# Patient Record
Sex: FEMALE | Race: WHITE | NOT HISPANIC OR LATINO | Employment: OTHER | URBAN - METROPOLITAN AREA
[De-identification: names, ages, dates, MRNs, and addresses within clinical notes are randomized per-mention and may not be internally consistent; named-entity substitution may affect disease eponyms.]

---

## 2017-03-24 ENCOUNTER — ALLSCRIPTS OFFICE VISIT (OUTPATIENT)
Dept: OTHER | Facility: OTHER | Age: 62
End: 2017-03-24

## 2017-04-11 ENCOUNTER — ALLSCRIPTS OFFICE VISIT (OUTPATIENT)
Dept: OTHER | Facility: OTHER | Age: 62
End: 2017-04-11

## 2017-07-21 ENCOUNTER — ALLSCRIPTS OFFICE VISIT (OUTPATIENT)
Dept: OTHER | Facility: OTHER | Age: 62
End: 2017-07-21

## 2017-09-25 ENCOUNTER — APPOINTMENT (EMERGENCY)
Dept: CT IMAGING | Facility: HOSPITAL | Age: 62
End: 2017-09-25
Payer: MEDICARE

## 2017-09-25 ENCOUNTER — APPOINTMENT (EMERGENCY)
Dept: RADIOLOGY | Facility: HOSPITAL | Age: 62
End: 2017-09-25
Payer: MEDICARE

## 2017-09-25 ENCOUNTER — HOSPITAL ENCOUNTER (EMERGENCY)
Facility: HOSPITAL | Age: 62
Discharge: HOME | End: 2017-09-25
Attending: EMERGENCY MEDICINE | Admitting: EMERGENCY MEDICINE
Payer: MEDICARE

## 2017-09-25 VITALS
HEIGHT: 64 IN | HEART RATE: 69 BPM | TEMPERATURE: 99.1 F | SYSTOLIC BLOOD PRESSURE: 115 MMHG | OXYGEN SATURATION: 94 % | RESPIRATION RATE: 16 BRPM | DIASTOLIC BLOOD PRESSURE: 71 MMHG | WEIGHT: 181.88 LBS | BODY MASS INDEX: 31.05 KG/M2
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
## 2017-09-25 DIAGNOSIS — E04.1 THYROID NODULE: ICD-10-CM

## 2017-09-25 DIAGNOSIS — S02.92XA FACIAL FRACTURE DUE TO FALL (HCC): ICD-10-CM

## 2017-09-25 LAB
ALBUMIN SERPL BCP-MCNC: 3.5 G/DL (ref 3.5–5)
ALP SERPL-CCNC: 112 U/L (ref 46–116)
ALT SERPL W P-5'-P-CCNC: 20 U/L (ref 12–78)
ANION GAP SERPL CALCULATED.3IONS-SCNC: 7 MMOL/L (ref 4–13)
APTT PPP: 30 SECONDS (ref 23–35)
AST SERPL W P-5'-P-CCNC: 18 U/L (ref 5–45)
BASOPHILS # BLD AUTO: 0.04 THOUSANDS/ΜL (ref 0–0.1)
BASOPHILS NFR BLD AUTO: 1 % (ref 0–1)
BILIRUB SERPL-MCNC: 0.5 MG/DL (ref 0.2–1)
BUN SERPL-MCNC: 10 MG/DL (ref 5–25)
CALCIUM SERPL-MCNC: 8.4 MG/DL (ref 8.3–10.1)
CHLORIDE SERPL-SCNC: 101 MMOL/L (ref 100–108)
CO2 SERPL-SCNC: 31 MMOL/L (ref 21–32)
CREAT SERPL-MCNC: 1.08 MG/DL (ref 0.6–1.3)
EOSINOPHIL # BLD AUTO: 0.09 THOUSAND/ΜL (ref 0–0.61)
EOSINOPHIL NFR BLD AUTO: 1 % (ref 0–6)
ERYTHROCYTE [DISTWIDTH] IN BLOOD BY AUTOMATED COUNT: 12.1 % (ref 11.6–15.1)
GFR SERPL CREATININE-BSD FRML MDRD: 56 ML/MIN/1.73SQ M
GLUCOSE SERPL-MCNC: 100 MG/DL (ref 65–140)
HCT VFR BLD AUTO: 35.3 % (ref 34.8–46.1)
HGB BLD-MCNC: 11.8 G/DL (ref 11.5–15.4)
INR PPP: 0.99 (ref 0.86–1.16)
LYMPHOCYTES # BLD AUTO: 2.09 THOUSANDS/ΜL (ref 0.6–4.47)
LYMPHOCYTES NFR BLD AUTO: 28 % (ref 14–44)
MCH RBC QN AUTO: 29.9 PG (ref 26.8–34.3)
MCHC RBC AUTO-ENTMCNC: 33.4 G/DL (ref 31.4–37.4)
MCV RBC AUTO: 89 FL (ref 82–98)
MONOCYTES # BLD AUTO: 0.74 THOUSAND/ΜL (ref 0.17–1.22)
MONOCYTES NFR BLD AUTO: 10 % (ref 4–12)
NEUTROPHILS # BLD AUTO: 4.36 THOUSANDS/ΜL (ref 1.85–7.62)
NEUTS SEG NFR BLD AUTO: 59 % (ref 43–75)
NRBC BLD AUTO-RTO: 0 /100 WBCS
PLATELET # BLD AUTO: 194 THOUSANDS/UL (ref 149–390)
PMV BLD AUTO: 10.1 FL (ref 8.9–12.7)
POTASSIUM SERPL-SCNC: 3.9 MMOL/L (ref 3.5–5.3)
PROT SERPL-MCNC: 7.2 G/DL (ref 6.4–8.2)
PROTHROMBIN TIME: 13.3 SECONDS (ref 12.1–14.4)
RBC # BLD AUTO: 3.95 MILLION/UL (ref 3.81–5.12)
SODIUM SERPL-SCNC: 139 MMOL/L (ref 136–145)
WBC # BLD AUTO: 7.36 THOUSAND/UL (ref 4.31–10.16)

## 2017-09-25 PROCEDURE — 73610 X-RAY EXAM OF ANKLE: CPT

## 2017-09-25 PROCEDURE — 85025 COMPLETE CBC W/AUTO DIFF WBC: CPT | Performed by: EMERGENCY MEDICINE

## 2017-09-25 PROCEDURE — 70450 CT HEAD/BRAIN W/O DYE: CPT

## 2017-09-25 PROCEDURE — 71260 CT THORAX DX C+: CPT

## 2017-09-25 PROCEDURE — 73630 X-RAY EXAM OF FOOT: CPT

## 2017-09-25 PROCEDURE — 70486 CT MAXILLOFACIAL W/O DYE: CPT

## 2017-09-25 PROCEDURE — 72125 CT NECK SPINE W/O DYE: CPT

## 2017-09-25 PROCEDURE — 74177 CT ABD & PELVIS W/CONTRAST: CPT

## 2017-09-25 PROCEDURE — 96372 THER/PROPH/DIAG INJ SC/IM: CPT

## 2017-09-25 PROCEDURE — 96361 HYDRATE IV INFUSION ADD-ON: CPT

## 2017-09-25 PROCEDURE — 80053 COMPREHEN METABOLIC PANEL: CPT | Performed by: EMERGENCY MEDICINE

## 2017-09-25 PROCEDURE — 36415 COLL VENOUS BLD VENIPUNCTURE: CPT | Performed by: EMERGENCY MEDICINE

## 2017-09-25 PROCEDURE — 96374 THER/PROPH/DIAG INJ IV PUSH: CPT

## 2017-09-25 PROCEDURE — 85610 PROTHROMBIN TIME: CPT | Performed by: EMERGENCY MEDICINE

## 2017-09-25 PROCEDURE — 96376 TX/PRO/DX INJ SAME DRUG ADON: CPT

## 2017-09-25 PROCEDURE — 85730 THROMBOPLASTIN TIME PARTIAL: CPT | Performed by: EMERGENCY MEDICINE

## 2017-09-25 PROCEDURE — 90715 TDAP VACCINE 7 YRS/> IM: CPT | Performed by: EMERGENCY MEDICINE

## 2017-09-25 PROCEDURE — 99284 EMERGENCY DEPT VISIT MOD MDM: CPT

## 2017-09-25 RX ORDER — FENTANYL CITRATE 50 UG/ML
50 INJECTION, SOLUTION INTRAMUSCULAR; INTRAVENOUS ONCE
Status: COMPLETED | OUTPATIENT
Start: 2017-09-25 | End: 2017-09-25

## 2017-09-25 RX ORDER — OXYCODONE HYDROCHLORIDE AND ACETAMINOPHEN 5; 325 MG/1; MG/1
1 TABLET ORAL EVERY 4 HOURS PRN
Qty: 15 TABLET | Refills: 0 | Status: SHIPPED | OUTPATIENT
Start: 2017-09-25 | End: 2018-07-02

## 2017-09-25 RX ORDER — OXYCODONE HYDROCHLORIDE AND ACETAMINOPHEN 5; 325 MG/1; MG/1
1 TABLET ORAL ONCE
Status: COMPLETED | OUTPATIENT
Start: 2017-09-25 | End: 2017-09-25

## 2017-09-25 RX ADMIN — IOHEXOL 100 ML: 350 INJECTION, SOLUTION INTRAVENOUS at 13:17

## 2017-09-25 RX ADMIN — OXYCODONE HYDROCHLORIDE AND ACETAMINOPHEN 1 TABLET: 5; 325 TABLET ORAL at 14:30

## 2017-09-25 RX ADMIN — FENTANYL CITRATE 50 MCG: 50 INJECTION INTRAMUSCULAR; INTRAVENOUS at 13:48

## 2017-09-25 RX ADMIN — SODIUM CHLORIDE 1000 ML: 0.9 INJECTION, SOLUTION INTRAVENOUS at 12:18

## 2017-09-25 RX ADMIN — FENTANYL CITRATE 50 MCG: 50 INJECTION INTRAMUSCULAR; INTRAVENOUS at 12:19

## 2017-09-25 RX ADMIN — TETANUS TOXOID, REDUCED DIPHTHERIA TOXOID AND ACELLULAR PERTUSSIS VACCINE, ADSORBED 0.5 ML: 5; 2.5; 8; 8; 2.5 SUSPENSION INTRAMUSCULAR at 14:35

## 2018-01-13 VITALS
DIASTOLIC BLOOD PRESSURE: 52 MMHG | BODY MASS INDEX: 30.32 KG/M2 | OXYGEN SATURATION: 97 % | HEIGHT: 65 IN | SYSTOLIC BLOOD PRESSURE: 102 MMHG | WEIGHT: 182 LBS | HEART RATE: 77 BPM

## 2018-07-02 ENCOUNTER — APPOINTMENT (INPATIENT)
Dept: RADIOLOGY | Facility: HOSPITAL | Age: 63
DRG: 389 | End: 2018-07-02
Payer: MEDICARE

## 2018-07-02 ENCOUNTER — APPOINTMENT (EMERGENCY)
Dept: RADIOLOGY | Facility: HOSPITAL | Age: 63
DRG: 389 | End: 2018-07-02
Payer: MEDICARE

## 2018-07-02 ENCOUNTER — APPOINTMENT (EMERGENCY)
Dept: INTERVENTIONAL RADIOLOGY/VASCULAR | Facility: HOSPITAL | Age: 63
DRG: 389 | End: 2018-07-02
Payer: MEDICARE

## 2018-07-02 ENCOUNTER — APPOINTMENT (EMERGENCY)
Dept: CT IMAGING | Facility: HOSPITAL | Age: 63
DRG: 389 | End: 2018-07-02
Payer: MEDICARE

## 2018-07-02 ENCOUNTER — HOSPITAL ENCOUNTER (INPATIENT)
Facility: HOSPITAL | Age: 63
LOS: 4 days | Discharge: HOME | DRG: 389 | End: 2018-07-06
Attending: EMERGENCY MEDICINE | Admitting: FAMILY MEDICINE
Payer: MEDICARE

## 2018-07-02 ENCOUNTER — TELEPHONE (OUTPATIENT)
Dept: INTERVENTIONAL RADIOLOGY/VASCULAR | Facility: HOSPITAL | Age: 63
End: 2018-07-02

## 2018-07-02 DIAGNOSIS — E87.6 HYPOKALEMIA: ICD-10-CM

## 2018-07-02 DIAGNOSIS — K56.7 ILEUS, POSTOPERATIVE (HCC): Primary | ICD-10-CM

## 2018-07-02 DIAGNOSIS — F11.90 CHRONIC, CONTINUOUS USE OF OPIOIDS: Chronic | ICD-10-CM

## 2018-07-02 DIAGNOSIS — K56.7 ILEUS (HCC): ICD-10-CM

## 2018-07-02 DIAGNOSIS — R74.8 ELEVATED LIVER ENZYMES: ICD-10-CM

## 2018-07-02 DIAGNOSIS — R11.14 BILIOUS VOMITING WITH NAUSEA: ICD-10-CM

## 2018-07-02 DIAGNOSIS — K59.00 CONSTIPATION: ICD-10-CM

## 2018-07-02 DIAGNOSIS — K91.89 ILEUS, POSTOPERATIVE (HCC): Primary | ICD-10-CM

## 2018-07-02 PROBLEM — K83.8 COMMON BILE DUCT DILATATION: Chronic | Status: ACTIVE | Noted: 2018-07-02

## 2018-07-02 PROBLEM — K83.8 COMMON BILE DUCT DILATATION: Status: ACTIVE | Noted: 2018-07-02

## 2018-07-02 PROBLEM — F41.1 GENERALIZED ANXIETY DISORDER: Chronic | Status: ACTIVE | Noted: 2018-07-02

## 2018-07-02 PROBLEM — F41.1 GENERALIZED ANXIETY DISORDER: Status: ACTIVE | Noted: 2018-07-02

## 2018-07-02 LAB
ALBUMIN SERPL BCP-MCNC: 3.4 G/DL (ref 3–5.2)
ALP SERPL-CCNC: 249 U/L (ref 43–122)
ALT SERPL W P-5'-P-CCNC: 68 U/L (ref 9–52)
ANION GAP SERPL CALCULATED.3IONS-SCNC: 8 MMOL/L (ref 5–14)
APTT PPP: 23 SECONDS (ref 23–34)
AST SERPL W P-5'-P-CCNC: 79 U/L (ref 14–36)
ATRIAL RATE: 90 BPM
BASOPHILS # BLD AUTO: 0 THOUSANDS/ΜL (ref 0–0.1)
BASOPHILS NFR BLD AUTO: 0 % (ref 0–1)
BILIRUB SERPL-MCNC: 1.3 MG/DL
BUN SERPL-MCNC: 12 MG/DL (ref 5–25)
CALCIUM SERPL-MCNC: 8.6 MG/DL (ref 8.4–10.2)
CHLORIDE SERPL-SCNC: 95 MMOL/L (ref 97–108)
CO2 SERPL-SCNC: 31 MMOL/L (ref 22–30)
CREAT SERPL-MCNC: 0.54 MG/DL (ref 0.6–1.2)
EOSINOPHIL # BLD AUTO: 0 THOUSAND/ΜL (ref 0–0.4)
EOSINOPHIL NFR BLD AUTO: 0 % (ref 0–6)
ERYTHROCYTE [DISTWIDTH] IN BLOOD BY AUTOMATED COUNT: 13.9 %
GFR SERPL CREATININE-BSD FRML MDRD: 101 ML/MIN/1.73SQ M
GLUCOSE SERPL-MCNC: 122 MG/DL (ref 70–99)
HCT VFR BLD AUTO: 37.6 % (ref 36–46)
HGB BLD-MCNC: 12.6 G/DL (ref 12–16)
INR PPP: 1.11 (ref 0.89–1.1)
LACTATE SERPL-SCNC: 0.6 MMOL/L (ref 0.7–2)
LIPASE SERPL-CCNC: 72 U/L (ref 23–300)
LYMPHOCYTES # BLD AUTO: 0.5 THOUSANDS/ΜL (ref 0.5–4)
LYMPHOCYTES NFR BLD AUTO: 7 % (ref 20–50)
MAGNESIUM SERPL-MCNC: 2.1 MG/DL (ref 1.6–2.3)
MCH RBC QN AUTO: 29.1 PG (ref 26–34)
MCHC RBC AUTO-ENTMCNC: 33.4 G/DL (ref 31–36)
MCV RBC AUTO: 87 FL (ref 80–100)
MONOCYTES # BLD AUTO: 0.7 THOUSAND/ΜL (ref 0.2–0.9)
MONOCYTES NFR BLD AUTO: 9 % (ref 1–10)
NEUTROPHILS # BLD AUTO: 6.4 THOUSANDS/ΜL (ref 1.8–7.8)
NEUTS SEG NFR BLD AUTO: 83 % (ref 45–65)
P AXIS: 22 DEGREES
PHOSPHATE SERPL-MCNC: 3.4 MG/DL (ref 2.5–4.8)
PLATELET # BLD AUTO: 319 THOUSANDS/UL (ref 150–450)
PMV BLD AUTO: 8.1 FL (ref 8.9–12.7)
POTASSIUM SERPL-SCNC: 3.4 MMOL/L (ref 3.6–5)
PR INTERVAL: 156 MS
PROT SERPL-MCNC: 6.6 G/DL (ref 5.9–8.4)
PROTHROMBIN TIME: 11.7 SECONDS (ref 9.5–11.6)
QRS AXIS: 14 DEGREES
QRSD INTERVAL: 74 MS
QT INTERVAL: 372 MS
QTC INTERVAL: 455 MS
RBC # BLD AUTO: 4.32 MILLION/UL (ref 4–5.2)
SODIUM SERPL-SCNC: 134 MMOL/L (ref 137–147)
T WAVE AXIS: 14 DEGREES
TROPONIN I SERPL-MCNC: <0.01 NG/ML (ref 0–0.03)
VENTRICULAR RATE: 90 BPM
WBC # BLD AUTO: 7.6 THOUSAND/UL (ref 4.5–11)

## 2018-07-02 PROCEDURE — 74177 CT ABD & PELVIS W/CONTRAST: CPT

## 2018-07-02 PROCEDURE — 93010 ELECTROCARDIOGRAM REPORT: CPT | Performed by: INTERNAL MEDICINE

## 2018-07-02 PROCEDURE — 76000 FLUOROSCOPY <1 HR PHYS/QHP: CPT

## 2018-07-02 PROCEDURE — 36415 COLL VENOUS BLD VENIPUNCTURE: CPT | Performed by: PHYSICIAN ASSISTANT

## 2018-07-02 PROCEDURE — 84484 ASSAY OF TROPONIN QUANT: CPT | Performed by: PHYSICIAN ASSISTANT

## 2018-07-02 PROCEDURE — 36569 INSJ PICC 5 YR+ W/O IMAGING: CPT

## 2018-07-02 PROCEDURE — 76937 US GUIDE VASCULAR ACCESS: CPT

## 2018-07-02 PROCEDURE — 99223 1ST HOSP IP/OBS HIGH 75: CPT | Performed by: HOSPITALIST

## 2018-07-02 PROCEDURE — 96361 HYDRATE IV INFUSION ADD-ON: CPT

## 2018-07-02 PROCEDURE — 96375 TX/PRO/DX INJ NEW DRUG ADDON: CPT

## 2018-07-02 PROCEDURE — 85025 COMPLETE CBC W/AUTO DIFF WBC: CPT | Performed by: PHYSICIAN ASSISTANT

## 2018-07-02 PROCEDURE — 85730 THROMBOPLASTIN TIME PARTIAL: CPT | Performed by: PHYSICIAN ASSISTANT

## 2018-07-02 PROCEDURE — 83605 ASSAY OF LACTIC ACID: CPT | Performed by: PHYSICIAN ASSISTANT

## 2018-07-02 PROCEDURE — 93005 ELECTROCARDIOGRAM TRACING: CPT

## 2018-07-02 PROCEDURE — 84100 ASSAY OF PHOSPHORUS: CPT | Performed by: HOSPITALIST

## 2018-07-02 PROCEDURE — 85610 PROTHROMBIN TIME: CPT | Performed by: PHYSICIAN ASSISTANT

## 2018-07-02 PROCEDURE — 02HV33Z INSERTION OF INFUSION DEVICE INTO SUPERIOR VENA CAVA, PERCUTANEOUS APPROACH: ICD-10-PCS | Performed by: FAMILY MEDICINE

## 2018-07-02 PROCEDURE — C1751 CATH, INF, PER/CENT/MIDLINE: HCPCS

## 2018-07-02 PROCEDURE — 83735 ASSAY OF MAGNESIUM: CPT | Performed by: HOSPITALIST

## 2018-07-02 PROCEDURE — 96365 THER/PROPH/DIAG IV INF INIT: CPT

## 2018-07-02 PROCEDURE — 71045 X-RAY EXAM CHEST 1 VIEW: CPT

## 2018-07-02 PROCEDURE — 96376 TX/PRO/DX INJ SAME DRUG ADON: CPT

## 2018-07-02 PROCEDURE — 83690 ASSAY OF LIPASE: CPT | Performed by: PHYSICIAN ASSISTANT

## 2018-07-02 PROCEDURE — 99285 EMERGENCY DEPT VISIT HI MDM: CPT

## 2018-07-02 PROCEDURE — 80053 COMPREHEN METABOLIC PANEL: CPT | Performed by: PHYSICIAN ASSISTANT

## 2018-07-02 PROCEDURE — 0T9B70Z DRAINAGE OF BLADDER WITH DRAINAGE DEVICE, VIA NATURAL OR ARTIFICIAL OPENING: ICD-10-PCS | Performed by: FAMILY MEDICINE

## 2018-07-02 RX ORDER — ONDANSETRON 2 MG/ML
INJECTION INTRAMUSCULAR; INTRAVENOUS
Status: DISPENSED
Start: 2018-07-02 | End: 2018-07-03

## 2018-07-02 RX ORDER — IBUPROFEN 200 MG
CAPSULE ORAL
COMMUNITY
Start: 2017-11-24 | End: 2018-12-02

## 2018-07-02 RX ORDER — ACETAMINOPHEN 325 MG/1
650 TABLET ORAL EVERY 6 HOURS PRN
Status: DISCONTINUED | OUTPATIENT
Start: 2018-07-02 | End: 2018-07-06 | Stop reason: HOSPADM

## 2018-07-02 RX ORDER — MORPHINE SULFATE 15 MG/1
15 TABLET ORAL EVERY 4 HOURS PRN
Status: COMPLETED | OUTPATIENT
Start: 2018-07-02 | End: 2018-07-04

## 2018-07-02 RX ORDER — MORPHINE SULFATE 60 MG/1
30 TABLET, FILM COATED, EXTENDED RELEASE ORAL
COMMUNITY
End: 2018-07-06 | Stop reason: HOSPADM

## 2018-07-02 RX ORDER — SODIUM CHLORIDE 9 MG/ML
100 INJECTION, SOLUTION INTRAVENOUS CONTINUOUS
Status: DISPENSED | OUTPATIENT
Start: 2018-07-02 | End: 2018-07-03

## 2018-07-02 RX ORDER — SENNOSIDES 8.6 MG
1 TABLET ORAL DAILY
Status: DISCONTINUED | OUTPATIENT
Start: 2018-07-03 | End: 2018-07-06 | Stop reason: HOSPADM

## 2018-07-02 RX ORDER — GABAPENTIN 300 MG/1
600 CAPSULE ORAL 2 TIMES DAILY
COMMUNITY
Start: 2017-11-06 | End: 2021-01-01 | Stop reason: SDUPTHER

## 2018-07-02 RX ORDER — DOCUSATE SODIUM 100 MG/1
100 CAPSULE, LIQUID FILLED ORAL 2 TIMES DAILY
Status: DISCONTINUED | OUTPATIENT
Start: 2018-07-02 | End: 2018-07-06 | Stop reason: HOSPADM

## 2018-07-02 RX ORDER — CETIRIZINE HYDROCHLORIDE 10 MG/1
TABLET ORAL
COMMUNITY
Start: 2017-10-23 | End: 2019-02-26

## 2018-07-02 RX ORDER — DIAZEPAM 5 MG/1
TABLET ORAL 3 TIMES DAILY
COMMUNITY
Start: 2017-11-03 | End: 2021-02-04 | Stop reason: HOSPADM

## 2018-07-02 RX ORDER — ONDANSETRON 2 MG/ML
INJECTION INTRAMUSCULAR; INTRAVENOUS
Status: COMPLETED
Start: 2018-07-02 | End: 2018-07-02

## 2018-07-02 RX ORDER — ONDANSETRON 2 MG/ML
4 INJECTION INTRAMUSCULAR; INTRAVENOUS EVERY 6 HOURS PRN
Status: DISCONTINUED | OUTPATIENT
Start: 2018-07-02 | End: 2018-07-06 | Stop reason: HOSPADM

## 2018-07-02 RX ORDER — MORPHINE SULFATE 15 MG/1
15 TABLET ORAL EVERY 4 HOURS
COMMUNITY
End: 2018-07-02

## 2018-07-02 RX ORDER — ESCITALOPRAM OXALATE 20 MG/1
TABLET ORAL
COMMUNITY
Start: 2017-10-23 | End: 2021-01-01 | Stop reason: SDUPTHER

## 2018-07-02 RX ORDER — POTASSIUM CHLORIDE 14.9 MG/ML
10 INJECTION INTRAVENOUS ONCE
Status: DISCONTINUED | OUTPATIENT
Start: 2018-07-02 | End: 2018-07-02

## 2018-07-02 RX ORDER — TEMAZEPAM 30 MG/1
30 CAPSULE ORAL
Status: ON HOLD | COMMUNITY
Start: 2017-11-28 | End: 2019-11-03 | Stop reason: ALTCHOICE

## 2018-07-02 RX ORDER — ONDANSETRON 2 MG/ML
4 INJECTION INTRAMUSCULAR; INTRAVENOUS ONCE
Status: COMPLETED | OUTPATIENT
Start: 2018-07-02 | End: 2018-07-02

## 2018-07-02 RX ADMIN — ONDANSETRON 4 MG: 2 INJECTION, SOLUTION INTRAMUSCULAR; INTRAVENOUS at 12:06

## 2018-07-02 RX ADMIN — HYDROMORPHONE HYDROCHLORIDE 0.5 MG: 1 INJECTION, SOLUTION INTRAMUSCULAR; INTRAVENOUS; SUBCUTANEOUS at 20:48

## 2018-07-02 RX ADMIN — SODIUM CHLORIDE 1000 ML: 9 INJECTION, SOLUTION INTRAVENOUS at 16:53

## 2018-07-02 RX ADMIN — POTASSIUM CHLORIDE 20 MEQ: 200 INJECTION, SOLUTION INTRAVENOUS at 17:49

## 2018-07-02 RX ADMIN — DOCUSATE SODIUM 100 MG: 100 CAPSULE, LIQUID FILLED ORAL at 22:23

## 2018-07-02 RX ADMIN — ONDANSETRON 4 MG: 2 INJECTION, SOLUTION INTRAMUSCULAR; INTRAVENOUS at 20:49

## 2018-07-02 RX ADMIN — HYDROMORPHONE HYDROCHLORIDE 1 MG: 1 INJECTION, SOLUTION INTRAMUSCULAR; INTRAVENOUS; SUBCUTANEOUS at 15:52

## 2018-07-02 RX ADMIN — IOHEXOL 100 ML: 350 INJECTION, SOLUTION INTRAVENOUS at 17:24

## 2018-07-02 RX ADMIN — SODIUM CHLORIDE 1000 ML: 9 INJECTION, SOLUTION INTRAVENOUS at 12:02

## 2018-07-02 RX ADMIN — HYDROMORPHONE HYDROCHLORIDE 1 MG: 1 INJECTION, SOLUTION INTRAMUSCULAR; INTRAVENOUS; SUBCUTANEOUS at 11:38

## 2018-07-02 RX ADMIN — HYDROMORPHONE HYDROCHLORIDE 0.5 MG: 1 INJECTION, SOLUTION INTRAMUSCULAR; INTRAVENOUS; SUBCUTANEOUS at 23:51

## 2018-07-02 RX ADMIN — SODIUM CHLORIDE 100 ML/HR: 9 INJECTION, SOLUTION INTRAVENOUS at 20:49

## 2018-07-02 RX ADMIN — HYDROMORPHONE HYDROCHLORIDE 0.5 MG: 1 INJECTION, SOLUTION INTRAMUSCULAR; INTRAVENOUS; SUBCUTANEOUS at 17:49

## 2018-07-02 NOTE — ED PROVIDER NOTES
History  Chief Complaint   Patient presents with    Abdominal Pain     uncomfortable abdomen, flatulence, no BM for 4 days    Fever - 9 weeks to 74 years     pt states she started 2 days ago     71-year-old female referred to the emergency department by Sneha Howard found for evaluation of increasing abdominal pain, distention, inability to pass flatus or bowel movements x3 days, fever x2 days  Patient underwent a anterior lumbar fusion at L2 through L4 on June 29th, and was admitted to skilled nursing care unit for further monitoring  Per documentation, she had post procedural urinary retention and was admitted to unit w/ indwelling urinary catheter, which was removed 7/1  She was then straight cathed this AM at 0100, but successfully voided urine at 0350 this AM    Pt has a reported hx of "at least 15 bowel obstructions"  Has not had a BM since 6/30, unable to pass flatus  Pt states "this feels like another one"  Vomited malodorous, bilious emesis upon arrival to ED  Prior to Admission Medications   Prescriptions Last Dose Informant Patient Reported? Taking? Calcium 600 MG tablet   Yes Yes   Sig: take 1 tablet by mouth once daily   cetirizine (ZyrTEC) 10 mg tablet   Yes Yes   diazepam (VALIUM) 5 mg tablet   Yes Yes   Sig: 3 (three) times a day Morning, 3pm and 8pm   escitalopram (LEXAPRO) 20 mg tablet   Yes Yes   Sig: daily  gabapentin (NEURONTIN) 300 mg capsule   Yes Yes   Sig: take 2 capsules by mouth three times a day   morphine (MS CONTIN) 60 mg 12 hr tablet   Yes No   Sig: Take 30 mg by mouth   temazepam (RESTORIL) 30 mg capsule   Yes Yes   Sig: daily      Facility-Administered Medications: None       Past Medical History:   Diagnosis Date    Asthma        Past Surgical History:   Procedure Laterality Date    ABDOMINAL SURGERY      BACK SURGERY         History reviewed  No pertinent family history    I have reviewed and agree with the history as documented  Social History   Substance Use Topics    Smoking status: Former Smoker     Quit date: 7/2/1980    Smokeless tobacco: Never Used    Alcohol use Not on file        Review of Systems   Constitutional: Positive for fatigue and fever  Negative for chills and diaphoresis  Respiratory: Negative for chest tightness and shortness of breath  Cardiovascular: Negative for chest pain and palpitations  Gastrointestinal: Positive for abdominal distention, abdominal pain, constipation, nausea and vomiting  Negative for blood in stool and diarrhea  Genitourinary: Negative for dysuria, flank pain, frequency and hematuria  Musculoskeletal: Negative for arthralgias, back pain and myalgias  Skin: Negative for color change and rash  Allergic/Immunologic: Negative for immunocompromised state  Neurological: Negative for dizziness and light-headedness  Hematological: Does not bruise/bleed easily  Psychiatric/Behavioral: Negative for confusion  Physical Exam  Physical Exam   Constitutional: She is oriented to person, place, and time  She appears well-developed and well-nourished  She appears distressed (in pain)  HENT:   Head: Normocephalic and atraumatic  Eyes: Pupils are equal, round, and reactive to light  No scleral icterus  Cardiovascular: Normal rate and regular rhythm  Exam reveals no gallop and no friction rub  No murmur heard  Pulmonary/Chest: No respiratory distress  She has no wheezes  She has no rales  Abdominal:   Abdomen markedly distended, diffuse rigid and exquisitely tender to palpation  Hyperactive BS 4 quadrants   Neurological: She is alert and oriented to person, place, and time  Skin: Skin is warm  Capillary refill takes less than 2 seconds  She is diaphoretic  Psychiatric: She has a normal mood and affect  Her behavior is normal    Vitals reviewed        Vital Signs  ED Triage Vitals   Temperature Pulse Respirations Blood Pressure SpO2   07/02/18 1120 07/02/18 1120 07/02/18 1120 07/02/18 1120 07/02/18 1212   99 4 °F (37 4 °C) 91 16 138/84 94 %      Temp Source Heart Rate Source Patient Position - Orthostatic VS BP Location FiO2 (%)   07/02/18 1120 07/02/18 1120 07/02/18 1120 07/02/18 1120 --   Temporal Monitor Lying Left arm       Pain Score       07/02/18 1138       Worst Possible Pain           Vitals:    07/02/18 1530 07/02/18 1600 07/02/18 1639 07/02/18 1757   BP: 140/81 138/71 124/80 131/75   Pulse: 90  93 97   Patient Position - Orthostatic VS:    Lying       Visual Acuity      ED Medications  Medications   potassium chloride 20 mEq in D5W 100 mL (20 mEq Intravenous New Bag 7/2/18 1749)    EMS REPLENISHMENT MED ( Does not apply Given to EMS 7/2/18 1110)   ondansetron (ZOFRAN) 4 mg/2 mL injection **AcuDose Override Pull** (  Given to EMS 7/2/18 1112)   HYDROmorphone (DILAUDID) injection 1 mg (1 mg Intravenous Given 7/2/18 1138)   sodium chloride 0 9 % bolus 1,000 mL (0 mL Intravenous Stopped 7/2/18 1645)   ondansetron (ZOFRAN) injection 4 mg (4 mg Intravenous Given 7/2/18 1206)   HYDROmorphone (DILAUDID) injection 1 mg (1 mg Intravenous Given 7/2/18 1552)   sodium chloride 0 9 % bolus 1,000 mL (1,000 mL Intravenous New Bag 7/2/18 1653)   iohexol (OMNIPAQUE) 350 MG/ML injection (MULTI-DOSE) 100 mL (100 mL Intravenous Given 7/2/18 1724)   HYDROmorphone (DILAUDID) injection 0 5 mg (0 5 mg Intravenous Given 7/2/18 1749)       Diagnostic Studies  Results Reviewed     Procedure Component Value Units Date/Time    APTT [[id number]]  (Normal) Collected:  07/02/18 1659    Lab Status:  Final result Specimen:  Blood from Line Updated:  07/02/18 1732     PTT 23 seconds     Narrative:       PTT:      Protime-INR [[id number]]  (Abnormal) Collected:  07/02/18 9555    Lab Status:  Final result Specimen:  Blood from Line Updated:  07/02/18 6022     Protime 11 7 (H) seconds      INR 1 11 (H)    Narrative:       INR:  ,PROTIME:      Troponin I [[id number]]  (Normal) Collected: 07/02/18 1602    Lab Status:  Final result Specimen:  Blood from Line Updated:  07/02/18 1634     Troponin I <0 01 ng/mL     Comprehensive metabolic panel [[id number]]  (Abnormal) Collected:  07/02/18 1604    Lab Status:  Final result Specimen:  Blood from Line Updated:  07/02/18 1623     Sodium 134 (L) mmol/L      Potassium 3 4 (L) mmol/L      Chloride 95 (L) mmol/L      CO2 31 (H) mmol/L      Anion Gap 8 mmol/L      BUN 12 mg/dL      Creatinine 0 54 (L) mg/dL      Glucose 122 (H) mg/dL      Calcium 8 6 mg/dL      AST 79 (H) U/L      ALT 68 (H) U/L      Alkaline Phosphatase 249 (H) U/L      Total Protein 6 6 g/dL      Albumin 3 4 g/dL      Total Bilirubin 1 30 (H) mg/dL      eGFR 101 ml/min/1 73sq m     Narrative:         National Kidney Disease Education Program recommendations are as follows:  GFR calculation is accurate only with a steady state creatinine  Chronic Kidney disease less than 60 ml/min/1 73 sq  meters  Kidney failure less than 15 ml/min/1 73 sq  meters  Lipase [[id number]]  (Normal) Collected:  07/02/18 1602    Lab Status:  Final result Specimen:  Blood from Line Updated:  07/02/18 1621     Lipase 72 u/L     Lactic acid, plasma [[id number]]  (Abnormal) Collected:  07/02/18 1603    Lab Status:  Final result Specimen:  Blood from Line Updated:  07/02/18 1621     LACTIC ACID 0 6 (L) mmol/L     Narrative:         Result may be elevated if tourniquet was used during collection      CBC and differential [[id number]]  (Abnormal) Collected:  07/02/18 1602    Lab Status:  Final result Specimen:  Blood from Line Updated:  07/02/18 1615     WBC 7 60 Thousand/uL      RBC 4 32 Million/uL      Hemoglobin 12 6 g/dL      Hematocrit 37 6 %      MCV 87 fL      MCH 29 1 pg      MCHC 33 4 g/dL      RDW 13 9 %      MPV 8 1 (L) fL      Platelets 054 Thousands/uL      Neutrophils Relative 83 (H) %      Lymphocytes Relative 7 (L) %      Monocytes Relative 9 %      Eosinophils Relative 0 %      Basophils Relative 0 % Neutrophils Absolute 6 40 Thousands/µL      Lymphocytes Absolute 0 50 Thousands/µL      Monocytes Absolute 0 70 Thousand/µL      Eosinophils Absolute 0 00 Thousand/µL      Basophils Absolute 0 00 Thousands/µL                  CT abdomen pelvis with contrast   Final Result by Arash Monique MD (07/02 1748)      1  Diffuse bowel dilatation involving distal small bowel and the entire colon to the anal verge  Obstructing mass is not identified and this may represent adynamic ileus in the postoperative setting, possibly related to medication  The possibility of    obstruction secondary to fecal impaction or a distal rectal/anal mass cannot be entirely excluded and clinical correlation is recommended  2   Postoperative changes status post recent spinal surgery  Lytic lesions in the iliac bone are likely bone graft donor sites  If this is not consistent with recent surgery, pathologic lesions cannot be excluded  3   Stable common bile duct and pancreatic duct dilatation of uncertain etiology though given that this was present in 2007 to a lesser degree, this is likely a benign process  4   Patchy bibasilar density, likely atelectasis  Note that a left basilar pneumonia cannot be excluded in the appropriate clinical setting  5   Stable right-sided hydronephrosis though the right kidney demonstrates normal enhancement  The ureter is nondilated and there appears to be kinking at the ureteropelvic junction suggesting partial UPJ junction  The study was marked in [facility] for immediate notification        Workstation performed: [id number]         XR chest PICC line portable   Final Result by Geremias Vargas MD (07/02 9466)      Successful repositioning of the PICC line, with its tip now at the cavoatrial junction            Workstation performed: [id number]         XR chest PICC line portable   Final Result by Geremias Vargas MD (07/02 9882)      PICC line extends into the left jugular vein requiring repositioning  The examination demonstrates a significant  finding and was documented as such [facility] for liaison and referring practitioner immediate notification  Workstation performed: [id number]         IR PICC line    (Results Pending)   XR chest 2 views    (Results Pending)              Procedures  ECG 12 Lead Documentation  Date/Time: 7/2/2018 12:05 PM  Performed by: Jhoan Velásquez by: Jg Loya     Indications / Diagnosis:  Abd pain, N/V  ECG reviewed by me, the ED Provider: yes    Patient location:  ED  Previous ECG:     Previous ECG:  Unavailable    Comparison to cardiac monitor: Yes    Interpretation:     Interpretation: normal    Rate:     ECG rate:  90    ECG rate assessment: normal    Rhythm:     Rhythm: sinus rhythm    Ectopy:     Ectopy: none    QRS:     QRS axis:  Normal  Conduction:     Conduction: normal    ST segments:     ST segments:  Normal  T waves:     T waves: flattening and inverted      Flattening:  II    Inverted:  V6, V5, V4, V3, V2, III and V1           Phone Contacts  ED Phone Contact    ED Course  ED Course as of Jul 02 1826   Mon Jul 02, 2018   1120 58 yo female presents 7d s/p ALIF L2-L4, abdominal approach, with abd distention, pain and bilious emesis  Hx of multiple SBO  Plan for abdominal labs, lactate, CT abdomen/pelvis  1300 IR in with patient, attempting vascular access    1405 CXR shows PICC line placed in jugular  IR will take pt to IR suite for management of the line  1539 PICC successfully repositioned  MDM  Number of Diagnoses or Management Options  Bilious vomiting with nausea:   Ileus, postoperative [facility]):   Diagnosis management comments: 58 yo female presents to the Emergency Department from [place] [address] for presumed bowel obstruction  Pt underwent ALIF L2-L4 on 6/29, no BMs since 6/30; also had issues w/ post-op urinary retention requring wang/straight catheter placement  Upon arrival to the ED, pt was found to be in distress w/ significant abdominal distention and pain  VS normal  Prolonged ED time due to lengthy IV access attempts; IR consulted and PICC Line placed under fluoroscopic guidance  NG tube placed during stay due to bilious emesis  Labs unremarkable; CT abdomen/pelvis shows adynamic ileus, no free air  Discussed w/ Dr Bailey Wilburn who accepts admission to [facility] surg  Amount and/or Complexity of Data Reviewed  Clinical lab tests: ordered and reviewed  Tests in the radiology section of CPT®: ordered and reviewed  Tests in the medicine section of CPT®: ordered and reviewed  Decide to obtain previous medical records or to obtain history from someone other than the patient: yes  Obtain history from someone other than the patient: yes  Review and summarize past medical records: yes  Discuss the patient with other providers: yes  Independent visualization of images, tracings, or specimens: yes    Risk of Complications, Morbidity, and/or Mortality  Presenting problems: high  Diagnostic procedures: high  Management options: high      CritCare Time    Disposition  Final diagnoses:   Ileus, postoperative ([facility] [address] )   Bilious vomiting with nausea     Time reflects when diagnosis was documented in both MDM as applicable and the Disposition within this note     Time User Action Codes Description Comment    7/2/2018  6:24 PM Uli Briscoe [K91 89,  K56 7] Ileus, postoperative ([address] )     7/2/2018  6:24 PM Sanjiv Bowen [R11 14] Bilious vomiting with nausea       ED Disposition     ED Disposition Condition Comment    Admit  Case was discussed with Dr Bailey Wilburn and the patient's admission status was agreed to be Admission Status: inpatient status to the service of Dr Bailey Wilburn   Follow-up Information    None         Patient's Medications   Discharge Prescriptions    No medications on file     No discharge procedures on file      ED Provider  Electronically Signed by           Emory Martinez SAMANTA  07/02/18 1828

## 2018-07-02 NOTE — PROCEDURES
PICC Line Insertion  Date/Time: 7/2/2018 1:26 PM  Performed by: Andreia Berumen  Authorized by: Kingston Jordan     Patient location:  ED  Other Assisting Provider: Yes (comment)    Consent:     Consent obtained:  Written    Consent given by:  Patient    Risks discussed:  Arterial puncture, bleeding, infection and incorrect placement    Alternatives discussed:  No treatment  Universal protocol:     Procedure explained and questions answered to patient or proxy's satisfaction: yes      Relevant documents present and verified: yes      Test results available and properly labeled: yes      Radiology Images displayed and confirmed  If images not available, report reviewed: yes      Site/side marked: yes      Immediately prior to procedure, a time out was called: yes      Patient identity confirmed:  Verbally with patient, arm band, provided demographic data and hospital-assigned identification number  Pre-procedure details:     Hand hygiene: Hand hygiene performed prior to insertion      Sterile barrier technique: All elements of maximal sterile technique followed      Skin preparation:  ChloraPrep    Skin preparation agent: Skin preparation agent completely dried prior to procedure    Indications:     PICC line indications: total parenteral nutrition and no peripheral vascular access    Anesthesia (see MAR for exact dosages):      Anesthesia method:  Local infiltration    Local anesthetic:  Lidocaine 1% w/o epi  Procedure details:     Location:  Basilic    Vessel type: vein      Laterality:  Left    Site selection rationale:  Non dominant extremity    Approach: percutaneous technique used      Patient position:  Flat    Procedural supplies:  Double lumen    Catheter size:  5 Fr    Landmarks identified: yes      Ultrasound guidance: yes      Sterile ultrasound techniques: Sterile gel and sterile probe covers were used      Number of attempts:  2    Successful placement: yes      Vessel of catheter tip end:  Chest Xray needed to confirm placement  Post-procedure details:     Post-procedure:  Dressing applied and securement device placed    Assessment:  Blood return through all ports and placement verification pending x-ray result    Post-procedure complications: none      Patient tolerance of procedure:   Tolerated well, no immediate complications

## 2018-07-02 NOTE — ED NOTES
Pt refused Chest X-ray at this time   Pt would like for X-ray to be done tomorrow since she will be admitted     Karlie Hsieh RN  07/02/18 7360

## 2018-07-02 NOTE — ED NOTES
Pt feels urgency to void, and states she knows she will need a wang, since in the past this has been something done routinely  PA informed       Kathryn Mulligan RN  07/02/18 6634

## 2018-07-02 NOTE — ED NOTES
BAND - LIMB ALERT APPLIED ON RIGHT ARM DUE TO PICC    BAND - ALLERGY APPLIED- NSAIDS     Eric Jameson RN  07/02/18 7808

## 2018-07-02 NOTE — PROGRESS NOTES
PICC line manipulated under fluoroscopic guidance  New measurements are 43cm at skin site and 46cm total  Pending radiologist reading

## 2018-07-02 NOTE — ED NOTES
Pt in ED for abdominal pain and distention  Pt has not had a BM for 4 days, poor I&O at home     Pt has a hx of multiple bowel obstructions and states these symptoms feel the same as she had had in the past        Nonah Room, RN  07/02/18 2998

## 2018-07-02 NOTE — ED NOTES
Report called to Del MULLER/care transferred to [id number] [facility]  07/02/18 1946

## 2018-07-03 ENCOUNTER — APPOINTMENT (INPATIENT)
Dept: RADIOLOGY | Facility: HOSPITAL | Age: 63
DRG: 389 | End: 2018-07-03
Payer: MEDICARE

## 2018-07-03 LAB
ALBUMIN SERPL BCP-MCNC: 2.6 G/DL (ref 3–5.2)
ALP SERPL-CCNC: 187 U/L (ref 43–122)
ALT SERPL W P-5'-P-CCNC: 59 U/L (ref 9–52)
ANION GAP SERPL CALCULATED.3IONS-SCNC: 3 MMOL/L (ref 5–14)
AST SERPL W P-5'-P-CCNC: 60 U/L (ref 14–36)
BILIRUB SERPL-MCNC: 0.8 MG/DL
BUN SERPL-MCNC: 10 MG/DL (ref 5–25)
CALCIUM SERPL-MCNC: 7.3 MG/DL (ref 8.4–10.2)
CHLORIDE SERPL-SCNC: 103 MMOL/L (ref 97–108)
CO2 SERPL-SCNC: 30 MMOL/L (ref 22–30)
CREAT SERPL-MCNC: 0.48 MG/DL (ref 0.6–1.2)
ERYTHROCYTE [DISTWIDTH] IN BLOOD BY AUTOMATED COUNT: 13.6 %
GFR SERPL CREATININE-BSD FRML MDRD: 105 ML/MIN/1.73SQ M
GLUCOSE SERPL-MCNC: 100 MG/DL (ref 70–99)
HCT VFR BLD AUTO: 24.5 % (ref 36–46)
HGB BLD-MCNC: 8.2 G/DL (ref 12–16)
MAGNESIUM SERPL-MCNC: 1.9 MG/DL (ref 1.6–2.3)
MCH RBC QN AUTO: 29.3 PG (ref 26–34)
MCHC RBC AUTO-ENTMCNC: 33.5 G/DL (ref 31–36)
MCV RBC AUTO: 88 FL (ref 80–100)
PHOSPHATE SERPL-MCNC: 2.4 MG/DL (ref 2.5–4.8)
PLATELET # BLD AUTO: 370 THOUSANDS/UL (ref 150–450)
PMV BLD AUTO: 8.4 FL (ref 8.9–12.7)
POTASSIUM SERPL-SCNC: 3.3 MMOL/L (ref 3.6–5)
PROT SERPL-MCNC: 5.3 G/DL (ref 5.9–8.4)
RBC # BLD AUTO: 2.8 MILLION/UL (ref 4–5.2)
SODIUM SERPL-SCNC: 136 MMOL/L (ref 137–147)
TSH SERPL DL<=0.05 MIU/L-ACNC: 0.61 UIU/ML (ref 0.47–4.68)
WBC # BLD AUTO: 6.8 THOUSAND/UL (ref 4.5–11)

## 2018-07-03 PROCEDURE — 84100 ASSAY OF PHOSPHORUS: CPT | Performed by: FAMILY MEDICINE

## 2018-07-03 PROCEDURE — 85027 COMPLETE CBC AUTOMATED: CPT | Performed by: FAMILY MEDICINE

## 2018-07-03 PROCEDURE — 84443 ASSAY THYROID STIM HORMONE: CPT | Performed by: FAMILY MEDICINE

## 2018-07-03 PROCEDURE — 80053 COMPREHEN METABOLIC PANEL: CPT | Performed by: FAMILY MEDICINE

## 2018-07-03 PROCEDURE — 71046 X-RAY EXAM CHEST 2 VIEWS: CPT

## 2018-07-03 PROCEDURE — 99232 SBSQ HOSP IP/OBS MODERATE 35: CPT | Performed by: INTERNAL MEDICINE

## 2018-07-03 PROCEDURE — 83735 ASSAY OF MAGNESIUM: CPT | Performed by: FAMILY MEDICINE

## 2018-07-03 RX ORDER — TEMAZEPAM 15 MG/1
30 CAPSULE ORAL DAILY
Status: DISCONTINUED | OUTPATIENT
Start: 2018-07-03 | End: 2018-07-06 | Stop reason: HOSPADM

## 2018-07-03 RX ORDER — DIAZEPAM 5 MG/1
5 TABLET ORAL 3 TIMES DAILY
Status: DISCONTINUED | OUTPATIENT
Start: 2018-07-03 | End: 2018-07-06 | Stop reason: HOSPADM

## 2018-07-03 RX ORDER — GABAPENTIN 300 MG/1
600 CAPSULE ORAL 3 TIMES DAILY
Status: DISCONTINUED | OUTPATIENT
Start: 2018-07-03 | End: 2018-07-06 | Stop reason: HOSPADM

## 2018-07-03 RX ORDER — LORATADINE 10 MG/1
10 TABLET ORAL DAILY
Status: DISCONTINUED | OUTPATIENT
Start: 2018-07-03 | End: 2018-07-06 | Stop reason: HOSPADM

## 2018-07-03 RX ORDER — KETOROLAC TROMETHAMINE 30 MG/ML
15 INJECTION, SOLUTION INTRAMUSCULAR; INTRAVENOUS EVERY 6 HOURS PRN
Status: DISPENSED | OUTPATIENT
Start: 2018-07-03 | End: 2018-07-06

## 2018-07-03 RX ORDER — ESCITALOPRAM OXALATE 10 MG/1
20 TABLET ORAL DAILY
Status: DISCONTINUED | OUTPATIENT
Start: 2018-07-03 | End: 2018-07-06 | Stop reason: HOSPADM

## 2018-07-03 RX ORDER — B-COMPLEX WITH VITAMIN C
1 TABLET ORAL
Status: DISCONTINUED | OUTPATIENT
Start: 2018-07-03 | End: 2018-07-06 | Stop reason: HOSPADM

## 2018-07-03 RX ADMIN — GABAPENTIN 600 MG: 300 CAPSULE ORAL at 20:20

## 2018-07-03 RX ADMIN — HYDROMORPHONE HYDROCHLORIDE 0.5 MG: 1 INJECTION, SOLUTION INTRAMUSCULAR; INTRAVENOUS; SUBCUTANEOUS at 14:05

## 2018-07-03 RX ADMIN — HYDROMORPHONE HYDROCHLORIDE 0.5 MG: 1 INJECTION, SOLUTION INTRAMUSCULAR; INTRAVENOUS; SUBCUTANEOUS at 20:48

## 2018-07-03 RX ADMIN — DOCUSATE SODIUM 100 MG: 100 CAPSULE, LIQUID FILLED ORAL at 09:30

## 2018-07-03 RX ADMIN — DIAZEPAM 5 MG: 5 TABLET ORAL at 15:55

## 2018-07-03 RX ADMIN — ESCITALOPRAM OXALATE 20 MG: 10 TABLET ORAL at 09:37

## 2018-07-03 RX ADMIN — HYDROMORPHONE HYDROCHLORIDE 0.5 MG: 1 INJECTION, SOLUTION INTRAMUSCULAR; INTRAVENOUS; SUBCUTANEOUS at 04:11

## 2018-07-03 RX ADMIN — KETOROLAC TROMETHAMINE 15 MG: 30 INJECTION, SOLUTION INTRAMUSCULAR; INTRAVENOUS at 17:33

## 2018-07-03 RX ADMIN — LORATADINE 10 MG: 10 TABLET ORAL at 09:36

## 2018-07-03 RX ADMIN — POTASSIUM CHLORIDE 20 MEQ: 200 INJECTION, SOLUTION INTRAVENOUS at 12:31

## 2018-07-03 RX ADMIN — TEMAZEPAM 30 MG: 15 CAPSULE ORAL at 20:21

## 2018-07-03 RX ADMIN — GABAPENTIN 600 MG: 300 CAPSULE ORAL at 09:37

## 2018-07-03 RX ADMIN — DIAZEPAM 5 MG: 5 TABLET ORAL at 20:20

## 2018-07-03 RX ADMIN — MORPHINE SULFATE 15 MG: 15 TABLET ORAL at 02:24

## 2018-07-03 RX ADMIN — SENNOSIDES 8.6 MG: 8.6 TABLET, FILM COATED ORAL at 09:37

## 2018-07-03 RX ADMIN — POTASSIUM CHLORIDE 20 MEQ: 200 INJECTION, SOLUTION INTRAVENOUS at 11:35

## 2018-07-03 RX ADMIN — DOCUSATE SODIUM 100 MG: 100 CAPSULE, LIQUID FILLED ORAL at 17:32

## 2018-07-03 RX ADMIN — ENOXAPARIN SODIUM 40 MG: 40 INJECTION SUBCUTANEOUS at 09:37

## 2018-07-03 RX ADMIN — GABAPENTIN 600 MG: 300 CAPSULE ORAL at 17:32

## 2018-07-03 RX ADMIN — KETOROLAC TROMETHAMINE 15 MG: 30 INJECTION, SOLUTION INTRAMUSCULAR; INTRAVENOUS at 11:35

## 2018-07-03 RX ADMIN — DIAZEPAM 5 MG: 5 TABLET ORAL at 09:30

## 2018-07-03 RX ADMIN — SODIUM CHLORIDE 100 ML/HR: 9 INJECTION, SOLUTION INTRAVENOUS at 05:31

## 2018-07-03 RX ADMIN — HYDROMORPHONE HYDROCHLORIDE 0.5 MG: 1 INJECTION, SOLUTION INTRAMUSCULAR; INTRAVENOUS; SUBCUTANEOUS at 09:33

## 2018-07-03 NOTE — ASSESSMENT & PLAN NOTE
Most likely contribute to her hypodynamic ileus  Avoid unnecessary opioids  Added IV Toradol for pain control

## 2018-07-03 NOTE — PLAN OF CARE
Problem: PAIN - ADULT  Goal: Verbalizes/displays adequate comfort level or baseline comfort level  Interventions:  - Encourage patient to monitor pain and request assistance  - Assess pain using appropriate pain scale  - Administer analgesics based on type and severity of pain and evaluate response  - Implement non-pharmacological measures as appropriate and evaluate response  - Consider cultural and social influences on pain and pain management  - Notify physician/advanced practitioner if interventions unsuccessful or patient reports new pain   Outcome: Progressing      Problem: INFECTION - ADULT  Goal: Absence or prevention of progression during hospitalization  INTERVENTIONS:  - Assess and monitor for signs and symptoms of infection  - Monitor lab/diagnostic results  - Monitor all insertion sites, i e  indwelling lines, tubes, and drains  - Monitor endotracheal (as able) and nasal secretions for changes in amount and color  - [place] appropriate cooling/warming therapies per order  - Administer medications as ordered  - Instruct and encourage patient and family to use good hand hygiene technique  - Identify and instruct in appropriate isolation precautions for identified infection/condition   Outcome: Progressing    Goal: Absence of fever/infection during neutropenic period  INTERVENTIONS:  - Monitor WBC  - Implement neutropenic guidelines   Outcome: Progressing      Problem: SAFETY ADULT  Goal: Maintain or return to baseline ADL function  INTERVENTIONS:  -  Assess patient's ability to carry out ADLs; assess patient's baseline for ADL function and identify physical deficits which impact ability to perform ADLs (bathing, care of mouth/teeth, toileting, grooming, dressing, etc )  - Assess/evaluate cause of self-care deficits   - Assess range of motion  - Assess patient's mobility; develop plan if impaired  - Assess patient's need for assistive devices and provide as appropriate  - Encourage maximum independence but intervene and supervise when necessary  ¯ Involve family in performance of ADLs  ¯ Assess for home care needs following discharge   ¯ Request OT consult to assist with ADL evaluation and planning for discharge  ¯ Provide patient education as appropriate   Outcome: Progressing    Goal: Maintain or return mobility status to optimal level  INTERVENTIONS:  - Assess patient's baseline mobility status (ambulation, transfers, stairs, etc )    - Identify cognitive and physical deficits and behaviors that affect mobility  - Identify mobility aids required to assist with transfers and/or ambulation (gait belt, sit-to-stand, lift, walker, cane, etc )  - [place] fall precautions as indicated by assessment  - Record patient progress and toleration of activity level on Mobility SBAR; progress patient to next Phase/Stage  - Instruct patient to call for assistance with activity based on assessment  - Request Rehabilitation consult to assist with strengthening/weightbearing, etc    Outcome: Progressing      Problem: DISCHARGE PLANNING  Goal: Discharge to home or other facility with appropriate resources  INTERVENTIONS:  - Identify barriers to discharge w/patient and caregiver  - Arrange for needed discharge resources and transportation as appropriate  - Identify discharge learning needs (meds, wound care, etc )  - Arrange for interpretive services to assist at discharge as needed  - Refer to Case Management Department for coordinating discharge planning if the patient needs post-hospital services based on physician/advanced practitioner order or complex needs related to functional status, cognitive ability, or social support system   Outcome: Progressing      Problem: Knowledge Deficit  Goal: Patient/family/caregiver demonstrates understanding of disease process, treatment plan, medications, and discharge instructions  Complete learning assessment and assess knowledge base    Interventions:  - Provide teaching at level of understanding  - Provide teaching via preferred learning methods   Outcome: Progressing      Problem: Prexisting or High Potential for Compromised Skin Integrity  Goal: Skin integrity is maintained or improved  INTERVENTIONS:  - Identify patients at risk for skin breakdown  - Assess and monitor skin integrity  - Assess and monitor nutrition and hydration status  - Monitor labs (i e  albumin)  - Assess for incontinence   - Turn and reposition patient  - Assist with mobility/ambulation  - Relieve pressure over bony prominences  - Avoid friction and shearing  - Provide appropriate hygiene as needed including keeping skin clean and dry  - Evaluate need for skin moisturizer/barrier cream  - Collaborate with interdisciplinary team (i e  Nutrition, Rehabilitation, etc )   - Patient/family teaching   Outcome: Progressing

## 2018-07-03 NOTE — CONSULTS
Assessment/Plan:   Mary Sneed is a 59 y  o female who is here for The primary encounter diagnosis was Ileus, postoperative ([address] )  Diagnoses of Bilious vomiting with nausea, Elevated liver enzymes, Ileus ([address] ), and Constipation were also pertinent to this visit  Plan:  1  Emesis that enemas  2 continue NG tube for now  3   Add Toradol with an attempt to diminish her narcotic use 4  DC Stahl 5  Ambulate patien  ______________________________________________________  CC: Jalen Kaska Abdominal pain distention vomiting    HPI:  Mary Sneed is a 59 y  o female who was referred for evaluation of a 3 to four-day history of abdominal pain and distention vomiting     She recently underwent spinal surgery about 5 days prior to admission  S were performed ER [facility]  She was discharged on narcotics and developed the above symptoms over the next few days  She went to the emergency room at [facility] where CT scan demonstrated a diffuse ileus with fairly significantly dilated colon down to the anal verge  She was admitted to the medical service and consultation was placed to General surgery  Currently current least she is complaining of abdominal pain 1 more pain medicine  She has NG tube in situ that was not functioning but was made to function  Minimal bilious fluid is being retrieved from the NG tube  ROS:  General ROS: negative  negative for - chills, fatigue, fever or night sweats, weight loss  Respiratory ROS: no cough, shortness of breath, or wheezing  Cardiovascular ROS: no chest pain or dyspnea on exertion  Genito-Urinary ROS: no dysuria, trouble voiding, or hematuria  Musculoskeletal ROS: negative for - gait disturbance, joint pain or muscle pain  Neurological ROS: no TIA or stroke symptoms     see HPI    Skin ROS: no new rashes or lesions   Lymphatic ROS: no new adenopathy noted by pt     GYN ROS: see HPI, no new GYN history or bleeding noted  Psy ROS: no new mental or behavioral disturbances       Patient Active Problem List   Diagnosis    [address]us ([facility] [address]    Elevated liver enzymes    Bilious vomiting with nausea    Hypokalemia    Common bile duct dilatation    Generalized anxiety disorder    Chronic, continuous use of opioids         Allergies:  Nsaids      Current Facility-Administered Medications:     acetaminophen (TYLENOL) tablet 650 mg, 650 mg, Oral, Q6H PRN, Ollen Homans, MD    calcium carbonate-vitamin D (OSCAL-D) 500 mg-200 units per tablet 1 tablet, 1 tablet, Oral, Daily With Breakfast, Ollen Homans, MD    diazepam (VALIUM) tablet 5 mg, 5 mg, Oral, TID, Ollen Homans, MD, 5 mg at 07/03/18 0930    docusate sodium (COLACE) capsule 100 mg, 100 mg, Oral, BID, Ollen Homans, MD, 100 mg at 07/03/18 0930    enoxaparin (LOVENOX) subcutaneous injection 40 mg, 40 mg, Subcutaneous, Daily, Ollen Homans, MD, 40 mg at 07/03/18 0937    escitalopram (LEXAPRO) tablet 20 mg, 20 mg, Oral, Daily, Ollen Homans, MD, 20 mg at 07/03/18 1906    gabapentin (NEURONTIN) capsule 600 mg, 600 mg, Oral, TID, Ollen Homans, MD, 600 mg at 07/03/18 5935    HYDROmorphone (DILAUDID) injection 0 5 mg, 0 5 mg, Intravenous, Q3H PRN, Ollen Homans, MD, 0 5 mg at 07/03/18 0933    ketorolac (TORADOL) injection 15 mg, 15 mg, Intravenous, Q6H PRN, Timothy Gamez MD    loratadine (CLARITIN) tablet 10 mg, 10 mg, Oral, Daily, Ollen Homans, MD, 10 mg at 07/03/18 0936    morphine (MSIR) IR tablet 15 mg, 15 mg, Oral, Q4H PRN, Ollen Homans, MD, 15 mg at 07/03/18 0224    ondansetron [facility]) injection 4 mg, 4 mg, Intravenous, Q6H PRN, Ollen Homans, MD, 4 mg at 07/02/18 2049    senna (SENOKOT) tablet 8 6 mg, 1 tablet, Oral, Daily, Ollen Homans, MD, 8 6 mg at 07/03/18 6122    temazepam (RESTORIL) capsule 30 mg, 30 mg, Oral, Daily, Ollen Homans, MD    Past Medical History:   Diagnosis Date    Asthma        Past Surgical History:   Procedure Laterality Date    ABDOMINAL SURGERY      BACK SURGERY         History reviewed  No pertinent family history  reports that she quit smoking about 38 years ago  She has never used smokeless tobacco  She reports that she drinks alcohol  She reports that she does not use drugs  Labs:   Lab Results   Component Value Date    WBC 6 80 07/03/2018    HGB 8 2 (L) 07/03/2018    HCT 24 5 (L) 07/03/2018    MCV 88 07/03/2018     07/03/2018     Lab Results   Component Value Date     (L) 07/03/2018    K 3 3 (L) 07/03/2018     07/03/2018    CO2 30 07/03/2018    ANIONGAP 3 (L) 07/03/2018    BUN 10 07/03/2018    CREATININE 0 48 (L) 07/03/2018    GLUCOSE 100 (H) 07/03/2018    CALCIUM 7 3 (L) 07/03/2018    AST 60 (H) 07/03/2018    ALT 59 (H) 07/03/2018    ALKPHOS 187 (H) 07/03/2018    PROT 5 3 (L) 07/03/2018    BILITOT 0 80 07/03/2018    EGFR 105 07/03/2018         Imaging: I personally reviewed the radiology studies, my impression is as follows:  Ileus dilated colon constipation  PHYSICAL EXAM middle-aged white female awake alert mild distress  Lungs clear   cor regular  abdomen distended multiple scars are noted up and down, transversely  Questionable incisional hernia present

## 2018-07-03 NOTE — NURSING NOTE
Enema effective for 500ml brown liquid and multiple small pieces of stool  Pt continues to c/o abdominal discomfort  Refusing second enema for more complete evacuation of bowel

## 2018-07-03 NOTE — PROGRESS NOTES
Progress Note - Lawanda Aranda 1955, 59 y o  female MRN: [id number]    Unit/Bed#: 7T [facility] 705-02 Encounter: [id number]    Primary Care Provider: Vilma Mccracken MD   Date and time admitted to hospital: 7/2/2018 11:06 AM        Chronic, continuous use of opioids   Assessment & Plan    Most likely contribute to her hypodynamic ileus  Avoid unnecessary opioids  Added IV Toradol for pain control         Generalized anxiety disorder   Assessment & Plan    Continue Valium ,Lexapro, Restoril  Patient is more comfortable continue complain of abdominal pain but anxiety is much controlled         Common bile duct dilatation   Assessment & Plan    Chronic since 2007 ,management as above        Hypokalemia   Assessment & Plan    Secondary to vomitus  Will replace potassium   check magnesium phosphorus level and replace as needed  Repeat BMP and electrolytes in a m  Bilious vomiting with nausea   Assessment & Plan    Secondary to #1  Management as above  Low potassium replace IV potassium  Monitored electrolytes          Elevated liver enzymes   Assessment & Plan    Status post cholecystectomy and chronic CBD dilatation since 2007  Keep NPO ,management as above  GI consult noted        * Ileus [facility])   Assessment & Plan    Patient presented with generalized abdominal pain nausea and bilious vomiting ,imaging study reports " diffuse bowel dilatation involving distal small bowel and the entire colon to the panel verge obstruction mass is not identified and this may represent adynamic ileus in the postoperative setting possible related to medication"  Patient indeed takes [facility] Contin 60 mg q 12 hoursX 6 months  Will keep NPO  Continue NG tube to low suction  Antiemetic IV fluids  Given the patient on opioids will continue IV pain management limited amount of time    General surgeon and GI consult  Given questionable findings of possible pneumonia on CT of abdomen pelvis chest x-ray has been ordered however patient refused until a m  will encourage incentive spirometer   Seen by surgeon thinks it is secondary to opioid obstruction but no mass found order enema 1 to see his response continue NPO with NG tube               VTE Pharmacologic Prophylaxis:   Pharmacologic: Enoxaparin (Lovenox)  Mechanical VTE Prophylaxis in Place: No    Patient Centered Rounds: I have performed bedside rounds with nursing staff today  Discussions with Specialists or Other Care Team Provider:  Dr Janina Bright to patient's management Discussed with surgeon     Education and Discussions with Family / Patient:  The patient    Time Spent for Care: 30 minutes  More than 50% of total time spent on counseling and coordination of care as described above  Current Length of Stay: 1 day(s)    Current Patient Status: Inpatient   Certification Statement: The patient will continue to require additional inpatient hospital stay due to ileus    Discharge Plan:  Home    Code Status: Level 1 - Full Code      Subjective:   Patient complaining of abdominal pain no BM but no chest pain or shortness of breath no coughing no vomiting    Objective:     Vitals:   Temp (24hrs), Av 6 °F (36 4 °C), Min:97 3 °F (36 3 °C), Max:97 8 °F (36 6 °C)    HR:  [] 84  Resp:  [16-24] 16  BP: (124-165)/(71-94) 140/78  SpO2:  [90 %-95 %] 95 %  Body mass index is 30 16 kg/m²  Input and Output Summary (last 24 hours): Intake/Output Summary (Last 24 hours) at 18 1218  Last data filed at 18 0250   Gross per 24 hour   Intake             2810 ml   Output             2600 ml   Net              210 ml       Physical Exam:     Physical Exam   Constitutional: She is oriented to person, place, and time  She appears well-developed and well-nourished  HENT:   Head: Normocephalic and atraumatic     Right Ear: External ear normal    Left Ear: External ear normal    Mouth/Throat: Oropharynx is clear and moist    Eyes: Conjunctivae and EOM are normal  Pupils are equal, round, and reactive to light  Neck: Normal range of motion  Neck supple  Cardiovascular: Normal rate, regular rhythm, normal heart sounds and intact distal pulses  Pulmonary/Chest: Effort normal    Chest decreased breath sound bilaterally   Abdominal: Soft  Bowel sounds are normal  She exhibits distension and mass  There is tenderness  There is no rebound and no guarding  Genitourinary:   Genitourinary Comments: deferred   Musculoskeletal: Normal range of motion  Neurological: She is alert and oriented to person, place, and time  She has normal reflexes  Skin: Skin is warm and dry  No rash noted  Psychiatric: She has a normal mood and affect  Nursing note and vitals reviewed  Additional Data:     Labs:      Results from last 7 days  Lab Units 07/03/18  0652 07/02/18  1602   WBC Thousand/uL 6 80 7 60   HEMOGLOBIN g/dL 8 2* 12 6   HEMATOCRIT % 24 5* 37 6   PLATELETS Thousands/uL 370 319   NEUTROS PCT %  --  83*   LYMPHS PCT %  --  7*   MONOS PCT %  --  9   EOS PCT %  --  0       Results from last 7 days  Lab Units 07/03/18  0526   SODIUM mmol/L 136*   POTASSIUM mmol/L 3 3*   CHLORIDE mmol/L 103   CO2 mmol/L 30   BUN mg/dL 10   CREATININE mg/dL 0 48*   CALCIUM mg/dL 7 3*   TOTAL PROTEIN g/dL 5 3*   BILIRUBIN TOTAL mg/dL 0 80   ALK PHOS U/L 187*   ALT U/L 59*   AST U/L 60*   GLUCOSE RANDOM mg/dL 100*       Results from last 7 days  Lab Units 07/02/18  1659   INR  1 11*                 * I Have Reviewed All Lab Data Listed Above  * Additional Pertinent Lab Tests Reviewed:  All [place] Admission Reviewed    Imaging:    Imaging Reports Reviewed Today Include:  Not done yet x-ray pending CT reviewed with surgeon  Imaging Personally Reviewed by Myself Includes:  Reviewed with surgeon    Recent Cultures (last 7 days):           Last 24 Hours Medication List:     Current Facility-Administered Medications:  acetaminophen 650 mg Oral Q6H PRN Rox Sacks, MD    calcium carbonate-vitamin D 1 tablet Oral Daily With Breakfast Ollen Homans, MD    diazepam 5 mg Oral TID Ollen Homans, MD    docusate sodium 100 mg Oral BID Ollen Homans, MD    enoxaparin 40 mg Subcutaneous Daily Ollen Homans, MD    escitalopram 20 mg Oral Daily Ollen Homans, MD    gabapentin 600 mg Oral TID Ollen Homans, MD    HYDROmorphone 0 5 mg Intravenous Q3H PRN Ollen Homans, MD    ketorolac 15 mg Intravenous Q6H PRN Justin Akers MD    loratadine 10 mg Oral Daily Ollen Homans, MD    morphine 15 mg Oral Q4H PRN Ollen Homans, MD    ondansetron 4 mg Intravenous Q6H PRN Ollen Homans, MD    potassium chloride 20 mEq Intravenous Once Tamiko Geronimo MD Last Rate: 20 mEq (07/03/18 1135)   potassium chloride 20 mEq Intravenous BID Tamiko Geronimo MD    senna 1 tablet Oral Daily Ollen Homans, MD    temazepam 30 mg Oral Daily Ollen Homans, MD         Today, Patient Was Seen By: Tamiko Greonimo MD    ** Please Note: Dictation voice to text software may have been used in the creation of this document   **

## 2018-07-03 NOTE — NURSING NOTE
Patient refusing 2nd milk and molasses enema  Nurse sat with patient and explained rational behind same  Patient continues to refuse  Nurse advised patient to ambulate with assistance as she's been in bed since admission with exception of going to commode per patient, patient refused same as well  Requesting pain medication, tylenol available, patient refused  Nurse stated no other PRNs available until [phone], patient stated "ok" to same  Stahl in place  Lungs clear throughuot  Abdomen soft, distended, bowel sounds hyperactive  Refusing SCDs  No edema  Call bell in reach, IV fluids infusing in PICC  Will monitor

## 2018-07-03 NOTE — ASSESSMENT & PLAN NOTE
Secondary to vomitus  Will replace potassium   check magnesium phosphorus level and replace as needed  Repeat BMP and electrolytes in a m

## 2018-07-03 NOTE — ASSESSMENT & PLAN NOTE
Status post cholecystectomy and chronic CBD dilatation since 2007  Keep NPO ,management as above  GI consult noted

## 2018-07-03 NOTE — ASSESSMENT & PLAN NOTE
Status post cholecystectomy and chronic CBD dilatation since 2007  Keep NPO ,management as above  GI consult

## 2018-07-03 NOTE — NURSING NOTE
Patient found out of bed on commode, stated she got up by herself  Nurse advised patient to ring for stand by assistance due to NG tube, PICC and wang  Patient stated "ok" to same  Assisted back to bed  Call bell in reach  NG tube unclamped  Refuses SCDs  Will monitor

## 2018-07-03 NOTE — H&P
H&P- Héctor Milton 1955, 59 y o  female MRN: [id number]    Unit/Bed#: 7T [facility] 705-02 Encounter: [id number]    Primary Care Provider: Jaquelin Diggs MD   Date and time admitted to hospital: 7/2/2018 11:06 AM        * Ileus [facility])   Assessment & Plan    Patient presented with generalized abdominal pain nausea and bilious vomiting ,imaging study reports " diffuse bowel dilatation involving distal small bowel and the entire colon to the panel verge obstruction mass is not identified and this may represent adynamic ileus in the postoperative setting possible related to medication"  Patient indeed takes [facility] Contin 60 mg q 12 hoursX 6 months  Will keep NPO  Continue NG tube to low suction  Antiemetic IV fluids  Check potassium magnesium phosphorus level and replace as needed, avoid electrolyte imbalance  Given the patient on opioids will continue IV pain management limited amount of time  General surgeon and GI consult  Given questionable findings of possible pneumonia on CT of abdomen pelvis chest x-ray has been ordered however patient refused until a m  Elevated liver enzymes   Assessment & Plan    Status post cholecystectomy and chronic CBD dilatation since 2007  Keep NPO ,management as above  GI consult        Bilious vomiting with nausea   Assessment & Plan    Secondary to #1  Management as above        Hypokalemia   Assessment & Plan    Secondary to vomitus  Will replace potassium   check magnesium phosphorus level and replace as needed  Repeat BMP and electrolytes in a m          Common bile duct dilatation   Assessment & Plan    Chronic since 2007 ,management as above        Chronic, continuous use of opioids   Assessment & Plan    Most likely contribute to her hypodynamic ileus  Avoid unnecessary opioids        Generalized anxiety disorder   Assessment & Plan    Continue Valium ,Lexapro, Restoril          VTE Prophylaxis: Enoxaparin (Lovenox)  / sequential compression device   Code Status: full  POLST: There is no POLST form on file for this patient (pre-hospital)      Anticipated Length of Stay:  Patient will be admitted on an Inpatient basis with an anticipated length of stay of  > 2 midnights  Justification for Hospital Stay:  General surgeon and GI consult    Total Time for Visit, including Counseling / Coordination of Care: 45 minutes  Greater than 50% of this total time spent on direct patient counseling and coordination of care  Chief Complaint:  Nausea ,vomiting, Diffuse abdominal pain    History of Present Illness:    Arlyn Hanson is a 59 y o  female [facility] of generalized abdominal pain distension nonbilious vomiting nausea over the past 2-3 days  Upon my assessment patient reports that she underwent lumbar spine and " thigh" surgery by Gabby Thomas last Tuesday , she had postop urinary retention and was admitted to the unit with indwelling urine catheter which was removed on July 1st, she was then straight catheterize this a m  and successfully voided at 3:50 a m  as mentioned above over the past 2-3 days she also noted inability to pass flatus generalize intractable nonradiating 10/10 abdominal pain and multiple episodes of nonbilious vomiting with nausea  Patient mostly concerned to continue her  [facility] Contin 60 mg BID which she started after her 1st back surgery approximately 6 months ago  Has a history of small-bowel obstructions as well  She had a large bowel movement after admission   denies fever chills hematemesis hematuria melena significant weight loss  Patient requested a Stahl catheter to be inserted in the ER catheter to be inserted    Further workup in the emergency room revealed  " diffuse bowel dilatation involving distal small bowel and the entire colon to the anal verge obstructing mass is not identified and this may represent a denied jessica a loose in the postoperative setting or related to medication" on CT of the  abdomen and pelvis  She refused a chest x-ray  Mildly elevated AST/ ALT noted ,total bilirubin 1 3 ,potassium 3 4   WBC and lactic acid normal  patient afebrile hemodynamically stable    NG tube was placed by ER fellow  She admitted on an inpatient basis for general surgeon and GI consult      Review of Systems:    Review of Systems   Gastrointestinal: Positive for abdominal pain, nausea and vomiting  All other systems reviewed and are negative  Past Medical and Surgical History:     Past Medical History:   Diagnosis Date    Asthma        Past Surgical History:   Procedure Laterality Date    ABDOMINAL SURGERY      BACK SURGERY         Meds/Allergies:    Prior to Admission medications    Medication Sig Start Date End Date Taking? Authorizing Provider   Calcium 600 MG tablet take 1 tablet by mouth once daily 11/24/17  Yes Historical Provider, MD   cetirizine (ZyrTEC) 10 mg tablet  10/23/17  Yes Historical Provider, MD   diazepam (VALIUM) 5 mg tablet 3 (three) times a day Morning, 3pm and 8pm 11/3/17  Yes Historical Provider, MD   escitalopram (LEXAPRO) 20 mg tablet daily  10/23/17  Yes Historical Provider, MD   gabapentin (NEURONTIN) 300 mg capsule take 2 capsules by mouth three times a day 11/6/17  Yes Historical Provider, MD   temazepam (RESTORIL) 30 mg capsule daily 11/28/17  Yes Historical Provider, MD   morphine (MS CONTIN) 60 mg 12 hr tablet Take 30 mg by mouth    Historical Provider, MD   morphine (MSIR) 15 mg tablet Take 15 mg by mouth every 4 (four) hours  7/2/18  Historical Provider, MD   oxyCODONE-acetaminophen (PERCOCET) 5-325 mg per tablet Take 1 tablet by mouth every 4 (four) hours as needed for moderate pain for up to 15 doses Max Daily Amount: 6 tablets 9/25/17 7/2/18  Birdie Knutson, DO     I have reviewed home medications with a medical source (PCP, Pharmacy, other)  Allergies:    Allergies   Allergen Reactions    Nsaids      Irritable, upset stomach       Social History:     Marital Status: /Civil Union   Occupation:none  Patient Pre-hospital Living Situation: home  Patient Pre-hospital Level of Mobility: reg  Patient Pre-hospital Diet Restrictions: reg  Substance Use History:   History   Alcohol use Not on file     History   Smoking Status    Former Smoker    Quit date: 7/2/1980   Smokeless Tobacco    Never Used     History   Drug Use No       Family History:    non-contributory    Physical Exam:     Vitals:   Blood Pressure: 144/94 (07/02/18 2000)  Pulse: (!) 111 (07/02/18 1959)  Temperature: 97 8 °F (36 6 °C) (07/02/18 1959)  Temp Source: Temporal (07/02/18 1959)  Respirations: 22 (07/02/18 1959)  Height: 5' 4" (162 6 cm) (07/02/18 2000)  Weight - Scale: 79 7 kg (175 lb 11 3 oz) (07/02/18 2000)  SpO2: 92 % (07/02/18 2000)    Physical Exam   Constitutional: No distress  HENT:   Head: Normocephalic  NG tube in place   Eyes: Pupils are equal, round, and reactive to light  Neck: Normal range of motion  Cardiovascular: Regular rhythm  Pulmonary/Chest: No respiratory distress  Abdominal: She exhibits distension  There is tenderness  There is no rebound and no guarding  Neurological: She is alert  Skin: Skin is warm  Psychiatric: She has a normal mood and affect  Additional Data:     Lab Results: I have personally reviewed pertinent reports          Results from last 7 days  Lab Units 07/02/18  1602   WBC Thousand/uL 7 60   HEMOGLOBIN g/dL 12 6   HEMATOCRIT % 37 6   PLATELETS Thousands/uL 319   NEUTROS PCT % 83*   LYMPHS PCT % 7*   MONOS PCT % 9   EOS PCT % 0       Results from last 7 days  Lab Units 07/02/18  1604   SODIUM mmol/L 134*   POTASSIUM mmol/L 3 4*   CHLORIDE mmol/L 95*   CO2 mmol/L 31*   BUN mg/dL 12   CREATININE mg/dL 0 54*   CALCIUM mg/dL 8 6   TOTAL PROTEIN g/dL 6 6   BILIRUBIN TOTAL mg/dL 1 30*   ALK PHOS U/L 249*   ALT U/L 68*   AST U/L 79*   GLUCOSE RANDOM mg/dL 122*       Results from last 7 days  Lab Units 07/02/18  1659   INR  1 11*               Imaging: I have personally reviewed pertinent reports  CT abdomen pelvis with contrast   Final Result by Heidi Montes MD (07/02 5438)      1  Diffuse bowel dilatation involving distal small bowel and the entire colon to the anal verge  Obstructing mass is not identified and this may represent adynamic ileus in the postoperative setting, possibly related to medication  The possibility of    obstruction secondary to fecal impaction or a distal rectal/anal mass cannot be entirely excluded and clinical correlation is recommended  2   Postoperative changes status post recent spinal surgery  Lytic lesions in the iliac bone are likely bone graft donor sites  If this is not consistent with recent surgery, pathologic lesions cannot be excluded  3   Stable common bile duct and pancreatic duct dilatation of uncertain etiology though given that this was present in 2007 to a lesser degree, this is likely a benign process  4   Patchy bibasilar density, likely atelectasis  Note that a left basilar pneumonia cannot be excluded in the appropriate clinical setting  5   Stable right-sided hydronephrosis though the right kidney demonstrates normal enhancement  The ureter is nondilated and there appears to be kinking at the ureteropelvic junction suggesting partial UPJ junction  The study was marked in [facility] for immediate notification  Workstation performed: [id number]         XR chest PICC line portable   Final Result by Nael Alves MD (07/02 1536)      Successful repositioning of the PICC line, with its tip now at the cavoatrial junction            Workstation performed: [id number]         XR chest PICC line portable   Final Result by Nael Alves MD (07/02 1354)      PICC line extends into the left jugular vein requiring repositioning         The examination demonstrates a significant  finding and was documented as such in Epic for liaison and referring practitioner immediate notification  Workstation performed: [id number]         IR PICC line    (Results Pending)   XR chest 2 views    (Results Pending)         Allscripts / Epic Records Reviewed: Yes     ** Please Note: This note has been constructed using a voice recognition system   **

## 2018-07-03 NOTE — SOCIAL WORK
SW met with this pt to gather assessment information  Pt reports her PCP is now Dr Randall Ac [phone] in [address] [place] [address]  She uses [employer] in [facility] and her  will transport her home  SW will follow for any discharge needs

## 2018-07-03 NOTE — ASSESSMENT & PLAN NOTE
Continue Valium ,Lexapro, Restoril  Patient is more comfortable continue complain of abdominal pain but anxiety is much controlled

## 2018-07-03 NOTE — ASSESSMENT & PLAN NOTE
Patient presented with generalized abdominal pain nausea and bilious vomiting ,imaging study reports " diffuse bowel dilatation involving distal small bowel and the entire colon to the panel verge obstruction mass is not identified and this may represent adynamic ileus in the postoperative setting possible related to medication"  Patient indeed takes [facility] Contin 60 mg q 12 hoursX 6 months  Will keep NPO  Continue NG tube to low suction  Antiemetic IV fluids  Check potassium magnesium phosphorus level and replace as needed, avoid electrolyte imbalance  Given the patient on opioids will continue IV pain management limited amount of time    General surgeon and GI consult  Given questionable findings of possible pneumonia on CT of abdomen pelvis chest x-ray has been ordered however patient refused until a m  will encourage incentive spirometer

## 2018-07-03 NOTE — NURSING NOTE
Pt continues w/ abdominal pain  Abdomen more distended throughout the night  NG tube only drained 50 cc of brown thick secretions  NG tube adjusted, and began to drain more  VSS call bell within reach  Will continue to monitor

## 2018-07-03 NOTE — CONSULTS
Patient MRN: [id number]  Date of Service: 7/3/2018  Referring Physician: Radha Chatterjee  Provider Creating Note: Maryjo Griggs MD  PCP: Dorie Naik  Reason for Consult:  Ileus, abnormal CT  HPI  Argelia Cheung is a 59 y o  female who was admitted with Ileus ([facility] [address] )  She is presently off floor at x-ray  Discussed with Dr Reagan Post who is following,  Enemas ordered and attempt to decrease narcotics  Past Medical History:   Diagnosis Date    Asthma      Past Surgical History:   Procedure Laterality Date    ABDOMINAL SURGERY      BACK SURGERY       Medications  Home Medications:   Prior to Admission medications    Medication Sig Start Date End Date Taking? Authorizing Provider   Calcium 600 MG tablet take 1 tablet by mouth once daily 11/24/17  Yes Historical Provider, MD   cetirizine (ZyrTEC) 10 mg tablet  10/23/17  Yes Historical Provider, MD   diazepam (VALIUM) 5 mg tablet 3 (three) times a day Morning, 3pm and 8pm 11/3/17  Yes Historical Provider, MD   escitalopram (LEXAPRO) 20 mg tablet daily     10/23/17  Yes Historical Provider, MD   gabapentin (NEURONTIN) 300 mg capsule take 2 capsules by mouth three times a day 11/6/17  Yes Historical Provider, MD   temazepam (RESTORIL) 30 mg capsule daily 11/28/17  Yes Historical Provider, MD   morphine (MS CONTIN) 60 mg 12 hr tablet Take 30 mg by mouth    Historical Provider, MD       Inhouse Medications    Current Facility-Administered Medications:     acetaminophen (TYLENOL) tablet 650 mg, 650 mg, Oral, Q6H PRN    calcium carbonate-vitamin D (OSCAL-D) 500 mg-200 units per tablet 1 tablet, 1 tablet, Oral, Daily With Breakfast    diazepam (VALIUM) tablet 5 mg, 5 mg, Oral, TID, 5 mg at 07/03/18 0930    docusate sodium (COLACE) capsule 100 mg, 100 mg, Oral, BID, 100 mg at 07/03/18 0930    enoxaparin (LOVENOX) subcutaneous injection 40 mg, 40 mg, Subcutaneous, Daily, 40 mg at 07/03/18 0937    escitalopram (LEXAPRO) tablet 20 mg, 20 mg, Oral, Daily, 20 mg at 07/03/18 4439    gabapentin (NEURONTIN) capsule 600 mg, 600 mg, Oral, TID, 600 mg at 07/03/18 0937    HYDROmorphone (DILAUDID) injection 0 5 mg, 0 5 mg, Intravenous, Q3H PRN, 0 5 mg at 07/03/18 0933    ketorolac (TORADOL) injection 15 mg, 15 mg, Intravenous, Q6H PRN    loratadine (CLARITIN) tablet 10 mg, 10 mg, Oral, Daily, 10 mg at 07/03/18 0936    morphine (MSIR) IR tablet 15 mg, 15 mg, Oral, Q4H PRN, 15 mg at 07/03/18 0224    ondansetron (ZOFRAN) injection 4 mg, 4 mg, Intravenous, Q6H PRN, 4 mg at 07/02/18 2049    potassium chloride 20 mEq in D5W 100 mL, 20 mEq, Intravenous, Once    potassium chloride 20 mEq in D5W 100 mL, 20 mEq, Intravenous, BID    senna (SENOKOT) tablet 8 6 mg, 1 tablet, Oral, Daily, 8 6 mg at 07/03/18 0859    temazepam (RESTORIL) capsule 30 mg, 30 mg, Oral, Daily    Allergies  Allergies   Allergen Reactions    Nsaids      Irritable, upset stomach     Social History   reports that she quit smoking about 38 years ago  She has never used smokeless tobacco  She reports that she drinks alcohol  She reports that she does not use drugs  Family History  History reviewed  No pertinent family history  ROS  ROS: Reports: not obtained All others negative except as noted in HPI  Objective   Vitals  /78 (BP Location: Right arm)   Pulse 84   Temp 97 8 °F (36 6 °C) (Temporal)   Resp 16   Ht 5' 4" (1 626 m)   Wt 79 7 kg (175 lb 11 3 oz)   SpO2 95%   BMI 30 16 kg/m²   General: Alert, no apparent distress per nursing and surgery  Eyes:   ENT:   Card:   Lungs: Clear to ascultation b/l   No wheezes, rales, rhonchi  Abdomen:   Skin:  Extremities:  Neuro: Alert and oriented x3    Laboratory Studies  Lab Results   Component Value Date    WBC 6 80 07/03/2018    HGB 8 2 (L) 07/03/2018    HCT 24 5 (L) 07/03/2018     07/03/2018    MCV 88 07/03/2018     Lab Results   Component Value Date    CREATININE 0 48 (L) 07/03/2018    BUN 10 07/03/2018     (L) 07/03/2018    K 3 3 (L) 07/03/2018    CL 103 07/03/2018    CO2 30 07/03/2018    GLUCOSE 100 (H) 07/03/2018    CALCIUM 7 3 (L) 07/03/2018    PROT 5 3 (L) 07/03/2018    ALKPHOS 187 (H) 07/03/2018    BILITOT 0 80 07/03/2018    AST 60 (H) 07/03/2018    ALT 59 (H) 07/03/2018     Lab Results   Component Value Date    PROTIME 11 7 (H) 07/02/2018    INR 1 11 (H) 07/02/2018       Imaging and Other Studies  Reviewed with Dr Dionte Chavez and Plan:  See surgery plan, doubt signif, liver disease,  ? Post op ileus exacerb by narcotic,   Will follow      Principal Problem:    Ileus ([address] )  Active Problems:    Elevated liver enzymes    Bilious vomiting with nausea    Hypokalemia    Common bile duct dilatation    Generalized anxiety disorder    Chronic, continuous use of opioids      Chloé Cornelius MD

## 2018-07-04 ENCOUNTER — APPOINTMENT (INPATIENT)
Dept: RADIOLOGY | Facility: HOSPITAL | Age: 63
DRG: 389 | End: 2018-07-04
Payer: MEDICARE

## 2018-07-04 PROBLEM — E87.6 HYPOKALEMIA: Status: RESOLVED | Noted: 2018-07-02 | Resolved: 2018-07-04

## 2018-07-04 PROBLEM — R33.8 ACUTE URINARY RETENTION: Status: ACTIVE | Noted: 2018-07-04

## 2018-07-04 LAB
ANION GAP SERPL CALCULATED.3IONS-SCNC: 8 MMOL/L (ref 5–14)
BUN SERPL-MCNC: 9 MG/DL (ref 5–25)
CALCIUM SERPL-MCNC: 8 MG/DL (ref 8.4–10.2)
CHLORIDE SERPL-SCNC: 100 MMOL/L (ref 97–108)
CO2 SERPL-SCNC: 25 MMOL/L (ref 22–30)
CREAT SERPL-MCNC: 0.49 MG/DL (ref 0.6–1.2)
ERYTHROCYTE [DISTWIDTH] IN BLOOD BY AUTOMATED COUNT: 13.8 %
GFR SERPL CREATININE-BSD FRML MDRD: 105 ML/MIN/1.73SQ M
GLUCOSE SERPL-MCNC: 103 MG/DL (ref 70–99)
HCT VFR BLD AUTO: 24.5 % (ref 36–46)
HGB BLD-MCNC: 8.1 G/DL (ref 12–16)
MAGNESIUM SERPL-MCNC: 1.8 MG/DL (ref 1.6–2.3)
MCH RBC QN AUTO: 28.9 PG (ref 26–34)
MCHC RBC AUTO-ENTMCNC: 33.3 G/DL (ref 31–36)
MCV RBC AUTO: 87 FL (ref 80–100)
PLATELET # BLD AUTO: 434 THOUSANDS/UL (ref 150–450)
PMV BLD AUTO: 7.9 FL (ref 8.9–12.7)
POTASSIUM SERPL-SCNC: 3.6 MMOL/L (ref 3.6–5)
RBC # BLD AUTO: 2.81 MILLION/UL (ref 4–5.2)
SODIUM SERPL-SCNC: 133 MMOL/L (ref 137–147)
WBC # BLD AUTO: 6.5 THOUSAND/UL (ref 4.5–11)

## 2018-07-04 PROCEDURE — 85027 COMPLETE CBC AUTOMATED: CPT | Performed by: INTERNAL MEDICINE

## 2018-07-04 PROCEDURE — 83735 ASSAY OF MAGNESIUM: CPT | Performed by: INTERNAL MEDICINE

## 2018-07-04 PROCEDURE — 99232 SBSQ HOSP IP/OBS MODERATE 35: CPT | Performed by: SPECIALIST

## 2018-07-04 PROCEDURE — 99232 SBSQ HOSP IP/OBS MODERATE 35: CPT | Performed by: FAMILY MEDICINE

## 2018-07-04 PROCEDURE — 74018 RADEX ABDOMEN 1 VIEW: CPT

## 2018-07-04 PROCEDURE — 80048 BASIC METABOLIC PNL TOTAL CA: CPT | Performed by: INTERNAL MEDICINE

## 2018-07-04 RX ORDER — SODIUM CHLORIDE 9 MG/ML
100 INJECTION, SOLUTION INTRAVENOUS CONTINUOUS
Status: DISPENSED | OUTPATIENT
Start: 2018-07-04 | End: 2018-07-04

## 2018-07-04 RX ORDER — IBUPROFEN 400 MG/1
400 TABLET ORAL ONCE
Status: COMPLETED | OUTPATIENT
Start: 2018-07-04 | End: 2018-07-04

## 2018-07-04 RX ORDER — SODIUM CHLORIDE 9 MG/ML
100 INJECTION, SOLUTION INTRAVENOUS CONTINUOUS
Status: DISPENSED | OUTPATIENT
Start: 2018-07-04 | End: 2018-07-05

## 2018-07-04 RX ADMIN — MORPHINE SULFATE 15 MG: 15 TABLET ORAL at 20:40

## 2018-07-04 RX ADMIN — LORATADINE 10 MG: 10 TABLET ORAL at 08:21

## 2018-07-04 RX ADMIN — IBUPROFEN 400 MG: 400 TABLET ORAL at 20:41

## 2018-07-04 RX ADMIN — SODIUM CHLORIDE 100 ML/HR: 9 INJECTION, SOLUTION INTRAVENOUS at 22:34

## 2018-07-04 RX ADMIN — DIAZEPAM 5 MG: 5 TABLET ORAL at 16:19

## 2018-07-04 RX ADMIN — GABAPENTIN 600 MG: 300 CAPSULE ORAL at 20:41

## 2018-07-04 RX ADMIN — TEMAZEPAM 30 MG: 15 CAPSULE ORAL at 22:00

## 2018-07-04 RX ADMIN — ENOXAPARIN SODIUM 40 MG: 40 INJECTION SUBCUTANEOUS at 08:23

## 2018-07-04 RX ADMIN — MORPHINE SULFATE 15 MG: 15 TABLET ORAL at 01:18

## 2018-07-04 RX ADMIN — DIAZEPAM 5 MG: 5 TABLET ORAL at 08:19

## 2018-07-04 RX ADMIN — KETOROLAC TROMETHAMINE: 30 INJECTION, SOLUTION INTRAMUSCULAR; INTRAVENOUS at 00:19

## 2018-07-04 RX ADMIN — MORPHINE SULFATE 15 MG: 15 TABLET ORAL at 12:14

## 2018-07-04 RX ADMIN — MORPHINE SULFATE 15 MG: 15 TABLET ORAL at 05:40

## 2018-07-04 RX ADMIN — SODIUM CHLORIDE 100 ML/HR: 9 INJECTION, SOLUTION INTRAVENOUS at 01:22

## 2018-07-04 RX ADMIN — ESCITALOPRAM OXALATE 20 MG: 10 TABLET ORAL at 08:20

## 2018-07-04 RX ADMIN — CALCIUM CARBONATE-VITAMIN D TAB 500 MG-200 UNIT 1 TABLET: 500-200 TAB at 08:20

## 2018-07-04 RX ADMIN — DIAZEPAM 5 MG: 5 TABLET ORAL at 22:00

## 2018-07-04 RX ADMIN — GABAPENTIN 600 MG: 300 CAPSULE ORAL at 16:19

## 2018-07-04 RX ADMIN — MORPHINE SULFATE 15 MG: 15 TABLET ORAL at 16:19

## 2018-07-04 RX ADMIN — SODIUM CHLORIDE 100 ML/HR: 9 INJECTION, SOLUTION INTRAVENOUS at 14:41

## 2018-07-04 NOTE — NURSING NOTE
Patient rang stating "I need to talk to you, I feel like I'm being ignored and swept under the rug"  Nurse reminded patient that nurse and patient spoke at length multiple times throughout evening about situation, patient stated "I know I just feel it"  Nurse sat with patient for 10 minutes discussing her feelings  After discussion patient stated she felt better and was appreciative of conversation  Patient remains apprehensive and non-compliant to 2nd enema despite further education throughout shift  Patient stating "I have nothing to poop out because you won't let me eat"  Nurse explained to patient that her CT scan showed she has stool and that she can't eat until she is having normal bowel function etc  Patient stated she understands this but wants ginger-jb, nurse advised patient her diet is NPO  Patient compliant to this  Call bell in reach will monitor

## 2018-07-04 NOTE — PROGRESS NOTES
Awake alert in bed  Complaining that her pain medicine was discontinued  Note  Abdomen is successful for significant amount of bowel movement yesterday  Complaining of back pain primarily and also some abdominal pain  This is improved somewhat  Chest of hypertension but other vital signs stable  Abdomen multi scarred less distended than yesterday  Soft minimal tenderness diffusely  White count normal   Hemoglobin 8 same as admission  NG tube minimal drainage  The impression/plan:  Postoperative constipation/colonic ileus status post back surgery  She also has chronic narcotic use  Would DC NG tube  Continue NPO Keep  Out of bed

## 2018-07-04 NOTE — NURSING NOTE
Assessment unchanged  C/O pain Morphine was given and was effective  Will continue to monitor call bell within reach

## 2018-07-04 NOTE — PROGRESS NOTES
Progress Note - Alexandre Vinson 1955, 59 y o  female MRN: [id number]    Unit/Bed#: 7T [facility] 705-02 Encounter: [id number]    Primary Care Provider: Fernando Mena MD   Date and time admitted to hospital: 7/2/2018 11:06 AM        * [facility])   Assessment & Plan    Patient presented with generalized abdominal pain nausea and bilious vomiting ,imaging study reports " diffuse bowel dilatation involving distal small bowel and the entire colon to the panel verge obstruction mass is not identified and this may represent adynamic ileus in the postoperative setting possible related to medication"  Patient states that she still has abdominal pain 3/10 periodically gets spasms  Will keep her NPO for now but will remove the NG tube  Discussed with surgery  If there is any recurrence of vomiting then NG tube will have to be replaced  The patient did have some bowel movements yesterday and does have active bowel sounds at this time  Patient has had multiple abdominal surgeries and multiple abdominal complications and is a very high risk of having recurrence of symptoms  Avoid narcotics at this time  Acute urinary retention   Assessment & Plan    Will discontinue wang catheter and do voiding trial if she fails it after 2 straight cath episode will replace wang and place on flomax        Chronic, continuous use of opioids   Assessment & Plan    Most likely contribute to her hypodynamic ileus  Avoid unnecessary opioids  Added IV Toradol for pain control   opioids decreased  Generalized anxiety disorder   Assessment & Plan    Continue Valium ,Lexapro, Restoril  Patient is more comfortable continue complain of abdominal pain but anxiety is much controlled         Common bile duct dilatation   Assessment & Plan    Chronic since 2007 ,management as above        Bilious vomiting with nausea   Assessment & Plan    Resolved  will see how she handles trial of ng tube being removed          Elevated liver enzymes Assessment & Plan    Status post cholecystectomy and chronic CBD dilatation since   Keep NPO ,management as above  GI consult noted        Hypokalemiaresolved as of 2018   Assessment & Plan    Secondary to vomitus  Will replace potassium   check magnesium phosphorus level and replace as needed  Repeat BMP and electrolytes in a m  VTE Pharmacologic Prophylaxis:   Pharmacologic: Enoxaparin (Lovenox)  Mechanical VTE Prophylaxis in Place: Yes    Patient Centered Rounds: I have performed bedside rounds with nursing staff today  Discussions with Specialists or Other Care Team Provider: surgery    Education and Discussions with Family / Patient: patient explained abt hospital course at bedside    Time Spent for Care: 30 minutes  More than 50% of total time spent on counseling and coordination of care as described above  Current Length of Stay: 2 day(s)    Current Patient Status: Inpatient   Certification Statement: The patient will continue to require additional inpatient hospital stay due to abd pain  Discharge Plan: in 2 days    Code Status: Level 1 - Full Code      Subjective:   Patient states that she has 3 to 4/10 abdominal pain periodically  She states that she has had bowel movements  Denies any nausea or vomiting  Objective:     Vitals:   Temp (24hrs), Av 4 °F (36 3 °C), Min:97 1 °F (36 2 °C), Max:97 8 °F (36 6 °C)    HR:  [82-90] 82  Resp:  [18] 18  BP: (146-175)/() 152/78  SpO2:  [96 %-98 %] 96 %  Body mass index is 30 16 kg/m²  Input and Output Summary (last 24 hours): Intake/Output Summary (Last 24 hours) at 18 1010  Last data filed at 18 0600   Gross per 24 hour   Intake                0 ml   Output             3975 ml   Net            -3975 ml       Physical Exam:     Physical Exam   Constitutional: She is oriented to person, place, and time  She appears well-developed and well-nourished  HENT:   Head: Normocephalic and atraumatic  Right Ear: External ear normal    Left Ear: External ear normal    Mouth/Throat: Oropharynx is clear and moist    Eyes: Conjunctivae and EOM are normal  Pupils are equal, round, and reactive to light  Neck: Normal range of motion  Neck supple  Cardiovascular: Normal rate, regular rhythm, normal heart sounds and intact distal pulses  Pulmonary/Chest: Effort normal and breath sounds normal    Abdominal: Bowel sounds are normal  She exhibits distension  She exhibits no mass  There is tenderness  There is no rebound and no guarding  Mild generalized tenderness on deep palpation  Very mild distention noted  Normal bowel sounds heard in all quadrants  Genitourinary:   Genitourinary Comments: deferred   Musculoskeletal: Normal range of motion  Neurological: She is alert and oriented to person, place, and time  She has normal reflexes  Skin: Skin is warm and dry  No rash noted  Psychiatric: She has a normal mood and affect  Nursing note and vitals reviewed  Additional Data:     Labs:      Results from last 7 days  Lab Units 07/04/18  0643  07/02/18  1602   WBC Thousand/uL 6 50  < > 7 60   HEMOGLOBIN g/dL 8 1*  < > 12 6   HEMATOCRIT % 24 5*  < > 37 6   PLATELETS Thousands/uL 434  < > 319   NEUTROS PCT %  --   --  83*   LYMPHS PCT %  --   --  7*   MONOS PCT %  --   --  9   EOS PCT %  --   --  0   < > = values in this interval not displayed  Results from last 7 days  Lab Units 07/04/18  0643 07/03/18  0526   SODIUM mmol/L 133* 136*   POTASSIUM mmol/L 3 6 3 3*   CHLORIDE mmol/L 100 103   CO2 mmol/L 25 30   BUN mg/dL 9 10   CREATININE mg/dL 0 49* 0 48*   CALCIUM mg/dL 8 0* 7 3*   TOTAL PROTEIN g/dL  --  5 3*   BILIRUBIN TOTAL mg/dL  --  0 80   ALK PHOS U/L  --  187*   ALT U/L  --  59*   AST U/L  --  60*   GLUCOSE RANDOM mg/dL 103* 100*       Results from last 7 days  Lab Units 07/02/18  1659   INR  1 11*                 * I Have Reviewed All Lab Data Listed Above    * Additional Pertinent Lab Tests Reviewed: [address] Admission Reviewed    Imaging:    Imaging Reports Reviewed Today Include: ct abd   Imaging Personally Reviewed by Myself Includes:  Ct abd    Recent Cultures (last 7 days):           Last 24 Hours Medication List:     Current Facility-Administered Medications:  acetaminophen 650 mg Oral Q6H PRN Alycia Ganser, MD    calcium carbonate-vitamin D 1 tablet Oral Daily With Breakfast Alycia Ganser, MD    diazepam 5 mg Oral TID Alycia Ganser, MD    docusate sodium 100 mg Oral BID Alycia Ganser, MD    enoxaparin 40 mg Subcutaneous Daily Alycia Ganser, MD    escitalopram 20 mg Oral Daily Alycia Ganser, MD    gabapentin 600 mg Oral TID Alycia Ganser, MD    ketorolac 15 mg Intravenous Q6H PRN Nabila Orta MD    loratadine 10 mg Oral Daily Alycia Ganser, MD    morphine 15 mg Oral Q4H PRN Alycia Ganser, MD    ondansetron 4 mg Intravenous Q6H PRN Alycia Ganser, MD    senna 1 tablet Oral Daily Alycia Ganser, MD    sodium chloride 100 mL/hr Intravenous Continuous Patty Davis MD Last Rate: 100 mL/hr (07/04/18 0122)   temazepam 30 mg Oral Daily Alycia Ganser, MD         Today, Patient Was Seen By: Shira Hdz MD    ** Please Note: Dictation voice to text software may have been used in the creation of this document   **

## 2018-07-04 NOTE — PROGRESS NOTES
Patient Name: Priya Jose  Patient MRN: [id number]  Date: 07/04/18  Service: [facility]    Subjective   Priya Jose is a 59 y o  female who was admitted with   She continues to complain of abdominal pain less than originally on admission  Claims worse after and during enema  NG tube seen with minimal drainage darker brown not coffee-ground  Discussed with Dr Mercedez Barron who was on floor  Objective     Vitals  /78 (BP Location: Left arm)   Pulse 82   Temp 97 6 °F (36 4 °C) (Temporal)   Resp 18   Ht 5' 4" (1 626 m)   Wt 79 7 kg (175 lb 11 3 oz)   SpO2 96%   BMI 30 16 kg/m²   General: Alert, no apparent distress  Eyes:   ENT:  Anicteric  Card:   Lungs: Clear to ascultation b/l  No wheezes, rales, rhonchi  Abdomen:  Distended with mild diffuse tenderness seemingly unchanged from yesterday  Negative rebound    Skin:  Extremities:  Neuro: Alert and oriented x3    Laboratory Studies  Lab Results   Component Value Date    CREATININE 0 49 (L) 07/04/2018    BUN 9 07/04/2018     (L) 07/04/2018    K 3 6 07/04/2018     07/04/2018    CO2 25 07/04/2018    GLUCOSE 103 (H) 07/04/2018    CALCIUM 8 0 (L) 07/04/2018    PROT 5 3 (L) 07/03/2018    ALKPHOS 187 (H) 07/03/2018    BILITOT 0 80 07/03/2018    AST 60 (H) 07/03/2018    ALT 59 (H) 07/03/2018     Lab Results   Component Value Date    WBC 6 50 07/04/2018    HGB 8 1 (L) 07/04/2018    HCT 24 5 (L) 07/04/2018     07/04/2018    MCV 87 07/04/2018     Lab Results   Component Value Date    PROTIME 11 7 (H) 07/02/2018    INR 1 11 (H) 07/02/2018       Imaging and Other Studies    Inhouse Medications       Current Facility-Administered Medications:     acetaminophen (TYLENOL) tablet 650 mg, 650 mg, Oral, Q6H PRN    calcium carbonate-vitamin D (OSCAL-D) 500 mg-200 units per tablet 1 tablet, 1 tablet, Oral, Daily With Breakfast, 1 tablet at 07/04/18 0820    diazepam (VALIUM) tablet 5 mg, 5 mg, Oral, TID, 5 mg at 07/04/18 0893   docusate sodium (COLACE) capsule 100 mg, 100 mg, Oral, BID, 100 mg at 07/03/18 1732    enoxaparin (LOVENOX) subcutaneous injection 40 mg, 40 mg, Subcutaneous, Daily, 40 mg at 07/04/18 0823    escitalopram (LEXAPRO) tablet 20 mg, 20 mg, Oral, Daily, 20 mg at 07/04/18 0820    gabapentin (NEURONTIN) capsule 600 mg, 600 mg, Oral, TID, 600 mg at 07/03/18 2020    ketorolac (TORADOL) injection 15 mg, 15 mg, Intravenous, Q6H PRN    loratadine (CLARITIN) tablet 10 mg, 10 mg, Oral, Daily, 10 mg at 07/04/18 0821    morphine (MSIR) IR tablet 15 mg, 15 mg, Oral, Q4H PRN, 15 mg at 07/04/18 0540    ondansetron (ZOFRAN) injection 4 mg, 4 mg, Intravenous, Q6H PRN, 4 mg at 07/02/18 2049    senna (SENOKOT) tablet 8 6 mg, 1 tablet, Oral, Daily, 8 6 mg at 07/03/18 1019    sodium chloride 0 9 % infusion, 100 mL/hr, Intravenous, Continuous, 100 mL/hr at 07/04/18 0122    temazepam (RESTORIL) capsule 30 mg, 30 mg, Oral, Daily, 30 mg at 07/03/18 2021      Assessment/Plan:  Probable postop ileus exacerbated by narcotics and bed rest-would suggest obstruction series in a m  to rule out significant colonic dilatation  Surgery following        Principal Problem:    Ileus ([address] )  Active Problems:    Elevated liver enzymes    Bilious vomiting with nausea    Hypokalemia    Common bile duct dilatation    Generalized anxiety disorder    Chronic, continuous use of opioids    Patti Acosta MD

## 2018-07-04 NOTE — NURSING NOTE
On initial rounds patient in no apparent distress  Skin warm and dry to touch  Pleasant and cooperative  Stated pain was at level 9 but was previously medicated  Abdomen softly distended with hypoactive bowel sounds  NS @ 100 cc per hour infusing well  NG to continuous suction with small amount of drainage, remain NPO but does take medication by mouth without any problem  Dressing to back intact  Stahl to bedside drainage with adequate amount of urine  Encourage out of bed but refuses said doctor do not want her walking because of her back  Encourage to use incentive spirometer  All safety maintained  Will continue to monitor

## 2018-07-04 NOTE — ASSESSMENT & PLAN NOTE
Patient presented with generalized abdominal pain nausea and bilious vomiting ,imaging study reports " diffuse bowel dilatation involving distal small bowel and the entire colon to the panel verge obstruction mass is not identified and this may represent adynamic ileus in the postoperative setting possible related to medication"  Patient states that she still has abdominal pain 3/10 periodically gets spasms  Will keep her NPO for now but will remove the NG tube  Discussed with surgery  If there is any recurrence of vomiting then NG tube will have to be replaced  The patient did have some bowel movements yesterday and does have active bowel sounds at this time  Patient has had multiple abdominal surgeries and multiple abdominal complications and is a very high risk of having recurrence of symptoms  Avoid narcotics at this time

## 2018-07-04 NOTE — NURSING NOTE
Marcia FERNÁNDEZ with [facility] called after receiving a call from patient stating that no one from their group came to see her  He wanted me to explain to her that she was having therapy there and there is no need for them to see her and that she can be out of bed ambulating after I told him that she said that they do not want her walking, she has no restrictions  Patient continue to ask for IV Morphine which is not ordered  MD aware All safety maintained  Will continue to monitor

## 2018-07-04 NOTE — ASSESSMENT & PLAN NOTE
Will discontinue wang catheter and do voiding trial if she fails it after 2 straight cath episode will replace wang and place on flomax

## 2018-07-04 NOTE — ASSESSMENT & PLAN NOTE
Most likely contribute to her hypodynamic ileus  Avoid unnecessary opioids  Added IV Toradol for pain control   opioids decreased

## 2018-07-05 ENCOUNTER — APPOINTMENT (INPATIENT)
Dept: RADIOLOGY | Facility: HOSPITAL | Age: 63
DRG: 389 | End: 2018-07-05
Payer: MEDICARE

## 2018-07-05 PROBLEM — D64.9 ANEMIA: Status: ACTIVE | Noted: 2018-07-05

## 2018-07-05 PROCEDURE — 99232 SBSQ HOSP IP/OBS MODERATE 35: CPT | Performed by: FAMILY MEDICINE

## 2018-07-05 PROCEDURE — 74022 RADEX COMPL AQT ABD SERIES: CPT

## 2018-07-05 PROCEDURE — C9113 INJ PANTOPRAZOLE SODIUM, VIA: HCPCS | Performed by: FAMILY MEDICINE

## 2018-07-05 RX ORDER — PANTOPRAZOLE SODIUM 40 MG/1
40 INJECTION, POWDER, FOR SOLUTION INTRAVENOUS
Status: DISCONTINUED | OUTPATIENT
Start: 2018-07-05 | End: 2018-07-06 | Stop reason: HOSPADM

## 2018-07-05 RX ORDER — MORPHINE SULFATE 15 MG/1
15 TABLET, FILM COATED, EXTENDED RELEASE ORAL EVERY 12 HOURS SCHEDULED
Status: DISCONTINUED | OUTPATIENT
Start: 2018-07-05 | End: 2018-07-06 | Stop reason: HOSPADM

## 2018-07-05 RX ADMIN — ACETAMINOPHEN 650 MG: 325 TABLET ORAL at 02:16

## 2018-07-05 RX ADMIN — KETOROLAC TROMETHAMINE 15 MG: 30 INJECTION, SOLUTION INTRAMUSCULAR; INTRAVENOUS at 22:59

## 2018-07-05 RX ADMIN — GABAPENTIN 600 MG: 300 CAPSULE ORAL at 08:51

## 2018-07-05 RX ADMIN — DIAZEPAM 5 MG: 5 TABLET ORAL at 22:57

## 2018-07-05 RX ADMIN — MORPHINE SULFATE 15 MG: 15 TABLET, EXTENDED RELEASE ORAL at 20:08

## 2018-07-05 RX ADMIN — DIAZEPAM 5 MG: 5 TABLET ORAL at 17:53

## 2018-07-05 RX ADMIN — GABAPENTIN 600 MG: 300 CAPSULE ORAL at 20:08

## 2018-07-05 RX ADMIN — PANTOPRAZOLE SODIUM 40 MG: 40 INJECTION, POWDER, FOR SOLUTION INTRAVENOUS at 11:04

## 2018-07-05 RX ADMIN — DIAZEPAM 5 MG: 5 TABLET ORAL at 08:49

## 2018-07-05 RX ADMIN — LORATADINE 10 MG: 10 TABLET ORAL at 08:51

## 2018-07-05 RX ADMIN — ENOXAPARIN SODIUM 40 MG: 40 INJECTION SUBCUTANEOUS at 08:51

## 2018-07-05 RX ADMIN — TEMAZEPAM 30 MG: 15 CAPSULE ORAL at 22:58

## 2018-07-05 RX ADMIN — MORPHINE SULFATE 15 MG: 15 TABLET, EXTENDED RELEASE ORAL at 11:00

## 2018-07-05 RX ADMIN — ESCITALOPRAM OXALATE 20 MG: 10 TABLET ORAL at 08:54

## 2018-07-05 RX ADMIN — KETOROLAC TROMETHAMINE: 30 INJECTION, SOLUTION INTRAMUSCULAR; INTRAVENOUS at 00:33

## 2018-07-05 RX ADMIN — GABAPENTIN 600 MG: 300 CAPSULE ORAL at 17:53

## 2018-07-05 RX ADMIN — KETOROLAC TROMETHAMINE 15 MG: 30 INJECTION, SOLUTION INTRAMUSCULAR; INTRAVENOUS at 06:34

## 2018-07-05 RX ADMIN — CALCIUM CARBONATE-VITAMIN D TAB 500 MG-200 UNIT 1 TABLET: 500-200 TAB at 08:49

## 2018-07-05 RX ADMIN — ONDANSETRON 4 MG: 2 INJECTION, SOLUTION INTRAMUSCULAR; INTRAVENOUS at 18:59

## 2018-07-05 NOTE — PROGRESS NOTES
Patient Name: Say Espinoza  Patient MRN: [id number]  Date: 07/05/18  Service: [facility]    Subjective    Pt still with abd pain about the same as yesterday  No fevers  NG tube removed  No emesis  +bm yesterday after enema  Abd pain better for several minutes after bm but then seems to return  Vitals  Blood pressure 152/82, pulse 78, temperature 97 9 °F (36 6 °C), temperature source Temporal, resp  rate 16, height 5' 4" (1 626 m), weight 79 7 kg (175 lb 11 3 oz), SpO2 97 %  Pt is laying in bed no distress  Abd with active bowel sounds  No rebound  Mild distension  nonlabored breathing  Laboratory Studies    Results from last 7 days  Lab Units 07/04/18  0643 07/03/18  0652 07/02/18  1659 07/02/18  1602   WBC Thousand/uL 6 50 6 80  --  7 60   HEMOGLOBIN g/dL 8 1* 8 2*  --  12 6   HEMATOCRIT % 24 5* 24 5*  --  37 6   PLATELETS Thousands/uL 434 370  --  319   INR   --   --  1 11*  --        Results from last 7 days  Lab Units 07/04/18  0643 07/03/18  0526 07/02/18  1604   SODIUM mmol/L 133* 136* 134*   POTASSIUM mmol/L 3 6 3 3* 3 4*   CHLORIDE mmol/L 100 103 95*   CO2 mmol/L 25 30 31*   BUN mg/dL 9 10 12   CREATININE mg/dL 0 49* 0 48* 0 54*   CALCIUM mg/dL 8 0* 7 3* 8 6   TOTAL PROTEIN g/dL  --  5 3* 6 6   BILIRUBIN TOTAL mg/dL  --  0 80 1 30*   ALK PHOS U/L  --  187* 249*   ALT U/L  --  59* 68*   AST U/L  --  60* 79*   GLUCOSE RANDOM mg/dL 103* 100* 122*       Imaging and Other Studies  Persistent dilation as before      Inhouse Medications     Current Facility-Administered Medications:     acetaminophen (TYLENOL) tablet 650 mg, 650 mg, Oral, Q6H PRN, 650 mg at 07/05/18 0216    calcium carbonate-vitamin D (OSCAL-D) 500 mg-200 units per tablet 1 tablet, 1 tablet, Oral, Daily With Breakfast, 1 tablet at 07/05/18 0849    diazepam (VALIUM) tablet 5 mg, 5 mg, Oral, TID, 5 mg at 07/05/18 0849    docusate sodium (COLACE) capsule 100 mg, 100 mg, Oral, BID, 100 mg at 07/03/18 1735    enoxaparin (LOVENOX) subcutaneous injection 40 mg, 40 mg, Subcutaneous, Daily, 40 mg at 07/05/18 0851    escitalopram (LEXAPRO) tablet 20 mg, 20 mg, Oral, Daily, 20 mg at 07/05/18 0854    gabapentin (NEURONTIN) capsule 600 mg, 600 mg, Oral, TID, 600 mg at 07/05/18 0851    ketorolac (TORADOL) injection 15 mg, 15 mg, Intravenous, Q6H PRN, 15 mg at 07/05/18 0634    loratadine (CLARITIN) tablet 10 mg, 10 mg, Oral, Daily, 10 mg at 07/05/18 0851    ondansetron (ZOFRAN) injection 4 mg, 4 mg, Intravenous, Q6H PRN, 4 mg at 07/02/18 2049    senna (SENOKOT) tablet 8 6 mg, 1 tablet, Oral, Daily, 8 6 mg at 07/03/18 2736    temazepam (RESTORIL) capsule 30 mg, 30 mg, Oral, Daily, 30 mg at 07/04/18 2200      Assessment/Plan:  ABD Pain; given xray and timing, suggestive of ileus from immobility and narcartics most likely  Morphine held and plan to increase ambulation  Will try clears given bms and no emesis  Consider stopping calcium supplement as can sometimes contribute to constipation  Elevated lfts; lfts were normal in the fall 2017; dilated cbd and pd seen on ct scan is chronic and was present in 2007 per report  lfts trending down  Back surgery; Hypokalema; corrected  Normocytic anemia without evidence for gi bleeding; monitor  Of note pt did have a colonoscopy quite awhile ago which was "ok"   She was probably in her 45s when it was performed  No significant abd sxs prior to her recent surgery        Rosamaria Cai MD

## 2018-07-05 NOTE — PROGRESS NOTES
Progress Note - General Surgery   Antoine Bergman 59 y o  female MRN: [id number]        D/W Dr Brittany silveira    C/O back pain   wants more narcs  Bowels moving    No surgical probs at this time

## 2018-07-05 NOTE — PROGRESS NOTES
Progress Note - Steven Batres 1955, 59 y o  female MRN: [id number]    Unit/Bed#: 7T [facility] 705-02 Encounter: [id number]    Primary Care Provider: Maribel Rice MD   Date and time admitted to hospital: 7/2/2018 11:06 AM        * [facility])   Assessment & Plan    Patient presented with generalized abdominal pain nausea and bilious vomiting ,imaging study reports " diffuse bowel dilatation involving distal small bowel and the entire colon to the panel verge obstruction mass is not identified and this may represent adynamic ileus in the postoperative setting possible related to medication"  Patient states that she still has abdominal pain 3/10 periodically gets spasms  Discussed with surgery  If there is any recurrence of vomiting then NG tube will have to be replaced  The patient did have some bowel movements yesterday and does have active bowel sounds at this time  Patient has had multiple abdominal surgeries and multiple abdominal complications and is a very high risk of having recurrence of symptoms  Patient has reported no n/v to np student magda guadalupe, when I walked in she right always tells me she still nauseus, and abdominal pain is there  Also focused on pain medications, she does not want any food but apparently taking po medications and drinking water fine  Had diarrhea yesterday  She continues to say give me 2 days, but also wants to go home very soon  States she does not want to see dr Justin Hernandez again as he did not inform her of ngt removal       I did inform her the side effects of opiods, she has toradol prn for pain  But she has been on morphine 60 mg po q12hrs standing for 6 month - cant be stopped abruptly as was not continued on admission- I did restart ms contin but at reduced dose 15 mg po bid     Also advance to clears            Chronic, continuous use of opioids   Assessment & Plan    Most likely contribute to her hypodynamic ileus  Avoid unnecessary opioids   IV Toradol for pain control   opioids decreased  She is on high dose chronic morphine cant be stopped abruptly will add back on but at lower dose 15mg po bid  Bilious vomiting with nausea   Assessment & Plan    Resolved  will see how she handles trial of ng tube being removed  S/p removal of ngt no vomiting tolerated water and po meds  Different report of nausea to different individuals   Advance to clears         Acute urinary retention   Assessment & Plan    Will discontinue wang catheter and do voiding trial if she fails it after 2 straight cath episode will replace wang and place on flomax    Patient reluctant to remove wang as she likes it   Explained risk of wang casuing infection - understood will remove        Elevated liver enzymes   Assessment & Plan    Status post cholecystectomy and chronic CBD dilatation since 2007  Advance to clears ,management as above  GI consult noted- discussed case with GI        Generalized anxiety disorder   Assessment & Plan    Continue Valium ,Lexapro, Restoril  Patient is more comfortable continue complain of abdominal pain but anxiety is much controlled         Common bile duct dilatation   Assessment & Plan    Chronic since 2007 ,management as above        Anemia   Assessment & Plan    · Normocytic / possible post surgery blood loss / ?? Chronic   · Recheck blood work marcelino   · Will avoid ferrous sulfate with chronic opiod use and high chance of ileus /constipation   · Transfuse if <8  · Iron studies            VTE Pharmacologic Prophylaxis:   Pharmacologic: Enoxaparin (Lovenox)  Mechanical VTE Prophylaxis in Place: Yes    Patient Centered Rounds: I have performed bedside rounds with nursing staff today  Discussions with Specialists or Other Care Team Provider: surgery    Education and Discussions with Family / Patient: patient explained abt hospital course at bedside    Time Spent for Care: 30 minutes    More than 50% of total time spent on counseling and coordination of care as described above  Current Length of Stay: 3 day(s)    Current Patient Status: Inpatient   Certification Statement: The patient will continue to require additional inpatient hospital stay due to abd pain  Discharge Plan: in 2 days    Code Status: Level 1 - Full Code      Subjective:   Patient states that she has 3 to 4/10 abdominal pain periodically  To me she states that's he is nausea, but to NP student reported no  She always has abdominal pain  she continues to focus onopiod medications, she does not want food  She had diarrhea epsisode yesterday  Objective:     Vitals:   Temp (24hrs), Av 6 °F (36 4 °C), Min:96 4 °F (35 8 °C), Max:98 1 °F (36 7 °C)    HR:  [68-78] 78  Resp:  [16-18] 16  BP: (145-153)/(74-82) 152/82  SpO2:  [95 %-97 %] 97 %  Body mass index is 30 16 kg/m²  Input and Output Summary (last 24 hours): Intake/Output Summary (Last 24 hours) at 18 1101  Last data filed at 18 0841   Gross per 24 hour   Intake          1553 34 ml   Output             6450 ml   Net         -4896 66 ml       Physical Exam:     Physical Exam   Constitutional: She is oriented to person, place, and time  She appears well-developed and well-nourished  HENT:   Head: Normocephalic and atraumatic  Right Ear: External ear normal    Left Ear: External ear normal    Mouth/Throat: Oropharynx is clear and moist    Eyes: Conjunctivae and EOM are normal  Pupils are equal, round, and reactive to light  Neck: Normal range of motion  Neck supple  Cardiovascular: Normal rate, regular rhythm, normal heart sounds and intact distal pulses  Pulmonary/Chest: Effort normal and breath sounds normal    Abdominal: Bowel sounds are normal  She exhibits no mass  There is tenderness  There is no rebound and no guarding  Patient has no distention on physical exam, she has diffuse abdominal tenderness when attempt to palpate diffusely, normal bowel sounds  Multiple scars     Genitourinary:   Genitourinary Comments: deferred   Musculoskeletal: Normal range of motion  Neurological: She is alert and oriented to person, place, and time  She has normal reflexes  Skin: Skin is warm and dry  No rash noted  Psychiatric: She has a normal mood and affect  Nursing note and vitals reviewed  Additional Data:     Labs:      Results from last 7 days  Lab Units 07/04/18  0643  07/02/18  1602   WBC Thousand/uL 6 50  < > 7 60   HEMOGLOBIN g/dL 8 1*  < > 12 6   HEMATOCRIT % 24 5*  < > 37 6   PLATELETS Thousands/uL 434  < > 319   NEUTROS PCT %  --   --  83*   LYMPHS PCT %  --   --  7*   MONOS PCT %  --   --  9   EOS PCT %  --   --  0   < > = values in this interval not displayed  Results from last 7 days  Lab Units 07/04/18  0643 07/03/18  0526   SODIUM mmol/L 133* 136*   POTASSIUM mmol/L 3 6 3 3*   CHLORIDE mmol/L 100 103   CO2 mmol/L 25 30   BUN mg/dL 9 10   CREATININE mg/dL 0 49* 0 48*   CALCIUM mg/dL 8 0* 7 3*   TOTAL PROTEIN g/dL  --  5 3*   BILIRUBIN TOTAL mg/dL  --  0 80   ALK PHOS U/L  --  187*   ALT U/L  --  59*   AST U/L  --  60*   GLUCOSE RANDOM mg/dL 103* 100*       Results from last 7 days  Lab Units 07/02/18  1659   INR  1 11*                 * I Have Reviewed All Lab Data Listed Above  * Additional Pertinent Lab Tests Reviewed:  Itz [facility] Admission Reviewed    Imaging:    Imaging Reports Reviewed Today Include: ct abd   Imaging Personally Reviewed by Myself Includes:  Ct abd    Recent Cultures (last 7 days):           Last 24 Hours Medication List:     Current Facility-Administered Medications:  acetaminophen 650 mg Oral Q6H PRN Rosalba Santos MD   calcium carbonate-vitamin D 1 tablet Oral Daily With Breakfast Rosalba Santos MD   diazepam 5 mg Oral TID Rosalba Santos MD   docusate sodium 100 mg Oral BID Rosalba Santos MD   enoxaparin 40 mg Subcutaneous Daily Rosalba Santos MD   escitalopram 20 mg Oral Daily Rosalba Santos MD   gabapentin 600 mg Oral TID Eddy Phalen MD Moni   ketorolac 15 mg Intravenous Q6H PRN Aydee Harper MD   loratadine 10 mg Oral Daily Isac Biggs MD   morphine 15 mg Oral Q12H [address] Darlene Zavala MD   ondansetron 4 mg Intravenous Q6H PRN Isac Biggs MD   pantoprazole 40 mg Intravenous Q24H [address] Darlene Zavala MD   senna 1 tablet Oral Daily Isac Biggs MD   temazepam 30 mg Oral Daily Isac Biggs MD        Today, Patient Was Seen By: Darlene Zavala MD    ** Please Note: Dictation voice to text software may have been used in the creation of this document   **

## 2018-07-05 NOTE — ASSESSMENT & PLAN NOTE
· Normocytic / possible post surgery blood loss / ??  Chronic   · Recheck blood work marcelino   · Will avoid ferrous sulfate with chronic opiod use and high chance of ileus /constipation   · Transfuse if <8  · Iron studies

## 2018-07-05 NOTE — ASSESSMENT & PLAN NOTE
Status post cholecystectomy and chronic CBD dilatation since 2007  Advance to clears ,management as above  GI consult noted- discussed case with GI

## 2018-07-05 NOTE — PHYSICAL THERAPY NOTE
PT CANCELLATION    PT Evaluation/Treatment deferred at this time due to: patient leaving floor for test     Plan is to resume PT when available, willing to participate and medically cleared      Franchesca Clark, PT, DPT

## 2018-07-05 NOTE — ASSESSMENT & PLAN NOTE
Resolved  will see how she handles trial of ng tube being removed   S/p removal of ngt no vomiting tolerated water and po meds  Different report of nausea to different individuals   Advance to clears

## 2018-07-05 NOTE — ASSESSMENT & PLAN NOTE
Patient presented with generalized abdominal pain nausea and bilious vomiting ,imaging study reports " diffuse bowel dilatation involving distal small bowel and the entire colon to the panel verge obstruction mass is not identified and this may represent adynamic ileus in the postoperative setting possible related to medication"  Patient states that she still has abdominal pain 3/10 periodically gets spasms  Discussed with surgery  If there is any recurrence of vomiting then NG tube will have to be replaced  The patient did have some bowel movements yesterday and does have active bowel sounds at this time  Patient has had multiple abdominal surgeries and multiple abdominal complications and is a very high risk of having recurrence of symptoms  Patient has reported no n/v to np student magda guadalupe, when I walked in she right always tells me she still nauseus, and abdominal pain is there  Also focused on pain medications, she does not want any food but apparently taking po medications and drinking water fine  Had diarrhea yesterday  She continues to say give me 2 days, but also wants to go home very soon  States she does not want to see dr Ahmet Galvan again as he did not inform her of ngt removal       I did inform her the side effects of opiods, she has toradol prn for pain  But she has been on morphine 60 mg po q12hrs standing for 6 month - cant be stopped abruptly as was not continued on admission- I did restart ms contin but at reduced dose 15 mg po bid     Also advance to clears

## 2018-07-05 NOTE — SOCIAL WORK
Pt continues with conservative treatment and has been tolerating clear liquids  Per Dr Heather Bermudez, this will continue  PT attempted to work with the pt today but pt was leaving the floor  SW continues to follow for discharge needs

## 2018-07-05 NOTE — ASSESSMENT & PLAN NOTE
Most likely contribute to her hypodynamic ileus  Avoid unnecessary opioids   IV Toradol for pain control   opioids decreased  She is on high dose chronic morphine cant be stopped abruptly will add back on but at lower dose 15mg po bid

## 2018-07-05 NOTE — ASSESSMENT & PLAN NOTE
Will discontinue wang catheter and do voiding trial if she fails it after 2 straight cath episode will replace wang and place on flomax    Patient reluctant to remove wang as she likes it   Explained risk of wang casuing infection - understood will remove

## 2018-07-05 NOTE — MEDICAL STUDENT
MEDICAL STUDENT  Inpatient Progress Note for TRAINING ONLY  Not Part of Legal Medical Record       Progress Note - Betsy Ochoa 59 y o  female MRN: [id number]    Unit/Bed#: 7T [facility] 705-02 Encounter: [id number]      Assessment/Plan    1  Ileus: Patient presented with generalized abdominal pain nausea and bilious vomiting ,Ab X-ray 7/4 shows  " diffuse bowel dilatation involving distal small bowel and the entire colon to the panel verge obstruction mass is not identified and this may represent adynamic ileus in the postoperative setting possible related to medication"  Patient states that she still has  Stabbing abdominal pain 5/10 periodically gets spasms  patient is inconsistently reporting nausea, reports positive flatus and reports a small non formed BM last night  Will repeat abdominal film and advance diet to clear liquids as tolerated  Patient is already tolerating medications with small amounts of water  Discussed with Gastroenterology and they agree with this plan  The patient does have active bowel sounds at this time  Patient has had multiple abdominal surgeries and multiple abdominal complications and is a very high risk of having recurrence of symptoms  Added PPI for patient at increased risk of PUD due to abdominal surgeries, NSAIDS, and limited oral intake  2  Acute Urinary Retention: Wang cath placed post operatively at [place] [address]  for post op urinary retention  Patient is OOB and ambulating to bathroom, will remove wang today for voiding trial   Patient was educated on the risks of UTI and understands why wang must be removed  3  Chronic Opioid use: Patient was on MS Contin 60 mg BID plus PRN Dilaudid 3 mg PRN at home for greater than 6 months  These medications were not ordered here due to NPO and ileus, patient at high risk of clinical opioid withdrawal syndrome    To balance risk of withdrawal and ileus will restart Ms-Contin 15 mg BID and monitor for signs and symptoms of ileus and opiate withdrawal   Pain also being addressed with gabapentin, Tylenol, NSAIDS  4  SHERICE: Continue Valium, Lexapro, Restoril, Patient is more comfortable continue complain of abdominal pain but anxiety is much controlled     5  Anemia: Today 8 1/24 5, yesterday 8 2/24 5, normocytic  Possible post op blood loss, will recheck CBC and Iron studies  Transfuse if <8 5 and symptomatic  6  Chronic common bile duct dilation: Chronic since 2007 ,management as above    7  Bilious vomiting with nausea: patient inconsistent with reporting of nausea  Tolerating PO medications with water, will advance diet to clear liquids as tolerated  Patient denies vomiting  8  Elevated liver enzymes: AST decreased from 79 to 60, ALT decreased from 68 to 59 7/3  Total bilirubin now normal at 0 80, no jaundice present  9  Hypokalemia-resolved as of 7/4/18 K 3 6, will recheck tomorrow  No vomiting, PO intake does remain poor  10  Hyponatremia: Sodium 133 on 7/4, not rechecked today, will recheck tomorrow  Likely due to decreased oral intake  Started patient on clear liquids today including juice, broth, and jell-O    11  Surgical site dressings: Surgical site dressings clean, dry and intact  Ortho spine here to see and evaluate dressings  Subjective:    patient states "I feel terrible" I "need my pain medications back"     Objective:     Vitals: Blood pressure 151/79, pulse 68, temperature (!) 96 4 °F (35 8 °C), resp  rate 18, height 5' 4" (1 626 m), weight 79 7 kg (175 lb 11 3 oz), SpO2 95 %  ,Body mass index is 30 16 kg/m²        Intake/Output Summary (Last 24 hours) at 07/05/18 0954  Last data filed at 07/05/18 0841   Gross per 24 hour   Intake          1553 34 ml   Output             6450 ml   Net         -4896 66 ml       Physical Exam: General appearance: alert and mild distress  Head: Normocephalic, without obvious abnormality, atraumatic  Eyes: negative findings: lids and lashes normal and pupils equal, round, reactive to light and accomodation  Back: surgical dressing intact midline  Lungs: diminished breath sounds  Heart: regular rate and rhythm, S1, S2 normal, no murmur, click, rub or gallop  Abdomen: abnormal findings:  guarding  Extremities: extremities normal, warm and well-perfused; no cyanosis, clubbing, or edema  Pulses: 2+ and symmetric  Skin: Skin color, texture, turgor normal  No rashes or lesions or pale  Neurologic: Alert and oriented X 3, normal strength and tone  Normal symmetric reflexes  Normal coordination and gait     Invasive Devices     Peripherally Inserted Central Catheter Line            PICC Line 23/31/48 Left Basilic 2 days          Drain            Urethral Catheter 16 Fr  2 days                Lab, Imaging and other studies: I have personally reviewed pertinent reports      VTE Pharmacologic Prophylaxis: Enoxaparin (Lovenox)  VTE Mechanical Prophylaxis: sequential compression device

## 2018-07-06 VITALS
DIASTOLIC BLOOD PRESSURE: 85 MMHG | OXYGEN SATURATION: 97 % | HEART RATE: 74 BPM | RESPIRATION RATE: 18 BRPM | WEIGHT: 175.71 LBS | BODY MASS INDEX: 30 KG/M2 | HEIGHT: 64 IN | SYSTOLIC BLOOD PRESSURE: 148 MMHG | TEMPERATURE: 97 F

## 2018-07-06 PROBLEM — R33.8 ACUTE URINARY RETENTION: Status: RESOLVED | Noted: 2018-07-04 | Resolved: 2018-07-06

## 2018-07-06 PROBLEM — E87.1 HYPONATREMIA: Status: ACTIVE | Noted: 2018-07-06

## 2018-07-06 PROBLEM — R11.14 BILIOUS VOMITING WITH NAUSEA: Status: RESOLVED | Noted: 2018-07-02 | Resolved: 2018-07-06

## 2018-07-06 LAB
ANION GAP SERPL CALCULATED.3IONS-SCNC: 9 MMOL/L (ref 5–14)
BUN SERPL-MCNC: 10 MG/DL (ref 5–25)
CALCIUM SERPL-MCNC: 8.7 MG/DL (ref 8.4–10.2)
CHLORIDE SERPL-SCNC: 99 MMOL/L (ref 97–108)
CO2 SERPL-SCNC: 29 MMOL/L (ref 22–30)
CREAT SERPL-MCNC: 0.64 MG/DL (ref 0.6–1.2)
EOSINOPHIL # BLD AUTO: 0.07 THOUSAND/UL (ref 0–0.4)
EOSINOPHIL NFR BLD MANUAL: 1 % (ref 0–6)
ERYTHROCYTE [DISTWIDTH] IN BLOOD BY AUTOMATED COUNT: 13.8 %
FERRITIN SERPL-MCNC: 107 NG/ML (ref 8–388)
GFR SERPL CREATININE-BSD FRML MDRD: 96 ML/MIN/1.73SQ M
GLUCOSE SERPL-MCNC: 109 MG/DL (ref 70–99)
HCT VFR BLD AUTO: 28.3 % (ref 36–46)
HGB BLD-MCNC: 9.3 G/DL (ref 12–16)
LYMPHOCYTES # BLD AUTO: 0.66 THOUSAND/UL (ref 0.5–4)
LYMPHOCYTES # BLD AUTO: 9 % (ref 20–50)
MAGNESIUM SERPL-MCNC: 1.9 MG/DL (ref 1.6–2.3)
MCH RBC QN AUTO: 28.7 PG (ref 26–34)
MCHC RBC AUTO-ENTMCNC: 33 G/DL (ref 31–36)
MCV RBC AUTO: 87 FL (ref 80–100)
MONOCYTES # BLD AUTO: 0.22 THOUSAND/UL (ref 0.2–0.9)
MONOCYTES NFR BLD AUTO: 3 % (ref 1–10)
NEUTS SEG # BLD: 6.35 THOUSAND/UL (ref 1.8–7.8)
NEUTS SEG NFR BLD AUTO: 87 %
PLATELET # BLD AUTO: 523 THOUSANDS/UL (ref 150–450)
PLATELET BLD QL SMEAR: ABNORMAL
PMV BLD AUTO: 7.8 FL (ref 8.9–12.7)
POTASSIUM SERPL-SCNC: 3 MMOL/L (ref 3.6–5)
RBC # BLD AUTO: 3.25 MILLION/UL (ref 4–5.2)
RBC MORPH BLD: NORMAL
SODIUM SERPL-SCNC: 137 MMOL/L (ref 137–147)
TIBC SERPL-MCNC: 294 UG/DL (ref 250–450)
TOTAL CELLS COUNTED SPEC: 100
WBC # BLD AUTO: 7.3 THOUSAND/UL (ref 4.5–11)

## 2018-07-06 PROCEDURE — 80048 BASIC METABOLIC PNL TOTAL CA: CPT | Performed by: FAMILY MEDICINE

## 2018-07-06 PROCEDURE — 99239 HOSP IP/OBS DSCHRG MGMT >30: CPT | Performed by: FAMILY MEDICINE

## 2018-07-06 PROCEDURE — G8982 BODY POS GOAL STATUS: HCPCS

## 2018-07-06 PROCEDURE — 97167 OT EVAL HIGH COMPLEX 60 MIN: CPT

## 2018-07-06 PROCEDURE — 97530 THERAPEUTIC ACTIVITIES: CPT

## 2018-07-06 PROCEDURE — 97163 PT EVAL HIGH COMPLEX 45 MIN: CPT

## 2018-07-06 PROCEDURE — 85007 BL SMEAR W/DIFF WBC COUNT: CPT | Performed by: FAMILY MEDICINE

## 2018-07-06 PROCEDURE — C9113 INJ PANTOPRAZOLE SODIUM, VIA: HCPCS | Performed by: FAMILY MEDICINE

## 2018-07-06 PROCEDURE — 85027 COMPLETE CBC AUTOMATED: CPT | Performed by: FAMILY MEDICINE

## 2018-07-06 PROCEDURE — G8981 BODY POS CURRENT STATUS: HCPCS

## 2018-07-06 PROCEDURE — 82728 ASSAY OF FERRITIN: CPT | Performed by: FAMILY MEDICINE

## 2018-07-06 PROCEDURE — 97535 SELF CARE MNGMENT TRAINING: CPT

## 2018-07-06 PROCEDURE — 83735 ASSAY OF MAGNESIUM: CPT | Performed by: FAMILY MEDICINE

## 2018-07-06 PROCEDURE — 83550 IRON BINDING TEST: CPT | Performed by: FAMILY MEDICINE

## 2018-07-06 RX ORDER — SIMETHICONE 80 MG
80 TABLET,CHEWABLE ORAL
Qty: 60 TABLET | Refills: 0 | Status: SHIPPED | OUTPATIENT
Start: 2018-07-06 | End: 2018-12-01

## 2018-07-06 RX ORDER — DOCUSATE SODIUM 100 MG/1
100 CAPSULE, LIQUID FILLED ORAL 2 TIMES DAILY
Qty: 20 CAPSULE | Refills: 0 | Status: SHIPPED | OUTPATIENT
Start: 2018-07-06 | End: 2018-12-01

## 2018-07-06 RX ORDER — POTASSIUM CHLORIDE 20 MEQ/1
60 TABLET, EXTENDED RELEASE ORAL ONCE
Status: DISCONTINUED | OUTPATIENT
Start: 2018-07-06 | End: 2018-07-06

## 2018-07-06 RX ORDER — POTASSIUM CHLORIDE 750 MG/1
30 TABLET, EXTENDED RELEASE ORAL
Status: COMPLETED | OUTPATIENT
Start: 2018-07-06 | End: 2018-07-06

## 2018-07-06 RX ORDER — SIMETHICONE 80 MG
80 TABLET,CHEWABLE ORAL
Status: DISCONTINUED | OUTPATIENT
Start: 2018-07-06 | End: 2018-07-06 | Stop reason: HOSPADM

## 2018-07-06 RX ORDER — SENNOSIDES 8.6 MG
1 TABLET ORAL DAILY
Qty: 30 EACH | Refills: 0 | Status: SHIPPED | OUTPATIENT
Start: 2018-07-07 | End: 2018-12-01

## 2018-07-06 RX ORDER — POTASSIUM CHLORIDE 20 MEQ/1
20 TABLET, EXTENDED RELEASE ORAL DAILY
Qty: 3 TABLET | Refills: 0 | Status: SHIPPED | OUTPATIENT
Start: 2018-07-07 | End: 2018-12-01

## 2018-07-06 RX ORDER — CHLORHEXIDINE GLUCONATE 4 G/100ML
SOLUTION TOPICAL
Status: DISPENSED
Start: 2018-07-06 | End: 2018-07-06

## 2018-07-06 RX ORDER — MORPHINE SULFATE 15 MG/1
15 TABLET, FILM COATED, EXTENDED RELEASE ORAL EVERY 12 HOURS SCHEDULED
Qty: 20 TABLET | Refills: 0 | Status: SHIPPED | OUTPATIENT
Start: 2018-07-06 | End: 2018-07-16

## 2018-07-06 RX ADMIN — POTASSIUM CHLORIDE 30 MEQ: 750 TABLET, EXTENDED RELEASE ORAL at 13:31

## 2018-07-06 RX ADMIN — POTASSIUM CHLORIDE 30 MEQ: 750 TABLET, EXTENDED RELEASE ORAL at 13:35

## 2018-07-06 RX ADMIN — DIAZEPAM 5 MG: 5 TABLET ORAL at 08:21

## 2018-07-06 RX ADMIN — SIMETHICONE CHEW TAB 80 MG 80 MG: 80 TABLET ORAL at 12:06

## 2018-07-06 RX ADMIN — LORATADINE 10 MG: 10 TABLET ORAL at 08:21

## 2018-07-06 RX ADMIN — CALCIUM CARBONATE-VITAMIN D TAB 500 MG-200 UNIT 1 TABLET: 500-200 TAB at 08:21

## 2018-07-06 RX ADMIN — GABAPENTIN 600 MG: 300 CAPSULE ORAL at 08:21

## 2018-07-06 RX ADMIN — MORPHINE SULFATE 15 MG: 15 TABLET, EXTENDED RELEASE ORAL at 08:21

## 2018-07-06 RX ADMIN — SENNOSIDES 8.6 MG: 8.6 TABLET, FILM COATED ORAL at 08:21

## 2018-07-06 RX ADMIN — ESCITALOPRAM OXALATE 20 MG: 10 TABLET ORAL at 08:21

## 2018-07-06 RX ADMIN — PANTOPRAZOLE SODIUM 40 MG: 40 INJECTION, POWDER, FOR SOLUTION INTRAVENOUS at 08:22

## 2018-07-06 RX ADMIN — ENOXAPARIN SODIUM 40 MG: 40 INJECTION SUBCUTANEOUS at 08:21

## 2018-07-06 NOTE — NURSING NOTE
Patient discharged to home  PICC removed, pressure dressing applied, no bleeding noted  Discharge instructions and Rx's given to patient with stated understanding  Patient left unit via w/c with belongings accompanied by staff in stable condition

## 2018-07-06 NOTE — PLAN OF CARE
Problem: OCCUPATIONAL THERAPY ADULT  Goal: Performs self-care activities at highest level of function for planned discharge setting  See evaluation for individualized goals  Treatment Interventions: ADL retraining, Functional transfer training, UE strengthening/ROM, Endurance training, Patient/family training, Compensatory technique education  Equipment Recommended: Tub seat with back       See flowsheet documentation for full assessment, interventions and recommendations  Limitation: decreased ADL status, decreased endurance, decreased self care trans, decreased high level ADLs  Prognosis: Good  Assessment: OT completed an extensive review of medical and social history within OT eval  Pt presents to OT with performance deficits including, restricted ROM, generalized weakness, pain, balance, standing tolerance, functional mobility, activity tolerance, balance, community skills, ADL function,and IADL function  These deficits have caused a decline in pt's prior level of independence  Pt will benefit from OT services to maximize [place] in order to safely return to PLOF  Pt is of high complexity and will benefit from max adaptations/ modifications to safely return to highest functional level of independence  OT Discharge Recommendation: Short Term Rehab  OT - OK to Discharge:  Yes

## 2018-07-06 NOTE — SOCIAL WORK
Pt asked for SW to see if she could discharge to home today, as she reports "I've had it, I want to go home today " SW reported this to her attending who will discharge her today  IMM#2 signed by pt  Discharge options discussed with pt including outpatient PT/OT, which pt prefers  Script requested for pt, who wants to schedule things herself  Pt's  will come to pick her up

## 2018-07-06 NOTE — OCCUPATIONAL THERAPY NOTE
Occupational Therapy Evaluation      Liv Flynn    7/6/2018    Patient Active Problem List   Diagnosis    Ileus ([address] )    Elevated liver enzymes    Bilious vomiting with nausea    Common bile duct dilatation    Generalized anxiety disorder    Chronic, continuous use of opioids    Acute urinary retention    Anemia       Past Medical History:   Diagnosis Date    Asthma        Past Surgical History:   Procedure Laterality Date    ABDOMINAL SURGERY      BACK SURGERY       Vitals: 95%, HR 88bpm, BP: 144/94  Observation: Pt supine in bed; IV not infusing, dressings to surgical sites intact  End of session: Pt returned to bed with all needs in reach  07/06/18 1016   Note Type   Note type Eval/Treat   Restrictions/Precautions   Other Precautions Pain; Fall Risk;Limb alert   Pain Assessment   Pain Assessment 0-10   Pain Score 8   Pain Type Surgical pain   Pain Location Abdomen   Pain [address] [place] Pain Intervention(s) Medication (See MAR)   Home Living   Type of [address] [place] [address] One level;Stairs to enter without rails   Bathroom Shower/Tub Tub/shower unit   Bathroom Toilet Raised   Bathroom Equipment Commode   Home Equipment Walker;Reacher   Prior Function   Level of [place] Independent with ADLs and functional mobility   Lives With Spouse; Family  (brother)   Receives Help From Family   ADL Assistance Independent   IADLs Needs assistance   Falls in the last 6 months 1 to 4   Vocational Retired   Cre[employer] work   Lifestyle   Autonomy Pt reports being independnet for bathing, dressing and ambulation with RW ( following sx 2 weeks ago)    Reciprocal Relationships Pt lives with her  and brother  States her  helps her with anything  Brother does most cooking, cleaning and grocery shopping      Service to Others retired    Intrinsic Gratification Pt states she enjoys spending time with her grandkids    Subjective   Subjective " I would like to go home today"   ADL   Where Assessed Edge of bed   Grooming Assistance 5  Supervision/Setup   Grooming Deficit Brushing hair   LB Dressing Assistance 2  Maximal Assistance   LB Dressing Deficit Don/doff R sock; Don/doff L sock; Thread RLE into pants; Thread LLE into pants; Thread RLE into underwear; Thread LLE into underwear   Toileting Assistance  4  Minimal Assistance   Toileting Deficit Steadying   Bed Mobility   Supine to Sit 5  Supervision   Additional items HOB elevated; Bedrails   Sit to Supine 5  Supervision   Additional items HOB elevated; Bedrails   Transfers   Sit to Stand 4  Minimal assistance   Additional items Assist x 1; Increased time required;Verbal cues  (safety and hand placement )   Stand to Sit 4  Minimal assistance   Additional items Assist x 1; Increased time required;Verbal cues   Functional Mobility   Functional Mobility 4  Minimal assistance   Additional Comments verbal cues for safety pt noted " I get tired very quickly"   Additional items Rolling walker   Balance   Static Sitting Fair +   Dynamic Sitting Fair +   Static Standing Fair   Dynamic Standing Fair   Ambulatory Fair   Activity Tolerance   Activity Tolerance Patient limited by fatigue;Patient limited by pain   Nurse Made Aware Darling Heard   RUE Assessment   RUE Assessment WFL  (shldrs to 90 degrees; unable to MMT 2' pain  appears funct  )   LUE Assessment   LUE Assessment WFL  (shldrs to 90 degrees; unable to MMT 2' pain  appears funct  )   Vision-Basic Assessment   Current Vision Wears glasses all the time   Cognition   Overall Cognitive Status [facility]   Arousal/Participation Alert; Responsive; Cooperative   Orientation Level Oriented X4   Following Commands Follows multistep commands with increased time or repetition   Goals   Patient Goals "to go home and visit and play with my grandkids"   Short Term Goal #1 1)Pt will perform upper body self care I; 2) Pt will perform LB self care with min A; 3) Pt will perform toileting I; 4) Pt will perform grooming I; 5) Pt will achieve a F+ activity tolerance  6) Pt will achieve a F+ standing balance  Plan   Treatment Interventions ADL retraining;Functional transfer training;UE strengthening/ROM; Endurance training;Patient/family training; Compensatory technique education   Goal Expiration Date 07/20/18   Treatment Day 1   OT Frequency 3-5x/wk   Additional Treatment Session   Start Time 1001   End Time 1016   Treatment Assessment Pt performed self care sitting EOB: grooming- pt performed hair care with S, Pt required total assist to alphonso/ doff socks 2' pain limited trunk flexion  Pt performed functional mobility with RW in room with CS- min A; requiring more assist at times 2' knee buckling  Will continue with plan of care    Recommendation   OT Discharge Recommendation Short Term Rehab   Equipment Recommended Tub seat with back   OT - OK to Discharge Yes   Barthel Index   Feeding 10   Bathing 0   Grooming Score 0   Dressing Score 5   Bladder Score 10   Bowels Score 10   Toilet Use Score 5   Transfers (Bed/Chair) Score 5   Mobility (Level Surface) Score 0   Stairs Score 0   Barthel Index Score 45   OT completed an extensive review of medical and social history within OT eval  Pt presents to OT with performance deficits including: ROM, Strength, pain, balance, standing tolerance, functional  Mobility, activity tolerance,  community skills, ADL function, IADL function  These deficits have cause a decline in pt's prior level of independence  Pt will benefit from OT services to maximize independence in order to safely return to PLOF  Pt is of high complexity and will benefit from max adaptations/ modifications to safely return to highest functional level of independence  Pt's prognosis is good     Rhys Vora OT  7/6/2018

## 2018-07-06 NOTE — DISCHARGE SUMMARY
Discharge- Alyssa Smith 1955, 59 y o  female MRN: [id number]    Unit/Bed#: 7T [facility] 705-02 Encounter: [id number]    Primary Care Provider: Faby Avelar MD   Date and time admitted to hospital: 7/2/2018 11:06 AM        * Ileus [facility])   Assessment & Plan    Patient presented with generalized abdominal pain nausea and bilious vomiting ,imaging study reports " diffuse bowel dilatation involving distal small bowel and the entire colon to the panel verge obstruction mass is not identified and this may represent adynamic ileus in the postoperative setting possible related to medication"  Patient states that she still has abdominal pain 3/10 periodically gets spasms  Discussed with surgery  If there is any recurrence of vomiting then NG tube will have to be replaced  The patient did have some bowel movements yesterday and does have active bowel sounds at this time  Patient has had multiple abdominal surgeries and multiple abdominal complications and is a very high risk of having recurrence of symptoms  Patient has reported no n/v to np student magda guadalupe, when I walked in she right always tells me she still nauseus, and abdominal pain is there  Also focused on pain medications, she does not want any food but apparently taking po medications and drinking water fine  Had diarrhea yesterday  She continues to say give me 2 days, but also wants to go home very soon  States she does not want to see dr Red Goldman again as he did not inform her of ngt removal       I did inform her the side effects of opiods, she has toradol prn for pain   But she has been on morphine 60 mg po q12hrs standing for 6 month - cant be stopped abruptly as was not continued on admission- I did restart ms contin but at reduced dose 15 mg po bid     ·   Patient's condition has improved she is having bowel movements positive flatus she is tolerating her clears and actually was advanced to full liquids and tolerating that without any vomiting abdominal pain improved nausea  Reviewed obstruction series yesterday still persistent ileus but her narcotics could complicate this she does not have any obstruction  Patient is requesting to go home I have no opposition to that  She will follow up with her PCP I did discuss with her to continue morphine sulfate but at a lower dose she stated she has 30 mg at home all actually sent her are MS Contin 15 mg b i d     Will continue full liquid diet for now and advance every 2 days if able to  Chronic, continuous use of opioids   Assessment & Plan    Most likely contribute to her hypodynamic ileus  Avoid unnecessary opioids   IV Toradol for pain control   opioids decreased  She is on high dose chronic morphine cant be stopped abruptly will add back on but at lower dose 15mg po bid  Will cut actually continue her on 15 mg   p o  B i d  At home to avoid worsening         Bilious vomiting with nausearesolved as of 7/6/2018   Assessment & Plan    Resolved  will see how she handles trial of ng tube being removed  S/p removal of ngt no vomiting tolerated water and po meds  Different report of nausea to different individuals   Advance to clears         Generalized anxiety disorder   Assessment & Plan    Continue Valium ,Lexapro, Restoril           Common bile duct dilatation   Assessment & Plan    Chronic since 2007 ,management as above        Anemia   Assessment & Plan    · Normocytic / possible post surgery blood loss / ??  Chronic   · Recheck blood work marcelino hb stable  · Will avoid ferrous sulfate with chronic opiod use and high chance of ileus /constipation   · Transfuse if <8  · Iron studies        Hyponatremia   Assessment & Plan    · POA resolved   · Was 2/2 inadequate intake and then most likley post op pain siadh        Hypokalemia   Assessment & Plan    Replace with kcl 60 meq po x1  Will send home on 20 meq po daily x3 days till po picks up              Discharging Physician / Practitioner: Adriana Mac Elias Narayanan MD  PCP: Carmina Key MD  Admission Date:   Admission Orders     Ordered        07/02/18 1825  Inpatient Admission (expected length of stay for this patient is greater than two midnights)  Once             Discharge Date: 07/06/18    Resolved Problems  Date Reviewed: 7/6/2018          Resolved    Bilious vomiting with nausea 7/6/2018     Resolved by  Cortez Mazariegos MD    Acute urinary retention 7/6/2018     Resolved by  Cortez Mazariegos MD          Consultations During Hospital Stay:  · GI  · DR VALE - SURGERY  · orthopedics    Procedures Performed:     Ct abdomen and pelvis- 1  Diffuse bowel dilatation involving distal small bowel and the entire colon to the anal verge  Obstructing mass is not identified and this may represent adynamic ileus in the postoperative setting, possibly related to medication  The possibility of   obstruction secondary to fecal impaction or a distal rectal/anal mass cannot be entirely excluded and clinical correlation is recommended      2  Postoperative changes status post recent spinal surgery  Lytic lesions in the iliac bone are likely bone graft donor sites  If this is not consistent with recent surgery, pathologic lesions cannot be excluded      3   Stable common bile duct and pancreatic duct dilatation of uncertain etiology though given that this was present in 2007 to a lesser degree, this is likely a benign process      4   Patchy bibasilar density, likely atelectasis  Note that a left basilar pneumonia cannot be excluded in the appropriate clinical setting      5   Stable right-sided hydronephrosis though the right kidney demonstrates normal enhancement    The ureter is nondilated and there appears to be kinking at the ureteropelvic junction suggesting partial UPJ junction  ·  obstruction series -Findings again most suggestive of an ileus pattern, without significant change from 7/4/2018       Significant Findings / Test Results:     · See above    Incidental Findings:   · none     Test Results Pending at Discharge (will require follow up):   · none     Outpatient Tests Requested:  · Bmp on 2/32    Complications:  none    Reason for Admission: ileus    Hospital Course:     Vincent Harrison is a 59 y o  female patient who originally presented to the hospital on 7/2/2018 due to   Which generalized abdominal pain, nausea, bilious vomiting with abnormality on CT abdomen and pelvis which was was found to have an ileus than obstruction on repeat obstruction series  Patient had an NG tube surgery evaluation and GI evaluation kept NP O  Patient was focused on opiate narcotics which was explained to her the side effect is ileus  Initially hyponatremic which could have been a combination pain as a SIADH and also inadequate intake  Hyponatremia resolved and if discharge is 137  Patient clinically actually has improved her NG tube has been removed at bowel sounds positive bowel movement although obstruction series still revealed a persistent ileus the patient is not going to comply to being off opiates I did discuss with her to avoid higher dose I did restart her on MS Contin 15 mg twice a day because she is unable to not do without them  I am replacing her potassium and her diet was advanced to full liquid she will remain on it for couple of days and advance as tolerated every 2-3 days will send her 3 days of potassium and repeat a BMP on July 9th  She will follow up with her PCP she stated and as she had recent surgery with Dr Kassandra Rojas issue has appointment with him in the follow-up the evaluated her here and everything was okay with the dressing replaced  No I cannot bend hemoglobin stable vitals are stable patient is asking to be discharged on no position to that just to follow strict rules and followups  Please see above list of diagnoses and related plan for additional information       Condition at Discharge: fair     Discharge Day Visit / Exam:     Subjective: Patient is seen and examined states her situation has improved she really wants to go home no nausea tolerating her full liquid diets no vomiting abdominal pain has improved she has been urinating since the Stahl has been removed and having adequate bowel movements  Vitals: Blood Pressure: 148/85 (07/06/18 0800)  Pulse: 74 (07/06/18 0800)  Temperature: (!) 97 °F (36 1 °C) (07/06/18 0800)  Temp Source: Temporal (07/06/18 0800)  Respirations: 18 (07/06/18 0800)  Height: 5' 4" (162 6 cm) (07/02/18 2000)  Weight - Scale: 79 7 kg (175 lb 11 3 oz) (07/02/18 2000)  SpO2: 97 % (07/06/18 0800)  Exam:   Physical Exam   Constitutional: She is oriented to person, place, and time  She appears well-developed and well-nourished  HENT:   Head: Normocephalic and atraumatic  Eyes: EOM are normal  Pupils are equal, round, and reactive to light  Neck: Normal range of motion  Cardiovascular: Normal rate, regular rhythm and normal heart sounds  Pulmonary/Chest: Effort normal and breath sounds normal    Abdominal: Soft  Bowel sounds are normal  She exhibits no distension  There is tenderness (  Mild tenderness in the upper abdomen)  There is no rebound and no guarding  Multiple abdominal scars   Musculoskeletal: Normal range of motion  Neurological: She is alert and oriented to person, place, and time  She has normal reflexes  Skin: Skin is warm  Psychiatric: She has a normal mood and affect  Discussion with Family: no    Discharge instructions/Information to patient and family:   See after visit summary for information provided to patient and family  Provisions for Follow-Up Care:  See after visit summary for information related to follow-up care and any pertinent home health orders        Disposition:     Home    For Discharges to [address] [facility]:   · Not Applicable to this Patient - Not Applicable to this Patient    Planned Readmission: no     Discharge Statement:  I spent >35 minutes minutes discharging the patient  This time was spent on the day of discharge  I had direct contact with the patient on the day of discharge  Greater than 50% of the total time was spent examining patient, answering all patient questions, arranging and discussing plan of care with patient as well as directly providing post-discharge instructions  Additional time then spent on discharge activities  Discharge Medications:  See after visit summary for reconciled discharge medications provided to patient and family        ** Please Note: This note has been constructed using a voice recognition system **

## 2018-07-06 NOTE — PROGRESS NOTES
Patient Name: Aram Kennedy  Patient MRN: [id number]  Date: 07/06/18  Service: [facility]    Subjective   Aram Kennedy is a 59 y o  female who was admitted with Ileus ([facility] [address] )  She is about the same  No significant change in symptoms with same degree of abd pain  +bm yesterday and this am  Stool is loose  Vitals  Blood pressure 154/89, pulse 66, temperature 97 8 °F (36 6 °C), temperature source Temporal, resp  rate 18, height 5' 4" (1 626 m), weight 79 7 kg (175 lb 11 3 oz), SpO2 96 %, not currently breastfeeding  Physical Exam   Appears comfortable  Somewhat frustrated about slow recovery  abd is soft but diffusely tender  +bs  Many scars  Lower suprapubic region with more pain on exam  No rebound  Laboratory Studies  Lab Results   Component Value Date    CREATININE 0 64 07/06/2018    BUN 10 07/06/2018    K 3 0 (L) 07/06/2018    CL 99 07/06/2018    CO2 29 07/06/2018    GLUCOSE 109 (H) 07/06/2018    CALCIUM 8 7 07/06/2018    PROT 5 3 (L) 07/03/2018    ALKPHOS 187 (H) 07/03/2018    BILITOT 0 80 07/03/2018    AST 60 (H) 07/03/2018    ALT 59 (H) 07/03/2018     Lab Results   Component Value Date    WBC 7 30 07/06/2018    HGB 9 3 (L) 07/06/2018    HCT 28 3 (L) 07/06/2018     (H) 07/06/2018    MCV 87 07/06/2018     Lab Results   Component Value Date    PROTIME 11 7 (H) 07/02/2018    INR 1 11 (H) 07/02/2018     No results found for: CDIFF    Imaging and Other Studies  kub showing increased intestinal air; no change   7 5 18    Inhouse Medications       Current Facility-Administered Medications:     acetaminophen (TYLENOL) tablet 650 mg, 650 mg, Oral, Q6H PRN, 650 mg at 07/05/18 0216    calcium carbonate-vitamin D (OSCAL-D) 500 mg-200 units per tablet 1 tablet, 1 tablet, Oral, Daily With Breakfast, 1 tablet at 07/06/18 0821    diazepam (VALIUM) tablet 5 mg, 5 mg, Oral, TID, 5 mg at 07/06/18 0821    docusate sodium (COLACE) capsule 100 mg, 100 mg, Oral, BID, 100 mg at 07/03/18 1732    enoxaparin (LOVENOX) subcutaneous injection 40 mg, 40 mg, Subcutaneous, Daily, 40 mg at 07/06/18 0821    escitalopram (LEXAPRO) tablet 20 mg, 20 mg, Oral, Daily, 20 mg at 07/06/18 0821    gabapentin (NEURONTIN) capsule 600 mg, 600 mg, Oral, TID, 600 mg at 07/06/18 0821    ketorolac (TORADOL) injection 15 mg, 15 mg, Intravenous, Q6H PRN, 15 mg at 07/05/18 2259    loratadine (CLARITIN) tablet 10 mg, 10 mg, Oral, Daily, 10 mg at 07/06/18 0821    morphine (MS CONTIN) ER tablet 15 mg, 15 mg, Oral, Q12H SHENA, 15 mg at 07/06/18 0821    ondansetron (ZOFRAN) injection 4 mg, 4 mg, Intravenous, Q6H PRN, 4 mg at 07/05/18 1859    pantoprazole (PROTONIX) injection 40 mg, 40 mg, Intravenous, Q24H SHENA, 40 mg at 07/06/18 0822    senna (SENOKOT) tablet 8 6 mg, 1 tablet, Oral, Daily, 8 6 mg at 07/06/18 8860    temazepam (RESTORIL) capsule 30 mg, 30 mg, Oral, Daily, 30 mg at 07/05/18 2258      Assessment/Plan:  Recent back surgery  Abd pain persists likely from gaseous distension of the bowel  May be hampered by significant adhesive disease  No obstruction based on ablilty to eat and KUB  Also now active bs and flatus and bms  Pt desiring something more for pain  Concern is that she is on morphine MS contin and more narcotics may "reaggrevate ileus" situation  Getting toradol but states not helping much  Ultram gives her migraines  levsin could assist if intestinal spasm contributing to pain, but does have constipation as side effect  Pt does not wish to take  Would try advancing to full liquids per pt request   OOB ambulating  Add simethacone for gas  Pain control per primary service  If morphine is used, would only consider using low doses        Angela Ellis MD

## 2018-07-06 NOTE — ASSESSMENT & PLAN NOTE
Most likely contribute to her hypodynamic ileus  Avoid unnecessary opioids   IV Toradol for pain control   opioids decreased  She is on high dose chronic morphine cant be stopped abruptly will add back on but at lower dose 15mg po bid  Will cut actually continue her on 15 mg   p o  B i d   At home to avoid worsening

## 2018-07-06 NOTE — PLAN OF CARE
Problem: PHYSICAL THERAPY ADULT  Goal: Performs mobility at highest level of function for planned discharge setting  See evaluation for individualized goals  Treatment/Interventions: ADL retraining, Functional transfer training, Elevations, LE strengthening/ROM, Therapeutic exercise, Endurance training, Patient/family training, Bed mobility, Equipment eval/education, Gait training, Spoke to nursing  Equipment Recommended: Other (Comment) (To be determined; states she has RW at home)       See flowsheet documentation for full assessment, interventions and recommendations  Outcome: Progressing  Prognosis: Fair  Problem List: Decreased strength, Decreased endurance, Impaired balance, Decreased mobility, Impaired judgement, Decreased safety awareness, Decreased skin integrity, Pain  Assessment: In summary, guiding factors including patient history, examination of body system(s), clinical presentation and clinical decision making were considered  Patient presents with comorbid conditions that impact function, context of current functional limitations as compared to the prior level of function, impaired prior level of function, recent hospital admission and with living environment deficits  Patient also presents with c/o abdominal pain, skin integrity issue(s), impaired safety awareness, left LE strength/knee AROM, bed mobility, transfers, endurance for activity, standing balance and gait abilities  Clinical presentation is unstable at this time  The assigned level of complexity is: high  Patient would benefit from continued PT treatment to address deficits as defined above and restore or maximize level of functional mobility with consistency   From PT/mobility standpoint, preliminary discharge recommendation would be: short-term/sub-acute level of rehab, in order to facilitate return home with  and brother, decrease risk for falls, improve activity tolerance and increase independence in home environment  Barriers to Discharge: Other (Comment) (LIANA home without HRs, limited functional mobility)     Recommendation: Short-term skilled PT     PT - OK to Discharge:  (Recommend sub-acute rehab)    See flowsheet documentation for full assessment

## 2018-07-06 NOTE — ASSESSMENT & PLAN NOTE
Patient presented with generalized abdominal pain nausea and bilious vomiting ,imaging study reports " diffuse bowel dilatation involving distal small bowel and the entire colon to the panel verge obstruction mass is not identified and this may represent adynamic ileus in the postoperative setting possible related to medication"  Patient states that she still has abdominal pain 3/10 periodically gets spasms  Discussed with surgery  If there is any recurrence of vomiting then NG tube will have to be replaced  The patient did have some bowel movements yesterday and does have active bowel sounds at this time  Patient has had multiple abdominal surgeries and multiple abdominal complications and is a very high risk of having recurrence of symptoms  Patient has reported no n/v to np student magda guadalupe, when I walked in she right always tells me she still nauseus, and abdominal pain is there  Also focused on pain medications, she does not want any food but apparently taking po medications and drinking water fine  Had diarrhea yesterday  She continues to say give me 2 days, but also wants to go home very soon  States she does not want to see dr Montez Valentin again as he did not inform her of ngt removal       I did inform her the side effects of opiods, she has toradol prn for pain  But she has been on morphine 60 mg po q12hrs standing for 6 month - cant be stopped abruptly as was not continued on admission- I did restart ms contin but at reduced dose 15 mg po bid     ·   Patient's condition has improved she is having bowel movements positive flatus she is tolerating her clears and actually was advanced to full liquids and tolerating that without any vomiting abdominal pain improved nausea  Reviewed obstruction series yesterday still persistent ileus but her narcotics could complicate this she does not have any obstruction  Patient is requesting to go home I have no opposition to that    She will follow up with her PCP I did discuss with her to continue morphine sulfate but at a lower dose she stated she has 30 mg at home all actually sent her are MS Contin 15 mg b i d     Will continue full liquid diet for now and advance every 2 days if able to

## 2018-07-06 NOTE — ASSESSMENT & PLAN NOTE
· Normocytic / possible post surgery blood loss / ??  Chronic   · Recheck blood work marcelino hb stable  · Will avoid ferrous sulfate with chronic opiod use and high chance of ileus /constipation   · Transfuse if <8  · Iron studies

## 2018-07-06 NOTE — PHYSICAL THERAPY NOTE
PHYSICAL THERAPY EVALUATION    Time In: 9:45  Time Out: 10:05  Total Time: 20 min  MRN: [id number]    Patient is a 59 y o  female evaluated by Physical Therapy [facility] with admitting diagnosis of: Ileus ([address] ) [K56 7]  Abdominal pain [R10 9]  Elevated liver enzymes [R74 8]  Ileus, postoperative ([address] ) [K91 89, K56 7]  Bilious vomiting with nausea [R11 14] and principal problem of: Ileus ([address] )  Past Medical History:   Diagnosis Date    Asthma      Please refer to H & P for further details  Past Surgical History:   Procedure Laterality Date    ABDOMINAL SURGERY      BACK SURGERY         Current Length Of Hospital Stay: 4 day(s)    PT was consulted to assess patient's functional mobility and discharge needs  Ordered are PT Evaluation and treatment with activity level of: up and OOB as tolerated  Chart reviewed  RN cleared patient for PT  Comorbidities affecting patient's physical performance at time of assessment include: anxiety  Personal factors affecting the patient at time of IE include: ambulating with assistive device, step(s) to enter home, history of fall(s), impaired safety awareness and anxiety  Please locate objective findings from PT assessment regarding body systems outlined below  07/06/18 1015   Note Type   Note type Eval/Treat   Pain Assessment   Pain Assessment 0-10   Pain Score 8   Pain Type Surgical pain   Pain Location Abdomen   Patient's Stated Pain Goal No pain   Hospital Pain Intervention(s) Medication (See MAR); Other (Comment)  Raysa Berry RN made aware)   Home Living   Type of Home House; Other (Comment)  (1 SH with 2 LIANA without HR)   Home Layout One level; Laundry in basement;Stairs to enter without rails; Other (Comment)  (States her  assists her on the steps)   Bathroom Shower/Tub Tub/shower unit   Bathroom Toilet Raised   [address] Other (Comment); Reacher  (RW)   Prior Function   Level of [place] Other (Comment); Independent with ADLs and functional mobility  (States she was at mod I for ambulation with RW)   Lives With Spouse; Other (Comment)  (Brother)   Receives Help From Family; Other (Comment); Outpatient therapy  ( & brother; states [facility])   ADL Assistance Independent   IADLs Needs assistance  ( does laundry, brother cleans/cooks, both shop)   Falls in the last 6 months 1 to 4  (Reports 3; 2 in winter on ice, 1 indoors)   Vocational Retired   [address] [place] [address] Work   Restrictions/Precautions   Other Precautions Fall Risk;Pain;Limb alert;Spinal precautions  (Limb Alert Left UE)   General   Additional Pertinent History Patient s/p spine surgery at [facility] 6/26/18 by Dr Edin Jurado  Family/Caregiver Present No   Cognition   Overall Cognitive Status WFL   Arousal/Participation Alert   Orientation Level Oriented X4   Following Commands Follows multistep commands with increased time or repetition   RLE Assessment   RLE Assessment X   RLE Overall AROM   R Hip Flexion NT   R Knee Extension WFL; strength 4+/5   R Ankle Dorsiflexion WFL; strength 4+/5   LLE Assessment   LLE Assessment X   LLE Overall AROM   L Hip Flexion NT   L Knee Extension ~ 170 degrees; strength 4-/5 within available range   L Ankle Dorsiflexion WFL; strength 4/5   Coordination   Movements are Fluid and Coordinated 1   Sensation WFL  (Denies numbness/tingling bilateral LEs)   Light Touch   RLE Light Touch Grossly intact   LLE Light Touch Grossly intact   Bed Mobility   Supine to Sit 5  Supervision   Additional items HOB elevated; Bedrails;Verbal cues; Comment; Increased time required  (VCs for proper technique)   Transfers   Sit to Stand 4  Minimal assistance   Additional items Assist x 1; Increased time required; Other  (For safety; VCs for proper hand placement)   Stand to Sit 4  Minimal assistance   Additional items Assist x 1; Increased time required;Verbal cues; Other  (VCs for proper hand placement)   Ambulation/Elevation   Gait pattern L Knee Ben; Short stride; Excessively slow   Gait Assistance 4  Minimal assist  (Close supervision to occ Min A of 1 due to L knee buckling)   Additional items Assist x 1   Assistive Device Rolling walker   Distance 75 ft   Stair Management Assistance Not tested   Balance   Static Sitting Fair   Dynamic Sitting Fair   Activity Tolerance   Activity Tolerance Patient limited by fatigue;Patient limited by pain   Nurse Made Aware Yes, Darline RN   Assessment   Prognosis Fair   Problem List Decreased strength;Decreased endurance; Impaired balance;Decreased mobility; Impaired judgement;Decreased safety awareness;Decreased skin integrity;Pain   Assessment In summary, guiding factors including patient history, examination of body system(s), clinical presentation and clinical decision making were considered  Patient presents with comorbid conditions that impact function, context of current functional limitations as compared to the prior level of function, impaired prior level of function, recent hospital admission and with living environment deficits  Patient also presents with c/o abdominal pain, skin integrity issue(s), impaired safety awareness, left LE strength/knee AROM, bed mobility, transfers, endurance for activity, standing balance and gait abilities  Clinical presentation is unstable at this time  The assigned level of complexity is: high  Patient would benefit from continued PT treatment to address deficits as defined above and restore or maximize level of functional mobility with consistency  From PT/mobility standpoint, preliminary discharge recommendation would be: short-term/sub-acute level of rehab, in order to facilitate return home with  and brother, decrease risk for falls, improve activity tolerance and increase independence in home environment     Barriers to Discharge Other (Comment)  (LIANA home without HRs, limited functional mobility) Goals   Patient Goals "I'm gonna try really hard to go home today"  LTG Expiration Date 07/14/18   Long Term Goal #1 1  Patient will perform all bed mobility with modified independence in order to get in/out of bed  2  Patient will perform all functional transfers with modified independence in order to  improve ability to perform upright tasks at home and improve ease of transfers  3  Patient will ambulate with modified independence x at least 150 ft with RW in order to safely access all necessary areas of home  4  Patient will ascend/descend 2 steps without handrail with least restrictive assistive device with min A of 1 to enter/exit home  Treatment Day 1   Plan   Treatment/Interventions ADL retraining;Functional transfer training;Elevations;LE strengthening/ROM; Therapeutic exercise; Endurance training;Patient/family training;Bed mobility; Equipment eval/education;Gait training;Spoke to nursing   PT Frequency Once a day; Other (Comment)  (3-5x/wk)   Recommendation   Recommendation Short-term skilled PT   Equipment Recommended Other (Comment)  (To be determined; states she has RW at home)   PT - OK to Discharge (Recommend sub-acute rehab)   Barthel Index   Feeding 10   Bathing 0   Grooming Score 5   Dressing Score 5   Bladder Score 10   Bowels Score 10   Toilet Use Score 5   Transfers (Bed/Chair) Score 5   Mobility (Level Surface) Score 0   Stairs Score 0   Barthel Index Score 50     PHYSICAL THERAPY TREATMENT NOTE    Time In: 10:05    Time Out: 10:15  Total Time: 10 min  MRN: [id number]    Chart reviewed  RN cleared patient for PT     S: "I wish I had a bed like this at home", patient stated regarding electric bed with elevating head capabilities  O: Therapeutic Activities:   Reviewed spine precautions  Sit < > stand with min A of 1 for balance to close supervision for safety with VCs for proper hand placement    Sit > supine with supervision for safety; HOB elevated (patient insisted) and use of siderail  Moves slowly  Ambulation with RW with close supervision to min A of 1 for safety and balance due to occasional left knee buckling x ~ 75 ft; patient declined to go further due to c/o fatigue; ambulates with decreased cheng and occasional left knee buckling  Patient declined to negotiate steps; states, "my  will help me"  Vitals: Seated post-ambulation and right UE BP = 144/94, Heart Rate = 88 bpm, SpO2 = 95%  on RA     A: Patient with limited endurance for activity  Recommended increased time OOB, but patient declining  Gait not safe due to occasional left knee buckling  P: Continue PT for generalized strengthening and improving functional tolerance for mobility  Patient opted to return to bed; patient positioned with all needs, including call bell, within reach      Yazmin Clark, PT, DPT

## 2018-07-09 ENCOUNTER — TRANSITIONAL CARE MANAGEMENT (OUTPATIENT)
Dept: FAMILY MEDICINE CLINIC | Facility: CLINIC | Age: 63
End: 2018-07-09

## 2018-09-13 ENCOUNTER — OFFICE VISIT (OUTPATIENT)
Dept: URGENT CARE | Facility: CLINIC | Age: 63
End: 2018-09-13
Payer: MEDICARE

## 2018-09-13 VITALS
DIASTOLIC BLOOD PRESSURE: 72 MMHG | RESPIRATION RATE: 18 BRPM | BODY MASS INDEX: 25.95 KG/M2 | HEART RATE: 75 BPM | SYSTOLIC BLOOD PRESSURE: 122 MMHG | WEIGHT: 152 LBS | TEMPERATURE: 99 F | HEIGHT: 64 IN

## 2018-09-13 DIAGNOSIS — H60.503 ACUTE OTITIS EXTERNA OF BOTH EARS, UNSPECIFIED TYPE: Primary | ICD-10-CM

## 2018-09-13 PROCEDURE — G0463 HOSPITAL OUTPT CLINIC VISIT: HCPCS | Performed by: PHYSICIAN ASSISTANT

## 2018-09-13 PROCEDURE — 99213 OFFICE O/P EST LOW 20 MIN: CPT | Performed by: PHYSICIAN ASSISTANT

## 2018-09-13 RX ORDER — OFLOXACIN 3 MG/ML
10 SOLUTION AURICULAR (OTIC) DAILY
Qty: 5 ML | Refills: 0 | Status: SHIPPED | OUTPATIENT
Start: 2018-09-13 | End: 2018-09-20

## 2018-09-13 NOTE — PROGRESS NOTES
[facility] [facility]        NAME: Alexandre Vinson is a 59 y o  female  : 1955    MRN: [id number]  DATE: 2018  TIME: 8:12 AM    Assessment and Plan   Acute otitis externa of both ears, unspecified type [H60 503]  1  Acute otitis externa of both ears, unspecified type  ofloxacin (FLOXIN) 0 3 % otic solution         Patient Instructions     Take medications as directed  Drink plenty of fluids  Follow up with family doctor this week  Go to ER immediately if new or worsening symptoms occur  Chief Complaint     Chief Complaint   Patient presents with    Earache     bi lateral since this morning         History of Present Illness       Earache    There is pain in both ears  This is a new problem  The current episode started today  The problem occurs constantly  The problem has been gradually worsening  There has been no fever  The pain is at a severity of 7/10  The pain is moderate  Pertinent negatives include no coughing, diarrhea, ear discharge, headaches, neck pain, rash, rhinorrhea or vomiting  She has tried nothing for the symptoms  Review of Systems   Review of Systems   Constitutional: Negative for chills, diaphoresis, fatigue and fever  HENT: Positive for ear pain  Negative for congestion, ear discharge, rhinorrhea, sinus pressure and voice change  Eyes: Negative  Respiratory: Negative for cough, chest tightness and shortness of breath  Cardiovascular: Negative for chest pain and palpitations  Gastrointestinal: Negative for constipation, diarrhea, nausea and vomiting  Endocrine: Negative  Genitourinary: Negative for dysuria  Musculoskeletal: Negative for back pain, myalgias and neck pain  Skin: Negative for pallor and rash  Allergic/Immunologic: Negative  Neurological: Negative for dizziness, syncope and headaches  Hematological: Negative  Psychiatric/Behavioral: Negative            Current Medications       Current Outpatient Prescriptions:    Calcium 600 MG tablet, take 1 tablet by mouth once daily, Disp: , Rfl:     cetirizine (ZyrTEC) 10 mg tablet, , Disp: , Rfl:     escitalopram (LEXAPRO) 20 mg tablet, daily    , Disp: , Rfl:     gabapentin (NEURONTIN) 300 mg capsule, take 2 capsules by mouth three times a day, Disp: , Rfl:     morphine (RUBIO) 30 MG 24 hr capsule, Take 30 mg by mouth daily, Disp: , Rfl:     diazepam (VALIUM) 5 mg tablet, 3 (three) times a day Morning, 3pm and 8pm, Disp: , Rfl:     docusate sodium (COLACE) 100 mg capsule, Take 1 capsule (100 mg total) by mouth 2 (two) times a day (Patient not taking: Reported on 9/13/2018 ), Disp: 20 capsule, Rfl: 0    ofloxacin (FLOXIN) 0 3 % otic solution, Administer 10 drops into both ears daily for 7 days, Disp: 5 mL, Rfl: 0    potassium chloride (K-DUR,KLOR-CON) 20 mEq tablet, Take 1 tablet (20 mEq total) by mouth daily for 3 days, Disp: 3 tablet, Rfl: 0    senna (SENOKOT) 8 6 mg, Take 1 tablet (8 6 mg total) by mouth daily (Patient not taking: Reported on 9/13/2018 ), Disp: 30 each, Rfl: 0    simethicone (MYLICON) 80 mg chewable tablet, Chew 1 tablet (80 mg total) 4 (four) times a day (with meals and at bedtime) (Patient not taking: Reported on 9/13/2018 ), Disp: 60 tablet, Rfl: 0    temazepam (RESTORIL) 30 mg capsule, daily, Disp: , Rfl:     Current Allergies     Allergies as of 09/13/2018 - Reviewed 09/13/2018   Allergen Reaction Noted    Nsaids  07/02/2018            The following portions of the patient's history were reviewed and updated as appropriate: allergies, current medications, past family history, past medical history, past social history, past surgical history and problem list      Past Medical History:   Diagnosis Date    Asthma     Chronic back pain        Past Surgical History:   Procedure Laterality Date    ABDOMINAL SURGERY      BACK SURGERY         Family History   Problem Relation Age of Onset    Diabetes Mother          Medications have been verified  Objective   /72 (BP Location: Left arm, Patient Position: Sitting)   Pulse 75   Temp 99 °F (37 2 °C) (Temporal)   Resp 18   Ht 5' 4" (1 626 m)   Wt 68 9 kg (152 lb)   LMP  (LMP Unknown)   BMI 26 09 kg/m²        Physical Exam     Physical Exam   Constitutional: She appears well-developed and well-nourished  No distress  HENT:   Head: Normocephalic and atraumatic  Right Ear: There is drainage and tenderness (Tragus)  Tympanic membrane is not erythematous and not bulging  Left Ear: There is drainage and tenderness (Tragus)  Tympanic membrane is not erythematous and not bulging  Eyes: Conjunctivae are normal  Right eye exhibits no discharge  Left eye exhibits no discharge  Neck: Normal range of motion  Neck supple  Cardiovascular: Normal rate, regular rhythm, normal heart sounds and intact distal pulses  Pulmonary/Chest: Effort normal and breath sounds normal  No respiratory distress  She has no wheezes  She has no rales  Lymphadenopathy:     She has no cervical adenopathy  Skin: Skin is warm  No rash noted  She is not diaphoretic  Nursing note and vitals reviewed

## 2018-09-13 NOTE — PATIENT INSTRUCTIONS
Take medications as directed  Drink plenty of fluids  Follow up with family doctor this week  Go to ER immediately if new or worsening symptoms occur  Otitis Externa   WHAT YOU NEED TO KNOW:   Otitis externa, or swimmer's ear, is an infection in the outer ear canal  This canal goes from the outside of the ear to the eardrum  DISCHARGE INSTRUCTIONS:   Return to the emergency department if:   · You have severe ear pain  · You are suddenly unable to hear at all  · You have new swelling in your face, behind your ears, or in your neck  · You suddenly cannot move part of your face  · Your face suddenly feels numb  Contact your healthcare provider if:   · You have a fever  · Your signs and symptoms do not get better after 2 days of treatment  · Your signs and symptoms go away for a time, but then come back  · You have questions or concerns about your condition or care  Medicines:   · NSAIDs , such as ibuprofen, help decrease swelling, pain, and fever  This medicine is available with or without a doctor's order  NSAIDs can cause stomach bleeding or kidney problems in certain people  If you take blood thinner medicine, always ask if NSAIDs are safe for you  Always read the medicine label and follow directions  Do not give these medicines to children under 10months of age without direction from your child's healthcare provider  · Acetaminophen  decreases pain and fever  It is available without a doctor's order  Ask how much to take and how often to take it  Follow directions  Acetaminophen can cause liver damage if not taken correctly  · Ear drops  that contain an antibiotic may be given  The antibiotic helps treat a bacterial infection  You may also be given steroid medicine  The steroid helps decrease redness, swelling, and pain  · Take your medicine as directed  Contact your healthcare provider if you think your medicine is not helping or if you have side effects   Tell him or her if you are allergic to any medicine  Keep a list of the medicines, vitamins, and herbs you take  Include the amounts, and when and why you take them  Bring the list or the pill bottles to follow-up visits  Carry your medicine list with you in case of an emergency  Follow up with your healthcare provider as directed:  Write down your questions so you remember to ask them during your visits  How to use eardrops:   · Lie down on your side with your infected ear facing up  · Carefully drip the correct number of eardrops into your ear  Have another person help you if possible  · Gently move the outside part of your ear back and forth to help the medicine reach your ear canal      · Stay lying down in the same position (with your ear facing up) for 3 to 5 minutes  Prevent otitis externa:   · Do not put cotton swabs or foreign objects in your ears  · Wrap a clean moist washcloth around your finger, and use it to clean your outer ear and remove extra ear wax  · Use ear plugs when you swim  Dry your outer ears completely after you swim or bathe  © 2017 [address] [facility] Information is for End User's use only and may not be sold, redistributed or otherwise used for commercial purposes  All illustrations and images included in CareNotes® are the copyrighted property of A D A M [employer] Inc  or [employer]  The above information is an  only  It is not intended as medical advice for individual conditions or treatments  Talk to your doctor, nurse or pharmacist before following any medical regimen to see if it is safe and effective for you

## 2018-12-01 ENCOUNTER — APPOINTMENT (EMERGENCY)
Dept: CT IMAGING | Facility: HOSPITAL | Age: 63
DRG: 344 | End: 2018-12-01
Payer: MEDICARE

## 2018-12-01 ENCOUNTER — HOSPITAL ENCOUNTER (INPATIENT)
Facility: HOSPITAL | Age: 63
LOS: 4 days | Discharge: LEFT BEFORE DISCHARGE | DRG: 344 | End: 2018-12-05
Attending: EMERGENCY MEDICINE | Admitting: HOSPITALIST
Payer: MEDICARE

## 2018-12-01 DIAGNOSIS — R14.0 DISTENDED ABDOMEN: ICD-10-CM

## 2018-12-01 DIAGNOSIS — N17.9 ACUTE RENAL FAILURE, UNSPECIFIED ACUTE RENAL FAILURE TYPE (HCC): Primary | ICD-10-CM

## 2018-12-01 DIAGNOSIS — J69.0 ASPIRATION PNEUMONIA (HCC): ICD-10-CM

## 2018-12-01 DIAGNOSIS — K59.00 OBSTIPATION: ICD-10-CM

## 2018-12-01 DIAGNOSIS — K56.41 FECAL IMPACTION (HCC): ICD-10-CM

## 2018-12-01 DIAGNOSIS — F11.90 CHRONIC NARCOTIC USE: ICD-10-CM

## 2018-12-01 PROBLEM — R33.9 URINARY RETENTION: Status: ACTIVE | Noted: 2018-07-04

## 2018-12-01 PROBLEM — G89.29 CHRONIC MIDLINE THORACIC BACK PAIN: Status: ACTIVE | Noted: 2018-12-01

## 2018-12-01 PROBLEM — K59.09 CHRONIC CONSTIPATION: Status: ACTIVE | Noted: 2018-12-01

## 2018-12-01 PROBLEM — M54.6 CHRONIC MIDLINE THORACIC BACK PAIN: Status: ACTIVE | Noted: 2018-12-01

## 2018-12-01 LAB
ALBUMIN SERPL BCP-MCNC: 3.4 G/DL (ref 3.5–5)
ALP SERPL-CCNC: 127 U/L (ref 46–116)
ALT SERPL W P-5'-P-CCNC: 26 U/L (ref 12–78)
ANION GAP SERPL CALCULATED.3IONS-SCNC: 14 MMOL/L (ref 4–13)
AST SERPL W P-5'-P-CCNC: 68 U/L (ref 5–45)
BASOPHILS # BLD AUTO: 0.06 THOUSANDS/ΜL (ref 0–0.1)
BASOPHILS NFR BLD AUTO: 1 % (ref 0–1)
BILIRUB SERPL-MCNC: 0.4 MG/DL (ref 0.2–1)
BILIRUB UR QL STRIP: NEGATIVE
BUN SERPL-MCNC: 28 MG/DL (ref 5–25)
CALCIUM SERPL-MCNC: 7.8 MG/DL (ref 8.3–10.1)
CHLORIDE SERPL-SCNC: 99 MMOL/L (ref 100–108)
CLARITY UR: CLEAR
CO2 SERPL-SCNC: 26 MMOL/L (ref 21–32)
COLOR UR: YELLOW
CREAT SERPL-MCNC: 3.52 MG/DL (ref 0.6–1.3)
EOSINOPHIL # BLD AUTO: 0.28 THOUSAND/ΜL (ref 0–0.61)
EOSINOPHIL NFR BLD AUTO: 3 % (ref 0–6)
ERYTHROCYTE [DISTWIDTH] IN BLOOD BY AUTOMATED COUNT: 17.6 % (ref 11.6–15.1)
GFR SERPL CREATININE-BSD FRML MDRD: 13 ML/MIN/1.73SQ M
GLUCOSE SERPL-MCNC: 113 MG/DL (ref 65–140)
GLUCOSE UR STRIP-MCNC: NEGATIVE MG/DL
HCT VFR BLD AUTO: 31.5 % (ref 34.8–46.1)
HGB BLD-MCNC: 10.1 G/DL (ref 11.5–15.4)
HGB UR QL STRIP.AUTO: NEGATIVE
IMM GRANULOCYTES # BLD AUTO: 0.05 THOUSAND/UL (ref 0–0.2)
IMM GRANULOCYTES NFR BLD AUTO: 1 % (ref 0–2)
KETONES UR STRIP-MCNC: NEGATIVE MG/DL
LACTATE SERPL-SCNC: 1 MMOL/L (ref 0.5–2)
LEUKOCYTE ESTERASE UR QL STRIP: NEGATIVE
LIPASE SERPL-CCNC: 155 U/L (ref 73–393)
LYMPHOCYTES # BLD AUTO: 1.12 THOUSANDS/ΜL (ref 0.6–4.47)
LYMPHOCYTES NFR BLD AUTO: 14 % (ref 14–44)
MCH RBC QN AUTO: 27.2 PG (ref 26.8–34.3)
MCHC RBC AUTO-ENTMCNC: 32.1 G/DL (ref 31.4–37.4)
MCV RBC AUTO: 85 FL (ref 82–98)
MONOCYTES # BLD AUTO: 0.79 THOUSAND/ΜL (ref 0.17–1.22)
MONOCYTES NFR BLD AUTO: 10 % (ref 4–12)
NEUTROPHILS # BLD AUTO: 6 THOUSANDS/ΜL (ref 1.85–7.62)
NEUTS SEG NFR BLD AUTO: 71 % (ref 43–75)
NITRITE UR QL STRIP: NEGATIVE
NRBC BLD AUTO-RTO: 0 /100 WBCS
PH UR STRIP.AUTO: 5.5 [PH] (ref 4.5–8)
PLATELET # BLD AUTO: 230 THOUSANDS/UL (ref 149–390)
PMV BLD AUTO: 9.8 FL (ref 8.9–12.7)
POTASSIUM SERPL-SCNC: 3 MMOL/L (ref 3.5–5.3)
PROT SERPL-MCNC: 7.2 G/DL (ref 6.4–8.2)
PROT UR STRIP-MCNC: NEGATIVE MG/DL
RBC # BLD AUTO: 3.72 MILLION/UL (ref 3.81–5.12)
SODIUM SERPL-SCNC: 139 MMOL/L (ref 136–145)
SP GR UR STRIP.AUTO: 1.02 (ref 1–1.03)
TROPONIN I SERPL-MCNC: <0.02 NG/ML
UROBILINOGEN UR QL STRIP.AUTO: 0.2 E.U./DL
WBC # BLD AUTO: 8.3 THOUSAND/UL (ref 4.31–10.16)

## 2018-12-01 PROCEDURE — 99285 EMERGENCY DEPT VISIT HI MDM: CPT

## 2018-12-01 PROCEDURE — 81003 URINALYSIS AUTO W/O SCOPE: CPT | Performed by: EMERGENCY MEDICINE

## 2018-12-01 PROCEDURE — 83605 ASSAY OF LACTIC ACID: CPT | Performed by: EMERGENCY MEDICINE

## 2018-12-01 PROCEDURE — 36415 COLL VENOUS BLD VENIPUNCTURE: CPT | Performed by: EMERGENCY MEDICINE

## 2018-12-01 PROCEDURE — 80053 COMPREHEN METABOLIC PANEL: CPT | Performed by: EMERGENCY MEDICINE

## 2018-12-01 PROCEDURE — 85025 COMPLETE CBC W/AUTO DIFF WBC: CPT | Performed by: EMERGENCY MEDICINE

## 2018-12-01 PROCEDURE — 96361 HYDRATE IV INFUSION ADD-ON: CPT

## 2018-12-01 PROCEDURE — 96374 THER/PROPH/DIAG INJ IV PUSH: CPT

## 2018-12-01 PROCEDURE — 83690 ASSAY OF LIPASE: CPT | Performed by: EMERGENCY MEDICINE

## 2018-12-01 PROCEDURE — 0T9B70Z DRAINAGE OF BLADDER WITH DRAINAGE DEVICE, VIA NATURAL OR ARTIFICIAL OPENING: ICD-10-PCS | Performed by: INTERNAL MEDICINE

## 2018-12-01 PROCEDURE — 84484 ASSAY OF TROPONIN QUANT: CPT | Performed by: EMERGENCY MEDICINE

## 2018-12-01 PROCEDURE — 74176 CT ABD & PELVIS W/O CONTRAST: CPT

## 2018-12-01 PROCEDURE — 99223 1ST HOSP IP/OBS HIGH 75: CPT | Performed by: HOSPITALIST

## 2018-12-01 RX ORDER — HYDROMORPHONE HYDROCHLORIDE 4 MG/1
4 TABLET ORAL 3 TIMES DAILY
COMMUNITY
End: 2021-01-01 | Stop reason: SDUPTHER

## 2018-12-01 RX ORDER — POLYETHYLENE GLYCOL 3350 17 G/17G
17 POWDER, FOR SOLUTION ORAL DAILY
Status: DISCONTINUED | OUTPATIENT
Start: 2018-12-02 | End: 2018-12-05 | Stop reason: HOSPADM

## 2018-12-01 RX ORDER — MORPHINE SULFATE 15 MG/1
30 TABLET ORAL EVERY 8 HOURS
Status: DISCONTINUED | OUTPATIENT
Start: 2018-12-01 | End: 2018-12-01

## 2018-12-01 RX ORDER — CALCIUM CARBONATE 200(500)MG
1000 TABLET,CHEWABLE ORAL DAILY PRN
Status: DISCONTINUED | OUTPATIENT
Start: 2018-12-01 | End: 2018-12-05 | Stop reason: HOSPADM

## 2018-12-01 RX ORDER — MORPHINE SULFATE 10 MG/ML
6 INJECTION, SOLUTION INTRAMUSCULAR; INTRAVENOUS ONCE
Status: COMPLETED | OUTPATIENT
Start: 2018-12-01 | End: 2018-12-01

## 2018-12-01 RX ORDER — PROMETHAZINE HYDROCHLORIDE 6.25 MG/5ML
5 SYRUP ORAL 4 TIMES DAILY PRN
COMMUNITY
End: 2019-02-26

## 2018-12-01 RX ORDER — POTASSIUM CHLORIDE 14.9 MG/ML
20 INJECTION INTRAVENOUS
Status: COMPLETED | OUTPATIENT
Start: 2018-12-01 | End: 2018-12-02

## 2018-12-01 RX ORDER — DOCUSATE SODIUM 100 MG/1
100 CAPSULE, LIQUID FILLED ORAL 2 TIMES DAILY
Status: DISCONTINUED | OUTPATIENT
Start: 2018-12-01 | End: 2018-12-05 | Stop reason: HOSPADM

## 2018-12-01 RX ORDER — FLUTICASONE PROPIONATE 220 UG/1
2 AEROSOL, METERED RESPIRATORY (INHALATION) 2 TIMES DAILY
COMMUNITY
End: 2019-02-26

## 2018-12-01 RX ORDER — SENNOSIDES 8.6 MG
1 TABLET ORAL DAILY
Status: DISCONTINUED | OUTPATIENT
Start: 2018-12-02 | End: 2018-12-05 | Stop reason: HOSPADM

## 2018-12-01 RX ORDER — SODIUM PHOSPHATE, DIBASIC AND SODIUM PHOSPHATE, MONOBASIC 7; 19 G/133ML; G/133ML
1 ENEMA RECTAL ONCE
Status: COMPLETED | OUTPATIENT
Start: 2018-12-01 | End: 2018-12-01

## 2018-12-01 RX ORDER — HEPARIN SODIUM 5000 [USP'U]/ML
5000 INJECTION, SOLUTION INTRAVENOUS; SUBCUTANEOUS EVERY 8 HOURS SCHEDULED
Status: DISCONTINUED | OUTPATIENT
Start: 2018-12-01 | End: 2018-12-05 | Stop reason: HOSPADM

## 2018-12-01 RX ORDER — SODIUM CHLORIDE 9 MG/ML
125 INJECTION, SOLUTION INTRAVENOUS CONTINUOUS
Status: DISCONTINUED | OUTPATIENT
Start: 2018-12-01 | End: 2018-12-03

## 2018-12-01 RX ADMIN — SODIUM CHLORIDE 2000 ML: 0.9 INJECTION, SOLUTION INTRAVENOUS at 19:18

## 2018-12-01 RX ADMIN — MORPHINE SULFATE 6 MG: 10 INJECTION INTRAVENOUS at 19:02

## 2018-12-01 RX ADMIN — DOCUSATE SODIUM 100 MG: 100 CAPSULE, LIQUID FILLED ORAL at 21:38

## 2018-12-01 RX ADMIN — POTASSIUM CHLORIDE 20 MEQ: 14.9 INJECTION, SOLUTION INTRAVENOUS at 21:54

## 2018-12-01 RX ADMIN — SODIUM PHOSPHATE, DIBASIC AND SODIUM PHOSPHATE, MONOBASIC 1 ENEMA: 7; 19 ENEMA RECTAL at 22:02

## 2018-12-01 RX ADMIN — HEPARIN SODIUM 5000 UNITS: 5000 INJECTION, SOLUTION INTRAVENOUS; SUBCUTANEOUS at 21:56

## 2018-12-01 NOTE — ED PROVIDER NOTES
History  Chief Complaint   Patient presents with    Urinary Retention     "have not peed in 3 days" + nausea, vomitting     3d constant moderate to severe LLQ abd pain, radiating down to L thigh  No aggravating or alleviating factors  Reports h/o SBOs in past w similar sxs  Reports associated sxs of no urine outpt x 2-3d  + vomiting  No fever  Currently symptomatic  Prior to Admission Medications   Prescriptions Last Dose Informant Patient Reported? Taking? Calcium 600 MG tablet   Yes Yes   Sig: take 1 tablet by mouth once daily   HYDROmorphone (DILAUDID) 4 mg tablet   Yes Yes   Sig: Take 4 mg by mouth 4 (four) times a day   Morphine Sulfate ER 30 MG TBEA  Self Yes Yes   Sig: Take 30 mg by mouth 3 (three) times a day     cetirizine (ZyrTEC) 10 mg tablet   Yes Yes   diazepam (VALIUM) 5 mg tablet   Yes Yes   Sig: 3 (three) times a day Morning, 3pm and 8pm   escitalopram (LEXAPRO) 20 mg tablet   Yes Yes   Sig: daily       fluticasone (FLOVENT HFA) 220 mcg/act inhaler   Yes Yes   Sig: Inhale 2 puffs 2 (two) times a day Rinse mouth after use    gabapentin (NEURONTIN) 300 mg capsule   Yes Yes   Sig: take 2 capsules by mouth three times a day   naproxen (NAPROSYN) 500 mg tablet   Yes Yes   Sig: Take 500 mg by mouth every 12 (twelve) hours as needed   nystatin (MYCOSTATIN) 100,000 units/mL suspension   Yes Yes   Sig: Apply 500,000 Units to the mouth or throat 4 (four) times a day   promethazine (PHENERGAN) 12 5 mg/10 mL syrup   Yes Yes   Sig: Take 5 mL by mouth 4 (four) times a day as needed for nausea or vomiting   temazepam (RESTORIL) 30 mg capsule   Yes Yes   Si mg daily at bedtime        Facility-Administered Medications: None       Past Medical History:   Diagnosis Date    Asthma     Chronic back pain        Past Surgical History:   Procedure Laterality Date    ABDOMINAL SURGERY      BACK SURGERY         Family History   Problem Relation Age of Onset    Diabetes Mother      I have reviewed and agree with the history as documented  Social History   Substance Use Topics    Smoking status: Former Smoker     Quit date: 7/2/1980    Smokeless tobacco: Never Used    Alcohol use No      Comment: social drinker        Review of Systems   Gastrointestinal: Positive for abdominal distention and abdominal pain  Endocrine: Negative for polyphagia and polyuria  Genitourinary: Positive for decreased urine volume and difficulty urinating  Negative for dysuria  All other systems reviewed and are negative  Physical Exam  Physical Exam   Constitutional: She is oriented to person, place, and time  She appears well-developed and well-nourished  No distress  HENT:   Head: Normocephalic and atraumatic  Eyes: Pupils are equal, round, and reactive to light  Conjunctivae and EOM are normal    Neck: Normal range of motion  Neck supple  No JVD present  Cardiovascular: Normal rate, regular rhythm, normal heart sounds and intact distal pulses  Pulmonary/Chest: Effort normal and breath sounds normal  No stridor  Abdominal: Soft  She exhibits distension  She exhibits no mass  There is tenderness  There is no rebound and no guarding  Musculoskeletal: Normal range of motion  She exhibits no edema, tenderness or deformity  Neurological: She is alert and oriented to person, place, and time  No cranial nerve deficit or sensory deficit  She exhibits normal muscle tone  Coordination normal    Skin: Skin is warm and dry  Capillary refill takes less than 2 seconds  No rash noted  She is not diaphoretic  No erythema  No pallor  Nursing note and vitals reviewed        Vital Signs  ED Triage Vitals   Temperature Pulse Respirations Blood Pressure SpO2   12/01/18 1705 12/01/18 1705 12/01/18 1704 12/01/18 1705 12/01/18 1705   98 8 °F (37 1 °C) 94 18 121/73 93 %      Temp Source Heart Rate Source Patient Position - Orthostatic VS BP Location FiO2 (%)   12/01/18 1705 12/01/18 1704 12/01/18 1704 12/01/18 1704 --   Oral Monitor Sitting Right arm       Pain Score       12/01/18 1704       8           Vitals:    12/01/18 1705 12/01/18 1845 12/01/18 1930 12/01/18 2000   BP: 121/73 100/61 113/70 112/68   Pulse: 94 83 82 81   Patient Position - Orthostatic VS:  Lying Lying Lying       Visual Acuity      ED Medications  Medications   morphine (PF) 10 mg/mL injection 6 mg (6 mg Intravenous Given 12/1/18 1902)   sodium chloride 0 9 % bolus 2,000 mL (2,000 mL Intravenous New Bag 12/1/18 1918)       Diagnostic Studies  Results Reviewed     Procedure Component Value Units Date/Time    [place] draw [[id number]] Collected:  12/01/18 1834    Lab Status:  Final result Specimen:  Blood Updated:  12/01/18 2001    Narrative: The following orders were created for panel order [place] draw  Procedure                               Abnormality         Status                     ---------                               -----------         ------                     Joselin Brisenoi Top on HBIZ[[id number]]                            Final result               Gold top on MMRC[[id number]]                                  Final result               Green / Black tube on BDQG[[id number]]                                                     Please view results for these tests on the individual orders      CBC and differential [[id number]]  (Abnormal) Collected:  12/01/18 1910    Lab Status:  Final result Specimen:  Blood from Hand, Right Updated:  12/01/18 1917     WBC 8 30 Thousand/uL      RBC 3 72 (L) Million/uL      Hemoglobin 10 1 (L) g/dL      Hematocrit 31 5 (L) %      MCV 85 fL      MCH 27 2 pg      MCHC 32 1 g/dL      RDW 17 6 (H) %      MPV 9 8 fL      Platelets 239 Thousands/uL      nRBC 0 /100 WBCs      Neutrophils Relative 71 %      Immat GRANS % 1 %      Lymphocytes Relative 14 %      Monocytes Relative 10 %      Eosinophils Relative 3 %      Basophils Relative 1 %      Neutrophils Absolute 6 00 Thousands/µL      Immature Grans Absolute 0 05 Thousand/uL Lymphocytes Absolute 1 12 Thousands/µL      Monocytes Absolute 0 79 Thousand/µL      Eosinophils Absolute 0 28 Thousand/µL      Basophils Absolute 0 06 Thousands/µL     Lactic acid, plasma [[id number]]  (Normal) Collected:  12/01/18 1834    Lab Status:  Final result Specimen:  Blood from Arm, Left Updated:  12/01/18 1913     LACTIC ACID 1 0 mmol/L     Narrative:         Result may be elevated if tourniquet was used during collection  Troponin I [[id number]]  (Normal) Collected:  12/01/18 1834    Lab Status:  Final result Specimen:  Blood from Arm, Left Updated:  12/01/18 1913     Troponin I <0 02 ng/mL     Comprehensive metabolic panel [[id number]]  (Abnormal) Collected:  12/01/18 1834    Lab Status:  Final result Specimen:  Blood from Arm, Left Updated:  12/01/18 1908     Sodium 139 mmol/L      Potassium 3 0 (L) mmol/L      Chloride 99 (L) mmol/L      CO2 26 mmol/L      ANION GAP 14 (H) mmol/L      BUN 28 (H) mg/dL      Creatinine 3 52 (H) mg/dL      Glucose 113 mg/dL      Calcium 7 8 (L) mg/dL      AST 68 (H) U/L      ALT 26 U/L      Alkaline Phosphatase 127 (H) U/L      Total Protein 7 2 g/dL      Albumin 3 4 (L) g/dL      Total Bilirubin 0 40 mg/dL      eGFR 13 ml/min/1 73sq m     Narrative:         National Kidney Disease Education Program recommendations are as follows:  GFR calculation is accurate only with a steady state creatinine  Chronic Kidney disease less than 60 ml/min/1 73 sq  meters  Kidney failure less than 15 ml/min/1 73 sq  meters      Lipase [[id number]]  (Normal) Collected:  12/01/18 1834    Lab Status:  Final result Specimen:  Blood from Arm, Left Updated:  12/01/18 1908     Lipase 155 u/L     UA w Reflex to Microscopic w Reflex to Culture [[id number]] Collected:  12/01/18 1806    Lab Status:  Final result Specimen:  Urine from Urine, Clean Catch Updated:  12/01/18 1814     Color, UA Yellow     Clarity, UA Clear     Specific Gravity, UA 1 025     pH, UA 5 5     Leukocytes, UA Negative     Nitrite, UA Negative     Protein, UA Negative mg/dl      Glucose, UA Negative mg/dl      Ketones, UA Negative mg/dl      Urobilinogen, UA 0 2 E U /dl      Bilirubin, UA Negative     Blood, UA Negative                 CT abdomen pelvis wo contrast   Final Result by Saul Valdivia MD (12/01 1920)      Stable to increased bilateral lower lung field infiltrates  This might represent chronic or recurrent pneumonia or perhaps some scarring  Correlate for any cough or fever or other typical pulmonary symptoms  Grossly stable appearance of distended colon containing stool and gas, presumably chronic constipation  Fecal impaction might be present  Consider disimpaction or therapeutic enema  Workstation performed: [id number]                    Procedures  CriticalCare Time  Performed by: Martín Kohler  Authorized by: Martín Kohler     Critical care provider statement:     Critical care time (minutes):  35    Critical care time was exclusive of:  Separately billable procedures and treating other patients    Critical care was time spent personally by me on the following activities:  Review of old charts, re-evaluation of patient's condition, ordering and review of radiographic studies, ordering and review of laboratory studies, blood draw for specimens, examination of patient and evaluation of patient's response to treatment           Phone Contacts  ED Phone Contact    ED Course                               MDM  Number of Diagnoses or Management Options  Acute renal failure, unspecified acute renal failure type [facility]):   Chronic narcotic use: The patient presented with a condition in which there was a high probability of imminent or life-threatening deterioration, and critical care services (excluding separately billable procedures) totalled 30-74 minutes          Disposition  Final diagnoses:   Acute renal failure, unspecified acute renal failure type ([address] )   Chronic narcotic use     Time reflects when diagnosis was documented in both MDM as applicable and the Disposition within this note     Time User Action Codes Description Comment    12/1/2018  8:04 PM Isac Rodriguez Add [N17 9] Acute renal failure, unspecified [facility] )     12/1/2018  8:05 PM Luis Fernando Tam Add [F11 90] Chronic narcotic use       ED Disposition     None      Follow-up Information    None         Patient's Medications   Discharge Prescriptions    No medications on file     No discharge procedures on file      ED Provider  Electronically Signed by           Va Feliciano MD  12/01/18 2026

## 2018-12-02 ENCOUNTER — APPOINTMENT (INPATIENT)
Dept: CT IMAGING | Facility: HOSPITAL | Age: 63
DRG: 344 | End: 2018-12-02
Payer: MEDICARE

## 2018-12-02 ENCOUNTER — APPOINTMENT (INPATIENT)
Dept: RADIOLOGY | Facility: HOSPITAL | Age: 63
DRG: 344 | End: 2018-12-02
Payer: MEDICARE

## 2018-12-02 LAB
ALBUMIN SERPL BCP-MCNC: 3.1 G/DL (ref 3.5–5)
ALP SERPL-CCNC: 126 U/L (ref 46–116)
ALT SERPL W P-5'-P-CCNC: 22 U/L (ref 12–78)
ANION GAP SERPL CALCULATED.3IONS-SCNC: 14 MMOL/L (ref 4–13)
AST SERPL W P-5'-P-CCNC: 53 U/L (ref 5–45)
BILIRUB SERPL-MCNC: 0.4 MG/DL (ref 0.2–1)
BUN SERPL-MCNC: 22 MG/DL (ref 5–25)
CALCIUM SERPL-MCNC: 7.8 MG/DL (ref 8.3–10.1)
CHLORIDE SERPL-SCNC: 109 MMOL/L (ref 100–108)
CO2 SERPL-SCNC: 22 MMOL/L (ref 21–32)
CREAT SERPL-MCNC: 2.23 MG/DL (ref 0.6–1.3)
GFR SERPL CREATININE-BSD FRML MDRD: 23 ML/MIN/1.73SQ M
GLUCOSE SERPL-MCNC: 103 MG/DL (ref 65–140)
INR PPP: 0.99 (ref 0.86–1.17)
MAGNESIUM SERPL-MCNC: 1.9 MG/DL (ref 1.6–2.6)
PHOSPHATE SERPL-MCNC: 5.1 MG/DL (ref 2.3–4.1)
PLATELET # BLD AUTO: 208 THOUSANDS/UL (ref 149–390)
PMV BLD AUTO: 9.7 FL (ref 8.9–12.7)
POTASSIUM SERPL-SCNC: 3.4 MMOL/L (ref 3.5–5.3)
PROT SERPL-MCNC: 6.9 G/DL (ref 6.4–8.2)
PROTHROMBIN TIME: 13 SECONDS (ref 11.8–14.2)
SODIUM SERPL-SCNC: 145 MMOL/L (ref 136–145)

## 2018-12-02 PROCEDURE — 99222 1ST HOSP IP/OBS MODERATE 55: CPT | Performed by: SURGERY

## 2018-12-02 PROCEDURE — 74022 RADEX COMPL AQT ABD SERIES: CPT

## 2018-12-02 PROCEDURE — 85049 AUTOMATED PLATELET COUNT: CPT | Performed by: NURSE PRACTITIONER

## 2018-12-02 PROCEDURE — 84100 ASSAY OF PHOSPHORUS: CPT | Performed by: NURSE PRACTITIONER

## 2018-12-02 PROCEDURE — 74176 CT ABD & PELVIS W/O CONTRAST: CPT

## 2018-12-02 PROCEDURE — 71250 CT THORAX DX C-: CPT

## 2018-12-02 PROCEDURE — 85610 PROTHROMBIN TIME: CPT | Performed by: NURSE PRACTITIONER

## 2018-12-02 PROCEDURE — 36415 COLL VENOUS BLD VENIPUNCTURE: CPT | Performed by: NURSE PRACTITIONER

## 2018-12-02 PROCEDURE — 83735 ASSAY OF MAGNESIUM: CPT | Performed by: NURSE PRACTITIONER

## 2018-12-02 PROCEDURE — 99233 SBSQ HOSP IP/OBS HIGH 50: CPT | Performed by: PHYSICIAN ASSISTANT

## 2018-12-02 PROCEDURE — 80053 COMPREHEN METABOLIC PANEL: CPT | Performed by: NURSE PRACTITIONER

## 2018-12-02 RX ORDER — MORPHINE SULFATE 30 MG/1
30 TABLET, FILM COATED, EXTENDED RELEASE ORAL EVERY 12 HOURS SCHEDULED
Status: DISCONTINUED | OUTPATIENT
Start: 2018-12-02 | End: 2018-12-02

## 2018-12-02 RX ORDER — GUAIFENESIN 600 MG
600 TABLET, EXTENDED RELEASE 12 HR ORAL EVERY 12 HOURS SCHEDULED
Status: DISCONTINUED | OUTPATIENT
Start: 2018-12-02 | End: 2018-12-05 | Stop reason: HOSPADM

## 2018-12-02 RX ORDER — SODIUM PHOSPHATE, DIBASIC AND SODIUM PHOSPHATE, MONOBASIC 7; 19 G/133ML; G/133ML
1 ENEMA RECTAL ONCE
Status: DISCONTINUED | OUTPATIENT
Start: 2018-12-02 | End: 2018-12-02

## 2018-12-02 RX ORDER — FLUTICASONE PROPIONATE 220 UG/1
2 AEROSOL, METERED RESPIRATORY (INHALATION) 2 TIMES DAILY
Status: DISCONTINUED | OUTPATIENT
Start: 2018-12-02 | End: 2018-12-05 | Stop reason: HOSPADM

## 2018-12-02 RX ORDER — TEMAZEPAM 15 MG/1
30 CAPSULE ORAL
Status: DISCONTINUED | OUTPATIENT
Start: 2018-12-02 | End: 2018-12-05 | Stop reason: HOSPADM

## 2018-12-02 RX ORDER — ACETAMINOPHEN 650 MG/1
650 SUPPOSITORY RECTAL EVERY 6 HOURS PRN
Status: DISCONTINUED | OUTPATIENT
Start: 2018-12-02 | End: 2018-12-05 | Stop reason: HOSPADM

## 2018-12-02 RX ORDER — GABAPENTIN 300 MG/1
600 CAPSULE ORAL 3 TIMES DAILY
Status: DISCONTINUED | OUTPATIENT
Start: 2018-12-02 | End: 2018-12-05 | Stop reason: HOSPADM

## 2018-12-02 RX ORDER — MINERAL OIL 100 G/100G
1 OIL RECTAL ONCE
Status: COMPLETED | OUTPATIENT
Start: 2018-12-02 | End: 2018-12-02

## 2018-12-02 RX ORDER — ESCITALOPRAM OXALATE 10 MG/1
20 TABLET ORAL DAILY
Status: DISCONTINUED | OUTPATIENT
Start: 2018-12-02 | End: 2018-12-05 | Stop reason: HOSPADM

## 2018-12-02 RX ORDER — PROMETHAZINE HYDROCHLORIDE 6.25 MG/5ML
6.25 SYRUP ORAL 4 TIMES DAILY PRN
Status: DISCONTINUED | OUTPATIENT
Start: 2018-12-02 | End: 2018-12-05 | Stop reason: HOSPADM

## 2018-12-02 RX ORDER — HYDROMORPHONE HCL/PF 1 MG/ML
0.5 SYRINGE (ML) INJECTION EVERY 4 HOURS PRN
Status: DISCONTINUED | OUTPATIENT
Start: 2018-12-02 | End: 2018-12-05 | Stop reason: HOSPADM

## 2018-12-02 RX ORDER — DIPHENHYDRAMINE HYDROCHLORIDE 50 MG/ML
25 INJECTION INTRAMUSCULAR; INTRAVENOUS EVERY 6 HOURS PRN
Status: DISCONTINUED | OUTPATIENT
Start: 2018-12-02 | End: 2018-12-05 | Stop reason: HOSPADM

## 2018-12-02 RX ORDER — HYDROMORPHONE HCL/PF 1 MG/ML
0.5 SYRINGE (ML) INJECTION ONCE
Status: COMPLETED | OUTPATIENT
Start: 2018-12-02 | End: 2018-12-02

## 2018-12-02 RX ORDER — LIDOCAINE 50 MG/G
1 PATCH TOPICAL DAILY
Status: DISCONTINUED | OUTPATIENT
Start: 2018-12-02 | End: 2018-12-05 | Stop reason: HOSPADM

## 2018-12-02 RX ORDER — ONDANSETRON 2 MG/ML
4 INJECTION INTRAMUSCULAR; INTRAVENOUS EVERY 8 HOURS PRN
Status: DISCONTINUED | OUTPATIENT
Start: 2018-12-02 | End: 2018-12-05 | Stop reason: HOSPADM

## 2018-12-02 RX ADMIN — ACETAMINOPHEN 650 MG: 650 SUPPOSITORY RECTAL at 12:55

## 2018-12-02 RX ADMIN — DIPHENHYDRAMINE HYDROCHLORIDE 25 MG: 50 INJECTION, SOLUTION INTRAMUSCULAR; INTRAVENOUS at 13:33

## 2018-12-02 RX ADMIN — FLUTICASONE PROPIONATE 2 PUFF: 220 AEROSOL, METERED RESPIRATORY (INHALATION) at 09:45

## 2018-12-02 RX ADMIN — POTASSIUM CHLORIDE 20 MEQ: 14.9 INJECTION, SOLUTION INTRAVENOUS at 00:47

## 2018-12-02 RX ADMIN — MINERAL OIL 1 ENEMA: 118 ENEMA RECTAL at 16:22

## 2018-12-02 RX ADMIN — HEPARIN SODIUM 5000 UNITS: 5000 INJECTION, SOLUTION INTRAVENOUS; SUBCUTANEOUS at 10:09

## 2018-12-02 RX ADMIN — SODIUM CHLORIDE 125 ML/HR: 0.9 INJECTION, SOLUTION INTRAVENOUS at 19:21

## 2018-12-02 RX ADMIN — METRONIDAZOLE 500 MG: 500 INJECTION, SOLUTION INTRAVENOUS at 12:55

## 2018-12-02 RX ADMIN — ESCITALOPRAM OXALATE 20 MG: 20 TABLET ORAL at 09:45

## 2018-12-02 RX ADMIN — HYDROMORPHONE HYDROCHLORIDE 0.5 MG: 1 INJECTION, SOLUTION INTRAMUSCULAR; INTRAVENOUS; SUBCUTANEOUS at 20:32

## 2018-12-02 RX ADMIN — SODIUM CHLORIDE 125 ML/HR: 0.9 INJECTION, SOLUTION INTRAVENOUS at 00:48

## 2018-12-02 RX ADMIN — HYDROMORPHONE HYDROCHLORIDE 0.5 MG: 1 INJECTION, SOLUTION INTRAMUSCULAR; INTRAVENOUS; SUBCUTANEOUS at 12:55

## 2018-12-02 RX ADMIN — CEFTRIAXONE SODIUM 1000 MG: 10 INJECTION, POWDER, FOR SOLUTION INTRAVENOUS at 12:35

## 2018-12-02 RX ADMIN — HEPARIN SODIUM 5000 UNITS: 5000 INJECTION, SOLUTION INTRAVENOUS; SUBCUTANEOUS at 16:22

## 2018-12-02 RX ADMIN — HYDROMORPHONE HYDROCHLORIDE 0.5 MG: 1 INJECTION, SOLUTION INTRAMUSCULAR; INTRAVENOUS; SUBCUTANEOUS at 16:15

## 2018-12-02 RX ADMIN — HEPARIN SODIUM 5000 UNITS: 5000 INJECTION, SOLUTION INTRAVENOUS; SUBCUTANEOUS at 23:38

## 2018-12-02 RX ADMIN — DOCUSATE SODIUM 100 MG: 100 CAPSULE, LIQUID FILLED ORAL at 09:43

## 2018-12-02 RX ADMIN — GABAPENTIN 600 MG: 300 CAPSULE ORAL at 09:45

## 2018-12-02 RX ADMIN — METRONIDAZOLE 500 MG: 500 INJECTION, SOLUTION INTRAVENOUS at 20:35

## 2018-12-02 NOTE — ED NOTES
Pt requesting to leave [facility] - Provider notified     Amrit Kingsley RN  12/02/18 8490

## 2018-12-02 NOTE — PROGRESS NOTES
Progress Note - Chris Jensen 1955, 59 y o  female MRN: [id number]    Unit/Bed#: ED 07 Encounter: [id number]    Primary Care Provider: Burton Sesay PA-C   Date and time admitted to hospital: 12/1/2018  5:23 PM    * SEBASTIÁN (acute kidney injury) ([facility] [address] )   Assessment & Plan    · Baseline creatinine was 0 4-0 6  · Creatinine on admission 3 52, improved with IVF and decompression by wang, 2 23  · Suspect secondary to severe constipation  · BMP daily, strict I/Os, continue wang catheter  · Avoid nephrotoxins, dose adjust meds accordingly     Urinary retention   Assessment & Plan    · Patient reports that she had not voided in approximately 3 days  · UA is negative     Chronic constipation   Assessment & Plan    · Patient has a significant history of opioid use for pain control reports that she has not had a bowel movement in a very long time this may be attributing to her urinary retention as well  · CT of her abdomen and pelvis revealed Grossly stable appearance of distended colon containing stool and gas, presumably chronic constipation   Fecal impaction might be present   Consider disimpaction or therapeutic enema    · Follow up obstruction series today shows persistent dilated colon and rectal fecal matter  · Consult placed to GI and surgery, discussed case with both  · Await surgery evaluation due to new development of fever - will consider stat CT abdomen/pelvis to rule out development of perforated bowel, though no free air on imaging this AM  · Monitor closely  · Start IV flagyl/ceftriaxone for possibility of aspiration pneumonia, consider zosyn if perforated bowel     Chronic midline thoracic back pain   Assessment & Plan    · Patient takes Dilaudid and morphine at home with Neurontin to manage her chronic back pain  · Hold oral agents for now  · IV dilaudid as needed due to renal failure/contraindication for morphine, judicious use due to concern for obstruction related to slow transit in the setting of chronic narcotic use  · Consider Relistor when renal functions improve  · Aqua K, and lidocaine patch (patient refuses)       VTE Pharmacologic Prophylaxis:   Pharmacologic: Heparin  Mechanical VTE Prophylaxis in Place: Yes    Patient Centered Rounds: I have performed bedside rounds with nursing staff today  Discussions with Specialists or Other Care Team Provider: d/w GI and surgery    Education and Discussions with Family / Patient: discussed extensively with the patient,  not present but will update following surgical evaluation     Time Spent for Care: > 90 minutes  More than 50% of total time spent on counseling and coordination of care as described above  Current Length of Stay: 1 day(s)    Current Patient Status: Inpatient   Certification Statement: The patient will continue to require additional inpatient hospital stay due to significant GI distension requiring GI and surgery evaluation, fever of unknown origin    Discharge Plan: not medically stable     Code Status: Level 1 - Full Code      Subjective:   Patient s/e multiple times, initially because she wanted to leave AMA  We had a long discussion at that time regarding ramifications of improperly treated SEBASTIÁN and colonic distension, at which point she was agreeable to stay  At that time, we obtained obstruction series to eval the distension due to her passing BM overnight  This was reviewed with GI and surgery  Initial plan was for enemas and close observation, consideration of NG tube for nausea  I then was asked to re-evaluate her due to development of fever and worsening distension  At this time, she is having 10/10 pain, fever with Tmax 103 oral and worsening back and belly pain (on top of chronic back pain)  She has had no emesis this am, but reports feeling nauseated and has diffuse abdominal pain  She is clammy and distressed at the time of my re-evaluation      Objective:     Vitals:   Temp (24hrs), Av 4 °F (38 6 °C), Min:98 8 °F (37 1 °C), Max:103 °F (39 4 °C)    Temp:  [98 8 °F (37 1 °C)-103 °F (39 4 °C)] 103 °F (39 4 °C)  HR:  [66-94] 68  Resp:  [9-21] 20  BP: (100-121)/(57-73) 113/66  SpO2:  [93 %-99 %] 99 %  Body mass index is 26 09 kg/m²  Input and Output Summary (last 24 hours): Intake/Output Summary (Last 24 hours) at 12/02/18 1248  Last data filed at 12/01/18 2307   Gross per 24 hour   Intake                0 ml   Output             3650 ml   Net            -3650 ml       Physical Exam:     Physical Exam   Constitutional: She is oriented to person, place, and time  She appears well-developed  She appears distressed  Cardiovascular: Normal rate, regular rhythm, S1 normal, S2 normal and normal heart sounds  No murmur heard  Pulmonary/Chest: Effort normal  No respiratory distress  She has no wheezes  She has rales (bases)  Moist-sounding cough  Difficult ausculation due to patient discomfort   Abdominal: Soft  She exhibits distension (significant)  There is tenderness  There is guarding  Decreased to minimal bowel sounds upper abdomen with tinkling bowel sounds lower abdomen    Neurological: She is alert and oriented to person, place, and time  Skin: Skin is warm  Nursing note and vitals reviewed        Additional Data:     Labs:      Results from last 7 days  Lab Units 12/02/18  0609 12/01/18  1910   WBC Thousand/uL  --  8 30   HEMOGLOBIN g/dL  --  10 1*   HEMATOCRIT %  --  31 5*   PLATELETS Thousands/uL 208 230   NEUTROS PCT %  --  71   LYMPHS PCT %  --  14   MONOS PCT %  --  10   EOS PCT %  --  3       Results from last 7 days  Lab Units 12/02/18  0609   SODIUM mmol/L 145   POTASSIUM mmol/L 3 4*   CHLORIDE mmol/L 109*   CO2 mmol/L 22   BUN mg/dL 22   CREATININE mg/dL 2 23*   ANION GAP mmol/L 14*   CALCIUM mg/dL 7 8*   ALBUMIN g/dL 3 1*   TOTAL BILIRUBIN mg/dL 0 40   ALK PHOS U/L 126*   ALT U/L 22   AST U/L 53*   GLUCOSE RANDOM mg/dL 103       Results from last 7 days  Lab Units 12/02/18  0609   INR 0 99               Results from last 7 days  Lab Units 12/01/18  1834   LACTIC ACID mmol/L 1 0           * I Have Reviewed All Lab Data Listed Above  * Additional Pertinent Lab Tests Reviewed: [address] Admission Reviewed    Imaging:    Imaging Reports Reviewed Today Include: CT abdo/pelvis, XR obstruction   Imaging Personally Reviewed by Myself Includes: obstruction series - significant dilated large bowel     Recent Cultures (last 7 days):           Last 24 Hours Medication List:     Current Facility-Administered Medications:  acetaminophen 650 mg Rectal Q6H PRN Jenise Strange PA-C    calcium carbonate 1,000 mg Oral Daily PRN Lethaniel DamienCAITLYN    cefTRIAXone 1,000 mg Intravenous Q24H Maria E Crespo PA-C Last Rate: 1,000 mg (12/02/18 1235)   docusate sodium 100 mg Oral BID Lethaniel DamienCAITLYN    escitalopram 20 mg Oral Daily Lethaniel DamienCAITLYN    fluticasone 2 puff Inhalation BID Lethaniel DamienCAITLYN    gabapentin 600 mg Oral TID Lethaniel DamienCAITLYN    guaiFENesin 600 mg Oral Q12H [address] Jenise Strange PA-C    heparin (porcine) 5,000 Units Subcutaneous [facility] Lethaniel CAITLYN Que[place]ada    HYDROmorphone 0 5 mg Intravenous Once Maria E Crespo PA-C    lidocaine 1 patch Topical Daily Nazario Ramírez MD    metroNIDAZOLE 500 mg Intravenous Q8H Maria E Crespo PA-C    mineral oil 1 enema Rectal Once Maria E Crespo PA-C    ondansetron 4 mg Intravenous Q8H PRN Maria E Crespo PA-C    polyethylene glycol 17 g Oral Daily Lethaniel CAITLYN Quezada    promethazine 6 25 mg Oral 4x Daily PRN Lethaniel DamienCAITLYN    senna 1 tablet Oral Daily Lethaniel DamienCAITLYN    sodium chloride 125 mL/hr Intravenous Continuous Ezequielaniel CAITLYN Quezada Last Rate: 125 mL/hr (12/02/18 0048)   temazepam 30 mg Oral HS Ezequielaniel CAITLYN Quezada         Today, Patient Was Seen By: Jenise Strange PA-C    ** Please Note: Dictation voice to text software may have been used in the creation of this document  **

## 2018-12-02 NOTE — RESPIRATORY THERAPY NOTE
RT Protocol Note  Fransico Fay 59 y o  female MRN: [id number]  Unit/Bed#: ED 07 Encounter: [id number]    Assessment    Principal Problem:    SEBASTIÁN (acute kidney injury) ([address] )  Active Problems:    Urinary retention    Chronic constipation    Chronic midline thoracic back pain      Home Pulmonary Medications:  none       Past Medical History:   Diagnosis Date    Asthma     Chronic back pain      Social History     Social History    Marital status: /Civil Union     Spouse name: N/A    Number of children: N/A    Years of education: N/A     Social History Main Topics    Smoking status: Former Smoker     Quit date: 7/2/1980    Smokeless tobacco: Never Used    Alcohol use No      Comment: social drinker    Drug use: No    Sexual activity: Yes     Other Topics Concern    None     Social History Narrative    None       Subjective  Pt offers no respiratory complaints at this time  Objective    Physical Exam:   Assessment Type: Assess only  General Appearance: Alert, Awake  Respiratory Pattern: Normal  Chest Assessment: Chest expansion symmetrical  Bilateral Breath Sounds: Diminished, Clear  O2 Device: 2L NC    Vitals:  Blood pressure 105/67, pulse 71, temperature 98 8 °F (37 1 °C), temperature source Oral, resp  rate (!) 9, SpO2 99 %, not currently breastfeeding  Imaging and other studies: I have personally reviewed pertinent reports        O2 Device: 2L NC     Plan    Respiratory Plan: Discontinue Protocol, No distress/Pulmonary history

## 2018-12-02 NOTE — ED NOTES
After fleet enema, no bowel movement at this time  Pt  Informed to contact nurse when feeling the urge to defecate  Pt  Dwayne Stoll in agreement  Pt  Very lethargic  Provider made aware       Peter Sheffield RN  12/01/18 2423

## 2018-12-02 NOTE — PLAN OF CARE
GASTROINTESTINAL - ADULT     Minimal or absence of nausea and/or vomiting Progressing     Maintains or returns to baseline bowel function Progressing     Maintains adequate nutritional intake Progressing        Potential for Falls     Patient will remain free of falls Progressing        Prexisting or High Potential for Compromised Skin Integrity     Skin integrity is maintained or improved Progressing

## 2018-12-02 NOTE — ASSESSMENT & PLAN NOTE
· Patient has a significant history of opioid use for pain control reports that she has not had a bowel movement in a very long time this may be attributing to her urinary retention as well  · CT of her abdomen and pelvis revealed Grossly stable appearance of distended colon containing stool and gas, presumably chronic constipation   Fecal impaction might be present   Consider disimpaction or therapeutic enema    · Senna Colace and MiraLax ordered  · One time fleets enema  · Monitor closely

## 2018-12-02 NOTE — ASSESSMENT & PLAN NOTE
· Baseline creatinine was 0 4-0 6  · Creatinine on admission 3 52, improved with IVF and decompression by wang, 2 23  · Suspect secondary to severe constipation  · BMP daily, strict I/Os, continue wang catheter  · Avoid nephrotoxins, dose adjust meds accordingly

## 2018-12-02 NOTE — ASSESSMENT & PLAN NOTE
· Baseline creatinine was 0 4-0 6  · Creatinine on admission 3 52  · Patient reports that she had not voided in approximately 3 days as this is likely a post renal injury  · Stahl placed strict I&O  · BMP in a m

## 2018-12-02 NOTE — ASSESSMENT & PLAN NOTE
· Patient reports that she has not voided in approximately 3 days  · Urinary retention resulting in SEBASTIÁN  · Stahl catheter placed, strict I&O

## 2018-12-02 NOTE — ASSESSMENT & PLAN NOTE
· Patient has a significant history of opioid use for pain control reports that she has not had a bowel movement in a very long time this may be attributing to her urinary retention as well  · CT of her abdomen and pelvis revealed Grossly stable appearance of distended colon containing stool and gas, presumably chronic constipation   Fecal impaction might be present   Consider disimpaction or therapeutic enema    · Follow up obstruction series today shows persistent dilated colon and rectal fecal matter  · Consult placed to GI and surgery, discussed case with both  · Await surgery evaluation due to new development of fever - will consider stat CT abdomen/pelvis to rule out development of perforated bowel, though no free air on imaging this AM  · Monitor closely  · Start IV flagyl/ceftriaxone for possibility of aspiration pneumonia, consider zosyn if perforated bowel

## 2018-12-02 NOTE — ASSESSMENT & PLAN NOTE
· Patient takes Dilaudid and morphine at home with Neurontin to manage her chronic back pain  · Morphine continued for pain control however Dilaudid held given patient's current condition  · Aqua K, and lidocaine patch

## 2018-12-02 NOTE — H&P
[facility]  H&P- Dar Hernandez 1955, 59 y o  female MRN: [id number]    Unit/Bed#: ED 07 Encounter: [id number]    Primary Care Provider: René Ching PA-C   Date and time admitted to hospital: 12/1/2018  5:23 PM        * SEBASTIÁN (acute kidney injury) ([facility] [address] )   Assessment & Plan    · Baseline creatinine was 0 4-0 6  · Creatinine on admission 3 52  · Patient reports that she had not voided in approximately 3 days as this is likely a post renal injury  · Stahl placed strict I&O  · BMP in a m  Urinary retention   Assessment & Plan    · Patient reports that she has not voided in approximately 3 days  · Urinary retention resulting in SEBASTIÁN  · Stahl catheter placed, strict I&O     Chronic constipation   Assessment & Plan    · Patient has a significant history of opioid use for pain control reports that she has not had a bowel movement in a very long time this may be attributing to her urinary retention as well  · CT of her abdomen and pelvis revealed Grossly stable appearance of distended colon containing stool and gas, presumably chronic constipation   Fecal impaction might be present   Consider disimpaction or therapeutic enema  · Senna Colace and MiraLax ordered  · One time fleets enema  · Monitor closely     Chronic midline thoracic back pain   Assessment & Plan    · Patient takes Dilaudid and morphine at home with Neurontin to manage her chronic back pain  · Morphine continued for pain control however Dilaudid held given patient's current condition  · Aqua K, and lidocaine patch          VTE Prophylaxis: Heparin  / sequential compression device   Code Status:  Full  POLST: POLST form is not discussed and not completed at this time  Discussion with family:  Not available    Anticipated Length of Stay:  Patient will be admitted on an Inpatient basis with an anticipated length of stay of  > 2 midnights     Justification for Hospital Stay:  Patient has a severe a KI requiring IV fluids and Stahl catheter placement  CT scan also revealed significant constipation possible impaction requiring treatment    Total Time for Visit, including Counseling / Coordination of Care: 45 minutes  Greater than 50% of this total time spent on direct patient counseling and coordination of care  Chief Complaint:   Have not peed in 3 days    History of Present Illness:    Rubens Thompson is a 59 y o  female who presents with urinary retention  Patient presented to the ED complaining that she has not voided in approximately 3 days  Stahl catheter was placed 750 mL of dark urine drained  Patient denies any chest pain chest tightness shortness of breath or difficulty breathing  She does report back pain as well as some lower abdominal discomfort  She reports that she does not smoke drink or use illicit drugs  She denies any nausea vomiting or diarrhea  Reports that she does not remember the last time she had a bowel movement  Review of Systems:    Review of Systems   Constitutional: Positive for fatigue  Negative for chills and fever  Respiratory: Negative for chest tightness and shortness of breath  Gastrointestinal: Positive for constipation  Genitourinary: Positive for difficulty urinating and flank pain  Musculoskeletal: Positive for back pain and neck pain  Skin: Negative  Neurological: Positive for dizziness and weakness  Negative for light-headedness  Psychiatric/Behavioral: Negative  All other systems reviewed and are negative  Past Medical and Surgical History:     Past Medical History:   Diagnosis Date    Asthma     Chronic back pain        Past Surgical History:   Procedure Laterality Date    ABDOMINAL SURGERY      BACK SURGERY         Meds/Allergies:    Prior to Admission medications    Medication Sig Start Date End Date Taking?  Authorizing Provider   Calcium 600 MG tablet take 1 tablet by mouth once daily 11/24/17  Yes Historical Provider, MD   cetirizine (ZyrTEC) 10 mg tablet  10/23/17  Yes Historical Provider, MD   diazepam (VALIUM) 5 mg tablet 3 (three) times a day Morning, 3pm and 8pm 11/3/17  Yes Historical Provider, MD   escitalopram (LEXAPRO) 20 mg tablet daily  10/23/17  Yes Historical Provider, MD   fluticasone (FLOVENT HFA) 220 mcg/act inhaler Inhale 2 puffs 2 (two) times a day Rinse mouth after use     Yes Historical Provider, MD   gabapentin (NEURONTIN) 300 mg capsule take 2 capsules by mouth three times a day 11/6/17  Yes Historical Provider, MD   HYDROmorphone (DILAUDID) 4 mg tablet Take 4 mg by mouth 4 (four) times a day   Yes Historical Provider, MD   Morphine Sulfate ER 30 MG TBEA Take 30 mg by mouth 3 (three) times a day     Yes Historical Provider, MD   naproxen (NAPROSYN) 500 mg tablet Take 500 mg by mouth every 12 (twelve) hours as needed 8/3/18  Yes Historical Provider, MD   nystatin (MYCOSTATIN) 100,000 units/mL suspension Apply 500,000 Units to the mouth or throat 4 (four) times a day   Yes Historical Provider, MD   promethazine (PHENERGAN) 12 5 mg/10 mL syrup Take 5 mL by mouth 4 (four) times a day as needed for nausea or vomiting   Yes Historical Provider, MD   temazepam (RESTORIL) 30 mg capsule 30 mg daily at bedtime   11/28/17  Yes Historical Provider, MD   AFLURIA QUADRIVALENT 0 5 ML MARY inject 0 5 milliliter intramuscularly 8/16/18 12/1/18  Historical Provider, MD   alendronate (FOSAMAX) 70 mg tablet  8/24/18 12/1/18  Historical Provider, MD   diclofenac sodium (VOLTAREN) 1 % APPLY 4 GRAMS SPARINGLY TO AFFECTED AREAS 4 TIMES DAILY AS NEEDED 9/14/18 12/1/18  Historical Provider, MD   docusate sodium (COLACE) 100 mg capsule Take 1 capsule (100 mg total) by mouth 2 (two) times a day  Patient not taking: Reported on 9/13/2018 7/6/18 12/1/18  Derek Lockwood MD   phenazopyridine (PYRIDIUM) 200 mg tablet take 1 tablet by mouth three times a day after meals if needed 8/3/18 12/1/18  Historical Provider, MD   potassium chloride (K-DUR,KLOR-CON) 20 mEq tablet Take 1 tablet (20 mEq total) by mouth daily for 3 days 7/7/18 12/1/18  Shikha Mccullough MD   senna (SENOKOT) 8 6 mg Take 1 tablet (8 6 mg total) by mouth daily  Patient not taking: Reported on 9/13/2018 7/7/18 12/1/18  Shikha Mccullough MD   simethicone (MYLICON) 80 mg chewable tablet Chew 1 tablet (80 mg total) 4 (four) times a day (with meals and at bedtime)  Patient not taking: Reported on 9/13/2018 7/6/18 12/1/18  Shikha Mccullough MD     I have reviewed home medications with patient personally  Allergies: Allergies   Allergen Reactions    Nsaids      Irritable, upset stomach       Social History:     Marital Status: /Civil Union   Occupation:  None  Patient Pre-hospital Living Situation:  Private residence  Patient Pre-hospital Level of Mobility:  Independent  Patient Pre-hospital Diet Restrictions:  None  Substance Use History:   History   Alcohol Use No     Comment: social drinker     History   Smoking Status    Former Smoker    Quit date: 7/2/1980   Smokeless Tobacco    Never Used     History   Drug Use No       Family History:    Family History   Problem Relation Age of Onset    Diabetes Mother        Physical Exam:     Vitals:   Blood Pressure: 116/66 (12/01/18 2100)  Pulse: 77 (12/01/18 2100)  Temperature: 98 8 °F (37 1 °C) (12/01/18 1705)  Temp Source: Oral (12/01/18 1705)  Respirations: 16 (12/01/18 2100)  SpO2: 97 % (12/01/18 2100)    Physical Exam   Constitutional: She appears well-developed  She appears lethargic  She is sleeping  She has a sickly appearance  Nasal cannula in place  HENT:   Head: Normocephalic  Eyes: Pupils are equal, round, and reactive to light  Neck: Normal range of motion  Neck supple  Cardiovascular: Normal rate and regular rhythm  Pulmonary/Chest: Effort normal and breath sounds normal    Abdominal: Soft  Bowel sounds are normal    Musculoskeletal: Normal range of motion  Neurological: She appears lethargic  Skin: Skin is warm and dry  Psychiatric: She has a normal mood and affect  Her behavior is normal    Nursing note and vitals reviewed  Additional Data:     Lab Results: I have personally reviewed pertinent reports  Results from last 7 days  Lab Units 12/01/18  1910   WBC Thousand/uL 8 30   HEMOGLOBIN g/dL 10 1*   HEMATOCRIT % 31 5*   PLATELETS Thousands/uL 230   NEUTROS PCT % 71   LYMPHS PCT % 14   MONOS PCT % 10   EOS PCT % 3       Results from last 7 days  Lab Units 12/01/18  1834   SODIUM mmol/L 139   POTASSIUM mmol/L 3 0*   CHLORIDE mmol/L 99*   CO2 mmol/L 26   BUN mg/dL 28*   CREATININE mg/dL 3 52*   ANION GAP mmol/L 14*   CALCIUM mg/dL 7 8*   ALBUMIN g/dL 3 4*   TOTAL BILIRUBIN mg/dL 0 40   ALK PHOS U/L 127*   ALT U/L 26   AST U/L 68*   GLUCOSE RANDOM mg/dL 113                   Results from last 7 days  Lab Units 12/01/18  1834   LACTIC ACID mmol/L 1 0       Imaging: I have personally reviewed pertinent reports  CT abdomen pelvis wo contrast   Final Result by Ana Antunez MD (12/01 1920)      Stable to increased bilateral lower lung field infiltrates  This might represent chronic or recurrent pneumonia or perhaps some scarring  Correlate for any cough or fever or other typical pulmonary symptoms  Grossly stable appearance of distended colon containing stool and gas, presumably chronic constipation  Fecal impaction might be present  Consider disimpaction or therapeutic enema  Workstation performed: [id number]             EKG, Pathology, and Other Studies Reviewed on Admission:   · EKG:  Not available    Allscripts / Epic Records Reviewed: Yes     ** Please Note: This note has been constructed using a voice recognition system   **

## 2018-12-02 NOTE — CONSULTS
Consultation - General Surgery   Gladis Bran 59 y o  female MRN: [id number]  Unit/Bed#: ED 07 Encounter: [id number]    Assessment/Plan     Assessment:  Acute abdominal distension, mostly large bowel  Suspect fecaloma causing mechanical obstruction at the level of the rectum  Increased temperature most likely from pulmonary infiltrates  Chronic opioid use  Acute kidney injury  Chronic back pain  Asthma    Plan:  The patient did not have good results from p o  laxatives and enema, worsening abdominal examination according to the physician assistant  Agree with repeat CT scan of the abdomen and pelvis, if this is negative for perforation then proceeded with oil retention enema followed by tap water enema  If the symptoms do not improve from the fecal impaction that will proceed with mechanical disimpaction with anesthesia  History of Present Illness     HPI:  Gladis Bran is a 59 y o  female who presents to the emergency room yesterday because of urinary retention for the past 3 days prior to admission, increasing shortness of breath and difficulty breathing  She also was noted to have abdominal distension  CT scan of the abdomen and pelvis was performed, see full report below  The patient is feeling worse today with increasing abdominal distension and more tenderness and also spiking temperatures  She does not recall any single event the provoked the symptoms  Patient had multiple abdominal surgeries  Consults    Review of Systems   Constitutional: Positive for chills and fever  HENT: Negative for nosebleeds and sore throat  Eyes: Negative for pain and discharge  Respiratory: Negative for cough and shortness of breath  Cardiovascular: Negative for chest pain and palpitations  Gastrointestinal:        As per history of present illness  Endocrine: Negative for cold intolerance and heat intolerance  Genitourinary: Positive for difficulty urinating and dysuria     Neurological: Negative for seizures and headaches  Hematological: Negative for adenopathy  Does not bruise/bleed easily  Psychiatric/Behavioral: Negative for confusion  The patient is not nervous/anxious          Historical Information   Past Medical History:   Diagnosis Date    Asthma     Chronic back pain      Past Surgical History:   Procedure Laterality Date    ABDOMINAL SURGERY      BACK SURGERY       Social History   History   Alcohol Use No     Comment: social drinker     History   Drug Use No     History   Smoking Status    Former Smoker    Quit date: 7/2/1980   Smokeless Tobacco    Never Used     Family History: non-contributory    Meds/Allergies   all current active meds have been reviewed, current meds:   Current Facility-Administered Medications   Medication Dose Route Frequency    acetaminophen (TYLENOL) rectal suppository 650 mg  650 mg Rectal Q6H PRN    calcium carbonate (TUMS) chewable tablet 1,000 mg  1,000 mg Oral Daily PRN    cefTRIAXone (ROCEPHIN) 1,000 mg in dextrose 5 % 50 mL IVPB  1,000 mg Intravenous Q24H    diphenhydrAMINE (BENADRYL) injection 25 mg  25 mg Intravenous Q6H PRN    docusate sodium (COLACE) capsule 100 mg  100 mg Oral BID    escitalopram (LEXAPRO) tablet 20 mg  20 mg Oral Daily    fluticasone (FLOVENT HFA) 220 mcg/act inhaler 2 puff  2 puff Inhalation BID    gabapentin (NEURONTIN) capsule 600 mg  600 mg Oral TID    guaiFENesin (MUCINEX) 12 hr tablet 600 mg  600 mg Oral Q12H SHENA    heparin (porcine) subcutaneous injection 5,000 Units  5,000 Units Subcutaneous Q8H [address]    lidocaine (LIDODERM) 5 % patch 1 patch  1 patch Topical Daily    metroNIDAZOLE (FLAGYL) IVPB (premix) 500 mg  500 mg Intravenous Q8H    mineral oil enema 1 enema  1 enema Rectal Once    ondansetron (ZOFRAN) injection 4 mg  4 mg Intravenous Q8H PRN    polyethylene glycol (MIRALAX) packet 17 g  17 g Oral Daily    promethazine (PHENERGAN) 12 5 mg/10 mL oral syrup 6 25 mg  6 25 mg Oral 4x Daily PRN    senna (SENOKOT) tablet 8 6 mg  1 tablet Oral Daily    sodium chloride 0 9 % infusion  125 mL/hr Intravenous Continuous    temazepam (RESTORIL) capsule 30 mg  30 mg Oral HS    and PTA meds:   Prior to Admission Medications   Prescriptions Last Dose Informant Patient Reported? Taking? HYDROmorphone (DILAUDID) 4 mg tablet   Yes Yes   Sig: Take 4 mg by mouth 4 (four) times a day   Morphine Sulfate ER 30 MG TBEA  Self Yes Yes   Sig: Take 30 mg by mouth 3 (three) times a day     cetirizine (ZyrTEC) 10 mg tablet   Yes Yes   diazepam (VALIUM) 5 mg tablet   Yes Yes   Sig: 3 (three) times a day Morning, 3pm and 8pm   escitalopram (LEXAPRO) 20 mg tablet   Yes Yes   Sig: daily       fluticasone (FLOVENT HFA) 220 mcg/act inhaler   Yes Yes   Sig: Inhale 2 puffs 2 (two) times a day Rinse mouth after use    gabapentin (NEURONTIN) 300 mg capsule   Yes Yes   Sig: take 2 capsules by mouth three times a day   naproxen (NAPROSYN) 500 mg tablet Not Taking at Unknown time  Yes No   Sig: Take 500 mg by mouth every 12 (twelve) hours as needed   nystatin (MYCOSTATIN) 100,000 units/mL suspension   Yes Yes   Sig: Apply 500,000 Units to the mouth or throat 4 (four) times a day   promethazine (PHENERGAN) 12 5 mg/10 mL syrup   Yes Yes   Sig: Take 5 mL by mouth 4 (four) times a day as needed for nausea or vomiting   temazepam (RESTORIL) 30 mg capsule   Yes Yes   Si mg daily at bedtime        Facility-Administered Medications: None     Allergies   Allergen Reactions    Nsaids      Irritable, upset stomach       Objective   First Vitals:   Blood Pressure: 121/73 (18 170)  Pulse: 94 (18 170)  Temperature: 98 8 °F (37 1 °C) (18 170)  Temp Source: Oral (18)  Respirations: 18 (18)  Height: 5' 4" (162 6 cm) (18 1140)  SpO2: 93 % (18 170)    Current Vitals:   Blood Pressure: 113/66 (18 0400)  Pulse: 68 (18 0400)  Temperature: (!) 103 °F (39 4 °C) (18 1200)  Temp Source: Oral (18 1200)  Respirations: 20 (12/02/18 0400)  Height: 5' 4" (162 6 cm) (12/02/18 1140)  SpO2: 99 % (12/02/18 0400)      Intake/Output Summary (Last 24 hours) at 12/02/18 1323  Last data filed at 12/01/18 2307   Gross per 24 hour   Intake                0 ml   Output             3650 ml   Net            -3650 ml       Invasive Devices     Peripheral Intravenous Line            Peripheral IV 12/01/18 Right Forearm less than 1 day          Drain            Urethral Catheter Latex 16 Fr  less than 1 day                Physical Exam    Lab Results:   I have personally reviewed pertinent lab results  , CBC:   Lab Results   Component Value Date    WBC 8 30 12/01/2018    HGB 10 1 (L) 12/01/2018    HCT 31 5 (L) 12/01/2018    MCV 85 12/01/2018     12/02/2018    MCH 27 2 12/01/2018    MCHC 32 1 12/01/2018    RDW 17 6 (H) 12/01/2018    MPV 9 7 12/02/2018    NRBC 0 12/01/2018   , CMP:   Lab Results   Component Value Date    SODIUM 145 12/02/2018    K 3 4 (L) 12/02/2018     (H) 12/02/2018    CO2 22 12/02/2018    BUN 22 12/02/2018    CREATININE 2 23 (H) 12/02/2018    CALCIUM 7 8 (L) 12/02/2018    AST 53 (H) 12/02/2018    ALT 22 12/02/2018    ALKPHOS 126 (H) 12/02/2018    EGFR 23 12/02/2018   , Coagulation:   Lab Results   Component Value Date    INR 0 99 12/02/2018   , Urinalysis:   Lab Results   Component Value Date    COLORU Yellow 12/01/2018    CLARITYU Clear 12/01/2018    SPECGRAV 1 025 12/01/2018    PHUR 5 5 12/01/2018    LEUKOCYTESUR Negative 12/01/2018    NITRITE Negative 12/01/2018    GLUCOSEU Negative 12/01/2018    KETONESU Negative 12/01/2018    BILIRUBINUR Negative 12/01/2018    BLOODU Negative 12/01/2018   , Amylase: No results found for: AMYLASE, Lipase:   Lab Results   Component Value Date    LIPASE 155 12/01/2018     Imaging: I have personally reviewed pertinent reports      EKG, Pathology, and Other Studies: I have personally reviewed pertinent films in PACS    XR abdomen obstruction series [[id number]] Collected: 12/02/18 1017   Order Status: Completed Updated: 12/02/18 1026   Narrative:     OBSTRUCTION SERIES    INDICATION:   r/o obstruction  COMPARISON:  Chest film from July 3, 2018, and abdomen film from July 5, 2018    EXAM PERFORMED/VIEWS:  [place] ABDOMEN OBSTRUCTION SERIES  Images: 4    FINDINGS:    Bowel gas pattern is abnormal   There is air-filled colon throughout   The colon is dilated throughout   Air-fluid levels are seen in multiple locations, within the colon   This was not seen previously  Inder Vines, this is very similar to the CT scan from   yesterday  Currently, no gas or stool is seen in the rectal vault   This would be the only change since yesterday  There are several air-filled loops of small bowel   I do not see air-fluid levels in the small bowel     No free air beneath the hemidiaphragms  No pathologic calcifications or soft tissue masses evident  Osseous structures are unremarkable  Examination of the chest reveals persistent abnormal soft tissue density in the right superior sulcus   This is similar to previous studies  New in the interval, or abnormal densities at both bases   This may represent worsening of the atelectasis seen on yesterday's CT  Impression:       Persistently dilated colon, after evacuation of the rectal vault    Worsening bibasilar process   Atelectasis versus infiltrate      Workstation performed: [id number]   CT abdomen pelvis wo contrast [[id number]] Collected: 12/01/18 1914   Order Status: Completed Updated: 12/01/18 1921   Narrative:     CT ABDOMEN AND PELVIS WITHOUT IV CONTRAST    INDICATION:   abd pain, h/o SBO     COMPARISON:  July 2, 2018    TECHNIQUE:  CT examination of the abdomen and pelvis was performed without intravenous contrast   Axial, sagittal, and coronal 2D reformatted images were created from the source data and submitted for interpretation       Radiation dose length product (DLP) for this visit:  341 mGy-cm    This examination, like all CT scans performed in the [facility], was performed utilizing techniques to minimize radiation dose exposure, including the use of iterative   reconstruction and automated exposure control  Enteric contrast was not administered  FINDINGS:    ABDOMEN    LOWER CHEST: Rebecca Concord are persistent left lower lobe ill-defined infiltrates and also the lingular infiltrate and interstitial opacity in the right middle and lower lobes   These seem slightly more prominent than on the previous exam   Does the patient   have symptoms of pneumonia?  Heart size appears stable   No pericardial or pleural effusion  LIVER/BILIARY TREE: Rebecca Concord are right hepatic lobe cysts   These are stable  GALLBLADDER: Joshi Fitting is surgically absent  SPLEEN:  Unremarkable  PANCREAS:  Unremarkable  ADRENAL GLANDS:  Unremarkable  KIDNEYS/URETERS:  Kidneys appear stable   There is prominence of the renal pelves bilaterally, right greater than left   Overall, no significant change from the prior exam   No obstructing stone or mass visible  STOMACH AND BOWEL:  The colon is very distended with stool and gas   This is similar to the prior study as well   Presumably chronic constipation  Rebecca Concord is no bowel wall thickening or pneumatosis of the bowel wall   The small bowel loops do not appear   substantially dilated at this time  APPENDIX:  No findings to suggest appendicitis  ABDOMINOPELVIC CAVITY:  No ascites or free intraperitoneal air  No lymphadenopathy  VESSELS:  Unremarkable for patient's age  PELVIS    REPRODUCTIVE ORGANS:  Patient is status post hysterectomy  URINARY BLADDER:  Decompressed by a Stahl catheter   No stones are seen  ABDOMINAL WALL/INGUINAL REGIONS: Rebecca Concord are postoperative changes of the abdominal wall   No hernias are seen  OSSEOUS STRUCTURES:  Postoperative changes of the iliac bones and lumbar spine   No acute findings     Impression:       Stable to increased bilateral lower lung field infiltrates   This might represent chronic or recurrent pneumonia or perhaps some scarring   Correlate for any cough or fever or other typical pulmonary symptoms  Grossly stable appearance of distended colon containing stool and gas, presumably chronic constipation   Fecal impaction might be present   Consider disimpaction or therapeutic enema

## 2018-12-03 ENCOUNTER — APPOINTMENT (INPATIENT)
Dept: RADIOLOGY | Facility: HOSPITAL | Age: 63
DRG: 344 | End: 2018-12-03
Payer: MEDICARE

## 2018-12-03 PROBLEM — R14.0 DISTENDED ABDOMEN: Status: ACTIVE | Noted: 2018-12-01

## 2018-12-03 LAB
ALBUMIN SERPL BCP-MCNC: 2.7 G/DL (ref 3.5–5)
ALP SERPL-CCNC: 111 U/L (ref 46–116)
ALT SERPL W P-5'-P-CCNC: 17 U/L (ref 12–78)
ANION GAP SERPL CALCULATED.3IONS-SCNC: 14 MMOL/L (ref 4–13)
AST SERPL W P-5'-P-CCNC: 37 U/L (ref 5–45)
BILIRUB SERPL-MCNC: 0.5 MG/DL (ref 0.2–1)
BUN SERPL-MCNC: 12 MG/DL (ref 5–25)
CA-I BLD-SCNC: 1 MMOL/L (ref 1.12–1.32)
CALCIUM SERPL-MCNC: 7.6 MG/DL (ref 8.3–10.1)
CHLORIDE SERPL-SCNC: 107 MMOL/L (ref 100–108)
CO2 SERPL-SCNC: 23 MMOL/L (ref 21–32)
CREAT SERPL-MCNC: 1.19 MG/DL (ref 0.6–1.3)
GFR SERPL CREATININE-BSD FRML MDRD: 49 ML/MIN/1.73SQ M
GLUCOSE SERPL-MCNC: 94 MG/DL (ref 65–140)
MAGNESIUM SERPL-MCNC: 1.7 MG/DL (ref 1.6–2.6)
POTASSIUM SERPL-SCNC: 2.7 MMOL/L (ref 3.5–5.3)
PROT SERPL-MCNC: 6 G/DL (ref 6.4–8.2)
SODIUM SERPL-SCNC: 144 MMOL/L (ref 136–145)

## 2018-12-03 PROCEDURE — 74018 RADEX ABDOMEN 1 VIEW: CPT

## 2018-12-03 PROCEDURE — 99232 SBSQ HOSP IP/OBS MODERATE 35: CPT | Performed by: NURSE PRACTITIONER

## 2018-12-03 PROCEDURE — 80053 COMPREHEN METABOLIC PANEL: CPT | Performed by: PHYSICIAN ASSISTANT

## 2018-12-03 PROCEDURE — 99232 SBSQ HOSP IP/OBS MODERATE 35: CPT | Performed by: SURGERY

## 2018-12-03 PROCEDURE — 99233 SBSQ HOSP IP/OBS HIGH 50: CPT | Performed by: SURGERY

## 2018-12-03 PROCEDURE — 82330 ASSAY OF CALCIUM: CPT | Performed by: PHYSICIAN ASSISTANT

## 2018-12-03 PROCEDURE — 83735 ASSAY OF MAGNESIUM: CPT | Performed by: PHYSICIAN ASSISTANT

## 2018-12-03 PROCEDURE — 99222 1ST HOSP IP/OBS MODERATE 55: CPT | Performed by: INTERNAL MEDICINE

## 2018-12-03 RX ORDER — POTASSIUM CHLORIDE 14.9 MG/ML
20 INJECTION INTRAVENOUS
Status: COMPLETED | OUTPATIENT
Start: 2018-12-03 | End: 2018-12-03

## 2018-12-03 RX ORDER — ERYTHROMYCIN 250 MG/1
250 TABLET, COATED ORAL
Status: DISCONTINUED | OUTPATIENT
Start: 2018-12-03 | End: 2018-12-05 | Stop reason: HOSPADM

## 2018-12-03 RX ORDER — SODIUM CHLORIDE 9 MG/ML
75 INJECTION, SOLUTION INTRAVENOUS CONTINUOUS
Status: DISCONTINUED | OUTPATIENT
Start: 2018-12-03 | End: 2018-12-05 | Stop reason: HOSPADM

## 2018-12-03 RX ORDER — MAGNESIUM SULFATE HEPTAHYDRATE 40 MG/ML
2 INJECTION, SOLUTION INTRAVENOUS ONCE
Status: COMPLETED | OUTPATIENT
Start: 2018-12-03 | End: 2018-12-03

## 2018-12-03 RX ADMIN — HYDROMORPHONE HYDROCHLORIDE 0.5 MG: 1 INJECTION, SOLUTION INTRAMUSCULAR; INTRAVENOUS; SUBCUTANEOUS at 16:58

## 2018-12-03 RX ADMIN — HYDROMORPHONE HYDROCHLORIDE 0.5 MG: 1 INJECTION, SOLUTION INTRAMUSCULAR; INTRAVENOUS; SUBCUTANEOUS at 20:58

## 2018-12-03 RX ADMIN — SODIUM CHLORIDE 75 ML/HR: 0.9 INJECTION, SOLUTION INTRAVENOUS at 21:25

## 2018-12-03 RX ADMIN — ERYTHROMYCIN 250 MG: 250 TABLET, FILM COATED ORAL at 23:31

## 2018-12-03 RX ADMIN — FLUTICASONE PROPIONATE 2 PUFF: 220 AEROSOL, METERED RESPIRATORY (INHALATION) at 10:27

## 2018-12-03 RX ADMIN — HYDROMORPHONE HYDROCHLORIDE 0.5 MG: 1 INJECTION, SOLUTION INTRAMUSCULAR; INTRAVENOUS; SUBCUTANEOUS at 00:37

## 2018-12-03 RX ADMIN — CEFTRIAXONE SODIUM 1000 MG: 10 INJECTION, POWDER, FOR SOLUTION INTRAVENOUS at 17:04

## 2018-12-03 RX ADMIN — MAGNESIUM SULFATE HEPTAHYDRATE 2 G: 40 INJECTION, SOLUTION INTRAVENOUS at 18:11

## 2018-12-03 RX ADMIN — HYDROMORPHONE HYDROCHLORIDE 0.5 MG: 1 INJECTION, SOLUTION INTRAMUSCULAR; INTRAVENOUS; SUBCUTANEOUS at 12:50

## 2018-12-03 RX ADMIN — HYDROMORPHONE HYDROCHLORIDE 0.5 MG: 1 INJECTION, SOLUTION INTRAMUSCULAR; INTRAVENOUS; SUBCUTANEOUS at 08:44

## 2018-12-03 RX ADMIN — METRONIDAZOLE 500 MG: 500 INJECTION, SOLUTION INTRAVENOUS at 16:00

## 2018-12-03 RX ADMIN — METRONIDAZOLE 500 MG: 500 INJECTION, SOLUTION INTRAVENOUS at 04:48

## 2018-12-03 RX ADMIN — HEPARIN SODIUM 5000 UNITS: 5000 INJECTION, SOLUTION INTRAVENOUS; SUBCUTANEOUS at 05:17

## 2018-12-03 RX ADMIN — HYDROMORPHONE HYDROCHLORIDE 0.5 MG: 1 INJECTION, SOLUTION INTRAMUSCULAR; INTRAVENOUS; SUBCUTANEOUS at 04:45

## 2018-12-03 RX ADMIN — POTASSIUM CHLORIDE 20 MEQ: 14.9 INJECTION, SOLUTION INTRAVENOUS at 09:24

## 2018-12-03 RX ADMIN — HEPARIN SODIUM 5000 UNITS: 5000 INJECTION, SOLUTION INTRAVENOUS; SUBCUTANEOUS at 14:06

## 2018-12-03 RX ADMIN — HEPARIN SODIUM 5000 UNITS: 5000 INJECTION, SOLUTION INTRAVENOUS; SUBCUTANEOUS at 23:31

## 2018-12-03 RX ADMIN — FLUTICASONE PROPIONATE 2 PUFF: 220 AEROSOL, METERED RESPIRATORY (INHALATION) at 18:39

## 2018-12-03 RX ADMIN — POTASSIUM CHLORIDE 20 MEQ: 14.9 INJECTION, SOLUTION INTRAVENOUS at 13:57

## 2018-12-03 NOTE — ASSESSMENT & PLAN NOTE
· Baseline creatinine was 0 4-0 6  · Creatinine on admission 3 52, improved with IVF and decompression by wang, currently resolved at 1 19  · Suspect secondary to severe constipation  · BMP daily, strict I/Os, continue wang catheter await surgical recommendations can attempt voiding trial once medically improved  · Avoid nephrotoxins, dose adjust meds accordingly

## 2018-12-03 NOTE — PROGRESS NOTES
Progress Note -Surgery PA  Gerhardt Jg 59 y o  female MRN: [id number]  Unit/Bed#: -01 Encounter: [id number]      Assessment   Abdominal pain and distention  Chronic constipation  - patient continues to have sharp cramping abdominal pain in her mid abdomen and distension but states this is not getting worse it just has not improved  - mineral oil enema and tap water enema yesterday with 2 large watery bowel movements containing stool  No further bowel movements today   - NGT placed yesterday, but output not recorded  - CBC pending, due to difficult stick  Hypokalemia - 2 7 today    Plan   - Continue diet NPO   - patient did have 2 bowel movements yesterday after receiving enemas, which were watery contain some stool but not significant and clearing out hard stool   - abdominal x-ray/KUB repeated; Shows air and distention in bowel  No stool palpable on rectal exam  No surgical intervention at this time  - replete electrolytes; check phos and ionized Ca  - Discuss possible decompression with GI  Will discuss  ______________________________________________________________________  CC:  I do not feel like I am getting better    Subjective:   Patient continues to complain of abdominal pain and distension but states that it is not worsening just not improving at all  Patient febrile yesterday but has been afebrile overnight and into this morning  Denies any current symptoms of fever or chills  States she had 2 bowel movements that were screening color and watery  Objective:       Vitals:  /68 (BP Location: Right arm)   Pulse 92   Temp 99 3 °F (37 4 °C) (Oral)   Resp 18   Ht 5' 4" (1 626 m)   Wt 68 9 kg (151 lb 14 4 oz)   LMP  (LMP Unknown)   SpO2 96%   BMI 26 07 kg/m²     I/Os:  I/O last 3 completed shifts:  In: -   Out: 4150 [Urine:4150]    No intake/output data recorded      Invasive Devices     Peripheral Intravenous Line            Peripheral IV 12/01/18 Right Forearm 1 day    Peripheral IV 12/02/18 Right Hand less than 1 day          Drain            Urethral Catheter Latex 16 Fr  1 day    NG/OG/Enteral Tube Nasogastric 16 Fr Right nares less than 1 day                Medications:  Current Facility-Administered Medications   Medication Dose Route Frequency    acetaminophen (TYLENOL) rectal suppository 650 mg  650 mg Rectal Q6H PRN    calcium carbonate (TUMS) chewable tablet 1,000 mg  1,000 mg Oral Daily PRN    cefTRIAXone (ROCEPHIN) 1,000 mg in dextrose 5 % 50 mL IVPB  1,000 mg Intravenous Q24H    diphenhydrAMINE (BENADRYL) injection 25 mg  25 mg Intravenous Q6H PRN    docusate sodium (COLACE) capsule 100 mg  100 mg Oral BID    escitalopram (LEXAPRO) tablet 20 mg  20 mg Oral Daily    fluticasone (FLOVENT HFA) 220 mcg/act inhaler 2 puff  2 puff Inhalation BID    gabapentin (NEURONTIN) capsule 600 mg  600 mg Oral TID    guaiFENesin (MUCINEX) 12 hr tablet 600 mg  600 mg Oral Q12H SHENA    heparin (porcine) subcutaneous injection 5,000 Units  5,000 Units Subcutaneous Q8H [address]    HYDROmorphone (DILAUDID) injection 0 5 mg  0 5 mg Intravenous Q4H PRN    lidocaine (LIDODERM) 5 % patch 1 patch  1 patch Topical Daily    magnesium sulfate 2 g/50 mL IVPB (premix) 2 g  2 g Intravenous Once    metroNIDAZOLE (FLAGYL) IVPB (premix) 500 mg  500 mg Intravenous Q8H    ondansetron (ZOFRAN) injection 4 mg  4 mg Intravenous Q8H PRN    polyethylene glycol (MIRALAX) packet 17 g  17 g Oral Daily    potassium chloride 20 mEq IVPB (premix)  20 mEq Intravenous Q2H    promethazine (PHENERGAN) 12 5 mg/10 mL oral syrup 6 25 mg  6 25 mg Oral 4x Daily PRN    senna (SENOKOT) tablet 8 6 mg  1 tablet Oral Daily    sodium chloride 0 9 % infusion  125 mL/hr Intravenous Continuous    temazepam (RESTORIL) capsule 30 mg  30 mg Oral HS                 Lab Results and Cultures:   CBC with diff:     BMP/CMP:  Lab Results   Component Value Date    K 2 7 (LL) 12/03/2018     12/03/2018    CO2 23 12/03/2018    BUN 12 12/03/2018    CREATININE 1 19 12/03/2018    CALCIUM 7 6 (L) 12/03/2018    AST 37 12/03/2018    ALT 17 12/03/2018    ALKPHOS 111 12/03/2018    EGFR 49 12/03/2018           Urinalysis:   Lab Results   Component Value Date    COLORU Yellow 12/01/2018    CLARITYU Clear 12/01/2018    SPECGRAV 1 025 12/01/2018    PHUR 5 5 12/01/2018    LEUKOCYTESUR Negative 12/01/2018    NITRITE Negative 12/01/2018    GLUCOSEU Negative 12/01/2018    KETONESU Negative 12/01/2018    BILIRUBINUR Negative 12/01/2018    BLOODU Negative 12/01/2018          Physical Exam:  General Appearance:    Alert and orientated x 3, cooperative, patient in moderate, appears stated age   Lungs:     Clear to auscultation bilaterally, shallow respirations, no wheezes    Heart:    Regular rate and rhythm, S1 and S2 normal, no murmur   Abdomen:    hypoactive BS, significant scarring present on abdomen 2/2 multiple surgical interventions, distended, tympanic to percussion   Extremities:  Extremities normal, no calf tenderness, no cyanosis or edema   Pulses:   2+ and symmetric all extremities   Skin:   Skin color, texture, turgor normal, no rashes   Neurologic:   CNII-XII intact, normal strength, affect appropriate       Imaging:  Ct Abdomen Pelvis Wo Contrast    Result Date: 12/1/2018  Impression: Stable to increased bilateral lower lung field infiltrates  This might represent chronic or recurrent pneumonia or perhaps some scarring  Correlate for any cough or fever or other typical pulmonary symptoms  Grossly stable appearance of distended colon containing stool and gas, presumably chronic constipation  Fecal impaction might be present  Consider disimpaction or therapeutic enema  Workstation performed: [id number]     Ct Chest Abdomen Pelvis Wo Contrast    Result Date: 12/2/2018  Impression: Probable pneumonia  Right apical masslike density is probably nodular scarring and is unchanged since September  It may require ongoing surveillance   Currently 22 x 25 mm  Endogastric tube in good position in the gastric antrum  Stable distended colon with gas and stool  Slightly increased size of small bowel loops compared with yesterday  Stable prominence of the renal pelves without visible obstructing stones or masses  Workstation performed: [id number]     Xr Abdomen Obstruction Series    Result Date: 12/2/2018  Impression: Persistently dilated colon, after evacuation of the rectal vault Worsening bibasilar process    Atelectasis versus infiltrate Workstation performed: [id number]       Nora Terrazas PA-C   12/3/2018

## 2018-12-03 NOTE — UTILIZATION REVIEW
Initial Clinical Review    Admission: Date/Time/Statement: 12/1/18 @ 2026      Inpatient Admission (expected length of stay for this patient is greater than two midnights)     Standing Status:   Standing     Number of Occurrences:   1     Order Specific Question:   Admitting Physician     Answer:   Mable Ingram     Order Specific Question:   Level of Care     Answer:   Med Surg [16]     Order Specific Question:   Estimated length of stay     Answer:   More than 2 Midnights     Order Specific Question:   Certification     Answer:   I certify that inpatient services are medically necessary for this patient for a duration of greater than two midnights  See H&P and MD Progress Notes for additional information about the patient's course of treatment  ED: Date/Time/Mode of Arrival:   ED Arrival Information     Expected Arrival Acuity Means of Arrival Escorted By Service Admission Type    - 12/1/2018 16:56 Emergent Walk-In Spouse General Medicine Emergency    Arrival Complaint    Urinary retention/vomiting          Chief Complaint:   Chief Complaint   Patient presents with    Urinary Retention     "have not peed in 3 days" + nausea, vomitting       History of Illness: 58 yo female to ED w/ /co not voiding in 3 days   Stahl was placed in ED and drained 750 ml dark yellow ua  C/o back pain and lower abd discomfort        PE : lethargic , sickly appearance     ED Vital Signs:   ED Triage Vitals   Temperature Pulse Respirations Blood Pressure SpO2   12/01/18 1705 12/01/18 1705 12/01/18 1704 12/01/18 1705 12/01/18 1705   98 8 °F (37 1 °C) 94 18 121/73 93 %      Temp Source Heart Rate Source Patient Position - Orthostatic VS BP Location FiO2 (%)   12/01/18 1705 12/01/18 1704 12/01/18 1704 12/01/18 1704 --   Oral Monitor Sitting Right arm       Pain Score       12/01/18 1704       8        Wt Readings from Last 1 Encounters:   12/02/18 68 9 kg (151 lb 14 4 oz)       Vital Signs (abnormal):   12/02/18 1457 100 3 °F (37 9 °C)  --  --  --  --  --  --   12/02/18 1200   103 °F (39 4 °C)  --  --  --  --  --  --   12/02/18 1140   102 4 °F (39 1 °C)  --  --  --  --  --  --   12/02/18 0400  --  68  20  113/66  99 %  --  --   12/02/18 0215  --  67  14  110/69  99 %  None (Room air)  Lying         Abnormal Labs/Diagnostic Test Results: H&H   10 1  31 5  Potassium 3 0 (L) 3 5 - 5 3 mmol/L      Chloride 99 (L) 100 - 108 mmol/L     CO2 26 21 - 32 mmol/L     ANION GAP 14 (H) 4 - 13 mmol/L     BUN 28 (H) 5 - 25 mg/dL     Creatinine 3 52 (H) 0 60 - 1 30 mg/dL      Calcium 7 8 (L) 8 3 - 10 1 mg/dL     AST 68 (H) 5 - 45 U/L   Alk phos 127, alb 3 4  CT chest -    Probable pneumonia  Right apical masslike density is probably nodular scarring and is unchanged since September  It may require ongoing surveillance  Currently 22 x 25 mm  Endogastric tube in good position in the gastric antrum  Stable distended colon with gas and stool  Slightly increased size of small bowel loops compared with yesterday  Stable prominence of the renal pelves without visible obstructing stones or masses  OBS series -    Persistently dilated colon, after evacuation of the rectal vault  Worsening bibasilar process   Atelectasis versus infiltrate      CT abd-      Stable to increased bilateral lower lung field infiltrates   This might represent chronic or recurrent pneumonia or perhaps some scarring          ED Treatment:   Medication Administration from 12/01/2018 1656 to 12/02/2018 1541       Date/Time Order Dose Route Action Action by Comments     12/01/2018 1902 morphine (PF) 10 mg/mL injection 6 mg 6 mg Intravenous Given Gretta Bundy RN      12/02/2018 0041 sodium chloride 0 9 % bolus 2,000 mL 0 mL Intravenous Stopped Parvin Jackson RN      12/01/2018 1918 sodium chloride 0 9 % bolus 2,000 mL 2,000 mL Intravenous [address] Gretta Bundy RN      12/02/2018 0994 escitalopram (LEXAPRO) tablet 20 mg 20 mg Oral Given Sergio Cortez RN 12/02/2018 0945 gabapentin (NEURONTIN) capsule 600 mg 600 mg Oral Given Gemini Gray RN      12/02/2018 0945 fluticasone (FLOVENT HFA) 220 mcg/act inhaler 2 puff 2 puff Inhalation Given Gemini Gray RN      12/02/2018 0048 sodium chloride 0 9 % infusion 125 mL/hr Intravenous [address]  [address] [facility]      12/02/2018 1009 heparin (porcine) subcutaneous injection 5,000 Units 5,000 Units Subcutaneous Given Gemini Gray RN      12/01/2018 2156 heparin (porcine) subcutaneous injection 5,000 Units 5,000 Units Subcutaneous Given Atul Moody RN right     12/02/2018 6534 polyethylene glycol (MIRALAX) packet 17 g 0 g Oral Hold Gemini Gray RN possible obstruction, surgery to see     12/02/2018 0946 senna (SENOKOT) tablet 8 6 mg 0 mg Oral Hold Gemini Gray RN possible obstruction, surgery to see     12/02/2018 0943 docusate sodium (COLACE) capsule 100 mg 100 mg Oral Given Gemini Gray RN      12/01/2018 2138 docusate sodium (COLACE) capsule 100 mg 100 mg Oral Given Atul Moody RN      12/01/2018 2202 sodium phosphate-biphosphate (FLEET) enema 1 enema 1 enema Rectal Given Atul Moody RN      12/02/2018 0300 potassium chloride 20 mEq IVPB (premix) 0 mEq Intravenous Stopped Yahaira Pederson RN      12/02/2018 0047 potassium chloride 20 mEq IVPB (premix) 20 mEq Intravenous [address]  [address] [facility]      12/01/2018 2154 potassium chloride 20 mEq IVPB (premix) 20 mEq Intravenous [address] Atul Moody RN      12/02/2018 0946 lidocaine (LIDODERM) 5 % patch 1 patch 0 patch Topical Hold Gemini Gray RN      12/02/2018 0946 guaiFENesin (MUCINEX) 12 hr tablet 600 mg 0 mg Oral Hold Gemini Gray RN possible obstruction, surgery to see     12/02/2018 0950 morphine (MS CONTIN) ER tablet 30 mg 0 mg Oral Hold Gemini Gray RN possible obstruction, surgery to see     12/02/2018 1255 acetaminophen (TYLENOL) rectal suppository 650 mg 650 mg Rectal Given Jailene Payne RN      12/02/2018 1255 metroNIDAZOLE (FLAGYL) IVPB (premix) 500 mg 500 mg Intravenous [address] Gabi Ribeiro RN      12/02/2018 1235 cefTRIAXone (ROCEPHIN) 1,000 mg in dextrose 5 % 50 mL IVPB 1,000 mg Intravenous [address] Jolie Mathew RN      12/02/2018 1255 HYDROmorphone (DILAUDID) injection 0 5 mg 0 5 mg Intravenous Given Gabi Ribeiro RN      12/02/2018 1333 diphenhydrAMINE (BENADRYL) injection 25 mg 25 mg Intravenous Given Jolie Mathew RN           Past Medical/Surgical History:    Active Ambulatory Problems     Diagnosis Date Noted    [facility]    Elevated liver enzymes 07/02/2018    Hypokalemia 07/02/2018    Common bile duct dilatation 07/02/2018    Generalized anxiety disorder 07/02/2018    Chronic, continuous use of opioids 07/02/2018    Urinary retention 07/04/2018    Anemia 07/05/2018    Hyponatremia 07/06/2018     Past Medical History:   Diagnosis Date    Asthma     Chronic back pain        Admitting Diagnosis: Urinary retention [R33 9]  Fecal impaction ([facility] [address] ) [K56 41]  Distended abdomen [R14 0]  Chronic narcotic use [F11 90]  Acute renal failure, unspecified acute renal failure type ([facility] [address] ) [N17 9]    Age/Sex: 59 y o  female    Assessment/Plan:        * SEBASTIÁN (acute kidney injury) ([facility] [address] )   Assessment & Plan     · Baseline creatinine was 0 4-0 6  · Creatinine on admission 3 52  · Patient reports that she had not voided in approximately 3 days as this is likely a post renal injury  · Stahl placed strict I&O  · BMP in a m     Urinary retention   Assessment & Plan     · Patient reports that she has not voided in approximately 3 days  · Urinary retention resulting in SEBASTIÁN  · Stahl catheter placed, strict I&O      Chronic constipation   Assessment & Plan     · Patient has a significant history of opioid use for pain control reports that she has not had a bowel movement in a very long time this may be attributing to her urinary retention as well  · CT of her abdomen and pelvis revealed Grossly stable appearance of distended colon containing stool and gas, presumably chronic constipation   Fecal impaction might be present   Consider disimpaction or therapeutic enema  · Senna Colace and MiraLax ordered  · One time fleets enema  · Monitor closely    Chronic midline thoracic back pain   Assessment & Plan     · Patient takes Dilaudid and morphine at home with Neurontin to manage her chronic back pain  · Morphine continued for pain control however Dilaudid held given patient's current condition  · Aqua K, and lidocaine patch     Anticipated Length of Stay:  Patient will be admitted on an Inpatient basis with an anticipated length of stay of  > 2 midnights  Justification for Hospital Stay:  Patient has a severe a KI requiring IV fluids and Stahl catheter placement    CT scan also revealed significant constipation possible impaction requiring treatment     Admission Orders:  Scheduled Meds:   Current Facility-Administered Medications:  acetaminophen 650 mg Rectal Q6H PRN     calcium carbonate 1,000 mg Oral Daily PRN     cefTRIAXone 1,000 mg Intravenous Q24H  Last Rate: 1,000 mg (12/02/18 1235)   diphenhydrAMINE 25 mg Intravenous Q6H PRN     docusate sodium 100 mg Oral BID     escitalopram 20 mg Oral Daily     fluticasone 2 puff Inhalation BID     gabapentin 600 mg Oral TID     guaiFENesin 600 mg Oral Q12H [address]     heparin (porcine) 5,000 Units Subcutaneous Q8H [address]     HYDROmorphone 0 5 mg Intravenous Q4H PRN     lidocaine 1 patch Topical Daily     magnesium sulfate 2 g Intravenous Once     metroNIDAZOLE 500 mg Intravenous Q8H  Last Rate: 500 mg (12/03/18 0448)   ondansetron 4 mg Intravenous Q8H PRN     polyethylene glycol 17 g Oral Daily     potassium chloride 20 mEq Intravenous Q2H  Last Rate: 20 mEq (12/03/18 0924)   promethazine 6 25 mg Oral 4x Daily PRN     senna 1 tablet Oral Daily     sodium chloride 125 mL/hr Intravenous Continuous  Last Rate: 125 mL/hr (12/02/18 1921)   temazepam 30 mg Oral HS       Tele   NG tube   Tap water enema  GI and acute care sx consult   SCD  I&O   Up w/ assist   Reg diet   12/3 cbc , bmp , mg , cmp  K   2 7, an gap 14, peg    7 6, total prot  6 0, alb  2 7

## 2018-12-03 NOTE — ASSESSMENT & PLAN NOTE
· Patient takes Dilaudid and morphine at home with Neurontin to manage her chronic back pain  · Hold oral agents for now  · IV dilaudid as needed due to renal failure/contraindication for morphine, would continue judicious use due to concern for obstruction related to slow transit in the setting of chronic narcotic use  · Consider Relistor when renal functions improve per discussion today with GI await further recommendations  · Aqua K, and lidocaine patch (patient refuses)

## 2018-12-03 NOTE — ASSESSMENT & PLAN NOTE
· Patient has a significant history of opioid use for pain control reports that she has not had a bowel movement in a very long time this may be attributing to her urinary retention as well per previous discussion with provider  · Patient reports 2 successful bowel movements after enema x2 however remains symptomatic  · CT of her abdomen and pelvis revealed Grossly stable appearance of distended colon containing stool and gas, presumably chronic constipation   Fecal impaction might be present     · At bedside with surgery patient is still markedly distended with abdominal pain check abdominal x-ray maintain NG tube and NPO status  · GI consult pending recommendations appreciated  · Monitor closely  · Continue IV flagyl/ceftriaxone for possibility of aspiration pneumonia

## 2018-12-03 NOTE — ASSESSMENT & PLAN NOTE
· Patient reports that she had not voided in approximately 3 days  · UA is negative  · Once patient is medically cleared by surgery can attempt voiding trial

## 2018-12-03 NOTE — CONSULTS
Consultation - [facility]  Ophelia Flatness 59 y o  female MRN: [id number]  Unit/Bed#: -01 Encounter: [id number]        Consults    Reason for Consult / Principal Problem: Large bowel distention, Obstipation    HPI: Ms Jalen Bull is a 60 yo F with a PMH of multiple bowel obstruction (unknown if large or small) requiring resections, chronic pain on chronic opioids, who presented with on 12/1 with 3 days of urinary retention as well as abdominal pain with constipation  She was found to have significant SEBASTIÁN as well as a very distended large bowel with stool and gas, possible 2/2 chronic constipation  She was started on enemas and yesterday did have 2 large watery BMs but she denies any further BMs or passage of gas today  She has an NG tube in place  She denies any chronic constipation and reports she takes PO mineral oil daily and has normal daily BMs with this regimen  She is unsure when her last colonoscopy was      REVIEW OF SYSTEMS: Negative except for as stated above    Historical Information   Past Medical History:   Diagnosis Date    Asthma     Chronic back pain      Past Surgical History:   Procedure Laterality Date    ABDOMINAL SURGERY      BACK SURGERY       Social History   History   Alcohol Use No     Comment: social drinker     History   Drug Use No     History   Smoking Status    Former Smoker    Quit date: 7/2/1980   Smokeless Tobacco    Never Used     Family History   Problem Relation Age of Onset    Diabetes Mother        Meds/Allergies     Prescriptions Prior to Admission   Medication    cetirizine (ZyrTEC) 10 mg tablet    diazepam (VALIUM) 5 mg tablet    escitalopram (LEXAPRO) 20 mg tablet    fluticasone (FLOVENT HFA) 220 mcg/act inhaler    gabapentin (NEURONTIN) 300 mg capsule    HYDROmorphone (DILAUDID) 4 mg tablet    Morphine Sulfate ER 30 MG TBEA    nystatin (MYCOSTATIN) 100,000 units/mL suspension    promethazine (PHENERGAN) 12 5 mg/10 mL syrup    temazepam (RESTORIL) 30 mg capsule    naproxen (NAPROSYN) 500 mg tablet     Current Facility-Administered Medications   Medication Dose Route Frequency    acetaminophen (TYLENOL) rectal suppository 650 mg  650 mg Rectal Q6H PRN    calcium carbonate (TUMS) chewable tablet 1,000 mg  1,000 mg Oral Daily PRN    cefTRIAXone (ROCEPHIN) 1,000 mg in dextrose 5 % 50 mL IVPB  1,000 mg Intravenous Q24H    diphenhydrAMINE (BENADRYL) injection 25 mg  25 mg Intravenous Q6H PRN    docusate sodium (COLACE) capsule 100 mg  100 mg Oral BID    escitalopram (LEXAPRO) tablet 20 mg  20 mg Oral Daily    fluticasone (FLOVENT HFA) 220 mcg/act inhaler 2 puff  2 puff Inhalation BID    gabapentin (NEURONTIN) capsule 600 mg  600 mg Oral TID    guaiFENesin (MUCINEX) 12 hr tablet 600 mg  600 mg Oral Q12H SHENA    heparin (porcine) subcutaneous injection 5,000 Units  5,000 Units Subcutaneous Q8H [address]    HYDROmorphone (DILAUDID) injection 0 5 mg  0 5 mg Intravenous Q4H PRN    lidocaine (LIDODERM) 5 % patch 1 patch  1 patch Topical Daily    magnesium sulfate 2 g/50 mL IVPB (premix) 2 g  2 g Intravenous Once    metroNIDAZOLE (FLAGYL) IVPB (premix) 500 mg  500 mg Intravenous Q8H    ondansetron (ZOFRAN) injection 4 mg  4 mg Intravenous Q8H PRN    polyethylene glycol (MIRALAX) packet 17 g  17 g Oral Daily    potassium chloride 20 mEq IVPB (premix)  20 mEq Intravenous Q2H    promethazine (PHENERGAN) 12 5 mg/10 mL oral syrup 6 25 mg  6 25 mg Oral 4x Daily PRN    senna (SENOKOT) tablet 8 6 mg  1 tablet Oral Daily    sodium chloride 0 9 % infusion  125 mL/hr Intravenous Continuous    temazepam (RESTORIL) capsule 30 mg  30 mg Oral HS       Allergies   Allergen Reactions    Nsaids      Irritable, upset stomach           Objective     Blood pressure 122/68, pulse 92, temperature 99 3 °F (37 4 °C), temperature source Oral, resp   rate 18, height 5' 4" (1 626 m), weight 68 9 kg (151 lb 14 4 oz), SpO2 96 %, not currently breastfeeding  Intake/Output Summary (Last 24 hours) at 12/03/18 1059  Last data filed at 12/02/18 2106   Gross per 24 hour   Intake                0 ml   Output             2100 ml   Net            -2100 ml         PHYSICAL EXAM:      General Appearance:   Alert, oriented x3, appears uncomfortable   HEENT:   Normocephalic, atraumatic, anicteric, (+) NG tube in place draining bilious fluid    Neck:  Supple, symmetrical, trachea midline   Lungs:   Clear to auscultation bilaterally, no respiratory distress   Heart[de-identified]   RRR, no murmur   Abdomen:   (+) Moderately distended, bowel sounds hypoactive, generalized TTP   Rectal:   Deferred    Extremities:  No cyanosis or LE edema    Pulses:  2+ and symmetric all extremities    Skin:  No jaundice or pallor     Lab Results:     Results from last 7 days  Lab Units 12/02/18  0609 12/01/18  1910   WBC Thousand/uL  --  8 30   HEMOGLOBIN g/dL  --  10 1*   HEMATOCRIT %  --  31 5*   PLATELETS Thousands/uL 208 230   NEUTROS PCT %  --  71   LYMPHS PCT %  --  14   MONOS PCT %  --  10   EOS PCT %  --  3       Results from last 7 days  Lab Units 12/03/18  0443   POTASSIUM mmol/L 2 7*   CHLORIDE mmol/L 107   CO2 mmol/L 23   BUN mg/dL 12   CREATININE mg/dL 1 19   CALCIUM mg/dL 7 6*   ALK PHOS U/L 111   ALT U/L 17   AST U/L 37       Results from last 7 days  Lab Units 12/02/18  0609   INR  0 99       Results from last 7 days  Lab Units 12/01/18  1834   LIPASE u/L 155       Imaging Studies: I have personally reviewed pertinent imaging studies  Ct Abdomen Pelvis Wo Contrast  Result Date: 12/1/2018  Impression: Stable to increased bilateral lower lung field infiltrates  This might represent chronic or recurrent pneumonia or perhaps some scarring  Correlate for any cough or fever or other typical pulmonary symptoms  Grossly stable appearance of distended colon containing stool and gas, presumably chronic constipation  Fecal impaction might be present    Consider disimpaction or therapeutic enema  Ct Chest Abdomen Pelvis Wo Contrast  Result Date: 12/2/2018  Impression: Probable pneumonia  Right apical masslike density is probably nodular scarring and is unchanged since September  It may require ongoing surveillance  Currently 22 x 25 mm  Endogastric tube in good position in the gastric antrum  Stable distended colon with gas and stool  Slightly increased size of small bowel loops compared with yesterday  Stable prominence of the renal pelves without visible obstructing stones or masses  Xr Abdomen Obstruction Series  Result Date: 12/2/2018  Impression: Persistently dilated colon, after evacuation of the rectal vault Worsening bibasilar process  Atelectasis versus infiltrate  ASSESSMENT and PLAN:      Obstipation  - CT A/P on admission showed distended colon with stool and gas likely 2/2 chronic constipation; repeat CT yesterday showed stable distention with increasing distention of small bowel loops  - Agree with surgical plan regarding possible disimpaction under anesthesia  - Could consider trial of relistor given she is on chronic opioids but given concern for mechanical obstruction from fecal matter, would hold off for now  - Colonoscopy will not be helpful given the large amount of stool, this will prevent decompression  - Continue serial abdominal exams  - Supportive care  - Limit additional narcotics beyond her chronic daily dose if possible  - Monitor electrolytes daily and replete as this will improve bowel function if she has chronic bowel dilation        Patient will be seen and examined by Dr Swathi Neff  All aaron medical decisions were made by Dr Swathi Neff  Thank you for allowing us to participate in the care of this patient  We will follow with you closely

## 2018-12-03 NOTE — PROGRESS NOTES
Progress Note - Chris Jensen 1955, 59 y o  female MRN: [id number]    Unit/Bed#: -01 Encounter: [id number]    Primary Care Provider: Burton Sesay PA-C   Date and time admitted to hospital: 12/1/2018  5:23 PM        * SEBASTIÁN (acute kidney injury) [facility])   Assessment & Plan    · Baseline creatinine was 0 4-0 6  · Creatinine on admission 3 52, improved with IVF and decompression by wang, currently resolved at 1 19  · Suspect secondary to severe constipation  · BMP daily, strict I/Os, continue wang catheter await surgical recommendations can attempt voiding trial once medically improved  · Avoid nephrotoxins, dose adjust meds accordingly     Chronic constipation   Assessment & Plan    · Patient has a significant history of opioid use for pain control reports that she has not had a bowel movement in a very long time this may be attributing to her urinary retention as well per previous discussion with provider  · Patient reports 2 successful bowel movements after enema x2 however remains symptomatic  · CT of her abdomen and pelvis revealed Grossly stable appearance of distended colon containing stool and gas, presumably chronic constipation   Fecal impaction might be present     · At bedside with surgery patient is still markedly distended with abdominal pain check abdominal x-ray maintain NG tube and NPO status  · GI consult pending recommendations appreciated  · Monitor closely  · Continue IV flagyl/ceftriaxone for possibility of aspiration pneumonia     Chronic midline thoracic back pain   Assessment & Plan    · Patient takes Dilaudid and morphine at home with Neurontin to manage her chronic back pain  · Hold oral agents for now  · IV dilaudid as needed due to renal failure/contraindication for morphine, would continue judicious use due to concern for obstruction related to slow transit in the setting of chronic narcotic use  · Consider Relistor when renal functions improve per discussion today with GI await further recommendations  · Aqua K, and lidocaine patch (patient refuses)     Urinary retention   Assessment & Plan    · Patient reports that she had not voided in approximately 3 days  · UA is negative  · Once patient is medically cleared by surgery can attempt voiding trial         VTE Pharmacologic Prophylaxis:   Pharmacologic: Heparin  Mechanical VTE Prophylaxis in Place: Yes    Patient Centered Rounds: I have performed bedside rounds with nursing staff today  Discussions with Specialists or Other Care Team Provider:  Surgery    Education and Discussions with Family / Patient:  Patient    Time Spent for Care: 30 minutes  More than 50% of total time spent on counseling and coordination of care as described above  Current Length of Stay: 2 day(s)    Current Patient Status: Inpatient   Certification Statement: The patient will continue to require additional inpatient hospital stay due to Ongoing treatment of chronic constipation with abdomen abdominal distension and pain    Discharge Plan:  Not medically cleared    Code Status: Level 1 - Full Code      Subjective:   Patient reporting ongoing significant abdominal pain with minimal improvement after having 2 bowel movements overnight  Patient reporting central abdominal pain and distension  Patient denies fever or chills  Patient reports she has been fine for the last 5 years however has had multiple surgeries for obstructions  Patient has no other general complaints at this time  Objective:     Vitals:   Temp (24hrs), Av 9 °F (37 7 °C), Min:98 3 °F (36 8 °C), Max:103 °F (39 4 °C)    Temp:  [98 3 °F (36 8 °C)-103 °F (39 4 °C)] 99 3 °F (37 4 °C)  HR:  [71-92] 92  Resp:  [18] 18  BP: ()/(51-68) 122/68  SpO2:  [93 %-96 %] 96 %  Body mass index is 26 07 kg/m²  Input and Output Summary (last 24 hours):        Intake/Output Summary (Last 24 hours) at 18 1123  Last data filed at 18 2106   Gross per 24 hour   Intake 0 ml   Output             2100 ml   Net            -2100 ml       Physical Exam:     Physical Exam   Constitutional: She is oriented to person, place, and time  HENT:   Head: Normocephalic and atraumatic  Eyes: Conjunctivae and EOM are normal    Neck: Normal range of motion  Cardiovascular: Normal rate, regular rhythm and normal heart sounds  Pulmonary/Chest: [place] normal and breath sounds normal    Abdominal: Soft  Bowel sounds are normal  She exhibits distension  There is tenderness  Previous surgical scars noted mid abdomen noted to be from childbirth and previous bowel obstructions per patient last surgery 5 years ago   Musculoskeletal: Normal range of motion  Neurological: She is alert and oriented to person, place, and time  Skin: Skin is warm and dry  Psychiatric: She has a normal mood and affect  Her behavior is normal          Additional Data:     Labs:      Results from last 7 days  Lab Units 12/02/18  0609 12/01/18  1910   WBC Thousand/uL  --  8 30   HEMOGLOBIN g/dL  --  10 1*   HEMATOCRIT %  --  31 5*   PLATELETS Thousands/uL 208 230   NEUTROS PCT %  --  71   LYMPHS PCT %  --  14   MONOS PCT %  --  10   EOS PCT %  --  3       Results from last 7 days  Lab Units 12/03/18  0443   SODIUM mmol/L 144   POTASSIUM mmol/L 2 7*   CHLORIDE mmol/L 107   CO2 mmol/L 23   BUN mg/dL 12   CREATININE mg/dL 1 19   ANION GAP mmol/L 14*   CALCIUM mg/dL 7 6*   ALBUMIN g/dL 2 7*   TOTAL BILIRUBIN mg/dL 0 50   ALK PHOS U/L 111   ALT U/L 17   AST U/L 37   GLUCOSE RANDOM mg/dL 94       Results from last 7 days  Lab Units 12/02/18  0609   INR  0 99               Results from last 7 days  Lab Units 12/01/18  1834   LACTIC ACID mmol/L 1 0           * I Have Reviewed All Lab Data Listed Above  * Additional Pertinent Lab Tests Reviewed:  Itz 66 Admission Reviewed    Imaging:    Imaging Reports Reviewed Today Include:  CT as mentioned above      Recent Cultures (last 7 days): Last 24 Hours Medication List:     Current Facility-Administered Medications:  acetaminophen 650 mg Rectal Q6H PRN Kelli Laughlin PA-C    calcium carbonate 1,000 mg Oral Daily PRN CAITLYN Razo    cefTRIAXone 1,000 mg Intravenous Q24H Maria E Crespo PA-C Last Rate: 1,000 mg (12/02/18 1235)   diphenhydrAMINE 25 mg Intravenous Q6H PRN Maria E Crespo PA-C    docusate sodium 100 mg Oral BID CAITLYN Razo    escitalopram 20 mg Oral Daily Kenny PassCAITLYN luo    fluticasone 2 puff Inhalation BID CAITLYN Razo    gabapentin 600 mg Oral TID Kenny CAITLYN Rodriguez    guaiFENesin 600 mg Oral Q12H [address] Kelli Laughlin PA-C    heparin (porcine) 5,000 Units Subcutaneous [facility] CAITLYN Razo    HYDROmorphone 0 5 mg Intravenous Q4H PRN Kelli Laguhlin PA-C    lidocaine 1 patch Topical Daily Priyanka Baires MD    magnesium sulfate 2 g Intravenous Once CAITLYN Mancera    metroNIDAZOLE 500 mg Intravenous Q8H Maria E Crespo PA-C Last Rate: 500 mg (12/03/18 0448)   ondansetron 4 mg Intravenous Q8H PRN Maria E Crespo PA-C    polyethylene glycol 17 g Oral Daily CAITLYN Razo    potassium chloride 20 mEq Intravenous Q2H CAITLYN Mancera Last Rate: 20 mEq (12/03/18 0924)   promethazine 6 25 mg Oral 4x Daily PRN CAITLYN Razo    senna 1 tablet Oral Daily CAITLYN Razo    sodium chloride 125 mL/hr Intravenous Continuous CAITLYN Razo Last Rate: 125 mL/hr (12/02/18 1921)   temazepam 30 mg Oral HS CAITLYN Razo         Today, Patient Was Seen By: CAITLYN Mancera    ** Please Note: Dictation voice to text software may have been used in the creation of this document   **

## 2018-12-04 ENCOUNTER — ANESTHESIA (INPATIENT)
Dept: PERIOP | Facility: HOSPITAL | Age: 63
DRG: 344 | End: 2018-12-04
Payer: MEDICARE

## 2018-12-04 ENCOUNTER — ANESTHESIA EVENT (INPATIENT)
Dept: PERIOP | Facility: HOSPITAL | Age: 63
DRG: 344 | End: 2018-12-04
Payer: MEDICARE

## 2018-12-04 PROBLEM — K59.00 OBSTIPATION: Status: ACTIVE | Noted: 2018-12-01

## 2018-12-04 LAB
ANION GAP SERPL CALCULATED.3IONS-SCNC: 16 MMOL/L (ref 4–13)
BASOPHILS # BLD AUTO: 0.02 THOUSANDS/ΜL (ref 0–0.1)
BASOPHILS NFR BLD AUTO: 1 % (ref 0–1)
BUN SERPL-MCNC: 7 MG/DL (ref 5–25)
CALCIUM SERPL-MCNC: 7.8 MG/DL (ref 8.3–10.1)
CHLORIDE SERPL-SCNC: 104 MMOL/L (ref 100–108)
CO2 SERPL-SCNC: 22 MMOL/L (ref 21–32)
CREAT SERPL-MCNC: 0.73 MG/DL (ref 0.6–1.3)
EOSINOPHIL # BLD AUTO: 0.03 THOUSAND/ΜL (ref 0–0.61)
EOSINOPHIL NFR BLD AUTO: 1 % (ref 0–6)
ERYTHROCYTE [DISTWIDTH] IN BLOOD BY AUTOMATED COUNT: 16.9 % (ref 11.6–15.1)
GFR SERPL CREATININE-BSD FRML MDRD: 89 ML/MIN/1.73SQ M
GLUCOSE SERPL-MCNC: 95 MG/DL (ref 65–140)
HCT VFR BLD AUTO: 29.2 % (ref 34.8–46.1)
HGB BLD-MCNC: 9.4 G/DL (ref 11.5–15.4)
IMM GRANULOCYTES # BLD AUTO: 0.02 THOUSAND/UL (ref 0–0.2)
IMM GRANULOCYTES NFR BLD AUTO: 1 % (ref 0–2)
LYMPHOCYTES # BLD AUTO: 0.57 THOUSANDS/ΜL (ref 0.6–4.47)
LYMPHOCYTES NFR BLD AUTO: 13 % (ref 14–44)
MCH RBC QN AUTO: 27.4 PG (ref 26.8–34.3)
MCHC RBC AUTO-ENTMCNC: 32.2 G/DL (ref 31.4–37.4)
MCV RBC AUTO: 85 FL (ref 82–98)
MONOCYTES # BLD AUTO: 0.43 THOUSAND/ΜL (ref 0.17–1.22)
MONOCYTES NFR BLD AUTO: 10 % (ref 4–12)
NEUTROPHILS # BLD AUTO: 3.37 THOUSANDS/ΜL (ref 1.85–7.62)
NEUTS SEG NFR BLD AUTO: 74 % (ref 43–75)
NRBC BLD AUTO-RTO: 0 /100 WBCS
PLATELET # BLD AUTO: 176 THOUSANDS/UL (ref 149–390)
PMV BLD AUTO: 10.4 FL (ref 8.9–12.7)
POTASSIUM SERPL-SCNC: 3.1 MMOL/L (ref 3.5–5.3)
RBC # BLD AUTO: 3.43 MILLION/UL (ref 3.81–5.12)
SODIUM SERPL-SCNC: 142 MMOL/L (ref 136–145)
WBC # BLD AUTO: 4.44 THOUSAND/UL (ref 4.31–10.16)

## 2018-12-04 PROCEDURE — 99232 SBSQ HOSP IP/OBS MODERATE 35: CPT | Performed by: NURSE PRACTITIONER

## 2018-12-04 PROCEDURE — 0D9E8ZZ DRAINAGE OF LARGE INTESTINE, VIA NATURAL OR ARTIFICIAL OPENING ENDOSCOPIC: ICD-10-PCS | Performed by: INTERNAL MEDICINE

## 2018-12-04 PROCEDURE — 80048 BASIC METABOLIC PNL TOTAL CA: CPT | Performed by: NURSE PRACTITIONER

## 2018-12-04 PROCEDURE — 0DBP8ZZ EXCISION OF RECTUM, VIA NATURAL OR ARTIFICIAL OPENING ENDOSCOPIC: ICD-10-PCS | Performed by: INTERNAL MEDICINE

## 2018-12-04 PROCEDURE — 0DBK8ZZ EXCISION OF ASCENDING COLON, VIA NATURAL OR ARTIFICIAL OPENING ENDOSCOPIC: ICD-10-PCS | Performed by: INTERNAL MEDICINE

## 2018-12-04 PROCEDURE — 85025 COMPLETE CBC W/AUTO DIFF WBC: CPT | Performed by: NURSE PRACTITIONER

## 2018-12-04 PROCEDURE — 45393 COLONOSCOPY W/DECOMPRESSION: CPT | Performed by: INTERNAL MEDICINE

## 2018-12-04 PROCEDURE — 88305 TISSUE EXAM BY PATHOLOGIST: CPT | Performed by: PATHOLOGY

## 2018-12-04 PROCEDURE — 45385 COLONOSCOPY W/LESION REMOVAL: CPT | Performed by: INTERNAL MEDICINE

## 2018-12-04 RX ORDER — LIDOCAINE HYDROCHLORIDE 10 MG/ML
INJECTION, SOLUTION INFILTRATION; PERINEURAL AS NEEDED
Status: DISCONTINUED | OUTPATIENT
Start: 2018-12-04 | End: 2018-12-04 | Stop reason: SURG

## 2018-12-04 RX ORDER — PROPOFOL 10 MG/ML
INJECTION, EMULSION INTRAVENOUS AS NEEDED
Status: DISCONTINUED | OUTPATIENT
Start: 2018-12-04 | End: 2018-12-04 | Stop reason: SURG

## 2018-12-04 RX ORDER — SODIUM CHLORIDE, SODIUM LACTATE, POTASSIUM CHLORIDE, CALCIUM CHLORIDE 600; 310; 30; 20 MG/100ML; MG/100ML; MG/100ML; MG/100ML
125 INJECTION, SOLUTION INTRAVENOUS CONTINUOUS
Status: DISCONTINUED | OUTPATIENT
Start: 2018-12-04 | End: 2018-12-05 | Stop reason: HOSPADM

## 2018-12-04 RX ADMIN — ERYTHROMYCIN 250 MG: 250 TABLET, FILM COATED ORAL at 11:49

## 2018-12-04 RX ADMIN — GABAPENTIN 600 MG: 300 CAPSULE ORAL at 20:57

## 2018-12-04 RX ADMIN — PROPOFOL 100 MG: 10 INJECTION, EMULSION INTRAVENOUS at 15:16

## 2018-12-04 RX ADMIN — ESCITALOPRAM OXALATE 20 MG: 20 TABLET ORAL at 08:54

## 2018-12-04 RX ADMIN — HYDROMORPHONE HYDROCHLORIDE 0.5 MG: 1 INJECTION, SOLUTION INTRAMUSCULAR; INTRAVENOUS; SUBCUTANEOUS at 04:32

## 2018-12-04 RX ADMIN — ERYTHROMYCIN 250 MG: 250 TABLET, FILM COATED ORAL at 08:54

## 2018-12-04 RX ADMIN — SENNOSIDES 8.6 MG: 8.6 TABLET, FILM COATED ORAL at 08:54

## 2018-12-04 RX ADMIN — FLUTICASONE PROPIONATE 2 PUFF: 220 AEROSOL, METERED RESPIRATORY (INHALATION) at 11:49

## 2018-12-04 RX ADMIN — CEFTRIAXONE SODIUM 1000 MG: 10 INJECTION, POWDER, FOR SOLUTION INTRAVENOUS at 11:50

## 2018-12-04 RX ADMIN — HEPARIN SODIUM 5000 UNITS: 5000 INJECTION, SOLUTION INTRAVENOUS; SUBCUTANEOUS at 21:07

## 2018-12-04 RX ADMIN — PROPOFOL 50 MG: 10 INJECTION, EMULSION INTRAVENOUS at 15:25

## 2018-12-04 RX ADMIN — TEMAZEPAM 30 MG: 15 CAPSULE ORAL at 21:07

## 2018-12-04 RX ADMIN — HYDROMORPHONE HYDROCHLORIDE 0.5 MG: 1 INJECTION, SOLUTION INTRAMUSCULAR; INTRAVENOUS; SUBCUTANEOUS at 16:23

## 2018-12-04 RX ADMIN — HYDROMORPHONE HYDROCHLORIDE 0.5 MG: 1 INJECTION, SOLUTION INTRAMUSCULAR; INTRAVENOUS; SUBCUTANEOUS at 20:25

## 2018-12-04 RX ADMIN — PROPOFOL 50 MG: 10 INJECTION, EMULSION INTRAVENOUS at 15:29

## 2018-12-04 RX ADMIN — HEPARIN SODIUM 5000 UNITS: 5000 INJECTION, SOLUTION INTRAVENOUS; SUBCUTANEOUS at 06:20

## 2018-12-04 RX ADMIN — DOCUSATE SODIUM 100 MG: 100 CAPSULE, LIQUID FILLED ORAL at 18:26

## 2018-12-04 RX ADMIN — METRONIDAZOLE 500 MG: 500 INJECTION, SOLUTION INTRAVENOUS at 01:23

## 2018-12-04 RX ADMIN — DOCUSATE SODIUM 100 MG: 100 CAPSULE, LIQUID FILLED ORAL at 11:49

## 2018-12-04 RX ADMIN — GABAPENTIN 600 MG: 300 CAPSULE ORAL at 08:54

## 2018-12-04 RX ADMIN — HYDROMORPHONE HYDROCHLORIDE 0.5 MG: 1 INJECTION, SOLUTION INTRAMUSCULAR; INTRAVENOUS; SUBCUTANEOUS at 08:49

## 2018-12-04 RX ADMIN — HYDROMORPHONE HYDROCHLORIDE 0.5 MG: 1 INJECTION, SOLUTION INTRAMUSCULAR; INTRAVENOUS; SUBCUTANEOUS at 12:41

## 2018-12-04 RX ADMIN — FLUTICASONE PROPIONATE 2 PUFF: 220 AEROSOL, METERED RESPIRATORY (INHALATION) at 18:27

## 2018-12-04 RX ADMIN — GUAIFENESIN 600 MG: 600 TABLET, EXTENDED RELEASE ORAL at 20:56

## 2018-12-04 RX ADMIN — GUAIFENESIN 600 MG: 600 TABLET, EXTENDED RELEASE ORAL at 08:54

## 2018-12-04 RX ADMIN — HEPARIN SODIUM 5000 UNITS: 5000 INJECTION, SOLUTION INTRAVENOUS; SUBCUTANEOUS at 14:15

## 2018-12-04 RX ADMIN — LIDOCAINE HYDROCHLORIDE 50 MG: 10 INJECTION, SOLUTION INFILTRATION; PERINEURAL at 15:15

## 2018-12-04 RX ADMIN — SODIUM CHLORIDE 75 ML/HR: 0.9 INJECTION, SOLUTION INTRAVENOUS at 16:20

## 2018-12-04 RX ADMIN — METRONIDAZOLE 500 MG: 500 INJECTION, SOLUTION INTRAVENOUS at 08:51

## 2018-12-04 RX ADMIN — HYDROMORPHONE HYDROCHLORIDE 0.5 MG: 1 INJECTION, SOLUTION INTRAMUSCULAR; INTRAVENOUS; SUBCUTANEOUS at 00:39

## 2018-12-04 RX ADMIN — PROPOFOL 50 MG: 10 INJECTION, EMULSION INTRAVENOUS at 15:22

## 2018-12-04 RX ADMIN — PROPOFOL 50 MG: 10 INJECTION, EMULSION INTRAVENOUS at 15:19

## 2018-12-04 NOTE — ANESTHESIA POSTPROCEDURE EVALUATION
Post-Op Assessment Note      CV Status:  Stable    Mental Status:  Alert and awake    Hydration Status:  Euvolemic    PONV Controlled:  Controlled    Airway Patency:  Patent    Post Op Vitals Reviewed: Yes          Staff: CRNA, Anesthesiologist           BP   122/66   Temp      Pulse  68   Resp   16   SpO2   99% ra

## 2018-12-04 NOTE — PROGRESS NOTES
Dav BRADY with SLIM and Teresa Pollack PA-c with GI in to speak with pt  Pt now agreeable to colonoscopy  Orders placed to remove wang and NG tube  Will continue to monitor

## 2018-12-04 NOTE — ANESTHESIA PREPROCEDURE EVALUATION
Review of Systems/Medical History  Patient summary reviewed    No history of anesthetic complications     Cardiovascular  EKG reviewed, Exercise tolerance (METS): >4,     Pulmonary  Smoker ex-smoker  , Asthma , well controlled/ stable Last rescue: < 1 month ago Asthma type of rescue: PRN inhaler,        GI/Hepatic    Bowel prep       Negative  ROS        Endo/Other  Negative endo/other ROS      GYN       Hematology  Anemia ,     Musculoskeletal       Neurology  Negative neurology ROS      Psychology   Anxiety,              Physical Exam    Airway    Mallampati score: II  TM Distance: >3 FB  Neck ROM: full     Dental   upper dentures,     Cardiovascular  Rhythm: regular, Rate: normal,     Pulmonary  Breath sounds clear to auscultation,     Other Findings        Anesthesia Plan  ASA Score- 2     Anesthesia Type- IV sedation with anesthesia with ASA Monitors  Additional Monitors:   Airway Plan:         Plan Factors-  Patient did not smoke on day of surgery  Induction- intravenous  Postoperative Plan-     Informed Consent- Anesthetic plan and risks discussed with patient  I personally reviewed this patient with the CRNA  Discussed and agreed on the Anesthesia Plan with the CRNA  Reina Elizabeth

## 2018-12-04 NOTE — PLAN OF CARE
Problem: DISCHARGE PLANNING - CARE MANAGEMENT  Goal: Discharge to post-acute care or home with appropriate resources  INTERVENTIONS:  - Conduct assessment to determine patient/family and health care team treatment goals, and need for post-acute services based on payer coverage, community resources, and patient preferences, and barriers to discharge  - Address psychosocial, clinical, and financial barriers to discharge as identified in assessment in conjunction with the patient/family and health care team  - Arrange appropriate level of post-acute services according to patients   needs and preference and payer coverage in collaboration with the physician and health care team  - Communicate with and update the patient/family, physician, and health care team regarding progress on the discharge plan  - Arrange appropriate transportation to post-acute venues  Outcome: Progressing  LOS- 3 LOS  PATIENT IS NOT A READMIT  PATIENT IS NOT A BUNDLE  CM met with patient at bedside  Patient lives in [place] with her  in a 1 story house   Per patient she uses no DME and can complete ADLS by herself  Patient states she had VNA in the past  Patient uses the [employer] in [place] and has prescription coverage  Patient relies on her  for transportation  CM name and role reviewed  Discharge Checklist reviewed and CM will continue to monitor for progress toward discharge goals in nursing and provider rounds  CM dept will continue to follow until d/c

## 2018-12-04 NOTE — SOCIAL WORK
LOS- 3 LOS  PATIENT IS NOT A READMIT  PATIENT IS NOT A BUNDLE  CM met with patient at bedside  Patient lives [place] with her  in a 1 story house   Per patient she uses no DME and can complete ADLS by herself  Patient states she had VNA in the past  Patient uses the [employer] in [place] and has prescription coverage  Patient relies on her  for transportation  CM name and role reviewed  Discharge Checklist reviewed and CM will continue to monitor for progress toward discharge goals in nursing and provider rounds  CM dept will continue to follow until d/c

## 2018-12-04 NOTE — ASSESSMENT & PLAN NOTE
· Patient takes Dilaudid and morphine at home with Neurontin to manage her chronic back pain  · IV dilaudid as needed due to renal failure/contraindication for morphine, would continue judicious use due to concern for obstruction related to slow transit in the setting of chronic narcotic use  · Consider Relistor when renal functions improve per discussion with GI await further recommendations  · Aqua K, and lidocaine patch (patient refuses)

## 2018-12-04 NOTE — ASSESSMENT & PLAN NOTE
· Baseline creatinine was 0 4-0 6  · Creatinine on admission 3 52, improved with IVF and decompression by wang, currently resolved at 0 73  · Suspect secondary to severe constipation  · BMP daily, strict I/Os, continue wang catheter await surgical recommendations can attempt voiding trial once medically improved  · Avoid nephrotoxins, dose adjust meds accordingly

## 2018-12-04 NOTE — PROGRESS NOTES
Patient complaining of rectal discomfort and unable to tolerate rectal tube despite recent administration of Dilaudid  Patient states "I need this tube out now or I will rip it out"  SLIM paged, advised to partially deflate rectal tube balloon to relieve pressure  20ml of the 45ml was removed  Patient with some relief  Tube still firmly in place  Will continue to monitor

## 2018-12-04 NOTE — ASSESSMENT & PLAN NOTE
· Patient has a significant history of opioid use for pain control reports that she has not had a bowel movement in a very long time this may be attributing to her urinary retention as well per previous discussion with provider  · Patient reports 2 successful bowel movements after enema x2 however remains symptomatic  · CT of her abdomen and pelvis revealed Grossly stable appearance of distended colon containing stool and gas, presumably chronic constipation   Fecal impaction might be present     · Follow-up x-ray showing distension no stool GI recommended rectal last evening that showed marked improvement in patient's abdominal distension  Given concern for bowel stenosis given patient's multiple surgeries in the past colonoscopy recommended    Patient refusal at 1st however after much education discussion patient open to get a colonoscopy however would like to have the NG tube removed and urinary catheter both were removed given improvement of her distension and symptoms will proceed with colonoscopy this afternoon  · Monitor closely  · Continue IV flagyl/ceftriaxone for possibility of aspiration pneumonia

## 2018-12-04 NOTE — PROGRESS NOTES
Progress Note - Delilah Chilel 59 y o  female MRN: [id number]    Unit/Bed#: -01 Encounter: [id number]      Assessment:  Chronic constipation  Chronic narcotic use    Plan:    GI for colonoscopic decompression today  Discussed with patient the importance of following with any postprocedure instructions as well as leave any rectal tubes in place  Informed her that if there is a failure to resolve her symptoms with these measures may necessitate surgical intervention  Subjective:   Better today still some persistent distension    Objective:     Vitals: Blood pressure 119/56, pulse 76, temperature 98 6 °F (37 °C), temperature source Oral, resp  rate 20, height 5' 4" (1 626 m), weight 68 9 kg (151 lb 14 4 oz), SpO2 95 %, not currently breastfeeding  ,Body mass index is 26 07 kg/m²        Intake/Output Summary (Last 24 hours) at 12/04/18 1418  Last data filed at 12/04/18 0700   Gross per 24 hour   Intake                0 ml   Output             1350 ml   Net            -1350 ml       Physical Exam:  Soft distended mild diffuse tenderness no focal rebound guarding or peritoneal signs

## 2018-12-04 NOTE — PROGRESS NOTES
Patient continues to complain of pain from rectal tube after deflation of balloon, stating "I take full blame for wanting the tube out, I know it should stay in but I can't take it anymore " SLIM paged, advised to remove the tube  Tube was removed  No further complaints from patient at this time, will continue to monitor

## 2018-12-04 NOTE — ASSESSMENT & PLAN NOTE
· Patient reports that she had not voided in approximately 3 days  · UA is negative  · Significant improvement of patient's abdominal distension will discontinue Stahl attempt a voiding trial

## 2018-12-04 NOTE — OP NOTE
COLONOSCOPY    PROCEDURE: Colonoscopy/ Polypectomy (Snare Cautery)    INDICATIONS: Abdominal Pain, Chronic; Olgilvies Syndrome for decompression    POST-OP DIAGNOSIS: See the impression below    SEDATION: Monitored anesthesia care, check anesthesia records    PHYSICAL EXAM:  Vitals:    12/04/18 1442   BP: 137/67   Pulse: 66   Resp: 20   Temp: 97 6 °F (36 4 °C)   SpO2: 95%     Body mass index is 26 07 kg/m²  General: NAD  Heart: S1 & S2 normal, RRR  Lungs: CTA, No rales or rhonchi  Abdomen: Soft, nontender, nondistended, good bowel sounds    CONSENT:  Informed consent was obtained for the procedure, including sedation after explaining the risks and benefits of the procedure  Risks including but not limited to bleeding, perforation, infection, aspiration were discussed in detail  Also explained about less than 100%$ sensitivity with the exam and other alternatives  PREPARATION:   EKG tracing, pulse oximetry, blood pressure were monitored throughout the procedure  Patient was identified by myself both verbally and by visual inspection of ID band  DESCRIPTION:   Patient was placed in the left lateral decubitus position and was sedated with the above medication  Digital rectal examination was performed  The colonoscope was introduced in to the anal canal and advanced up to cecum, which was identified by the appendiceal orifice and IC valve  A careful inspection was made as the colonoscope was withdrawn, including a retroflexed view of the rectum; findings and interventions are described below  Appropriate photodocumentation was obtained  The quality of the colonic preparation was adequate  The blood loss was minimal     FINDINGS:  The colon was wide and capacious in the cecum ascending colon hepatic flexure transverse colon  The entire colon was suctioned with good decompression  Two large 2 cm polyps were removed with snare cautery polypectomy from the ascending colon and rectum    There was scattered stool throughout preventing adequate examination for smaller polyps         IMPRESSIONS:    Charlotte syndrome status post decompression  Two polyps removed with hot snare polypectomy    RECOMMENDATIONS:  Continue MiraLax 17 g p o  Q day  Continue erythromycin  Regular diet  I suspect she will be able to go home tomorrow as long as she tolerates the diet      COMPLICATIONS:  None; patient tolerated the procedure well    DISPOSITION: PACU  CONDITION: Stable    Amandeep Landry MD  12/4/2018,3:38 PM

## 2018-12-04 NOTE — PROGRESS NOTES
Progress Note - Delilah Chilel 1955, 59 y o  female MRN: [id number]    Unit/Bed#: -01 Encounter: [id number]    Primary Care Provider: Daren Lemons PA-C   Date and time admitted to hospital: 12/1/2018  5:23 PM        * Obstipation   Assessment & Plan    · Patient has a significant history of opioid use for pain control reports that she has not had a bowel movement in a very long time this may be attributing to her urinary retention as well per previous discussion with provider  · Patient reports 2 successful bowel movements after enema x2 however remains symptomatic  · CT of her abdomen and pelvis revealed Grossly stable appearance of distended colon containing stool and gas, presumably chronic constipation   Fecal impaction might be present     · Follow-up x-ray showing distension no stool GI recommended rectal last evening that showed marked improvement in patient's abdominal distension  Given concern for bowel stenosis given patient's multiple surgeries in the past colonoscopy recommended    Patient refusal at 1st however after much education discussion patient open to get a colonoscopy however would like to have the NG tube removed and urinary catheter both were removed given improvement of her distension and symptoms will proceed with colonoscopy this afternoon  · Monitor closely  · Continue IV flagyl/ceftriaxone for possibility of aspiration pneumonia     SEBASTIÁN (acute kidney injury) ([facility] [address] )   Assessment & Plan    · Baseline creatinine was 0 4-0 6  · Creatinine on admission 3 52, improved with IVF and decompression by wang, currently resolved at 0 73  · Suspect secondary to severe constipation  · BMP daily, strict I/Os, continue wang catheter await surgical recommendations can attempt voiding trial once medically improved  · Avoid nephrotoxins, dose adjust meds accordingly     Chronic midline thoracic back pain   Assessment & Plan    · Patient takes Dilaudid and morphine at home with Neurontin to manage her chronic back pain  · IV dilaudid as needed due to renal failure/contraindication for morphine, would continue judicious use due to concern for obstruction related to slow transit in the setting of chronic narcotic use  · Consider Relistor when renal functions improve per discussion with GI await further recommendations  · Aqua K, and lidocaine patch (patient refuses)     Urinary retention   Assessment & Plan    · Patient reports that she had not voided in approximately 3 days  · UA is negative  · Significant improvement of patient's abdominal distension will discontinue Stahl attempt a voiding trial     Chronic, continuous use of opioids   Assessment & Plan    Known history evident by home use of morphine and Dilaudid   Continue current regimen with added prn dilaudid IV for acute on chronic pain until symptoms improve  VTE Pharmacologic Prophylaxis:   Pharmacologic: Heparin  Mechanical VTE Prophylaxis in Place: Yes    Patient Centered Rounds: I have performed bedside rounds with nursing staff today  Discussions with Specialists or Other Care Team Provider:  Gastroenterology    Education and Discussions with Family / Patient:  Patient    Time Spent for Care: 35 minutes  More than 50% of total time spent on counseling and coordination of care as described above  Current Length of Stay: 3 day(s)    Current Patient Status: Inpatient   Certification Statement: The patient will continue to require additional inpatient hospital stay due to Need for further diagnostic evaluation for obstipation    Discharge Plan:  Not medically clear pending progress    Code Status: Level 1 - Full Code      Subjective: At bedside with Gastroenterology AP patient is refusing colonoscopy requesting to have NG tube and catheter removed as she knows her symptoms will get better if she gets up and moves around    Patient reported the rectal to was the worst experience and felt the staff did not listen to her upon putting it on given the significant pain she had  Patient allowed to verbalize her concerns and was reason for not want a colonoscopy done  Patient educated by GI and slim attending on importance of colonoscopy given her multiple surgeries and risk for stenosis given that she is already prepped further distension will be taken away from her to make her more comfortable  Patient stated given the trauma from the rectal tube she is hesitant as she does want to be in pain patient made aware that she would be sedated and relaxed likely with less pain and discomfort  After much education patient agreeable to proceed forward with the colonoscopy however requesting to have NG tube removed and open to a voiding trial with the Stahl  Objective:     Vitals:   Temp (24hrs), Av 8 °F (37 1 °C), Min:98 4 °F (36 9 °C), Max:99 7 °F (37 6 °C)    Temp:  [98 4 °F (36 9 °C)-99 7 °F (37 6 °C)] 98 6 °F (37 °C)  HR:  [71-80] 76  Resp:  [19-20] 20  BP: (117-143)/(56-67) 119/56  SpO2:  [94 %-97 %] 95 %  Body mass index is 26 07 kg/m²  Input and Output Summary (last 24 hours): Intake/Output Summary (Last 24 hours) at 18 0925  Last data filed at 18 0700   Gross per 24 hour   Intake                0 ml   Output             1350 ml   Net            -1350 ml       Physical Exam:     Physical Exam   Constitutional: She is oriented to person, place, and time  HENT:   Head: Normocephalic and atraumatic  Eyes: Conjunctivae and EOM are normal    Neck: Normal range of motion  Cardiovascular: Normal rate, regular rhythm and normal heart sounds  Pulmonary/Chest: Effort normal and breath sounds normal    Abdominal: Soft  Bowel sounds are normal  She exhibits distension  There is no tenderness  Minimal distention of the abdomen overall improved   Musculoskeletal: Normal range of motion  Neurological: She is alert and oriented to person, place, and time  Skin: Skin is warm and dry     Psychiatric: She has a normal mood and affect  Her behavior is normal          Additional Data:     Labs:      Results from last 7 days  Lab Units 12/04/18  0507   WBC Thousand/uL 4 44   HEMOGLOBIN g/dL 9 4*   HEMATOCRIT % 29 2*   PLATELETS Thousands/uL 176   NEUTROS PCT % 74   LYMPHS PCT % 13*   MONOS PCT % 10   EOS PCT % 1       Results from last 7 days  Lab Units 12/04/18  0507 12/03/18  0443   SODIUM mmol/L 142 144   POTASSIUM mmol/L 3 1* 2 7*   CHLORIDE mmol/L 104 107   CO2 mmol/L 22 23   BUN mg/dL 7 12   CREATININE mg/dL 0 73 1 19   ANION GAP mmol/L 16* 14*   CALCIUM mg/dL 7 8* 7 6*   ALBUMIN g/dL  --  2 7*   TOTAL BILIRUBIN mg/dL  --  0 50   ALK PHOS U/L  --  111   ALT U/L  --  17   AST U/L  --  37   GLUCOSE RANDOM mg/dL 95 94       Results from last 7 days  Lab Units 12/02/18  0609   INR  0 99               Results from last 7 days  Lab Units 12/01/18  1834   LACTIC ACID mmol/L 1 0           * I Have Reviewed All Lab Data Listed Above  * Additional Pertinent Lab Tests Reviewed:  All Labs Within Last 24 Hours Reviewed        Recent Cultures (last 7 days):           Last 24 Hours Medication List:     Current Facility-Administered Medications:  acetaminophen 650 mg Rectal Q6H PRN Kayla Baeza PA-C    calcium carbonate 1,000 mg Oral Daily PRN Rosan Light, MARCELONP    cefTRIAXone 1,000 mg Intravenous Q24H Maria E Crespo PA-C Last Rate: 1,000 mg (12/03/18 1704)   diphenhydrAMINE 25 mg Intravenous Q6H PRN Maria E Crespo PA-C    docusate sodium 100 mg Oral BID Rosan Light, CAITLYN    erythromycin base 250 mg Oral TID AC Shellie Daniel III, MD    escitalopram 20 mg Oral Daily Rosan Light, CRFIOR    fluticasone 2 puff Inhalation BID Rosan Light, CRNP    gabapentin 600 mg Oral TID Rosan Light, CRNP    guaiFENesin 600 mg Oral Q12H [facility] Genoveva Crespo PA-C    heparin (porcine) 5,000 Units Subcutaneous [facility] Rosan Light, MARCELONP    HYDROmorphone 0 5 mg Intravenous Q4H PRN SILVER LyonsC    lidocaine 1 patch Topical Daily Sabina Frausto MD    metroNIDAZOLE 500 mg Intravenous Q8H Jewel Osorio PA-C Last Rate: 500 mg (12/04/18 0851)   ondansetron 4 mg Intravenous Q8H PRN Jewel Osorio PA-C    polyethylene glycol 17 g Oral Daily Levi Cheek, CRNP    promethazine 6 25 mg Oral 4x Daily PRN Levi Cheek, CRNP    senna 1 tablet Oral Daily Levi Cheek, CRNP    sodium chloride 75 mL/hr Intravenous Continuous CAITLYN Chan Last Rate: 75 mL/hr (12/03/18 2125)   temazepam 30 mg Oral HS Levi Cheek, CRNP         Today, Patient Was Seen By: CAITLYN Chan    ** Please Note: Dictation voice to text software may have been used in the creation of this document   **

## 2018-12-04 NOTE — ASSESSMENT & PLAN NOTE
Known history evident by home use of morphine and Dilaudid   Continue current regimen with added prn dilaudid IV for acute on chronic pain until symptoms improve

## 2018-12-05 VITALS
TEMPERATURE: 98.1 F | RESPIRATION RATE: 17 BRPM | SYSTOLIC BLOOD PRESSURE: 127 MMHG | HEIGHT: 64 IN | BODY MASS INDEX: 25.93 KG/M2 | OXYGEN SATURATION: 97 % | HEART RATE: 65 BPM | WEIGHT: 151.9 LBS | DIASTOLIC BLOOD PRESSURE: 73 MMHG

## 2018-12-05 PROCEDURE — 99232 SBSQ HOSP IP/OBS MODERATE 35: CPT | Performed by: PHYSICIAN ASSISTANT

## 2018-12-05 PROCEDURE — 99232 SBSQ HOSP IP/OBS MODERATE 35: CPT | Performed by: INTERNAL MEDICINE

## 2018-12-05 PROCEDURE — 99239 HOSP IP/OBS DSCHRG MGMT >30: CPT | Performed by: NURSE PRACTITIONER

## 2018-12-05 RX ORDER — METRONIDAZOLE 500 MG/1
500 TABLET ORAL EVERY 8 HOURS SCHEDULED
Status: DISCONTINUED | OUTPATIENT
Start: 2018-12-05 | End: 2018-12-05 | Stop reason: HOSPADM

## 2018-12-05 RX ORDER — METRONIDAZOLE 500 MG/1
500 TABLET ORAL EVERY 8 HOURS SCHEDULED
Qty: 18 TABLET | Refills: 0 | Status: SHIPPED | OUTPATIENT
Start: 2018-12-05 | End: 2018-12-11

## 2018-12-05 RX ORDER — CEFDINIR 300 MG/1
300 CAPSULE ORAL EVERY 12 HOURS SCHEDULED
Qty: 12 CAPSULE | Refills: 0 | Status: SHIPPED | OUTPATIENT
Start: 2018-12-05 | End: 2018-12-11

## 2018-12-05 RX ORDER — POLYETHYLENE GLYCOL 3350 17 G/17G
17 POWDER, FOR SOLUTION ORAL DAILY
Qty: 14 EACH | Refills: 0 | Status: SHIPPED | OUTPATIENT
Start: 2018-12-06 | End: 2019-02-26

## 2018-12-05 RX ADMIN — GABAPENTIN 600 MG: 300 CAPSULE ORAL at 09:33

## 2018-12-05 RX ADMIN — HEPARIN SODIUM 5000 UNITS: 5000 INJECTION, SOLUTION INTRAVENOUS; SUBCUTANEOUS at 05:28

## 2018-12-05 RX ADMIN — ESCITALOPRAM OXALATE 20 MG: 20 TABLET ORAL at 09:33

## 2018-12-05 RX ADMIN — METRONIDAZOLE 500 MG: 500 TABLET ORAL at 09:33

## 2018-12-05 RX ADMIN — GUAIFENESIN 600 MG: 600 TABLET, EXTENDED RELEASE ORAL at 09:33

## 2018-12-05 RX ADMIN — METRONIDAZOLE 500 MG: 500 INJECTION, SOLUTION INTRAVENOUS at 00:29

## 2018-12-05 RX ADMIN — HYDROMORPHONE HYDROCHLORIDE 0.5 MG: 1 INJECTION, SOLUTION INTRAMUSCULAR; INTRAVENOUS; SUBCUTANEOUS at 01:11

## 2018-12-05 RX ADMIN — HYDROMORPHONE HYDROCHLORIDE 0.5 MG: 1 INJECTION, SOLUTION INTRAMUSCULAR; INTRAVENOUS; SUBCUTANEOUS at 09:33

## 2018-12-05 RX ADMIN — HYDROMORPHONE HYDROCHLORIDE 0.5 MG: 1 INJECTION, SOLUTION INTRAMUSCULAR; INTRAVENOUS; SUBCUTANEOUS at 05:28

## 2018-12-05 RX ADMIN — DOCUSATE SODIUM 100 MG: 100 CAPSULE, LIQUID FILLED ORAL at 09:33

## 2018-12-05 RX ADMIN — ERYTHROMYCIN 250 MG: 250 TABLET, FILM COATED ORAL at 09:35

## 2018-12-05 NOTE — ASSESSMENT & PLAN NOTE
· Patient takes Dilaudid and morphine at home with Neurontin to manage her chronic back pain  · IV dilaudid as needed due to renal failure/contraindication for morphine, would continue judicious use due to concern for obstruction related to slow transit in the setting of chronic narcotic use  · Patient decided on AMA  · Aqua K, and lidocaine patch (patient refuses)

## 2018-12-05 NOTE — PROGRESS NOTES
The metronidazole has / have been converted to Oral per [facility] IV-to-PO Auto-Conversion Protocol for Adults as approved by the Pharmacy and Therapeutics Committee  The patient met all eligible criteria:  3 Age = 26years old   2) Received at least one dose of the IV form   3) Receiving at least one other scheduled oral/enteral medication   4) Tolerating an oral/enteral diet   and did not have any exclusions:   1) Critical care patient   2) Active GI bleed (IF assessing H2RAs or PPIs)   3) Continuous tube feeding (IF assessing cipro, doxycycline, levofloxacin, minocycline, rifampin, or voriconazole)   4) Receiving PO vancomycin (IF assessing metronidazole)   5) Persistent nausea and/or vomiting   6) Ileus or gastrointestinal obstruction   7) Benito/nasogastric tube set for continuous suction   8) Specific order not to automatically convert to PO (in the order's comments or if discussed in the most recent Infectious Disease or primary team's progress notes)

## 2018-12-05 NOTE — PROGRESS NOTES
GI Progress Note - Liz Mar 59 y o  female MRN: [id number]    Unit/Bed#: -01 Encounter: [id number]    Subjective: Ms Albino Martinez wants to leave today  She still reports abdominal pain, some severe in the upper abdomen  She denies nausea or vomiting  She had 4 liquid brown BMs since last night and is passing flatus  She tolerated ice cream last night and yogurt this AM  She refuses to ambulate while inpatient and wants to do this at home  Objective:     Vitals: Blood pressure 127/73, pulse 65, temperature 98 1 °F (36 7 °C), temperature source Oral, resp  rate 17, height 5' 4" (1 626 m), weight 68 9 kg (151 lb 14 4 oz), SpO2 97 %, not currently breastfeeding  ,Body mass index is 26 07 kg/m²        Intake/Output Summary (Last 24 hours) at 12/05/18 1151  Last data filed at 12/05/18 0125   Gross per 24 hour   Intake           473 75 ml   Output              350 ml   Net           123 75 ml       Physical Exam:     General Appearance: Alert, oriented x3, no acute distess  Lungs: Clear to auscultation bilaterally, no respiratory distress  Heart: RRR, no murmur  Abdomen: Non-distended, BS active, (+) diffuse TTP with guarding  Extremities: No cyanosis or LE edema    Invasive Devices     Peripheral Intravenous Line            Peripheral IV 12/03/18 Right Foot 1 day          Drain            Rectal Tube With balloon 1 day                Lab Results:    Results from last 7 days  Lab Units 12/04/18  0507   WBC Thousand/uL 4 44   HEMOGLOBIN g/dL 9 4*   HEMATOCRIT % 29 2*   PLATELETS Thousands/uL 176   NEUTROS PCT % 74   LYMPHS PCT % 13*   MONOS PCT % 10   EOS PCT % 1       Results from last 7 days  Lab Units 12/04/18  0507 12/03/18  0443   POTASSIUM mmol/L 3 1* 2 7*   CHLORIDE mmol/L 104 107   CO2 mmol/L 22 23   BUN mg/dL 7 12   CREATININE mg/dL 0 73 1 19   CALCIUM mg/dL 7 8* 7 6*   ALK PHOS U/L  --  111   ALT U/L  --  17   AST U/L  --  37       Results from last 7 days  Lab Units 12/02/18  0609   INR  0 99 Results from last 7 days  Lab Units 12/01/18  1834   LIPASE u/L 155       Imaging Studies: I have personally reviewed pertinent imaging studies  Ct Abdomen Pelvis Wo Contrast  Result Date: 12/1/2018  Impression: Stable to increased bilateral lower lung field infiltrates  This might represent chronic or recurrent pneumonia or perhaps some scarring  Correlate for any cough or fever or other typical pulmonary symptoms  Grossly stable appearance of distended colon containing stool and gas, presumably chronic constipation  Fecal impaction might be present  Consider disimpaction or therapeutic enema  Ct Chest Abdomen Pelvis Wo Contrast  Result Date: 12/2/2018  Impression: Probable pneumonia  Right apical masslike density is probably nodular scarring and is unchanged since September  It may require ongoing surveillance  Currently 22 x 25 mm  Endogastric tube in good position in the gastric antrum  Stable distended colon with gas and stool  Slightly increased size of small bowel loops compared with yesterday  Stable prominence of the renal pelves without visible obstructing stones or masses  Xr Abdomen 1 View Kub  Result Date: 12/3/2018  Impression: No change from yesterday's CT exam   There is again significant gaseous distention of large bowel as well as gaseous distention of small bowel compatible with large bowel obstruction  Paucity of gas in the rectum     Xr Abdomen Obstruction Series  Result Date: 12/2/2018  Impression: Persistently dilated colon, after evacuation of the rectal vault Worsening bibasilar process  Atelectasis versus infiltrate         Assessment and Plan:     Charlotte Syndrome  Chronic Constipation  - S/P decompression colonoscopy yesterday showing wide diameter from cecum to transverse colon, entire colon suctioned for decompression, 2 large polyps removed  - Follow up pathology of polyps in 1-2 weeks  - Giving guarding on exam, recommend continued inpatient hospitalization and serial abdominal exams but patient would like to leave AMA  - Recommend continuing bowel regimen with miralax daily  - Pt has been on erythromycin while inpatient for motility, would not continue on discharge  - Pt instructed to come back to hospital if symptoms worsen and that we do not feel she is safe for discharge at this point      The patient's care will be discussed by Dr Markos Owens

## 2018-12-05 NOTE — PROGRESS NOTES
Progress Note - General Surgery   Cleve Phillip 59 y o  female MRN: [id number]  Unit/Bed#: -01 Encounter: [id number]    Assessment:  1  Cleve Phillip is a 59 y o  female with chronic constipation s/p colonoscopy for bowel decompression and polypectomy     Plan:  - No further indications for surgical intervention at this time as patient feeling much improved after colonoscopy decompression  Tolerating solid diet without nausea or vomiting  Polyp biopsies pending  Continue with GI recommendations  Subjective/Objective     Subjective: Lying in bed in no acute distress  States she feels much improved after undergoing a colonoscopy decompression yesterday  Abdominal pain has improved now with only residual soreness s/p colonoscopy  Passing large amounts of flatus, no further nausea or vomiting  Tolerated a solid diet yesterday without difficulty  No other complaints  Objective:     Blood pressure 127/73, pulse 65, temperature 98 1 °F (36 7 °C), temperature source Oral, resp  rate 17, height 5' 4" (1 626 m), weight 68 9 kg (151 lb 14 4 oz), SpO2 97 %, not currently breastfeeding  ,Body mass index is 26 07 kg/m²        Intake/Output Summary (Last 24 hours) at 12/05/18 1147  Last data filed at 12/05/18 0125   Gross per 24 hour   Intake           473 75 ml   Output              350 ml   Net           123 75 ml       Invasive Devices     Peripheral Intravenous Line            Peripheral IV 12/03/18 Right Foot 1 day          Drain            Rectal Tube With balloon 1 day                Physical Exam: /73 (BP Location: Left arm)   Pulse 65   Temp 98 1 °F (36 7 °C) (Oral)   Resp 17   Ht 5' 4" (1 626 m)   Wt 68 9 kg (151 lb 14 4 oz)   LMP  (LMP Unknown)   SpO2 97%   BMI 26 07 kg/m²   General appearance: alert and oriented, in no acute distress  Lungs: clear to auscultation bilaterally  Heart: regular rate and rhythm, S1, S2 normal, no murmur, click, rub or gallop  Abdomen: soft, non-distended, normoactive bowel sounds, diffusely sore to palpation without guarding, no diffuse peritoneal signs,     Lab, Imaging and other studies:I have personally reviewed pertinent lab results       VTE Pharmacologic Prophylaxis: Heparin  VTE Mechanical Prophylaxis: sequential compression device    Recent Results (from the past 36 hour(s))   Basic metabolic panel    Collection Time: 12/04/18  5:07 AM   Result Value Ref Range    Sodium 142 136 - 145 mmol/L    Potassium 3 1 (L) 3 5 - 5 3 mmol/L    Chloride 104 100 - 108 mmol/L    CO2 22 21 - 32 mmol/L    ANION GAP 16 (H) 4 - 13 mmol/L    BUN 7 5 - 25 mg/dL    Creatinine 0 73 0 60 - 1 30 mg/dL    Glucose 95 65 - 140 mg/dL    Calcium 7 8 (L) 8 3 - 10 1 mg/dL    eGFR 89 ml/min/1 73sq m   CBC and differential    Collection Time: 12/04/18  5:07 AM   Result Value Ref Range    WBC 4 44 4 31 - 10 16 Thousand/uL    RBC 3 43 (L) 3 81 - 5 12 Million/uL    Hemoglobin 9 4 (L) 11 5 - 15 4 g/dL    Hematocrit 29 2 (L) 34 8 - 46 1 %    MCV 85 82 - 98 fL    MCH 27 4 26 8 - 34 3 pg    MCHC 32 2 31 4 - 37 4 g/dL    RDW 16 9 (H) 11 6 - 15 1 %    MPV 10 4 8 9 - 12 7 fL    Platelets 400 461 - 237 Thousands/uL    nRBC 0 /100 WBCs    Neutrophils Relative 74 43 - 75 %    Immat GRANS % 1 0 - 2 %    Lymphocytes Relative 13 (L) 14 - 44 %    Monocytes Relative 10 4 - 12 %    Eosinophils Relative 1 0 - 6 %    Basophils Relative 1 0 - 1 %    Neutrophils Absolute 3 37 1 85 - 7 62 Thousands/µL    Immature Grans Absolute 0 02 0 00 - 0 20 Thousand/uL    Lymphocytes Absolute 0 57 (L) 0 60 - 4 47 Thousands/µL    Monocytes Absolute 0 43 0 17 - 1 22 Thousand/µL    Eosinophils Absolute 0 03 0 00 - 0 61 Thousand/µL    Basophils Absolute 0 02 0 00 - 0 10 Thousands/µL

## 2018-12-05 NOTE — DISCHARGE SUMMARY
Discharge- Jane Beal 1955, 59 y o  female MRN: [id number]    Unit/Bed#: -01 Encounter: [id number]    Primary Care Provider: Reji Boston PA-C   Date and time admitted to hospital: 12/1/2018  5:23 PM        * Obstipation   Assessment & Plan    · Present on admission likely setting to patient's chronic opioid use  · Patient was given to enema given her extensive distension with improvement  · Given concern for possible bowel stricture patient went for colonoscopy yesterday were no stricture was found patient had 2 polyps removed  · Patient has persistent guarding mid epigastric region discuss with GI evaluated patient agree patient is guarding in needs further acute intervention  · Continue regular diet although patient is currently refusing to eat  · Will give patient antibiotic to treat possible aspiration pneumonia to complete 10 course      SEBASTIÁN (acute kidney injury) ([facility] [address] )   Assessment & Plan    · Baseline creatinine was 0 4-0 6  · Currently resolved  · Continue to monitor     Chronic midline thoracic back pain   Assessment & Plan    · Patient takes Dilaudid and morphine at home with Neurontin to manage her chronic back pain  · IV dilaudid as needed due to renal failure/contraindication for morphine, would continue judicious use due to concern for obstruction related to slow transit in the setting of chronic narcotic use  · Patient decided on AMA  · Aqua K, and lidocaine patch (patient refuses)     Urinary retention   Assessment & Plan    · Patient reports that she had not voided in approximately 3 days  · UA is negative  · Significant improvement of patient's abdominal distension will discontinue Stahl attempt a voiding trial     Chronic, continuous use of opioids   Assessment & Plan    Known history evident by home use of morphine and Dilaudid   Continue current regimen with added prn dilaudid IV for acute on chronic pain until symptoms improve             Discharging Physician / Practitioner: CAITLYN Carpenter  PCP: Etelvina Martell PA-C  Admission Date:   Admission Orders     Ordered        12/01/18 2059  Inpatient Admission (expected length of stay for this patient is greater than two midnights)  Once         12/01/18 2026  Inpatient Admission (expected length of stay for this patient is greater than two midnights)  Once             Discharge Date: 12/05/18    Resolved Problems  Date Reviewed: 12/5/2018    None          Consultations During Hospital Stay:  · Gastroenterology  · Acute Care surgery    Procedures Performed:     · CT abdomen pelvis 12/1:  Stable due to increased bilateral lower lobe infiltrates  Could represent chronic or recurrent pneumonia  Grossly stable appearance of descending colon containing stool and gas pursue make chronic constipation  Fecal impaction may be present  · X-ray obstruction series 12/02:  Persistent dilated colon after evacuation  Worsening bibasilar process atelectasis versus infiltrate  · CT chest abdomen pelvis 12/2:  Showing probable pneumonia  Right apical masslike density is probably nodular scarring is unchanged since previous imaging  And a gastric tube is in good position  Stable distended colon with gas and stool  Slightly increased size of small bowel loops compared with yesterday  Stable prominence of the renal past pelvis with out obstructing stones or masses  · Abdominal x-ray KUB 12/3:  No changes from CT imaging previously again significant gas is distention of large bowel    Significant Findings / Test Results:     · Obstipation in setting of chronic opioid use  · Pneumonia    Incidental Findings:   ·  Known nodular density on CT patient aware     Test Results Pending at Discharge (will require follow up):    · None     Outpatient Tests Requested:  · Per Gastroenterology    Complications:  Patient signed out [place]    Reason for Admission:  Need for further GI workup for obstipation    Hospital Course:     Ruby Connolly is a 59 y o  female patient who originally presented to the hospital on 12/1/2018 due to not awaiting in approximately 6 3 days and increased pain and distention in her lower abdomen  Patient has known multiple abdominal surgeries in the past   Patient had a Stahl placed in the ED that drained 750 mL which was kept intact  Patient has CT of the abdomen was showing a grossly distended abdomen with constipation  Patient was admitted to the floor given enema with successful bowel movements however her abdominal distension persisted  GI evaluated the patient opted for a rectal tube which further decompress the patient however further recommended a colonoscopy that patient 1st did not want to do after much education patient was agreeable however wanted the NG tube and the Stahl catheter removed which was done in patient had a successful voiding trial   Patient then agreeable was vacuumed during the colonoscopy to further decrease her distension no stricture was seen patient was returned to the floor and started on diet  Patient had persistent abdominal pain acute Care surgery was following and identified no need for surgical intervention at this time  Patient on day 4 had persistent abdominal pain with guarding however refused further intervention and asked for AMA and further refused any discussion with her family  Patient was explained risks of infection, of worsening of her symptoms, perforation, or even death patient explained she assumes the wrist and still wanted to leave  Please see above list of diagnoses and related plan for additional information  Condition at Discharge: stable     Discharge Day Visit / Exam:     Subjective:  Patient denies fever chills denies abdominal pain even on examination with guarding and currently states shows no further treatment and wanted to sign out against medical advice    Vitals: Blood Pressure: 127/73 (12/05/18 0750)  Pulse: 65 (12/05/18 0750)  Temperature: 98 1 °F (36 7 °C) (12/05/18 0750)  Temp Source: Oral (12/05/18 0750)  Respirations: 17 (12/05/18 0750)  Height: 5' 4" (162 6 cm) (12/02/18 1602)  Weight - Scale: 68 9 kg (151 lb 14 4 oz) (12/02/18 1602)  SpO2: 97 % (12/05/18 0750)  Exam:   Physical Exam   Constitutional: She is oriented to person, place, and time  HENT:   Head: Normocephalic and atraumatic  Eyes: Conjunctivae and EOM are normal    Neck: Normal range of motion  Cardiovascular: Normal rate, regular rhythm and normal heart sounds  Pulmonary/Chest: Effort normal and breath sounds normal    Abdominal: Soft  Bowel sounds are normal  There is tenderness in the epigastric area  There is guarding  Musculoskeletal: Normal range of motion  Neurological: She is alert and oriented to person, place, and time  Skin: Skin is warm and dry  Psychiatric: She has a normal mood and affect  Her behavior is normal        Discussion with Family:  Refused    Discharge instructions/Information to patient and family:   See after visit summary for information provided to patient and family  Provisions for Follow-Up Care:  See after visit summary for information related to follow-up care and any pertinent home health orders  Disposition:     Home    For Discharges to [facility] SNF:   · Not Applicable to this Patient - Not Applicable to this Patient    Planned Readmission:  None     Discharge Statement:  I spent 45 minutes discharging the patient  This time was spent on the day of discharge  I had direct contact with the patient on the day of discharge  Greater than 50% of the total time was spent examining patient, answering all patient questions, arranging and discussing plan of care with patient as well as directly providing post-discharge instructions  Additional time then spent on discharge activities  Discharge Medications:  See after visit summary for reconciled discharge medications provided to patient and family        ** Please Note: This note has been constructed using a voice recognition system **

## 2018-12-05 NOTE — DISCHARGE INSTRUCTIONS
Cefdinir (By mouth)   Cefdinir (SEF-di-true)  Treats bacterial infections  This medicine is a cephalosporin antibiotic  Brand Name(s):   There may be other brand names for this medicine  When This Medicine Should Not Be Used: This medicine is not right for everyone  Do not use it if you had an allergic reaction to cefdinir or to any type of cephalosporin  How to Use This Medicine:   Capsule, Liquid  · Your doctor will tell you how much medicine to use  Do not use more than directed  · Shake the oral liquid well before use  · Measure the oral liquid medicine with a marked measuring spoon, oral syringe, or medicine cup  · Take all of the medicine in your prescription to clear up your infection, even if you feel better after the first few doses  · This medicine is not for long-term use  · Missed dose: Take a dose as soon as you remember  If it is almost time for your next dose, wait until then and take a regular dose  Do not take extra medicine to make up for a missed dose  · Store the medicine in a closed container at room temperature, away from heat, moisture, and direct light  Discard any unused portion of the oral liquid after 10 days  Drugs and Foods to Avoid:   Ask your doctor or pharmacist before using any other medicine, including over-the-counter medicines, vitamins, and herbal products  · Some medicines can affect how cefdinir works  Tell your doctor if you are also taking probenecid  · If you use antacids or iron supplements (including those found in multivitamins), take them at least 2 hours before or 2 hours after you take cefdinir  Warnings While Using This Medicine:   · Tell your doctor if you are pregnant or breastfeeding, have kidney disease, or had an allergic reaction to penicillin antibiotics or any other medicines  · This medicine can cause diarrhea  Call your doctor if the diarrhea becomes severe, does not stop, or is bloody   Do not take any medicine to stop diarrhea until you have talked to your doctor  Diarrhea can occur 2 months or more after you stop taking this medicine  · If you have diabetes, use Clinistix® or Luzmaria-Tape® for urine glucose tests while you are taking cefdinir  Cefdinir capsules and liquid may cause incorrect results on the Clinitest® urine glucose test   · Tell any doctor or dentist who treats you that you are using this medicine  This medicine may affect certain medical test results  · Call your doctor if your symptoms do not improve or if they get worse  · Keep all medicine out of the reach of children  Never share your medicine with anyone  Possible Side Effects While Using This Medicine:   Call your doctor right away if you notice any of these side effects:  · Allergic reaction: Itching or hives, swelling in your face or hands, swelling or tingling in your mouth or throat, chest tightness, trouble breathing  · Blistering, peeling, red skin rash  · Red or black stools  · Severe diarrhea or stomach pain  · Sores or white patches on your lips, mouth, or throat  If you notice these less serious side effects, talk with your doctor:   · Bloated feeling, constipation, loss of appetite, nausea, vomiting, or upset stomach  · Dizziness, drowsiness, sleepiness, or unusual weakness  · Dry mouth  · Trouble sleeping  If you notice other side effects that you think are caused by this medicine, tell your doctor  Call your doctor for medical advice about side effects  You may report side effects to FDA at 0-088-FDA-9547  © 2017 [address] Information is for End User's use only and may not be sold, redistributed or otherwise used for commercial purposes  The above information is an  only  It is not intended as medical advice for individual conditions or treatments  Talk to your doctor, nurse or pharmacist before following any medical regimen to see if it is safe and effective for you        Metronidazole (By mouth)   Metronidazole (met-tiffanie-HANNAH-saurabh-radha)  Treats bacterial infections  Brand Name(s): Flagyl, Flagyl 375   There may be other brand names for this medicine  When This Medicine Should Not Be Used: This medicine is not right for everyone  Do not use if you had an allergic reaction to metronidazole or similar medicines  Do not use this medicine to treat trichomoniasis if you are in the first 3 months of pregnancy  How to Use This Medicine:   Capsule, Tablet, Long Acting Tablet  · Take this medicine as directed, and take it at the same time each day  · Capsule or Tablet: Take with food or milk to avoid stomach upset  · Extended-release tablet: Take it on an empty stomach, 1 hour before or 2 hours after a meal   · Swallow the extended-release tablet whole  Do not crush, break, or chew it  · Take all of the medicine in your prescription to clear up your infection, even if you feel better after the first few doses  · Missed dose: Take a dose as soon as you remember  If it is almost time for your next dose, wait until then and take a regular dose  Do not take extra medicine to make up for a missed dose  · Store the medicine in a closed container at room temperature, away from heat, moisture, and direct light  Drugs and Foods to Avoid:   Ask your doctor or pharmacist before using any other medicine, including over-the-counter medicines, vitamins, and herbal products  · Do not use this medicine if you have taken disulfiram within the last 2 weeks  Do not drink alcohol or use medicine that contains alcohol or propylene glycol while using this medicine and for at least 3 days after you have finished metronidazole treatment  · Some foods and medicines can affect how metronidazole works  Tell your doctor if you are using busulfan, cimetidine, lithium, phenobarbital, phenytoin, or warfarin or another blood thinner    Warnings While Using This Medicine:   · Tell your doctor if you are pregnant or breastfeeding, or if you have kidney disease, liver disease, blood or bone marrow problems, oral thrush, yeast infection, or a history of seizures  · This medicine may cause the following problems:  ¨ Brain or nervous system problems, including seizures, meningitis, or vision problems  · Trichomoniasis treatment:  Your doctor may want to also treat your sexual partner, even if he or she has no symptoms  Also, you may want to use a condom during sexual intercourse  These measures will help keep you from getting the infection back again from your partner  If you have any questions, ask your doctor  · Call your doctor if your symptoms do not improve or if they get worse  · Your doctor will do lab tests at regular visits to check on the effects of this medicine  Keep all appointments  · Tell any doctor or dentist who treats you that you are using this medicine  This medicine may affect certain medical test results  · Keep all medicine out of the reach of children  Never share your medicine with anyone  Possible Side Effects While Using This Medicine:   Call your doctor right away if you notice any of these side effects:  · Allergic reaction: Itching or hives, swelling in your face or hands, swelling or tingling in your mouth or throat, chest tightness, trouble breathing  · Confusion, drowsiness, fever, headache, loss of appetite, nausea or vomiting, stiff neck or back  · Dizziness, problems with muscle control, clumsiness, shakiness, trouble talking  · Fever, chills, cough, sore throat, and body aches  · Numbness, tingling, or burning pain in your hands, arms, legs, or feet  · Seizures  If you notice these less serious side effects, talk with your doctor:   · Mild nausea  · Unusual or unpleasant taste in your mouth  If you notice other side effects that you think are caused by this medicine, tell your doctor  Call your doctor for medical advice about side effects   You may report side effects to FDA at 7-024-FDA-6922  © 2017 [employer] Information is for End User's use only and may not be sold, redistributed or otherwise used for commercial purposes  The above information is an  only  It is not intended as medical advice for individual conditions or treatments  Talk to your doctor, nurse or pharmacist before following any medical regimen to see if it is safe and effective for you  Polyethylene Glycol 3350 (By mouth)   Polyethylene Glycol (tkd-jz-RWF-i-magui GLYE-kol)  Treats occasional constipation  Brand Name(s): [employer] ClearLax, Peter Merlin, [facility] Clear Lax, Healthylax Polyethylene Glycol 3350, [employer] ClearLax, MiraLAX, , Purelax, [employer] Laxative, [employer] Clearmax, [employer] ClearLax, [employer] Polyethylene Glycol 3350   There may be other brand names for this medicine  When This Medicine Should Not Be Used: You should not use this medicine if you have had an allergic reaction to polyethylene glycol, or if you have signs of a bowel obstruction (nausea, vomiting, stomach pain or bloating)  How to Use This Medicine:   Powder, Liquid, Packet  · Your doctor will tell you how much of this medicine to take and how often  Do not take more medicine or take it more often than your doctor tells you to  This medicine is not for long-term use  · Always dissolve the powder in a full glass (8 ounces) of water, juice, soda, coffee, or tea before swallowing it  If you need to measure your medicine, use a marked measuring cup or spoon  · Measure the oral liquid medicine with a marked measuring spoon, oral syringe, or medicine cup  · It may take 2 days or longer for this medicine to help you have a bowel movement  If a dose is missed:   · If you miss a dose or forget to take your medicine, take it as soon as you can  If it is almost time for your next dose, wait until then to take the medicine and skip the missed dose  · Do not use extra medicine to make up for a missed dose    How to Store and Dispose of This Medicine:   · Store the medicine at room temperature in a closed container, away from heat, moisture, and direct light  · Keep all medicine out of the reach of children and never share your medicine with anyone  Drugs and Foods to Avoid:      Ask your doctor or pharmacist before using any other medicine, including over-the-counter medicines, vitamins, and herbal products  Warnings While Using This Medicine:   · Make sure your doctor knows if you are pregnant or breastfeeding  · Do not use this medicine longer than 2 weeks unless your doctor has told you to  Possible Side Effects While Using This Medicine:   Call your doctor right away if you notice any of these side effects:  · Allergic reaction: Itching or hives, swelling in face or hands, swelling or tingling in the mouth or throat, tightness in chest, trouble breathing  · Severe stomach pain, bloating, vomiting, or diarrhea  If you notice these less serious side effects, talk with your doctor:   · Mild cramps, bloating, diarrhea, or gas  If you notice other side effects that you think are caused by this medicine, tell your doctor  Call your doctor for medical advice about side effects  You may report side effects to FDA at 9-262-FDA-0258  © 2017 [employer]  Information is for End User's use only and may not be sold, redistributed or otherwise used for commercial purposes  The above information is an  only  It is not intended as medical advice for individual conditions or treatments  Talk to your doctor, nurse or pharmacist before following any medical regimen to see if it is safe and effective for you

## 2018-12-05 NOTE — ASSESSMENT & PLAN NOTE
· Present on admission likely setting to patient's chronic opioid use  · Patient was given to enema given her extensive distension with improvement  · Given concern for possible bowel stricture patient went for colonoscopy yesterday were no stricture was found patient had 2 polyps removed  · Patient has persistent guarding mid epigastric region discuss with GI evaluated patient agree patient is guarding in needs further acute intervention  · Continue regular diet although patient is currently refusing to eat    · Will give patient antibiotic to treat possible aspiration pneumonia to complete 10 course

## 2018-12-06 ENCOUNTER — TRANSITIONAL CARE MANAGEMENT (OUTPATIENT)
Dept: FAMILY MEDICINE CLINIC | Facility: CLINIC | Age: 63
End: 2018-12-06

## 2018-12-06 ENCOUNTER — TELEPHONE (OUTPATIENT)
Dept: GASTROENTEROLOGY | Facility: CLINIC | Age: 63
End: 2018-12-06

## 2018-12-06 NOTE — TELEPHONE ENCOUNTER
----- Message from [id number] Shane Azar MD sent at 12/6/2018 10:57 AM EST -----  pls tell her the polyps were precancerous- pls recommend colonoscopy in the next 4-6 weeks

## 2018-12-08 ENCOUNTER — APPOINTMENT (EMERGENCY)
Dept: RADIOLOGY | Facility: HOSPITAL | Age: 63
End: 2018-12-08
Payer: MEDICARE

## 2018-12-08 ENCOUNTER — APPOINTMENT (EMERGENCY)
Dept: CT IMAGING | Facility: HOSPITAL | Age: 63
End: 2018-12-08
Payer: MEDICARE

## 2018-12-08 ENCOUNTER — HOSPITAL ENCOUNTER (EMERGENCY)
Facility: HOSPITAL | Age: 63
Discharge: HOME | End: 2018-12-09
Attending: EMERGENCY MEDICINE
Payer: MEDICARE

## 2018-12-08 VITALS
BODY MASS INDEX: 23.9 KG/M2 | HEART RATE: 75 BPM | WEIGHT: 140 LBS | HEIGHT: 64 IN | TEMPERATURE: 99.2 F | RESPIRATION RATE: 20 BRPM | OXYGEN SATURATION: 99 % | DIASTOLIC BLOOD PRESSURE: 58 MMHG | SYSTOLIC BLOOD PRESSURE: 110 MMHG

## 2018-12-08 DIAGNOSIS — R10.9 ABDOMINAL PAIN: Primary | ICD-10-CM

## 2018-12-08 DIAGNOSIS — E87.6 HYPOKALEMIA: ICD-10-CM

## 2018-12-08 DIAGNOSIS — R11.0 NAUSEA: ICD-10-CM

## 2018-12-08 DIAGNOSIS — R33.9 URINARY RETENTION: ICD-10-CM

## 2018-12-08 LAB
ALBUMIN SERPL BCP-MCNC: 3.1 G/DL (ref 3.5–5)
ALP SERPL-CCNC: 78 U/L (ref 46–116)
ALT SERPL W P-5'-P-CCNC: 24 U/L (ref 12–78)
ANION GAP SERPL CALCULATED.3IONS-SCNC: 7 MMOL/L (ref 4–13)
AST SERPL W P-5'-P-CCNC: 31 U/L (ref 5–45)
BASOPHILS # BLD AUTO: 0.02 THOUSANDS/ΜL (ref 0–0.1)
BASOPHILS NFR BLD AUTO: 0 % (ref 0–1)
BILIRUB SERPL-MCNC: 0.2 MG/DL (ref 0.2–1)
BUN SERPL-MCNC: 5 MG/DL (ref 5–25)
CALCIUM SERPL-MCNC: 8.6 MG/DL (ref 8.3–10.1)
CHLORIDE SERPL-SCNC: 104 MMOL/L (ref 100–108)
CO2 SERPL-SCNC: 33 MMOL/L (ref 21–32)
CREAT SERPL-MCNC: 0.84 MG/DL (ref 0.6–1.3)
EOSINOPHIL # BLD AUTO: 0.11 THOUSAND/ΜL (ref 0–0.61)
EOSINOPHIL NFR BLD AUTO: 2 % (ref 0–6)
ERYTHROCYTE [DISTWIDTH] IN BLOOD BY AUTOMATED COUNT: 16.8 % (ref 11.6–15.1)
GFR SERPL CREATININE-BSD FRML MDRD: 75 ML/MIN/1.73SQ M
GLUCOSE SERPL-MCNC: 72 MG/DL (ref 65–140)
HCT VFR BLD AUTO: 29.9 % (ref 34.8–46.1)
HGB BLD-MCNC: 9.8 G/DL (ref 11.5–15.4)
IMM GRANULOCYTES # BLD AUTO: 0.02 THOUSAND/UL (ref 0–0.2)
IMM GRANULOCYTES NFR BLD AUTO: 0 % (ref 0–2)
LACTATE SERPL-SCNC: 1.7 MMOL/L (ref 0.5–2)
LIPASE SERPL-CCNC: 149 U/L (ref 73–393)
LYMPHOCYTES # BLD AUTO: 1.75 THOUSANDS/ΜL (ref 0.6–4.47)
LYMPHOCYTES NFR BLD AUTO: 32 % (ref 14–44)
MCH RBC QN AUTO: 27.5 PG (ref 26.8–34.3)
MCHC RBC AUTO-ENTMCNC: 32.8 G/DL (ref 31.4–37.4)
MCV RBC AUTO: 84 FL (ref 82–98)
MONOCYTES # BLD AUTO: 0.51 THOUSAND/ΜL (ref 0.17–1.22)
MONOCYTES NFR BLD AUTO: 9 % (ref 4–12)
NEUTROPHILS # BLD AUTO: 3.12 THOUSANDS/ΜL (ref 1.85–7.62)
NEUTS SEG NFR BLD AUTO: 57 % (ref 43–75)
NRBC BLD AUTO-RTO: 0 /100 WBCS
PLATELET # BLD AUTO: 258 THOUSANDS/UL (ref 149–390)
PMV BLD AUTO: 10.3 FL (ref 8.9–12.7)
POTASSIUM SERPL-SCNC: 2.5 MMOL/L (ref 3.5–5.3)
PROT SERPL-MCNC: 6.6 G/DL (ref 6.4–8.2)
RBC # BLD AUTO: 3.56 MILLION/UL (ref 3.81–5.12)
SODIUM SERPL-SCNC: 144 MMOL/L (ref 136–145)
WBC # BLD AUTO: 5.53 THOUSAND/UL (ref 4.31–10.16)

## 2018-12-08 PROCEDURE — 96374 THER/PROPH/DIAG INJ IV PUSH: CPT

## 2018-12-08 PROCEDURE — 96376 TX/PRO/DX INJ SAME DRUG ADON: CPT

## 2018-12-08 PROCEDURE — 85025 COMPLETE CBC W/AUTO DIFF WBC: CPT | Performed by: EMERGENCY MEDICINE

## 2018-12-08 PROCEDURE — 74022 RADEX COMPL AQT ABD SERIES: CPT

## 2018-12-08 PROCEDURE — 74177 CT ABD & PELVIS W/CONTRAST: CPT

## 2018-12-08 PROCEDURE — 96375 TX/PRO/DX INJ NEW DRUG ADDON: CPT

## 2018-12-08 PROCEDURE — 36415 COLL VENOUS BLD VENIPUNCTURE: CPT | Performed by: EMERGENCY MEDICINE

## 2018-12-08 PROCEDURE — 83690 ASSAY OF LIPASE: CPT | Performed by: EMERGENCY MEDICINE

## 2018-12-08 PROCEDURE — 99285 EMERGENCY DEPT VISIT HI MDM: CPT

## 2018-12-08 PROCEDURE — 83605 ASSAY OF LACTIC ACID: CPT | Performed by: EMERGENCY MEDICINE

## 2018-12-08 PROCEDURE — 80053 COMPREHEN METABOLIC PANEL: CPT | Performed by: EMERGENCY MEDICINE

## 2018-12-08 PROCEDURE — 96365 THER/PROPH/DIAG IV INF INIT: CPT

## 2018-12-08 PROCEDURE — 96366 THER/PROPH/DIAG IV INF ADDON: CPT

## 2018-12-08 RX ORDER — ONDANSETRON 2 MG/ML
4 INJECTION INTRAMUSCULAR; INTRAVENOUS ONCE
Status: COMPLETED | OUTPATIENT
Start: 2018-12-08 | End: 2018-12-08

## 2018-12-08 RX ORDER — FENTANYL CITRATE 50 UG/ML
50 INJECTION, SOLUTION INTRAMUSCULAR; INTRAVENOUS ONCE
Status: COMPLETED | OUTPATIENT
Start: 2018-12-08 | End: 2018-12-08

## 2018-12-08 RX ORDER — POTASSIUM CHLORIDE 14.9 MG/ML
20 INJECTION INTRAVENOUS ONCE
Status: COMPLETED | OUTPATIENT
Start: 2018-12-08 | End: 2018-12-09

## 2018-12-08 RX ORDER — FENTANYL CITRATE 50 UG/ML
75 INJECTION, SOLUTION INTRAMUSCULAR; INTRAVENOUS ONCE
Status: COMPLETED | OUTPATIENT
Start: 2018-12-08 | End: 2018-12-08

## 2018-12-08 RX ADMIN — IOHEXOL 50 ML: 240 INJECTION, SOLUTION INTRATHECAL; INTRAVASCULAR; INTRAVENOUS; ORAL at 22:28

## 2018-12-08 RX ADMIN — FENTANYL CITRATE 75 MCG: 50 INJECTION INTRAMUSCULAR; INTRAVENOUS at 22:27

## 2018-12-08 RX ADMIN — POTASSIUM CHLORIDE 20 MEQ: 14.9 INJECTION, SOLUTION INTRAVENOUS at 22:51

## 2018-12-08 RX ADMIN — IOHEXOL 100 ML: 350 INJECTION, SOLUTION INTRAVENOUS at 23:40

## 2018-12-08 RX ADMIN — ONDANSETRON 4 MG: 2 INJECTION INTRAMUSCULAR; INTRAVENOUS at 21:24

## 2018-12-08 RX ADMIN — FENTANYL CITRATE 50 MCG: 50 INJECTION INTRAMUSCULAR; INTRAVENOUS at 21:22

## 2018-12-09 LAB
BACTERIA UR QL AUTO: NORMAL /HPF
BILIRUB UR QL STRIP: NEGATIVE
CLARITY UR: CLEAR
COLOR UR: YELLOW
GLUCOSE UR STRIP-MCNC: NEGATIVE MG/DL
HGB UR QL STRIP.AUTO: NEGATIVE
KETONES UR STRIP-MCNC: NEGATIVE MG/DL
LEUKOCYTE ESTERASE UR QL STRIP: NEGATIVE
NITRITE UR QL STRIP: NEGATIVE
NON-SQ EPI CELLS URNS QL MICRO: NORMAL /HPF
PH UR STRIP.AUTO: 5.5 [PH] (ref 4.5–8)
PROT UR STRIP-MCNC: NEGATIVE MG/DL
RBC #/AREA URNS AUTO: NORMAL /HPF
SP GR UR STRIP.AUTO: 1.01 (ref 1–1.03)
UROBILINOGEN UR QL STRIP.AUTO: 0.2 E.U./DL
WBC #/AREA URNS AUTO: NORMAL /HPF

## 2018-12-09 PROCEDURE — 96375 TX/PRO/DX INJ NEW DRUG ADDON: CPT

## 2018-12-09 PROCEDURE — 81001 URINALYSIS AUTO W/SCOPE: CPT | Performed by: EMERGENCY MEDICINE

## 2018-12-09 PROCEDURE — 96366 THER/PROPH/DIAG IV INF ADDON: CPT

## 2018-12-09 PROCEDURE — 96361 HYDRATE IV INFUSION ADD-ON: CPT

## 2018-12-09 RX ORDER — ONDANSETRON 4 MG/1
4 TABLET, FILM COATED ORAL EVERY 6 HOURS
Qty: 12 TABLET | Refills: 0 | Status: SHIPPED | OUTPATIENT
Start: 2018-12-09 | End: 2019-02-26

## 2018-12-09 RX ORDER — FENTANYL CITRATE 50 UG/ML
50 INJECTION, SOLUTION INTRAMUSCULAR; INTRAVENOUS ONCE
Status: COMPLETED | OUTPATIENT
Start: 2018-12-09 | End: 2018-12-09

## 2018-12-09 RX ADMIN — SODIUM CHLORIDE 1000 ML: 0.9 INJECTION, SOLUTION INTRAVENOUS at 02:00

## 2018-12-09 RX ADMIN — FENTANYL CITRATE 50 MCG: 50 INJECTION INTRAMUSCULAR; INTRAVENOUS at 01:55

## 2018-12-09 NOTE — DISCHARGE INSTRUCTIONS
Hypokalemia   WHAT YOU NEED TO KNOW:   What is hypokalemia? Hypokalemia is a low level of potassium in your blood  Potassium helps control how your muscles, heart, and digestive system work  Hypokalemia occurs when your body loses too much potassium or does not absorb enough from food  What causes hypokalemia? · Diarrhea or vomiting    · Medicines, such as diuretics, blood pressure medicines, or antibiotics    · Excessive use of laxatives    · Anorexia or bulimia nervosa    · Medical conditions, such as Cushing syndrome or kidney disease    · Not eating enough foods that contain potassium  What are the signs and symptoms of hypokalemia? You may not have any signs or symptoms if you have mild hypokalemia  You may have any of the following if it is more severe:  · Fatigue    · Constipation    · Frequent urination or urinating large amounts    · Muscle cramps or skin tingling    · Muscle weakness    · Fast or irregular heartbeat  How is hypokalemia diagnosed? · An EKG  test records your heart rhythm and how fast your heart beats  It is used to check for an irregular heartbeat  · Blood tests  are done to check your potassium level  How is hypokalemia treated? You will receive potassium to bring your levels back to normal  This may be given as a pill or IV  The amount of potassium you will be given depends on your potassium level  What foods are high in potassium? Foods that are high in potassium include bananas, oranges, tomatoes, potatoes, and avocado  Slade beans, turkey, salmon, lean beef, yogurt, and milk are also high in potassium  Ask your healthcare provider or dietitian for more information about foods that are high in potassium  When should I seek immediate care? · You cannot move your arm or leg  · You have a fast or irregular heartbeat  · You are too tired or weak to stand up  When should I contact my healthcare provider? · You are vomiting, or you have diarrhea      · You have numbness or tingling in your arms or legs  · Your symptoms do not go away or they get worse  · You have questions or concerns about your condition or care  CARE AGREEMENT:   You have the right to help plan your care  Learn about your health condition and how it may be treated  Discuss treatment options with your caregivers to decide what care you want to receive  You always have the right to refuse treatment  The above information is an  only  It is not intended as medical advice for individual conditions or treatments  Talk to your doctor, nurse or pharmacist before following any medical regimen to see if it is safe and effective for you  © 2017 [employer]  Information is for End User's use only and may not be sold, redistributed or otherwise used for commercial purposes  All illustrations and images included in CareNotes® are the copyrighted property of [employer]  or James Ramires  Acute Urinary Retention in Women   WHAT YOU NEED TO KNOW:   Acute urinary retention (AUR) is when your bladder is full, but you cannot urinate  This condition happens suddenly, gets worse quickly, and lasts a short time  DISCHARGE INSTRUCTIONS:   Medicines:   · Antibiotics  help treat or prevent a bacterial infection  · Take your medicine as directed  Contact your healthcare provider if you think your medicine is not helping or if you have side effects  Tell him if you are allergic to any medicine  Keep a list of the medicines, vitamins, and herbs you take  Include the amounts, and when and why you take them  Bring the list or the pill bottles to follow-up visits  Carry your medicine list with you in case of an emergency  Stahl catheter care: You may need a Stahl catheter while you are at home  Healthcare providers will give you a smaller leg bag to collect urine   Keep the bag below your waist  This will prevent urine from flowing back into your bladder and causing an infection or other problems  Also, keep the tube free of kinks so the urine will drain properly  Do not pull on the catheter  This can cause pain and bleeding, and may cause the catheter to come out  Ask your healthcare provider for more information on Stahl catheter care  Follow up with your healthcare provider as directed:  Write down your questions so you remember to ask them during your visits  Contact your healthcare provider if:   · You have a fever  · You have pain when you urinate  · You see blood in your urine  · You have problems with your catheter  · You have questions or concerns about your condition or care  Return to the emergency department if:   · You have severe abdominal pain  · You are breathing faster than usual     · Your heartbeat is faster than usual     · Your face, hands, feet, or ankles are swollen  © 2017 [employer]  Information is for End User's use only and may not be sold, redistributed or otherwise used for commercial purposes  All illustrations and images included in CareNotes® are the copyrighted property of [employer]  or James Ike  The above information is an  only  It is not intended as medical advice for individual conditions or treatments  Talk to your doctor, nurse or pharmacist before following any medical regimen to see if it is safe and effective for you  Abdominal Pain   WHAT YOU NEED TO KNOW:   Abdominal pain can be dull, achy, or sharp  You may have pain in one area of your abdomen, or in your entire abdomen  Your pain may be caused by a condition such as constipation, food sensitivity or poisoning, infection, or a blockage  Abdominal pain can also be from a hernia, appendicitis, or an ulcer  Liver, gallbladder, or kidney conditions can also cause abdominal pain  The cause of your abdominal pain may be unknown     DISCHARGE INSTRUCTIONS:   Return to the emergency department if:   · You have new chest pain or shortness of breath  · You have pulsing pain in your upper abdomen or lower back that suddenly becomes constant  · Your pain is in the right lower abdominal area and worsens with movement  · You have a fever over 100 4°F (38°C) or shaking chills  · You are vomiting and cannot keep food or liquids down  · Your pain does not improve or gets worse over the next 8 to 12 hours  · You see blood in your vomit or bowel movements, or they look black and tarry  · Your skin or the whites of your eyes turn yellow  · You are a woman and have a large amount of vaginal bleeding that is not your monthly period  Contact your healthcare provider if:   · You have pain in your lower back  · You are a man and have pain in your testicles  · You have pain when you urinate  · You have questions or concerns about your condition or care  Follow up with your healthcare provider within 24 hours or as directed:  Write down your questions so you remember to ask them during your visits  Medicines:   · Medicines  may be given to calm your stomach and prevent vomiting or to decrease pain  Ask how to take pain medicine safely  · Take your medicine as directed  Contact your healthcare provider if you think your medicine is not helping or if you have side effects  Tell him of her if you are allergic to any medicine  Keep a list of the medicines, vitamins, and herbs you take  Include the amounts, and when and why you take them  Bring the list or the pill bottles to follow-up visits  Carry your medicine list with you in case of an emergency  © 2017 [employer] Information is for End User's use only and may not be sold, redistributed or otherwise used for commercial purposes  All illustrations and images included in CareNotes® are the copyrighted property of [employer]  or James Ramires  The above information is an  only   It is not intended as medical advice for individual conditions or treatments  Talk to your doctor, nurse or pharmacist before following any medical regimen to see if it is safe and effective for you

## 2018-12-09 NOTE — ED PROVIDER NOTES
History  Chief Complaint   Patient presents with    Abdominal Pain     Patient c/o abdominal pain, she states her abdomen feels very distended  Patient states has history of obstruction and states she was discharged from our hospital 2 days ago  HPI  52-year-old female with significant abdominal surgical history requiring multiple ex laps presents to the ED with concern for bowel obstruction  Patient was admitted to the hospital last week with abdominal pain and urinary retention and left AMA on 12/5  She states that she did not feel well when she left, but that she wants to go home to see her grand daughter  Since discharge home, she has continued to have abdominal pain and has hardly had any PO intake  Abdominal pain acutely worsened today and patient has not had a bowel movement or passed any gas since yesterday  She states that has also not had any urine output since discharge  Per notes, patient had wang catheter while in the hospital due to retention  Current abdominal pain is associated with distention and feels similar to that which she has had in the past with bowel obstructions  On ROS, she denies associated nausea, vomiting, or complaints other than stated above  Prior to Admission Medications   Prescriptions Last Dose Informant Patient Reported? Taking? HYDROmorphone (DILAUDID) 4 mg tablet   Yes No   Sig: Take 4 mg by mouth 4 (four) times a day   Morphine Sulfate ER 30 MG TBEA  Self Yes No   Sig: Take 30 mg by mouth 3 (three) times a day     cefdinir (OMNICEF) 300 mg capsule   No No   Sig: Take 1 capsule (300 mg total) by mouth every 12 (twelve) hours for 6 days   cetirizine (ZyrTEC) 10 mg tablet   Yes No   diazepam (VALIUM) 5 mg tablet   Yes No   Sig: 3 (three) times a day Morning, 3pm and 8pm   escitalopram (LEXAPRO) 20 mg tablet   Yes No   Sig: daily  fluticasone (FLOVENT HFA) 220 mcg/act inhaler   Yes No   Sig: Inhale 2 puffs 2 (two) times a day Rinse mouth after use  gabapentin (NEURONTIN) 300 mg capsule   Yes No   Sig: take 2 capsules by mouth three times a day   metroNIDAZOLE (FLAGYL) 500 mg tablet   No No   Sig: Take 1 tablet (500 mg total) by mouth every 8 (eight) hours for 6 days   naproxen (NAPROSYN) 500 mg tablet   Yes No   Sig: Take 500 mg by mouth every 12 (twelve) hours as needed   nystatin (MYCOSTATIN) 100,000 units/mL suspension   Yes No   Sig: Apply 500,000 Units to the mouth or throat 4 (four) times a day   polyethylene glycol (MIRALAX) 17 g packet   No No   Sig: Take 17 g by mouth daily   promethazine (PHENERGAN) 12 5 mg/10 mL syrup   Yes No   Sig: Take 5 mL by mouth 4 (four) times a day as needed for nausea or vomiting   temazepam (RESTORIL) 30 mg capsule   Yes No   Si mg daily at bedtime        Facility-Administered Medications: None       Past Medical History:   Diagnosis Date    Asthma     Chronic back pain        Past Surgical History:   Procedure Laterality Date    ABDOMINAL SURGERY      x 25    BACK SURGERY      x 3    COLONOSCOPY N/A 2018    Procedure: COLONOSCOPY;  Surgeon: Rebecca Connolly MD;  Location: [facility]; Service: Gastroenterology       Family History   Problem Relation Age of Onset    Diabetes Mother      I have reviewed and agree with the history as documented  Social History   Substance Use Topics    Smoking status: Former Smoker     Quit date: 1980    Smokeless tobacco: Never Used    Alcohol use No      Comment: social drinker        Review of Systems   Constitutional: Negative for chills and fever  Respiratory: Negative for shortness of breath  Gastrointestinal: Positive for abdominal distention and abdominal pain  Negative for nausea and vomiting  Genitourinary: Positive for difficulty urinating  Skin: Negative for rash and wound  Allergic/Immunologic: Negative for immunocompromised state  Neurological: Negative for headaches  Psychiatric/Behavioral: The patient is not nervous/anxious  All other systems reviewed and are negative  Physical Exam  Physical Exam   Constitutional: She is oriented to person, place, and time  No distress  HENT:   Head: Normocephalic and atraumatic  Eyes: EOM are normal    Neck: Normal range of motion  Neck supple  Cardiovascular: Normal rate and regular rhythm  Pulmonary/Chest: Effort normal and breath sounds normal  No respiratory distress  Abdominal: She exhibits distension  There is generalized tenderness  There is no rigidity and no guarding  Multiple old scars   Musculoskeletal: Normal range of motion  Neurological: She is alert and oriented to person, place, and time  Skin: Skin is warm and dry  She is not diaphoretic  Psychiatric: She has a normal mood and affect  Her behavior is normal    Nursing note and vitals reviewed        Vital Signs  ED Triage Vitals   Temperature Pulse Respirations Blood Pressure SpO2   12/08/18 2003 12/08/18 2005 12/08/18 2005 12/08/18 2005 12/08/18 2005   99 2 °F (37 3 °C) 80 20 130/74 97 %      Temp src Heart Rate Source Patient Position - Orthostatic VS BP Location FiO2 (%)   -- 12/08/18 2005 12/08/18 2005 12/08/18 2005 --    Monitor Sitting Left arm       Pain Score       12/08/18 2034       Worst Possible Pain           Vitals:    12/08/18 2005 12/08/18 2210   BP: 130/74 110/58   Pulse: 80 75   Patient Position - Orthostatic VS: Sitting Lying       Visual Acuity      ED Medications  Medications   fentanyl citrate (PF) 100 MCG/2ML 50 mcg (50 mcg Intravenous Given 12/8/18 2122)   ondansetron (ZOFRAN) injection 4 mg (4 mg Intravenous Given 12/8/18 2124)   iohexol (OMNIPAQUE) 240 MG/ML solution 50 mL (50 mL Oral Given 12/8/18 2228)   potassium chloride 20 mEq IVPB (premix) (0 mEq Intravenous Stopped 12/9/18 0158)   fentanyl citrate (PF) 100 MCG/2ML 75 mcg (75 mcg Intravenous Given 12/8/18 2227)   iohexol (OMNIPAQUE) 350 MG/ML injection (MULTI-DOSE) 100 mL (100 mL Intravenous Given 12/8/18 2340)   fentanyl citrate (PF) 100 MCG/2ML 50 mcg (50 mcg Intravenous Given 12/9/18 0155)   sodium chloride 0 9 % bolus 1,000 mL (0 mL Intravenous Stopped 12/9/18 0338)       Diagnostic Studies  Results Reviewed     Procedure Component Value Units Date/Time    Urinalysis with microscopic [[id number]] Collected:  12/09/18 0128    Lab Status:  Final result Specimen:  Urine from Urine, Clean Catch Updated:  12/09/18 0150     Clarity, UA Clear     Color, UA Yellow     Specific Gravity, UA 1 010     pH, UA 5 5     Glucose, UA Negative mg/dl      Ketones, UA Negative mg/dl      Blood, UA Negative     Protein, UA Negative mg/dl      Nitrite, UA Negative     Bilirubin, UA Negative     Urobilinogen, UA 0 2 E U /dl      Leukocytes, UA Negative     WBC, UA None Seen /hpf      RBC, UA None Seen /hpf      Bacteria, UA None Seen /hpf      Epithelial Cells Occasional /hpf     CMP [[id number]]  (Abnormal) Collected:  12/08/18 2132    Lab Status:  Final result Specimen:  Blood from Arm, Right Updated:  12/08/18 2210     Sodium 144 mmol/L      Potassium 2 5 (LL) mmol/L      Chloride 104 mmol/L      CO2 33 (H) mmol/L      ANION GAP 7 mmol/L      BUN 5 mg/dL      Creatinine 0 84 mg/dL      Glucose 72 mg/dL      Calcium 8 6 mg/dL      AST 31 U/L      ALT 24 U/L      Alkaline Phosphatase 78 U/L      Total Protein 6 6 g/dL      Albumin 3 1 (L) g/dL      Total Bilirubin 0 20 mg/dL      eGFR 75 ml/min/1 73sq m     Narrative:         National Kidney Disease Education Program recommendations are as follows:  GFR calculation is accurate only with a steady state creatinine  Chronic Kidney disease less than 60 ml/min/1 73 sq  meters  Kidney failure less than 15 ml/min/1 73 sq  meters      Lipase [[id number]]  (Normal) Collected:  12/08/18 2132    Lab Status:  Final result Specimen:  Blood from Arm, Right Updated:  12/08/18 2205     Lipase 149 u/L     Lactic acid, plasma [[id number]]  (Normal) Collected:  12/08/18 2201    Lab Status:  Final result Specimen:  Blood Updated:  12/08/18 2202     LACTIC ACID 1 7 mmol/L     Narrative:         Result may be elevated if tourniquet was used during collection  CBC and differential [[id number]]  (Abnormal) Collected:  12/08/18 2132    Lab Status:  Final result Specimen:  Blood from Arm, Right Updated:  12/08/18 2151     WBC 5 53 Thousand/uL      RBC 3 56 (L) Million/uL      Hemoglobin 9 8 (L) g/dL      Hematocrit 29 9 (L) %      MCV 84 fL      MCH 27 5 pg      MCHC 32 8 g/dL      RDW 16 8 (H) %      MPV 10 3 fL      Platelets 944 Thousands/uL      nRBC 0 /100 WBCs      Neutrophils Relative 57 %      Immat GRANS % 0 %      Lymphocytes Relative 32 %      Monocytes Relative 9 %      Eosinophils Relative 2 %      Basophils Relative 0 %      Neutrophils Absolute 3 12 Thousands/µL      Immature Grans Absolute 0 02 Thousand/uL      Lymphocytes Absolute 1 75 Thousands/µL      Monocytes Absolute 0 51 Thousand/µL      Eosinophils Absolute 0 11 Thousand/µL      Basophils Absolute 0 02 Thousands/µL                  XR abdomen obstruction series   Final Result by Nikita Acosta MD (12/09 6687)      Nonobstructive bowel gas pattern  Workstation performed: [id number]         CT abdomen pelvis with contrast   Final Result by Cliff Almanzar MD (12/09 0032)      Right ureter is dilated in its mid and proximal portions and there is right hydronephrosis  No renal stones are noted  This is likely secondary to mass effect or downstream obstruction     There is a left-sided extrarenal pelvis and mild left    hydronephrosis, however no hydroureter  Right hydroureter has developed in the interval   Other findings are similar to the prior exam       The bladder is massively enlarged and dilated    Stahl catheter has been removed in the interval       There is descending colonic and sigmoid wall thickening concerning for colitis  Liquid stool is seen throughout the bowel concerning for diarrhea  No small bowel dilatation to suggest an obstruction  Other nonacute findings as above  Workstation performed: [id number]                    Procedures  Procedures       Phone Contacts  ED Phone Contact    ED Course  ED Course as of Dec 13 1330   Sat Dec 08, 2018   2152 chronic Hemoglobin: (!) 9 8   2210 Potassium: (!!) 2 5   Sun Dec 09, 2018   0056 Stahl                                MDM  Number of Diagnoses or Management Options  Abdominal pain:   Hypokalemia:   Nausea:   Urinary retention:   Diagnosis management comments: 18-year-old female with significant surgical abdominal history presenting abdominal pain, concern for recurrent bowel obstruction  Patient has not urinated in 3 days, but has not had p o  Intake during that time either  Will obtain belly labs, CT, urinalysis  Patient is unable to provide urine sample after receiving fluids, will place Stahl catheter  Stahl placed, but patient requested removal prior to discharge  She understands that retention may return if Stahl is not in place  She understands and agrees with plan to return to the emergency department if she is unable to urinated home  CritCare Time    Disposition  Final diagnoses:   Abdominal pain   Urinary retention   Nausea   Hypokalemia     Time reflects when diagnosis was documented in both MDM as applicable and the Disposition within this note     Time User Action Codes Description Comment    12/9/2018  2:36 AM Shweta Quiet Add [R10 9] Abdominal pain     12/9/2018  2:36 AM Zwiebel, Namrata Add [R33 9] Urinary retention     12/9/2018  2:39 AM Zwiebel, Namrata Add [R11 0] Nausea     12/9/2018  2:39 AM Shweta Quiet Add [E87 6] Hypokalemia       ED Disposition     ED Disposition Condition Comment    Discharge  Leela Matthew discharge to home/self care      Condition at discharge: Good        Follow-up Information     Follow up With Specialties Details Why Contact Info Additional [address] [facility] Schedule an appointment as soon as possible for a visit in 1 week for wang removal  [address] [facility] [id number]  [facility], [address], [place], [place], [address]    PCP   repeat potassium in 1 week            Discharge Medication List as of 12/9/2018  2:41 AM      START taking these medications    Details   ondansetron (ZOFRAN) 4 mg tablet Take 1 tablet (4 mg total) by mouth every 6 (six) hours, Starting Sun 12/9/2018, Normal         CONTINUE these medications which have NOT CHANGED    Details   cefdinir (OMNICEF) 300 mg capsule Take 1 capsule (300 mg total) by mouth every 12 (twelve) hours for 6 days, Starting Wed 12/5/2018, Until Tue 12/11/2018, Normal      cetirizine (ZyrTEC) 10 mg tablet Starting Mon 10/23/2017, Historical Med      diazepam (VALIUM) 5 mg tablet 3 (three) times a day Morning, 3pm and 8pm, Starting Fri 11/3/2017, Historical Med      escitalopram (LEXAPRO) 20 mg tablet daily    , Historical Med      fluticasone (FLOVENT HFA) 220 mcg/act inhaler Inhale 2 puffs 2 (two) times a day Rinse mouth after use , Historical Med      gabapentin (NEURONTIN) 300 mg capsule take 2 capsules by mouth three times a day, Historical Med      HYDROmorphone (DILAUDID) 4 mg tablet Take 4 mg by mouth 4 (four) times a day, Historical Med      metroNIDAZOLE (FLAGYL) 500 mg tablet Take 1 tablet (500 mg total) by mouth every 8 (eight) hours for 6 days, Starting Wed 12/5/2018, Until Tue 12/11/2018, Normal      Morphine Sulfate ER 30 MG TBEA Take 30 mg by mouth 3 (three) times a day  , Historical Med      naproxen (NAPROSYN) 500 mg tablet Take 500 mg by mouth every 12 (twelve) hours as needed, Starting Fri 8/3/2018, Historical Med      nystatin (MYCOSTATIN) 100,000 units/mL suspension Apply 500,000 Units to the mouth or throat 4 (four) times a day, Historical Med      polyethylene glycol (MIRALAX) 17 g packet Take 17 g by mouth daily, Starting u 12/6/2018, Normal promethazine (PHENERGAN) 12 5 mg/10 mL syrup Take 5 mL by mouth 4 (four) times a day as needed for nausea or vomiting, Historical Med      temazepam (RESTORIL) 30 mg capsule 30 mg daily at bedtime  , Starting Tue 11/28/2017, Historical Med           No discharge procedures on file      ED Provider  Electronically Signed by           Dilip Beverly MD  12/13/18 1910

## 2018-12-09 NOTE — ED NOTES
Provided food/drink for PO challenge; patient is reluctant to try   Encourage her to try her best     Ignacoi Oakley RN  12/09/18 3616

## 2019-01-27 ENCOUNTER — APPOINTMENT (EMERGENCY)
Dept: CT IMAGING | Facility: HOSPITAL | Age: 64
End: 2019-01-27
Payer: MEDICARE

## 2019-01-27 ENCOUNTER — HOSPITAL ENCOUNTER (EMERGENCY)
Facility: HOSPITAL | Age: 64
Discharge: HOME | End: 2019-01-27
Attending: EMERGENCY MEDICINE
Payer: MEDICARE

## 2019-01-27 VITALS
DIASTOLIC BLOOD PRESSURE: 70 MMHG | BODY MASS INDEX: 24.03 KG/M2 | HEART RATE: 72 BPM | RESPIRATION RATE: 16 BRPM | TEMPERATURE: 98.4 F | WEIGHT: 139.99 LBS | SYSTOLIC BLOOD PRESSURE: 140 MMHG | OXYGEN SATURATION: 94 %

## 2019-01-27 DIAGNOSIS — R33.8 ACUTE URINARY RETENTION: Primary | ICD-10-CM

## 2019-01-27 DIAGNOSIS — K59.00 CONSTIPATION: ICD-10-CM

## 2019-01-27 LAB
ALBUMIN SERPL BCP-MCNC: 3.5 G/DL (ref 3.5–5)
ALP SERPL-CCNC: 87 U/L (ref 46–116)
ALT SERPL W P-5'-P-CCNC: 9 U/L (ref 12–78)
ANION GAP SERPL CALCULATED.3IONS-SCNC: 7 MMOL/L (ref 4–13)
AST SERPL W P-5'-P-CCNC: 16 U/L (ref 5–45)
BASOPHILS # BLD AUTO: 0.05 THOUSANDS/ΜL (ref 0–0.1)
BASOPHILS NFR BLD AUTO: 1 % (ref 0–1)
BILIRUB DIRECT SERPL-MCNC: 0.06 MG/DL (ref 0–0.2)
BILIRUB SERPL-MCNC: 0.2 MG/DL (ref 0.2–1)
BILIRUB UR QL STRIP: NEGATIVE
BUN SERPL-MCNC: 10 MG/DL (ref 5–25)
CALCIUM SERPL-MCNC: 8.7 MG/DL (ref 8.3–10.1)
CHLORIDE SERPL-SCNC: 98 MMOL/L (ref 100–108)
CLARITY UR: CLEAR
CO2 SERPL-SCNC: 30 MMOL/L (ref 21–32)
COLOR UR: YELLOW
CREAT SERPL-MCNC: 1.02 MG/DL (ref 0.6–1.3)
EOSINOPHIL # BLD AUTO: 0.13 THOUSAND/ΜL (ref 0–0.61)
EOSINOPHIL NFR BLD AUTO: 2 % (ref 0–6)
ERYTHROCYTE [DISTWIDTH] IN BLOOD BY AUTOMATED COUNT: 13.4 % (ref 11.6–15.1)
GFR SERPL CREATININE-BSD FRML MDRD: 59 ML/MIN/1.73SQ M
GLUCOSE SERPL-MCNC: 84 MG/DL (ref 65–140)
GLUCOSE UR STRIP-MCNC: NEGATIVE MG/DL
HCT VFR BLD AUTO: 35.1 % (ref 34.8–46.1)
HGB BLD-MCNC: 11.3 G/DL (ref 11.5–15.4)
HGB UR QL STRIP.AUTO: NEGATIVE
IMM GRANULOCYTES # BLD AUTO: 0.02 THOUSAND/UL (ref 0–0.2)
IMM GRANULOCYTES NFR BLD AUTO: 0 % (ref 0–2)
KETONES UR STRIP-MCNC: NEGATIVE MG/DL
LEUKOCYTE ESTERASE UR QL STRIP: NEGATIVE
LYMPHOCYTES # BLD AUTO: 1.48 THOUSANDS/ΜL (ref 0.6–4.47)
LYMPHOCYTES NFR BLD AUTO: 26 % (ref 14–44)
MCH RBC QN AUTO: 28.9 PG (ref 26.8–34.3)
MCHC RBC AUTO-ENTMCNC: 32.2 G/DL (ref 31.4–37.4)
MCV RBC AUTO: 90 FL (ref 82–98)
MONOCYTES # BLD AUTO: 0.44 THOUSAND/ΜL (ref 0.17–1.22)
MONOCYTES NFR BLD AUTO: 8 % (ref 4–12)
NEUTROPHILS # BLD AUTO: 3.52 THOUSANDS/ΜL (ref 1.85–7.62)
NEUTS SEG NFR BLD AUTO: 63 % (ref 43–75)
NITRITE UR QL STRIP: NEGATIVE
NRBC BLD AUTO-RTO: 0 /100 WBCS
PH UR STRIP.AUTO: 6 [PH] (ref 4.5–8)
PLATELET # BLD AUTO: 170 THOUSANDS/UL (ref 149–390)
PMV BLD AUTO: 9.8 FL (ref 8.9–12.7)
POTASSIUM SERPL-SCNC: 3.8 MMOL/L (ref 3.5–5.3)
PROT SERPL-MCNC: 7 G/DL (ref 6.4–8.2)
PROT UR STRIP-MCNC: NEGATIVE MG/DL
RBC # BLD AUTO: 3.91 MILLION/UL (ref 3.81–5.12)
SODIUM SERPL-SCNC: 135 MMOL/L (ref 136–145)
SP GR UR STRIP.AUTO: 1.01 (ref 1–1.03)
UROBILINOGEN UR QL STRIP.AUTO: 0.2 E.U./DL
WBC # BLD AUTO: 5.64 THOUSAND/UL (ref 4.31–10.16)

## 2019-01-27 PROCEDURE — 74177 CT ABD & PELVIS W/CONTRAST: CPT

## 2019-01-27 PROCEDURE — 96372 THER/PROPH/DIAG INJ SC/IM: CPT

## 2019-01-27 PROCEDURE — 96361 HYDRATE IV INFUSION ADD-ON: CPT

## 2019-01-27 PROCEDURE — 80076 HEPATIC FUNCTION PANEL: CPT | Performed by: PHYSICIAN ASSISTANT

## 2019-01-27 PROCEDURE — 80048 BASIC METABOLIC PNL TOTAL CA: CPT | Performed by: PHYSICIAN ASSISTANT

## 2019-01-27 PROCEDURE — 81003 URINALYSIS AUTO W/O SCOPE: CPT | Performed by: PHYSICIAN ASSISTANT

## 2019-01-27 PROCEDURE — 85025 COMPLETE CBC W/AUTO DIFF WBC: CPT | Performed by: PHYSICIAN ASSISTANT

## 2019-01-27 PROCEDURE — 99284 EMERGENCY DEPT VISIT MOD MDM: CPT

## 2019-01-27 PROCEDURE — 96374 THER/PROPH/DIAG INJ IV PUSH: CPT

## 2019-01-27 PROCEDURE — 36415 COLL VENOUS BLD VENIPUNCTURE: CPT | Performed by: PHYSICIAN ASSISTANT

## 2019-01-27 RX ORDER — HYDROMORPHONE HCL/PF 1 MG/ML
0.5 SYRINGE (ML) INJECTION ONCE
Status: COMPLETED | OUTPATIENT
Start: 2019-01-27 | End: 2019-01-27

## 2019-01-27 RX ORDER — FENTANYL CITRATE 50 UG/ML
50 INJECTION, SOLUTION INTRAMUSCULAR; INTRAVENOUS ONCE
Status: DISCONTINUED | OUTPATIENT
Start: 2019-01-27 | End: 2019-01-27

## 2019-01-27 RX ORDER — QUETIAPINE FUMARATE 100 MG/1
150 TABLET, FILM COATED ORAL
Status: ON HOLD | COMMUNITY
End: 2019-11-03

## 2019-01-27 RX ADMIN — HYDROMORPHONE HYDROCHLORIDE 0.5 MG: 1 INJECTION, SOLUTION INTRAMUSCULAR; INTRAVENOUS; SUBCUTANEOUS at 20:52

## 2019-01-27 RX ADMIN — IOHEXOL 100 ML: 350 INJECTION, SOLUTION INTRAVENOUS at 21:20

## 2019-01-27 RX ADMIN — SODIUM CHLORIDE 1000 ML: 0.9 INJECTION, SOLUTION INTRAVENOUS at 20:52

## 2019-01-27 RX ADMIN — HYDROMORPHONE HYDROCHLORIDE 0.5 MG: 1 INJECTION, SOLUTION INTRAMUSCULAR; INTRAVENOUS; SUBCUTANEOUS at 18:42

## 2019-01-27 NOTE — ED PROVIDER NOTES
History  Chief Complaint   Patient presents with    Urinary Retention     last void yesterday morning, co of pain      60 y/o female here for acute urinary retention  Last void was yesterday morning  Increasing pain since then  This has been a recurrent issue for her  She was previously told that it was likely due to her extensive abdominal surgical history and subsequent adhesions  Abdominal pain is in suprapubic region getting progressively worse  Denies fevers, chills, n/v  Normal bowel movements  Continues to have flatulence  She states she has been hospitalized in the past for her urinary retention          History provided by:  Patient   used: No    Difficulty Urinating   Pain quality:  Stabbing  Pain severity:  Moderate  Onset quality:  Sudden  Duration:  1 day  Timing:  Constant  Progression:  Worsening  Chronicity:  Recurrent  Recent urinary tract infections: no    Relieved by:  Nothing  Worsened by:  Nothing  Ineffective treatments:  None tried  Urinary symptoms: no discolored urine, no foul-smelling urine, no frequent urination, no hematuria and no bladder incontinence    Urinary symptoms comment:  Retention  Associated symptoms: abdominal pain    Associated symptoms: no fever, no flank pain, no genital lesions, no nausea, no vaginal discharge and no vomiting        Prior to Admission Medications   Prescriptions Last Dose Informant Patient Reported? Taking?    HYDROmorphone (DILAUDID) 4 mg tablet 1/27/2019 at Unknown time  Yes Yes   Sig: Take 4 mg by mouth 4 (four) times a day   Morphine Sulfate ER 30 MG TBEA 1/27/2019 at Unknown time Self Yes Yes   Sig: Take 30 mg by mouth 3 (three) times a day     QUEtiapine (SEROquel) 100 mg tablet 1/26/2019 at Unknown time  Yes Yes   Sig: Take 100 mg by mouth daily at bedtime   cetirizine (ZyrTEC) 10 mg tablet 1/27/2019 at Unknown time  Yes Yes   diazepam (VALIUM) 5 mg tablet 1/27/2019 at Unknown time  Yes Yes   Sig: 3 (three) times a day Morning, 3pm and 8pm   escitalopram (LEXAPRO) 20 mg tablet 2019 at Unknown time  Yes Yes   Sig: daily  fluticasone (FLOVENT HFA) 220 mcg/act inhaler 2019 at Unknown time  Yes Yes   Sig: Inhale 2 puffs 2 (two) times a day Rinse mouth after use    gabapentin (NEURONTIN) 300 mg capsule 2019 at Unknown time  Yes Yes   Sig: take 2 capsules by mouth three times a day   naproxen (NAPROSYN) 500 mg tablet 2019 at Unknown time  Yes Yes   Sig: Take 500 mg by mouth every 12 (twelve) hours as needed   nystatin (MYCOSTATIN) 100,000 units/mL suspension 2019 at Unknown time  Yes Yes   Sig: Apply 500,000 Units to the mouth or throat 4 (four) times a day   ondansetron (ZOFRAN) 4 mg tablet 2019 at Unknown time  No Yes   Sig: Take 1 tablet (4 mg total) by mouth every 6 (six) hours   polyethylene glycol (MIRALAX) 17 g packet 2019 at Unknown time  No Yes   Sig: Take 17 g by mouth daily   promethazine (PHENERGAN) 12 5 mg/10 mL syrup 2019 at Unknown time  Yes Yes   Sig: Take 5 mL by mouth 4 (four) times a day as needed for nausea or vomiting   temazepam (RESTORIL) 30 mg capsule 2019 at Unknown time  Yes Yes   Si mg daily at bedtime        Facility-Administered Medications: None       Past Medical History:   Diagnosis Date    Asthma     Chronic back pain        Past Surgical History:   Procedure Laterality Date    ABDOMINAL SURGERY      x 25    BACK SURGERY      x 3    COLONOSCOPY N/A 2018    Procedure: COLONOSCOPY;  Surgeon: Shreya Nguyen MD;  Location: [facility]; Service: Gastroenterology       Family History   Problem Relation Age of Onset    Diabetes Mother      I have reviewed and agree with the history as documented      Social History   Substance Use Topics    Smoking status: Former Smoker     Quit date: 1980    Smokeless tobacco: Never Used    Alcohol use No      Comment: social drinker        Review of Systems   Constitutional: Negative for activity change, appetite change, chills, diaphoresis, fatigue, fever and unexpected weight change  HENT: Negative for congestion, rhinorrhea, sinus pressure, sore throat and trouble swallowing  Eyes: Negative for photophobia and visual disturbance  Respiratory: Negative for apnea, cough, choking, chest tightness, shortness of breath, wheezing and stridor  Cardiovascular: Negative for chest pain, palpitations and leg swelling  Gastrointestinal: Positive for abdominal pain  Negative for abdominal distention, blood in stool, constipation, diarrhea, nausea and vomiting  Genitourinary: Positive for difficulty urinating and dysuria  Negative for decreased urine volume, enuresis, flank pain, frequency, hematuria, urgency and vaginal discharge  Musculoskeletal: Negative for arthralgias, myalgias, neck pain and neck stiffness  Skin: Negative for color change, pallor, rash and wound  Allergic/Immunologic: Negative  Neurological: Negative for dizziness, tremors, syncope, weakness, light-headedness, numbness and headaches  Hematological: Negative  Psychiatric/Behavioral: Negative  All other systems reviewed and are negative  Physical Exam  Physical Exam   Constitutional: She is oriented to person, place, and time  She appears well-developed and well-nourished  Non-toxic appearance  She does not have a sickly appearance  She does not appear ill  No distress  HENT:   Head: Normocephalic and atraumatic  Eyes: Pupils are equal, round, and reactive to light  EOM and lids are normal    Neck: Normal range of motion  Neck supple  Cardiovascular: Normal rate, regular rhythm, S1 normal, S2 normal, normal heart sounds, intact distal pulses and normal pulses  Exam reveals no gallop, no distant heart sounds, no friction rub and no decreased pulses  No murmur heard  Pulses:       Radial pulses are 2+ on the right side, and 2+ on the left side     Pulmonary/Chest: Effort normal and breath sounds normal  No accessory muscle usage  No apnea, no tachypnea and no bradypnea  No respiratory distress  She has no decreased breath sounds  She has no wheezes  She has no rhonchi  She has no rales  Abdominal: Soft  Normal appearance  She exhibits no distension  There is tenderness in the suprapubic area  There is no rigidity, no rebound and no guarding  Musculoskeletal: Normal range of motion  She exhibits no edema, tenderness or deformity  Neurological: She is alert and oriented to person, place, and time  No cranial nerve deficit  GCS eye subscore is 4  GCS verbal subscore is 5  GCS motor subscore is 6  GCS 15  AAOx3  Ambulating in department without difficulty  CN II-XII grossly intact  No focal neuro deficits  Skin: Skin is warm, dry and intact  No rash noted  She is not diaphoretic  No erythema  No pallor  Psychiatric: Her speech is normal    Nursing note and vitals reviewed        Vital Signs  ED Triage Vitals [01/27/19 1615]   Temperature Pulse Respirations Blood Pressure SpO2   98 4 °F (36 9 °C) 96 16 107/70 98 %      Temp Source Heart Rate Source Patient Position - Orthostatic VS BP Location FiO2 (%)   Oral Monitor Sitting Right arm --      Pain Score       5           Vitals:    01/27/19 1915 01/27/19 2057 01/27/19 2200 01/27/19 2300   BP: 129/71 109/66 136/70 140/70   Pulse: 68 80 71 72   Patient Position - Orthostatic VS: Sitting  Sitting        Visual Acuity      ED Medications  Medications   sodium chloride 0 9 % bolus 1,000 mL (0 mL Intravenous Stopped 1/27/19 2230)   HYDROmorphone (DILAUDID) injection 0 5 mg (0 5 mg Intramuscular Given 1/27/19 1842)   HYDROmorphone (DILAUDID) injection 0 5 mg (0 5 mg Intravenous Given 1/27/19 2052)   iohexol (OMNIPAQUE) 350 MG/ML injection (MULTI-DOSE) 100 mL (100 mL Intravenous Given 1/27/19 2120)       Diagnostic Studies  Results Reviewed     Procedure Component Value Units Date/Time    Basic metabolic panel [[id number]]  (Abnormal) Collected:  01/27/19 2006    Lab Status:  Final result Specimen:  Blood from Arm, Left Updated:  01/27/19 2032     Sodium 135 (L) mmol/L      Potassium 3 8 mmol/L      Chloride 98 (L) mmol/L      CO2 30 mmol/L      ANION GAP 7 mmol/L      BUN 10 mg/dL      Creatinine 1 02 mg/dL      Glucose 84 mg/dL      Calcium 8 7 mg/dL      eGFR 59 ml/min/1 73sq m     Narrative:         National Kidney Disease Education Program recommendations are as follows:  GFR calculation is accurate only with a steady state creatinine  Chronic Kidney disease less than 60 ml/min/1 73 sq  meters  Kidney failure less than 15 ml/min/1 73 sq  meters      Hepatic function panel [[id number]]  (Abnormal) Collected:  01/27/19 2006    Lab Status:  Final result Specimen:  Blood from Arm, Left Updated:  01/27/19 2032     Total Bilirubin 0 20 mg/dL      Bilirubin, Direct 0 06 mg/dL      Alkaline Phosphatase 87 U/L      AST 16 U/L      ALT 9 (L) U/L      Total Protein 7 0 g/dL      Albumin 3 5 g/dL     CBC and differential [[id number]]  (Abnormal) Collected:  01/27/19 2006    Lab Status:  Final result Specimen:  Blood from Arm, Left Updated:  01/27/19 2013     WBC 5 64 Thousand/uL      RBC 3 91 Million/uL      Hemoglobin 11 3 (L) g/dL      Hematocrit 35 1 %      MCV 90 fL      MCH 28 9 pg      MCHC 32 2 g/dL      RDW 13 4 %      MPV 9 8 fL      Platelets 477 Thousands/uL      nRBC 0 /100 WBCs      Neutrophils Relative 63 %      Immat GRANS % 0 %      Lymphocytes Relative 26 %      Monocytes Relative 8 %      Eosinophils Relative 2 %      Basophils Relative 1 %      Neutrophils Absolute 3 52 Thousands/µL      Immature Grans Absolute 0 02 Thousand/uL      Lymphocytes Absolute 1 48 Thousands/µL      Monocytes Absolute 0 44 Thousand/µL      Eosinophils Absolute 0 13 Thousand/µL      Basophils Absolute 0 05 Thousands/µL     UA w Reflex to Microscopic w Reflex to Culture [[id number]] Collected:  01/27/19 1713    Lab Status:  Final result Specimen:  Urine from Urine, Clean Catch Updated:  01/27/19 1825     Color, UA Yellow     Clarity, UA Clear     Specific Gravity, UA 1 015     pH, UA 6 0     Leukocytes, UA Negative     Nitrite, UA Negative     Protein, UA Negative mg/dl      Glucose, UA Negative mg/dl      Ketones, UA Negative mg/dl      Urobilinogen, UA 0 2 E U /dl      Bilirubin, UA Negative     Blood, UA Negative                 CT abdomen pelvis with contrast   Final Result by Ebonie Muniz DO (01/27 2247)      Large amount of stool in the colon, suspicious for constipation but no discrete evidence of bowel obstruction  Patchy groundglass and alveolar opacities in the lung bases which could be secondary to an infectious or inflammatory pneumonitis depending on the clinical setting  Correlation with the patient's symptoms and laboratory values recommended  Bladder is collapsed around a bladder catheter  Some mild circumferential bladder wall thickening noted, probably exaggerated by underdistention although a superimposed cystitis could be considered in the appropriate clinical setting  The previously    seen hydroureteronephrosis is significantly intervally improved  Postsurgical changes in the spine redemonstrated  The hardware appears intact  Hepatic cysts, pancreatic atrophy with prominence of the biliary tree and proximal pancreatic duct, and other findings as above  Workstation performed: [id number]                    Procedures  Procedures       Phone Contacts  ED Phone Contact    ED Course  ED Course as of Jan 27 2344   Shankar Ponce Jan 27, 2019   1803 Continues to have severe pain after catheter placement  Will check CT to r/o other acute pathology aside from acute urinary retention  1842 Difficulty obtaining IV access  Will attempt US guided IV      2323 Patient now requesting the Stahl catheter be removed    I had a lengthy discussion with the patient in regards to the potential for again developing acute urinary retention and I explained that at any point she can developed this again and each time this occurs shaking developed acute renal failure, infection, sepsis  Pt insistent that she wants it removed  She states she will be able to see her urologist tomorrow in office if she calls and requests to  She understands that if for some reason she is unable to be seen and she begins retaining urine she will need to return to ER>                                MDM  Number of Diagnoses or Management Options  Acute urinary retention: new and requires workup  Constipation: new and requires workup  Diagnosis management comments: DDX including but not limited to: urinary retention, reaction to anesthesia, BPH, hematuria, UTI, tumor, neurologic etiology  Plan: Bladder scan  Wang catheter if confirmed to be retaining  IV, labs, fluid bolus  Analgesia  dispo pending  Amount and/or Complexity of Data Reviewed  Clinical lab tests: ordered and reviewed  Tests in the radiology section of CPT®: reviewed and ordered  Independent visualization of images, tracings, or specimens: yes    Risk of Complications, Morbidity, and/or Mortality  Presenting problems: moderate  Management options: low  General comments: 71-year-old female patient presents with urinary retention, abdominal pain  Urinary retention confirmed by bladder scan  Wang placed  Retention resolved  Continued to have severe pain  Labs unremarkable  CT obtained revealed constipation, no obstruction  Do not suspect pneumonia, she has no symptoms of pneumonia  No leukocytosis  She is now resting comfortably  Recommended pt keep wang catheter in until urology follow up but pt is adamant to have it taken out  Discussed risks and benefits  She chose to have it removed  Return parameters provided  Pt understands and agrees with plan        Patient Progress  Patient progress: stable    CritCare Time    Disposition  Final diagnoses:   Acute urinary retention   Constipation     Time reflects when diagnosis was documented in both MDM as applicable and the Disposition within this note     Time User Action Codes Description Comment    1/27/2019 11:03 PM Danella Duane L Add [R33 8] Acute urinary retention     1/27/2019 11:03 PM Nadine Campos Add [K59 00] Constipation       ED Disposition     ED Disposition Condition Comment    Discharge  Graham Fair discharge to home/self care  Condition at discharge: Good        Follow-up Information     Follow up With Specialties Details Why Contact Info Additional Information    Your urologist         [address] [place] [address] [facility] Call in 1 day if unable to follow up with urologist [id number] [facility] [phone]  70  [facility], [address], [facility], [place], [address]          Discharge Medication List as of 1/27/2019 11:05 PM      CONTINUE these medications which have NOT CHANGED    Details   cetirizine (ZyrTEC) 10 mg tablet Starting Mon 10/23/2017, Historical Med      diazepam (VALIUM) 5 mg tablet 3 (three) times a day Morning, 3pm and 8pm, Starting Fri 11/3/2017, Historical Med      escitalopram (LEXAPRO) 20 mg tablet daily    , Historical Med      fluticasone (FLOVENT HFA) 220 mcg/act inhaler Inhale 2 puffs 2 (two) times a day Rinse mouth after use , Historical Med      gabapentin (NEURONTIN) 300 mg capsule take 2 capsules by mouth three times a day, Historical Med      HYDROmorphone (DILAUDID) 4 mg tablet Take 4 mg by mouth 4 (four) times a day, Historical Med      Morphine Sulfate ER 30 MG TBEA Take 30 mg by mouth 3 (three) times a day  , Historical Med      naproxen (NAPROSYN) 500 mg tablet Take 500 mg by mouth every 12 (twelve) hours as needed, Starting Fri 8/3/2018, Historical Med      nystatin (MYCOSTATIN) 100,000 units/mL suspension Apply 500,000 Units to the mouth or throat 4 (four) times a day, Historical Med      ondansetron (ZOFRAN) 4 mg tablet Take 1 tablet (4 mg total) by mouth every 6 (six) hours, Starting Sun 12/9/2018, Normal      polyethylene glycol (MIRALAX) 17 g packet Take 17 g by mouth daily, Starting Thu 12/6/2018, Normal      promethazine (PHENERGAN) 12 5 mg/10 mL syrup Take 5 mL by mouth 4 (four) times a day as needed for nausea or vomiting, Historical Med      QUEtiapine (SEROquel) 100 mg tablet Take 100 mg by mouth daily at bedtime, Historical Med      temazepam (RESTORIL) 30 mg capsule 30 mg daily at bedtime  , Starting Tue 11/28/2017, Historical Med           No discharge procedures on file      ED Provider  Electronically Signed by           Katie Fischer PA-C  01/27/19 0212

## 2019-01-27 NOTE — ED NOTES
Reema Roy at bedside to attempt iv access   Attempted by 3 nurses prior to his arrival     Anita Hess, [address] [facility]  01/27/19 7581

## 2019-01-28 ENCOUNTER — TELEPHONE (OUTPATIENT)
Dept: UROLOGY | Facility: MEDICAL CENTER | Age: 64
End: 2019-01-28

## 2019-01-28 NOTE — TELEPHONE ENCOUNTER
New patient  Patient seen in ER on 1/27/19 and was diagnosed with urinary retention and constipation  Patient reported to ER that this has happened before  Patient had catheter placed  Patient should be scheduled within 4 weeks  Patient scheduled for 2/14/19 at 11:15 with Dr Smith at the [facility] office   Please call and confirm appointment

## 2019-01-28 NOTE — DISCHARGE INSTRUCTIONS
Return to the Emergency Department sooner if increased difficulty urinating, pain, fever, vomiting, bleeding  Acute Urinary Retention in Women   WHAT YOU NEED TO KNOW:   Acute urinary retention (AUR) is when your bladder is full, but you cannot urinate  This condition happens suddenly, gets worse quickly, and lasts a short time  DISCHARGE INSTRUCTIONS:   Medicines:   · Antibiotics  help treat or prevent a bacterial infection  · Take your medicine as directed  Contact your healthcare provider if you think your medicine is not helping or if you have side effects  Tell him if you are allergic to any medicine  Keep a list of the medicines, vitamins, and herbs you take  Include the amounts, and when and why you take them  Bring the list or the pill bottles to follow-up visits  Carry your medicine list with you in case of an emergency  Stahl catheter care: You may need a Stahl catheter while you are at home  Healthcare providers will give you a smaller leg bag to collect urine  Keep the bag below your waist  This will prevent urine from flowing back into your bladder and causing an infection or other problems  Also, keep the tube free of kinks so the urine will drain properly  Do not pull on the catheter  This can cause pain and bleeding, and may cause the catheter to come out  Ask your healthcare provider for more information on Stahl catheter care  Follow up with your healthcare provider as directed:  Write down your questions so you remember to ask them during your visits  Contact your healthcare provider if:   · You have a fever  · You have pain when you urinate  · You see blood in your urine  · You have problems with your catheter  · You have questions or concerns about your condition or care  Return to the emergency department if:   · You have severe abdominal pain      · You are breathing faster than usual     · Your heartbeat is faster than usual     · Your face, hands, feet, or ankles are swollen  © 2017 [facility] [facility] Information is for End User's use only and may not be sold, redistributed or otherwise used for commercial purposes  All illustrations and images included in CareNotes® are the copyrighted property of A D A M , Inc  or [employer]  The above information is an  only  It is not intended as medical advice for individual conditions or treatments  Talk to your doctor, nurse or pharmacist before following any medical regimen to see if it is safe and effective for you

## 2019-01-28 NOTE — ED NOTES
Pt states that she does not want to go home with wang in place  JOLENE Dunham made aware and at bedside        Juan Manuel Marx, YOHANA  01/27/19 6130

## 2019-01-29 NOTE — TELEPHONE ENCOUNTER
Called patient left message for appointment in [facility] on 02/14/2019 at 11:15 am   Advised patient message if any issues to return call at [phone]

## 2019-01-31 ENCOUNTER — TELEPHONE (OUTPATIENT)
Dept: SURGERY | Facility: CLINIC | Age: 64
End: 2019-01-31

## 2019-01-31 NOTE — TELEPHONE ENCOUNTER
ED followup is the appropriate action  Most likely this is related to chronic narcotic use and constipation as it had been on the previous visit  But ED would be the best place to be checked out

## 2019-01-31 NOTE — TELEPHONE ENCOUNTER
Pt in a lot of pain-   Please advise    Severe pain, stomach extremely bloated    Can not urinate or poop ( for 3 days)    Went to er recently- they did ct scan, came back normal    She thinks something major is going on internally and hospital    Pt said she saw Gina Stanford in the hospital (progress not form 12/04/18 from [facility])

## 2019-01-31 NOTE — TELEPHONE ENCOUNTER
Vero Romo,    I called the pt and she informed me that she has had 25 prior open Laparotomies with [facility] in the past for Adhesions  She stated she is in a great deal of pain  Has not been able to have a bowel movement or urinate in 3 days  She went to ED where CT-Scan was done  She stated they told her Ct was normal for her to f/u with Urology  I see she has an appointment coming up  On 02/14/19  I scheduled her with you on 02/06/2019  I did advise her that if she is still in a great deal of pain and has not used the restroom at all that she should go to the ED immediately  Is there anything else that you would advise at this time?

## 2019-02-01 ENCOUNTER — HOSPITAL ENCOUNTER (EMERGENCY)
Facility: HOSPITAL | Age: 64
Discharge: HOME | End: 2019-02-01
Attending: EMERGENCY MEDICINE
Payer: MEDICARE

## 2019-02-01 VITALS
DIASTOLIC BLOOD PRESSURE: 80 MMHG | TEMPERATURE: 97.9 F | OXYGEN SATURATION: 93 % | HEART RATE: 84 BPM | BODY MASS INDEX: 23.9 KG/M2 | SYSTOLIC BLOOD PRESSURE: 142 MMHG | RESPIRATION RATE: 20 BRPM | HEIGHT: 64 IN | WEIGHT: 139.99 LBS

## 2019-02-01 DIAGNOSIS — Z46.6 ENCOUNTER FOR FOLEY CATHETER REMOVAL: Primary | ICD-10-CM

## 2019-02-01 PROCEDURE — 99283 EMERGENCY DEPT VISIT LOW MDM: CPT

## 2019-02-01 NOTE — ED NOTES
Patient came in with a 16 Green[place]ic wang placed a another facility, requested that is be removed  MD placed orders and orders were followed with wang being removed       Paul Leon RN  02/01/19 9121

## 2019-02-01 NOTE — ED PROVIDER NOTES
History  Chief Complaint   Patient presents with    Urinary Catheter Problem     Pt presents for removal of wang, pt states she had placed at [facility] 24 hours ago and was told to get it removed in 24 hours and was not able to be seen by urology  Placed for rentention  HPI  71-year-old female with history of multiple abdominal surgeries and intermittent urinary retention presents to the emergency department requesting Wang catheter removal   Patient states that she was seen at [facility] yesterday with 4 days of urinary retention and have Wang catheter placed  She reports being told to follow up in 24 hr for Wang catheter removal, but she was unable to get an appointment with urologist, so she presents to the emergency department today  She denies any complaints at this time aside from discomfort from the catheter  Explained to patient that Wang catheter should remain in place until outpatient follow-up with Urology, as she had retention that did require catheter placement  She requests that catheter be removed and states that she will return if retention recurs  Review of recent records reveals that patient was in the emergency department on 01/27 with urinary retention  She had a catheter placed at that time, as well, but did not want to go home with a catheter in place  Prior to Admission Medications   Prescriptions Last Dose Informant Patient Reported? Taking? HYDROmorphone (DILAUDID) 4 mg tablet   Yes No   Sig: Take 4 mg by mouth 4 (four) times a day   Morphine Sulfate ER 30 MG TBEA  Self Yes No   Sig: Take 30 mg by mouth 3 (three) times a day     QUEtiapine (SEROquel) 100 mg tablet   Yes No   Sig: Take 100 mg by mouth daily at bedtime   cetirizine (ZyrTEC) 10 mg tablet   Yes No   diazepam (VALIUM) 5 mg tablet   Yes No   Sig: 3 (three) times a day Morning, 3pm and 8pm   escitalopram (LEXAPRO) 20 mg tablet   Yes No   Sig: daily       fluticasone (FLOVENT HFA) 220 mcg/act inhaler   Yes No   Sig: Inhale 2 puffs 2 (two) times a day Rinse mouth after use    gabapentin (NEURONTIN) 300 mg capsule   Yes No   Sig: take 2 capsules by mouth three times a day   naproxen (NAPROSYN) 500 mg tablet   Yes No   Sig: Take 500 mg by mouth every 12 (twelve) hours as needed   nystatin (MYCOSTATIN) 100,000 units/mL suspension   Yes No   Sig: Apply 500,000 Units to the mouth or throat 4 (four) times a day   ondansetron (ZOFRAN) 4 mg tablet   No No   Sig: Take 1 tablet (4 mg total) by mouth every 6 (six) hours   polyethylene glycol (MIRALAX) 17 g packet   No No   Sig: Take 17 g by mouth daily   promethazine (PHENERGAN) 12 5 mg/10 mL syrup   Yes No   Sig: Take 5 mL by mouth 4 (four) times a day as needed for nausea or vomiting   temazepam (RESTORIL) 30 mg capsule   Yes No   Si mg daily at bedtime        Facility-Administered Medications: None       Past Medical History:   Diagnosis Date    Asthma     Chronic back pain        Past Surgical History:   Procedure Laterality Date    ABDOMINAL SURGERY      x 25    BACK SURGERY      x 3    COLONOSCOPY N/A 2018    Procedure: COLONOSCOPY;  Surgeon: Eleonora Young MD;  Location: [facility]; Service: Gastroenterology       Family History   Problem Relation Age of Onset    Diabetes Mother      I have reviewed and agree with the history as documented  Social History   Substance Use Topics    Smoking status: Former Smoker     Quit date: 1980    Smokeless tobacco: Never Used    Alcohol use No      Comment: social drinker        Review of Systems   Constitutional: Negative for chills and fever  Respiratory: Negative for shortness of breath  Gastrointestinal: Negative for abdominal pain, nausea and vomiting  Genitourinary:        Catheter in place   Musculoskeletal: Negative for arthralgias and joint swelling  Skin: Negative for rash and wound  Allergic/Immunologic: Negative for immunocompromised state     Neurological: Negative for headaches  Psychiatric/Behavioral: The patient is not nervous/anxious  All other systems reviewed and are negative  Physical Exam  Physical Exam   Constitutional: She is oriented to person, place, and time  She appears well-nourished  No distress  HENT:   Head: Normocephalic and atraumatic  Eyes: EOM are normal    Neck: Normal range of motion  Neck supple  Cardiovascular: Normal rate and regular rhythm  Pulmonary/Chest: Effort normal and breath sounds normal  No respiratory distress  Abdominal: Soft  She exhibits no distension  There is no tenderness  Mild diffuse abdominal discomfort, patient reports significantly improved since yesterday  No guarding or rebound  Musculoskeletal: Normal range of motion  Neurological: She is alert and oriented to person, place, and time  Skin: Skin is warm and dry  She is not diaphoretic  Psychiatric: She has a normal mood and affect  Her behavior is normal    Nursing note and vitals reviewed        Vital Signs  ED Triage Vitals   Temperature Pulse Respirations Blood Pressure SpO2   02/01/19 1001 02/01/19 0958 02/01/19 0958 02/01/19 1000 02/01/19 0958   97 9 °F (36 6 °C) 84 20 142/80 93 %      Temp Source Heart Rate Source Patient Position - Orthostatic VS BP Location FiO2 (%)   02/01/19 1001 02/01/19 0958 02/01/19 0958 02/01/19 0958 --   Oral Monitor Sitting Right arm       Pain Score       02/01/19 0958       No Pain           Vitals:    02/01/19 0958 02/01/19 1000   BP:  142/80   Pulse: 84    Patient Position - Orthostatic VS: Sitting        Visual Acuity      ED Medications  Medications - No data to display    Diagnostic Studies  Results Reviewed     None                 No orders to display              Procedures  Procedures       Phone Contacts  ED Phone Contact    ED Course                               MDM    Disposition  Final diagnoses:   Encounter for Stahl catheter removal     Time reflects when diagnosis was documented in both MDM as applicable and the Disposition within this note     Time User Action Codes Description Comment    2/1/2019 10:17 AM Jose Bowen [Z46 [id number]] Encounter for Stahl catheter removal       ED Disposition     ED Disposition Condition Date/Time Comment    Discharge  Fri Feb 1, 2019 10:17 AM Ankit David discharge to home/self care  Condition at discharge: Good        Follow-up Information     Follow up With Specialties Details Why [address] For [facility]rology [place] [facility] Schedule an appointment as soon as possible for a visit  [address]  [address] [facility] [id number]  [address] [place] [address] For [facility] [place], [address],  [address], [facility], [place], [address]          Discharge Medication List as of 2/1/2019 10:18 AM      CONTINUE these medications which have NOT CHANGED    Details   cetirizine (ZyrTEC) 10 mg tablet Starting Mon 10/23/2017, Historical Med      diazepam (VALIUM) 5 mg tablet 3 (three) times a day Morning, 3pm and 8pm, Starting Fri 11/3/2017, Historical Med      escitalopram (LEXAPRO) 20 mg tablet daily    , Historical Med      fluticasone (FLOVENT HFA) 220 mcg/act inhaler Inhale 2 puffs 2 (two) times a day Rinse mouth after use , Historical Med      gabapentin (NEURONTIN) 300 mg capsule take 2 capsules by mouth three times a day, Historical Med      HYDROmorphone (DILAUDID) 4 mg tablet Take 4 mg by mouth 4 (four) times a day, Historical Med      Morphine Sulfate ER 30 MG TBEA Take 30 mg by mouth 3 (three) times a day  , Historical Med      naproxen (NAPROSYN) 500 mg tablet Take 500 mg by mouth every 12 (twelve) hours as needed, Starting Fri 8/3/2018, Historical Med      nystatin (MYCOSTATIN) 100,000 units/mL suspension Apply 500,000 Units to the mouth or throat 4 (four) times a day, Historical Med      ondansetron (ZOFRAN) 4 mg tablet Take 1 tablet (4 mg total) by mouth every 6 (six) hours, Starting Sun 12/9/2018, Normal      polyethylene glycol (MIRALAX) 17 g packet Take 17 g by mouth daily, Starting u 12/6/2018, Normal      promethazine (PHENERGAN) 12 5 mg/10 mL syrup Take 5 mL by mouth 4 (four) times a day as needed for nausea or vomiting, Historical Med      QUEtiapine (SEROquel) 100 mg tablet Take 100 mg by mouth daily at bedtime, Historical Med      temazepam (RESTORIL) 30 mg capsule 30 mg daily at bedtime  , Starting Tue 11/28/2017, Historical Med           No discharge procedures on file      ED Provider  Electronically Signed by           Kay Barrios MD  02/06/19 2028

## 2019-02-01 NOTE — DISCHARGE INSTRUCTIONS
Acute Urinary Retention in Women   WHAT YOU NEED TO KNOW:   Acute urinary retention (AUR) is when your bladder is full, but you cannot urinate  This condition happens suddenly, gets worse quickly, and lasts a short time  DISCHARGE INSTRUCTIONS:   Medicines:   · Antibiotics  help treat or prevent a bacterial infection  · Take your medicine as directed  Contact your healthcare provider if you think your medicine is not helping or if you have side effects  Tell him if you are allergic to any medicine  Keep a list of the medicines, vitamins, and herbs you take  Include the amounts, and when and why you take them  Bring the list or the pill bottles to follow-up visits  Carry your medicine list with you in case of an emergency  Stahl catheter care: You may need a Stahl catheter while you are at home  Healthcare providers will give you a smaller leg bag to collect urine  Keep the bag below your waist  This will prevent urine from flowing back into your bladder and causing an infection or other problems  Also, keep the tube free of kinks so the urine will drain properly  Do not pull on the catheter  This can cause pain and bleeding, and may cause the catheter to come out  Ask your healthcare provider for more information on Stahl catheter care  Follow up with your healthcare provider as directed:  Write down your questions so you remember to ask them during your visits  Contact your healthcare provider if:   · You have a fever  · You have pain when you urinate  · You see blood in your urine  · You have problems with your catheter  · You have questions or concerns about your condition or care  Return to the emergency department if:   · You have severe abdominal pain  · You are breathing faster than usual     · Your heartbeat is faster than usual     · Your face, hands, feet, or ankles are swollen    © 2017 [address] [employer] Information is for End User's use only and may not be sold, redistributed or otherwise used for commercial purposes  All illustrations and images included in CareNotes® are the copyrighted property of A D A M , Inc  or [employer]  The above information is an  only  It is not intended as medical advice for individual conditions or treatments  Talk to your doctor, nurse or pharmacist before following any medical regimen to see if it is safe and effective for you

## 2019-02-26 ENCOUNTER — HOSPITAL ENCOUNTER (INPATIENT)
Facility: HOSPITAL | Age: 64
LOS: 3 days | Discharge: LEFT BEFORE DISCHARGE | DRG: 371 | End: 2019-03-01
Attending: EMERGENCY MEDICINE | Admitting: INTERNAL MEDICINE
Payer: MEDICARE

## 2019-02-26 ENCOUNTER — APPOINTMENT (EMERGENCY)
Dept: CT IMAGING | Facility: HOSPITAL | Age: 64
DRG: 371 | End: 2019-02-26
Payer: MEDICARE

## 2019-02-26 DIAGNOSIS — G93.41 ACUTE METABOLIC ENCEPHALOPATHY: ICD-10-CM
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
## 2019-02-26 DIAGNOSIS — R19.7 DIARRHEA: ICD-10-CM

## 2019-02-26 DIAGNOSIS — R93.89 ABNORMAL CT OF THE CHEST: ICD-10-CM

## 2019-02-26 DIAGNOSIS — K51.00 PANCOLITIS (HCC): ICD-10-CM

## 2019-02-26 DIAGNOSIS — N17.9 ACUTE RENAL FAILURE (ARF) (HCC): ICD-10-CM

## 2019-02-26 DIAGNOSIS — J18.9 MULTIFOCAL PNEUMONIA: ICD-10-CM

## 2019-02-26 DIAGNOSIS — E86.0 DEHYDRATION: ICD-10-CM

## 2019-02-26 DIAGNOSIS — N13.30 HYDRONEPHROSIS: ICD-10-CM

## 2019-02-26 DIAGNOSIS — E87.6 HYPOKALEMIA: ICD-10-CM

## 2019-02-26 PROBLEM — F32.A DEPRESSION: Status: ACTIVE | Noted: 2019-02-26

## 2019-02-26 PROBLEM — M62.82 RHABDOMYOLYSIS: Status: ACTIVE | Noted: 2019-02-26

## 2019-02-26 PROBLEM — F11.20 OPIOID DEPENDENCE (HCC): Status: ACTIVE | Noted: 2019-02-26

## 2019-02-26 LAB
ALBUMIN SERPL BCP-MCNC: 3.1 G/DL (ref 3.5–5)
ALP SERPL-CCNC: 101 U/L (ref 46–116)
ALT SERPL W P-5'-P-CCNC: 39 U/L (ref 12–78)
AMMONIA PLAS-SCNC: 29 UMOL/L (ref 11–35)
ANION GAP SERPL CALCULATED.3IONS-SCNC: 15 MMOL/L (ref 4–13)
APTT PPP: 30 SECONDS (ref 26–38)
AST SERPL W P-5'-P-CCNC: 60 U/L (ref 5–45)
ATRIAL RATE: 87 BPM
BASOPHILS # BLD AUTO: 0.03 THOUSANDS/ΜL (ref 0–0.1)
BASOPHILS NFR BLD AUTO: 0 % (ref 0–1)
BILIRUB DIRECT SERPL-MCNC: 0.09 MG/DL (ref 0–0.2)
BILIRUB SERPL-MCNC: 0.4 MG/DL (ref 0.2–1)
BILIRUB UR QL STRIP: NEGATIVE
BUN SERPL-MCNC: 66 MG/DL (ref 5–25)
CALCIUM SERPL-MCNC: 9.5 MG/DL (ref 8.3–10.1)
CHLORIDE SERPL-SCNC: 90 MMOL/L (ref 100–108)
CK MB SERPL-MCNC: 2.8 NG/ML (ref 0–5)
CK MB SERPL-MCNC: <1 % (ref 0–2.5)
CK SERPL-CCNC: 856 U/L (ref 26–192)
CLARITY UR: CLEAR
CO2 SERPL-SCNC: 24 MMOL/L (ref 21–32)
COLOR UR: YELLOW
CREAT SERPL-MCNC: 2.86 MG/DL (ref 0.6–1.3)
EOSINOPHIL # BLD AUTO: 0.03 THOUSAND/ΜL (ref 0–0.61)
EOSINOPHIL NFR BLD AUTO: 0 % (ref 0–6)
ERYTHROCYTE [DISTWIDTH] IN BLOOD BY AUTOMATED COUNT: 13 % (ref 11.6–15.1)
GFR SERPL CREATININE-BSD FRML MDRD: 17 ML/MIN/1.73SQ M
GLUCOSE SERPL-MCNC: 115 MG/DL (ref 65–140)
GLUCOSE UR STRIP-MCNC: NEGATIVE MG/DL
HCT VFR BLD AUTO: 36.8 % (ref 34.8–46.1)
HGB BLD-MCNC: 12.9 G/DL (ref 11.5–15.4)
HGB UR QL STRIP.AUTO: NEGATIVE
IMM GRANULOCYTES # BLD AUTO: 0.14 THOUSAND/UL (ref 0–0.2)
IMM GRANULOCYTES NFR BLD AUTO: 2 % (ref 0–2)
INR PPP: 1.09 (ref 0.86–1.17)
KETONES UR STRIP-MCNC: NEGATIVE MG/DL
LACTATE SERPL-SCNC: 1.2 MMOL/L (ref 0.5–2)
LEUKOCYTE ESTERASE UR QL STRIP: NEGATIVE
LIPASE SERPL-CCNC: 575 U/L (ref 73–393)
LYMPHOCYTES # BLD AUTO: 0.76 THOUSANDS/ΜL (ref 0.6–4.47)
LYMPHOCYTES NFR BLD AUTO: 9 % (ref 14–44)
MAGNESIUM SERPL-MCNC: 2 MG/DL (ref 1.6–2.6)
MCH RBC QN AUTO: 28.3 PG (ref 26.8–34.3)
MCHC RBC AUTO-ENTMCNC: 35.1 G/DL (ref 31.4–37.4)
MCV RBC AUTO: 81 FL (ref 82–98)
MONOCYTES # BLD AUTO: 1 THOUSAND/ΜL (ref 0.17–1.22)
MONOCYTES NFR BLD AUTO: 12 % (ref 4–12)
NEUTROPHILS # BLD AUTO: 6.68 THOUSANDS/ΜL (ref 1.85–7.62)
NEUTS SEG NFR BLD AUTO: 77 % (ref 43–75)
NITRITE UR QL STRIP: NEGATIVE
NRBC BLD AUTO-RTO: 0 /100 WBCS
NT-PROBNP SERPL-MCNC: 125 PG/ML
OSMOLALITY UR: 290 MMOL/KG
P AXIS: 45 DEGREES
PH UR STRIP.AUTO: 6 [PH] (ref 4.5–8)
PLATELET # BLD AUTO: 244 THOUSANDS/UL (ref 149–390)
PMV BLD AUTO: 11.5 FL (ref 8.9–12.7)
POTASSIUM SERPL-SCNC: 2.5 MMOL/L (ref 3.5–5.3)
PR INTERVAL: 172 MS
PROCALCITONIN SERPL-MCNC: 0.26 NG/ML
PROT SERPL-MCNC: 8 G/DL (ref 6.4–8.2)
PROT UR STRIP-MCNC: NEGATIVE MG/DL
PROTHROMBIN TIME: 14 SECONDS (ref 11.8–14.2)
QRS AXIS: 84 DEGREES
QRSD INTERVAL: 92 MS
QT INTERVAL: 494 MS
QTC INTERVAL: 594 MS
RBC # BLD AUTO: 4.56 MILLION/UL (ref 3.81–5.12)
SODIUM 24H UR-SCNC: 9 MOL/L
SODIUM SERPL-SCNC: 129 MMOL/L (ref 136–145)
SP GR UR STRIP.AUTO: 1.01 (ref 1–1.03)
T WAVE AXIS: 42 DEGREES
TROPONIN I SERPL-MCNC: <0.02 NG/ML
TSH SERPL DL<=0.05 MIU/L-ACNC: 0.5 UIU/ML (ref 0.36–3.74)
UROBILINOGEN UR QL STRIP.AUTO: 0.2 E.U./DL
VENTRICULAR RATE: 87 BPM
WBC # BLD AUTO: 8.64 THOUSAND/UL (ref 4.31–10.16)

## 2019-02-26 PROCEDURE — 83605 ASSAY OF LACTIC ACID: CPT | Performed by: EMERGENCY MEDICINE

## 2019-02-26 PROCEDURE — 99222 1ST HOSP IP/OBS MODERATE 55: CPT | Performed by: PHYSICIAN ASSISTANT

## 2019-02-26 PROCEDURE — 82553 CREATINE MB FRACTION: CPT | Performed by: EMERGENCY MEDICINE

## 2019-02-26 PROCEDURE — 84145 PROCALCITONIN (PCT): CPT | Performed by: INTERNAL MEDICINE

## 2019-02-26 PROCEDURE — 99222 1ST HOSP IP/OBS MODERATE 55: CPT | Performed by: PSYCHIATRY & NEUROLOGY

## 2019-02-26 PROCEDURE — 85730 THROMBOPLASTIN TIME PARTIAL: CPT | Performed by: EMERGENCY MEDICINE

## 2019-02-26 PROCEDURE — 93010 ELECTROCARDIOGRAM REPORT: CPT | Performed by: INTERNAL MEDICINE

## 2019-02-26 PROCEDURE — 71250 CT THORAX DX C-: CPT

## 2019-02-26 PROCEDURE — 84300 ASSAY OF URINE SODIUM: CPT | Performed by: INTERNAL MEDICINE

## 2019-02-26 PROCEDURE — 82140 ASSAY OF AMMONIA: CPT | Performed by: EMERGENCY MEDICINE

## 2019-02-26 PROCEDURE — 80076 HEPATIC FUNCTION PANEL: CPT | Performed by: EMERGENCY MEDICINE

## 2019-02-26 PROCEDURE — 96365 THER/PROPH/DIAG IV INF INIT: CPT

## 2019-02-26 PROCEDURE — 84443 ASSAY THYROID STIM HORMONE: CPT | Performed by: EMERGENCY MEDICINE

## 2019-02-26 PROCEDURE — 99285 EMERGENCY DEPT VISIT HI MDM: CPT

## 2019-02-26 PROCEDURE — 83935 ASSAY OF URINE OSMOLALITY: CPT | Performed by: INTERNAL MEDICINE

## 2019-02-26 PROCEDURE — 83690 ASSAY OF LIPASE: CPT | Performed by: EMERGENCY MEDICINE

## 2019-02-26 PROCEDURE — 72125 CT NECK SPINE W/O DYE: CPT

## 2019-02-26 PROCEDURE — 0T9B70Z DRAINAGE OF BLADDER WITH DRAINAGE DEVICE, VIA NATURAL OR ARTIFICIAL OPENING: ICD-10-PCS | Performed by: INTERNAL MEDICINE

## 2019-02-26 PROCEDURE — 83735 ASSAY OF MAGNESIUM: CPT | Performed by: EMERGENCY MEDICINE

## 2019-02-26 PROCEDURE — 99223 1ST HOSP IP/OBS HIGH 75: CPT | Performed by: INTERNAL MEDICINE

## 2019-02-26 PROCEDURE — 83880 ASSAY OF NATRIURETIC PEPTIDE: CPT | Performed by: EMERGENCY MEDICINE

## 2019-02-26 PROCEDURE — 36415 COLL VENOUS BLD VENIPUNCTURE: CPT | Performed by: EMERGENCY MEDICINE

## 2019-02-26 PROCEDURE — 87040 BLOOD CULTURE FOR BACTERIA: CPT | Performed by: EMERGENCY MEDICINE

## 2019-02-26 PROCEDURE — 96361 HYDRATE IV INFUSION ADD-ON: CPT

## 2019-02-26 PROCEDURE — 81003 URINALYSIS AUTO W/O SCOPE: CPT | Performed by: INTERNAL MEDICINE

## 2019-02-26 PROCEDURE — 74176 CT ABD & PELVIS W/O CONTRAST: CPT

## 2019-02-26 PROCEDURE — 85610 PROTHROMBIN TIME: CPT | Performed by: EMERGENCY MEDICINE

## 2019-02-26 PROCEDURE — 84484 ASSAY OF TROPONIN QUANT: CPT | Performed by: EMERGENCY MEDICINE

## 2019-02-26 PROCEDURE — 87186 SC STD MICRODIL/AGAR DIL: CPT | Performed by: EMERGENCY MEDICINE

## 2019-02-26 PROCEDURE — 82550 ASSAY OF CK (CPK): CPT | Performed by: EMERGENCY MEDICINE

## 2019-02-26 PROCEDURE — 70450 CT HEAD/BRAIN W/O DYE: CPT

## 2019-02-26 PROCEDURE — 85025 COMPLETE CBC W/AUTO DIFF WBC: CPT | Performed by: EMERGENCY MEDICINE

## 2019-02-26 PROCEDURE — 93005 ELECTROCARDIOGRAM TRACING: CPT

## 2019-02-26 PROCEDURE — 80048 BASIC METABOLIC PNL TOTAL CA: CPT | Performed by: EMERGENCY MEDICINE

## 2019-02-26 RX ORDER — ONDANSETRON 2 MG/ML
4 INJECTION INTRAMUSCULAR; INTRAVENOUS EVERY 6 HOURS PRN
Status: DISCONTINUED | OUTPATIENT
Start: 2019-02-26 | End: 2019-03-01 | Stop reason: HOSPADM

## 2019-02-26 RX ORDER — POTASSIUM CHLORIDE 20 MEQ/1
40 TABLET, EXTENDED RELEASE ORAL ONCE
Status: COMPLETED | OUTPATIENT
Start: 2019-02-26 | End: 2019-02-26

## 2019-02-26 RX ORDER — TAMSULOSIN HYDROCHLORIDE 0.4 MG/1
0.4 CAPSULE ORAL
Status: DISCONTINUED | OUTPATIENT
Start: 2019-02-26 | End: 2019-03-01 | Stop reason: HOSPADM

## 2019-02-26 RX ORDER — VENLAFAXINE 75 MG/1
75 TABLET ORAL 2 TIMES DAILY
Status: ON HOLD | COMMUNITY
End: 2019-11-03 | Stop reason: ALTCHOICE

## 2019-02-26 RX ORDER — ACETAMINOPHEN 325 MG/1
650 TABLET ORAL EVERY 6 HOURS PRN
Status: DISCONTINUED | OUTPATIENT
Start: 2019-02-26 | End: 2019-03-01 | Stop reason: HOSPADM

## 2019-02-26 RX ORDER — SODIUM CHLORIDE 9 MG/ML
100 INJECTION, SOLUTION INTRAVENOUS CONTINUOUS
Status: DISCONTINUED | OUTPATIENT
Start: 2019-02-26 | End: 2019-02-27

## 2019-02-26 RX ORDER — MAGNESIUM SULFATE 1 G/100ML
1 INJECTION INTRAVENOUS 2 TIMES DAILY
Status: COMPLETED | OUTPATIENT
Start: 2019-02-26 | End: 2019-02-27

## 2019-02-26 RX ORDER — VENLAFAXINE 37.5 MG/1
75 TABLET ORAL 2 TIMES DAILY
Status: DISCONTINUED | OUTPATIENT
Start: 2019-02-26 | End: 2019-03-01 | Stop reason: HOSPADM

## 2019-02-26 RX ORDER — ESCITALOPRAM OXALATE 20 MG/1
20 TABLET ORAL DAILY
Status: CANCELLED | OUTPATIENT
Start: 2019-02-26

## 2019-02-26 RX ORDER — HEPARIN SODIUM 5000 [USP'U]/ML
5000 INJECTION, SOLUTION INTRAVENOUS; SUBCUTANEOUS EVERY 8 HOURS SCHEDULED
Status: DISCONTINUED | OUTPATIENT
Start: 2019-02-26 | End: 2019-03-01 | Stop reason: HOSPADM

## 2019-02-26 RX ORDER — POTASSIUM CHLORIDE 14.9 MG/ML
20 INJECTION INTRAVENOUS ONCE
Status: COMPLETED | OUTPATIENT
Start: 2019-02-26 | End: 2019-02-26

## 2019-02-26 RX ORDER — QUETIAPINE FUMARATE 100 MG/1
100 TABLET, FILM COATED ORAL
Status: DISCONTINUED | OUTPATIENT
Start: 2019-02-26 | End: 2019-03-01 | Stop reason: HOSPADM

## 2019-02-26 RX ORDER — LIDOCAINE 50 MG/G
1 PATCH TOPICAL ONCE
Status: DISCONTINUED | OUTPATIENT
Start: 2019-02-26 | End: 2019-03-01

## 2019-02-26 RX ORDER — GABAPENTIN 300 MG/1
600 CAPSULE ORAL DAILY
Status: DISCONTINUED | OUTPATIENT
Start: 2019-02-26 | End: 2019-03-01

## 2019-02-26 RX ADMIN — TAMSULOSIN HYDROCHLORIDE 0.4 MG: 0.4 CAPSULE ORAL at 17:46

## 2019-02-26 RX ADMIN — SODIUM CHLORIDE 1000 ML: 0.9 INJECTION, SOLUTION INTRAVENOUS at 07:59

## 2019-02-26 RX ADMIN — CEFTRIAXONE SODIUM 1000 MG: 10 INJECTION, POWDER, FOR SOLUTION INTRAVENOUS at 16:11

## 2019-02-26 RX ADMIN — POTASSIUM CHLORIDE 20 MEQ: 200 INJECTION, SOLUTION INTRAVENOUS at 10:45

## 2019-02-26 RX ADMIN — METRONIDAZOLE 500 MG: 500 INJECTION, SOLUTION INTRAVENOUS at 18:50

## 2019-02-26 RX ADMIN — VENLAFAXINE 75 MG: 37.5 TABLET ORAL at 21:15

## 2019-02-26 RX ADMIN — GABAPENTIN 600 MG: 300 CAPSULE ORAL at 13:13

## 2019-02-26 RX ADMIN — POTASSIUM CHLORIDE 40 MEQ: 1500 TABLET, EXTENDED RELEASE ORAL at 10:12

## 2019-02-26 RX ADMIN — HEPARIN SODIUM 5000 UNITS: 5000 INJECTION, SOLUTION INTRAVENOUS; SUBCUTANEOUS at 21:16

## 2019-02-26 RX ADMIN — HEPARIN SODIUM 5000 UNITS: 5000 INJECTION, SOLUTION INTRAVENOUS; SUBCUTANEOUS at 16:12

## 2019-02-26 RX ADMIN — SODIUM CHLORIDE 100 ML/HR: 0.9 INJECTION, SOLUTION INTRAVENOUS at 16:06

## 2019-02-26 RX ADMIN — QUETIAPINE FUMARATE 100 MG: 100 TABLET ORAL at 21:16

## 2019-02-26 RX ADMIN — VENLAFAXINE 75 MG: 37.5 TABLET ORAL at 13:14

## 2019-02-26 RX ADMIN — MAGNESIUM SULFATE HEPTAHYDRATE 1 G: 1 INJECTION, SOLUTION INTRAVENOUS at 17:39

## 2019-02-26 RX ADMIN — SODIUM CHLORIDE 1000 ML: 0.9 INJECTION, SOLUTION INTRAVENOUS at 08:43

## 2019-02-26 NOTE — CONSULTS
Consultation - Neurology   Gladis Bran 62 y o  female MRN: [id number]  Unit/Bed#: -01 Encounter: [id number]      Assessment/Plan   Assessment:  Gladis Bran is a 62 y o   female with a past medical history of chronic back pain s/p multiple lumbar spine surgeries, on opiate analgesics at home, asthma, psychiatric history, multiple small-bowel obstructions s/p multiple abdominal surgeries who presents to [facility] on 02/26/2019 after being found down in her home  Neurology consulted for acute metabolic encephalopathy  Likely multifactorial in the setting of hyponatremia, hypokalemia, SEBASTI[place]N, rhabdomyolysis, and dehydration  Plan:  -CT head unremarkable for acute intracranial abnormalities  -CT cervical and lumbar spine unremarkable for fractures or traumatic subluxation/malalignment  -Pending:TSH, B12, C diff toxin and stool enteric bacterial panel  -Telemetry  -Frequent neuro checks  -PT/OT  -Pain management per primary team   -Recommend patient follows up with pain management     History of Present Illness     Reason for Consult / Principal Problem: Acute metabolic encephalopathy   Hx and PE limited by: Patient is a poor historian   HPI: Gladis Bran is a 62 y o   female with a past medical history of chronic back pain s/p multiple lumbar spine surgeries, on opiate analgesics at home, asthma, psychiatric history, multiple small-bowel obstructions s/p multiple abdominal surgeries who presents to [facility] on 02/26/2019 after being found down in her home  Patient states she couldn't sleep the night before  Patient got up to walk and fell and stayed there all night because she hit her back and had intense pain  Patient states she lost her balance d/t walking in the dark and remembers falling onto the couch and the floor  States she did not hit her head and no LOC  She states she stayed on the floor for a couple of hours  Patients  found her and called EMS    Per chart review, family reports patient was slurring her speech and seemed confused and altered  Patient does admit to nocturia the morning of 2019  Patient states she had diarrhea approximately a week ago  Last BM 2-3 days ago  Per chart review, patient reports multiple episodes of nonbloody diarrhea at least 5-7 episodes daily  States she is not eating well  Denies recent illness, fevers, chills, joint pain, muscle pain, headache, cough, sore throat, sick contacts at home     Today patient states she continues to have excruciating back pain and repeatedly asks for pain medications  Patient does admit to urinary retention issues  Denies CP, SOB, headache, dizziness, vision changes, N/V, abdominal pain, weakness or numbness, urinary incontinence  Inpatient consult to Neurology  Consult performed by: Isa Torrez PA-C  Consult ordered by: Peyman Singh MD        Review of Systems  12 point ROS performed, as stated above, all others negative  Historical Information   Past Medical History:   Diagnosis Date    Asthma     Bowel obstruction (HCC)     Chronic back pain      Past Surgical History:   Procedure Laterality Date    ABDOMINAL SURGERY      x 25    BACK SURGERY      x 3    COLONOSCOPY N/A 2018    Procedure: COLONOSCOPY;  Surgeon: Giorgio Coreas MD;  Location: [facility]; Service: Gastroenterology     Social History   Social History     Substance and Sexual Activity   Alcohol Use No    Alcohol/week: 0 0 oz    Frequency: Never    Binge frequency: Never    Comment: social drinker     Social History     Substance and Sexual Activity   Drug Use No     Social History     Tobacco Use   Smoking Status Former Smoker    Last attempt to quit: 1980    Years since quittin 6   Smokeless Tobacco Never Used     Family History:   Family History   Problem Relation Age of Onset    Diabetes Mother        Review of previous medical records was completed      Meds/Allergies   all current active meds have been reviewed, current meds:   Current Facility-Administered Medications   Medication Dose Route Frequency    acetaminophen (TYLENOL) tablet 650 mg  650 mg Oral Q6H PRN    ceftriaxone (ROCEPHIN) 1 g/50 mL in dextrose IVPB  1,000 mg Intravenous Q24H    gabapentin (NEURONTIN) capsule 600 mg  600 mg Oral Daily    heparin (porcine) subcutaneous injection 5,000 Units  5,000 Units Subcutaneous Q8H [address]    lidocaine (LIDODERM) 5 % patch 1 patch  1 patch Topical Once    magnesium sulfate IVPB (premix) SOLN 1 g  1 g Intravenous BID    metroNIDAZOLE (FLAGYL) IVPB (premix) 500 mg  500 mg Intravenous Q8H    ondansetron (ZOFRAN) injection 4 mg  4 mg Intravenous Q6H PRN    QUEtiapine (SEROquel) tablet 100 mg  100 mg Oral HS    sodium chloride 0 9 % infusion  100 mL/hr Intravenous Continuous    venlafaxine (EFFEXOR) tablet 75 mg  75 mg Oral BID   , PTA meds:   Prior to Admission Medications   Prescriptions Last Dose Informant Patient Reported? Taking? HYDROmorphone (DILAUDID) 4 mg tablet   Yes Yes   Sig: Take 4 mg by mouth 4 (four) times a day   QUEtiapine (SEROquel) 100 mg tablet   Yes Yes   Sig: Take 100 mg by mouth daily at bedtime   diazepam (VALIUM) 5 mg tablet   Yes Yes   Sig: 3 (three) times a day Morning, 3pm and 8pm   escitalopram (LEXAPRO) 20 mg tablet   Yes Yes   Sig: daily  gabapentin (NEURONTIN) 300 mg capsule   Yes Yes   Sig: take 2 capsules by mouth three times a day   temazepam (RESTORIL) 30 mg capsule   Yes No   Si mg daily at bedtime     venlafaxine (EFFEXOR) 75 mg tablet   Yes Yes   Sig: Take 75 mg by mouth 2 (two) times a day      Facility-Administered Medications: None    and     Allergies   Allergen Reactions    Nsaids      Irritable, upset stomach    Tolmetin      Irritable, upset stomach       Objective   Vitals:Blood pressure 138/78, pulse 79, temperature 97 9 °F (36 6 °C), temperature source Oral, resp   rate 20, height 5' 4" (1 626 m), weight 67 4 kg (148 lb 9 4 oz), SpO2 98 %, not currently breastfeeding  ,Body mass index is 25 51 kg/m²  Intake/Output Summary (Last 24 hours) at 2/26/2019 1510  Last data filed at 2/26/2019 1159  Gross per 24 hour   Intake 1080 ml   Output    Net 1080 ml       Invasive Devices: Invasive Devices     Peripheral Intravenous Line            Peripheral IV 02/26/19 Left Antecubital less than 1 day                Physical Exam   Constitutional: She appears well-developed and well-nourished  No distress  HENT:   Head: Normocephalic and atraumatic  Eyes: Pupils are equal, round, and reactive to light  EOM are normal    Neck: Normal range of motion  Neck supple  Cardiovascular: Normal rate  Pulmonary/Chest: Effort normal  No respiratory distress  Musculoskeletal:   Tender to palpation over paraspinal muscles    Neurological: She has a normal Finger-Nose-Finger Test    Skin: Skin is warm and dry  She is not diaphoretic  Psychiatric: She has a normal mood and affect  Her behavior is normal  Judgment and thought content normal      Neurologic Exam     Mental Status   Patient is alert, lying in bed  Oriented to person and place  States the year is 5  Unable to state the day of the week  No dysarthria or aphasia noted  Able to perform simple calculations, difficult with complex calculations  Able to spell WORLD forwards, difficult with spelling backwards  Able to register 3/3 words, unable to recall any objects after 5 minutes even with hints  0/3 delayed with cues  Difficulty with performing multistep commands  Answers all questions appropriately      Cranial Nerves     CN II   Visual fields full to confrontation  Right visual field deficit: none  Left visual field deficit: none     CN III, IV, VI   Pupils are equal, round, and reactive to light  Extraocular motions are normal    Nystagmus: none   Upgaze: normal  Downgaze: normal  Conjugate gaze: present    CN V   Facial sensation intact  CN VII   Facial expression full, symmetric       CN VIII   Hearing: intact    CN XI   CN XI normal      CN XII   Tongue deviation: none    Motor Exam   Subtle tremor with pronator drift testing, no drift noted    strength symmetric  Bilateral hip flexion 3-/5, however pain limited   Right knee flexion 5/5, knee extension 3-/5  Left Knee flexion 5/5, knee extension 3-/5  Increased tone in LLE  Bilateral dorsiflexion and plantar flexion 5/5     Sensory Exam   Right leg vibration: decreased from ankle  Left leg vibration: decreased from ankle  Decreased sensation to light touch on Left forearm, remainder of extremity WNL  Bilateral lower extremity sensation to light touch intact  Temperature sensation intact throughout  Diminished proprioception in bilateral lower extremities      Gait, Coordination, and Reflexes     Coordination   Finger to nose coordination: normal    Reflexes   Right plantar: equivocal  Left plantar: equivocal  Bilateral biceps, triceps, brachioradialis reflexes 2+  Bilateral patellar reflexes 3+ brisk w/o crossed adductor reflex  Bilateral achilles reflexes 1+  Difficult to assess heel to shin d/t trouble getting heel on shin in the setting of pain and stiffness        Lab Results: I have personally reviewed pertinent reports    Recent Results (from the past 24 hour(s))   CBC and differential    Collection Time: 02/26/19  7:59 AM   Result Value Ref Range    WBC 8 64 4 31 - 10 16 Thousand/uL    RBC 4 56 3 81 - 5 12 Million/uL    Hemoglobin 12 9 11 5 - 15 4 g/dL    Hematocrit 36 8 34 8 - 46 1 %    MCV 81 (L) 82 - 98 fL    MCH 28 3 26 8 - 34 3 pg    MCHC 35 1 31 4 - 37 4 g/dL    RDW 13 0 11 6 - 15 1 %    MPV 11 5 8 9 - 12 7 fL    Platelets 049 843 - 949 Thousands/uL    nRBC 0 /100 WBCs    Neutrophils Relative 77 (H) 43 - 75 %    Immat GRANS % 2 0 - 2 %    Lymphocytes Relative 9 (L) 14 - 44 %    Monocytes Relative 12 4 - 12 %    Eosinophils Relative 0 0 - 6 %    Basophils Relative 0 0 - 1 %    Neutrophils Absolute 6 68 1 85 - 7 62 Thousands/µL Immature Grans Absolute 0 14 0 00 - 0 20 Thousand/uL    Lymphocytes Absolute 0 76 0 60 - 4 47 Thousands/µL    Monocytes Absolute 1 00 0 17 - 1 22 Thousand/µL    Eosinophils Absolute 0 03 0 00 - 0 61 Thousand/µL    Basophils Absolute 0 03 0 00 - 0 10 Thousands/µL   Protime-INR    Collection Time: 02/26/19  7:59 AM   Result Value Ref Range    Protime 14 0 11 8 - 14 2 seconds    INR 1 09 0 86 - 1 17   APTT    Collection Time: 02/26/19  7:59 AM   Result Value Ref Range    PTT 30 26 - 38 seconds   Basic metabolic panel    Collection Time: 02/26/19  7:59 AM   Result Value Ref Range    Sodium 129 (L) 136 - 145 mmol/L    Potassium 2 5 (LL) 3 5 - 5 3 mmol/L    Chloride 90 (L) 100 - 108 mmol/L    CO2 24 21 - 32 mmol/L    ANION GAP 15 (H) 4 - 13 mmol/L    BUN 66 (H) 5 - 25 mg/dL    Creatinine 2 86 (H) 0 60 - 1 30 mg/dL    Glucose 115 65 - 140 mg/dL    Calcium 9 5 8 3 - 10 1 mg/dL    eGFR 17 ml/min/1 73sq m   Hepatic function panel    Collection Time: 02/26/19  7:59 AM   Result Value Ref Range    Total Bilirubin 0 40 0 20 - 1 00 mg/dL    Bilirubin, Direct 0 09 0 00 - 0 20 mg/dL    Alkaline Phosphatase 101 46 - 116 U/L    AST 60 (H) 5 - 45 U/L    ALT 39 12 - 78 U/L    Total Protein 8 0 6 4 - 8 2 g/dL    Albumin 3 1 (L) 3 5 - 5 0 g/dL   TSH, 3rd generation with Free T4 reflex    Collection Time: 02/26/19  7:59 AM   Result Value Ref Range    TSH 3RD GENERATON 0 501 0 358 - 3 740 uIU/mL   Troponin I    Collection Time: 02/26/19  7:59 AM   Result Value Ref Range    Troponin I <0 02 <=0 04 ng/mL   B-type natriuretic peptide    Collection Time: 02/26/19  7:59 AM   Result Value Ref Range    NT-proBNP 125 (H) <125 pg/mL   Lactic acid, plasma    Collection Time: 02/26/19  7:59 AM   Result Value Ref Range    LACTIC ACID 1 2 0 5 - 2 0 mmol/L   Magnesium    Collection Time: 02/26/19  7:59 AM   Result Value Ref Range    Magnesium 2 0 1 6 - 2 6 mg/dL   Lipase    Collection Time: 02/26/19  7:59 AM   Result Value Ref Range    Lipase 575 (H) 73 - 393 u/L   CK Total with Reflex CKMB    Collection Time: 02/26/19  7:59 AM   Result Value Ref Range    Total  (H) 26 - 192 U/L   Ammonia    Collection Time: 02/26/19  7:59 AM   Result Value Ref Range    Ammonia 29 11 - 35 umol/L   CKMB    Collection Time: 02/26/19  7:59 AM   Result Value Ref Range    CK-MB Index <1 0 0 0 - 2 5 %    CK-MB 2 8 0 0 - 5 0 ng/mL   ]  Imaging Studies: I have personally reviewed pertinent reports and I have personally reviewed pertinent films in PACS  EKG, Pathology, and Other Studies: I have personally reviewed pertinent reports  VTE Prophylaxis: Sequential compression device (Venodyne)  and Heparin    Counseling / Coordination of Care  Total time spent today 45 minutes  Greater than 50% of total time was spent with the patient and/or family counseling and/or coordination of care  A description of the counseling/coordination of care:  Patient was seen and evaluated  Discussed with attending  Chart reviewed thoroughly including laboratory and imaging studies    Plan of care discussed with patient and primary team

## 2019-02-26 NOTE — ASSESSMENT & PLAN NOTE
Resume Dilaudid at lower dose    Also resume some of her other medications although at lower dose and advised to hold for sedation

## 2019-02-26 NOTE — CONSULTS
UROLOGY CONSULTATION NOTE     Patient Identifiers: Felisa Lee (MRN [id number])  Service Requesting Consultation: SLIM  Service Providing Consultation:  Urology, Arlen Maldonado PA-C    Date of Service: 2/26/2019  Inpatient consult to Urology  Consult performed by: Arlen Maldonado PA-C  Consult ordered by: Shaniqua Nieto MD      Reason for Consultation: Hydroureteronephrosis with distended bladder on CT    History of Present Illness:     Felisa Lee is a 62 y o  old with a history of urinary retention, multiple lumbar spine surgeries, chronic pain medication use and multiple abdominal surgeries  She was brought to the ED this morning after being found on the floor by her family around 7:00 a m  Abdelrahman Perla She was also found to be confused with slurred speech  From chart review it appears that patient believe she got out of bed early in the morning to moved to the couch, but does not remember what happened after that  There is also report of diarrhea over the past week, with occasional abdominal cramping  Patient does have a history of urinary retention  From review of patient's chart it seems that she has had multiple episodes of urinary tension but has failed to follow up in Urology office  Upon presentation today CT scan showed hydroureteronephrosis and distended bladder  Upon insertion of Stahl catheter 1400 cc of urine was obtained  I was unable to obtain a proper review of systems as patient was sleeping and she was difficult to keep awake when trying to arouse her  She was able to answer some questions but this was very limited  Other information was obtained from discussion with her nurse and chart review  The patient is not currently having any nausea, vomiting, fever, chills or abdominal pain      Past Medical, Past Surgical History:     Past Medical History:   Diagnosis Date    Asthma     Bowel obstruction (HCC)     Chronic back pain    :    Past Surgical History:   Procedure Laterality Date  ABDOMINAL SURGERY      x 25    BACK SURGERY      x 3    COLONOSCOPY N/A 2018    Procedure: COLONOSCOPY;  Surgeon: Todd Lake MD;  Location: [facility]; Service: Gastroenterology   :    Medications, Allergies:     Current Facility-Administered Medications   Medication Dose Route Frequency    acetaminophen (TYLENOL) tablet 650 mg  650 mg Oral Q6H PRN    ceftriaxone (ROCEPHIN) 1 g/50 mL in dextrose IVPB  1,000 mg Intravenous Q24H    gabapentin (NEURONTIN) capsule 600 mg  600 mg Oral Daily    heparin (porcine) subcutaneous injection 5,000 Units  5,000 Units Subcutaneous Q8H [address]    lidocaine (LIDODERM) 5 % patch 1 patch  1 patch Topical Once    magnesium sulfate IVPB (premix) SOLN 1 g  1 g Intravenous BID    metroNIDAZOLE (FLAGYL) IVPB (premix) 500 mg  500 mg Intravenous Q8H    ondansetron (ZOFRAN) injection 4 mg  4 mg Intravenous Q6H PRN    QUEtiapine (SEROquel) tablet 100 mg  100 mg Oral HS    sodium chloride 0 9 % infusion  100 mL/hr Intravenous Continuous    venlafaxine (EFFEXOR) tablet 75 mg  75 mg Oral BID       Allergies:   Allergies   Allergen Reactions    Nsaids      Irritable, upset stomach    Tolmetin      Irritable, upset stomach   :    Social and Family History:   Social History:   Social History     Tobacco Use    Smoking status: Former Smoker     Last attempt to quit: 1980     Years since quittin 6    Smokeless tobacco: Never Used   Substance Use Topics    Alcohol use: No     Alcohol/week: 0 0 oz     Frequency: Never     Binge frequency: Never     Comment: social drinker    Drug use: No        Social History     Tobacco Use   Smoking Status Former Smoker    Last attempt to quit: 1980    Years since quittin 6   Smokeless Tobacco Never Used       Family History:  Family History   Problem Relation Age of Onset    Diabetes Mother    :     Review of Systems:     All other systems were queried and were negative except as listed above in HPI and here in the ROS  Physical Exam:   /78 (BP Location: Left arm)   Pulse 79   Temp 97 9 °F (36 6 °C) (Oral)   Resp 20   Ht 5' 4" (1 626 m)   Wt 67 4 kg (148 lb 9 4 oz)   LMP  (LMP Unknown)   SpO2 98%   BMI 25 51 kg/m² Temp (24hrs), Av 7 °F (36 5 °C), Min:97 5 °F (36 4 °C), Max:97 9 °F (36 6 °C)  current; Temperature: 97 9 °F (36 6 °C)  I/O last 24 hours: In: 1080 [IV Piggyback:1080]  Out: 1400 [Urine:1400]  General:  Patient is sleeping and difficult to arouse  She is oriented to person place and time  Cardiac:  Regular rate and rhythm, S1 and S2 normal, no murmurs  Pulmonary: Non-labored breathing, no wheezes or rhonchi, CTA  Abdomen: Soft, non-tender, non-distended  multiple surgical scars  No masses, tenderness, no CVA or suprapubic tenderness  Musculoskeletal: Extremities are nontender, mild lower extremity edema, no pitting  Psychiatric:  Pleasant but sleepy  Difficult to arouse from sleep and patient continues to drift off  Skin:  Warm, dry, no rashes, no jaundice  WONG: in place draining clear yellow urine  no clots and       Labs:     Lab Results   Component Value Date    HGB 12 9 2019    HCT 36 8 2019    WBC 8 64 2019     2019   ]    Lab Results   Component Value Date    K 2 5 (LL) 2019    CL 90 (L) 2019    CO2 24 2019    BUN 66 (H) 2019    CREATININE 2 86 (H) 2019    CALCIUM 9 5 2019   ]    Imaging:   Ct Chest Abdomen Pelvis Wo Contrast    Result Date: 2019  Impression: No visceral injury in the chest, abdomen or pelvis  No acute fracture  Patchy groundglass parenchymal changes in mid and lower lung zones suspicious for multifocal pneumonia  However, changes appear to have been present on prior examinations as far back as 2018  Pulmonology consultation in close interval chest CT  follow-up with next examination to be performed in 6 weeks is recommended   Hydronephrosis and hydroureter to level of the mid ureters  A similar pattern, though lesser degree was present on examination of September 25, 2017 also the time when there was significant urinary bladder distention  Mild thickening of the wall of the large bowel suggesting mild pancolitis, possibly infectious  Fusiform enlargement of the common bile duct, stable as far back as September 2017  Correlate with liver function testing  Workstation performed: [id number]     Ct Head Without Contrast    Result Date: 2/26/2019  Impression: No acute intracranial abnormality  Workstation performed: [id number]     Ct Cervical Spine Without Contrast    Result Date: 2/26/2019  Impression: No cervical spine fracture or traumatic malalignment  Workstation performed: [id number]     Ct Recon Only Lumbar Spine    Result Date: 2/26/2019  Impression: No fracture or traumatic subluxation  Surgical changes with intact hardware, successful incorporation of bone graft material, and expected postsurgical alignment from L2 through L5 inclusive  Workstation performed: [id number]     ASSESSMENT:     62 y o  old female with  history of urinary retention, admitted after brought to the emergency department by EMS after family for having found on floor  Unsure of amount of time patient was down on floor  Patient found to have rhabdomyolysis and CT findings of hydroureternephrosis and distended bladder  SEBASTIÁN with Cr 2 86, last Cr 1 02  Wang placement with 1400cc of urine output  Clear straw colored urine  No hematuria  Nephrology consulted  PLAN:     - Maintain wang catheter  Consider void trial once patient is better recovered and ambulating, however patient has had multiple episodes of retention  Start flomax  - Continue IVF  - Monitor kidney function    - Replete electrolytes    Thank you for allowing me to participate in this patients care  Please do not hesitate to call with any additional questions    Rogerio Alejo PA-C

## 2019-02-26 NOTE — ED NOTES
Pt offered lidocaine patch  PT refused  Pt states "It does not work"  Explained purpose of lidocaine patch to patient   Pt still refused     Jean Rodriguez RN  02/26/19 0843

## 2019-02-26 NOTE — H&P
[facility]        PATIENT INFORMATION      Garlin Galeazzi 62 y o  female MRN: [id number]  Unit/Bed#: ED 15 Encounter: [id number]  Admitting Physician: Migdalia Arriaza MD  PCP: No primary care provider on file    Date of Admission:  02/26/19      ASSESSMENTS & PLAN       Traumatic rhabdomyolysis secondary to fall  Assessment & Plan  - presented with a CK of 856 - continue IV fluid resuscitation - acute kidney injury noted   - likely due to follow prolonged immobility  - trend CK daily     Acute metabolic encephalopathy  Assessment & Plan  - likely multifactorial secondary to suspicion for benzodiazepine/opioid unintentional overdose, pancolitis, hyponatremia, hypokalemia, acute kidney injury, rhabdomyolysis, and dehydration (see plan for individual assessments)  - CT of head unremarkable   - neurology to follow - c/w neurochecks - check carotid dopplers  - mention of "slurred speech" noted now resolved - defer further brain imaging to neurology if warranted in light of [place]     Pancolitis - Diarrhea  Assessment & Plan  - as evidence on CT imaging with clinical complaints of worsening diarrhea over the last several days  - check stool culture, stool leukocytes, and stool for C  difficile PCR  - maintain on IV Flagyl/Rocephin for now - will appreciate gastroenterology input  - check procalcitonin to guide antibiotic therapy - presented afebrile without leukocytosis  - clear liquids for now - slowly advance diet pending clinical progression   - monitor electrolytes and replete for insensible losses   - elevated lipase of 575 on admission, however, no evidence of pancreatitis on CT imaging    Depression  Assessment & Plan  - continue Effexor BID - holding Lexapro currently (SSRI interaction w/ SNRI noted) the due to acute encephalopathy    Opioid dependence - Chronic back pain  Assessment & Plan  - in light of encephalopathy/fall, hold Dilaudid - Lidocaine patch and PRN Tylenol on board for pain      Hyponatremia  Assessment & Plan  - possibly due to decreased oral intake/dehydration and insensible losses from diarrhea  - continue IV fluids   - check serum and urine osmolality - check urine sodium     Hypokalemia  Assessment & Plan  - monitor/replete as necessary  - serum potassium normal  - likely due to poor oral intake coupled with insensible losses from diarrhea    Acute kidney injury   Assessment & Plan  - presented with a creatinine of 2 86 with evidence of dehydration on with hydronephrosis CT imaging  - continue IV fluids - limit/avoid nephrotoxins and renally dose existing medications as possible  - monitor renal function - monitor urine output    Hydronephrosis  Assessment & Plan  - progressively increased compared to prior imaging per radiology report  - continue IV fluids - monitor urine output - monitor renal function  - will appreciate urology input    Abnormal CT of the chest  Assessment & Plan  - read by radiologist as "Patchy groundglass parenchymal changes in mid and lower lung zones suspicious for multifocal pneumonia  However, changes appear to have been present on prior examinations as far back as December 2018  Pulmonology consultation in close interval chest CT follow-up with next examination to be performed in 6 weeks is recommended  "  - denies any chest pain/shortness of breath - denies any coughing or other symptoms of upper respiratory infection   - clinical monitoring outpatient follow-up      DVT Prophylaxis:  Heparin SC      CHIEF COMPLAINT      Found by family on the floor status post fall for unknown period of time      HISTORY OF PRESENT ILLNESS      Ankit Hernandez is a 62 y o  female who presents from home where she was found on the floor early this morning by her family    Per patient, she woke up in the middle the night and was walking towards her couch when she fell but denied any loss of consciousness, or headache/dizziness before the fall  Approximately around 7 AM, her family found laying in the living room and brought her to the hospital   At that time, she was noted to have some slurred speech" with confusion  Upon my encounter in the ER, she is alert and oriented to name/place only  She is speaking in full sentences however  She also complains of severe episodes of loose diarrhea over the last several days and often times has up to 7-8 episodes today  Denies any fever/chills however  She denies any chest pain or shortness of breath/coughing  She does complain of generalized weakness/fatigue  She acknowledges that she has not been eating or drinking much over the last several days due to the diarrhea  She acknowledges that her  assists her and her daily medications and does not intentionally take more than prescribed  No other significant complaints at this time  REVIEW OF SYSTEMS      Review of Systems - A thorough 12 point review systems was conducted  Pertinent positives and negatives are mentioned in the history of present illness  PAST MEDICAL & SURGICAL HISTORY      Past Medical History:   Diagnosis Date    Asthma     Bowel obstruction ([address] )     Chronic back pain        Past Surgical History:   Procedure Laterality Date    ABDOMINAL SURGERY      x 25    BACK SURGERY      x 3    COLONOSCOPY N/A 12/4/2018    Procedure: COLONOSCOPY;  Surgeon: Nevin Tay MD;  Location: [facility]; Service: Gastroenterology         MEDICATIONS & ALLERGIES       Prior to Admission medications    Medication Sig Start Date End Date Taking? Authorizing Provider   diazepam (VALIUM) 5 mg tablet 3 (three) times a day Morning, 3pm and 8pm 11/3/17  Yes Historical Provider, MD   escitalopram (LEXAPRO) 20 mg tablet daily     10/23/17  Yes Historical Provider, MD   gabapentin (NEURONTIN) 300 mg capsule take 2 capsules by mouth three times a day 11/6/17  Yes Historical Provider, MD   HYDROmorphone (DILAUDID) 4 mg tablet Take 4 mg by mouth 4 (four) times a day   Yes Historical Provider, MD   QUEtiapine (SEROquel) 100 mg tablet Take 100 mg by mouth daily at bedtime   Yes Historical Provider, MD   venlafaxine (EFFEXOR) 75 mg tablet Take 75 mg by mouth 2 (two) times a day   Yes Historical Provider, MD   temazepam (RESTORIL) 30 mg capsule 30 mg daily at bedtime   17   Historical Provider, MD   cetirizine (ZyrTEC) 10 mg tablet  10/23/17 2/26/19  Historical Provider, MD   fluticasone (FLOVENT HFA) 220 mcg/act inhaler Inhale 2 puffs 2 (two) times a day Rinse mouth after use   19  Historical Provider, MD   Morphine Sulfate ER 30 MG TBEA Take 30 mg by mouth 3 (three) times a day    19  Historical Provider, MD   naproxen (NAPROSYN) 500 mg tablet Take 500 mg by mouth every 12 (twelve) hours as needed 8/3/18 2/26/19  Historical Provider, MD   nystatin (MYCOSTATIN) 100,000 units/mL suspension Apply 500,000 Units to the mouth or throat 4 (four) times a day  19  Historical Provider, MD   ondansetron (ZOFRAN) 4 mg tablet Take 1 tablet (4 mg total) by mouth every 6 (six) hours 18  Desean Nielson MD   polyethylene glycol (MIRALAX) 17 g packet Take 17 g by mouth daily 18  Patsie Brittle, CRNP   promethazine (PHENERGAN) 12 5 mg/10 mL syrup Take 5 mL by mouth 4 (four) times a day as needed for nausea or vomiting  19  Historical Provider, MD         Allergies:    Allergies   Allergen Reactions    Nsaids      Irritable, upset stomach    Tolmetin      Irritable, upset stomach         SOCIAL HISTORY         Marital Status:   Occupation:  Retired  Patient Pre-hospital Living Situation:  Resides at home      Substance Use History:   Social History     Substance and Sexual Activity   Alcohol Use No    Comment: social drinker     Social History     Tobacco Use   Smoking Status Former Smoker    Last attempt to quit: 1980    Years since quittin 6   Smokeless Tobacco Never Used Social History     Substance and Sexual Activity   Drug Use No         FAMILY HISTORY      Patient cannot recall pertinent family history at this time  Per records, significant for diabetes mellitus in mother  PHYSICAL EXAM      Vitals:   Blood Pressure: 99/63 (02/26/19 1100)  Pulse: 76 (02/26/19 1100)  Temperature: 97 5 °F (36 4 °C) (02/26/19 0724)  Temp Source: Oral (02/26/19 0724)  Respirations: 19 (02/26/19 1100)  Height: 5' 4" (162 6 cm) (02/26/19 4438)  Weight - Scale: 67 4 kg (148 lb 9 4 oz) (02/26/19 0712)  SpO2: 97 % (02/26/19 1100)      GENERAL:  Weak/fatigued  HEAD:  Normocephalic - atraumatic  EYES: PERRL - EOMI   MOUTH:  Mucosa dry  NECK:  Supple - full range of motion  CARDIAC:  Regular rate/rhythm - S1/S2 positive  PULMONARY:  Clear breath sounds bilaterally - nonlabored respirations  ABDOMEN:  Soft - nontender/nondistended - active bowel sounds  MUSCULOSKELETAL:  Motor strength/range of motion markedly deconditioned   NEUROLOGIC:  Awake - oriented to name/place only   SKIN:  Chronic wrinkles/blemishes       ADDITIONAL DATA     Lab Results:     Results from last 7 days   Lab Units 02/26/19  0759   WBC Thousand/uL 8 64   HEMOGLOBIN g/dL 12 9   HEMATOCRIT % 36 8   PLATELETS Thousands/uL 244   NEUTROS PCT % 77*   LYMPHS PCT % 9*   MONOS PCT % 12   EOS PCT % 0     Results from last 7 days   Lab Units 02/26/19  0759   SODIUM mmol/L 129*   POTASSIUM mmol/L 2 5*   CHLORIDE mmol/L 90*   CO2 mmol/L 24   BUN mg/dL 66*   CREATININE mg/dL 2 86*   CALCIUM mg/dL 9 5   ALK PHOS U/L 101   ALT U/L 39   AST U/L 60*     Results from last 7 days   Lab Units 02/26/19  0759   INR  1 09       Imaging:     Ct Chest Abdomen Pelvis Wo Contrast    Result Date: 2/26/2019  Narrative: CT CHEST, ABDOMEN AND PELVIS WITHOUT IV CONTRAST INDICATION:   fall unknown mechanism, abd and back pain, diarrhea x 7 days  Weakness  COMPARISON:  CT of the abdomen and pelvis performed January 27, 2019   TECHNIQUE: CT examination of the chest, abdomen and pelvis was performed without intravenous contrast   Axial, sagittal, and coronal 2D reformatted images were created from the source data and submitted for interpretation  Radiation dose length product (DLP) for this visit:  842 mGy-cm   This examination, like all CT scans performed in the [facility], was performed utilizing techniques to minimize radiation dose exposure, including the use of iterative reconstruction and automated exposure control  Enteric contrast was not administered  FINDINGS: CHEST LUNGS:  Right apical pulmonary parenchymal scarring  Patchy groundglass consolidative densities in the perihilar right middle lobe and medial right lower lobe as well as in the perihilar lingula and left lower lobe are noted  These changes are similar to those seen at the lung bases on abdomen and pelvis CTs of December 8, 2018 and January 27, 2019  PLEURA:  Unremarkable  HEART/GREAT VESSELS:  Unremarkable for patient's age  MEDIASTINUM AND DAVID:  Unremarkable  CHEST WALL AND LOWER NECK:   Unremarkable  ABDOMEN LIVER/BILIARY TREE:  There is prominent fusiform enlargement of the common bile duct and cystic duct remnant with mild prominence of central intrahepatic biliary tree  This is similar from previous examinations when compared as far back as September 2017  Right hepatic lobe cysts are unchanged  GALLBLADDER:  Gallbladder is surgically absent  SPLEEN:  Unremarkable  PANCREAS:  Unremarkable  ADRENAL GLANDS:  Unremarkable  KIDNEYS/URETERS:  There is hydronephrosis and ureteronephrosis to the level of the mid ureters  Distal ureters are collapsed  No calcified stones are identified  No perinephric stranding or perinephric collection noted  STOMACH AND BOWEL:  There has been bowel surgery and a left lower quadrant anastomosis is identified    There is mild submucosal thickening of the wall of colonic loops especially notable involving the descending colon and a segment of sigmoid colon draped over the dome of the urinary bladder  Findings are nonspecific, suspicious for mild pancreatitis possibly infectious  No pneumatosis  Small bowel obstruction  APPENDIX:  No findings to suggest appendicitis  ABDOMINOPELVIC CAVITY:  No ascites or free intraperitoneal air  No lymphadenopathy  VESSELS:  Unremarkable for patient's age  PELVIS REPRODUCTIVE ORGANS:  Patient is status post hysterectomy  URINARY BLADDER:  Urinary bladder is markedly distended but otherwise unremarkable  ABDOMINAL WALL/INGUINAL REGIONS:  Abdominal wall surgical changes noted  OSSEOUS STRUCTURES:  Posterior fixation at L2-L5  Discectomy and osseous fusion at every level from L2 through L5 with successful incorporation of bone graft material   No acute osseous abnormality  Impression: No visceral injury in the chest, abdomen or pelvis  No acute fracture  Patchy groundglass parenchymal changes in mid and lower lung zones suspicious for multifocal pneumonia  However, changes appear to have been present on prior examinations as far back as December 2018  Pulmonology consultation in close interval chest CT  follow-up with next examination to be performed in 6 weeks is recommended  Hydronephrosis and hydroureter to level of the mid ureters  A similar pattern, though lesser degree was present on examination of September 25, 2017 also the time when there was significant urinary bladder distention  Mild thickening of the wall of the large bowel suggesting mild pancolitis, possibly infectious  Fusiform enlargement of the common bile duct, stable as far back as September 2017  Correlate with liver function testing  Workstation performed: [id number]     Ct Head Without Contrast    Result Date: 2/26/2019  Narrative: CT BRAIN - WITHOUT CONTRAST INDICATION:   fall, unknown mechanism  COMPARISON:  September 25, 2017  TECHNIQUE:  CT examination of the brain was performed    In addition to axial images, coronal 2D reformatted images were created and submitted for interpretation  Radiation dose length product (DLP) for this visit:  959 mGy-cm   This examination, like all CT scans performed in the [facility], was performed utilizing techniques to minimize radiation dose exposure, including the use of iterative reconstruction and automated exposure control  IMAGE QUALITY:  Diagnostic  FINDINGS: PARENCHYMA:  No intracranial mass, mass effect or midline shift  No CT signs of acute infarction  No acute parenchymal hemorrhage  VENTRICLES AND EXTRA-AXIAL SPACES:  Normal for the patient's age  VISUALIZED ORBITS AND PARANASAL SINUSES:  Unremarkable  CALVARIUM AND EXTRACRANIAL SOFT TISSUES:  Normal      Impression: No acute intracranial abnormality  Workstation performed: [id number]     Ct Cervical Spine Without Contrast    Result Date: 2/26/2019  Narrative: CT CERVICAL SPINE - WITHOUT CONTRAST INDICATION:   fall, unknown mechanism, patient altered  COMPARISON:  September 25, 2017  TECHNIQUE:  CT examination of the cervical spine was performed without intravenous contrast   Contiguous axial images were obtained  Sagittal and coronal reconstructions were performed  Radiation dose length product (DLP) for this visit:  323 mGy-cm   This examination, like all CT scans performed in the [facility], was performed utilizing techniques to minimize radiation dose exposure, including the use of iterative reconstruction and automated exposure control  IMAGE QUALITY:  Diagnostic  FINDINGS: ALIGNMENT:  There is straightening of normal cervical lordosis  No subluxation or compression deformity  VERTEBRAL BODIES:  No fracture  DEGENERATIVE CHANGES:  Mild multilevel cervical degenerative changes are noted without critical central canal stenosis  PREVERTEBRAL AND PARASPINAL SOFT TISSUES: Thyroid parenchyma is heterogeneous and there is a 12 mm right-sided thyroid nodule    Incidental discovery of one or more thyroid nodule(s) measuring less than 1 5 cm and without suspicious features is noted in this patient who is above 28years old; according to guidelines published in the February 2015 white paper on incidental thyroid nodules in the Journal of the [employer] of Radiology [facility]), no further evaluation is recommended  THORACIC INLET:  There is dense scarring in the right lung apex  Impression: No cervical spine fracture or traumatic malalignment  Workstation performed: [id number]     Ct Recon Only Lumbar Spine    Result Date: 2/26/2019  Narrative: CT LUMBAR SPINE INDICATION:   Acute back pain  Chronic back pain  Fall  Midline tenderness    COMPARISON:  Multiple prior CT examinations, including reconstructed CT performed September 25, 2017  TECHNIQUE: Axial CT examination of the lumbar spine was obtained utilizing reconstructed images from CT of the chest, abdomen and pelvis performed the same day  Images were reformatted in the sagittal and coronal planes  This examination, like all CT scans performed in the [facility], was performed utilizing techniques to minimize radiation dose exposure, including the use of iterative reconstruction and automated exposure control  FINDINGS: ALIGNMENT: Normal alignment of lumbar vertebral bodies  No subluxation  VERTEBRAL BODIES: No fracture  No acute osseous abnormality  Surgical changes of left lateral fusion at the L2-L3 levels with intact hardware  There has been pedicle screw and posterior jocelyn fixation from the L2-L5 levels, inclusive  Where is intact  There has been successful incorporation of interbody graft material at L2-L3, L3-L4, and L4-L5  In addition, there has been successful incorporation of graft material at the level of the posterior elements from L2 through L5  There has been right hemilaminectomy at L3 and L4  There is no significant interval change when compared to September 25, 2017   DEGENERATIVE CHANGES: Postsurgical changes as described  No critical central canal or foraminal stenosis  PREVERTEBRAL AND PARASPINAL SOFT TISSUES: No prevertebral or paraspinal soft tissue mass  Please see concurrently performed examination of the chest, abdomen and pelvis dictated under separate accession number  Impression: No fracture or traumatic subluxation  Surgical changes with intact hardware, successful incorporation of bone graft material, and expected postsurgical alignment from L2 through L5 inclusive  Workstation performed: [id number]     Ct Abdomen Pelvis With Contrast    Result Date: 1/27/2019  Narrative: CT ABDOMEN AND PELVIS WITH IV CONTRAST INDICATION:   generalized abd pain, h/o bowel obstruction  COMPARISON:  CT abdomen and pelvis dated 12/8/2018 TECHNIQUE:  CT examination of the abdomen and pelvis was performed  Axial, sagittal, and coronal 2D reformatted images were created from the source data and submitted for interpretation  Radiation dose length product (DLP) for this visit:  469 mGy-cm   This examination, like all CT scans performed in the [facility], was performed utilizing techniques to minimize radiation dose exposure, including the use of iterative reconstruction and automated exposure control  IV Contrast:  100 mL of iohexol (OMNIPAQUE) Enteric Contrast:  Enteric contrast was not administered  FINDINGS: ABDOMEN LOWER CHEST:  Some linear atelectasis/scarring is seen in the lung bases  There are some patchy groundglass and alveolar opacities in the lung bases which could be secondary to an infectious or inflammatory pneumonitis depending on the clinical setting  The heart is normal in size  LIVER/BILIARY TREE:  Several cysts are seen in the right hepatic lobe, largest measures approximately 2 cm in size  Mild prominence of the intrahepatic biliary tree with dilatation of the common bile duct, similar to the prior, probably sequela postcholecystectomy state    Several additional tiny low-density lesions scattered in the liver are too small to definitively characterize but are of unlikely clinical significance  GALLBLADDER:  Gallbladder is surgically absent  SPLEEN:  Unremarkable  PANCREAS:  Mild atrophy  There is mild prominence of the proximal pancreatic duct, possibly related to pancreatic atrophy  Otherwise grossly unremarkable ADRENAL GLANDS:  Unremarkable  KIDNEYS/URETERS:  Exophytic bilateral renal pelvises but the previously seen hydroureteronephrosis is significantly intervally improved  Several tiny low-density lesions are scattered in the bilateral kidneys, too small to definitively characterize but of unlikely clinical significance  Bilateral kidneys otherwise appear grossly unremarkable  STOMACH AND BOWEL:  Postsurgical changes are redemonstrated involving the distal colon and the distal ileum  There is a large amount of stool seen throughout the colon, suggesting constipation but there is no discrete evidence for bowel obstruction  APPENDIX:  No findings to suggest appendicitis  ABDOMINOPELVIC CAVITY:  No ascites or free intraperitoneal air  No lymphadenopathy  VESSELS:  No aortic aneurysm  PELVIS REPRODUCTIVE ORGANS:  Uterus not well seen, suspect prior hysterectomy  URINARY BLADDER:  Bladder is collapsed around a bladder catheter  Some mild circumferential bladder wall thickening is noted, probably exaggerated by underdistention, although a superimposed cystitis could be considered in the appropriate clinical setting  ABDOMINAL WALL/INGUINAL REGIONS:  Extensive postsurgical changes involving the anterior abdominal wall are again noted, the bilateral inguinal regions appear grossly unremarkable  Mild body wall edema  OSSEOUS STRUCTURES:  Posterior jocelyn and screw fusion of L2-L5 with laminectomy at the L4 level  The hardware appears intact  Intervertebral disc spacers are seen and appear appropriately positioned  Spinal alignment is maintained    No acute fracture or  destructive osseous lesion is identified  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 but the femoral head morphology is maintained  Graft donor sites in the bilateral iliac bones are again seen  Mild degenerative changes of the pubic symphysis  Impression: Large amount of stool in the colon, suspicious for constipation but no discrete evidence of bowel obstruction  Patchy groundglass and alveolar opacities in the lung bases which could be secondary to an infectious or inflammatory pneumonitis depending on the clinical setting  Correlation with the patient's symptoms and laboratory values recommended  Bladder is collapsed around a bladder catheter  Some mild circumferential bladder wall thickening noted, probably exaggerated by underdistention although a superimposed cystitis could be considered in the appropriate clinical setting  The previously seen hydroureteronephrosis is significantly intervally improved  Postsurgical changes in the spine redemonstrated  The hardware appears intact  Hepatic cysts, pancreatic atrophy with prominence of the biliary tree and proximal pancreatic duct, and other findings as above  Workstation performed: [id number]       Code Status:  DNR/DNI (personally discussed with patient)       Anticipated Length of Stay:  Patient will be admitted on an Inpatient basis with an anticipated length of stay of  greater than 2 midnights  Justification for Hospital Stay:  Acute metabolic encephalopathy with rhabdomyolysis status post fall and concurrent pancolitis requiring intravenous fluid resuscitation, IV antibiotics, and clinical monitoring  Total Time for Visit, including Counseling / Coordination of Care: 72 minutes  Greater than 50% of this total time spent on direct patient counseling and coordination of care  ** Please Note: This note is constructed using a voice recognition dictation system    An occasional wrong word/phrase or sound-a-like substitution may have been picked up by dictation device due to the inherent limitations of voice recognition software  Read the chart carefully and recognize, using reasonable context, where substitutions may have occurred  **

## 2019-02-26 NOTE — ED NOTES
Patient rang call bell; stated "I have a lot of pressure and feel like I need to urinate"  RN informed  Prompted patient to use commode with assistance, patient refused and stated "I don't want to use to the commode, I have a lot of pressure  I want a straight catheter"        Brian Deleon  02/26/19 1218

## 2019-02-26 NOTE — ED NOTES
IV potassium stopped due to patient request  Pt states she could not tolerate IV potassium even with the normal saline diluting        Claudette Duron RN  02/26/19 4722

## 2019-02-26 NOTE — ED PROVIDER NOTES
History  Chief Complaint   Patient presents with    Fall     Pt states she fell this morning while going to the living room  Pt does not remember how she fell but remembers being stuck on the ground in the living room  Pt c/o being more weak than usual     Patient is a 71-year-old female with past medical history of chronic back pain status post multiple lumbar spine surgeries, on opioid analgesics at home, asthma, psychiatric history, multiple small bowel obstruction status post multiple abdominal surgeries, presents to the emergency department by EMS after she was found on the floor of her home this morning her family  According to patient, she got out of bed to go to the couch approximately 3:00 a m  and somehow ended up on the ground  She does not recall falling and is not sure how she fell  She was unable to get up and was on the floor until her family found her at 7:00 a m     Family noted that she was slurring her speech and seemed confused and altered  Patient denies hitting her head or loss of consciousness  She complains of worsening of her chronic back pain since the fall  She does report that for the past 7 days she has had multiple episodes of nonbloody diarrhea at least 5-7 episodes daily  She reports occasional abdominal cramping pain  She denies any headache, dizziness or near syncope, visual disturbance or eye pain, tinnitus or loss of hearing, neck pain or stiffness, chest pain, palpitations, dyspnea, cough or URI symptoms, hemoptysis, abdominal distension, nausea, vomiting, constipation, blood per rectum or melena, dysuria, change in urinary frequency, hematuria, flank pain, skin rash or color change, lateralizing extremity weakness or paresthesia or other focal neurologic deficits  She does report feeling generally weak  She denies being on any blood thinners  She thinks her speech was abnormal earlier but it is starting to return to baseline    She would not describe it as slurring but that she was losing her voice  She reports her  assist with medication administration and she denies taking more than her prescribed opioid therapy  History provided by:  Patient   used: No    Fall   Associated symptoms: back pain    Associated symptoms: no abdominal pain, no chest pain, no headaches, no hearing loss, no nausea, no neck pain, no seizures and no vomiting        Prior to Admission Medications   Prescriptions Last Dose Informant Patient Reported? Taking? HYDROmorphone (DILAUDID) 4 mg tablet   Yes Yes   Sig: Take 4 mg by mouth 4 (four) times a day   QUEtiapine (SEROquel) 100 mg tablet   Yes Yes   Sig: Take 100 mg by mouth daily at bedtime   diazepam (VALIUM) 5 mg tablet   Yes Yes   Sig: 3 (three) times a day Morning, 3pm and 8pm   escitalopram (LEXAPRO) 20 mg tablet   Yes Yes   Sig: daily  gabapentin (NEURONTIN) 300 mg capsule   Yes Yes   Sig: take 2 capsules by mouth three times a day   temazepam (RESTORIL) 30 mg capsule   Yes No   Si mg daily at bedtime     venlafaxine (EFFEXOR) 75 mg tablet   Yes Yes   Sig: Take 75 mg by mouth 2 (two) times a day      Facility-Administered Medications: None       Past Medical History:   Diagnosis Date    Asthma     Bowel obstruction (HCC)     Chronic back pain        Past Surgical History:   Procedure Laterality Date    ABDOMINAL SURGERY      x 25    BACK SURGERY      x 3    COLONOSCOPY N/A 2018    Procedure: COLONOSCOPY;  Surgeon: Martina Villa MD;  Location: [facility]; Service: Gastroenterology       Family History   Problem Relation Age of Onset    Diabetes Mother      I have reviewed and agree with the history as documented      Social History     Tobacco Use    Smoking status: Former Smoker     Last attempt to quit: 1980     Years since quittin 6    Smokeless tobacco: Never Used   Substance Use Topics    Alcohol use: No     Comment: social drinker    Drug use: No        Review of Systems   Constitutional: Negative for chills and fever  HENT: Positive for voice change  Negative for congestion, ear pain, hearing loss, rhinorrhea, sore throat and tinnitus  Eyes: Negative for pain and visual disturbance  Respiratory: Negative for cough, chest tightness, shortness of breath and wheezing  Cardiovascular: Negative for chest pain, palpitations and leg swelling  Gastrointestinal: Positive for diarrhea  Negative for abdominal distention, abdominal pain, blood in stool, constipation, nausea and vomiting  Genitourinary: Negative for dysuria, flank pain, frequency and hematuria  Musculoskeletal: Positive for back pain  Negative for neck pain and neck stiffness  Skin: Negative for color change, pallor, rash and wound  Allergic/Immunologic: Negative for immunocompromised state  Neurological: Positive for speech difficulty and weakness  Negative for dizziness, seizures, syncope, facial asymmetry, light-headedness, numbness and headaches  Hematological: Negative for adenopathy  Does not bruise/bleed easily  Psychiatric/Behavioral: Positive for confusion  Negative for decreased concentration  All other systems reviewed and are negative  Physical Exam  Physical Exam   Constitutional: She appears well-developed and well-nourished  No distress  HENT:   Head: Normocephalic and atraumatic  Right Ear: External ear normal    Left Ear: External ear normal    Mouth/Throat: No oropharyngeal exudate  Dry mucous membranes and dry cracked lips  Eyes: Pupils are equal, round, and reactive to light  Conjunctivae and EOM are normal    Neck: Normal range of motion  Neck supple  No JVD present  Cardiovascular: Normal rate, regular rhythm, normal heart sounds and intact distal pulses  Exam reveals no gallop and no friction rub  No murmur heard  Pulmonary/Chest: Effort normal and breath sounds normal  No respiratory distress  She has no wheezes  She has no rales   She exhibits no tenderness  Abdominal: Soft  Bowel sounds are normal  She exhibits no distension  There is tenderness  There is no rebound and no guarding  Mild diffuse abdominal tenderness  Extensive old surgical scars on the anterior abdominal wall  Musculoskeletal: Normal range of motion  She exhibits no edema or tenderness    + Midline L-spine tenderness  Midline thoracolumbar surgical scar (old)  No midline C/T spine tenderness  Lymphadenopathy:     She has no cervical adenopathy  Neurological: She is alert  No cranial nerve deficit  Patient oriented to person and place but disoriented to time  4/5 strength bilateral upper extremities  2/5 strength bilateral lower extremities  No dysarthria or aphasia  Skin: Skin is warm and dry  No rash noted  She is not diaphoretic  No erythema  No pallor  Psychiatric: She has a normal mood and affect  Her behavior is normal    Nursing note and vitals reviewed        Vital Signs  ED Triage Vitals   Temperature Pulse Respirations Blood Pressure SpO2   02/26/19 0724 02/26/19 0712 02/26/19 0712 02/26/19 0712 02/26/19 0712   97 5 °F (36 4 °C) 89 16 100/60 96 %      Temp Source Heart Rate Source Patient Position - Orthostatic VS BP Location FiO2 (%)   02/26/19 0724 02/26/19 0712 02/26/19 0712 02/26/19 0712 --   Oral Monitor Sitting Left arm       Pain Score       02/26/19 0712       9         Vitals:    02/26/19 0930 02/26/19 1000 02/26/19 1030 02/26/19 1100   BP: 96/58 95/57 99/62 99/63   BP Location: Right arm Right arm Right arm Right arm   Pulse: 77 81 76 76   Resp: 13 16 15 19   Temp:       TempSrc:       SpO2: 98% 96% 97% 97%   Weight:       Height:         Visual Acuity  Visual Acuity      Most Recent Value   L Pupil Size (mm)  2   R Pupil Size (mm)  2          ED Medications  Medications   lidocaine (LIDODERM) 5 % patch 1 patch (0 patches Topical Hold 2/26/19 0800)   potassium chloride 20 mEq IVPB (premix) (20 mEq Intravenous New Bag 2/26/19 1045)   sodium chloride 0 9 % bolus 1,000 mL (0 mL Intravenous Stopped 2/26/19 0842)   sodium chloride 0 9 % bolus 1,000 mL (0 mL Intravenous Stopped 2/26/19 1045)   potassium chloride (K-DUR,KLOR-CON) CR tablet 40 mEq (40 mEq Oral Given 2/26/19 1012)       Diagnostic Studies  Results Reviewed     Procedure Component Value Units Date/Time    Procalcitonin [[id number]]     Lab Status:  No result Specimen:  Blood     CKMB [[id number]]  (Normal) Collected:  02/26/19 0759    Lab Status:  Final result Specimen:  Blood from Arm, Left Updated:  02/26/19 0942     CK-MB Index <1 0 %      CK-MB 2 8 ng/mL     Basic metabolic panel [[id number]]  (Abnormal) Collected:  02/26/19 0759    Lab Status:  Final result Specimen:  Blood from Arm, Left Updated:  02/26/19 8810     Sodium 129 mmol/L      Potassium 2 5 mmol/L      Chloride 90 mmol/L      CO2 24 mmol/L      ANION GAP 15 mmol/L      BUN 66 mg/dL      Creatinine 2 86 mg/dL      Glucose 115 mg/dL      Calcium 9 5 mg/dL      eGFR 17 ml/min/1 73sq m     Narrative:       National Kidney Disease Education Program recommendations are as follows:  GFR calculation is accurate only with a steady state creatinine  Chronic Kidney disease less than 60 ml/min/1 73 sq  meters  Kidney failure less than 15 ml/min/1 73 sq  meters  Hepatic function panel [[id number]]  (Abnormal) Collected:  02/26/19 0759    Lab Status:  Final result Specimen:  Blood from Arm, Left Updated:  02/26/19 0916     Total Bilirubin 0 40 mg/dL      Bilirubin, Direct 0 09 mg/dL      Alkaline Phosphatase 101 U/L      AST 60 U/L      ALT 39 U/L      Total Protein 8 0 g/dL      Albumin 3 1 g/dL     TSH, 3rd generation with Free T4 reflex [[id number]]  (Normal) Collected:  02/26/19 0759    Lab Status:  Final result Specimen:  Blood from Arm, Left Updated:  02/26/19 0916     TSH 3RD GENERATON 0 501 uIU/mL     Narrative:       Patients undergoing fluorescein dye angiography may retain small amounts of fluorescein in the body for 48-72 hours post procedure  Samples containing fluorescein can produce falsely depressed TSH values  If the patient had this procedure,a specimen should be resubmitted post fluorescein clearance  B-type natriuretic peptide [[id number]]  (Abnormal) Collected:  02/26/19 0759    Lab Status:  Final result Specimen:  Blood from Arm, Left Updated:  02/26/19 0916     NT-proBNP 125 pg/mL     Magnesium [[id number]]  (Normal) Collected:  02/26/19 0759    Lab Status:  Final result Specimen:  Blood from Arm, Left Updated:  02/26/19 0916     Magnesium 2 0 mg/dL     Lipase [[id number]]  (Abnormal) Collected:  02/26/19 0759    Lab Status:  Final result Specimen:  Blood from Arm, Left Updated:  02/26/19 0916     Lipase 575 u/L     CK Total with Reflex CKMB [[id number]]  (Abnormal) Collected:  02/26/19 0759    Lab Status:  Final result Specimen:  Blood from Arm, Left Updated:  02/26/19 0916     Total  U/L     Ammonia [[id number]]  (Normal) Collected:  02/26/19 0759    Lab Status:  Final result Specimen:  Blood from Arm, Left Updated:  02/26/19 0912     Ammonia 29 umol/L     Troponin I [[id number]]  (Normal) Collected:  02/26/19 0759    Lab Status:  Final result Specimen:  Blood from Arm, Left Updated:  02/26/19 0844     Troponin I <0 02 ng/mL     Lactic acid, plasma [[id number]]  (Normal) Collected:  02/26/19 0759    Lab Status:  Final result Specimen:  Blood from Arm, Left Updated:  02/26/19 0839     LACTIC ACID 1 2 mmol/L     Narrative:       Result may be elevated if tourniquet was used during collection      Karly Manner [[id number]]  (Normal) Collected:  02/26/19 0759    Lab Status:  Final result Specimen:  Blood from Arm, Left Updated:  02/26/19 0837     Protime 14 0 seconds      INR 1 09    APTT [[id number]]  (Normal) Collected:  02/26/19 0759    Lab Status:  Final result Specimen:  Blood from Arm, Left Updated:  02/26/19 0837     PTT 30 seconds     CBC and differential [[id number]]  (Abnormal) Collected:  02/26/19 0759    Lab Status:  Final result Specimen:  Blood from Arm, Left Updated:  02/26/19 0814     WBC 8 64 Thousand/uL      RBC 4 56 Million/uL      Hemoglobin 12 9 g/dL      Hematocrit 36 8 %      MCV 81 fL      MCH 28 3 pg      MCHC 35 1 g/dL      RDW 13 0 %      MPV 11 5 fL      Platelets 960 Thousands/uL      nRBC 0 /100 WBCs      Neutrophils Relative 77 %      Immat GRANS % 2 %      Lymphocytes Relative 9 %      Monocytes Relative 12 %      Eosinophils Relative 0 %      Basophils Relative 0 %      Neutrophils Absolute 6 68 Thousands/µL      Immature Grans Absolute 0 14 Thousand/uL      Lymphocytes Absolute 0 76 Thousands/µL      Monocytes Absolute 1 00 Thousand/µL      Eosinophils Absolute 0 03 Thousand/µL      Basophils Absolute 0 03 Thousands/µL     Blood culture #2 [[id number]] Collected:  02/26/19 0759    Lab Status: In process Specimen:  Blood from Arm, Left Updated:  02/26/19 0808    Blood culture #1 [[id number]] Collected:  02/26/19 0742    Lab Status: In process Specimen:  Blood from Arm, Left Updated:  02/26/19 0808    Stool Enteric Bacterial Panel by PCR [[id number]]     Lab Status:  No result Specimen:  Stool     Clostridium difficile toxin by PCR [[id number]]     Lab Status:  No result Specimen:  Stool from Per Rectum     UA w Reflex to Microscopic w Reflex to Culture [[id number]]     Lab Status:  No result Specimen:  Urine                  CT head without contrast   Final Result by Mukesh Waterman MD (02/26 1021)      No acute intracranial abnormality  Workstation performed: [id number]         CT recon only lumbar spine   Final Result by Mukesh Waterman MD (02/26 1042)      No fracture or traumatic subluxation  Surgical changes with intact hardware, successful incorporation of bone graft material, and expected postsurgical alignment from L2 through L5 inclusive              Workstation performed: [id number]         CT cervical spine without contrast   Final Result by Mukesh Waterman MD (02/26 1020)      No cervical spine fracture or traumatic malalignment  Workstation performed: [id number]         CT chest abdomen pelvis wo contrast   Final Result by Muna Santiago MD (02/26 1033)      No visceral injury in the chest, abdomen or pelvis  No acute fracture  Patchy groundglass parenchymal changes in mid and lower lung zones suspicious for multifocal pneumonia  However, changes appear to have been present on prior examinations as far back as December 2018  Pulmonology consultation in close interval chest CT    follow-up with next examination to be performed in 6 weeks is recommended  Hydronephrosis and hydroureter to level of the mid ureters  A similar pattern, though lesser degree was present on examination of September 25, 2017 also the time when there was significant urinary bladder distention  Mild thickening of the wall of the large bowel suggesting mild pancolitis, possibly infectious  Fusiform enlargement of the common bile duct, stable as far back as September 2017  Correlate with liver function testing  Workstation performed: [id number]                    Procedures  ECG 12 Lead Documentation  Date/Time: 2/26/2019 7:42 AM  Performed by: Moriah Dawn DO  Authorized by: Moriah Dawn DO     ECG reviewed by me, the ED Provider: yes    Patient location:  ED  Previous ECG:     Previous ECG:  Compared to current    Comparison ECG info:  7-2-18; QT interval has significantly lengthened; T-wave inversion in anterior leads no longer present    Rate:     ECG rate:  87    ECG rate assessment: normal    Rhythm:     Rhythm: sinus rhythm    Ectopy:     Ectopy: none    QRS:     QRS axis:  Normal    QRS intervals:  Normal  Conduction:     Conduction: normal    ST segments:     ST segments:  Normal  T waves:     T waves: normal    Other findings:     Other findings: prolonged qTc interval             Phone Contacts  ED Phone Contact    ED Course  ED Course as of Feb 26 1115   Tue Feb 26, 2019   5399 Patient in acute renal failure  Creatinine(!): 2 86   0931 Will replace with IV and oral potassium  Potassium(!!): 2 5   1115 Spoke with SLIM about CT chest findings and will have inpatient team evaluate patient prior to starting Abx since the PNA was seen on prior imaging in Dec and Jan                                   [facility]  Number of Diagnoses or Management Options  Diagnosis management comments: 71-year-old female presents status post fall with unclear mechanism  Patient has had recent diarrhea for the past 1 week  She appears clinically dry and slightly confused  She is weak on exam but no focal neurologic deficits  Will do full cardiac and septic workup  Will obtain CT imaging of the head, cervical spine, chest, abdomen and pelvis  Will give 1 L IV fluid bolus  Lidoderm patch for back pain  Will most likely admit  Amount and/or Complexity of Data Reviewed  Clinical lab tests: ordered and reviewed  Tests in the radiology section of CPT®: ordered and reviewed  Tests in the medicine section of CPT®: ordered and reviewed  Obtain history from someone other than the patient: yes  Independent visualization of images, tracings, or specimens: yes        Disposition  Final diagnoses:   Fall   Acute renal failure (ARF) ([facility] [address] )   Hypokalemia   Dehydration   Diarrhea   Pancolitis ([facility] [address] )   Multifocal pneumonia - seen on prior imaging so unclear if chronic or acute     Time reflects when diagnosis was documented in both MDM as applicable and the Disposition within this note     Time User Action Codes Description Comment    2/26/2019 11:12 AM Skylar PENA Add [O69  XXXA] Fall     2/26/2019 11:12 AM Skylar PENA Add [N17 9] Acute renal failure (ARF) ([facility] [address] )     2/26/2019 11:12 AM Skylar PENA Add [E87 6] Hypokalemia     2/26/2019 11:12 AM Skylar PENA Add [E86 0] Dehydration     2/26/2019 11:12 AM Skylar PENA Add [R19 7] Diarrhea     2/26/2019 11:12 AM Skylar PENA Add [K51 00] Pancolitis ([facility] [address] )     2/26/2019 11:12 AM Tierney PENA Add [J18 9] Multifocal pneumonia     2/26/2019 11:12 AM Tierney PENA Modify [J18 9] Multifocal pneumonia seen on prior imaging so unclear if chronic or acute      ED Disposition     ED Disposition Condition Date/Time Comment    Admit Stable Tue Feb 26, 2019 11:13 AM Case was discussed with SHIKHA and the patient's admission status was agreed to be Admission Status: inpatient status to the service of Dr Zachery Goodson   Follow-up Information    None         Patient's Medications   Discharge Prescriptions    No medications on file     No discharge procedures on file      ED Provider  Electronically Signed by           Melissa Driver, DO  02/26/19 [address] [facility], DO  02/26/19 6800

## 2019-02-26 NOTE — ED NOTES
Patient attempted to provide urine sample, unable to successfully do so at this time        Bibi Brito  02/26/19 4754

## 2019-02-26 NOTE — ASSESSMENT & PLAN NOTE
Mental status appears to be getting better  She is more awake and alert  She wants to go home, however, she does not want to sign against medical advice    I have resumed many of her other medications including narcotics although at much lower dose to avoid withdrawal symptoms

## 2019-02-27 ENCOUNTER — APPOINTMENT (INPATIENT)
Dept: ULTRASOUND IMAGING | Facility: HOSPITAL | Age: 64
DRG: 371 | End: 2019-02-27
Payer: MEDICARE

## 2019-02-27 ENCOUNTER — TELEPHONE (OUTPATIENT)
Dept: OTHER | Facility: HOSPITAL | Age: 64
End: 2019-02-27

## 2019-02-27 PROBLEM — R74.8 ABNORMAL SERUM LEVEL OF LIPASE: Status: ACTIVE | Noted: 2019-02-27

## 2019-02-27 PROBLEM — R78.81 GRAM-NEGATIVE BACTEREMIA: Status: ACTIVE | Noted: 2019-02-27

## 2019-02-27 LAB
ALBUMIN SERPL BCP-MCNC: 2.5 G/DL (ref 3.5–5)
ALP SERPL-CCNC: 84 U/L (ref 46–116)
ALT SERPL W P-5'-P-CCNC: 27 U/L (ref 12–78)
ANION GAP SERPL CALCULATED.3IONS-SCNC: 10 MMOL/L (ref 4–13)
ANION GAP SERPL CALCULATED.3IONS-SCNC: 12 MMOL/L (ref 4–13)
AST SERPL W P-5'-P-CCNC: 38 U/L (ref 5–45)
BILIRUB SERPL-MCNC: 0.2 MG/DL (ref 0.2–1)
BUN SERPL-MCNC: 22 MG/DL (ref 5–25)
BUN SERPL-MCNC: 35 MG/DL (ref 5–25)
C DIFF TOX GENS STL QL NAA+PROBE: NORMAL
CALCIUM SERPL-MCNC: 8.9 MG/DL (ref 8.3–10.1)
CALCIUM SERPL-MCNC: 8.9 MG/DL (ref 8.3–10.1)
CAMPYLOBACTER DNA SPEC NAA+PROBE: ABNORMAL
CHLORIDE SERPL-SCNC: 103 MMOL/L (ref 100–108)
CHLORIDE SERPL-SCNC: 106 MMOL/L (ref 100–108)
CK MB SERPL-MCNC: 1.4 NG/ML (ref 0–5)
CK MB SERPL-MCNC: <1 % (ref 0–2.5)
CK SERPL-CCNC: 285 U/L (ref 26–192)
CO2 SERPL-SCNC: 22 MMOL/L (ref 21–32)
CO2 SERPL-SCNC: 22 MMOL/L (ref 21–32)
CREAT SERPL-MCNC: 0.84 MG/DL (ref 0.6–1.3)
CREAT SERPL-MCNC: 1.2 MG/DL (ref 0.6–1.3)
GFR SERPL CREATININE-BSD FRML MDRD: 48 ML/MIN/1.73SQ M
GFR SERPL CREATININE-BSD FRML MDRD: 74 ML/MIN/1.73SQ M
GLUCOSE SERPL-MCNC: 102 MG/DL (ref 65–140)
GLUCOSE SERPL-MCNC: 121 MG/DL (ref 65–140)
LIPASE SERPL-CCNC: 1111 U/L (ref 73–393)
MAGNESIUM SERPL-MCNC: 2.6 MG/DL (ref 1.6–2.6)
PHOSPHATE SERPL-MCNC: 2.5 MG/DL (ref 2.3–4.1)
POTASSIUM SERPL-SCNC: 2.9 MMOL/L (ref 3.5–5.3)
POTASSIUM SERPL-SCNC: 3.8 MMOL/L (ref 3.5–5.3)
PROT SERPL-MCNC: 6.6 G/DL (ref 6.4–8.2)
SALMONELLA DNA SPEC QL NAA+PROBE: DETECTED
SHIGA TOXIN STX GENE SPEC NAA+PROBE: ABNORMAL
SHIGELLA DNA SPEC QL NAA+PROBE: ABNORMAL
SODIUM SERPL-SCNC: 137 MMOL/L (ref 136–145)
SODIUM SERPL-SCNC: 138 MMOL/L (ref 136–145)
TSH SERPL DL<=0.05 MIU/L-ACNC: 0.24 UIU/ML (ref 0.36–3.74)
VIT B12 SERPL-MCNC: 317 PG/ML (ref 100–900)

## 2019-02-27 PROCEDURE — 99222 1ST HOSP IP/OBS MODERATE 55: CPT | Performed by: INTERNAL MEDICINE

## 2019-02-27 PROCEDURE — 92610 EVALUATE SWALLOWING FUNCTION: CPT

## 2019-02-27 PROCEDURE — 82550 ASSAY OF CK (CPK): CPT | Performed by: INTERNAL MEDICINE

## 2019-02-27 PROCEDURE — 87186 SC STD MICRODIL/AGAR DIL: CPT | Performed by: INTERNAL MEDICINE

## 2019-02-27 PROCEDURE — 99223 1ST HOSP IP/OBS HIGH 75: CPT | Performed by: INTERNAL MEDICINE

## 2019-02-27 PROCEDURE — 80053 COMPREHEN METABOLIC PANEL: CPT | Performed by: INTERNAL MEDICINE

## 2019-02-27 PROCEDURE — 84443 ASSAY THYROID STIM HORMONE: CPT | Performed by: INTERNAL MEDICINE

## 2019-02-27 PROCEDURE — 99233 SBSQ HOSP IP/OBS HIGH 50: CPT | Performed by: INTERNAL MEDICINE

## 2019-02-27 PROCEDURE — 89055 LEUKOCYTE ASSESSMENT FECAL: CPT | Performed by: INTERNAL MEDICINE

## 2019-02-27 PROCEDURE — 87077 CULTURE AEROBIC IDENTIFY: CPT | Performed by: INTERNAL MEDICINE

## 2019-02-27 PROCEDURE — 80048 BASIC METABOLIC PNL TOTAL CA: CPT | Performed by: INTERNAL MEDICINE

## 2019-02-27 PROCEDURE — 93880 EXTRACRANIAL BILAT STUDY: CPT

## 2019-02-27 PROCEDURE — 83690 ASSAY OF LIPASE: CPT | Performed by: INTERNAL MEDICINE

## 2019-02-27 PROCEDURE — 99232 SBSQ HOSP IP/OBS MODERATE 35: CPT | Performed by: NURSE PRACTITIONER

## 2019-02-27 PROCEDURE — 87493 C DIFF AMPLIFIED PROBE: CPT | Performed by: INTERNAL MEDICINE

## 2019-02-27 PROCEDURE — 82607 VITAMIN B-12: CPT | Performed by: PHYSICIAN ASSISTANT

## 2019-02-27 PROCEDURE — 87505 NFCT AGENT DETECTION GI: CPT | Performed by: INTERNAL MEDICINE

## 2019-02-27 PROCEDURE — 84100 ASSAY OF PHOSPHORUS: CPT | Performed by: INTERNAL MEDICINE

## 2019-02-27 PROCEDURE — 82553 CREATINE MB FRACTION: CPT | Performed by: INTERNAL MEDICINE

## 2019-02-27 PROCEDURE — 83735 ASSAY OF MAGNESIUM: CPT | Performed by: INTERNAL MEDICINE

## 2019-02-27 PROCEDURE — 93880 EXTRACRANIAL BILAT STUDY: CPT | Performed by: SURGERY

## 2019-02-27 RX ORDER — POTASSIUM CHLORIDE AND SODIUM CHLORIDE 900; 300 MG/100ML; MG/100ML
125 INJECTION, SOLUTION INTRAVENOUS CONTINUOUS
Status: DISCONTINUED | OUTPATIENT
Start: 2019-02-27 | End: 2019-02-28

## 2019-02-27 RX ORDER — METRONIDAZOLE 500 MG/1
500 TABLET ORAL EVERY 8 HOURS SCHEDULED
Status: DISCONTINUED | OUTPATIENT
Start: 2019-02-27 | End: 2019-02-28

## 2019-02-27 RX ORDER — POTASSIUM CHLORIDE 20 MEQ/1
40 TABLET, EXTENDED RELEASE ORAL ONCE
Status: COMPLETED | OUTPATIENT
Start: 2019-02-27 | End: 2019-02-27

## 2019-02-27 RX ADMIN — HEPARIN SODIUM 5000 UNITS: 5000 INJECTION, SOLUTION INTRAVENOUS; SUBCUTANEOUS at 13:32

## 2019-02-27 RX ADMIN — HEPARIN SODIUM 5000 UNITS: 5000 INJECTION, SOLUTION INTRAVENOUS; SUBCUTANEOUS at 21:41

## 2019-02-27 RX ADMIN — HEPARIN SODIUM 5000 UNITS: 5000 INJECTION, SOLUTION INTRAVENOUS; SUBCUTANEOUS at 05:12

## 2019-02-27 RX ADMIN — CEFTRIAXONE SODIUM 1000 MG: 10 INJECTION, POWDER, FOR SOLUTION INTRAVENOUS at 15:22

## 2019-02-27 RX ADMIN — METRONIDAZOLE 500 MG: 500 TABLET ORAL at 21:41

## 2019-02-27 RX ADMIN — POTASSIUM CHLORIDE AND SODIUM CHLORIDE 125 ML/HR: 900; 300 INJECTION, SOLUTION INTRAVENOUS at 12:41

## 2019-02-27 RX ADMIN — MAGNESIUM SULFATE HEPTAHYDRATE 1 G: 1 INJECTION, SOLUTION INTRAVENOUS at 02:00

## 2019-02-27 RX ADMIN — METRONIDAZOLE 500 MG: 500 INJECTION, SOLUTION INTRAVENOUS at 12:41

## 2019-02-27 RX ADMIN — VENLAFAXINE 75 MG: 37.5 TABLET ORAL at 17:10

## 2019-02-27 RX ADMIN — POTASSIUM CHLORIDE 40 MEQ: 1500 TABLET, EXTENDED RELEASE ORAL at 12:46

## 2019-02-27 RX ADMIN — QUETIAPINE FUMARATE 100 MG: 100 TABLET ORAL at 21:41

## 2019-02-27 RX ADMIN — GABAPENTIN 600 MG: 300 CAPSULE ORAL at 09:16

## 2019-02-27 RX ADMIN — METRONIDAZOLE 500 MG: 500 INJECTION, SOLUTION INTRAVENOUS at 03:05

## 2019-02-27 RX ADMIN — VENLAFAXINE 75 MG: 37.5 TABLET ORAL at 09:16

## 2019-02-27 RX ADMIN — TAMSULOSIN HYDROCHLORIDE 0.4 MG: 0.4 CAPSULE ORAL at 15:24

## 2019-02-27 NOTE — UTILIZATION REVIEW
Initial Clinical Review    Admission: Date/Time/Statement: 2/26/19 @ 1113   Orders Placed This Encounter   Procedures    Inpatient Admission     Standing Status:   Standing     Number of Occurrences:   1     Order Specific Question:   Admitting Physician     Answer:   Laron Mueller [[id number]]     Order Specific Question:   Level of Care     Answer:   Med Surg [16]     Order Specific Question:   Bed request comments     Answer:   tele     Order Specific Question:   Estimated length of stay     Answer:   More than 2 Midnights     Order Specific Question:   Certification     Answer:   I certify that inpatient services are medically necessary for this patient for a duration of greater than two midnights  See H&P and MD Progress Notes for additional information about the patient's course of treatment  ED: Date/Time/Mode of Arrival:   ED Arrival Information     Expected Arrival Acuity Means of Arrival Escorted By Service Admission Type    - 2/26/2019 07:10 Emergent Amb[facility] Hospitalist Emergency    Arrival Complaint    weakness        Chief Complaint:   Chief Complaint   Patient presents with    Fall     Pt states she fell this morning while going to the living room  Pt does not remember how she fell but remembers being stuck on the ground in the living room  Pt c/o being more weak than usual     History of Illness: Ricco Jackson is a 62 y o  female who presents from home where she was found on the floor early this morning by her family  Per patient, she woke up in the middle the night and was walking towards her couch when she fell but denied any loss of consciousness, or headache/dizziness before the fall  Approximately around 7 AM, her family found laying in the living room and brought her to the hospital   At that time, she was noted to have some slurred speech" with confusion  Upon my encounter in the ER, she is alert and oriented to name/place only    She is speaking in full sentences however  She also complains of severe episodes of loose diarrhea over the last several days and often times has up to 7-8 episodes today  ED Vital Signs:   ED Triage Vitals   Temperature Pulse Respirations Blood Pressure SpO2   02/26/19 0724 02/26/19 0712 02/26/19 0712 02/26/19 0712 02/26/19 0712   97 5 °F (36 4 °C) 89 16 100/60 96 %      Temp Source Heart Rate Source Patient Position - Orthostatic VS BP Location FiO2 (%)   02/26/19 0724 02/26/19 0712 02/26/19 0712 02/26/19 0712 --   Oral Monitor Sitting Left arm       Pain Score       02/26/19 0712       9        Wt Readings from Last 1 Encounters:   02/26/19 67 4 kg (148 lb 9 4 oz)     Vital Signs (abnormal):  02/26/19 0800    81  22  83/59Abnormal   97 %  None (Room air)  Lying   02/26/19 0730    84  18  88/57Abnormal   96 %    Lying        Pertinent Labs/Diagnostic Test Results:   bld cx -   Blood Culture Non lactose fermenting gram negative rodAbnormal      Gram Stain Result Gram negative rodsAbnormal    PCT 0 26  Sodium 129Low  136 - 145 mmol/L      Potassium 2 5Low Panic   3 5 - 5 3 mmol/L     Comment: Slightly Hemolyzed; Results May be Affected       Chloride 90Low  100 - 108 mmol/L     CO2 24 21 - 32 mmol/L     ANION GAP 15High  4 - 13 mmol/L     BUN 66High  5 - 25 mg/dL     Creatinine 2  86High  0 60 - 1 30 mg/dL    ast  60  Alb   3 1  Lipase   575, total ck   856  CT head- wnl   CT lumbar spine- wnl   CT cervical spine- wnl   CT chest -      No visceral injury in the chest, abdomen or pelvis   No acute fracture  Patchy groundglass parenchymal changes in mid and lower lung zones suspicious for multifocal pneumonia   However, changes appear to have been present on prior examinations as far back as December 2018   Pulmonology consultation in close interval chest CT   follow-up with next examination to be performed in 6 weeks is recommended        EKG- NSR     ED Treatment:   Medication Administration from 02/26/2019 0710 to 02/26/2019 1348       Date/Time Order Dose Route Action Action by Comments     02/26/2019 0842 sodium chloride 0 9 % bolus 1,000 mL 0 mL Intravenous Stopped Tita Dunn RN      02/26/2019 0759 sodium chloride 0 9 % bolus 1,000 mL 1,000 mL Intravenous New Bag Tita Dunn RN      02/26/2019 0800 lidocaine (LIDODERM) 5 % patch 1 patch 0 patch Topical Hold Tita Dunn RN      02/26/2019 1045 sodium chloride 0 9 % bolus 1,000 mL 0 mL Intravenous Stopped Judie Schwarz RN      02/26/2019 0843 sodium chloride 0 9 % bolus 1,000 mL 1,000 mL Intravenous New Bag Tita Dunn RN      02/26/2019 1012 potassium chloride (K-DUR,KLOR-CON) CR tablet 40 mEq 40 mEq Oral Given Judie Schwarz RN      02/26/2019 1159 potassium chloride 20 mEq IVPB (premix) 0 mEq Intravenous Stopped Tita Dunn RN      02/26/2019 1045 potassium chloride 20 mEq IVPB (premix) 20 mEq Intravenous [address] Judie Schwarz RN      02/26/2019 1313 gabapentin (NEURONTIN) capsule 600 mg 600 mg Oral Given Iain Hodges RN      02/26/2019 1314 venlafaxine (EFFEXOR) tablet 75 mg 75 mg Oral Given Iain Hodges RN         Past Medical/Surgical History:    Active Ambulatory Problems     Diagnosis Date Noted    Ileus ([facility] ) 07/02/2018    Elevated liver enzymes 07/02/2018    Hypokalemia 07/02/2018    Common bile duct dilatation 07/02/2018    Generalized anxiety disorder 07/02/2018    Chronic, continuous use of opioids 07/02/2018    Urinary retention 07/04/2018    Anemia 07/05/2018    Hyponatremia 07/06/2018    Obstipation 12/01/2018    Chronic midline thoracic back pain 12/01/2018    SEBASTIÁN (acute kidney injury) ([facility] ) 12/01/2018    Distended abdomen 12/01/2018     Past Medical History:   Diagnosis Date    Asthma     Bowel obstruction (HCC)     Chronic back pain      Admitting Diagnosis: Hydronephrosis [N13 30]  Diarrhea [R19 7]  Dehydration [E86 0]  Hypokalemia [E87 6]  Pancolitis (HCC) [K51 00]  Weakness generalized [R53 1]  Fall [W19 XXXA]  Acute renal failure (ARF) (HCC) [K56 9]  Acute metabolic encephalopathy [Q72 49]  Multifocal pneumonia [J18 9]  Age/Sex: 62 y o  female  Assessment/Plan: Traumatic rhabdomyolysis secondary to fall  Assessment & Plan  - presented with a CK of 856 - continue IV fluid resuscitation - acute kidney injury noted   - likely due to follow prolonged immobility  - trend CK daily      Acute metabolic encephalopathy  Assessment & Plan  - likely multifactorial secondary to suspicion for benzodiazepine/opioid unintentional overdose, pancolitis, hyponatremia, hypokalemia, acute kidney injury, rhabdomyolysis, and dehydration (see plan for individual assessments)  - CT of head unremarkable   - neurology to follow - c/w neurochecks - check carotid dopplers  - mention of "slurred speech" noted now resolved - defer further brain imaging to neurology if warranted in light of SEBASTIÁN      Pancolitis - Diarrhea  Assessment & Plan  - as evidence on CT imaging with clinical complaints of worsening diarrhea over the last several days  - check stool culture, stool leukocytes, and stool for C  difficile PCR  - maintain on IV Flagyl/Rocephin for now - will appreciate gastroenterology input  - check procalcitonin to guide antibiotic therapy - presented afebrile without leukocytosis  - clear liquids for now - slowly advance diet pending clinical progression   - monitor electrolytes and replete for insensible losses   - elevated lipase of 575 on admission, however, no evidence of pancreatitis on CT imaging   Depression  Assessment & Plan  - continue Effexor BID - holding Lexapro currently (SSRI interaction w/ SNRI noted) the due to acute encephalopathy   Opioid dependence - Chronic back pain  Assessment & Plan  - in light of encephalopathy/fall, hold Dilaudid - Lidocaine patch and PRN Tylenol on board for pain     Hyponatremia  Assessment & Plan  - possibly due to decreased oral intake/dehydration and insensible losses from diarrhea  - continue IV fluids   - check serum and urine osmolality - check urine sodium   Hypokalemia  Assessment & Plan  - monitor/replete as necessary  - serum potassium normal  - likely due to poor oral intake coupled with insensible losses from diarrhea   Acute kidney injury   Assessment & Plan  - presented with a creatinine of 2 86 with evidence of dehydration on with hydronephrosis CT imaging  - continue IV fluids - limit/avoid nephrotoxins and renally dose existing medications as possible  - monitor renal function - monitor urine output   Hydronephrosis  Assessment & Plan  - progressively increased compared to prior imaging per radiology report  - continue IV fluids - monitor urine output - monitor renal function  - will appreciate urology input   Abnormal CT of the chest  Assessment & Plan  - read by radiologist as "Patchy groundglass parenchymal changes in mid and lower lung zones suspicious for multifocal pneumonia   However, changes appear to have been present on prior examinations as far back as December 2018   Pulmonology consultation in close interval chest CT follow-up with next examination to be performed in 6 weeks is recommended  "  - denies any chest pain/shortness of breath - denies any coughing or other symptoms of upper respiratory infection   - clinical monitoring outpatient follow-up   DVT Prophylaxis:  Heparin SC       Admission Orders:  Scheduled Meds:   Current Facility-Administered Medications:  acetaminophen 650 mg Oral Q6H PRN     cefTRIAXone 1,000 mg Intravenous Q24H  Last Rate: 1,000 mg (02/26/19 1611)   gabapentin 600 mg Oral Daily     heparin (porcine) 5,000 Units Subcutaneous Q8H [address]     lidocaine 1 patch Topical Once     metroNIDAZOLE 500 mg Intravenous Q8H  Last Rate: 500 mg (02/27/19 1241)   ondansetron 4 mg Intravenous Q6H PRN     QUEtiapine 100 mg Oral HS     sodium chloride 0 9 % with KCl 40 mEq/L 125 mL/hr Intravenous Continuous  Last Rate: 125 mL/hr (02/27/19 1241)   tamsulosin 0 4 mg Oral Daily With Dinner     venlafaxine 75 mg Oral BID       Speech eval   Tele   pulm consult   Urology consult   Neuro checks   Neuro consult   OT PT eval   2/27 cmp, mg , phos, ck , ck mb   K   2 9, BUN creat   35 1 20, lipase  1111, total CK   285    Pulm consult   2/27  Plan:   Patient is here status post fall with altered mental status, appears she has significant psych history and prior history of bizarre behavior in previous outpatient office notes  CT chest abdomen pelvis performed yesterday- patchy ground-glass parenchymal changes in mid and lower lung zone similar to prior examinations as far back as December 2018  On my read these abnormalities appear improved from the previous imaging  She will need follow-up CT chest in 6 weeks to assess for resolution  She does not have any respiratory symptoms or complaints at this time  She is afebrile, no leukocytosis  Procalcitonin was ever so minimally elevated at 0 26 (normal < or  =0 25) and she had SEBASTIÁN at the time  She may have GI infection as well  She is at risk for aspiration given her mental status and would recommend aspiration precautions  She is already on Flagyl anyway  Will order speech and swallow eval   GI  and ID consult is pending for pancolitis seen on the CT scan  Patient with history of asthma, appears she is not on any maintenance inhalers  Not currently in acute exacerbation  Appears her most recent pulmonology follow-up was an office visit on 09/16/2015 with Dr Fabiano Haji with [facility], was using Symbicort at the time  Was instructed to follow-up in 1 month, appears she never did  She will need outpatient pulmonary follow-up including follow-up CT chest in 6 weeks to assess for resolution of these findings  PFTs and management of her asthma would be recommended as well

## 2019-02-27 NOTE — PLAN OF CARE
Problem: Potential for Falls  Goal: Patient will remain free of falls  Description  INTERVENTIONS:  - Assess patient frequently for physical needs  -  Identify cognitive and physical deficits and behaviors that affect risk of falls  -  [place] fall precautions as indicated by assessment   - Educate patient/family on patient safety including physical limitations  - Instruct patient to call for assistance with activity based on assessment  - Modify environment to reduce risk of injury  - Consider OT/PT consult to assist with strengthening/mobility  Outcome: Progressing     Problem: Prexisting or High Potential for Compromised Skin Integrity  Goal: Skin integrity is maintained or improved  Description  INTERVENTIONS:  - Identify patients at risk for skin breakdown  - Assess and monitor skin integrity  - Assess and monitor nutrition and hydration status  - Monitor labs (i e  albumin)  - Assess for incontinence   - Turn and reposition patient  - Assist with mobility/ambulation  - Relieve pressure over bony prominences  - Avoid friction and shearing  - Provide appropriate hygiene as needed including keeping skin clean and dry  - Evaluate need for skin moisturizer/barrier cream  - Collaborate with interdisciplinary team (i e  Nutrition, Rehabilitation, etc )   - Patient/family teaching  Outcome: Progressing     Problem: Nutrition/Hydration-ADULT  Goal: Nutrient/Hydration intake appropriate for improving, restoring or maintaining nutritional needs  Description  Monitor and assess patient's nutrition/hydration status for malnutrition (ex- brittle hair, bruises, dry skin, pale skin and conjunctiva, muscle wasting, smooth red tongue, and disorientation)  Collaborate with interdisciplinary team and initiate plan and interventions as ordered  Monitor patient's weight and dietary intake as ordered or per policy  Utilize nutrition screening tool and intervene per policy   Determine patient's food preferences and provide high-protein, high-caloric foods as appropriate  INTERVENTIONS:  - Monitor oral intake, urinary output, labs, and treatment plans  - Assess nutrition and hydration status and recommend course of action  - Evaluate amount of meals eaten  - Assist patient with eating if necessary   - Allow adequate time for meals  - Recommend/ encourage appropriate diets, oral nutritional supplements, and vitamin/mineral supplements  - Order, calculate, and assess calorie counts as needed  - Recommend, monitor, and adjust tube feedings and TPN/PPN based on assessed needs  - Assess need for intravenous fluids  - Provide specific nutrition/hydration education as appropriate  - Include patient/family/caregiver in decisions related to nutrition  Outcome: Progressing     Problem: PAIN - ADULT  Goal: Verbalizes/displays adequate comfort level or baseline comfort level  Description  Interventions:  - Encourage patient to monitor pain and request assistance  - Assess pain using appropriate pain scale  - Administer analgesics based on type and severity of pain and evaluate response  - Implement non-pharmacological measures as appropriate and evaluate response  - Consider cultural and social influences on pain and pain management  - Notify physician/advanced practitioner if interventions unsuccessful or patient reports new pain  Outcome: Progressing     Problem: SAFETY ADULT  Goal: Patient will remain free of falls  Description  INTERVENTIONS:  - Assess patient frequently for physical needs  -  Identify cognitive and physical deficits and behaviors that affect risk of falls    -  [place] fall precautions as indicated by assessment   - Educate patient/family on patient safety including physical limitations  - Instruct patient to call for assistance with activity based on assessment  - Modify environment to reduce risk of injury  - Consider OT/PT consult to assist with strengthening/mobility  Outcome: Progressing  Goal: Maintain or return to baseline ADL function  Description  INTERVENTIONS:  -  Assess patient's ability to carry out ADLs; assess patient's baseline for ADL function and identify physical deficits which impact ability to perform ADLs (bathing, care of mouth/teeth, toileting, grooming, dressing, etc )  - Assess/evaluate cause of self-care deficits   - Assess range of motion  - Assess patient's mobility; develop plan if impaired  - Assess patient's need for assistive devices and provide as appropriate  - Encourage maximum independence but intervene and supervise when necessary  ¯ Involve family in performance of ADLs  ¯ Assess for home care needs following discharge   ¯ Request OT consult to assist with ADL evaluation and planning for discharge  ¯ Provide patient education as appropriate  Outcome: Progressing  Goal: Maintain or return mobility status to optimal level  Description  INTERVENTIONS:  - Assess patient's baseline mobility status (ambulation, transfers, stairs, etc )    - Identify cognitive and physical deficits and behaviors that affect mobility  - Identify mobility aids required to assist with transfers and/or ambulation (gait belt, sit-to-stand, lift, walker, cane, etc )  - [place] fall precautions as indicated by assessment  - Record patient progress and toleration of activity level on Mobility SBAR; progress patient to next Phase/Stage  - Instruct patient to call for assistance with activity based on assessment  - Request Rehabilitation consult to assist with strengthening/weightbearing, etc   Outcome: Progressing

## 2019-02-27 NOTE — CONSULTS
Consultation - Pulmonary Medicine   Shyla Hooper 62 y o  female MRN: [id number]  Unit/Bed#: -01 Encounter: [id number]      Assessment:  Abnormal chest CT  Asthma not in acute exacerbation  Pancolitis    Plan:   Patient is here status post fall with altered mental status, appears she has significant psych history and prior history of bizarre behavior in previous outpatient office notes  CT chest abdomen pelvis performed yesterday- patchy ground-glass parenchymal changes in mid and lower lung zone similar to prior examinations as far back as December 2018  On my read these abnormalities appear improved from the previous imaging  She will need follow-up CT chest in 6 weeks to assess for resolution  She does not have any respiratory symptoms or complaints at this time  She is afebrile, no leukocytosis  Procalcitonin was ever so minimally elevated at 0 26 (normal < or  =0 25) and she had SEBASTIÁN at the time  She may have GI infection as well  She is at risk for aspiration given her mental status and would recommend aspiration precautions  She is already on Flagyl anyway  Will order speech and swallow eval   GI  and ID consult is pending for pancolitis seen on the CT scan  Patient with history of asthma, appears she is not on any maintenance inhalers  Not currently in acute exacerbation  Appears her most recent pulmonology follow-up was an office visit on 09/16/2015 with Dr Welsh Samples with [facility], was using Symbicort at the time  Was instructed to follow-up in 1 month, appears she never did  She will need outpatient pulmonary follow-up including follow-up CT chest in 6 weeks to assess for resolution of these findings  PFTs and management of her asthma would be recommended as well       History of Present Illness   Physician Requesting Consult: Georgia Gonsales MD  Reason for Consult / Principal Problem:  Abnormal CT chest  Hx and PE limited by:  Patient's mental status  HPI:  HPI obtained through chart Norma Cruz is a 62y o  year old female former smoker with past medical history of asthma, chronic back pain, bowel obstruction who was found lying on the floor by her family and brought into the ED  She was noted to have slurred speech and confusion  She has also been having diarrhea  CT of the chest abdomen and pelvis was performed revealing parenchymal changes of the lungs previously seen December 2018  Other findings include pancolitis  History is limited as patient is altered however she denies any respiratory symptoms at this time including shortness of breath, cough, wheezing  She denies any upper respiratory symptoms such as nasal congestion, rhinorrhea, sore throat, sneezing  She denies fevers or chills  She does note that she has been having diarrhea  She denies any pain or discomfort at this time  She wants followed [facility] for pulmonary medicine in 2015  Appears that she never followed up since  She states she does not typically have any trouble with her breathing  Inpatient consult to Pulmonology  Consult performed by: Pau Toth PA-C  Consult ordered by: King Reardon MD        Full ROS was unable to be performed however she did respond to some questions  Review of Systems   Unable to perform ROS: Mental status change   Constitutional: Negative for chills and fever  HENT: Negative for congestion, rhinorrhea, sneezing and sore throat  Respiratory: Negative for cough, shortness of breath and wheezing  Gastrointestinal: Positive for diarrhea  Negative for abdominal pain  Historical Information   Past Medical History:   Diagnosis Date    Asthma     Bowel obstruction (HCC)     Chronic back pain      Past Surgical History:   Procedure Laterality Date    ABDOMINAL SURGERY      x 25    BACK SURGERY      x 3    COLONOSCOPY N/A 12/4/2018    Procedure: COLONOSCOPY;  Surgeon: Boni Rivera MD;  Location: [facility];   Service: Gastroenterology     Social History   Social History     Substance and Sexual Activity   Alcohol Use Never    Alcohol/week: 0 0 oz    Frequency: Never    Binge frequency: Never    Comment: social drinker     Social History     Substance and Sexual Activity   Drug Use No     Social History     Tobacco Use   Smoking Status Former Smoker    Last attempt to quit: 1980    Years since quittin 6   Smokeless Tobacco Never Used     Occupational History:     Family History: non-contributory    Meds/Allergies   all current active meds have been reviewed    Allergies   Allergen Reactions    Nsaids      Irritable, upset stomach    Tolmetin      Irritable, upset stomach       Objective   Vitals: Blood pressure 106/59, pulse 77, temperature 97 8 °F (36 6 °C), temperature source Oral, resp  rate 18, height 5' 4" (1 626 m), weight 67 4 kg (148 lb 9 4 oz), SpO2 100 %, not currently breastfeeding  ,Body mass index is 25 51 kg/m²  Intake/Output Summary (Last 24 hours) at 2019 1048  Last data filed at 2019 5642  Gross per 24 hour   Intake 80 ml   Output 2945 ml   Net -2865 ml     Invasive Devices     Drain            Urethral Catheter Double-lumen 18 Fr  less than 1 day                Physical Exam   Constitutional: She appears well-developed  No distress  She is lethargic, chronically ill-appearing  HENT:   Head: Normocephalic and atraumatic  Right Ear: External ear normal    Left Ear: External ear normal    Nose: Nose normal    Mouth/Throat: Oropharynx is clear and moist  No oropharyngeal exudate  Eyes: Conjunctivae are normal  Right eye exhibits no discharge  Left eye exhibits no discharge  No scleral icterus  Neck: Normal range of motion  Neck supple  No JVD present  No tracheal deviation present  Cardiovascular: Normal rate, regular rhythm, normal heart sounds and intact distal pulses  Exam reveals no gallop and no friction rub  No murmur heard  Pulmonary/Chest: Effort normal and breath sounds normal  No stridor   No respiratory distress  She has no wheezes  She has no rales  Saturating well on room air  No evidence of any increased work of breathing  Abdominal: Soft  She exhibits no distension  There is no tenderness  Musculoskeletal: She exhibits no edema, tenderness or deformity  Neurological: She exhibits normal muscle tone  She awakes to her name being called  Oriented to herself  On my exam she did not know where she was and could not tell me what year it was either  Skin: Skin is warm and dry  No rash noted  She is not diaphoretic  No erythema  There is pallor  Psychiatric:   Patient is lethargic, keeps her eyes closed for most of exam   Does answer some questions but not all  She is somewhat cooperative  She is not agitated  Nursing note and vitals reviewed  Lab Results: I have personally reviewed pertinent lab results  Imaging Studies: I have personally reviewed pertinent reports  EKG, Pathology, and Other Studies: I have personally reviewed pertinent reports      VTE Prophylaxis: Heparin    Code Status: Level 3 - DNAR and DNI  Advance Directive and Living Will:      Power of :    POLST:

## 2019-02-27 NOTE — SPEECH THERAPY NOTE
Speech-Language Pathology Bedside Swallow Evaluation      Patient Name: Ophelia Carney    KDKLECAILIN Date: 2/27/2019       Impression  Limited evaluation 2/2 current diet (pt is on Clear Liquid diet pending further medical evaluation), however, pt presents with swallow function WNL with thin liquids  Recommendations  Advance diet per physician recommendations  No ST needs at this time  Please reconsult with any questions or concerns  Problem List  Patient Active Problem List   Diagnosis    Ileus (HCC)    Elevated liver enzymes    Hypokalemia    Common bile duct dilatation    Generalized anxiety disorder    Chronic, continuous use of opioids    Urinary retention    Anemia    Hyponatremia    Obstipation    Chronic midline thoracic back pain    SEBASTIÁN (acute kidney injury) ([facility] [address] )    Distended abdomen    Rhabdomyolysis secondary to fall    Acute metabolic encephalopathy    Pancolitis - Diarrhea    Depression    Opioid dependence - Chronic back pain    Acute kidney injury     Hydronephrosis    Abnormal CT of the chest    Gram-negative bacteremia    Abnormal serum level of lipase       Past Medical History  Past Medical History:   Diagnosis Date    Asthma     Bowel obstruction (HCC)     Chronic back pain        Past Surgical History  Past Surgical History:   Procedure Laterality Date    ABDOMINAL SURGERY      x 25    BACK SURGERY      x 3    COLONOSCOPY N/A 12/4/2018    Procedure: COLONOSCOPY;  Surgeon: Farrah Tello MD;  Location: [facility]; Service: Gastroenterology           Current Medical Status  Pt is a 62 y o  female who presented to [facility] with encephalopathy, s/p fall, also reporting diarrhea x1 week  ST consulted to r/o aspiration in the face of complicated medical picture  The pt [facility]  She has been on a Regular/Thin Liquid diet during past admissions   D/w YOHANA Chatterjee who reports that pt has tolerated thin liquids and whole meds without difficulty today  At the time of this evaluation, the pt was A+A, denied pain or other acute complaints  Somewhat tangential, and repeatedly asking "Is my doctor coming back?" She denied any recent or past hx dysphagia and reports she normally eats a Regular/Thin Liquid diet without difficulty  She owns upper dentures and normally wears them to eat solid foods  Imaging Studies:  CT Chest 2/26 Patchy groundglass parenchymal changes in mid and lower lung zones suspicious for multifocal pneumonia  However, changes appear to have been present on prior examinations as far back as December 2018  Pulmonology consultation in close interval chest CT follow-up with next examination to be performed in 6 weeks is recommended  CT Cervical Spine 2/26 No cervical spine fracture or traumatic malalignment  CT Head 2/26 No acute intracranial abnormality  Swallow Information   Current Risks for Dysphagia & Aspiration: AMS     Current Symptoms/Concerns: Complicated medical status    Current Diet: thin liquids (clear liquids pending further medical workup)     Baseline Diet: regular diet and thin liquids      Baseline Assessment   Behavior/Cognition: alert, oriented x4 but with some tangential questions and speech    Speech/Language Status: able to participate in conversation and able to follow commands    Patient Positioning: upright in bed    Pain Status/Interventions/Response to Interventions:  No report of or nonverbal indications of pain         Swallow Mechanism Exam   Facial: symmetrical  Labial: WFL  Lingual: WFL  Velum: symmetrical  Mandible: adequate ROM  Dentition: edentulous upper, natural dentition lower; pt normally wears upper dentures to eat solids; dentures are at home  Vocal quality: clear/adequate   Volitional Cough: strong/productive   Swallow Mechanics: timely and coordinated hyolaryngeal elevation with dry swallow        Consistencies Assessed and Performance   Consistencies Administered: thin liquids  Specific materials administered included ice water via straw  Other trials withheld 2/2 current medical plan of care  Oral Stage: WFL  Mastication was adequate with the materials administered today  Bolus formation and transfer were functional with no significant oral residue noted  No overt s/sx reduced oral control  Pharyngeal Stage: [facility]  Swallowing initiation was prompt  Laryngeal rise was palpated and judged to be within functional limits  No coughing, throat clearing, change in vocal quality or respiratory status noted today       Esophageal Concerns: none reported or observed    Risk for Aspiration:  Low    Recommendations: advance per physician recommendations to Regular/Thin     Recommended Form of Meds: whole with liquid     Aspiration precautions and compensatory swallowing strategies: upright posture and only feed when fully alert    Results Reviewed with: patient and RN         [Impressions/Recommendations located at top]

## 2019-02-27 NOTE — PROGRESS NOTES
Progress Note - Na Win 62 y o  female MRN: [id number]    Unit/Bed#: -01 Encounter: [id number]      Assessment:  Na Win is a 79-year-old female seen in consultation regarding high volume urinary retention with resultant bilateral hydronephrosis and acute kidney injury with admission creatinine of 2 86 and currently 1 20; after bladder decompression/postobstructive diuresis and IV fluid administration  Patient is afebrile and alert today after being found unresponsive yesterday  Review of electronic medical record demonstrates patient had previous emergency room visit related to poor bladder emptying and declined catheter at home  Office contacted patient for office follow-up and was scheduled for 2/14  Patient was lost to follow-up  Plan:  Maintain pre-existing Stahl catheter  Do not remove  Not nursing or other department managed  Under strong recommendation for bladder decompression, patient is amenable to discharge with Stahl in place  Will follow peripherally while discharge plan is in progress  Our office staff will contact patient with hospital follow-up for extensive urologic workup  Nursing staff will need to provide patient with Stahl catheter care and leg bag teaching  If patient is discharged to home, may require VNA services  Subjective:  Denies fever, chills, flank, groin, suprapubic pain, dysuria or gross hematuria      Objective:     Vitals: Blood pressure 106/59, pulse 77, temperature 97 8 °F (36 6 °C), temperature source Oral, resp  rate 18, height 5' 4" (1 626 m), weight 67 4 kg (148 lb 9 4 oz), SpO2 100 %, not currently breastfeeding  ,Body mass index is 25 51 kg/m²        Intake/Output Summary (Last 24 hours) at 2/27/2019 0853  Last data filed at 2/27/2019 5491  Gross per 24 hour   Intake 80 ml   Output 2945 ml   Net -2865 ml       Physical Exam: General appearance: alert and oriented, in no acute distress, appears older than stated age, cooperative and no distress  Head: Normocephalic, without obvious abnormality, atraumatic  Neck: no adenopathy, no carotid bruit, no JVD, supple, symmetrical, trachea midline and thyroid not enlarged, symmetric, no tenderness/mass/nodules  Lungs: diminished breath sounds  Heart: regular rate and rhythm, S1, S2 normal, no murmur, click, rub or gallop  Abdomen: soft, non-tender; bowel sounds normal; no masses,  no organomegaly  Extremities: extremities normal, warm and well-perfused; no cyanosis, clubbing, or edema  Pulses: 2+ and symmetric  Neurologic: Grossly normal  Stahl patent for grossly clear yellow urine     Invasive Devices     Drain            Urethral Catheter Double-lumen 18 Fr  less than 1 day              Lab Results   Component Value Date    WBC 8 64 02/26/2019    HGB 12 9 02/26/2019    HCT 36 8 02/26/2019    MCV 81 (L) 02/26/2019     02/26/2019     Lab Results   Component Value Date    CALCIUM 8 9 02/27/2019    K 2 9 (L) 02/27/2019    CO2 22 02/27/2019     02/27/2019    BUN 35 (H) 02/27/2019    CREATININE 1 20 02/27/2019       Lab, Imaging and other studies: I have personally reviewed pertinent reports

## 2019-02-27 NOTE — PROGRESS NOTES
Paged by nursing regarding IV infiltration with flagyl  Nursing discussed with pharmacy, low concern for extravasation  Pt seen and evaluated at bedside, swelling present with minimal redness, no induration  Pulses distally intact, pt complaining of pain  Good hand   Advise ice to help with swelling, monitor closely      Jacklyn Almendarez PA-C

## 2019-02-27 NOTE — ASSESSMENT & PLAN NOTE
Blood cultures turned out to be cell medulla as well  On IV antibiotics  Infectious Disease consultant following  Follow-up sensitivities    Clinically she looks better

## 2019-02-27 NOTE — NURSING NOTE
During pm report it was reported pt iv had infiltrated  Pt arm swollen  Iv infusion stopped and pharmacy notified for possible extravasation  Pharmacy did not find that medication was a vesicant  SLIM notified of infiltrate and up to see pt, pt able to move arm but does complain of some discomfort  SL removed and pt offered ice and limb elevated

## 2019-02-27 NOTE — NURSING NOTE
20G PIV placed in right upper arm by Yane Thomas critical care PA, using ultrasound guidance and lidocaine

## 2019-02-27 NOTE — NURSING NOTE
At this point, four nurses have attempted IV insertion, one using the ultrasound  Awaiting next nurse from ICU to make an attempt  Left arm swelling and pain have diminished

## 2019-02-27 NOTE — TELEPHONE ENCOUNTER
Norma Cruz is a 29-year-old female seen in emergency room and in consultation at [facility] against background of high volume urinary retention, bilateral hydronephrosis and resultant acute kidney injury; now status post Stahl catheter insertion  Please note, patient was scheduled according to record, for office appointment 2/14 and may perhaps been a no show  Patient will be discharged per Internal Medicine with Stahl this time  Please contact patient for urologic follow-up with Dr Antony Nicholas in approximately 3 weeks  Thank you

## 2019-02-27 NOTE — PROGRESS NOTES
[facility] Internal [facility] Progress Note  Patient: Ankit Hernandez 62 y o  female   MRN: [id number]  PCP: No primary care provider on file  Unit/Bed#: -01 Encounter: [id number]  Date Of Visit: 02/27/19          * Gram-negative bacteremia  Assessment & Plan  Patient presents with a fall  She is not complaining of any abdominal pain other than having some chronic diarrhea  She is not complaining of any urinary complaints or any injury  She has history of back pain and previous back surgeries  She also denies any febrile illness or being sick recently  One of the blood cultures came back positive  Patient is pericardial and IV antibiotics  She has lots of abnormalities on her CT scan of the chest/abdomen/pelvis  Not sure if she has urinary source although UA is unremarkable  Not sure if she has GI source  Stool samples have been sent  Patient has very poor IV access  Hopefully an IV would be obtained and she would receive IV fluids in addition to IV antibiotics  GI and ID consultation pending  Would also consult urology service  She would be on bladder scan/urinary retention protocol  Abnormal serum level of lipase  Assessment & Plan  Not sure what to make out of it  GI evaluation pending  She herself denies any GI symptoms other than some chronic diarrhea  Abnormal CT of the chest  Assessment & Plan  Patient herself does not have any respiratory symptoms  She has some abnormalities on the CT scan of the chest and they were there before  Would consult Pulmonary for close follow-up on outpatient basis  She also had abnormalities on the CT scan of her abdomen/pelvis  Urology following  GI evaluation pending  Hydronephrosis  Assessment & Plan  Patient with chronic issues of urinary retention  She was seen by the urology service and a a Stahl catheter was placed  She basically has been noncompliant with follow-up  Urology input appreciated    This could also be likely contributing to her acute kidney injury  Acute kidney injury   Assessment & Plan  Hemodynamic  IV fluids and improving  Opioid dependence - Chronic back pain  Assessment & Plan  Hold Dilaudid  Watch for any withdrawal symptoms  Some of her other medications have been continued including Lidoderm patch  Depression  Assessment & Plan  -she is back on her Effexor  Lexapro was on hold    Pancolitis - Diarrhea  Assessment & Plan  She has longstanding issues of diarrhea  With worsening diarrhea, also contributing to her acute kidney injury  GI evaluation    Acute metabolic encephalopathy  Assessment & Plan  Likely multifactorial   Possibly using too much of her narcotics in addition to possibly underlying infectious process  Do not know what her baseline mental status is, however, she feels better today  She knows where she is and she is oriented x3    Rhabdomyolysis secondary to fall  Assessment & Plan  Secondary to fall  Continue with IV fluids  CPK improving  Hyponatremia  Assessment & Plan  Hemodynamic and continue to monitor      Hypokalemia  Assessment & Plan  Add potassium in IV fluids  Follow-up labs later today  Also she received oral and IV potassium        Present on Admission:   Rhabdomyolysis secondary to fall   Acute metabolic encephalopathy   Pancolitis - Diarrhea   Depression   Opioid dependence - Chronic back pain   Hyponatremia   Hypokalemia   Acute kidney injury    Hydronephrosis   Abnormal CT of the chest   Gram-negative bacteremia   Abnormal serum level of lipase            VTE Pharmacologic Prophylaxis:   Pharmacologic: Heparin  Mechanical VTE Prophylaxis in Place: Yes    Patient Centered Rounds: I have performed bedside rounds with nursing staff today  Discussions with Specialists or Other Care Team Provider:  Yes    Education and Discussions with Family / Patient:  Yes    Time Spent for Care: 45+ minutes    More than 50% of total time spent on counseling and coordination of care as described above  Current Length of Stay: 1 day(s)    Current Patient Status: Inpatient   Certification Statement: The patient will continue to require additional inpatient hospital stay due to Acute encephalopathy/acute kidney injury/bacteremia    Discharge Plan:  Home when stable    Code Status: Level 3 - DNAR and DNI      Subjective:   Patient has somewhat bizarre behavior  She tells me today that she feels good and she came in here because she fell at home on the carpet as she woke up early yesterday morning and went to get TV remote and ended up between the couch and the so far  Reviewing records, I believe she was found on the floor by her family  She has history of chronic diarrhea and initially she basically declined to me any GI symptoms  On asking her about diarrhea she said she has had some and has not had no problem today  Also denies any abdominal pain  Her major issue is chronic back pain as she has had previous 5 surgeries on her back and she follows up with pain management team from where she gets her pain medications  She denies any chest pain or shortness of breath to me  She denies any fever or chills  She denies any nausea or vomiting  She denies any headache    Objective:     Vitals:   Temp (24hrs), Av 9 °F (36 6 °C), Min:97 7 °F (36 5 °C), Max:98 1 °F (36 7 °C)    Temp:  [97 7 °F (36 5 °C)-98 1 °F (36 7 °C)] 97 8 °F (36 6 °C)  HR:  [68-81] 77  Resp:  [15-20] 18  BP: ()/(53-78) 106/59  SpO2:  [96 %-100 %] 100 %  Body mass index is 25 51 kg/m²  Input and Output Summary (last 24 hours): Intake/Output Summary (Last 24 hours) at 2019 0950  Last data filed at 2019 5515  Gross per 24 hour   Intake 80 ml   Output 2945 ml   Net -2865 ml           Physical Exam:     Vital signs are reviewed as above  Constitutional:   62years old female who is lying in bed  She had hard time sitting up in the bed  Eyes: EOM grossly intact   Conjunctivae slightly pale  Patient has anicteric sclera  HENT: Oropharynx are slightly dry  I  Did not notice any significant lesions on the tongue  Head normocephalic  Neck: Neck is supple  I was not able to visualize any JVD at 90°  There is no significant lymphadenopathy  I also did not notice any significant thyromegaly  Cardiac: I did not hear any rubs or gallop  Patient appears to be in sinus rhythm  Respiratory: Patient not in significant respiratory distress  Air entry in general is fair with few coarse crackles on the right side  GI: Abdomen is soft  It is grossly nontender  Bowel sounds are adequate  I was not able to appreciate any hepatosplenomegaly  Neurologic:  Patient is awake and alert  She knows that she is in the hospital   She also remembers what happened as she fell  Other examination is is somewhat limited    Skin: Skin is warm and dry  Psychiatric:  Depressed  Musculoskeletal   Chronic back pain and had difficult time sitting up in the bed  Extremities: Patient has no significant cyanosis, clubbing, or lower extremity edema       Additional Data:     Labs:    Results from last 7 days   Lab Units 02/26/19  0759   WBC Thousand/uL 8 64   HEMOGLOBIN g/dL 12 9   HEMATOCRIT % 36 8   PLATELETS Thousands/uL 244   NEUTROS PCT % 77*   LYMPHS PCT % 9*   MONOS PCT % 12   EOS PCT % 0     Results from last 7 days   Lab Units 02/27/19  0654   POTASSIUM mmol/L 2 9*   CHLORIDE mmol/L 103   CO2 mmol/L 22   BUN mg/dL 35*   CREATININE mg/dL 1 20   CALCIUM mg/dL 8 9   ALK PHOS U/L 84   ALT U/L 27   AST U/L 38     Results from last 7 days   Lab Units 02/26/19  0759   INR  1 09       * I Have Reviewed All Lab Data Listed Above  * Additional Pertinent Lab Tests Reviewed:  All Labs Within Last 24 Hours Reviewed    Imaging:    Imaging Reports Reviewed Today Include:  CT scan results      Recent Cultures (last 7 days):     Results from last 7 days   Lab Units 02/26/19  0759   GRAM STAIN RESULT  Gram negative rods*       Last 24 Hours Medication List:     Current Facility-Administered Medications:  acetaminophen 650 mg Oral Q6H PRN Peyman Singh MD    cefTRIAXone 1,000 mg Intravenous Q24H Peyman Singh MD Last Rate: 1,000 mg (02/26/19 1611)   gabapentin 600 mg Oral Daily Peyman Singh MD    heparin (porcine) 5,000 Units Subcutaneous Q8H [address] Peyman Singh MD    lidocaine 1 patch Topical Once Peyman Singh MD    metroNIDAZOLE 500 mg Intravenous Renuka Laurent MD Last Rate: 500 mg (02/27/19 0305)   ondansetron 4 mg Intravenous Q6H PRN Peyman Singh MD    QUEtiapine 100 mg Oral HS Peyman Singh MD    sodium chloride 0 9 % with KCl 40 mEq/L 125 mL/hr Intravenous Continuous Юлия Etienne MD    tamsulosin 0 4 mg Oral Daily With Dinner Heather Stanley PA-C    venlafaxine 75 mg Oral BID Peyman Singh MD         Today, Patient Was Seen By: Юлия Etienne MD    ** Please Note: Dragon 360 Dictation voice to text software may have been used in the creation of this document   **

## 2019-02-27 NOTE — CONSULTS
Consultation - Infectious Disease   Julian Crawford 62 y o  female MRN: [id number]  Unit/Bed#: -01 Encounter: [id number]      IMPRESSION & RECOMMENDATIONS:   Impression/Recommendations:  1  Gram-negative bacteremia  Likely due to #2  UA negative  Consider E  Coli, Salmonella  Clinically stable without sepsis  Rec:  · Continue ceftriaxone/Flagyl  · Follow up ID/susceptibilities and tailor antibiotics as indicated  · Follow temperatures closely  · Follow GI symptoms closely  · Check CBC in AM  · Supportive care as per the primary service    2  Pan colitis  In setting of 7 days of nonbloody watery diarrhea and abnormal CT  Likely due to [address] given #1  Improving with antibiotics  Rec:  · Continue antibiotics as above  · Serial abdominal exams    3  Chronic urinary retention  In the setting of chronic constipation  Noncompliance with prior [place] and urology follow-up  Rec:  · Continue Stahl per urology with close follow-up ongoing    4  SEBASTIÁN  Likely multifactorial due to infection, dehydration, retention  Improving  Rec:  · Follow creatinine closely  · Fortunately ceftriaxone and Flagyl do not require renal dose adjustment  · Check BMP in AM    Antibiotics:  Ceftriaxone/Flagyl # 2    Thank you for this consultation  We will follow along with you  HISTORY OF PRESENT ILLNESS:  Reason for Consult:  Pan colitis    HPI: Julian Crawford is a 62 y o  female who carries a history of recurrent small bowel obstruction status post multiple abdominal surgery in the remote past as well as chronic constipation/obstipation in the setting of chronic opioid use  She also has a history of urinary retention with several ED encounters over the past several months for retention requiring catheterization  Her care has been complicated by non adherence and leaving AMA    She presented to the emergency department yesterday after being found down on the floor by her family a fall at home in the setting of 1 week of weakness, diarrhea and abdominal pain  She denies any recent sick contacts or unusual food exposures  Upon presentation she was noted to be afebrile with a normal heart rate  She did have mild hypotension  Her exam revealed dried mucous membranes with cracked lips  Her labs revealed SEBASTIÁN, hyponatremia, hypokalemia, and an elevated CPK  A UA was negative  Her procalcitonin was mildly elevated  She underwent a CT which showed chronic patchy ground-glass opacities, hydronephrosis with hydroureter and possible colitis  A Stahl catheter was placed yielding 1400 cc of urine output  REVIEW OF SYSTEMS:  She denies any nausea or vomiting  Her bowel movements are more formed today  A complete system-based review of systems is otherwise negative  PAST MEDICAL HISTORY:  Past Medical History:   Diagnosis Date    Asthma     Bowel obstruction (HCC)     Chronic back pain      Past Surgical History:   Procedure Laterality Date    ABDOMINAL SURGERY      x 25    BACK SURGERY      x 3    COLONOSCOPY N/A 2018    Procedure: COLONOSCOPY;  Surgeon: Todd Lake MD;  Location: [facility]; Service: Gastroenterology       FAMILY HISTORY:  Non-contributory    SOCIAL HISTORY:  Social History     Substance and Sexual Activity   Alcohol Use Never    Alcohol/week: 0 0 oz    Frequency: Never    Binge frequency: Never    Comment: social drinker     Social History     Substance and Sexual Activity   Drug Use No     Social History     Tobacco Use   Smoking Status Former Smoker    Last attempt to quit: 1980    Years since quittin 6   Smokeless Tobacco Never Used       ALLERGIES:  Allergies   Allergen Reactions    Nsaids      Irritable, upset stomach    Tolmetin      Irritable, upset stomach       MEDICATIONS:  All current active medications have been reviewed      PHYSICAL EXAM:  Vitals:  Temp:  [97 7 °F (36 5 °C)-98 1 °F (36 7 °C)] 97 8 °F (36 6 °C)  HR:  [68-79] 77  Resp:  [17-20] 18  BP: ()/(53-78) 106/59  SpO2:  [96 %-100 %] 100 %  Temp (24hrs), Av 9 °F (36 6 °C), Min:97 7 °F (36 5 °C), Max:98 1 °F (36 7 °C)  Current: Temperature: 97 8 °F (36 6 °C)     Physical Exam:  General:  Well-nourished, well-developed, in no acute distress  Eyes:  Conjunctive clear with no hemorrhages or effusions  Oropharynx:  No ulcers, no lesions  Neck:  Supple, no lymphadenopathy  Lungs:  Clear to auscultation bilaterally, no accessory muscle use  Cardiac:  Regular rate and rhythm, no murmurs  Abdomen:  Soft, non-tender, non-distended  Extremities:  No peripheral cyanosis, clubbing, or edema  Skin:  No rashes, no ulcers  Neurological:  Moves all four extremities spontaneously, sensation grossly intact    LABS, IMAGING, & OTHER STUDIES:  Lab Results:  I have personally reviewed pertinent labs  Results from last 7 days   Lab Units 19  0654 19  0759   POTASSIUM mmol/L 2 9* 2 5*   CHLORIDE mmol/L 103 90*   CO2 mmol/L 22 24   BUN mg/dL 35* 66*   CREATININE mg/dL 1 20 2 86*   EGFR ml/min/1 73sq m 48 17   CALCIUM mg/dL 8 9 9 5   AST U/L 38 60*   ALT U/L 27 39   ALK PHOS U/L 84 101     Results from last 7 days   Lab Units 19  0759   WBC Thousand/uL 8 64   HEMOGLOBIN g/dL 12 9   PLATELETS Thousands/uL 244     Results from last 7 days   Lab Units 19  0759   GRAM STAIN RESULT  Gram negative rods*       Imaging Studies:   I have personally reviewed pertinent imaging study reports and images in PACS  CT abdomen with pan colitis    EKG, Pathology, and Other Studies:   I have personally reviewed pertinent reports

## 2019-02-27 NOTE — NURSING NOTE
RN called to patient room, patient c/o pain at IV site that was placed today with ultrasound guidance  Site was infiltrated and arm swollen, Infusion was stopped and IV was removed  Patient refuses to let anyone stick her for another IV site at this time  Patient complains of some discomfort at this time but states "its not so bad now that it is out"     Dr Fletcher Sullivan made aware

## 2019-02-27 NOTE — PHYSICAL THERAPY NOTE
Physical Therapy Cancellation Note    PT order received  Chart review performed  At this time, PT evaluation cancelled per discussion with Dr Maddison Swenson- reports pt not medically appropriate for PT assessment at this time, updated YOHANA Moore  PT will follow and evaluate as appropriate      Gilberto Jackson, PT, DPT

## 2019-02-27 NOTE — CONSULTS
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, who presented yesterday after being found down by her family  She was found to have rhabdomyolysis as well as evidence of colitis on CT  She has 1 positive blood culture  She reports diarrhea 5-6 times daily for the past month without abdominal pain, fevers, sick contacts, melena, or hematochezia  She denies any recent travel or antibiotic use  Her last hospitalization was in December when she required a decompression with colonoscopy  During her colonoscopy in December she had 2 polyps removed and had a wide/capacious colon consistent with Charlotte syndrome but no other abnormalities  REVIEW OF SYSTEMS: Negative except for as stated above    Historical Information   Past Medical History:   Diagnosis Date    Asthma     Bowel obstruction ([address] )     Chronic back pain      Past Surgical History:   Procedure Laterality Date    ABDOMINAL SURGERY      x 25    BACK SURGERY      x 3    COLONOSCOPY N/A 2018    Procedure: COLONOSCOPY;  Surgeon: Mario Ortiz MD;  Location: [facility];   Service: Gastroenterology     Social History   Social History     Substance and Sexual Activity   Alcohol Use Never    Alcohol/week: 0 0 oz    Frequency: Never    Binge frequency: Never    Comment: social drinker     Social History     Substance and Sexual Activity   Drug Use No     Social History     Tobacco Use   Smoking Status Former Smoker    Last attempt to quit: 1980    Years since quittin 6   Smokeless Tobacco Never Used     Family History   Problem Relation Age of Onset    Diabetes Mother Meds/Allergies     Medications Prior to Admission   Medication    diazepam (VALIUM) 5 mg tablet    escitalopram (LEXAPRO) 20 mg tablet    gabapentin (NEURONTIN) 300 mg capsule    HYDROmorphone (DILAUDID) 4 mg tablet    QUEtiapine (SEROquel) 100 mg tablet    venlafaxine (EFFEXOR) 75 mg tablet    temazepam (RESTORIL) 30 mg capsule     Current Facility-Administered Medications   Medication Dose Route Frequency    acetaminophen (TYLENOL) tablet 650 mg  650 mg Oral Q6H PRN    ceftriaxone (ROCEPHIN) 1 g/50 mL in dextrose IVPB  1,000 mg Intravenous Q24H    gabapentin (NEURONTIN) capsule 600 mg  600 mg Oral Daily    heparin (porcine) subcutaneous injection 5,000 Units  5,000 Units Subcutaneous Q8H [facility]    lidocaine (LIDODERM) 5 % patch 1 patch  1 patch Topical Once    metroNIDAZOLE (FLAGYL) IVPB (premix) 500 mg  500 mg Intravenous Q8H    ondansetron (ZOFRAN) injection 4 mg  4 mg Intravenous Q6H PRN    QUEtiapine (SEROquel) tablet 100 mg  100 mg Oral HS    sodium chloride 0 9 % with KCl 40 mEq/L infusion (premix)  125 mL/hr Intravenous Continuous    tamsulosin (FLOMAX) capsule 0 4 mg  0 4 mg Oral Daily With Dinner    [facility]) tablet 75 mg  75 mg Oral BID       Allergies   Allergen Reactions    Nsaids      Irritable, upset stomach    Tolmetin      Irritable, upset stomach           Objective     Blood pressure 115/62, pulse 79, temperature 97 7 °F (36 5 °C), temperature source Oral, resp  rate 18, height 5' 4" (1 626 m), weight 67 4 kg (148 lb 9 4 oz), SpO2 100 %, not currently breastfeeding        Intake/Output Summary (Last 24 hours) at 2/27/2019 1600  Last data filed at 2/27/2019 1444  Gross per 24 hour   Intake 880 ml   Output 2195 ml   Net -1315 ml         PHYSICAL EXAM:      General Appearance:   Alert, oriented x3, no acute distress   HEENT:   Normocephalic, atraumatic     Neck:  Supple, symmetrical, trachea midline   Lungs:   Clear to auscultation bilaterally, no respiratory distress   Heart[de-identified]   RRR, no murmur   Abdomen:   Non-distended, soft, BS active, (+) mild TTP in the lower abdomen   Rectal:   Deferred    Extremities:  No cyanosis or LE edema    Pulses:  2+ and symmetric all extremities    Skin:  No jaundice or pallor      Lab Results:   Results from last 7 days   Lab Units 02/26/19  0759   WBC Thousand/uL 8 64   HEMOGLOBIN g/dL 12 9   HEMATOCRIT % 36 8   PLATELETS Thousands/uL 244   NEUTROS PCT % 77*   LYMPHS PCT % 9*   MONOS PCT % 12   EOS PCT % 0     Results from last 7 days   Lab Units 02/27/19  0654   POTASSIUM mmol/L 2 9*   CHLORIDE mmol/L 103   CO2 mmol/L 22   BUN mg/dL 35*   CREATININE mg/dL 1 20   CALCIUM mg/dL 8 9   ALK PHOS U/L 84   ALT U/L 27   AST U/L 38     Results from last 7 days   Lab Units 02/26/19  0759   INR  1 09     Results from last 7 days   Lab Units 02/27/19  0654   LIPASE u/L 1,111*       Imaging Studies: I have personally reviewed pertinent imaging studies  Ct Chest Abdomen Pelvis Wo Contrast  Result Date: 2/26/2019  Impression: No visceral injury in the chest, abdomen or pelvis  No acute fracture  Patchy groundglass parenchymal changes in mid and lower lung zones suspicious for multifocal pneumonia  However, changes appear to have been present on prior examinations as far back as December 2018  Pulmonology consultation in close interval chest CT  follow-up with next examination to be performed in 6 weeks is recommended  Hydronephrosis and hydroureter to level of the mid ureters  A similar pattern, though lesser degree was present on examination of September 25, 2017 also the time when there was significant urinary bladder distention  Mild thickening of the wall of the large bowel suggesting mild pancolitis, possibly infectious  Fusiform enlargement of the common bile duct, stable as far back as September 2017  Correlate with liver function testing       Ct Head Without Contrast  Result Date: 2/26/2019  Impression: No acute intracranial abnormality  Ct Cervical Spine Without Contrast  Result Date: 2/26/2019  Impression: No cervical spine fracture or traumatic malalignment  Ct Recon Only Lumbar Spine  Result Date: 2/26/2019  Impression: No fracture or traumatic subluxation  Surgical changes with intact hardware, successful incorporation of bone graft material, and expected postsurgical alignment from L2 through L5 inclusive  ASSESSMENT and PLAN:      Gram Negative Bacteremia  Colitis  Diarrhea  - She reports 1 month of diarrhea without any recent travel, antibiotic use, or sick contacts  - CT A/P on admission showed mild submucosal thickening of the colonic loops in descending and sigmoid colon suspicious for mild colitis  - Cdiff negative, stool culture pending  - Lactic acid normal  - Continue antibiotics per ID recommendations  - Suspect this is an infectious colitis, less likely to be ischemic or inflammatory given lack of associated blood in the stool and no evidence of inflammatory changes on her colonoscopy in December  - Serial abdominal exams  - Tolerating clear liquids well, advance to soft low fiber diet  - Supportive care with antiemetics PRN    Biliary Dilatation  - CT on admission notable for a prominent enlargement of the CBD and cystic duct remnant with mild prominence of the central intrahepatic biliary tree though this is a stable appearance since September 2017  - LFTs normal on admission today, mild AST elevated yesterday is likely 2/2 elevated CK  - No RUQ pain or jaundice to suggest cholangitis so will hold off on further imaging      Patient will be seen and examined by Dr Tiffany Chase  All aaron medical decisions were made by Dr Tiffany Chase  Thank you for allowing us to participate in the care of this patient  We will follow with you closely

## 2019-02-28 PROBLEM — E87.1 HYPONATREMIA: Status: RESOLVED | Noted: 2018-07-06 | Resolved: 2019-02-28

## 2019-02-28 PROBLEM — N17.9 ACUTE KIDNEY INJURY (HCC): Status: RESOLVED | Noted: 2019-02-26 | Resolved: 2019-02-28

## 2019-02-28 LAB
ALBUMIN SERPL BCP-MCNC: 2.4 G/DL (ref 3.5–5)
ALP SERPL-CCNC: 76 U/L (ref 46–116)
ALT SERPL W P-5'-P-CCNC: 22 U/L (ref 12–78)
ANION GAP SERPL CALCULATED.3IONS-SCNC: 11 MMOL/L (ref 4–13)
AST SERPL W P-5'-P-CCNC: 24 U/L (ref 5–45)
BASOPHILS # BLD MANUAL: 0.05 THOUSAND/UL (ref 0–0.1)
BASOPHILS NFR MAR MANUAL: 1 % (ref 0–1)
BILIRUB SERPL-MCNC: 0.1 MG/DL (ref 0.2–1)
BUN SERPL-MCNC: 13 MG/DL (ref 5–25)
CALCIUM SERPL-MCNC: 8.8 MG/DL (ref 8.3–10.1)
CHLORIDE SERPL-SCNC: 109 MMOL/L (ref 100–108)
CK SERPL-CCNC: 125 U/L (ref 26–192)
CO2 SERPL-SCNC: 22 MMOL/L (ref 21–32)
CREAT SERPL-MCNC: 0.76 MG/DL (ref 0.6–1.3)
EOSINOPHIL # BLD MANUAL: 0 THOUSAND/UL (ref 0–0.4)
EOSINOPHIL NFR BLD MANUAL: 0 % (ref 0–6)
ERYTHROCYTE [DISTWIDTH] IN BLOOD BY AUTOMATED COUNT: 13.2 % (ref 11.6–15.1)
GFR SERPL CREATININE-BSD FRML MDRD: 84 ML/MIN/1.73SQ M
GLUCOSE SERPL-MCNC: 124 MG/DL (ref 65–140)
HCT VFR BLD AUTO: 29.1 % (ref 34.8–46.1)
HGB BLD-MCNC: 10.1 G/DL (ref 11.5–15.4)
LIPASE SERPL-CCNC: 1606 U/L (ref 73–393)
LYMPHOCYTES # BLD AUTO: 1.17 THOUSAND/UL (ref 0.6–4.47)
LYMPHOCYTES # BLD AUTO: 25 % (ref 14–44)
MCH RBC QN AUTO: 28.7 PG (ref 26.8–34.3)
MCHC RBC AUTO-ENTMCNC: 34.7 G/DL (ref 31.4–37.4)
MCV RBC AUTO: 83 FL (ref 82–98)
METAMYELOCYTES NFR BLD MANUAL: 2 % (ref 0–1)
MONOCYTES # BLD AUTO: 0.42 THOUSAND/UL (ref 0–1.22)
MONOCYTES NFR BLD: 9 % (ref 4–12)
NEUTROPHILS # BLD MANUAL: 2.9 THOUSAND/UL (ref 1.85–7.62)
NEUTS BAND NFR BLD MANUAL: 2 % (ref 0–8)
NEUTS SEG NFR BLD AUTO: 60 % (ref 43–75)
NRBC BLD AUTO-RTO: 0 /100 WBCS
OSMOLALITY UR/SERPL-RTO: 291 MMOL/KG (ref 282–298)
PLATELET # BLD AUTO: 199 THOUSANDS/UL (ref 149–390)
PLATELET BLD QL SMEAR: ADEQUATE
PMV BLD AUTO: 10.6 FL (ref 8.9–12.7)
POTASSIUM SERPL-SCNC: 3.1 MMOL/L (ref 3.5–5.3)
PROT SERPL-MCNC: 6.1 G/DL (ref 6.4–8.2)
RBC # BLD AUTO: 3.52 MILLION/UL (ref 3.81–5.12)
SODIUM SERPL-SCNC: 142 MMOL/L (ref 136–145)
TOTAL CELLS COUNTED SPEC: 100
VARIANT LYMPHS # BLD AUTO: 1 %
WBC # BLD AUTO: 4.68 THOUSAND/UL (ref 4.31–10.16)
WBC SPEC QL GRAM STN: NORMAL

## 2019-02-28 PROCEDURE — 83690 ASSAY OF LIPASE: CPT | Performed by: INTERNAL MEDICINE

## 2019-02-28 PROCEDURE — 99232 SBSQ HOSP IP/OBS MODERATE 35: CPT | Performed by: INTERNAL MEDICINE

## 2019-02-28 PROCEDURE — 85027 COMPLETE CBC AUTOMATED: CPT | Performed by: INTERNAL MEDICINE

## 2019-02-28 PROCEDURE — 99233 SBSQ HOSP IP/OBS HIGH 50: CPT | Performed by: INTERNAL MEDICINE

## 2019-02-28 PROCEDURE — 80053 COMPREHEN METABOLIC PANEL: CPT | Performed by: INTERNAL MEDICINE

## 2019-02-28 PROCEDURE — 83930 ASSAY OF BLOOD OSMOLALITY: CPT | Performed by: INTERNAL MEDICINE

## 2019-02-28 PROCEDURE — 82550 ASSAY OF CK (CPK): CPT | Performed by: INTERNAL MEDICINE

## 2019-02-28 PROCEDURE — 85007 BL SMEAR W/DIFF WBC COUNT: CPT | Performed by: INTERNAL MEDICINE

## 2019-02-28 RX ORDER — HYDROMORPHONE HYDROCHLORIDE 2 MG/1
1 TABLET ORAL 4 TIMES DAILY
Status: DISCONTINUED | OUTPATIENT
Start: 2019-02-28 | End: 2019-03-01 | Stop reason: HOSPADM

## 2019-02-28 RX ORDER — POTASSIUM CHLORIDE AND SODIUM CHLORIDE 900; 300 MG/100ML; MG/100ML
75 INJECTION, SOLUTION INTRAVENOUS CONTINUOUS
Status: DISPENSED | OUTPATIENT
Start: 2019-02-28 | End: 2019-02-28

## 2019-02-28 RX ORDER — POTASSIUM CHLORIDE 20 MEQ/1
40 TABLET, EXTENDED RELEASE ORAL
Status: DISCONTINUED | OUTPATIENT
Start: 2019-02-28 | End: 2019-03-01

## 2019-02-28 RX ORDER — TEMAZEPAM 7.5 MG/1
7.5 CAPSULE ORAL
Status: DISCONTINUED | OUTPATIENT
Start: 2019-02-28 | End: 2019-03-01 | Stop reason: HOSPADM

## 2019-02-28 RX ORDER — DIAZEPAM 2 MG/1
2 TABLET ORAL EVERY 8 HOURS PRN
Status: DISCONTINUED | OUTPATIENT
Start: 2019-02-28 | End: 2019-03-01 | Stop reason: HOSPADM

## 2019-02-28 RX ADMIN — QUETIAPINE FUMARATE 100 MG: 100 TABLET ORAL at 21:51

## 2019-02-28 RX ADMIN — VENLAFAXINE 75 MG: 37.5 TABLET ORAL at 08:09

## 2019-02-28 RX ADMIN — METRONIDAZOLE 500 MG: 500 TABLET ORAL at 05:52

## 2019-02-28 RX ADMIN — HYDROMORPHONE HYDROCHLORIDE 1 MG: 2 TABLET ORAL at 12:20

## 2019-02-28 RX ADMIN — AMPICILLIN SODIUM 2000 MG: 2 INJECTION, POWDER, FOR SOLUTION INTRAMUSCULAR; INTRAVENOUS at 15:14

## 2019-02-28 RX ADMIN — HEPARIN SODIUM 5000 UNITS: 5000 INJECTION, SOLUTION INTRAVENOUS; SUBCUTANEOUS at 05:52

## 2019-02-28 RX ADMIN — DIAZEPAM 2 MG: 2 TABLET ORAL at 19:38

## 2019-02-28 RX ADMIN — POTASSIUM CHLORIDE AND SODIUM CHLORIDE 125 ML/HR: 900; 300 INJECTION, SOLUTION INTRAVENOUS at 09:08

## 2019-02-28 RX ADMIN — POTASSIUM CHLORIDE 40 MEQ: 1500 TABLET, EXTENDED RELEASE ORAL at 10:18

## 2019-02-28 RX ADMIN — HYDROMORPHONE HYDROCHLORIDE 1 MG: 2 TABLET ORAL at 18:21

## 2019-02-28 RX ADMIN — GABAPENTIN 600 MG: 300 CAPSULE ORAL at 08:09

## 2019-02-28 RX ADMIN — TEMAZEPAM 7.5 MG: 7.5 CAPSULE ORAL at 19:38

## 2019-02-28 RX ADMIN — AMPICILLIN SODIUM 2000 MG: 2 INJECTION, POWDER, FOR SOLUTION INTRAMUSCULAR; INTRAVENOUS at 21:50

## 2019-02-28 RX ADMIN — HEPARIN SODIUM 5000 UNITS: 5000 INJECTION, SOLUTION INTRAVENOUS; SUBCUTANEOUS at 21:51

## 2019-02-28 RX ADMIN — HYDROMORPHONE HYDROCHLORIDE 1 MG: 2 TABLET ORAL at 21:50

## 2019-02-28 RX ADMIN — POTASSIUM CHLORIDE 40 MEQ: 1500 TABLET, EXTENDED RELEASE ORAL at 15:13

## 2019-02-28 RX ADMIN — VENLAFAXINE 75 MG: 37.5 TABLET ORAL at 18:21

## 2019-02-28 RX ADMIN — TAMSULOSIN HYDROCHLORIDE 0.4 MG: 0.4 CAPSULE ORAL at 15:14

## 2019-02-28 NOTE — NURSING NOTE
Pt refusing fluids and IV antibiotics at this time  Pt requesting medications to be given orally as well as a sleep aid  SLIM made aware  Will continue to monitor

## 2019-02-28 NOTE — PROGRESS NOTES
GI Progress Note - Jessica Pool 62 y o  female MRN: [id number]    Unit/Bed#: -01 Encounter: [id number]    Subjective: She reports no abdominal pain and her stool is starting to form  She had 3-4 BMs today, no melena or hematochezia  She is tolerating a soft diet without nausea or vomiting but had some trouble eating due to not having her upper dentures  Objective:     Vitals: Blood pressure 124/63, pulse 90, temperature 98 °F (36 7 °C), temperature source Oral, resp  rate 18, height 5' 4" (1 626 m), weight 67 4 kg (148 lb 9 4 oz), SpO2 100 %, not currently breastfeeding  ,Body mass index is 25 51 kg/m²        Intake/Output Summary (Last 24 hours) at 2/28/2019 1500  Last data filed at 2/28/2019 1439  Gross per 24 hour   Intake 1417 08 ml   Output 1000 ml   Net 417 08 ml       Physical Exam:     General Appearance: Alert, oriented x3, no acute distress  Lungs: Clear to auscultation bilaterally, no respiratory distress  Heart: RRR, no murmur  Abdomen: Non-distended, soft, BS active, (+) mild generalized TTP without any guarding  Extremities: No cyanosis or LE edema    Invasive Devices     Peripheral Intravenous Line            Peripheral IV 02/28/19 Left Forearm less than 1 day          Drain            Urethral Catheter Double-lumen 18 Fr  2 days                Lab Results:  Results from last 7 days   Lab Units 02/28/19  0826 02/26/19  0759   WBC Thousand/uL 4 68 8 64   HEMOGLOBIN g/dL 10 1* 12 9   HEMATOCRIT % 29 1* 36 8   PLATELETS Thousands/uL 199 244   NEUTROS PCT %  --  77*   LYMPHS PCT %  --  9*   LYMPHO PCT % 25  --    MONOS PCT %  --  12   MONO PCT % 9  --    EOS PCT % 0 0     Results from last 7 days   Lab Units 02/28/19  0826   POTASSIUM mmol/L 3 1*   CHLORIDE mmol/L 109*   CO2 mmol/L 22   BUN mg/dL 13   CREATININE mg/dL 0 76   CALCIUM mg/dL 8 8   ALK PHOS U/L 76   ALT U/L 22   AST U/L 24     Results from last 7 days   Lab Units 02/26/19  0759   INR  1 09     Results from last 7 days   Lab Units 02/28/19  0826   LIPASE u/L 1,606*       Imaging Studies: I have personally reviewed pertinent imaging studies  Ct Chest Abdomen Pelvis Wo Contrast  Result Date: 2/26/2019  Impression: No visceral injury in the chest, abdomen or pelvis  No acute fracture  Patchy groundglass parenchymal changes in mid and lower lung zones suspicious for multifocal pneumonia  However, changes appear to have been present on prior examinations as far back as December 2018  Pulmonology consultation in close interval chest CT  follow-up with next examination to be performed in 6 weeks is recommended  Hydronephrosis and hydroureter to level of the mid ureters  A similar pattern, though lesser degree was present on examination of September 25, 2017 also the time when there was significant urinary bladder distention  Mild thickening of the wall of the large bowel suggesting mild pancolitis, possibly infectious  Fusiform enlargement of the common bile duct, stable as far back as September 2017  Correlate with liver function testing  Ct Head Without Contrast  Result Date: 2/26/2019  Impression: No acute intracranial abnormality  Ct Cervical Spine Without Contrast  Result Date: 2/26/2019  Impression: No cervical spine fracture or traumatic malalignment  Ct Recon Only Lumbar Spine  Result Date: 2/26/2019  Impression: No fracture or traumatic subluxation  Surgical changes with intact hardware, successful incorporation of bone graft material, and expected postsurgical alignment from L2 through L5 inclusive         Assessment and Plan:     Salmonella Colitis  Salmonella Bacteremia  - CT on admission shows mild colitis in the descending and sigmoid colon  - Cdiff negative  - Stool culture positive for salmonella, sensitivies pending  - She is clinically improving on antibiotics and she has remained afebrile without leukocytosis  - Given her presentation and bacteremia, would continue to treat with ampicillin per ID recommendations and transition to PO in the next 24 hours to complete a 7-10 day course total  - Continue low fiber diet  - Serial abdominal exams  - Supportive care  - Colonoscopy in December 2018 done for decompression but no evidence of IBD at that time    Biliary Dilatation  - Noted incidentally on imaging as this is stable since 2017 in the setting of normal LFTs  - Likely post cholecystectomy state  - Repeat imaging in 6 months either with CT or MRI      The patient will be seen by Dr Dennys Cornell  GI will sign off, call with questions

## 2019-02-28 NOTE — PROGRESS NOTES
Progress Note - Infectious Disease   Yanira Briggs 62 y o  female MRN: [id number]  Unit/Bed#: -01 Encounter: [id number]      Impression/Recommendations:  1  Salmonella bacteremia  Due to #2  Clinically stable without sepsis  Rec:  ? Tailor antibiotics to ampicillin  ? Follow temperatures closely  ? Follow GI symptoms closely  ? Supportive care as per the primary service  ? If continues to improve anticipate change to PO antibiotics with plan to complete 7-10 days total of antibiotics     2   Salmonella pancolitis  In setting of 7 days of nonbloody watery diarrhea and abnormal CT  No obvious animal or unusual food exposure  Improving with antibiotics  Rec:  ? Continue antibiotics as above  ? Serial abdominal exams     3  Chronic urinary retention  In the setting of chronic constipation  Noncompliance with prior [place] and urology follow-up  Rec:  ? Continue Stahl per urology with close follow-up ongoing     4  SEBASTIÁN  Likely multifactorial due to infection, dehydration, retention  Improving      Antibiotics:  Ceftriaxone/Flagyl #3    Subjective:  Patient seen on PM rounds  Feels somewhat better today  Diarrhea improving  Tolerating regular diet  24 Hour Events:  No documented fevers, chills, sweats, nausea, vomiting, or diarrhea  Objective:  Vitals:  Temp:  [97 7 °F (36 5 °C)-98 8 °F (37 1 °C)] 98 °F (36 7 °C)  HR:  [76-90] 90  Resp:  [17-18] 18  BP: (115-125)/(62-64) 124/63  SpO2:  [95 %-100 %] 100 %  Temp (24hrs), Av 1 °F (36 7 °C), Min:97 7 °F (36 5 °C), Max:98 8 °F (37 1 °C)  Current: Temperature: 98 °F (36 7 °C)    Physical Exam:   General:  No acute distress  Psychiatric:  Awake and alert  Pulmonary:  Normal respiratory excursion without accessory muscle use  Abdomen:  Soft, nontender  Extremities:  No edema  Skin:  No rashes    Lab Results:  I have personally reviewed pertinent labs    Results from last 7 days   Lab Units 19  0826 19  1745 19  0654 19  4816 POTASSIUM mmol/L 3 1* 3 8 2 9* 2 5*   CHLORIDE mmol/L 109* 106 103 90*   CO2 mmol/L 22 22 22 24   BUN mg/dL 13 22 35* 66*   CREATININE mg/dL 0 76 0 84 1 20 2 86*   EGFR ml/min/1 73sq m 84 74 48 17   CALCIUM mg/dL 8 8 8 9 8 9 9 5   AST U/L 24  --  38 60*   ALT U/L 22  --  27 39   ALK PHOS U/L 76  --  84 101     Results from last 7 days   Lab Units 02/28/19  0826 02/26/19  0759   WBC Thousand/uL 4 68 8 64   HEMOGLOBIN g/dL 10 1* 12 9   PLATELETS Thousands/uL 199 244     Results from last 7 days   Lab Units 02/27/19  0442 02/26/19  0759 02/26/19  0742   BLOOD CULTURE   --  Salmonella species* No Growth at 24 hrs  GRAM STAIN RESULT   --  Gram negative rods*  --    C DIFF TOXIN B  NEGATIVE for C difficle toxin by PCR    --   --        Imaging Studies:   I have personally reviewed pertinent imaging study reports and images in PACS  EKG, Pathology, and Other Studies:   I have personally reviewed pertinent reports

## 2019-02-28 NOTE — PROGRESS NOTES
[facility] Progress Note  Patient: Jannette Wiseman 62 y o  female   MRN: [id number]  PCP: No primary care provider on file  Unit/Bed#: -01 Encounter: [id number]  Date Of Visit: 02/28/19          * Gram-negative bacteremia  Assessment & Plan  Blood cultures turned out to be cell medulla as well  On IV antibiotics  Infectious Disease consultant following  Follow-up sensitivities  Clinically she looks better    Abnormal serum level of lipase  Assessment & Plan  Not having any abdominal pain  GI input appreciated    Abnormal CT of the chest  Assessment & Plan  All consultants help appreciated  Hydronephrosis  Assessment & Plan  Continue to maintain Stalh  Opioid dependence - Chronic back pain  Assessment & Plan  Resume Dilaudid at lower dose  Also resume some of her other medications although at lower dose and advised to hold for sedation    Depression  Assessment & Plan  Continue with Effexor    Pancolitis - Diarrhea  Assessment & Plan  Likely secondary to Salmonella on treatment as per ID    Acute metabolic encephalopathy  Assessment & Plan  Mental status appears to be getting better  She is more awake and alert  She wants to go home, however, she does not want to sign against medical advice  I have resumed many of her other medications including narcotics although at much lower dose to avoid withdrawal symptoms    Rhabdomyolysis secondary to fall  Assessment & Plan  Secondary to fall  Continue with IV fluids  CPK improving  Hypokalemia  Assessment & Plan  Improving  Continue to monitor      Acute kidney injury resolved as of 2/28/2019  Assessment & Plan  Hemodynamic  IV fluids and improving      Hyponatremiaresolved as of 2/28/2019  Assessment & Plan  Hemodynamic and continue to monitor        Present on Admission:   Rhabdomyolysis secondary to fall   Acute metabolic encephalopathy   Pancolitis - Diarrhea   Depression   Opioid dependence - Chronic back pain   Hypokalemia   (Resolved) Acute kidney injury    Hydronephrosis   Abnormal CT of the chest   Gram-negative bacteremia   Abnormal serum level of lipase            VTE Pharmacologic Prophylaxis:   Pharmacologic: Heparin  Mechanical VTE Prophylaxis in Place: Yes    Patient Centered Rounds: I have performed bedside rounds with nursing staff today  Discussions with Specialists or Other Care Team Provider:  Yes    Education and Discussions with Family / Patient:  Yes    Time Spent for Care: 35+ minutes  More than 50% of total time spent on counseling and coordination of care as described above  Current Length of Stay: 2 day(s)    Current Patient Status: Inpatient   Certification Statement: The patient will continue to require additional inpatient hospital stay due to IV antibiotic    Discharge Plan: At the moment undetermined    Code Status: Level 3 - DNAR and DNI      Subjective:   She wants to go home  After talking to her that she needs to be in the hospital for antibiotics/IV fluids and she could only leave against medical advise and she decided against that  She denies any abdominal pain to me  She also denies any diarrhea to me today as well  Denies any chest pain  Denies any fever or chills  Denies any shortness of breath  As I have resumed her home medications at lower dose because of her somewhat improvement in her mental status/being more awake and alert, was told by the staff that She wants her pain medications and benzodiazepines back on at the same does  Objective:     Vitals:   Temp (24hrs), Av 1 °F (36 7 °C), Min:97 8 °F (36 6 °C), Max:98 8 °F (37 1 °C)    Temp:  [97 8 °F (36 6 °C)-98 8 °F (37 1 °C)] 97 9 °F (36 6 °C)  HR:  [76-90] 82  Resp:  [17-18] 18  BP: (116-133)/(63-69) 133/69  SpO2:  [95 %-100 %] 100 %  Body mass index is 25 51 kg/m²  Input and Output Summary (last 24 hours):        Intake/Output Summary (Last 24 hours) at 2019 1541  Last data filed at 2/28/2019 1439  Gross per 24 hour   Intake 1417 08 ml   Output 1000 ml   Net 417 08 ml           Physical Exam:     Vital signs are reviewed as above  Constitutional:  Patient is much more awake and alert  She is up in the bed  She is not in any respiratory  Eyes: EOM grossly intact  Conjunctivae slightly pale  Patient has anicteric sclera  HENT:  Lips are dry  Neck: Neck is supple  Cardiac: I did not hear any rubs or gallop  Patient appears to be in sinus rhythm  Respiratory: Patient not in significant respiratory distress  Air entry in general is fair  GI: Abdomen is soft  It is grossly nontender  Bowel sounds are adequate  I was not able to appreciate any hepatosplenomegaly  Neurologic:  Patient is awake and alert  Neurological examination is grossly intact  No obvious focal neurological deficit noticed  Skin: Skin is warm and dry  Pale  Psychiatric:  Anxious  Musculoskeletal  Patient moving all extremities while up in the chair today  Has chronic back pain and arthritic joints   Extremities: Patient has no significant cyanosis, clubbing, or lower extremity edema       Additional Data:     Labs:    Results from last 7 days   Lab Units 02/28/19  0826 02/26/19  0759   WBC Thousand/uL 4 68 8 64   HEMOGLOBIN g/dL 10 1* 12 9   HEMATOCRIT % 29 1* 36 8   PLATELETS Thousands/uL 199 244   NEUTROS PCT %  --  77*   LYMPHS PCT %  --  9*   LYMPHO PCT % 25  --    MONOS PCT %  --  12   MONO PCT % 9  --    EOS PCT % 0 0     Results from last 7 days   Lab Units 02/28/19  0826   POTASSIUM mmol/L 3 1*   CHLORIDE mmol/L 109*   CO2 mmol/L 22   BUN mg/dL 13   CREATININE mg/dL 0 76   CALCIUM mg/dL 8 8   ALK PHOS U/L 76   ALT U/L 22   AST U/L 24     Results from last 7 days   Lab Units 02/26/19  0759   INR  1 09       * I Have Reviewed All Lab Data Listed Above  * Additional Pertinent Lab Tests Reviewed:  All Labs Within Last 24 Hours Reviewed    Recent Cultures (last 7 days):     Results from last 7 days   Lab Units 02/27/19  0442 02/26/19  0759 02/26/19  0742   BLOOD CULTURE   --  Salmonella species* No Growth at 24 hrs  GRAM STAIN RESULT   --  Gram negative rods*  --    C DIFF TOXIN B  NEGATIVE for C difficle toxin by PCR    --   --        Last 24 Hours Medication List:     Current Facility-Administered Medications:  acetaminophen 650 mg Oral Q6H PRN Frankie Reddy MD    ampicillin 2,000 mg Intravenous Q6H Triston Pressley MD Last Rate: 2,000 mg (02/28/19 1514)   diazepam 2 mg Oral Q8H PRN Tamera Bermudez MD    gabapentin 600 mg Oral Daily Frankie Reddy MD    heparin (porcine) 5,000 Units Subcutaneous Q8H [address] Frankie Reddy MD    HYDROmorphone 1 mg Oral 4x Daily Tamera Bermudez MD    lidocaine 1 patch Topical Once Frankie Reddy MD    ondansetron 4 mg Intravenous Q6H PRN Frankie Reddy MD    potassium chloride 40 mEq Oral TID With Meals Tamera Bermudez MD    QUEtiapine 100 mg Oral HS Frankie Reddy MD    sodium chloride 0 9 % with KCl 40 mEq/L 75 mL/hr Intravenous Continuous Tamera Bermudez MD Last Rate: 75 mL/hr (02/28/19 1020)   tamsulosin 0 4 mg Oral Daily With Dinner Heather Stanley PA-C    temazepam 7 5 mg Oral HS PRN Tamera Bermudez MD    venlafaxine 75 mg Oral BID Frankie Reddy MD         Today, Patient Was Seen By: Tamera Bermudez MD    ** Please Note: Dragon 360 Dictation voice to text software may have been used in the creation of this document   **

## 2019-03-01 VITALS
DIASTOLIC BLOOD PRESSURE: 74 MMHG | TEMPERATURE: 99.2 F | RESPIRATION RATE: 20 BRPM | WEIGHT: 148.59 LBS | BODY MASS INDEX: 25.37 KG/M2 | HEART RATE: 78 BPM | OXYGEN SATURATION: 98 % | SYSTOLIC BLOOD PRESSURE: 164 MMHG | HEIGHT: 64 IN

## 2019-03-01 LAB
ANION GAP SERPL CALCULATED.3IONS-SCNC: 12 MMOL/L (ref 4–13)
BUN SERPL-MCNC: 5 MG/DL (ref 5–25)
CALCIUM SERPL-MCNC: 8.8 MG/DL (ref 8.3–10.1)
CHLORIDE SERPL-SCNC: 106 MMOL/L (ref 100–108)
CK SERPL-CCNC: 106 U/L (ref 26–192)
CO2 SERPL-SCNC: 20 MMOL/L (ref 21–32)
CREAT SERPL-MCNC: 0.61 MG/DL (ref 0.6–1.3)
GFR SERPL CREATININE-BSD FRML MDRD: 97 ML/MIN/1.73SQ M
GLUCOSE SERPL-MCNC: 133 MG/DL (ref 65–140)
POTASSIUM SERPL-SCNC: 4 MMOL/L (ref 3.5–5.3)
SODIUM SERPL-SCNC: 138 MMOL/L (ref 136–145)

## 2019-03-01 PROCEDURE — 82550 ASSAY OF CK (CPK): CPT | Performed by: INTERNAL MEDICINE

## 2019-03-01 PROCEDURE — 99239 HOSP IP/OBS DSCHRG MGMT >30: CPT | Performed by: INTERNAL MEDICINE

## 2019-03-01 PROCEDURE — 80048 BASIC METABOLIC PNL TOTAL CA: CPT | Performed by: INTERNAL MEDICINE

## 2019-03-01 PROCEDURE — 99232 SBSQ HOSP IP/OBS MODERATE 35: CPT | Performed by: INTERNAL MEDICINE

## 2019-03-01 RX ORDER — GABAPENTIN 100 MG/1
200 CAPSULE ORAL 3 TIMES DAILY
Status: DISCONTINUED | OUTPATIENT
Start: 2019-03-01 | End: 2019-03-01 | Stop reason: HOSPADM

## 2019-03-01 RX ORDER — GABAPENTIN 100 MG/1
200 CAPSULE ORAL 3 TIMES DAILY
Status: DISCONTINUED | OUTPATIENT
Start: 2019-03-01 | End: 2019-03-01

## 2019-03-01 RX ADMIN — AMPICILLIN SODIUM 2000 MG: 2 INJECTION, POWDER, FOR SOLUTION INTRAMUSCULAR; INTRAVENOUS at 09:06

## 2019-03-01 RX ADMIN — HYDROMORPHONE HYDROCHLORIDE 1 MG: 2 TABLET ORAL at 13:17

## 2019-03-01 RX ADMIN — HEPARIN SODIUM 5000 UNITS: 5000 INJECTION, SOLUTION INTRAVENOUS; SUBCUTANEOUS at 05:34

## 2019-03-01 RX ADMIN — HYDROMORPHONE HYDROCHLORIDE 1 MG: 2 TABLET ORAL at 08:10

## 2019-03-01 RX ADMIN — HEPARIN SODIUM 5000 UNITS: 5000 INJECTION, SOLUTION INTRAVENOUS; SUBCUTANEOUS at 13:18

## 2019-03-01 RX ADMIN — POTASSIUM CHLORIDE 40 MEQ: 1500 TABLET, EXTENDED RELEASE ORAL at 08:10

## 2019-03-01 RX ADMIN — GABAPENTIN 600 MG: 300 CAPSULE ORAL at 08:10

## 2019-03-01 RX ADMIN — AMPICILLIN SODIUM 2000 MG: 2 INJECTION, POWDER, FOR SOLUTION INTRAMUSCULAR; INTRAVENOUS at 02:25

## 2019-03-01 RX ADMIN — VENLAFAXINE 75 MG: 37.5 TABLET ORAL at 08:10

## 2019-03-01 RX ADMIN — ACETAMINOPHEN 650 MG: 325 TABLET, FILM COATED ORAL at 04:28

## 2019-03-01 NOTE — NURSING NOTE
Patient is insisting on leaving AMA  Dr Lew Acevedo made aware by tiger text  It was explained to the patient that it is in her best interest to stay in the hospital where she is receiving IV antibiotics to treat her infection  Per ID there is not an oral antibiotic that is appropriate for her with her infection and medical history  This was explained in detail to the patient and she was able to verbally express her understanding of the risks of leaving AMA including sepsis and death and still wishes to leave  Patients  is at the bedside and is unhappy with patients choice to leave  Patient will be leaving with wang in place w/ leg bag  Wang care instructions were provided to patient and instructed to follow up with urology  AMA paper signed

## 2019-03-01 NOTE — DISCHARGE SUMMARY
Discharge Summary - [facility]    Patient Information: Norma Cruz 62 y o  female MRN: [id number]  Unit/Bed#: -01 Encounter: [id number]    Discharging Physician / Practitioner: King Reardon MD  PCP: No primary care provider on file  Admission Date: 2/26/2019  Discharge Date: 03/01/19    Reason for Admission:  Traumatic rhabdomyolysis secondary to fall    Discharge Diagnoses:     Principal Problem:    Gram-negative bacteremia  Active Problems:    Hypokalemia    Rhabdomyolysis secondary to fall    Acute metabolic encephalopathy    Pancolitis - Diarrhea    Depression    Opioid dependence - Chronic back pain    Hydronephrosis    Abnormal CT of the chest    Abnormal serum level of lipase  Resolved Problems:    Acute kidney injury     Present on Admission:   Rhabdomyolysis secondary to fall   Acute metabolic encephalopathy   Pancolitis - Diarrhea   Depression   Opioid dependence - Chronic back pain   Hypokalemia   (Resolved) Acute kidney injury    Hydronephrosis   Abnormal CT of the chest   Gram-negative bacteremia   Abnormal serum level of lipase    Consultations During Hospital Stay:  · Infectious disease consult  · Urology service  · GI    Procedures Performed:     · CT chest/abdomen/pelvis  · Carotid ultrasound-no significant abnormalities or stenosis    Significant Findings:     No visceral injury in the chest, abdomen or pelvis  No acute fracture      Patchy groundglass parenchymal changes in mid and lower lung zones suspicious for multifocal pneumonia  However, changes appear to have been present on prior examinations as far back as December 2018  Pulmonology consultation in close interval chest CT   follow-up with next examination to be performed in 6 weeks is recommended      Hydronephrosis and hydroureter to level of the mid ureters    A similar pattern, though lesser degree was present on examination of September 25, 2017 also the time when there was significant urinary bladder distention      Mild thickening of the wall of the large bowel suggesting mild pancolitis, possibly infectious      · Fusiform enlargement of the common bile duct, stable as far back as September 2017  Correlate with liver function testing  Incidental Findings:   · As above     Test Results Pending at Discharge (will require follow up):   · As above     Outpatient Tests Requested:  · None    Complications:  None    Hospital Course:     Leela Matthew is a 62 y o  female patient who originally presented to the hospital on 2/26/2019 due to a fall at home  She was found to be mentally altered and also was in acute renal failure with rhabdomyolysis and significant abnormalities on her CT scan  Stool cultures and blood cultures came back positive for salmonella  She also had prolonged QT interval   She was on IV antibiotics and her symptoms have significantly improved  Her renal functions also as she had Stahl catheter placed for urinary retention  She has very poor IV access  I have talked to patient about 3 times today as she is asking to be discharged home and she did that yesterday 2  I have explained to her that leaving without treatment, could result in death secondary to overwhelming infection/sepsis  Also she can probably go back into acute renal failure again  Her home medications-many of them were held because of her fall and change in mental status and were being resumed slowly  Because of prolonged QT, therefore, could not give her quinolones and after discussion with Infectious Disease consultant, it was required that she stays in the hospital and have the repeat cultures done and then possibly a PICC line to complete a course of IV antibiotic for about 10 days total   However, she again this afternoon decided that she wanted to leave and left against medical advice  She was explained about the risks leaving against medical advise a myself earlier today including death    She signed AMA form  Condition at Discharge: fair     Discharge Day Visit / Exam:     Subjective:  Patient complaining of back pain and asking more pain medications  In general she feels much better  She had brown stools and not greenish anymore  Vitals: Blood Pressure: 164/74 (03/01/19 0750)  Pulse: 78 (03/01/19 0750)  Temperature: 99 2 °F (37 3 °C) (03/01/19 0750)  Temp Source: Oral (03/01/19 0750)  Respirations: 20 (03/01/19 0750)  Height: 5' 4" (162 6 cm) (02/27/19 1548)  Weight - Scale: 67 4 kg (148 lb 9 4 oz) (02/27/19 1548)  SpO2: 98 % (03/01/19 0750)  Exam:        Vital signs are reviewed as above  In bed  Wants to go home  Conjunctivae slightly pale  Oropharynx are much better hydrated  Decreased breath sounds at the bases  Abdomen is soft  Bowel sounds are audible  Discharge instructions/Information to patient and family:   See after visit summary for information provided to patient and family  Provisions for Follow-Up Care:  See after visit summary for information related to follow-up care and any pertinent home health orders  Disposition:     Home    For Discharges to [facility] SNF:   · Not Applicable to this Patient - Not Applicable to this Patient    Planned Readmission:  None     Discharge Statement:  I spent 45+ minutes discharging the patient  This time was spent on the day of discharge  I had direct contact with the patient on the day of discharge  Greater than 50% of the total time was spent examining patient, answering all patient questions, arranging and discussing plan of care with patient as well as directly providing post-discharge instructions  Additional time then spent on discharge activities  Discharge Medications:  See after visit summary for reconciled discharge medications provided to patient and family  ** Please Note: Dragon 360 Dictation voice to text software may have been used in the creation of this document   **

## 2019-03-01 NOTE — PROGRESS NOTES
Progress Note - Infectious Disease   Brant Nance 62 y o  female MRN: [id number]  Unit/Bed#: -01 Encounter: [id number]      Impression/Plan:  1   Salmonella bacteremia   Due to #2   Clinically stable without sepsis     Rec:  ? Continue ampicillin for now at current  ? Recheck blood cultures x2 sets confirm clearance of bacteremia  ? Follow temperatures closely  ? Follow GI symptoms closely  ? Supportive care as per the primary service  ? Unfortunately not really any good oral antibiotic options as the patient has a prolonged QT on EKG and is on Seroquel  ? Could consider discontinuing the Seroquel, and rechecking the EKG on Monday to see if a transition to oral ciprofloxacin may be possible  ? Once clears bacteremia, can confirm oral versus IV antibiotic treatment plan as an outpatient     2   Salmonella pancolitis   In setting of 7 days of nonbloody watery diarrhea and abnormal CT   No obvious animal or unusual food exposure  Improving with antibiotics  Rec:  ? Continue antibiotics as above  ? Serial abdominal exams     3   Chronic urinary retention   In the setting of chronic constipation   Noncompliance with prior [place]ys and urology follow-up  Rec:  ? Continue Stahl per urology with close follow-up ongoing     4   SEBASTIÁN   Likely multifactorial due to infection, dehydration, retention  Shruthi Lopez  Antibiotics:  Ampicillin 2  Antibiotics 4  Subjective:  Patient has no fever, chills, sweats; no nausea, vomiting, diarrhea; no cough, shortness of breath; no pain  No new symptoms  She is feeling much better overall  Objective:  Vitals:  Temp:  [97 9 °F (36 6 °C)-99 2 °F (37 3 °C)] 99 2 °F (37 3 °C)  HR:  [78-82] 78  Resp:  [18-20] 20  BP: (129-164)/(67-74) 164/74  SpO2:  [98 %-100 %] 98 %  Temp (24hrs), Av 4 °F (36 9 °C), Min:97 9 °F (36 6 °C), Max:99 2 °F (37 3 °C)  Current: Temperature: 99 2 °F (37 3 °C)    Physical Exam:   General Appearance:  Alert, interactive, nontoxic, no acute distress  Throat: Oropharynx moist without lesions  Lungs:   Clear to auscultation bilaterally; no wheezes, rhonchi or rales; respirations unlabored   Heart:  RRR; no murmur, rub or gallop   Abdomen:   Soft, non-tender, non-distended, positive bowel sounds  Extremities: No clubbing, cyanosis or edema   Skin: No new rashes or lesions  No draining wounds noted  Labs, Imaging, & Other studies:   All pertinent labs and imaging studies were personally reviewed  Results from last 7 days   Lab Units 02/28/19  0826 02/26/19  0759   WBC Thousand/uL 4 68 8 64   HEMOGLOBIN g/dL 10 1* 12 9   PLATELETS Thousands/uL 199 244     Results from last 7 days   Lab Units 02/28/19  0826 02/27/19  1745 02/27/19  0654 02/26/19  0759   SODIUM mmol/L 142 138 137 129*   POTASSIUM mmol/L 3 1* 3 8 2 9* 2 5*   CHLORIDE mmol/L 109* 106 103 90*   CO2 mmol/L 22 22 22 24   BUN mg/dL 13 22 35* 66*   CREATININE mg/dL 0 76 0 84 1 20 2 86*   EGFR ml/min/1 73sq m 84 74 48 17   CALCIUM mg/dL 8 8 8 9 8 9 9 5   AST U/L 24  --  38 60*   ALT U/L 22  --  27 39   ALK PHOS U/L 76  --  84 101     Results from last 7 days   Lab Units 02/27/19  0442 02/26/19  0759 02/26/19  0742   BLOOD CULTURE   --  Salmonella species* No Growth at 48 hrs     GRAM STAIN RESULT   --  Gram negative rods*  --    C DIFF TOXIN B  NEGATIVE for C difficle toxin by PCR    --   --

## 2019-03-01 NOTE — PLAN OF CARE
Problem: Potential for Falls  Goal: Patient will remain free of falls  Description  INTERVENTIONS:  - Assess patient frequently for physical needs  -  Identify cognitive and physical deficits and behaviors that affect risk of falls  -  [place] fall precautions as indicated by assessment   - Educate patient/family on patient safety including physical limitations  - Instruct patient to call for assistance with activity based on assessment  - Modify environment to reduce risk of injury  - Consider OT/PT consult to assist with strengthening/mobility  Outcome: Progressing     Problem: Prexisting or High Potential for Compromised Skin Integrity  Goal: Skin integrity is maintained or improved  Description  INTERVENTIONS:  - Identify patients at risk for skin breakdown  - Assess and monitor skin integrity  - Assess and monitor nutrition and hydration status  - Monitor labs (i e  albumin)  - Assess for incontinence   - Turn and reposition patient  - Assist with mobility/ambulation  - Relieve pressure over bony prominences  - Avoid friction and shearing  - Provide appropriate hygiene as needed including keeping skin clean and dry  - Evaluate need for skin moisturizer/barrier cream  - Collaborate with interdisciplinary team (i e  Nutrition, Rehabilitation, etc )   - Patient/family teaching  Outcome: Progressing     Problem: Nutrition/Hydration-ADULT  Goal: Nutrient/Hydration intake appropriate for improving, restoring or maintaining nutritional needs  Description  Monitor and assess patient's nutrition/hydration status for malnutrition (ex- brittle hair, bruises, dry skin, pale skin and conjunctiva, muscle wasting, smooth red tongue, and disorientation)  Collaborate with interdisciplinary team and initiate plan and interventions as ordered  Monitor patient's weight and dietary intake as ordered or per policy  Utilize nutrition screening tool and intervene per policy   Determine patient's food preferences and provide high-protein, high-caloric foods as appropriate  INTERVENTIONS:  - Monitor oral intake, urinary output, labs, and treatment plans  - Assess nutrition and hydration status and recommend course of action  - Evaluate amount of meals eaten  - Assist patient with eating if necessary   - Allow adequate time for meals  - Recommend/ encourage appropriate diets, oral nutritional supplements, and vitamin/mineral supplements  - Order, calculate, and assess calorie counts as needed  - Recommend, monitor, and adjust tube feedings and TPN/PPN based on assessed needs  - Assess need for intravenous fluids  - Provide specific nutrition/hydration education as appropriate  - Include patient/family/caregiver in decisions related to nutrition  Outcome: Progressing     Problem: PAIN - ADULT  Goal: Verbalizes/displays adequate comfort level or baseline comfort level  Description  Interventions:  - Encourage patient to monitor pain and request assistance  - Assess pain using appropriate pain scale  - Administer analgesics based on type and severity of pain and evaluate response  - Implement non-pharmacological measures as appropriate and evaluate response  - Consider cultural and social influences on pain and pain management  - Notify physician/advanced practitioner if interventions unsuccessful or patient reports new pain  Outcome: Progressing     Problem: SAFETY ADULT  Goal: Patient will remain free of falls  Description  INTERVENTIONS:  - Assess patient frequently for physical needs  -  Identify cognitive and physical deficits and behaviors that affect risk of falls    -  [place] fall precautions as indicated by assessment   - Educate patient/family on patient safety including physical limitations  - Instruct patient to call for assistance with activity based on assessment  - Modify environment to reduce risk of injury  - Consider OT/PT consult to assist with strengthening/mobility  Outcome: Progressing  Goal: Maintain or return to baseline ADL function  Description  INTERVENTIONS:  -  Assess patient's ability to carry out ADLs; assess patient's baseline for ADL function and identify physical deficits which impact ability to perform ADLs (bathing, care of mouth/teeth, toileting, grooming, dressing, etc )  - Assess/evaluate cause of self-care deficits   - Assess range of motion  - Assess patient's mobility; develop plan if impaired  - Assess patient's need for assistive devices and provide as appropriate  - Encourage maximum independence but intervene and supervise when necessary  ¯ Involve family in performance of ADLs  ¯ Assess for home care needs following discharge   ¯ Request OT consult to assist with ADL evaluation and planning for discharge  ¯ Provide patient education as appropriate  Outcome: Progressing  Goal: Maintain or return mobility status to optimal level  Description  INTERVENTIONS:  - Assess patient's baseline mobility status (ambulation, transfers, stairs, etc )    - Identify cognitive and physical deficits and behaviors that affect mobility  - Identify mobility aids required to assist with transfers and/or ambulation (gait belt, sit-to-stand, lift, walker, cane, etc )  - [place] fall precautions as indicated by assessment  - Record patient progress and toleration of activity level on Mobility SBAR; progress patient to next Phase/Stage  - Instruct patient to call for assistance with activity based on assessment  - Request Rehabilitation consult to assist with strengthening/weightbearing, etc   Outcome: Progressing

## 2019-03-03 ENCOUNTER — HOSPITAL ENCOUNTER (EMERGENCY)
Facility: HOSPITAL | Age: 64
Discharge: HOME | End: 2019-03-03
Attending: EMERGENCY MEDICINE | Admitting: EMERGENCY MEDICINE
Payer: MEDICARE

## 2019-03-03 VITALS
WEIGHT: 148.59 LBS | HEIGHT: 64 IN | DIASTOLIC BLOOD PRESSURE: 68 MMHG | BODY MASS INDEX: 25.37 KG/M2 | HEART RATE: 95 BPM | SYSTOLIC BLOOD PRESSURE: 102 MMHG | OXYGEN SATURATION: 98 % | RESPIRATION RATE: 18 BRPM | TEMPERATURE: 98.2 F

## 2019-03-03 DIAGNOSIS — T83.9XXA PROBLEM WITH FOLEY CATHETER, INITIAL ENCOUNTER (HCC): Primary | ICD-10-CM

## 2019-03-03 DIAGNOSIS — B37.3 VAGINAL CANDIDA: ICD-10-CM

## 2019-03-03 LAB
BACTERIA BLD CULT: NORMAL
BACTERIA UR QL AUTO: ABNORMAL /HPF
BILIRUB UR QL STRIP: NEGATIVE
CAOX CRY URNS QL MICRO: ABNORMAL /HPF
CLARITY UR: ABNORMAL
COLOR UR: YELLOW
GLUCOSE UR STRIP-MCNC: NEGATIVE MG/DL
HGB UR QL STRIP.AUTO: ABNORMAL
KETONES UR STRIP-MCNC: NEGATIVE MG/DL
LEUKOCYTE ESTERASE UR QL STRIP: ABNORMAL
MUCOUS THREADS UR QL AUTO: ABNORMAL
NITRITE UR QL STRIP: NEGATIVE
NON-SQ EPI CELLS URNS QL MICRO: ABNORMAL /HPF
OTHER STN SPEC: ABNORMAL
PH UR STRIP.AUTO: 5.5 [PH] (ref 4.5–8)
PROT UR STRIP-MCNC: ABNORMAL MG/DL
RBC #/AREA URNS AUTO: ABNORMAL /HPF
SP GR UR STRIP.AUTO: >=1.03 (ref 1–1.03)
UROBILINOGEN UR QL STRIP.AUTO: 0.2 E.U./DL
WBC #/AREA URNS AUTO: ABNORMAL /HPF

## 2019-03-03 PROCEDURE — 81001 URINALYSIS AUTO W/SCOPE: CPT | Performed by: EMERGENCY MEDICINE

## 2019-03-03 PROCEDURE — 87086 URINE CULTURE/COLONY COUNT: CPT | Performed by: EMERGENCY MEDICINE

## 2019-03-03 PROCEDURE — 99283 EMERGENCY DEPT VISIT LOW MDM: CPT

## 2019-03-03 RX ORDER — FLUCONAZOLE 200 MG/1
200 TABLET ORAL DAILY
Qty: 7 TABLET | Refills: 0 | Status: SHIPPED | OUTPATIENT
Start: 2019-03-03 | End: 2019-03-10

## 2019-03-03 RX ORDER — FLUCONAZOLE 100 MG/1
200 TABLET ORAL ONCE
Status: COMPLETED | OUTPATIENT
Start: 2019-03-03 | End: 2019-03-03

## 2019-03-03 RX ADMIN — FLUCONAZOLE 200 MG: 100 TABLET ORAL at 10:56

## 2019-03-04 LAB — BACTERIA UR CULT: ABNORMAL

## 2019-03-04 NOTE — TELEPHONE ENCOUNTER
Patient was discharged, then returned to ER yesterday because of a problem with wang  Please advise if patient should be seen sooner or if plans have changed because of this ER visit  Also, will we remove wang at next appointment and do a void trial? Thank you

## 2019-03-04 NOTE — TELEPHONE ENCOUNTER
Per Denies patient is to follow up with Cresencio Cooley in 3 weeks  Does this plan change due to recent ER trip or should patient still be scheduled in 3 weeks?

## 2019-03-04 NOTE — TELEPHONE ENCOUNTER
Per Gianluca Thorpe PA-C  Please have appointment scheduled as a voiding trial  Should be within the next 1-2 weeks with Dr Smith  Please assist on scheduling

## 2019-03-04 NOTE — TELEPHONE ENCOUNTER
Please have appointment scheduled as a voiding trial  Should be within the next 1-2 weeks  Thank you

## 2019-03-04 NOTE — QUICK NOTE
I called patient and discussed with her about her cultures and to see her primary care physician to review her cultures and get antibiotics for treatment

## 2019-03-05 NOTE — TELEPHONE ENCOUNTER
Pt calling- requesting apt within the week, discharged without wang catheter on 3/3  Please advise    [phone]

## 2019-03-05 NOTE — TELEPHONE ENCOUNTER
Per Franny Bello PA-C  Should be within the next 1-2 weeks with Dr Smith  Please assist on scheduling

## 2019-03-07 NOTE — ED PROVIDER NOTES
History  Chief Complaint   Patient presents with    Urinary Catheter Problem     Patient states"she is here to have her wang catheter removed"  Patient was seen here for urinary retention  78-year-old female patient presents emergency department for removal of Wang catheter  I did attempt did discuss this with her with impending snow storm in the fact that she lives in an area which would be difficult to get to the hospital that maybe she would be better off waiting until after the bad weather as she has had multiple episodes of urinary retention in the past with patient is just demanding Wang be removed  The Wang will be removed she will be treated for candidal infection  History provided by:  Patient   used: No    Urinary Catheter Problem   Severity:  Mild  Onset quality:  Gradual  Timing:  Constant  Progression:  Worsening  Chronicity:  Recurrent  Associated symptoms: no chest pain, no diarrhea, no ear pain, no headaches, no loss of consciousness, no myalgias, no vomiting and no wheezing        Prior to Admission Medications   Prescriptions Last Dose Informant Patient Reported? Taking? HYDROmorphone (DILAUDID) 4 mg tablet   Yes No   Sig: Take 4 mg by mouth 4 (four) times a day   QUEtiapine (SEROquel) 100 mg tablet   Yes No   Sig: Take 100 mg by mouth daily at bedtime   diazepam (VALIUM) 5 mg tablet   Yes No   Sig: 3 (three) times a day Morning, 3pm and 8pm   escitalopram (LEXAPRO) 20 mg tablet   Yes No   Sig: daily       gabapentin (NEURONTIN) 300 mg capsule   Yes No   Sig: take 2 capsules by mouth three times a day   temazepam (RESTORIL) 30 mg capsule   Yes No   Si mg daily at bedtime     venlafaxine (EFFEXOR) 75 mg tablet   Yes No   Sig: Take 75 mg by mouth 2 (two) times a day      Facility-Administered Medications: None       Past Medical History:   Diagnosis Date    Asthma     Bowel obstruction (HCC)     Chronic back pain        Past Surgical History:   Procedure Laterality Date    ABDOMINAL SURGERY      x 25    BACK SURGERY      x 3    COLONOSCOPY N/A 2018    Procedure: COLONOSCOPY;  Surgeon: Vicki Pavon MD;  Location: [facility]; Service: Gastroenterology    HYSTERECTOMY         Family History   Problem Relation Age of Onset    Diabetes Mother      I have reviewed and agree with the history as documented  Social History     Tobacco Use    Smoking status: Former Smoker     Last attempt to quit: 1980     Years since quittin 7    Smokeless tobacco: Never Used   Substance Use Topics    Alcohol use: Never     Alcohol/week: 0 0 oz     Frequency: Never     Binge frequency: Never     Comment: social drinker    Drug use: No        Review of Systems   HENT: Negative for ear pain  Respiratory: Negative for wheezing  Cardiovascular: Negative for chest pain  Gastrointestinal: Negative for diarrhea and vomiting  Musculoskeletal: Negative for myalgias  Neurological: Negative for loss of consciousness and headaches  All other systems reviewed and are negative  Physical Exam  Physical Exam   Constitutional: She is oriented to person, place, and time  She appears well-developed and well-nourished  HENT:   Head: Normocephalic and atraumatic  Right Ear: External ear normal    Left Ear: External ear normal    Eyes: Conjunctivae and EOM are normal    Neck: No JVD present  No tracheal deviation present  No thyromegaly present  Cardiovascular: Normal rate  Pulmonary/Chest: Effort normal and breath sounds normal  No stridor  Abdominal: Soft  She exhibits no distension and no mass  There is no tenderness  There is no guarding  No hernia  Musculoskeletal: Normal range of motion  She exhibits no edema, tenderness or deformity  Lymphadenopathy:     She has no cervical adenopathy  Neurological: She is alert and oriented to person, place, and time  Skin: Skin is warm  No rash noted  No erythema  No pallor     Psychiatric: She has a normal mood and affect  Her behavior is normal    Nursing note and vitals reviewed        Vital Signs  ED Triage Vitals [03/03/19 1019]   Temperature Pulse Respirations Blood Pressure SpO2   98 2 °F (36 8 °C) 95 18 102/68 98 %      Temp Source Heart Rate Source Patient Position - Orthostatic VS BP Location FiO2 (%)   Oral Monitor Lying Right arm --      Pain Score       --           Vitals:    03/03/19 1019   BP: 102/68   Pulse: 95   Patient Position - Orthostatic VS: Lying       Visual Acuity      ED Medications  Medications   fluconazole (DIFLUCAN) tablet 200 mg (200 mg Oral Given 3/3/19 1056)       Diagnostic Studies  Results Reviewed     Procedure Component Value Units Date/Time    Urine culture [[id number]]  (Abnormal) Collected:  03/03/19 1054    Lab Status:  Final result Specimen:  Urine, Clean Catch Updated:  03/04/19 1756     Urine Culture 20,000-29,000 cfu/ml Candida sp  presumptively albicans    Urine Microscopic [[id number]]  (Abnormal) Collected:  03/03/19 1054    Lab Status:  Final result Specimen:  Urine, Clean Catch Updated:  03/03/19 1112     RBC, UA 4-10 /hpf      WBC, UA 10-20 /hpf      Epithelial Cells None Seen /hpf      Bacteria, UA None Seen /hpf      Ca Oxalate Claudia, UA Occasional /hpf      OTHER OBSERVATIONS Yeast Cells Present     MUCUS THREADS Occasional    UA w Reflex to Microscopic w Reflex to Culture [[id number]]  (Abnormal) Collected:  03/03/19 1054    Lab Status:  Final result Specimen:  Urine, Clean Catch Updated:  03/03/19 1059     Color, UA Yellow     Clarity, UA Slightly Cloudy     Specific Gravity, UA >=1 030     pH, UA 5 5     Leukocytes, UA Small     Nitrite, UA Negative     Protein, UA 30 (1+) mg/dl      Glucose, UA Negative mg/dl      Ketones, UA Negative mg/dl      Urobilinogen, UA 0 2 E U /dl      Bilirubin, UA Negative     Blood, UA Small                 No orders to display              Procedures  Procedures       Phone Contacts  ED Phone Contact    ED Course MDM  Number of Diagnoses or Management Options  Problem with Stahl catheter, initial encounter [facility]): new and requires workup  Vaginal candida: new and requires workup     Amount and/or Complexity of Data Reviewed  Clinical lab tests: reviewed and ordered  Decide to obtain previous medical records or to obtain history from someone other than the patient: yes  Review and summarize past medical records: yes    Patient Progress  Patient progress: stable      Disposition  Final diagnoses:   Problem with Stahl catheter, initial encounter [facility])   Vaginal candida     Time reflects when diagnosis was documented in both MDM as applicable and the Disposition within this note     Time User Action Codes Description Comment    3/3/2019 10:43 AM Lyudmila Bowen [T83  9XXA] Problem with Stahl catheter, initial encounter ([facility] [address] )     3/3/2019 11:19 AM Lyudmila Bowen [B37 3] Vaginal candida       ED Disposition     ED Disposition Condition Date/Time Comment    Discharge Stable Sun Mar 3, 2019 10:43 AM Kasey Tuttle discharge to home/self care  Follow-up Information    None         Discharge Medication List as of 3/3/2019 11:20 AM      START taking these medications    Details   fluconazole (DIFLUCAN) 200 mg tablet Take 1 tablet (200 mg total) by mouth daily for 7 days Take one in three days  Keep the rest for your next infection  Take one on day one of symptoms and repeat the dose ONCE on day 3, Starting Sun 3/3/2019, Until Sun 3/10/2019, Print         CONTINUE these medications which have NOT CHANGED    Details   diazepam (VALIUM) 5 mg tablet 3 (three) times a day Morning, 3pm and 8pm, Starting Fri 11/3/2017, Historical Med      escitalopram (LEXAPRO) 20 mg tablet daily    , Historical Med      gabapentin (NEURONTIN) 300 mg capsule take 2 capsules by mouth three times a day, Historical Med      HYDROmorphone (DILAUDID) 4 mg tablet Take 4 mg by mouth 4 (four) times a day, Historical Med QUEtiapine (SEROquel) 100 mg tablet Take 100 mg by mouth daily at bedtime, Historical Med      temazepam (RESTORIL) 30 mg capsule 30 mg daily at bedtime  , Starting Tue 11/28/2017, Historical Med      venlafaxine (EFFEXOR) 75 mg tablet Take 75 mg by mouth 2 (two) times a day, Historical Med           No discharge procedures on file      ED Provider  Electronically Signed by           Kasandra Cole DO  03/07/19 4718

## 2019-03-08 LAB
BACTERIA BLD CULT: ABNORMAL
GRAM STN SPEC: ABNORMAL

## 2019-03-11 NOTE — TELEPHONE ENCOUNTER
Patient should keep appointment as scheduled  She is scheduled to see MD to discuss recurrent urinary retention

## 2019-03-11 NOTE — TELEPHONE ENCOUNTER
Pt visited [facility] ED 3/3, wang was removed  Pt scheduled wang removal 3/12  Is this appropriate?   [phone]

## 2019-03-12 ENCOUNTER — OFFICE VISIT (OUTPATIENT)
Dept: UROLOGY | Facility: CLINIC | Age: 64
End: 2019-03-12
Payer: MEDICARE

## 2019-03-12 VITALS
BODY MASS INDEX: 27.05 KG/M2 | HEART RATE: 82 BPM | WEIGHT: 147 LBS | HEIGHT: 62 IN | DIASTOLIC BLOOD PRESSURE: 70 MMHG | SYSTOLIC BLOOD PRESSURE: 120 MMHG

## 2019-03-12 DIAGNOSIS — N13.30 HYDRONEPHROSIS, UNSPECIFIED HYDRONEPHROSIS TYPE: ICD-10-CM

## 2019-03-12 DIAGNOSIS — N17.9 AKI (ACUTE KIDNEY INJURY) (HCC): Primary | ICD-10-CM

## 2019-03-12 LAB — POST-VOID RESIDUAL VOLUME, ML POC: 133 ML

## 2019-03-12 PROCEDURE — 51798 US URINE CAPACITY MEASURE: CPT | Performed by: UROLOGY

## 2019-03-12 PROCEDURE — 99214 OFFICE O/P EST MOD 30 MIN: CPT | Performed by: UROLOGY

## 2019-03-12 NOTE — PROGRESS NOTES
Referring Physician: Clotilde Leal MD  A copy of this note was sent to the referring physician  Diagnoses and all orders for this visit:    SEBASTIÁN (acute kidney injury) ([address] )  -     POCT Measure PVR  -     US kidney and bladder; Future  -     Creatinine, serum; Future    Hydronephrosis, unspecified hydronephrosis type  -     POCT Measure PVR  -     US kidney and bladder; Future  -     Creatinine, serum; Future            Assessment and plan:       1  History of recurrent urinary retention  -associated with hydronephrosis and acute kidney injury  - chronic bilateral hydronephrosis  - recent gram negative bacteremia    I am concerned about the persistent bilateral hydroureteronephrosis present on to sequential CT scans  Each of these were obtained in the time of acute urinary retention with rather high blood IM bladder distention  Patient is currently doing well and is minimally symptomatic  He also empties her bladder to completion as assessed on postvoid residual ultrasound today  We discussed to methods of further evaluation  The first was proceeding to the operating for cystoscopy bilateral retrograde pyelography possible diagnostic ureteroscopy  The 2nd was more conservative with repeat ultrasound creatinine assessment in a serial fashion  She has elected for the latter  Plans to follow up in 3 months with a repeat renal bladder ultrasound PVR as well as a creatinine checked  If she has persistent hydronephrosis, or any other symptomatology, she can again be offered cystoscopy, retrograde pyelography, diagnostic ureteroscopy          Nadia Ortega MD      Chief Complaint     Hydronephrosis, urinary retention      History of Present Illness     Na Win is a 62 y o  female referred for evaluation of urinary retention with hydronephrosis  He was recently seen in the emergency room on April 3rd with acute urinary retention, high volume    A CT scan revealed moderate hydroureteronephrosis as well as a significantly distended bladder  She was treated with Stahl catheter  He was acutely ill at that time with metabolic encephalopathy and ultimately found to have gram-negative jocelyn bacteremia  She ultimately left the hospital against medical advice apparently prior to completing her antibiotic course  She subsequently had her Stahl catheter removed with an additional ER visit and presents in follow-up today  She is currently asymptomatic  He states he has had prior urinary tract infections  He has an extensive surgical history statingly undergone 25 exploratory laparotomies for complicated small bowel obstruction  He denies a history of prior pelvic radiation    Detailed Urologic History     - please refer to HPI    Review of Systems     Review of Systems   Constitutional: Negative for activity change and fatigue  HENT: Negative for congestion  Eyes: Negative for visual disturbance  Respiratory: Negative for shortness of breath and wheezing  Cardiovascular: Negative for chest pain and leg swelling  Gastrointestinal: Negative for abdominal pain  Endocrine: Positive for polyuria  Genitourinary: Positive for dysuria  Negative for flank pain, hematuria and urgency  Musculoskeletal: Negative for back pain  Allergic/Immunologic: Negative for immunocompromised state  Neurological: Negative for dizziness and numbness  Psychiatric/Behavioral: Negative for dysphoric mood  All other systems reviewed and are negative  Allergies     Allergies   Allergen Reactions    Nsaids      Irritable, upset stomach    Tolmetin      Irritable, upset stomach       Physical Exam     Physical Exam   Constitutional: She is oriented to person, place, and time  She appears well-developed  HENT:   Head: Normocephalic and atraumatic  Eyes: Pupils are equal, round, and reactive to light  Neck: Normal range of motion     Cardiovascular:   Negative lower extremity edema   Pulmonary/Chest: Effort normal and breath sounds normal    Abdominal: Soft  Genitourinary:   Genitourinary Comments: Negative CVA tenderness, negative suprapubic this   Musculoskeletal: Normal range of motion  Neurological: She is alert and oriented to person, place, and time  Skin: Skin is warm  Psychiatric: She has a normal mood and affect  Vital Signs  There were no vitals filed for this visit  Current Medications       Current Outpatient Medications:     diazepam (VALIUM) 5 mg tablet, 3 (three) times a day Morning, 3pm and 8pm, Disp: , Rfl:     escitalopram (LEXAPRO) 20 mg tablet, daily    , Disp: , Rfl:     gabapentin (NEURONTIN) 300 mg capsule, take 2 capsules by mouth three times a day, Disp: , Rfl:     HYDROmorphone (DILAUDID) 4 mg tablet, Take 4 mg by mouth 4 (four) times a day, Disp: , Rfl:     QUEtiapine (SEROquel) 100 mg tablet, Take 100 mg by mouth daily at bedtime, Disp: , Rfl:     temazepam (RESTORIL) 30 mg capsule, 30 mg daily at bedtime  , Disp: , Rfl:     venlafaxine (EFFEXOR) 75 mg tablet, Take 75 mg by mouth 2 (two) times a day, Disp: , Rfl:       Active Problems     Patient Active Problem List   Diagnosis    Ileus ([address] )    Elevated liver enzymes    Hypokalemia    Common bile duct dilatation    Generalized anxiety disorder    Chronic, continuous use of opioids    Urinary retention    Anemia    Obstipation    Chronic midline thoracic back pain    SEBASTIÁN (acute kidney injury) ([address] )    Distended abdomen    Rhabdomyolysis secondary to fall    Acute metabolic encephalopathy    Pancolitis - Diarrhea    Depression    Opioid dependence - Chronic back pain    Hydronephrosis    Abnormal CT of the chest    Gram-negative bacteremia    Abnormal serum level of lipase         Past Medical History     Past Medical History:   Diagnosis Date    Asthma     Bowel obstruction (HCC)     Chronic back pain          Surgical History     Past Surgical History: Procedure Laterality Date    ABDOMINAL SURGERY      x 25    BACK SURGERY      x 3    COLONOSCOPY N/A 2018    Procedure: COLONOSCOPY;  Surgeon: Agnes Baptiste MD;  Location: [facility]; Service: Gastroenterology    HYSTERECTOMY           Family History     Family History   Problem Relation Age of Onset    Diabetes Mother          Social History     Social History     Social History     Tobacco Use   Smoking Status Former Smoker    Last attempt to quit: 1980    Years since quittin 7   Smokeless Tobacco Never Used         Pertinent Lab Values     Lab Results   Component Value Date    CREATININE 0 61 2019       No results found for: PSA    @RESULTRCNT(1H])@      Pertinent Imaging     FINDINGS:     CHEST     LUNGS:  Right apical pulmonary parenchymal scarring  Patchy groundglass consolidative densities in the perihilar right middle lobe and medial right lower lobe as well as in the perihilar lingula and left lower lobe are noted  These changes are similar   to those seen at the lung bases on abdomen and pelvis CTs of 2018 and 2019      PLEURA:  Unremarkable      HEART/GREAT VESSELS:  Unremarkable for patient's age      MEDIASTINUM AND DAVID:  Unremarkable      CHEST WALL AND LOWER NECK:   Unremarkable      ABDOMEN     LIVER/BILIARY TREE:  There is prominent fusiform enlargement of the common bile duct and cystic duct remnant with mild prominence of central intrahepatic biliary tree  This is similar from previous examinations when compared as far back as 2017  Right hepatic lobe cysts are unchanged      GALLBLADDER:  Gallbladder is surgically absent      SPLEEN:  Unremarkable      PANCREAS:  Unremarkable      ADRENAL GLANDS:  Unremarkable      KIDNEYS/URETERS:  There is hydronephrosis and ureteronephrosis to the level of the mid ureters  Distal ureters are collapsed  No calcified stones are identified    No perinephric stranding or perinephric collection noted      STOMACH AND BOWEL:  There has been bowel surgery and a left lower quadrant anastomosis is identified  There is mild submucosal thickening of the wall of colonic loops especially notable involving the descending colon and a segment of sigmoid colon   draped over the dome of the urinary bladder  Findings are nonspecific, suspicious for mild pancreatitis possibly infectious  No pneumatosis  Small bowel obstruction      APPENDIX:  No findings to suggest appendicitis      ABDOMINOPELVIC CAVITY:  No ascites or free intraperitoneal air  No lymphadenopathy      VESSELS:  Unremarkable for patient's age      PELVIS     REPRODUCTIVE ORGANS:  Patient is status post hysterectomy      URINARY BLADDER:  Urinary bladder is markedly distended but otherwise unremarkable      ABDOMINAL WALL/INGUINAL REGIONS:  Abdominal wall surgical changes noted      OSSEOUS STRUCTURES:  Posterior fixation at L2-L5  Discectomy and osseous fusion at every level from L2 through L5 with successful incorporation of bone graft material   No acute osseous abnormality      IMPRESSION:     No visceral injury in the chest, abdomen or pelvis  No acute fracture      Patchy groundglass parenchymal changes in mid and lower lung zones suspicious for multifocal pneumonia  However, changes appear to have been present on prior examinations as far back as December 2018  Pulmonology consultation in close interval chest CT   follow-up with next examination to be performed in 6 weeks is recommended      Hydronephrosis and hydroureter to level of the mid ureters  A similar pattern, though lesser degree was present on examination of September 25, 2017 also the time when there was significant urinary bladder distention      Mild thickening of the wall of the large bowel suggesting mild pancolitis, possibly infectious      Fusiform enlargement of the common bile duct, stable as far back as September 2017    Correlate with liver function testing  Portions of the record may have been created with voice recognition software   Occasional wrong word or "sound a like" substitutions may have occurred due to the inherent limitations of voice recognition software   Read the chart carefully and recognize, using context, where substitutions have occurred

## 2019-03-15 ENCOUNTER — TELEPHONE (OUTPATIENT)
Dept: UROLOGY | Facility: AMBULATORY SURGERY CENTER | Age: 64
End: 2019-03-15

## 2019-03-15 DIAGNOSIS — R53.1 WEAKNESS: Primary | ICD-10-CM

## 2019-03-15 NOTE — TELEPHONE ENCOUNTER
Please have patient obtain urine studies given her past history of urinary infections  Patient should remain well hydrated and contact her primary care provider for further evaluation of her symptoms is these are primarily not urologic in origin  Thank you

## 2019-03-15 NOTE — TELEPHONE ENCOUNTER
Patient managed by Ashkan Posada at the [facility] office  Patient seen for acute kidney injury, and hydronephrosis  Called and spoke to patient  Patient is reporting that she has not felt good since she left hospital  Patient reports that all she does is sleep and she feels weak and dizzy  Patient reports that she feels nausea sometimes  Patient denies any vomiting, fevers, chills, difficulty urinating, hematuria, dysuria, or any pain  Patient aware that I will send message to physician assistant and will call her back with any recommendations

## 2019-03-15 NOTE — TELEPHONE ENCOUNTER
Called and spoke to patient  Made patient aware that we recommended obtaining urine testing given her past history of urinary infections  Patient aware that she should remain well hydrated and contact her primary care provider for further evaluation of her symptoms is these are primarily not urologic in origin  Patient verbalized understanding and denies any other questions or concerns  Patient aware that we will call her once results are in

## 2019-03-18 ENCOUNTER — TRANSCRIBE ORDERS (OUTPATIENT)
Dept: LAB | Facility: HOSPITAL | Age: 64
End: 2019-03-18

## 2019-03-21 PROBLEM — Z86.010 HX OF COLONIC POLYP: Status: ACTIVE | Noted: 2019-03-21

## 2019-03-21 PROBLEM — Z86.0100 HX OF COLONIC POLYP: Status: ACTIVE | Noted: 2019-03-21

## 2019-03-22 ENCOUNTER — HOSPITAL ENCOUNTER (EMERGENCY)
Facility: HOSPITAL | Age: 64
Discharge: HOME | End: 2019-03-22
Attending: EMERGENCY MEDICINE | Admitting: EMERGENCY MEDICINE
Payer: MEDICARE

## 2019-03-22 VITALS
HEART RATE: 78 BPM | DIASTOLIC BLOOD PRESSURE: 70 MMHG | SYSTOLIC BLOOD PRESSURE: 128 MMHG | RESPIRATION RATE: 20 BRPM | WEIGHT: 146.39 LBS | BODY MASS INDEX: 26.94 KG/M2 | OXYGEN SATURATION: 99 % | HEIGHT: 62 IN | TEMPERATURE: 98 F

## 2019-03-22 DIAGNOSIS — R33.9 URINARY RETENTION: Primary | ICD-10-CM

## 2019-03-22 LAB
ALBUMIN SERPL BCP-MCNC: 3.3 G/DL (ref 3.5–5)
ALP SERPL-CCNC: 77 U/L (ref 46–116)
ALT SERPL W P-5'-P-CCNC: 18 U/L (ref 12–78)
ANION GAP SERPL CALCULATED.3IONS-SCNC: 10 MMOL/L (ref 4–13)
AST SERPL W P-5'-P-CCNC: 13 U/L (ref 5–45)
BASOPHILS # BLD AUTO: 0.03 THOUSANDS/ΜL (ref 0–0.1)
BASOPHILS NFR BLD AUTO: 1 % (ref 0–1)
BILIRUB SERPL-MCNC: 0.3 MG/DL (ref 0.2–1)
BILIRUB UR QL STRIP: NEGATIVE
BUN SERPL-MCNC: 6 MG/DL (ref 5–25)
CALCIUM SERPL-MCNC: 8.7 MG/DL (ref 8.3–10.1)
CHLORIDE SERPL-SCNC: 101 MMOL/L (ref 100–108)
CLARITY UR: CLEAR
CO2 SERPL-SCNC: 27 MMOL/L (ref 21–32)
COLOR UR: YELLOW
CREAT SERPL-MCNC: 0.89 MG/DL (ref 0.6–1.3)
EOSINOPHIL # BLD AUTO: 0.08 THOUSAND/ΜL (ref 0–0.61)
EOSINOPHIL NFR BLD AUTO: 2 % (ref 0–6)
ERYTHROCYTE [DISTWIDTH] IN BLOOD BY AUTOMATED COUNT: 13.1 % (ref 11.6–15.1)
GFR SERPL CREATININE-BSD FRML MDRD: 69 ML/MIN/1.73SQ M
GLUCOSE SERPL-MCNC: 87 MG/DL (ref 65–140)
GLUCOSE UR STRIP-MCNC: NEGATIVE MG/DL
HCT VFR BLD AUTO: 35 % (ref 34.8–46.1)
HGB BLD-MCNC: 11.6 G/DL (ref 11.5–15.4)
HGB UR QL STRIP.AUTO: NEGATIVE
IMM GRANULOCYTES # BLD AUTO: 0.02 THOUSAND/UL (ref 0–0.2)
IMM GRANULOCYTES NFR BLD AUTO: 1 % (ref 0–2)
KETONES UR STRIP-MCNC: NEGATIVE MG/DL
LEUKOCYTE ESTERASE UR QL STRIP: NEGATIVE
LYMPHOCYTES # BLD AUTO: 1.06 THOUSANDS/ΜL (ref 0.6–4.47)
LYMPHOCYTES NFR BLD AUTO: 25 % (ref 14–44)
MCH RBC QN AUTO: 28.6 PG (ref 26.8–34.3)
MCHC RBC AUTO-ENTMCNC: 33.1 G/DL (ref 31.4–37.4)
MCV RBC AUTO: 86 FL (ref 82–98)
MONOCYTES # BLD AUTO: 0.61 THOUSAND/ΜL (ref 0.17–1.22)
MONOCYTES NFR BLD AUTO: 15 % (ref 4–12)
NEUTROPHILS # BLD AUTO: 2.4 THOUSANDS/ΜL (ref 1.85–7.62)
NEUTS SEG NFR BLD AUTO: 56 % (ref 43–75)
NITRITE UR QL STRIP: NEGATIVE
NRBC BLD AUTO-RTO: 0 /100 WBCS
PH UR STRIP.AUTO: 7 [PH]
PLATELET # BLD AUTO: 255 THOUSANDS/UL (ref 149–390)
PMV BLD AUTO: 10 FL (ref 8.9–12.7)
POTASSIUM SERPL-SCNC: 3.7 MMOL/L (ref 3.5–5.3)
PROT SERPL-MCNC: 7.1 G/DL (ref 6.4–8.2)
PROT UR STRIP-MCNC: NEGATIVE MG/DL
RBC # BLD AUTO: 4.06 MILLION/UL (ref 3.81–5.12)
SODIUM SERPL-SCNC: 138 MMOL/L (ref 136–145)
SP GR UR STRIP.AUTO: 1.01 (ref 1–1.03)
UROBILINOGEN UR QL STRIP.AUTO: 0.2 E.U./DL
WBC # BLD AUTO: 4.2 THOUSAND/UL (ref 4.31–10.16)

## 2019-03-22 PROCEDURE — 81003 URINALYSIS AUTO W/O SCOPE: CPT | Performed by: EMERGENCY MEDICINE

## 2019-03-22 PROCEDURE — 80053 COMPREHEN METABOLIC PANEL: CPT | Performed by: EMERGENCY MEDICINE

## 2019-03-22 PROCEDURE — 36415 COLL VENOUS BLD VENIPUNCTURE: CPT | Performed by: EMERGENCY MEDICINE

## 2019-03-22 PROCEDURE — 85025 COMPLETE CBC W/AUTO DIFF WBC: CPT | Performed by: EMERGENCY MEDICINE

## 2019-03-22 PROCEDURE — 99284 EMERGENCY DEPT VISIT MOD MDM: CPT

## 2019-03-22 RX ORDER — ACETAMINOPHEN 325 MG/1
975 TABLET ORAL ONCE
Status: COMPLETED | OUTPATIENT
Start: 2019-03-22 | End: 2019-03-22

## 2019-03-22 RX ADMIN — ACETAMINOPHEN 975 MG: 325 TABLET, FILM COATED ORAL at 02:00

## 2019-03-22 NOTE — ED PROVIDER NOTES
History  Chief Complaint   Patient presents with    Abdominal Pain     Pt states she has had abdominal pain for the past three days with nausea     61-year-old female patient presents emergency department for evaluation of acute onset of abdominal pain  Patient has a history of urinary retention, she has had multiple episodes of urinary retention necessitating Stahl catheter placement in the emergency department  The patient was seen here recently Stahl catheter removal   Currently the patient states he has not urinated since yesterday, she feels that she may be in retention again, will be evaluated with a differential diagnosis to include but not be limited to urinary retention, urinary infection, renal failure  History provided by:  Patient   used: No    Abdominal Pain   Pain location:  Generalized  Pain quality: aching and bloating    Pain radiates to:  Does not radiate  Pain severity:  No pain  Onset quality:  Sudden  Context: not alcohol use, not eating, not laxative use, not recent illness, not recent travel and not sick contacts    Relieved by:  Nothing  Worsened by:  Nothing  Ineffective treatments:  None tried  Associated symptoms: no chills, no hematuria and no vaginal bleeding    Risk factors: no alcohol abuse        Prior to Admission Medications   Prescriptions Last Dose Informant Patient Reported? Taking? HYDROmorphone (DILAUDID) 4 mg tablet  Self Yes No   Sig: Take 4 mg by mouth 4 (four) times a day   QUEtiapine (SEROquel) 100 mg tablet  Self Yes No   Sig: Take 100 mg by mouth daily at bedtime   diazepam (VALIUM) 5 mg tablet  Self Yes No   Sig: 3 (three) times a day Morning, 3pm and 8pm   escitalopram (LEXAPRO) 20 mg tablet  Self Yes No   Sig: daily       gabapentin (NEURONTIN) 300 mg capsule  Self Yes No   Sig: take 2 capsules by mouth three times a day   temazepam (RESTORIL) 30 mg capsule  Self Yes No   Si mg daily at bedtime     venlafaxine (EFFEXOR) 75 mg tablet Self Yes No   Sig: Take 75 mg by mouth 2 (two) times a day      Facility-Administered Medications: None       Past Medical History:   Diagnosis Date    Asthma     Bowel obstruction (HCC)     Chronic back pain     Enlarged kidney     Liver mass        Past Surgical History:   Procedure Laterality Date    ABDOMINAL SURGERY      x 25    BACK SURGERY      x 3    COLONOSCOPY N/A 2018    Procedure: COLONOSCOPY;  Surgeon: Wilfrido Velasco MD;  Location: [facility]; Service: Gastroenterology    HYSTERECTOMY         Family History   Problem Relation Age of Onset    Diabetes Mother      I have reviewed and agree with the history as documented  Social History     Tobacco Use    Smoking status: Former Smoker     Last attempt to quit: 1980     Years since quittin 7    Smokeless tobacco: Never Used   Substance Use Topics    Alcohol use: Never     Alcohol/week: 0 0 oz     Frequency: Never     Binge frequency: Never     Comment: social drinker    Drug use: No        Review of Systems   Constitutional: Negative for chills  Gastrointestinal: Positive for abdominal pain  Genitourinary: Negative for hematuria and vaginal bleeding  All other systems reviewed and are negative  Physical Exam  Physical Exam   Constitutional: She is oriented to person, place, and time  She appears well-developed and well-nourished  HENT:   Head: Normocephalic and atraumatic  Right Ear: External ear normal    Left Ear: External ear normal    Eyes: Conjunctivae and EOM are normal    Neck: No JVD present  No tracheal deviation present  No thyromegaly present  Cardiovascular: Normal rate  Pulmonary/Chest: Effort normal and breath sounds normal  No stridor  Abdominal: Soft  She exhibits no distension and no mass  There is no tenderness  There is no guarding  No hernia  Musculoskeletal: Normal range of motion  She exhibits no edema, tenderness or deformity     Lymphadenopathy:     She has no cervical adenopathy  Neurological: She is alert and oriented to person, place, and time  Skin: Skin is warm  No rash noted  No erythema  No pallor  Psychiatric: She has a normal mood and affect  Her behavior is normal    Nursing note and vitals reviewed  Vital Signs  ED Triage Vitals   Temperature Pulse Respirations Blood Pressure SpO2   03/22/19 0027 03/22/19 0027 03/22/19 0027 03/22/19 0045 03/22/19 0027   98 °F (36 7 °C) (!) 111 20 133/82 98 %      Temp Source Heart Rate Source Patient Position - Orthostatic VS BP Location FiO2 (%)   03/22/19 0027 -- 03/22/19 0027 03/22/19 0027 --   Oral  Lying Right arm       Pain Score       03/22/19 0200       Worst Possible Pain           Vitals:    03/22/19 0027 03/22/19 0045 03/22/19 0345   BP:  133/82 128/70   Pulse: (!) 111 97 78   Patient Position - Orthostatic VS: Lying           Visual Acuity      ED Medications  Medications   acetaminophen (TYLENOL) tablet 975 mg (975 mg Oral Given 3/22/19 0200)       Diagnostic Studies  Results Reviewed     Procedure Component Value Units Date/Time    Comprehensive metabolic panel [[id number]]  (Abnormal) Collected:  03/22/19 0117    Lab Status:  Final result Specimen:  Blood from Arm, Left Updated:  03/22/19 0151     Sodium 138 mmol/L      Potassium 3 7 mmol/L      Chloride 101 mmol/L      CO2 27 mmol/L      ANION GAP 10 mmol/L      BUN 6 mg/dL      Creatinine 0 89 mg/dL      Glucose 87 mg/dL      Calcium 8 7 mg/dL      AST 13 U/L      ALT 18 U/L      Alkaline Phosphatase 77 U/L      Total Protein 7 1 g/dL      Albumin 3 3 g/dL      Total Bilirubin 0 30 mg/dL      eGFR 69 ml/min/1 73sq m     Narrative:       National Kidney Disease Education Program recommendations are as follows:  GFR calculation is accurate only with a steady state creatinine  Chronic Kidney disease less than 60 ml/min/1 73 sq  meters  Kidney failure less than 15 ml/min/1 73 sq  meters      UA w Reflex to Microscopic w Reflex to Culture [[id number]] Collected: 03/22/19 0117    Lab Status:  Final result Specimen:  Urine, Clean Catch Updated:  03/22/19 0133     Color, UA Yellow     Clarity, UA Clear     Specific Gravity, UA 1 010     pH, UA 7 0     Leukocytes, UA Negative     Nitrite, UA Negative     Protein, UA Negative mg/dl      Glucose, UA Negative mg/dl      Ketones, UA Negative mg/dl      Urobilinogen, UA 0 2 E U /dl      Bilirubin, UA Negative     Blood, UA Negative    CBC and differential [[id number]]  (Abnormal) Collected:  03/22/19 0117    Lab Status:  Final result Specimen:  Blood from Arm, Left Updated:  03/22/19 0133     WBC 4 20 Thousand/uL      RBC 4 06 Million/uL      Hemoglobin 11 6 g/dL      Hematocrit 35 0 %      MCV 86 fL      MCH 28 6 pg      MCHC 33 1 g/dL      RDW 13 1 %      MPV 10 0 fL      Platelets 116 Thousands/uL      nRBC 0 /100 WBCs      Neutrophils Relative 56 %      Immat GRANS % 1 %      Lymphocytes Relative 25 %      Monocytes Relative 15 %      Eosinophils Relative 2 %      Basophils Relative 1 %      Neutrophils Absolute 2 40 Thousands/µL      Immature Grans Absolute 0 02 Thousand/uL      Lymphocytes Absolute 1 06 Thousands/µL      Monocytes Absolute 0 61 Thousand/µL      Eosinophils Absolute 0 08 Thousand/µL      Basophils Absolute 0 03 Thousands/µL                  No orders to display              Procedures  Procedures       Phone Contacts  ED Phone Contact    ED Course                               MDM  Number of Diagnoses or Management Options  Urinary retention: new and requires workup     Amount and/or Complexity of Data Reviewed  Clinical lab tests: ordered and reviewed  Tests in the radiology section of CPT®: reviewed and ordered  Decide to obtain previous medical records or to obtain history from someone other than the patient: yes  Review and summarize past medical records: yes    Patient Progress  Patient progress: stable      Disposition  Final diagnoses:   Urinary retention     Time reflects when diagnosis was documented in both MDM as applicable and the Disposition within this note     Time User Action Codes Description Comment    3/22/2019  3:06 AM Jimbo Simms Add [R33 9] Urinary retention       ED Disposition     ED Disposition Condition Date/Time Comment    Discharge Stable Fri Mar 22, 2019  3:06 AM Delilah Getachew discharge to home/self care  Follow-up Information    None         Discharge Medication List as of 3/22/2019  3:08 AM      CONTINUE these medications which have NOT CHANGED    Details   diazepam (VALIUM) 5 mg tablet 3 (three) times a day Morning, 3pm and 8pm, Starting Fri 11/3/2017, Historical Med      escitalopram (LEXAPRO) 20 mg tablet daily  , Historical Med      gabapentin (NEURONTIN) 300 mg capsule take 2 capsules by mouth three times a day, Historical Med      HYDROmorphone (DILAUDID) 4 mg tablet Take 4 mg by mouth 4 (four) times a day, Historical Med      QUEtiapine (SEROquel) 100 mg tablet Take 100 mg by mouth daily at bedtime, Historical Med      temazepam (RESTORIL) 30 mg capsule 30 mg daily at bedtime  , Starting Tue 11/28/2017, Historical Med      venlafaxine (EFFEXOR) 75 mg tablet Take 75 mg by mouth 2 (two) times a day, Historical Med           No discharge procedures on file      ED Provider  Electronically Signed by           Naeem Armstrong DO  03/26/19 6018

## 2019-05-02 LAB — CULTURE ID FROM ENTERIC PCR: ABNORMAL

## 2019-05-09 DIAGNOSIS — R10.11 RIGHT UPPER QUADRANT ABDOMINAL PAIN: Primary | ICD-10-CM

## 2019-05-09 RX ORDER — METOCLOPRAMIDE 10 MG/1
10 TABLET ORAL 3 TIMES DAILY
Qty: 6 TABLET | Refills: 0 | Status: SHIPPED | OUTPATIENT
Start: 2019-05-09 | End: 2019-05-12 | Stop reason: HOSPADM

## 2019-05-10 ENCOUNTER — APPOINTMENT (INPATIENT)
Dept: RADIOLOGY | Facility: HOSPITAL | Age: 64
DRG: 189 | End: 2019-05-10
Payer: MEDICARE

## 2019-05-10 ENCOUNTER — ANESTHESIA EVENT (OUTPATIENT)
Dept: SURGERY | Facility: HOSPITAL | Age: 64
End: 2019-05-10

## 2019-05-10 ENCOUNTER — APPOINTMENT (EMERGENCY)
Dept: CT IMAGING | Facility: HOSPITAL | Age: 64
DRG: 189 | End: 2019-05-10
Payer: MEDICARE

## 2019-05-10 ENCOUNTER — HOSPITAL ENCOUNTER (OUTPATIENT)
Facility: HOSPITAL | Age: 64
Setting detail: OUTPATIENT SURGERY
DRG: 189 | End: 2019-05-10
Attending: INTERNAL MEDICINE | Admitting: INTERNAL MEDICINE
Payer: MEDICARE

## 2019-05-10 ENCOUNTER — TELEPHONE (OUTPATIENT)
Dept: GASTROENTEROLOGY | Facility: CLINIC | Age: 64
End: 2019-05-10

## 2019-05-10 ENCOUNTER — ANESTHESIA (OUTPATIENT)
Dept: SURGERY | Facility: HOSPITAL | Age: 64
End: 2019-05-10

## 2019-05-10 ENCOUNTER — HOSPITAL ENCOUNTER (INPATIENT)
Facility: HOSPITAL | Age: 64
LOS: 2 days | Discharge: HOME | DRG: 189 | End: 2019-05-12
Attending: EMERGENCY MEDICINE | Admitting: FAMILY MEDICINE
Payer: MEDICARE

## 2019-05-10 VITALS
DIASTOLIC BLOOD PRESSURE: 58 MMHG | SYSTOLIC BLOOD PRESSURE: 102 MMHG | TEMPERATURE: 98.5 F | OXYGEN SATURATION: 89 % | HEART RATE: 73 BPM | RESPIRATION RATE: 18 BRPM

## 2019-05-10 DIAGNOSIS — J45.41 MODERATE PERSISTENT ASTHMA WITH ACUTE EXACERBATION: ICD-10-CM

## 2019-05-10 DIAGNOSIS — N17.9 AKI (ACUTE KIDNEY INJURY) (HCC): ICD-10-CM

## 2019-05-10 DIAGNOSIS — K59.00 CONSTIPATION: ICD-10-CM

## 2019-05-10 DIAGNOSIS — R14.0 DISTENDED ABDOMEN: ICD-10-CM

## 2019-05-10 DIAGNOSIS — K63.89 COLON DISTENTION: ICD-10-CM

## 2019-05-10 DIAGNOSIS — R93.3 ABNORMAL CT SCAN, SIGMOID COLON: ICD-10-CM

## 2019-05-10 DIAGNOSIS — R09.02 HYPOXIA: Primary | ICD-10-CM

## 2019-05-10 DIAGNOSIS — R33.9 URINARY RETENTION: ICD-10-CM

## 2019-05-10 DIAGNOSIS — K56.7 ILEUS (HCC): ICD-10-CM

## 2019-05-10 PROBLEM — J96.01 ACUTE RESPIRATORY FAILURE WITH HYPOXIA (HCC): Status: ACTIVE | Noted: 2019-05-10

## 2019-05-10 LAB
ALBUMIN SERPL BCP-MCNC: 3 G/DL (ref 3.5–5)
ALP SERPL-CCNC: 106 U/L (ref 46–116)
ALT SERPL W P-5'-P-CCNC: 13 U/L (ref 12–78)
ANION GAP SERPL CALCULATED.3IONS-SCNC: 5 MMOL/L (ref 4–13)
APTT PPP: 30 SECONDS (ref 26–38)
AST SERPL W P-5'-P-CCNC: 43 U/L (ref 5–45)
ATRIAL RATE: 73 BPM
BASE EX.OXY STD BLDV CALC-SCNC: 80.2 % (ref 60–80)
BASE EXCESS BLDV CALC-SCNC: 0.5 MMOL/L
BASOPHILS # BLD AUTO: 0.02 THOUSANDS/ΜL (ref 0–0.1)
BASOPHILS NFR BLD AUTO: 1 % (ref 0–1)
BILIRUB DIRECT SERPL-MCNC: 0 MG/DL (ref 0–0.2)
BILIRUB SERPL-MCNC: 0.4 MG/DL (ref 0.2–1)
BILIRUB UR QL STRIP: NEGATIVE
BUN SERPL-MCNC: 10 MG/DL (ref 5–25)
CALCIUM SERPL-MCNC: 8.3 MG/DL (ref 8.3–10.1)
CHLORIDE SERPL-SCNC: 105 MMOL/L (ref 100–108)
CLARITY UR: CLEAR
CO2 SERPL-SCNC: 31 MMOL/L (ref 21–32)
COLOR UR: YELLOW
CREAT SERPL-MCNC: 0.65 MG/DL (ref 0.6–1.3)
EOSINOPHIL # BLD AUTO: 0.11 THOUSAND/ΜL (ref 0–0.61)
EOSINOPHIL NFR BLD AUTO: 3 % (ref 0–6)
ERYTHROCYTE [DISTWIDTH] IN BLOOD BY AUTOMATED COUNT: 13.3 % (ref 11.6–15.1)
GFR SERPL CREATININE-BSD FRML MDRD: 95 ML/MIN/1.73SQ M
GLUCOSE SERPL-MCNC: 85 MG/DL (ref 65–140)
GLUCOSE UR STRIP-MCNC: NEGATIVE MG/DL
HCO3 BLDV-SCNC: 28 MMOL/L (ref 24–30)
HCT VFR BLD AUTO: 31.4 % (ref 34.8–46.1)
HGB BLD-MCNC: 10.1 G/DL (ref 11.5–15.4)
HGB UR QL STRIP.AUTO: NEGATIVE
IMM GRANULOCYTES # BLD AUTO: 0.01 THOUSAND/UL (ref 0–0.2)
IMM GRANULOCYTES NFR BLD AUTO: 0 % (ref 0–2)
INR PPP: 1.06 (ref 0.86–1.17)
KETONES UR STRIP-MCNC: NEGATIVE MG/DL
LACTATE SERPL-SCNC: 0.3 MMOL/L (ref 0.5–2)
LEUKOCYTE ESTERASE UR QL STRIP: NEGATIVE
LYMPHOCYTES # BLD AUTO: 1.15 THOUSANDS/ΜL (ref 0.6–4.47)
LYMPHOCYTES NFR BLD AUTO: 28 % (ref 14–44)
MCH RBC QN AUTO: 29.4 PG (ref 26.8–34.3)
MCHC RBC AUTO-ENTMCNC: 32.2 G/DL (ref 31.4–37.4)
MCV RBC AUTO: 92 FL (ref 82–98)
MONOCYTES # BLD AUTO: 0.52 THOUSAND/ΜL (ref 0.17–1.22)
MONOCYTES NFR BLD AUTO: 13 % (ref 4–12)
NEUTROPHILS # BLD AUTO: 2.31 THOUSANDS/ΜL (ref 1.85–7.62)
NEUTS SEG NFR BLD AUTO: 55 % (ref 43–75)
NITRITE UR QL STRIP: NEGATIVE
NRBC BLD AUTO-RTO: 0 /100 WBCS
NT-PROBNP SERPL-MCNC: 49 PG/ML
O2 CT BLDV-SCNC: 13.4 ML/DL
P AXIS: 46 DEGREES
PCO2 BLDV: 58.8 MM HG (ref 42–50)
PH BLDV: 7.29 [PH] (ref 7.3–7.4)
PH UR STRIP.AUTO: 5 [PH]
PLATELET # BLD AUTO: 196 THOUSANDS/UL (ref 149–390)
PMV BLD AUTO: 9.5 FL (ref 8.9–12.7)
PO2 BLDV: 49.5 MM HG (ref 35–45)
POTASSIUM SERPL-SCNC: 3.7 MMOL/L (ref 3.5–5.3)
PR INTERVAL: 158 MS
PROT SERPL-MCNC: 6.5 G/DL (ref 6.4–8.2)
PROT UR STRIP-MCNC: NEGATIVE MG/DL
PROTHROMBIN TIME: 13.7 SECONDS (ref 11.8–14.2)
QRS AXIS: 69 DEGREES
QRSD INTERVAL: 86 MS
QT INTERVAL: 398 MS
QTC INTERVAL: 438 MS
RBC # BLD AUTO: 3.43 MILLION/UL (ref 3.81–5.12)
SODIUM SERPL-SCNC: 141 MMOL/L (ref 136–145)
SP GR UR STRIP.AUTO: >=1.03 (ref 1–1.03)
T WAVE AXIS: 49 DEGREES
TROPONIN I SERPL-MCNC: <0.02 NG/ML
UROBILINOGEN UR QL STRIP.AUTO: 0.2 E.U./DL
VENTRICULAR RATE: 73 BPM
WBC # BLD AUTO: 4.12 THOUSAND/UL (ref 4.31–10.16)

## 2019-05-10 PROCEDURE — 0T9B70Z DRAINAGE OF BLADDER WITH DRAINAGE DEVICE, VIA NATURAL OR ARTIFICIAL OPENING: ICD-10-PCS | Performed by: INTERNAL MEDICINE

## 2019-05-10 PROCEDURE — 80076 HEPATIC FUNCTION PANEL: CPT | Performed by: PHYSICIAN ASSISTANT

## 2019-05-10 PROCEDURE — 99285 EMERGENCY DEPT VISIT HI MDM: CPT

## 2019-05-10 PROCEDURE — 85610 PROTHROMBIN TIME: CPT | Performed by: PHYSICIAN ASSISTANT

## 2019-05-10 PROCEDURE — 96374 THER/PROPH/DIAG INJ IV PUSH: CPT

## 2019-05-10 PROCEDURE — 83605 ASSAY OF LACTIC ACID: CPT | Performed by: PHYSICIAN ASSISTANT

## 2019-05-10 PROCEDURE — 83880 ASSAY OF NATRIURETIC PEPTIDE: CPT | Performed by: PHYSICIAN ASSISTANT

## 2019-05-10 PROCEDURE — 74022 RADEX COMPL AQT ABD SERIES: CPT

## 2019-05-10 PROCEDURE — 82805 BLOOD GASES W/O2 SATURATION: CPT | Performed by: PHYSICIAN ASSISTANT

## 2019-05-10 PROCEDURE — 36415 COLL VENOUS BLD VENIPUNCTURE: CPT | Performed by: PHYSICIAN ASSISTANT

## 2019-05-10 PROCEDURE — 71275 CT ANGIOGRAPHY CHEST: CPT

## 2019-05-10 PROCEDURE — 96375 TX/PRO/DX INJ NEW DRUG ADDON: CPT

## 2019-05-10 PROCEDURE — 93005 ELECTROCARDIOGRAM TRACING: CPT

## 2019-05-10 PROCEDURE — 74177 CT ABD & PELVIS W/CONTRAST: CPT

## 2019-05-10 PROCEDURE — 93010 ELECTROCARDIOGRAM REPORT: CPT | Performed by: INTERNAL MEDICINE

## 2019-05-10 PROCEDURE — 80048 BASIC METABOLIC PNL TOTAL CA: CPT | Performed by: PHYSICIAN ASSISTANT

## 2019-05-10 PROCEDURE — C9113 INJ PANTOPRAZOLE SODIUM, VIA: HCPCS | Performed by: FAMILY MEDICINE

## 2019-05-10 PROCEDURE — 99223 1ST HOSP IP/OBS HIGH 75: CPT | Performed by: FAMILY MEDICINE

## 2019-05-10 PROCEDURE — 84484 ASSAY OF TROPONIN QUANT: CPT | Performed by: PHYSICIAN ASSISTANT

## 2019-05-10 PROCEDURE — 99222 1ST HOSP IP/OBS MODERATE 55: CPT | Performed by: INTERNAL MEDICINE

## 2019-05-10 PROCEDURE — 96361 HYDRATE IV INFUSION ADD-ON: CPT

## 2019-05-10 PROCEDURE — 85730 THROMBOPLASTIN TIME PARTIAL: CPT | Performed by: PHYSICIAN ASSISTANT

## 2019-05-10 PROCEDURE — 81003 URINALYSIS AUTO W/O SCOPE: CPT | Performed by: PHYSICIAN ASSISTANT

## 2019-05-10 PROCEDURE — 85025 COMPLETE CBC W/AUTO DIFF WBC: CPT | Performed by: PHYSICIAN ASSISTANT

## 2019-05-10 PROCEDURE — 99285 EMERGENCY DEPT VISIT HI MDM: CPT | Performed by: PHYSICIAN ASSISTANT

## 2019-05-10 RX ORDER — LEVALBUTEROL 1.25 MG/.5ML
1.25 SOLUTION, CONCENTRATE RESPIRATORY (INHALATION) EVERY 6 HOURS PRN
Status: DISCONTINUED | OUTPATIENT
Start: 2019-05-10 | End: 2019-05-11

## 2019-05-10 RX ORDER — SODIUM CHLORIDE FOR INHALATION 0.9 %
3 VIAL, NEBULIZER (ML) INHALATION EVERY 6 HOURS PRN
Status: DISCONTINUED | OUTPATIENT
Start: 2019-05-10 | End: 2019-05-11

## 2019-05-10 RX ORDER — HYDROMORPHONE HCL/PF 1 MG/ML
0.5 SYRINGE (ML) INJECTION ONCE
Status: COMPLETED | OUTPATIENT
Start: 2019-05-10 | End: 2019-05-10

## 2019-05-10 RX ORDER — TEMAZEPAM 15 MG/1
30 CAPSULE ORAL
Status: DISCONTINUED | OUTPATIENT
Start: 2019-05-10 | End: 2019-05-12 | Stop reason: HOSPADM

## 2019-05-10 RX ORDER — HYDROMORPHONE HCL/PF 1 MG/ML
0.5 SYRINGE (ML) INJECTION EVERY 4 HOURS PRN
Status: DISCONTINUED | OUTPATIENT
Start: 2019-05-10 | End: 2019-05-12 | Stop reason: HOSPADM

## 2019-05-10 RX ORDER — METOCLOPRAMIDE HYDROCHLORIDE 5 MG/ML
5 INJECTION INTRAMUSCULAR; INTRAVENOUS EVERY 6 HOURS
Status: DISCONTINUED | OUTPATIENT
Start: 2019-05-10 | End: 2019-05-12 | Stop reason: HOSPADM

## 2019-05-10 RX ORDER — VENLAFAXINE 37.5 MG/1
75 TABLET ORAL 2 TIMES DAILY
Status: DISCONTINUED | OUTPATIENT
Start: 2019-05-10 | End: 2019-05-12 | Stop reason: HOSPADM

## 2019-05-10 RX ORDER — METOCLOPRAMIDE 10 MG/1
10 TABLET ORAL 3 TIMES DAILY
Status: DISCONTINUED | OUTPATIENT
Start: 2019-05-10 | End: 2019-05-11

## 2019-05-10 RX ORDER — PANTOPRAZOLE SODIUM 40 MG/1
40 INJECTION, POWDER, FOR SOLUTION INTRAVENOUS EVERY 12 HOURS SCHEDULED
Status: DISCONTINUED | OUTPATIENT
Start: 2019-05-10 | End: 2019-05-12 | Stop reason: HOSPADM

## 2019-05-10 RX ORDER — ONDANSETRON 2 MG/ML
4 INJECTION INTRAMUSCULAR; INTRAVENOUS EVERY 6 HOURS PRN
Status: DISCONTINUED | OUTPATIENT
Start: 2019-05-10 | End: 2019-05-12 | Stop reason: HOSPADM

## 2019-05-10 RX ORDER — SODIUM CHLORIDE 9 MG/ML
75 INJECTION, SOLUTION INTRAVENOUS CONTINUOUS
Status: DISCONTINUED | OUTPATIENT
Start: 2019-05-10 | End: 2019-05-11

## 2019-05-10 RX ORDER — ONDANSETRON 2 MG/ML
4 INJECTION INTRAMUSCULAR; INTRAVENOUS ONCE
Status: COMPLETED | OUTPATIENT
Start: 2019-05-10 | End: 2019-05-10

## 2019-05-10 RX ORDER — ESCITALOPRAM OXALATE 10 MG/1
20 TABLET ORAL DAILY
Status: DISCONTINUED | OUTPATIENT
Start: 2019-05-11 | End: 2019-05-12 | Stop reason: HOSPADM

## 2019-05-10 RX ORDER — SODIUM CHLORIDE 9 MG/ML
125 INJECTION, SOLUTION INTRAVENOUS CONTINUOUS
Status: DISCONTINUED | OUTPATIENT
Start: 2019-05-10 | End: 2019-05-11

## 2019-05-10 RX ORDER — METHYLPREDNISOLONE SODIUM SUCCINATE 125 MG/2ML
125 INJECTION, POWDER, LYOPHILIZED, FOR SOLUTION INTRAMUSCULAR; INTRAVENOUS ONCE
Status: COMPLETED | OUTPATIENT
Start: 2019-05-10 | End: 2019-05-10

## 2019-05-10 RX ORDER — GABAPENTIN 300 MG/1
600 CAPSULE ORAL 3 TIMES DAILY
Status: DISCONTINUED | OUTPATIENT
Start: 2019-05-10 | End: 2019-05-12 | Stop reason: HOSPADM

## 2019-05-10 RX ADMIN — HYDROMORPHONE HYDROCHLORIDE 0.5 MG: 1 INJECTION, SOLUTION INTRAMUSCULAR; INTRAVENOUS; SUBCUTANEOUS at 15:14

## 2019-05-10 RX ADMIN — TEMAZEPAM 30 MG: 15 CAPSULE ORAL at 21:59

## 2019-05-10 RX ADMIN — GABAPENTIN 600 MG: 300 CAPSULE ORAL at 20:10

## 2019-05-10 RX ADMIN — PANTOPRAZOLE SODIUM 40 MG: 40 INJECTION, POWDER, FOR SOLUTION INTRAVENOUS at 20:09

## 2019-05-10 RX ADMIN — HYDROMORPHONE HYDROCHLORIDE 0.5 MG: 1 INJECTION, SOLUTION INTRAMUSCULAR; INTRAVENOUS; SUBCUTANEOUS at 11:52

## 2019-05-10 RX ADMIN — HYDROMORPHONE HYDROCHLORIDE 0.5 MG: 1 INJECTION, SOLUTION INTRAMUSCULAR; INTRAVENOUS; SUBCUTANEOUS at 22:14

## 2019-05-10 RX ADMIN — METOCLOPRAMIDE 5 MG: 5 INJECTION, SOLUTION INTRAMUSCULAR; INTRAVENOUS at 20:09

## 2019-05-10 RX ADMIN — VENLAFAXINE 75 MG: 37.5 TABLET ORAL at 18:00

## 2019-05-10 RX ADMIN — QUETIAPINE FUMARATE 150 MG: 100 TABLET ORAL at 21:58

## 2019-05-10 RX ADMIN — HYDROMORPHONE HYDROCHLORIDE 0.5 MG: 1 INJECTION, SOLUTION INTRAMUSCULAR; INTRAVENOUS; SUBCUTANEOUS at 17:58

## 2019-05-10 RX ADMIN — ONDANSETRON 4 MG: 2 INJECTION INTRAMUSCULAR; INTRAVENOUS at 11:52

## 2019-05-10 RX ADMIN — METHYLNALTREXONE BROMIDE 12 MG: 12 INJECTION, SOLUTION SUBCUTANEOUS at 17:58

## 2019-05-10 RX ADMIN — METOCLOPRAMIDE HYDROCHLORIDE 10 MG: 10 TABLET ORAL at 21:59

## 2019-05-10 RX ADMIN — METOCLOPRAMIDE 5 MG: 5 INJECTION, SOLUTION INTRAMUSCULAR; INTRAVENOUS at 15:14

## 2019-05-10 RX ADMIN — IOHEXOL 100 ML: 350 INJECTION, SOLUTION INTRAVENOUS at 12:25

## 2019-05-10 RX ADMIN — SODIUM CHLORIDE 1000 ML: 0.9 INJECTION, SOLUTION INTRAVENOUS at 11:50

## 2019-05-10 RX ADMIN — SODIUM CHLORIDE 125 ML/HR: 0.9 INJECTION, SOLUTION INTRAVENOUS at 17:58

## 2019-05-10 RX ADMIN — METHYLPREDNISOLONE SODIUM SUCCINATE 125 MG: 125 INJECTION, POWDER, FOR SOLUTION INTRAMUSCULAR; INTRAVENOUS at 15:14

## 2019-05-11 ENCOUNTER — TELEPHONE (OUTPATIENT)
Dept: OTHER | Facility: HOSPITAL | Age: 64
End: 2019-05-11

## 2019-05-11 DIAGNOSIS — K59.03 DRUG INDUCED CONSTIPATION: Primary | ICD-10-CM

## 2019-05-11 LAB
ANION GAP SERPL CALCULATED.3IONS-SCNC: 10 MMOL/L (ref 4–13)
BASOPHILS # BLD AUTO: 0.01 THOUSANDS/ΜL (ref 0–0.1)
BASOPHILS NFR BLD AUTO: 0 % (ref 0–1)
BUN SERPL-MCNC: 9 MG/DL (ref 5–25)
CALCIUM SERPL-MCNC: 7.9 MG/DL (ref 8.3–10.1)
CHLORIDE SERPL-SCNC: 105 MMOL/L (ref 100–108)
CO2 SERPL-SCNC: 25 MMOL/L (ref 21–32)
CREAT SERPL-MCNC: 0.66 MG/DL (ref 0.6–1.3)
EOSINOPHIL # BLD AUTO: 0 THOUSAND/ΜL (ref 0–0.61)
EOSINOPHIL NFR BLD AUTO: 0 % (ref 0–6)
ERYTHROCYTE [DISTWIDTH] IN BLOOD BY AUTOMATED COUNT: 12.8 % (ref 11.6–15.1)
GFR SERPL CREATININE-BSD FRML MDRD: 94 ML/MIN/1.73SQ M
GLUCOSE SERPL-MCNC: 107 MG/DL (ref 65–140)
HCT VFR BLD AUTO: 31.1 % (ref 34.8–46.1)
HGB BLD-MCNC: 10.4 G/DL (ref 11.5–15.4)
IMM GRANULOCYTES # BLD AUTO: 0.02 THOUSAND/UL (ref 0–0.2)
IMM GRANULOCYTES NFR BLD AUTO: 1 % (ref 0–2)
LYMPHOCYTES # BLD AUTO: 0.65 THOUSANDS/ΜL (ref 0.6–4.47)
LYMPHOCYTES NFR BLD AUTO: 17 % (ref 14–44)
MAGNESIUM SERPL-MCNC: 1.6 MG/DL (ref 1.6–2.6)
MCH RBC QN AUTO: 29.4 PG (ref 26.8–34.3)
MCHC RBC AUTO-ENTMCNC: 33.4 G/DL (ref 31.4–37.4)
MCV RBC AUTO: 88 FL (ref 82–98)
MONOCYTES # BLD AUTO: 0.22 THOUSAND/ΜL (ref 0.17–1.22)
MONOCYTES NFR BLD AUTO: 6 % (ref 4–12)
NEUTROPHILS # BLD AUTO: 2.9 THOUSANDS/ΜL (ref 1.85–7.62)
NEUTS SEG NFR BLD AUTO: 76 % (ref 43–75)
NRBC BLD AUTO-RTO: 0 /100 WBCS
PLATELET # BLD AUTO: 191 THOUSANDS/UL (ref 149–390)
PMV BLD AUTO: 9.4 FL (ref 8.9–12.7)
POTASSIUM SERPL-SCNC: 3.7 MMOL/L (ref 3.5–5.3)
RBC # BLD AUTO: 3.54 MILLION/UL (ref 3.81–5.12)
SODIUM SERPL-SCNC: 140 MMOL/L (ref 136–145)
WBC # BLD AUTO: 3.8 THOUSAND/UL (ref 4.31–10.16)

## 2019-05-11 PROCEDURE — 99233 SBSQ HOSP IP/OBS HIGH 50: CPT | Performed by: INTERNAL MEDICINE

## 2019-05-11 PROCEDURE — 99222 1ST HOSP IP/OBS MODERATE 55: CPT | Performed by: SURGERY

## 2019-05-11 PROCEDURE — 99222 1ST HOSP IP/OBS MODERATE 55: CPT | Performed by: UROLOGY

## 2019-05-11 PROCEDURE — 83735 ASSAY OF MAGNESIUM: CPT | Performed by: FAMILY MEDICINE

## 2019-05-11 PROCEDURE — C9113 INJ PANTOPRAZOLE SODIUM, VIA: HCPCS | Performed by: FAMILY MEDICINE

## 2019-05-11 PROCEDURE — 80048 BASIC METABOLIC PNL TOTAL CA: CPT | Performed by: FAMILY MEDICINE

## 2019-05-11 PROCEDURE — 85025 COMPLETE CBC W/AUTO DIFF WBC: CPT | Performed by: FAMILY MEDICINE

## 2019-05-11 PROCEDURE — 99232 SBSQ HOSP IP/OBS MODERATE 35: CPT | Performed by: PHYSICIAN ASSISTANT

## 2019-05-11 RX ORDER — ALBUTEROL SULFATE 2.5 MG/3ML
2.5 SOLUTION RESPIRATORY (INHALATION) EVERY 4 HOURS PRN
Status: DISCONTINUED | OUTPATIENT
Start: 2019-05-11 | End: 2019-05-12 | Stop reason: HOSPADM

## 2019-05-11 RX ORDER — ERYTHROMYCIN 250 MG/1
250 TABLET, COATED ORAL
Status: DISCONTINUED | OUTPATIENT
Start: 2019-05-11 | End: 2019-05-12 | Stop reason: HOSPADM

## 2019-05-11 RX ORDER — HYDROMORPHONE HYDROCHLORIDE 2 MG/1
2 TABLET ORAL EVERY 6 HOURS PRN
Status: DISCONTINUED | OUTPATIENT
Start: 2019-05-11 | End: 2019-05-12 | Stop reason: HOSPADM

## 2019-05-11 RX ADMIN — METHYLNALTREXONE BROMIDE 12 MG: 12 INJECTION, SOLUTION SUBCUTANEOUS at 11:55

## 2019-05-11 RX ADMIN — METOCLOPRAMIDE HYDROCHLORIDE 10 MG: 10 TABLET ORAL at 08:16

## 2019-05-11 RX ADMIN — METOCLOPRAMIDE 5 MG: 5 INJECTION, SOLUTION INTRAMUSCULAR; INTRAVENOUS at 15:25

## 2019-05-11 RX ADMIN — HYDROMORPHONE HYDROCHLORIDE 0.5 MG: 1 INJECTION, SOLUTION INTRAMUSCULAR; INTRAVENOUS; SUBCUTANEOUS at 08:16

## 2019-05-11 RX ADMIN — VENLAFAXINE 75 MG: 37.5 TABLET ORAL at 17:23

## 2019-05-11 RX ADMIN — HYDROMORPHONE HYDROCHLORIDE 0.5 MG: 1 INJECTION, SOLUTION INTRAMUSCULAR; INTRAVENOUS; SUBCUTANEOUS at 12:42

## 2019-05-11 RX ADMIN — HYDROMORPHONE HYDROCHLORIDE 2 MG: 2 TABLET ORAL at 17:26

## 2019-05-11 RX ADMIN — QUETIAPINE FUMARATE 150 MG: 100 TABLET ORAL at 23:20

## 2019-05-11 RX ADMIN — ENOXAPARIN SODIUM 40 MG: 40 INJECTION SUBCUTANEOUS at 08:16

## 2019-05-11 RX ADMIN — METOCLOPRAMIDE 5 MG: 5 INJECTION, SOLUTION INTRAMUSCULAR; INTRAVENOUS at 20:19

## 2019-05-11 RX ADMIN — METOCLOPRAMIDE 5 MG: 5 INJECTION, SOLUTION INTRAMUSCULAR; INTRAVENOUS at 08:17

## 2019-05-11 RX ADMIN — GABAPENTIN 600 MG: 300 CAPSULE ORAL at 08:16

## 2019-05-11 RX ADMIN — SODIUM CHLORIDE 125 ML/HR: 0.9 INJECTION, SOLUTION INTRAVENOUS at 02:18

## 2019-05-11 RX ADMIN — ERYTHROMYCIN 250 MG: 250 TABLET, FILM COATED ORAL at 15:24

## 2019-05-11 RX ADMIN — METOCLOPRAMIDE 5 MG: 5 INJECTION, SOLUTION INTRAMUSCULAR; INTRAVENOUS at 04:46

## 2019-05-11 RX ADMIN — HYDROMORPHONE HYDROCHLORIDE 0.5 MG: 1 INJECTION, SOLUTION INTRAMUSCULAR; INTRAVENOUS; SUBCUTANEOUS at 02:19

## 2019-05-11 RX ADMIN — GABAPENTIN 600 MG: 300 CAPSULE ORAL at 15:24

## 2019-05-11 RX ADMIN — TEMAZEPAM 30 MG: 15 CAPSULE ORAL at 23:19

## 2019-05-11 RX ADMIN — VENLAFAXINE 75 MG: 37.5 TABLET ORAL at 08:16

## 2019-05-11 RX ADMIN — PANTOPRAZOLE SODIUM 40 MG: 40 INJECTION, POWDER, FOR SOLUTION INTRAVENOUS at 20:19

## 2019-05-11 RX ADMIN — GABAPENTIN 600 MG: 300 CAPSULE ORAL at 20:18

## 2019-05-11 RX ADMIN — HYDROMORPHONE HYDROCHLORIDE 0.5 MG: 1 INJECTION, SOLUTION INTRAMUSCULAR; INTRAVENOUS; SUBCUTANEOUS at 20:18

## 2019-05-11 RX ADMIN — PANTOPRAZOLE SODIUM 40 MG: 40 INJECTION, POWDER, FOR SOLUTION INTRAVENOUS at 08:16

## 2019-05-11 RX ADMIN — ESCITALOPRAM OXALATE 20 MG: 10 TABLET ORAL at 08:16

## 2019-05-11 RX ADMIN — ERYTHROMYCIN 250 MG: 250 TABLET, FILM COATED ORAL at 11:55

## 2019-05-12 VITALS
DIASTOLIC BLOOD PRESSURE: 69 MMHG | OXYGEN SATURATION: 97 % | TEMPERATURE: 98.3 F | BODY MASS INDEX: 27.29 KG/M2 | WEIGHT: 159.83 LBS | HEIGHT: 64 IN | HEART RATE: 70 BPM | SYSTOLIC BLOOD PRESSURE: 149 MMHG | RESPIRATION RATE: 18 BRPM

## 2019-05-12 PROBLEM — J96.01 ACUTE RESPIRATORY FAILURE WITH HYPOXIA (HCC): Status: RESOLVED | Noted: 2019-05-10 | Resolved: 2019-05-12

## 2019-05-12 PROCEDURE — 99239 HOSP IP/OBS DSCHRG MGMT >30: CPT | Performed by: INTERNAL MEDICINE

## 2019-05-12 RX ORDER — ERYTHROMYCIN 250 MG/1
250 TABLET, COATED ORAL
Qty: 42 TABLET | Refills: 0 | Status: SHIPPED | OUTPATIENT
Start: 2019-05-12 | End: 2019-05-21

## 2019-05-12 RX ADMIN — METOCLOPRAMIDE 5 MG: 5 INJECTION, SOLUTION INTRAMUSCULAR; INTRAVENOUS at 03:17

## 2019-05-12 RX ADMIN — HYDROMORPHONE HYDROCHLORIDE 2 MG: 2 TABLET ORAL at 00:27

## 2019-05-12 RX ADMIN — ERYTHROMYCIN 250 MG: 250 TABLET, FILM COATED ORAL at 06:38

## 2019-05-12 RX ADMIN — HYDROMORPHONE HYDROCHLORIDE 2 MG: 2 TABLET ORAL at 06:39

## 2019-05-13 ENCOUNTER — TELEPHONE (OUTPATIENT)
Dept: GASTROENTEROLOGY | Facility: CLINIC | Age: 64
End: 2019-05-13

## 2019-05-14 ENCOUNTER — TELEPHONE (OUTPATIENT)
Dept: GASTROENTEROLOGY | Facility: CLINIC | Age: 64
End: 2019-05-14

## 2019-05-15 ENCOUNTER — HOSPITAL ENCOUNTER (INPATIENT)
Facility: HOSPITAL | Age: 64
LOS: 1 days | Discharge: HOME | DRG: 392 | End: 2019-05-16
Attending: EMERGENCY MEDICINE | Admitting: FAMILY MEDICINE
Payer: MEDICARE

## 2019-05-15 ENCOUNTER — APPOINTMENT (EMERGENCY)
Dept: CT IMAGING | Facility: HOSPITAL | Age: 64
DRG: 392 | End: 2019-05-15
Payer: MEDICARE

## 2019-05-15 DIAGNOSIS — K59.00 OBSTIPATION: Primary | ICD-10-CM

## 2019-05-15 DIAGNOSIS — K56.7 ILEUS (HCC): ICD-10-CM

## 2019-05-15 DIAGNOSIS — K59.03 THERAPEUTIC OPIOID-INDUCED CONSTIPATION (OIC): ICD-10-CM

## 2019-05-15 DIAGNOSIS — T40.2X5A THERAPEUTIC OPIOID-INDUCED CONSTIPATION (OIC): ICD-10-CM

## 2019-05-15 LAB
ALBUMIN SERPL BCP-MCNC: 3.5 G/DL (ref 3.5–5)
ALP SERPL-CCNC: 99 U/L (ref 46–116)
ALT SERPL W P-5'-P-CCNC: 25 U/L (ref 12–78)
ANION GAP SERPL CALCULATED.3IONS-SCNC: 7 MMOL/L (ref 4–13)
AST SERPL W P-5'-P-CCNC: 30 U/L (ref 5–45)
BASOPHILS # BLD AUTO: 0.04 THOUSANDS/ΜL (ref 0–0.1)
BASOPHILS NFR BLD AUTO: 1 % (ref 0–1)
BILIRUB SERPL-MCNC: 0.2 MG/DL (ref 0.2–1)
BUN SERPL-MCNC: 7 MG/DL (ref 5–25)
CALCIUM SERPL-MCNC: 8.5 MG/DL (ref 8.3–10.1)
CHLORIDE SERPL-SCNC: 106 MMOL/L (ref 100–108)
CO2 SERPL-SCNC: 30 MMOL/L (ref 21–32)
CREAT SERPL-MCNC: 1 MG/DL (ref 0.6–1.3)
EOSINOPHIL # BLD AUTO: 0.2 THOUSAND/ΜL (ref 0–0.61)
EOSINOPHIL NFR BLD AUTO: 4 % (ref 0–6)
ERYTHROCYTE [DISTWIDTH] IN BLOOD BY AUTOMATED COUNT: 13.4 % (ref 11.6–15.1)
GFR SERPL CREATININE-BSD FRML MDRD: 60 ML/MIN/1.73SQ M
GLUCOSE SERPL-MCNC: 88 MG/DL (ref 65–140)
HCT VFR BLD AUTO: 34.5 % (ref 34.8–46.1)
HGB BLD-MCNC: 11.2 G/DL (ref 11.5–15.4)
IMM GRANULOCYTES # BLD AUTO: 0.02 THOUSAND/UL (ref 0–0.2)
IMM GRANULOCYTES NFR BLD AUTO: 0 % (ref 0–2)
LACTATE SERPL-SCNC: 1.2 MMOL/L (ref 0.5–2)
LIPASE SERPL-CCNC: 130 U/L (ref 73–393)
LYMPHOCYTES # BLD AUTO: 1.99 THOUSANDS/ΜL (ref 0.6–4.47)
LYMPHOCYTES NFR BLD AUTO: 35 % (ref 14–44)
MCH RBC QN AUTO: 29 PG (ref 26.8–34.3)
MCHC RBC AUTO-ENTMCNC: 32.5 G/DL (ref 31.4–37.4)
MCV RBC AUTO: 89 FL (ref 82–98)
MONOCYTES # BLD AUTO: 0.71 THOUSAND/ΜL (ref 0.17–1.22)
MONOCYTES NFR BLD AUTO: 13 % (ref 4–12)
NEUTROPHILS # BLD AUTO: 2.72 THOUSANDS/ΜL (ref 1.85–7.62)
NEUTS SEG NFR BLD AUTO: 47 % (ref 43–75)
NRBC BLD AUTO-RTO: 0 /100 WBCS
PLATELET # BLD AUTO: 249 THOUSANDS/UL (ref 149–390)
PMV BLD AUTO: 9.7 FL (ref 8.9–12.7)
POTASSIUM SERPL-SCNC: 3.9 MMOL/L (ref 3.5–5.3)
PROT SERPL-MCNC: 7.1 G/DL (ref 6.4–8.2)
RBC # BLD AUTO: 3.86 MILLION/UL (ref 3.81–5.12)
SODIUM SERPL-SCNC: 143 MMOL/L (ref 136–145)
WBC # BLD AUTO: 5.68 THOUSAND/UL (ref 4.31–10.16)

## 2019-05-15 PROCEDURE — 96376 TX/PRO/DX INJ SAME DRUG ADON: CPT

## 2019-05-15 PROCEDURE — 36415 COLL VENOUS BLD VENIPUNCTURE: CPT | Performed by: EMERGENCY MEDICINE

## 2019-05-15 PROCEDURE — 99285 EMERGENCY DEPT VISIT HI MDM: CPT

## 2019-05-15 PROCEDURE — 96374 THER/PROPH/DIAG INJ IV PUSH: CPT

## 2019-05-15 PROCEDURE — 99284 EMERGENCY DEPT VISIT MOD MDM: CPT | Performed by: EMERGENCY MEDICINE

## 2019-05-15 PROCEDURE — 85025 COMPLETE CBC W/AUTO DIFF WBC: CPT | Performed by: EMERGENCY MEDICINE

## 2019-05-15 PROCEDURE — 74176 CT ABD & PELVIS W/O CONTRAST: CPT

## 2019-05-15 PROCEDURE — 80053 COMPREHEN METABOLIC PANEL: CPT | Performed by: EMERGENCY MEDICINE

## 2019-05-15 PROCEDURE — 83690 ASSAY OF LIPASE: CPT | Performed by: EMERGENCY MEDICINE

## 2019-05-15 PROCEDURE — 83605 ASSAY OF LACTIC ACID: CPT | Performed by: EMERGENCY MEDICINE

## 2019-05-15 RX ORDER — HYDROMORPHONE HCL/PF 1 MG/ML
0.5 SYRINGE (ML) INJECTION ONCE
Status: COMPLETED | OUTPATIENT
Start: 2019-05-15 | End: 2019-05-15

## 2019-05-15 RX ADMIN — HYDROMORPHONE HYDROCHLORIDE 0.5 MG: 1 INJECTION, SOLUTION INTRAMUSCULAR; INTRAVENOUS; SUBCUTANEOUS at 22:23

## 2019-05-15 RX ADMIN — HYDROMORPHONE HYDROCHLORIDE 0.5 MG: 1 INJECTION, SOLUTION INTRAMUSCULAR; INTRAVENOUS; SUBCUTANEOUS at 23:07

## 2019-05-16 VITALS
TEMPERATURE: 98.4 F | HEIGHT: 64 IN | OXYGEN SATURATION: 95 % | WEIGHT: 140 LBS | HEART RATE: 85 BPM | SYSTOLIC BLOOD PRESSURE: 123 MMHG | RESPIRATION RATE: 17 BRPM | BODY MASS INDEX: 23.9 KG/M2 | DIASTOLIC BLOOD PRESSURE: 93 MMHG

## 2019-05-16 PROBLEM — K59.03 THERAPEUTIC OPIOID INDUCED CONSTIPATION: Status: ACTIVE | Noted: 2019-05-16

## 2019-05-16 PROBLEM — R10.9 ABDOMINAL PAIN: Status: ACTIVE | Noted: 2019-05-16

## 2019-05-16 PROBLEM — T40.2X5A THERAPEUTIC OPIOID INDUCED CONSTIPATION: Status: ACTIVE | Noted: 2019-05-16

## 2019-05-16 LAB
ALBUMIN SERPL BCP-MCNC: 3.3 G/DL (ref 3.5–5)
ALP SERPL-CCNC: 91 U/L (ref 46–116)
ALT SERPL W P-5'-P-CCNC: 28 U/L (ref 12–78)
ANION GAP SERPL CALCULATED.3IONS-SCNC: 9 MMOL/L (ref 4–13)
AST SERPL W P-5'-P-CCNC: 40 U/L (ref 5–45)
BASOPHILS # BLD AUTO: 0.03 THOUSANDS/ΜL (ref 0–0.1)
BASOPHILS NFR BLD AUTO: 1 % (ref 0–1)
BILIRUB SERPL-MCNC: 0.3 MG/DL (ref 0.2–1)
BUN SERPL-MCNC: 7 MG/DL (ref 5–25)
CALCIUM SERPL-MCNC: 8.5 MG/DL (ref 8.3–10.1)
CHLORIDE SERPL-SCNC: 107 MMOL/L (ref 100–108)
CO2 SERPL-SCNC: 26 MMOL/L (ref 21–32)
CREAT SERPL-MCNC: 0.93 MG/DL (ref 0.6–1.3)
EOSINOPHIL # BLD AUTO: 0.18 THOUSAND/ΜL (ref 0–0.61)
EOSINOPHIL NFR BLD AUTO: 3 % (ref 0–6)
ERYTHROCYTE [DISTWIDTH] IN BLOOD BY AUTOMATED COUNT: 13.2 % (ref 11.6–15.1)
GFR SERPL CREATININE-BSD FRML MDRD: 66 ML/MIN/1.73SQ M
GLUCOSE SERPL-MCNC: 92 MG/DL (ref 65–140)
HCT VFR BLD AUTO: 33.8 % (ref 34.8–46.1)
HGB BLD-MCNC: 11.3 G/DL (ref 11.5–15.4)
IMM GRANULOCYTES # BLD AUTO: 0.03 THOUSAND/UL (ref 0–0.2)
IMM GRANULOCYTES NFR BLD AUTO: 1 % (ref 0–2)
LYMPHOCYTES # BLD AUTO: 1.94 THOUSANDS/ΜL (ref 0.6–4.47)
LYMPHOCYTES NFR BLD AUTO: 31 % (ref 14–44)
MAGNESIUM SERPL-MCNC: 2.2 MG/DL (ref 1.6–2.6)
MCH RBC QN AUTO: 29.7 PG (ref 26.8–34.3)
MCHC RBC AUTO-ENTMCNC: 33.4 G/DL (ref 31.4–37.4)
MCV RBC AUTO: 89 FL (ref 82–98)
MONOCYTES # BLD AUTO: 0.77 THOUSAND/ΜL (ref 0.17–1.22)
MONOCYTES NFR BLD AUTO: 13 % (ref 4–12)
NEUTROPHILS # BLD AUTO: 3.23 THOUSANDS/ΜL (ref 1.85–7.62)
NEUTS SEG NFR BLD AUTO: 51 % (ref 43–75)
NRBC BLD AUTO-RTO: 0 /100 WBCS
PHOSPHATE SERPL-MCNC: 3.3 MG/DL (ref 2.3–4.1)
PLATELET # BLD AUTO: 232 THOUSANDS/UL (ref 149–390)
PMV BLD AUTO: 9.8 FL (ref 8.9–12.7)
POTASSIUM SERPL-SCNC: 3.9 MMOL/L (ref 3.5–5.3)
PROT SERPL-MCNC: 6.7 G/DL (ref 6.4–8.2)
RBC # BLD AUTO: 3.8 MILLION/UL (ref 3.81–5.12)
SODIUM SERPL-SCNC: 142 MMOL/L (ref 136–145)
WBC # BLD AUTO: 6.18 THOUSAND/UL (ref 4.31–10.16)

## 2019-05-16 PROCEDURE — 83735 ASSAY OF MAGNESIUM: CPT | Performed by: PHYSICIAN ASSISTANT

## 2019-05-16 PROCEDURE — 96372 THER/PROPH/DIAG INJ SC/IM: CPT

## 2019-05-16 PROCEDURE — 99223 1ST HOSP IP/OBS HIGH 75: CPT | Performed by: PHYSICIAN ASSISTANT

## 2019-05-16 PROCEDURE — 80053 COMPREHEN METABOLIC PANEL: CPT | Performed by: PHYSICIAN ASSISTANT

## 2019-05-16 PROCEDURE — 84100 ASSAY OF PHOSPHORUS: CPT | Performed by: PHYSICIAN ASSISTANT

## 2019-05-16 PROCEDURE — 85025 COMPLETE CBC W/AUTO DIFF WBC: CPT | Performed by: PHYSICIAN ASSISTANT

## 2019-05-16 RX ORDER — ESCITALOPRAM OXALATE 10 MG/1
20 TABLET ORAL DAILY
Status: DISCONTINUED | OUTPATIENT
Start: 2019-05-16 | End: 2019-05-16 | Stop reason: HOSPADM

## 2019-05-16 RX ORDER — ERYTHROMYCIN 250 MG/1
250 TABLET, COATED ORAL
Status: DISCONTINUED | OUTPATIENT
Start: 2019-05-16 | End: 2019-05-16 | Stop reason: HOSPADM

## 2019-05-16 RX ORDER — ONDANSETRON 2 MG/ML
4 INJECTION INTRAMUSCULAR; INTRAVENOUS EVERY 6 HOURS PRN
Status: DISCONTINUED | OUTPATIENT
Start: 2019-05-16 | End: 2019-05-16 | Stop reason: HOSPADM

## 2019-05-16 RX ORDER — TEMAZEPAM 15 MG/1
30 CAPSULE ORAL
Status: DISCONTINUED | OUTPATIENT
Start: 2019-05-16 | End: 2019-05-16 | Stop reason: HOSPADM

## 2019-05-16 RX ORDER — GABAPENTIN 300 MG/1
600 CAPSULE ORAL 3 TIMES DAILY
Status: DISCONTINUED | OUTPATIENT
Start: 2019-05-16 | End: 2019-05-16 | Stop reason: HOSPADM

## 2019-05-16 RX ORDER — SODIUM CHLORIDE 9 MG/ML
125 INJECTION, SOLUTION INTRAVENOUS ONCE
Status: COMPLETED | OUTPATIENT
Start: 2019-05-16 | End: 2019-05-16

## 2019-05-16 RX ORDER — VENLAFAXINE 37.5 MG/1
75 TABLET ORAL 2 TIMES DAILY
Status: DISCONTINUED | OUTPATIENT
Start: 2019-05-16 | End: 2019-05-16 | Stop reason: HOSPADM

## 2019-05-16 RX ORDER — DIAZEPAM 5 MG/1
5 TABLET ORAL 3 TIMES DAILY
Status: DISCONTINUED | OUTPATIENT
Start: 2019-05-16 | End: 2019-05-16 | Stop reason: HOSPADM

## 2019-05-16 RX ADMIN — METHYLNALTREXONE BROMIDE 12 MG: 12 INJECTION, SOLUTION SUBCUTANEOUS at 01:51

## 2019-05-16 RX ADMIN — SODIUM CHLORIDE 125 ML/HR: 0.9 INJECTION, SOLUTION INTRAVENOUS at 05:13

## 2019-05-17 RX ORDER — PHENAZOPYRIDINE HYDROCHLORIDE 200 MG/1
TABLET, FILM COATED ORAL
Refills: 0 | Status: ON HOLD | COMMUNITY
Start: 2019-03-30 | End: 2019-11-03

## 2019-05-17 RX ORDER — FLUCONAZOLE 200 MG/1
TABLET ORAL
Status: ON HOLD | COMMUNITY
Start: 2019-03-03 | End: 2019-11-03 | Stop reason: ALTCHOICE

## 2019-05-17 RX ORDER — MORPHINE SULFATE 30 MG/1
TABLET, FILM COATED, EXTENDED RELEASE ORAL
COMMUNITY
Start: 2018-11-10 | End: 2021-01-01 | Stop reason: SDUPTHER

## 2019-05-17 RX ORDER — HYDROCHLOROTHIAZIDE 12.5 MG/1
CAPSULE, GELATIN COATED ORAL
COMMUNITY
Start: 2015-04-20 | End: 2020-07-20 | Stop reason: ALTCHOICE

## 2019-05-17 RX ORDER — CLONAZEPAM 0.5 MG/1
TABLET ORAL
Status: ON HOLD | COMMUNITY
Start: 2019-02-02 | End: 2019-11-03 | Stop reason: ALTCHOICE

## 2019-05-17 RX ORDER — ALENDRONATE SODIUM 70 MG/1
TABLET ORAL
Refills: 0 | COMMUNITY
Start: 2019-04-07 | End: 2020-07-20 | Stop reason: ALTCHOICE

## 2019-05-21 ENCOUNTER — OFFICE VISIT (OUTPATIENT)
Dept: GASTROENTEROLOGY | Facility: CLINIC | Age: 64
End: 2019-05-21
Payer: MEDICARE

## 2019-05-21 VITALS
SYSTOLIC BLOOD PRESSURE: 112 MMHG | BODY MASS INDEX: 25.83 KG/M2 | WEIGHT: 150.5 LBS | DIASTOLIC BLOOD PRESSURE: 84 MMHG | HEART RATE: 87 BPM

## 2019-05-21 DIAGNOSIS — K59.03 DRUG-INDUCED CONSTIPATION: Primary | ICD-10-CM

## 2019-05-21 DIAGNOSIS — R10.84 GENERALIZED ABDOMINAL PAIN: ICD-10-CM

## 2019-05-21 DIAGNOSIS — T40.2X5A THERAPEUTIC OPIOID INDUCED CONSTIPATION: Primary | ICD-10-CM

## 2019-05-21 DIAGNOSIS — K59.03 THERAPEUTIC OPIOID INDUCED CONSTIPATION: Primary | ICD-10-CM

## 2019-05-21 DIAGNOSIS — K56.7 ILEUS (HCC): ICD-10-CM

## 2019-05-21 DIAGNOSIS — K59.81 OGILVIE'S SYNDROME: ICD-10-CM

## 2019-05-21 PROBLEM — R14.0 DISTENDED ABDOMEN: Status: RESOLVED | Noted: 2018-12-01 | Resolved: 2019-05-21

## 2019-05-21 PROBLEM — K83.8 COMMON BILE DUCT DILATATION: Chronic | Status: RESOLVED | Noted: 2018-07-02 | Resolved: 2019-05-21

## 2019-05-21 PROBLEM — K59.00 OBSTIPATION: Status: RESOLVED | Noted: 2018-12-01 | Resolved: 2019-05-21

## 2019-05-21 PROBLEM — K51.00 PANCOLITIS (HCC): Status: RESOLVED | Noted: 2019-02-26 | Resolved: 2019-05-21

## 2019-05-21 PROBLEM — K63.89 COLONIC MASS: Status: RESOLVED | Noted: 2019-05-10 | Resolved: 2019-05-21

## 2019-05-21 PROCEDURE — 99214 OFFICE O/P EST MOD 30 MIN: CPT | Performed by: INTERNAL MEDICINE

## 2019-05-21 RX ORDER — POLYETHYLENE GLYCOL 3350, SODIUM CHLORIDE, SODIUM BICARBONATE, POTASSIUM CHLORIDE 420; 11.2; 5.72; 1.48 G/4L; G/4L; G/4L; G/4L
4000 POWDER, FOR SOLUTION ORAL ONCE
Qty: 4000 ML | Refills: 0 | Status: SHIPPED | OUTPATIENT
Start: 2019-05-21 | End: 2020-07-20 | Stop reason: ALTCHOICE

## 2019-05-21 RX ORDER — METOCLOPRAMIDE 10 MG/1
10 TABLET ORAL 3 TIMES DAILY
Qty: 9 TABLET | Refills: 0 | Status: ON HOLD | OUTPATIENT
Start: 2019-05-21 | End: 2019-11-03

## 2019-06-04 ENCOUNTER — TELEPHONE (OUTPATIENT)
Dept: GASTROENTEROLOGY | Facility: CLINIC | Age: 64
End: 2019-06-04

## 2019-06-11 ENCOUNTER — TELEPHONE (OUTPATIENT)
Dept: GASTROENTEROLOGY | Facility: CLINIC | Age: 64
End: 2019-06-11

## 2019-06-12 ENCOUNTER — TELEPHONE (OUTPATIENT)
Dept: GASTROENTEROLOGY | Facility: CLINIC | Age: 64
End: 2019-06-12

## 2019-06-12 DIAGNOSIS — K59.03 CONSTIPATION DUE TO OPIOID THERAPY: Primary | ICD-10-CM

## 2019-06-12 DIAGNOSIS — T40.2X5A CONSTIPATION DUE TO OPIOID THERAPY: Primary | ICD-10-CM

## 2019-06-17 ENCOUNTER — TELEPHONE (OUTPATIENT)
Dept: GASTROENTEROLOGY | Facility: CLINIC | Age: 64
End: 2019-06-17

## 2019-06-18 ENCOUNTER — TELEPHONE (OUTPATIENT)
Dept: GASTROENTEROLOGY | Facility: CLINIC | Age: 64
End: 2019-06-18

## 2019-06-20 ENCOUNTER — TELEPHONE (OUTPATIENT)
Dept: GASTROENTEROLOGY | Facility: CLINIC | Age: 64
End: 2019-06-20

## 2019-06-24 ENCOUNTER — HOSPITAL ENCOUNTER (OUTPATIENT)
Dept: GASTROENTEROLOGY | Facility: HOSPITAL | Age: 64
Setting detail: OUTPATIENT SURGERY
Discharge: HOME | End: 2019-06-24
Attending: INTERNAL MEDICINE

## 2019-06-25 ENCOUNTER — TELEPHONE (OUTPATIENT)
Dept: GASTROENTEROLOGY | Facility: CLINIC | Age: 64
End: 2019-06-25

## 2019-10-28 ENCOUNTER — LAB REQUISITION (OUTPATIENT)
Dept: LAB | Facility: HOSPITAL | Age: 64
End: 2019-10-28
Payer: MEDICARE

## 2019-10-28 DIAGNOSIS — G89.29 OTHER CHRONIC PAIN: ICD-10-CM

## 2019-10-28 DIAGNOSIS — K63.2 FISTULA OF INTESTINE: ICD-10-CM

## 2019-10-28 LAB
ALBUMIN SERPL BCP-MCNC: 2.4 G/DL (ref 3.5–5)
ALP SERPL-CCNC: 125 U/L (ref 46–116)
ALT SERPL W P-5'-P-CCNC: 27 U/L (ref 12–78)
ANION GAP SERPL CALCULATED.3IONS-SCNC: 8 MMOL/L (ref 4–13)
AST SERPL W P-5'-P-CCNC: 17 U/L (ref 5–45)
BASOPHILS # BLD AUTO: 0.03 THOUSANDS/ΜL (ref 0–0.1)
BASOPHILS NFR BLD AUTO: 1 % (ref 0–1)
BILIRUB SERPL-MCNC: 0.3 MG/DL (ref 0.2–1)
BUN SERPL-MCNC: 2 MG/DL (ref 5–25)
CALCIUM SERPL-MCNC: 7.2 MG/DL (ref 8.3–10.1)
CHLORIDE SERPL-SCNC: 100 MMOL/L (ref 100–108)
CO2 SERPL-SCNC: 28 MMOL/L (ref 21–32)
CREAT SERPL-MCNC: 0.8 MG/DL (ref 0.6–1.3)
EOSINOPHIL # BLD AUTO: 0.17 THOUSAND/ΜL (ref 0–0.61)
EOSINOPHIL NFR BLD AUTO: 3 % (ref 0–6)
ERYTHROCYTE [DISTWIDTH] IN BLOOD BY AUTOMATED COUNT: 16.2 % (ref 11.6–15.1)
GFR SERPL CREATININE-BSD FRML MDRD: 79 ML/MIN/1.73SQ M
GLUCOSE SERPL-MCNC: 68 MG/DL (ref 65–140)
HCT VFR BLD AUTO: 28.6 % (ref 34.8–46.1)
HGB BLD-MCNC: 9.3 G/DL (ref 11.5–15.4)
IMM GRANULOCYTES # BLD AUTO: 0.02 THOUSAND/UL (ref 0–0.2)
IMM GRANULOCYTES NFR BLD AUTO: 0 % (ref 0–2)
LYMPHOCYTES # BLD AUTO: 1.88 THOUSANDS/ΜL (ref 0.6–4.47)
LYMPHOCYTES NFR BLD AUTO: 30 % (ref 14–44)
MAGNESIUM SERPL-MCNC: 1 MG/DL (ref 1.6–2.6)
MCH RBC QN AUTO: 27.9 PG (ref 26.8–34.3)
MCHC RBC AUTO-ENTMCNC: 32.5 G/DL (ref 31.4–37.4)
MCV RBC AUTO: 86 FL (ref 82–98)
MONOCYTES # BLD AUTO: 0.71 THOUSAND/ΜL (ref 0.17–1.22)
MONOCYTES NFR BLD AUTO: 11 % (ref 4–12)
NEUTROPHILS # BLD AUTO: 3.48 THOUSANDS/ΜL (ref 1.85–7.62)
NEUTS SEG NFR BLD AUTO: 55 % (ref 43–75)
NRBC BLD AUTO-RTO: 0 /100 WBCS
PHOSPHATE SERPL-MCNC: 2.2 MG/DL (ref 2.3–4.1)
PLATELET # BLD AUTO: 295 THOUSANDS/UL (ref 149–390)
PMV BLD AUTO: 11 FL (ref 8.9–12.7)
POTASSIUM SERPL-SCNC: 3.1 MMOL/L (ref 3.5–5.3)
PROT SERPL-MCNC: 5.5 G/DL (ref 6.4–8.2)
RBC # BLD AUTO: 3.33 MILLION/UL (ref 3.81–5.12)
SODIUM SERPL-SCNC: 136 MMOL/L (ref 136–145)
TRIGL SERPL-MCNC: 89 MG/DL
WBC # BLD AUTO: 6.29 THOUSAND/UL (ref 4.31–10.16)

## 2019-10-28 PROCEDURE — 85025 COMPLETE CBC W/AUTO DIFF WBC: CPT | Performed by: SURGERY

## 2019-10-28 PROCEDURE — 83735 ASSAY OF MAGNESIUM: CPT | Performed by: SURGERY

## 2019-10-28 PROCEDURE — 84478 ASSAY OF TRIGLYCERIDES: CPT | Performed by: SURGERY

## 2019-10-28 PROCEDURE — 80053 COMPREHEN METABOLIC PANEL: CPT | Performed by: SURGERY

## 2019-10-28 PROCEDURE — 84100 ASSAY OF PHOSPHORUS: CPT | Performed by: SURGERY

## 2019-11-03 ENCOUNTER — OFFICE VISIT (OUTPATIENT)
Dept: URGENT CARE | Facility: CLINIC | Age: 64
End: 2019-11-03
Payer: MEDICARE

## 2019-11-03 ENCOUNTER — HOSPITAL ENCOUNTER (INPATIENT)
Facility: HOSPITAL | Age: 64
LOS: 2 days | Discharge: HOME HEALTH | DRG: 394 | End: 2019-11-06
Attending: EMERGENCY MEDICINE | Admitting: INTERNAL MEDICINE
Payer: MEDICARE

## 2019-11-03 ENCOUNTER — APPOINTMENT (EMERGENCY)
Dept: CT IMAGING | Facility: HOSPITAL | Age: 64
DRG: 394 | End: 2019-11-03
Payer: MEDICARE

## 2019-11-03 ENCOUNTER — APPOINTMENT (EMERGENCY)
Dept: ULTRASOUND IMAGING | Facility: HOSPITAL | Age: 64
DRG: 394 | End: 2019-11-03
Payer: MEDICARE

## 2019-11-03 ENCOUNTER — APPOINTMENT (EMERGENCY)
Dept: RADIOLOGY | Facility: HOSPITAL | Age: 64
DRG: 394 | End: 2019-11-03
Payer: MEDICARE

## 2019-11-03 VITALS
HEIGHT: 64 IN | SYSTOLIC BLOOD PRESSURE: 103 MMHG | BODY MASS INDEX: 26.29 KG/M2 | RESPIRATION RATE: 18 BRPM | DIASTOLIC BLOOD PRESSURE: 57 MMHG | TEMPERATURE: 97.8 F | WEIGHT: 154 LBS | OXYGEN SATURATION: 95 % | HEART RATE: 80 BPM

## 2019-11-03 DIAGNOSIS — R60.0 BILATERAL LOWER EXTREMITY EDEMA: ICD-10-CM

## 2019-11-03 DIAGNOSIS — E83.42 HYPOMAGNESEMIA: ICD-10-CM

## 2019-11-03 DIAGNOSIS — G89.29 CHRONIC MIDLINE THORACIC BACK PAIN: ICD-10-CM

## 2019-11-03 DIAGNOSIS — F32.A DEPRESSION, UNSPECIFIED DEPRESSION TYPE: ICD-10-CM

## 2019-11-03 DIAGNOSIS — K59.81 OGILVIE'S SYNDROME: ICD-10-CM

## 2019-11-03 DIAGNOSIS — R60.0 PEDAL EDEMA: Primary | ICD-10-CM

## 2019-11-03 DIAGNOSIS — M54.6 CHRONIC MIDLINE THORACIC BACK PAIN: ICD-10-CM

## 2019-11-03 DIAGNOSIS — M85.80 OSTEOPENIA, UNSPECIFIED LOCATION: ICD-10-CM

## 2019-11-03 DIAGNOSIS — E87.6 HYPOKALEMIA: Primary | ICD-10-CM

## 2019-11-03 DIAGNOSIS — R10.9 ABDOMINAL PAIN: ICD-10-CM

## 2019-11-03 DIAGNOSIS — D50.8 OTHER IRON DEFICIENCY ANEMIA: ICD-10-CM

## 2019-11-03 DIAGNOSIS — K91.2 SHORT GUT SYNDROME: ICD-10-CM

## 2019-11-03 PROBLEM — M62.82 RHABDOMYOLYSIS: Status: RESOLVED | Noted: 2019-02-26 | Resolved: 2019-11-03

## 2019-11-03 PROBLEM — R78.81 GRAM-NEGATIVE BACTEREMIA: Status: RESOLVED | Noted: 2019-02-27 | Resolved: 2019-11-03

## 2019-11-03 PROBLEM — M79.604 BILATERAL LOWER EXTREMITY PAIN: Status: ACTIVE | Noted: 2019-11-03

## 2019-11-03 PROBLEM — K56.7 ILEUS (HCC): Status: RESOLVED | Noted: 2018-07-02 | Resolved: 2019-11-03

## 2019-11-03 PROBLEM — R53.81 PHYSICAL DECONDITIONING: Status: ACTIVE | Noted: 2019-11-03

## 2019-11-03 PROBLEM — M79.605 BILATERAL LOWER EXTREMITY PAIN: Status: ACTIVE | Noted: 2019-11-03

## 2019-11-03 PROBLEM — D50.9 IRON DEFICIENCY ANEMIA: Status: ACTIVE | Noted: 2019-11-03

## 2019-11-03 PROBLEM — N17.9 AKI (ACUTE KIDNEY INJURY) (HCC): Status: RESOLVED | Noted: 2018-12-01 | Resolved: 2019-11-03

## 2019-11-03 PROBLEM — M79.89 LEG SWELLING: Status: ACTIVE | Noted: 2019-11-03

## 2019-11-03 PROBLEM — G93.41 ACUTE METABOLIC ENCEPHALOPATHY: Status: RESOLVED | Noted: 2019-02-26 | Resolved: 2019-11-03

## 2019-11-03 LAB
ALBUMIN SERPL BCP-MCNC: 2.3 G/DL (ref 3.5–5)
ALP SERPL-CCNC: 116 U/L (ref 46–116)
ALT SERPL W P-5'-P-CCNC: 17 U/L (ref 12–78)
ANION GAP SERPL CALCULATED.3IONS-SCNC: 8 MMOL/L (ref 4–13)
APTT PPP: 30 SECONDS (ref 23–37)
AST SERPL W P-5'-P-CCNC: 16 U/L (ref 5–45)
BASOPHILS # BLD AUTO: 0.02 THOUSANDS/ΜL (ref 0–0.1)
BASOPHILS NFR BLD AUTO: 0 % (ref 0–1)
BILIRUB SERPL-MCNC: 0.3 MG/DL (ref 0.2–1)
BUN SERPL-MCNC: 4 MG/DL (ref 5–25)
CALCIUM SERPL-MCNC: 7.7 MG/DL (ref 8.3–10.1)
CHLORIDE SERPL-SCNC: 103 MMOL/L (ref 100–108)
CO2 SERPL-SCNC: 30 MMOL/L (ref 21–32)
CREAT SERPL-MCNC: 0.74 MG/DL (ref 0.6–1.3)
EOSINOPHIL # BLD AUTO: 0.08 THOUSAND/ΜL (ref 0–0.61)
EOSINOPHIL NFR BLD AUTO: 1 % (ref 0–6)
ERYTHROCYTE [DISTWIDTH] IN BLOOD BY AUTOMATED COUNT: 15.7 % (ref 11.6–15.1)
GFR SERPL CREATININE-BSD FRML MDRD: 86 ML/MIN/1.73SQ M
GLUCOSE SERPL-MCNC: 93 MG/DL (ref 65–140)
HCT VFR BLD AUTO: 32.1 % (ref 34.8–46.1)
HGB BLD-MCNC: 10.3 G/DL (ref 11.5–15.4)
IMM GRANULOCYTES # BLD AUTO: 0.04 THOUSAND/UL (ref 0–0.2)
IMM GRANULOCYTES NFR BLD AUTO: 1 % (ref 0–2)
INR PPP: 1.1 (ref 0.84–1.19)
LYMPHOCYTES # BLD AUTO: 1.36 THOUSANDS/ΜL (ref 0.6–4.47)
LYMPHOCYTES NFR BLD AUTO: 18 % (ref 14–44)
MCH RBC QN AUTO: 27.4 PG (ref 26.8–34.3)
MCHC RBC AUTO-ENTMCNC: 32.1 G/DL (ref 31.4–37.4)
MCV RBC AUTO: 85 FL (ref 82–98)
MONOCYTES # BLD AUTO: 0.7 THOUSAND/ΜL (ref 0.17–1.22)
MONOCYTES NFR BLD AUTO: 9 % (ref 4–12)
NEUTROPHILS # BLD AUTO: 5.32 THOUSANDS/ΜL (ref 1.85–7.62)
NEUTS SEG NFR BLD AUTO: 71 % (ref 43–75)
NRBC BLD AUTO-RTO: 0 /100 WBCS
NT-PROBNP SERPL-MCNC: 61 PG/ML
PLATELET # BLD AUTO: 346 THOUSANDS/UL (ref 149–390)
PMV BLD AUTO: 9.5 FL (ref 8.9–12.7)
POTASSIUM SERPL-SCNC: 2.9 MMOL/L (ref 3.5–5.3)
PROT SERPL-MCNC: 6.3 G/DL (ref 6.4–8.2)
PROTHROMBIN TIME: 14.2 SECONDS (ref 11.6–14.5)
RBC # BLD AUTO: 3.76 MILLION/UL (ref 3.81–5.12)
SODIUM SERPL-SCNC: 141 MMOL/L (ref 136–145)
TROPONIN I SERPL-MCNC: <0.02 NG/ML
WBC # BLD AUTO: 7.52 THOUSAND/UL (ref 4.31–10.16)

## 2019-11-03 PROCEDURE — 99213 OFFICE O/P EST LOW 20 MIN: CPT | Performed by: PHYSICIAN ASSISTANT

## 2019-11-03 PROCEDURE — 74177 CT ABD & PELVIS W/CONTRAST: CPT

## 2019-11-03 PROCEDURE — G0463 HOSPITAL OUTPT CLINIC VISIT: HCPCS | Performed by: PHYSICIAN ASSISTANT

## 2019-11-03 PROCEDURE — 83880 ASSAY OF NATRIURETIC PEPTIDE: CPT | Performed by: EMERGENCY MEDICINE

## 2019-11-03 PROCEDURE — 36415 COLL VENOUS BLD VENIPUNCTURE: CPT | Performed by: EMERGENCY MEDICINE

## 2019-11-03 PROCEDURE — 85025 COMPLETE CBC W/AUTO DIFF WBC: CPT | Performed by: EMERGENCY MEDICINE

## 2019-11-03 PROCEDURE — 85610 PROTHROMBIN TIME: CPT | Performed by: EMERGENCY MEDICINE

## 2019-11-03 PROCEDURE — 85730 THROMBOPLASTIN TIME PARTIAL: CPT | Performed by: EMERGENCY MEDICINE

## 2019-11-03 PROCEDURE — 99285 EMERGENCY DEPT VISIT HI MDM: CPT | Performed by: EMERGENCY MEDICINE

## 2019-11-03 PROCEDURE — 93005 ELECTROCARDIOGRAM TRACING: CPT

## 2019-11-03 PROCEDURE — 96375 TX/PRO/DX INJ NEW DRUG ADDON: CPT

## 2019-11-03 PROCEDURE — 99285 EMERGENCY DEPT VISIT HI MDM: CPT

## 2019-11-03 PROCEDURE — 96374 THER/PROPH/DIAG INJ IV PUSH: CPT

## 2019-11-03 PROCEDURE — 80053 COMPREHEN METABOLIC PANEL: CPT | Performed by: EMERGENCY MEDICINE

## 2019-11-03 PROCEDURE — 71045 X-RAY EXAM CHEST 1 VIEW: CPT

## 2019-11-03 PROCEDURE — 84484 ASSAY OF TROPONIN QUANT: CPT | Performed by: EMERGENCY MEDICINE

## 2019-11-03 PROCEDURE — 99220 PR INITIAL OBSERVATION CARE/DAY 70 MINUTES: CPT | Performed by: INTERNAL MEDICINE

## 2019-11-03 PROCEDURE — 93971 EXTREMITY STUDY: CPT

## 2019-11-03 RX ORDER — DIAZEPAM 5 MG/1
5 TABLET ORAL 3 TIMES DAILY
Status: DISCONTINUED | OUTPATIENT
Start: 2019-11-03 | End: 2019-11-06 | Stop reason: HOSPADM

## 2019-11-03 RX ORDER — HYDROMORPHONE HCL/PF 1 MG/ML
0.5 SYRINGE (ML) INJECTION ONCE
Status: COMPLETED | OUTPATIENT
Start: 2019-11-03 | End: 2019-11-03

## 2019-11-03 RX ORDER — SODIUM CHLORIDE, SODIUM LACTATE, POTASSIUM CHLORIDE, CALCIUM CHLORIDE 600; 310; 30; 20 MG/100ML; MG/100ML; MG/100ML; MG/100ML
50 INJECTION, SOLUTION INTRAVENOUS CONTINUOUS
Status: DISCONTINUED | OUTPATIENT
Start: 2019-11-03 | End: 2019-11-04

## 2019-11-03 RX ORDER — ALENDRONATE SODIUM 70 MG/1
70 TABLET ORAL
Status: DISCONTINUED | OUTPATIENT
Start: 2019-11-04 | End: 2019-11-04

## 2019-11-03 RX ORDER — GABAPENTIN 100 MG/1
100 CAPSULE ORAL ONCE
Status: DISCONTINUED | OUTPATIENT
Start: 2019-11-03 | End: 2019-11-03

## 2019-11-03 RX ORDER — OXYCODONE HYDROCHLORIDE 5 MG/1
5 TABLET ORAL EVERY 4 HOURS PRN
Status: DISCONTINUED | OUTPATIENT
Start: 2019-11-03 | End: 2019-11-03

## 2019-11-03 RX ORDER — METOCLOPRAMIDE HYDROCHLORIDE 5 MG/ML
10 INJECTION INTRAMUSCULAR; INTRAVENOUS EVERY 6 HOURS PRN
Status: DISCONTINUED | OUTPATIENT
Start: 2019-11-03 | End: 2019-11-06 | Stop reason: HOSPADM

## 2019-11-03 RX ORDER — POTASSIUM CHLORIDE 14.9 MG/ML
20 INJECTION INTRAVENOUS ONCE
Status: COMPLETED | OUTPATIENT
Start: 2019-11-03 | End: 2019-11-03

## 2019-11-03 RX ORDER — MORPHINE SULFATE 30 MG/1
30 TABLET, FILM COATED, EXTENDED RELEASE ORAL EVERY 8 HOURS
Status: DISCONTINUED | OUTPATIENT
Start: 2019-11-03 | End: 2019-11-06 | Stop reason: HOSPADM

## 2019-11-03 RX ORDER — OXYCODONE HYDROCHLORIDE 10 MG/1
10 TABLET ORAL EVERY 4 HOURS PRN
Status: DISCONTINUED | OUTPATIENT
Start: 2019-11-03 | End: 2019-11-03

## 2019-11-03 RX ORDER — ONDANSETRON 2 MG/ML
4 INJECTION INTRAMUSCULAR; INTRAVENOUS ONCE
Status: COMPLETED | OUTPATIENT
Start: 2019-11-03 | End: 2019-11-03

## 2019-11-03 RX ORDER — AMOXICILLIN 250 MG
1 CAPSULE ORAL 2 TIMES DAILY
Status: DISCONTINUED | OUTPATIENT
Start: 2019-11-04 | End: 2019-11-06 | Stop reason: HOSPADM

## 2019-11-03 RX ORDER — HYDROMORPHONE HCL/PF 1 MG/ML
0.5 SYRINGE (ML) INJECTION EVERY 2 HOUR PRN
Status: DISCONTINUED | OUTPATIENT
Start: 2019-11-03 | End: 2019-11-04

## 2019-11-03 RX ORDER — HYDROMORPHONE HCL/PF 1 MG/ML
0.5 SYRINGE (ML) INJECTION
Status: DISCONTINUED | OUTPATIENT
Start: 2019-11-03 | End: 2019-11-03

## 2019-11-03 RX ORDER — HEPARIN SODIUM 5000 [USP'U]/ML
5000 INJECTION, SOLUTION INTRAVENOUS; SUBCUTANEOUS EVERY 8 HOURS SCHEDULED
Status: DISCONTINUED | OUTPATIENT
Start: 2019-11-03 | End: 2019-11-06 | Stop reason: HOSPADM

## 2019-11-03 RX ORDER — GABAPENTIN 300 MG/1
600 CAPSULE ORAL 3 TIMES DAILY
Status: DISCONTINUED | OUTPATIENT
Start: 2019-11-03 | End: 2019-11-06 | Stop reason: HOSPADM

## 2019-11-03 RX ORDER — POTASSIUM CHLORIDE 14.9 MG/ML
20 INJECTION INTRAVENOUS ONCE
Status: COMPLETED | OUTPATIENT
Start: 2019-11-03 | End: 2019-11-04

## 2019-11-03 RX ADMIN — POTASSIUM CHLORIDE 20 MEQ: 200 INJECTION, SOLUTION INTRAVENOUS at 23:58

## 2019-11-03 RX ADMIN — HYDROMORPHONE HYDROCHLORIDE 0.5 MG: 1 INJECTION, SOLUTION INTRAMUSCULAR; INTRAVENOUS; SUBCUTANEOUS at 23:56

## 2019-11-03 RX ADMIN — MORPHINE SULFATE 30 MG: 30 TABLET, FILM COATED, EXTENDED RELEASE ORAL at 23:57

## 2019-11-03 RX ADMIN — HEPARIN SODIUM 5000 UNITS: 5000 INJECTION INTRAVENOUS; SUBCUTANEOUS at 23:57

## 2019-11-03 RX ADMIN — POTASSIUM CHLORIDE 20 MEQ: 200 INJECTION, SOLUTION INTRAVENOUS at 20:46

## 2019-11-03 RX ADMIN — HYDROMORPHONE HYDROCHLORIDE 0.5 MG: 1 INJECTION, SOLUTION INTRAMUSCULAR; INTRAVENOUS; SUBCUTANEOUS at 18:45

## 2019-11-03 RX ADMIN — ONDANSETRON 4 MG: 2 INJECTION INTRAMUSCULAR; INTRAVENOUS at 18:44

## 2019-11-03 RX ADMIN — HYDROMORPHONE HYDROCHLORIDE 0.5 MG: 1 INJECTION, SOLUTION INTRAMUSCULAR; INTRAVENOUS; SUBCUTANEOUS at 20:43

## 2019-11-03 RX ADMIN — DIAZEPAM 5 MG: 5 TABLET ORAL at 23:57

## 2019-11-03 RX ADMIN — GABAPENTIN 600 MG: 300 CAPSULE ORAL at 23:16

## 2019-11-03 RX ADMIN — IOHEXOL 100 ML: 350 INJECTION, SOLUTION INTRAVENOUS at 17:22

## 2019-11-03 RX ADMIN — SODIUM CHLORIDE, SODIUM LACTATE, POTASSIUM CHLORIDE, AND CALCIUM CHLORIDE 50 ML/HR: .6; .31; .03; .02 INJECTION, SOLUTION INTRAVENOUS at 23:59

## 2019-11-03 NOTE — PROGRESS NOTES
[facility]        NAME: Anai Nieto is a 62 y o  female  : 1955    MRN: [id number]  DATE: November 3, 2019  TIME: 2:34 PM    Assessment and Plan   Pedal edema [R60 0]  1  Pedal edema  Transfer to other facility         Patient Instructions     Patient Instructions   Due to bilateral feet swelling, recent hospitalization, rec ER eval for further work up  Follow up with PCP in 3-5 days  Proceed to  ER if symptoms worsen  Chief Complaint     Chief Complaint   Patient presents with    Leg Swelling     swelling of feet and legs         History of Present Illness         60-year-old female who was recently discharged from the hospital 4 days ago complains of bilateral feet swelling and pain  She says the feet swelling pain started 2 days ago  Very painful today to walk  She has pain at the left leg in the left calf  She has pain when she stands and some pain at rest   She is recently admitted for bowel obstruction had surgery on her abdomen  She denies any history of heart failure  No chest pain or shortness of breath  Review of Systems   Review of Systems   Constitutional: Negative  HENT: Negative  Eyes: Negative  Respiratory: Negative  Negative for cough and shortness of breath  Cardiovascular: Positive for leg swelling  Negative for chest pain and palpitations  Current Medications       Current Outpatient Medications:     alendronate (FOSAMAX) 70 mg tablet, TAKE 1 TABLET 30-60 MINUTES PRIOR TO BREAKFAST ON AN EMPTY STOMAC     (REFER TO PRESCRIPTION NOTES)  , Disp: , Rfl: 0    diazepam (VALIUM) 5 mg tablet, 3 (three) times a day Morning, 3pm and 8pm, Disp: , Rfl:     escitalopram (LEXAPRO) 20 mg tablet, daily    , Disp: , Rfl:     gabapentin (NEURONTIN) 300 mg capsule, take 2 capsules by mouth three times a day, Disp: , Rfl:     HYDROmorphone (DILAUDID) 4 mg tablet, Take 4 mg by mouth 4 (four) times a day, Disp: , Rfl:     morphine (MS CONTIN) 30 mg 12 hr tablet, take 1 tablet by mouth every 8 hours, Disp: , Rfl:     clonazePAM (KlonoPIN) 0 5 mg tablet, take 1/2 tablet by mouth twice a day, Disp: , Rfl:     diclofenac sodium (VOLTAREN) 1 %, , Disp: , Rfl:     fluconazole (DIFLUCAN) 200 mg tablet, take 1 tablet by mouth ON DAY ONE OF SYMPTOMS AND MAY REPEAT THE      (REFER TO PRESCRIPTION NOTES)  , Disp: , Rfl:     hydrochlorothiazide (MICROZIDE) 12 5 mg capsule, every morning , Disp: , Rfl:     Linaclotide 290 MCG CAPS, Take 1 capsule by mouth daily (Patient not taking: Reported on 5/29/2019), Disp: 90 capsule, Rfl: 1    metoclopramide (REGLAN) 10 mg tablet, Take 1 tablet (10 mg total) by mouth 3 (three) times a day (Patient not taking: Reported on 11/3/2019), Disp: 9 tablet, Rfl: 0    naloxegol oxalate (MOVANTIK) 25 MG tablet, Take 1 tablet in the morning either 1 hour prior to meal or 2 hours after meal  (Patient not taking: Reported on 11/3/2019), Disp: 30 tablet, Rfl: 2    phenazopyridine (PYRIDIUM) 200 mg tablet, take 1 tablet by mouth three times a day if needed for bladder SPASMS, Disp: , Rfl: 0    polyethylene glycol-electrolytes (NULYTELY) 4000 mL solution, Take 4,000 mL by mouth once for 1 dose, Disp: 4000 mL, Rfl: 0    QUEtiapine (SEROquel) 100 mg tablet, Take 150 mg by mouth daily at bedtime , Disp: , Rfl:     temazepam (RESTORIL) 30 mg capsule, 30 mg daily at bedtime  , Disp: , Rfl:     venlafaxine (EFFEXOR) 75 mg tablet, Take 75 mg by mouth 2 (two) times a day, Disp: , Rfl:     Current Allergies     Allergies as of 11/03/2019 - Reviewed 11/03/2019   Allergen Reaction Noted    Nsaids  07/02/2018    Tolmetin  07/02/2018    Codeine Hives 09/25/2019    Tylenol [acetaminophen]  05/15/2019            The following portions of the patient's history were reviewed and updated as appropriate: allergies, current medications, past family history, past medical history, past social history, past surgical history and problem list      Past Medical History:   Diagnosis Date    Asthma     Bowel obstruction (HCC)     Chronic back pain     Colon polyp     Enlarged kidney     Liver mass        Past Surgical History:   Procedure Laterality Date    ABDOMINAL SURGERY      x 25    BACK SURGERY      x 3    COLONOSCOPY N/A 12/4/2018    Procedure: COLONOSCOPY;  Surgeon: Blanca Ku MD;  Location: [facility]; Service: Gastroenterology    HYSTERECTOMY      TONSILLECTOMY         Family History   Problem Relation Age of Onset    Diabetes Mother          Medications have been verified  Objective   /57   Pulse 80   Temp 97 8 °F (36 6 °C) (Temporal)   Resp 18   Ht 5' 4" (1 626 m)   Wt 69 9 kg (154 lb)   LMP  (LMP Unknown)   SpO2 95%   BMI 26 43 kg/m²        Physical Exam     Physical Exam   Constitutional: She is oriented to person, place, and time  She appears well-developed and well-nourished  Cardiovascular: Normal rate and regular rhythm  Pulmonary/Chest: Effort normal and breath sounds normal    Abdominal: Soft  There is no rigidity and no CVA tenderness  Neurological: She is alert and oriented to person, place, and time  Skin: Skin is warm and dry  Bilateral dorsal feet are swollen and slightly purple  They do arnulfo  No warmth  She has tenderness up to the middle 3rd of the left calf and left tibia  Some pitting edema  Right calf is supple  She has painful range of motion all directions bilateral ankles  The toes brisk cap refill    Sensation intact

## 2019-11-03 NOTE — ED PROVIDER NOTES
Pt Name: Shell De La Cruz  MRN: [id number]  [place] 1955  Age/Sex: 62 y o  female  Date of evaluation: 11/3/2019  PCP: Renae Herbert, [address] [place] [address]    Chief Complaint   Patient presents with    Leg Swelling     pt seen at [facility] and d/c wednesday for bowel obstruction  pt presents today for bilateral leg edema          HPI    62 y o  female presenting with abdominal pain as well as bilateral lower extremity swelling, greater on the left the right  Patient recently had a 1 month admission with multiple bowel surgeries initially starting with treatment of a small-bowel obstruction followed by surgical correction of for fistulas, failed correction of the 5th, as well as repair of a perforated bowel  She was released on Wednesday of last week, over the past 3-4 days, she has had progressively worsening abdominal pain, as well as swelling and pain in the legs and feet which got substantially worse over the last 2 days  The pain in her feet is now severe, sharp, burning, worse with any pressure on the feet and better at rest   Pain is stomach is moderate intensity, consistent with usual abdominal pain but maybe slightly worse  Patient notes decreased p o  Intake mainly because she is having difficulty walking due to the pain and her  is injured and bed-bound  HPI      Past Medical and Surgical History    Past Medical History:   Diagnosis Date    Asthma     Bowel obstruction (HCC)     Chronic back pain     Colon polyp     Enlarged kidney     Liver mass        Past Surgical History:   Procedure Laterality Date    ABDOMINAL SURGERY      x 25    BACK SURGERY      x 3    COLONOSCOPY N/A 12/4/2018    Procedure: COLONOSCOPY;  Surgeon: Parmjit Moulton MD;  Location: [facility];   Service: Gastroenterology    HYSTERECTOMY      TONSILLECTOMY         Family History   Problem Relation Age of Onset    Diabetes Mother        Social History     Tobacco Use    Smoking status: Former Smoker Last attempt to quit: 1980     Years since quittin 3    Smokeless tobacco: Never Used   Substance Use Topics    Alcohol use: Never     Alcohol/week: 0 0 standard drinks     Frequency: Never     Binge frequency: Never     Comment: social drinker    Drug use: No           Allergies    Allergies   Allergen Reactions    Nsaids      Irritable, upset stomach  Irritable, upset stomach    Tolmetin      Irritable, upset stomach    Codeine Hives     Per [address] admission orders    Tylenol [Acetaminophen]        Home Medications    Prior to Admission medications    Medication Sig Start Date End Date Taking? Authorizing Provider   alendronate (FOSAMAX) 70 mg tablet TAKE 1 TABLET 30-60 MINUTES PRIOR TO BREAKFAST ON AN EMPTY STOMAC     (REFER TO PRESCRIPTION NOTES)  19   Historical Provider, MD   clonazePAM (KlonoPIN) 0 5 mg tablet take 1/2 tablet by mouth twice a day 19   Historical Provider, MD   diazepam (VALIUM) 5 mg tablet 3 (three) times a day Morning, 3pm and 8pm 11/3/17   Historical Provider, MD   diclofenac sodium (VOLTAREN) 1 %  18   Historical Provider, MD   escitalopram (LEXAPRO) 20 mg tablet daily  10/23/17   Historical Provider, MD   fluconazole (DIFLUCAN) 200 mg tablet take 1 tablet by mouth ON DAY ONE OF SYMPTOMS AND MAY REPEAT THE      (REFER TO PRESCRIPTION NOTES)  3/3/19   Historical Provider, MD   gabapentin (NEURONTIN) 300 mg capsule take 2 capsules by mouth three times a day 17   Historical Provider, MD   hydrochlorothiazide (MICROZIDE) 12 5 mg capsule every morning   4/20/15   Historical Provider, MD   HYDROmorphone (DILAUDID) 4 mg tablet Take 4 mg by mouth 4 (four) times a day    Historical Provider, MD   Linaclotide 290 MCG CAPS Take 1 capsule by mouth daily  Patient not taking: Reported on 2019   Laure Fernandes MD   metoclopramide (REGLAN) 10 mg tablet Take 1 tablet (10 mg total) by mouth 3 (three) times a day  Patient not taking: Reported on 11/3/2019 5/21/19   Godwin Bentley MD   morphine (MS CONTIN) 30 mg 12 hr tablet take 1 tablet by mouth every 8 hours 11/10/18   Historical Provider, MD   naloxegol oxalate (MOVANTIK) 25 MG tablet Take 1 tablet in the morning either 1 hour prior to meal or 2 hours after meal   Patient not taking: Reported on 11/3/2019 6/12/19   Nancy Gant PA-C   phenazopyridine (PYRIDIUM) 200 mg tablet take 1 tablet by mouth three times a day if needed for bladder SPASMS 3/30/19   Historical Provider, MD   polyethylene glycol-electrolytes (NULYTELY) 4000 mL solution Take 4,000 mL by mouth once for 1 dose 5/21/19 5/29/19  Godwin Bentley MD   QUEtiapine (SEROquel) 100 mg tablet Take 150 mg by mouth daily at bedtime     Historical Provider, MD   temazepam (RESTORIL) 30 mg capsule 30 mg daily at bedtime   11/28/17   Historical Provider, MD   venlafaxine (EFFEXOR) 75 mg tablet Take 75 mg by mouth 2 (two) times a day    Historical Provider, MD           Review of Systems    Review of Systems   Constitutional: Negative for activity change, chills and fever  HENT: Negative for drooling and facial swelling  Eyes: Negative for pain, discharge and visual disturbance  Respiratory: Negative for apnea, cough, chest tightness, shortness of breath and wheezing  Cardiovascular: Positive for leg swelling  Negative for chest pain  Gastrointestinal: Positive for abdominal pain and nausea  Negative for constipation, diarrhea and vomiting  Genitourinary: Negative for difficulty urinating, dysuria and urgency  Musculoskeletal: Positive for gait problem and joint swelling  Negative for arthralgias and back pain  Skin: Negative for color change and rash  Neurological: Negative for dizziness, speech difficulty, weakness and headaches  Psychiatric/Behavioral: Negative for agitation, behavioral problems and confusion  All other systems reviewed and negative      Physical Exam      ED Triage Vitals   Temperature Pulse Respirations Blood Pressure SpO2   11/03/19 1623 11/03/19 1623 11/03/19 1623 11/03/19 1623 11/03/19 1623   97 8 °F (36 6 °C) 94 18 128/73 97 %      Temp Source Heart Rate Source Patient Position - Orthostatic VS BP Location FiO2 (%)   11/03/19 1623 11/03/19 1623 11/03/19 1736 11/03/19 1736 --   Oral Monitor Lying Right arm       Pain Score       11/03/19 1623       No Pain               Physical Exam   Constitutional: She is oriented to person, place, and time  She appears well-developed and well-nourished  HENT:   Head: Normocephalic and atraumatic  Nose: Nose normal    Mouth/Throat: Oropharynx is clear and moist    Eyes: Pupils are equal, round, and reactive to light  Conjunctivae and EOM are normal    Neck: Normal range of motion  Neck supple  Cardiovascular: Normal rate, regular rhythm, normal heart sounds and intact distal pulses  Pulmonary/Chest: Effort normal and breath sounds normal  No respiratory distress  She has no wheezes  She has no rales  Abdominal: Soft  She exhibits no distension  There is tenderness  There is no rebound and no guarding  Abdomen significantly scarred, fistula in the midline with output of stool, tender to palpation slightly to the left of that area no rebound or guarding  Musculoskeletal: Normal range of motion  She exhibits no edema or deformity  Neurological: She is alert and oriented to person, place, and time  Skin: Skin is warm and dry  No rash noted  No erythema  There is pallor  Psychiatric: She has a normal mood and affect  Her behavior is normal  Judgment and thought content normal    Nursing note and vitals reviewed             Diagnostic Results  EKG Interpretation    Rate:  81  BPM  Rhythm:  Normal sinus rhythm   Axis:  Normal   Intervals: Normal, no blocks, QTc  462 ms  Q waves:  Normal   T waves:  Normal   ST segments:  No significant elevations or depressions     Impression:  Normal sinus rhythm without evidence of acute ischemia      EKG for comparison:  EKG dated 10 May 2019 similar in character with no major changes  EKG interpreted by me         Labs:    Results Reviewed     Procedure Component Value Units Date/Time    NT-BNP PRO [[id number]]  (Normal) Collected:  11/03/19 1842    Lab Status:  Final result Specimen:  Blood from Foot, Right Updated:  11/03/19 1913     NT-proBNP 61 pg/mL     Troponin I [[id number]]  (Normal) Collected:  11/03/19 1842    Lab Status:  Final result Specimen:  Blood from Vein Updated:  11/03/19 1908     Troponin I <0 02 ng/mL     Comprehensive metabolic panel [[id number]]  (Abnormal) Collected:  11/03/19 1842    Lab Status:  Final result Specimen:  Blood from Foot, Right Updated:  11/03/19 1906     Sodium 141 mmol/L      Potassium 2 9 mmol/L      Chloride 103 mmol/L      CO2 30 mmol/L      ANION GAP 8 mmol/L      BUN 4 mg/dL      Creatinine 0 74 mg/dL      Glucose 93 mg/dL      Calcium 7 7 mg/dL      AST 16 U/L      ALT 17 U/L      Alkaline Phosphatase 116 U/L      Total Protein 6 3 g/dL      Albumin 2 3 g/dL      Total Bilirubin 0 30 mg/dL      eGFR 86 ml/min/1 73sq m     Narrative:       Claire guidelines for Chronic Kidney Disease (CKD):     Stage 1 with normal or high GFR (GFR > 90 mL/min/1 73 square meters)    Stage 2 Mild CKD (GFR = 60-89 mL/min/1 73 square meters)    Stage 3A Moderate CKD (GFR = 45-59 mL/min/1 73 square meters)    Stage 3B Moderate CKD (GFR = 30-44 mL/min/1 73 square meters)    Stage 4 Severe CKD (GFR = 15-29 mL/min/1 73 square meters)    Stage 5 End Stage CKD (GFR <15 mL/min/1 73 square meters)  Note: GFR calculation is accurate only with a steady state creatinine    Protime-INR [[id number]]  (Normal) Collected:  11/03/19 1843    Lab Status:  Final result Specimen:  Blood from Vein Updated:  11/03/19 1902     Protime 14 2 seconds      INR 1 10    APTT [[id number]]  (Normal) Collected:  11/03/19 1843    Lab Status:  Final result Specimen:  Blood from Vein Updated: 11/03/19 1902     PTT 30 seconds     CBC and differential [[id number]]  (Abnormal) Collected:  11/03/19 1842    Lab Status:  Final result Specimen:  Blood from Foot, Right Updated:  11/03/19 1848     WBC 7 52 Thousand/uL      RBC 3 76 Million/uL      Hemoglobin 10 3 g/dL      Hematocrit 32 1 %      MCV 85 fL      MCH 27 4 pg      MCHC 32 1 g/dL      RDW 15 7 %      MPV 9 5 fL      Platelets 852 Thousands/uL      nRBC 0 /100 WBCs      Neutrophils Relative 71 %      Immat GRANS % 1 %      Lymphocytes Relative 18 %      Monocytes Relative 9 %      Eosinophils Relative 1 %      Basophils Relative 0 %      Neutrophils Absolute 5 32 Thousands/µL      Immature Grans Absolute 0 04 Thousand/uL      Lymphocytes Absolute 1 36 Thousands/µL      Monocytes Absolute 0 70 Thousand/µL      Eosinophils Absolute 0 08 Thousand/µL      Basophils Absolute 0 02 Thousands/µL     UA w Reflex to Microscopic [[id number]]     Lab Status:  No result Specimen:  Urine           All labs reviewed and utilized in the medical decision making process    Radiology:    CT abdomen pelvis with contrast   Final Result      1  Several air-fluid collections at the anterior abdominal wall at midline with open wounds in the lower anterior abdominal wall  Correlate for draining wound/abscess collections  2   No definite findings for intra-abdominal abscess but repeat study with enteric and IV contrast material may be helpful given the altered anatomy from prior surgeries if clinical concern persists  The study was marked in EPIC for significant notification        Workstation performed: [id number]         XR chest 1 view portable   ED Interpretation   No acute disease      VAS lower limb venous duplex study, unilateral/limited    (Results Pending)       All radiology studies independently viewed by me and interpreted by the radiologist     Procedure    Procedures        ED Course of Care and Re-Assessments      Pain improved with hydromorphone, given Zofran , as well as supplemental potassium  Medications   potassium chloride 20 mEq IVPB (premix) (20 mEq Intravenous New Bag 11/3/19 2046)   ondansetron (ZOFRAN) injection 4 mg (4 mg Intravenous Given 11/3/19 1844)   HYDROmorphone (DILAUDID) injection 0 5 mg (0 5 mg Intravenous Given 11/3/19 1845)   iohexol (OMNIPAQUE) 350 MG/ML injection (MULTI-DOSE) 100 mL (100 mL Intravenous Given 11/3/19 1722)   HYDROmorphone (DILAUDID) injection 0 5 mg (0 5 mg Intravenous Given 11/3/19 2043)           FINAL IMPRESSION    Final diagnoses:   Hypokalemia   Bilateral lower extremity edema   Abdominal pain         DISPOSITION/PLAN    Hypokalemia, weakness, bilateral lower extremity edema and abdominal pain as above  Do not suspect acute surgical emergency based on clinical examination, CT, normal vital signs, overall reassuring labs other than hypokalemia  Hypokalemia treated with potassium  As patient is unable to ambulate, has significant pain, and has no to help her home, admitted to Internal Medicine with General surgery consulting for further treatment as well as serial abdominal exams, hemodynamically stable comfortable at time of admit  Time reflects when diagnosis was documented in both MDM as applicable and the Disposition within this note     Time User Action Codes Description Comment    11/3/2019  8:23 PM Gema TORRES Add [E87 6] Hypokalemia     11/3/2019  8:23 PM Gemarobbi Foote T Add [R60 0] Bilateral lower extremity edema     11/3/2019  8:23 PM Gema Foote T Add [R10 9] Abdominal pain       ED Disposition     ED Disposition Condition Date/Time Comment    Admit Stable Sun Nov 3, 2019  8:23 PM Case was discussed with SHIKHA and the patient's admission status was agreed to be Admission Status: observation status to the service of Dr Nory Pantoja   Follow-up Information    None           PATIENT REFERRED TO:    No follow-up provider specified      DISCHARGE MEDICATIONS:    Patient's Medications Discharge Prescriptions    No medications on file       No discharge procedures on file           MD Gamaliel Baldwin MD  11/03/19 9232

## 2019-11-04 PROBLEM — M85.80 OSTEOPENIA: Status: ACTIVE | Noted: 2019-11-04

## 2019-11-04 PROBLEM — K63.2 ENTEROCUTANEOUS FISTULA: Status: ACTIVE | Noted: 2019-11-04

## 2019-11-04 LAB
ANION GAP SERPL CALCULATED.3IONS-SCNC: 10 MMOL/L (ref 4–13)
ATRIAL RATE: 81 BPM
BACTERIA UR QL AUTO: ABNORMAL /HPF
BILIRUB UR QL STRIP: NEGATIVE
BUN SERPL-MCNC: 3 MG/DL (ref 5–25)
CALCIUM SERPL-MCNC: 7.5 MG/DL (ref 8.3–10.1)
CHLORIDE SERPL-SCNC: 107 MMOL/L (ref 100–108)
CLARITY UR: ABNORMAL
CO2 SERPL-SCNC: 28 MMOL/L (ref 21–32)
COLOR UR: YELLOW
CREAT SERPL-MCNC: 0.66 MG/DL (ref 0.6–1.3)
ERYTHROCYTE [DISTWIDTH] IN BLOOD BY AUTOMATED COUNT: 15.8 % (ref 11.6–15.1)
GFR SERPL CREATININE-BSD FRML MDRD: 94 ML/MIN/1.73SQ M
GLUCOSE P FAST SERPL-MCNC: 107 MG/DL (ref 65–99)
GLUCOSE SERPL-MCNC: 107 MG/DL (ref 65–140)
GLUCOSE UR STRIP-MCNC: NEGATIVE MG/DL
HCT VFR BLD AUTO: 30.4 % (ref 34.8–46.1)
HCYS SERPL-SCNC: 7.8 UMOL/L (ref 3.7–11.2)
HGB BLD-MCNC: 9.7 G/DL (ref 11.5–15.4)
HGB UR QL STRIP.AUTO: NEGATIVE
KETONES UR STRIP-MCNC: NEGATIVE MG/DL
LEUKOCYTE ESTERASE UR QL STRIP: ABNORMAL
MCH RBC QN AUTO: 27.6 PG (ref 26.8–34.3)
MCHC RBC AUTO-ENTMCNC: 31.9 G/DL (ref 31.4–37.4)
MCV RBC AUTO: 86 FL (ref 82–98)
NITRITE UR QL STRIP: NEGATIVE
NON-SQ EPI CELLS URNS QL MICRO: ABNORMAL /HPF
P AXIS: 63 DEGREES
PH UR STRIP.AUTO: 6.5 [PH]
PLATELET # BLD AUTO: 322 THOUSANDS/UL (ref 149–390)
PMV BLD AUTO: 9.7 FL (ref 8.9–12.7)
POTASSIUM SERPL-SCNC: 3.1 MMOL/L (ref 3.5–5.3)
POTASSIUM SERPL-SCNC: 3.1 MMOL/L (ref 3.5–5.3)
PR INTERVAL: 154 MS
PROT UR STRIP-MCNC: NEGATIVE MG/DL
QRS AXIS: 70 DEGREES
QRSD INTERVAL: 100 MS
QT INTERVAL: 398 MS
QTC INTERVAL: 462 MS
RBC # BLD AUTO: 3.52 MILLION/UL (ref 3.81–5.12)
RBC #/AREA URNS AUTO: ABNORMAL /HPF
SODIUM SERPL-SCNC: 145 MMOL/L (ref 136–145)
SP GR UR STRIP.AUTO: <=1.005 (ref 1–1.03)
T WAVE AXIS: 54 DEGREES
UROBILINOGEN UR QL STRIP.AUTO: 0.2 E.U./DL
VENTRICULAR RATE: 81 BPM
VIT B12 SERPL-MCNC: 704 PG/ML (ref 100–900)
WBC # BLD AUTO: 7.14 THOUSAND/UL (ref 4.31–10.16)
WBC #/AREA URNS AUTO: ABNORMAL /HPF

## 2019-11-04 PROCEDURE — 82607 VITAMIN B-12: CPT | Performed by: INTERNAL MEDICINE

## 2019-11-04 PROCEDURE — 81001 URINALYSIS AUTO W/SCOPE: CPT | Performed by: INTERNAL MEDICINE

## 2019-11-04 PROCEDURE — 83090 ASSAY OF HOMOCYSTEINE: CPT | Performed by: INTERNAL MEDICINE

## 2019-11-04 PROCEDURE — 85027 COMPLETE CBC AUTOMATED: CPT | Performed by: INTERNAL MEDICINE

## 2019-11-04 PROCEDURE — 84132 ASSAY OF SERUM POTASSIUM: CPT | Performed by: INTERNAL MEDICINE

## 2019-11-04 PROCEDURE — 80048 BASIC METABOLIC PNL TOTAL CA: CPT | Performed by: INTERNAL MEDICINE

## 2019-11-04 PROCEDURE — 99221 1ST HOSP IP/OBS SF/LOW 40: CPT | Performed by: SURGERY

## 2019-11-04 PROCEDURE — 83918 ORGANIC ACIDS TOTAL QUANT: CPT | Performed by: INTERNAL MEDICINE

## 2019-11-04 PROCEDURE — 93971 EXTREMITY STUDY: CPT | Performed by: SURGERY

## 2019-11-04 PROCEDURE — 99233 SBSQ HOSP IP/OBS HIGH 50: CPT | Performed by: INTERNAL MEDICINE

## 2019-11-04 PROCEDURE — 93010 ELECTROCARDIOGRAM REPORT: CPT | Performed by: INTERNAL MEDICINE

## 2019-11-04 RX ORDER — MAGNESIUM SULFATE HEPTAHYDRATE 40 MG/ML
2 INJECTION, SOLUTION INTRAVENOUS ONCE
Status: COMPLETED | OUTPATIENT
Start: 2019-11-04 | End: 2019-11-04

## 2019-11-04 RX ORDER — HYDROMORPHONE HCL/PF 1 MG/ML
1 SYRINGE (ML) INJECTION
Status: DISCONTINUED | OUTPATIENT
Start: 2019-11-04 | End: 2019-11-05

## 2019-11-04 RX ORDER — POTASSIUM CHLORIDE 20 MEQ/1
40 TABLET, EXTENDED RELEASE ORAL ONCE
Status: DISCONTINUED | OUTPATIENT
Start: 2019-11-04 | End: 2019-11-06 | Stop reason: HOSPADM

## 2019-11-04 RX ORDER — HYDROMORPHONE HYDROCHLORIDE 2 MG/1
4 TABLET ORAL EVERY 6 HOURS PRN
Status: DISCONTINUED | OUTPATIENT
Start: 2019-11-04 | End: 2019-11-06 | Stop reason: HOSPADM

## 2019-11-04 RX ORDER — ALENDRONATE SODIUM 70 MG/1
70 TABLET ORAL
Status: DISCONTINUED | OUTPATIENT
Start: 2019-11-04 | End: 2019-11-04

## 2019-11-04 RX ORDER — POTASSIUM CHLORIDE 20 MEQ/1
40 TABLET, EXTENDED RELEASE ORAL ONCE
Status: COMPLETED | OUTPATIENT
Start: 2019-11-04 | End: 2019-11-04

## 2019-11-04 RX ORDER — HYDROMORPHONE HYDROCHLORIDE 2 MG/1
4 TABLET ORAL 4 TIMES DAILY
Status: DISCONTINUED | OUTPATIENT
Start: 2019-11-04 | End: 2019-11-04

## 2019-11-04 RX ORDER — NALOXONE HYDROCHLORIDE 0.4 MG/ML
0.04 INJECTION, SOLUTION INTRAMUSCULAR; INTRAVENOUS; SUBCUTANEOUS
Status: DISCONTINUED | OUTPATIENT
Start: 2019-11-04 | End: 2019-11-06 | Stop reason: HOSPADM

## 2019-11-04 RX ORDER — DEXTROSE, SODIUM CHLORIDE, AND POTASSIUM CHLORIDE 5; .45; .15 G/100ML; G/100ML; G/100ML
100 INJECTION INTRAVENOUS CONTINUOUS
Status: DISCONTINUED | OUTPATIENT
Start: 2019-11-04 | End: 2019-11-04

## 2019-11-04 RX ADMIN — HEPARIN SODIUM 5000 UNITS: 5000 INJECTION INTRAVENOUS; SUBCUTANEOUS at 22:25

## 2019-11-04 RX ADMIN — HYDROMORPHONE HYDROCHLORIDE 4 MG: 2 TABLET ORAL at 19:22

## 2019-11-04 RX ADMIN — HYDROMORPHONE HYDROCHLORIDE 1 MG: 1 INJECTION, SOLUTION INTRAMUSCULAR; INTRAVENOUS; SUBCUTANEOUS at 21:16

## 2019-11-04 RX ADMIN — GABAPENTIN 600 MG: 300 CAPSULE ORAL at 08:52

## 2019-11-04 RX ADMIN — MORPHINE SULFATE 30 MG: 30 TABLET, FILM COATED, EXTENDED RELEASE ORAL at 13:55

## 2019-11-04 RX ADMIN — MORPHINE SULFATE 30 MG: 30 TABLET, FILM COATED, EXTENDED RELEASE ORAL at 22:25

## 2019-11-04 RX ADMIN — GLYCERIN, PETROLATUM, PHENYLEPHRINE HCL, PRAMOXINE HCL: 144; 2.5; 10; 15 CREAM TOPICAL at 11:55

## 2019-11-04 RX ADMIN — DIAZEPAM 5 MG: 5 TABLET ORAL at 08:48

## 2019-11-04 RX ADMIN — SENNOSIDES AND DOCUSATE SODIUM 1 TABLET: 8.6; 5 TABLET ORAL at 08:52

## 2019-11-04 RX ADMIN — HEPARIN SODIUM 5000 UNITS: 5000 INJECTION INTRAVENOUS; SUBCUTANEOUS at 13:55

## 2019-11-04 RX ADMIN — HYDROMORPHONE HYDROCHLORIDE 1 MG: 1 INJECTION, SOLUTION INTRAMUSCULAR; INTRAVENOUS; SUBCUTANEOUS at 13:55

## 2019-11-04 RX ADMIN — POTASSIUM CHLORIDE 40 MEQ: 1500 TABLET, EXTENDED RELEASE ORAL at 11:33

## 2019-11-04 RX ADMIN — GLYCERIN, PETROLATUM, PHENYLEPHRINE HCL, PRAMOXINE HCL: 144; 2.5; 10; 15 CREAM TOPICAL at 18:08

## 2019-11-04 RX ADMIN — HEPARIN SODIUM 5000 UNITS: 5000 INJECTION INTRAVENOUS; SUBCUTANEOUS at 05:40

## 2019-11-04 RX ADMIN — DIAZEPAM 5 MG: 5 TABLET ORAL at 22:25

## 2019-11-04 RX ADMIN — GABAPENTIN 600 MG: 300 CAPSULE ORAL at 22:25

## 2019-11-04 RX ADMIN — MORPHINE SULFATE 30 MG: 30 TABLET, FILM COATED, EXTENDED RELEASE ORAL at 05:40

## 2019-11-04 RX ADMIN — HYDROMORPHONE HYDROCHLORIDE 4 MG: 2 TABLET ORAL at 11:33

## 2019-11-04 RX ADMIN — HYDROMORPHONE HYDROCHLORIDE 0.5 MG: 1 INJECTION, SOLUTION INTRAMUSCULAR; INTRAVENOUS; SUBCUTANEOUS at 04:58

## 2019-11-04 RX ADMIN — MAGNESIUM SULFATE HEPTAHYDRATE 2 G: 40 INJECTION, SOLUTION INTRAVENOUS at 11:34

## 2019-11-04 RX ADMIN — GABAPENTIN 600 MG: 300 CAPSULE ORAL at 17:02

## 2019-11-04 RX ADMIN — MORPHINE SULFATE 2 MG: 2 INJECTION, SOLUTION INTRAMUSCULAR; INTRAVENOUS at 08:52

## 2019-11-04 RX ADMIN — DIAZEPAM 5 MG: 5 TABLET ORAL at 17:02

## 2019-11-04 RX ADMIN — HYDROMORPHONE HYDROCHLORIDE 0.5 MG: 1 INJECTION, SOLUTION INTRAMUSCULAR; INTRAVENOUS; SUBCUTANEOUS at 02:12

## 2019-11-04 RX ADMIN — HYDROMORPHONE HYDROCHLORIDE 1 MG: 1 INJECTION, SOLUTION INTRAMUSCULAR; INTRAVENOUS; SUBCUTANEOUS at 17:02

## 2019-11-04 NOTE — ED NOTES
1  CC: leg pain, abdominal pain     2  Orientation status: alert and oriented x4     3  Abnormal labs/ focused assessment/ vitals    4  Medication/ drips: 4mg zofran, 0 5mg dilaudid     5  Last time narcotics given/ pain medications: see MAR     6  IV lines/drains/ etc: 20g R foot     7  Isolation status: none as of now     8  Skin: redness and swelling bilateral feet     9  Ambulation status: independent at home, difficulty here due to swelling     10   ED phone number: Cyndy Rivera RN  11/03/19 8014

## 2019-11-04 NOTE — PROGRESS NOTES
Progress Note - Jessica Houser 1955, 62 y o  female MRN: [id number]    Unit/Bed#: -01 Encounter: [id number]    Primary Care Provider: Elvia Means DO   Date and time admitted to hospital: 11/3/2019  4:17 PM        * Abdominal pain  Assessment & Plan  CT scan of the abdomen revealed several air-fluid collections at the anterior abdominal wall with over wounds in the lower anterior abdominal wall, which appear to be chronic  There was no definite intra-abdominal abscess seen, but repeat scanning with IV and enteric contrast was recommended in case of clinical concern for abscess or infection  She denies purulent drainage from her abdomen  Prior history of multiple abdominal surgeries and enterocutaneous fistula  Complaining of pain around colostomy site    · Continue her home medications including morphine and hydromorphone  PDMP verfied  · Iv Dilaudid p r n  · Discussed with General surgery no further inpatient management at this time  · Nutrition consulted for short-gut syndrome  · Supportive care  · Replete electrolytes and monitor    Osteopenia  Assessment & Plan  Resume alendronate upon discharge    Iron deficiency anemia  Assessment & Plan  Hemoglobin stable, at baseline  Likely from inadequate absorption secondary to multiple bowel surgeries  Follow-up iron panel  Start iron supplementation if needed    Physical deconditioning  Assessment & Plan  Patient reports difficulty with ambulation and difficulty with oral intake, likely secondary to multifactorial etiology including lower extremity pain, abdominal pain, and recent multiple surgeries  · Continue pain management  · Consider PT/OT evaluation    Bilateral lower extremity edema  Assessment & Plan  Elevate lower extremities  Fluid restriction  Given hypokalemia, will hold off on Lasix    If edema not improved worsening, will trial spironolactone    Depression  Assessment & Plan  Patient states she is no longer taking Lexapro  Continue Seroquel at bedtime    Generalized anxiety disorder  Assessment & Plan  Continue home dose of scheduled Valium t i d     VTE Pharmacologic Prophylaxis:   Pharmacologic: Heparin  Mechanical VTE Prophylaxis in Place: Yes    Patient Centered Rounds: I have performed bedside rounds with nursing staff today  Discussions with Specialists or Other Care Team Provider:  Surgery    Education and Discussions with Family / Patient:  Patient    Time Spent for Care: 30 minutes  More than 50% of total time spent on counseling and coordination of care as described above  Current Length of Stay: 0 day(s)    Current Patient Status: Inpatient   Certification Statement: The patient will continue to require additional inpatient hospital stay due to Above medical problems    Discharge Plan:  Continue hospitalization for pain control and nutritional support  Anticipate discharge next 24 hours if clinically improving and stable    Code Status: Level 1 - Full Code      Subjective:   Patient seen and examined  Complaining of abdominal pain  Pain more around the colostomy area  Denies nausea vomiting  Objective:     Vitals:   Temp (24hrs), Av °F (36 7 °C), Min:97 4 °F (36 3 °C), Max:98 5 °F (36 9 °C)    Temp:  [97 4 °F (36 3 °C)-98 5 °F (36 9 °C)] 97 4 °F (36 3 °C)  HR:  [80-94] 82  Resp:  [14-18] 18  BP: ()/(53-73) 102/54  SpO2:  [93 %-98 %] 97 %  Body mass index is 26 43 kg/m²  Input and Output Summary (last 24 hours):     No intake or output data in the 24 hours ending 19 1453    Physical Exam:     Physical Exam   Constitutional: She is oriented to person, place, and time  She appears well-developed and well-nourished  HENT:   Head: Normocephalic  Eyes: Pupils are equal, round, and reactive to light  EOM are normal    Neck: Normal range of motion  Neck supple  Cardiovascular: Normal rate and regular rhythm  Pulmonary/Chest: Effort normal and breath sounds normal    Abdominal: Soft   She exhibits no distension  There is tenderness  Neurological: She is alert and oriented to person, place, and time  Skin: Skin is warm and dry  Additional Data:     Labs:    Results from last 7 days   Lab Units 11/04/19  0450 11/03/19  1842   WBC Thousand/uL 7 14 7 52   HEMOGLOBIN g/dL 9 7* 10 3*   HEMATOCRIT % 30 4* 32 1*   PLATELETS Thousands/uL 322 346   NEUTROS PCT %  --  71   LYMPHS PCT %  --  18   MONOS PCT %  --  9   EOS PCT %  --  1     Results from last 7 days   Lab Units 11/04/19  0450 11/03/19  1842   SODIUM mmol/L 145 141   POTASSIUM mmol/L 3 1*  3 1* 2 9*   CHLORIDE mmol/L 107 103   CO2 mmol/L 28 30   BUN mg/dL 3* 4*   CREATININE mg/dL 0 66 0 74   ANION GAP mmol/L 10 8   CALCIUM mg/dL 7 5* 7 7*   ALBUMIN g/dL  --  2 3*   TOTAL BILIRUBIN mg/dL  --  0 30   ALK PHOS U/L  --  116   ALT U/L  --  17   AST U/L  --  16   GLUCOSE RANDOM mg/dL 107 93     Results from last 7 days   Lab Units 11/03/19  1843   INR  1 10         * I Have Reviewed All Lab Data Listed Above  * Additional Pertinent Lab Tests Reviewed:  It[facility] Admission Reviewed    Imaging:    Imaging Reports Reviewed Today Include:  CT of the abdomen  Imaging Personally Reviewed by Myself Includes:    Recent Cultures (last 7 days):           Last 24 Hours Medication List:     Current Facility-Administered Medications:  diazepam 5 mg Oral TID Luther Lassiter, DO   gabapentin 600 mg Oral TID Luther Lassiter, DO   heparin (porcine) 5,000 Units Subcutaneous Q8H [address] Luther Maikol,    HYDROmorphone 1 mg Intravenous Q3H PRN Heather Stanley PA-C   HYDROmorphone 4 mg Oral 4x Daily Kristy River MD   [START ON 11/5/2019] magnesium oxide 400 mg Oral BID Kristy River MD   metoclopramide 10 mg Intravenous Q6H PRN Luther Lassiter DO   morphine 30 mg Oral Q8H Abiel Shah DO   naloxone 0 04 mg Intravenous Q1MIN PRN Heather Stanley PA-C   potassium chloride 40 mEq Oral Once Kristy River MD pramoxine-phenylephrine-glycerin-petrolatum  Rectal BID PRN Jc Stanley PA-C   QUEtiapine 150 mg Oral HS Valerie Craze, DO   senna-docusate sodium 1 tablet Oral BID Valerie Craze, DO        Today, Patient Was Seen By: Benito Del Castillo MD    ** Please Note: Dictation voice to text software may have been used in the creation of this document   **

## 2019-11-04 NOTE — H&P
H&P- Stefano Corderoenstein 1955, 62 y o  female MRN: [id number]    Unit/Bed#: -01 Encounter: [id number]    Primary Care Provider: Damián Ortiz DO   Date and time admitted to hospital: 11/3/2019  4:17 PM        * Abdominal pain  Assessment & Plan  CT scan of the abdomen revealed several air-fluid collections at the anterior abdominal wall with over wounds in the lower anterior abdominal wall, which appear to be chronic  There was no definite intra-abdominal abscess seen, but repeat scanning with IV and enteric contrast was recommended in case of clinical concern for abscess or infection  She denies purulent drainage from her abdomen  · Continue scheduled morphine  · Dilaudid p r n  · Await surgical consult  · NPO, sips with meds  · Gentle IV hydration overnight while NPO  · Owing to severe pain, and multiple issues, would consider palliative care consult  Physical deconditioning  Assessment & Plan  Patient reports difficulty with ambulation and difficulty with oral intake, likely secondary to multifactorial etiology including lower extremity pain, abdominal pain, and recent multiple surgeries  · Continue pain management  · Consider PT/OT evaluation    Bilateral lower extremity edema  Assessment & Plan  Elevate lower extremities  Fluid restriction  Given hypokalemia, will hold off on Lasix  If edema not improved worsening, will trial spironolactone    Bilateral lower extremity pain  Assessment & Plan  Unclear etiology, but differential includes worsening edema versus neuropathic pain  · Continue gabapentin scheduled  · Elevate extremities  · Follow-up B12, MMA, homocystine level to rule out B12/folate deficiency  · Continue pain regimen with morphine and Dilaudid as otherwise ordered  · See plan for lower extremity edema    Iron deficiency anemia  Assessment & Plan  Hemoglobin stable, at baseline  Likely from inadequate absorption secondary to multiple bowel surgeries    Follow-up iron panel  Start iron supplementation if needed    DDD (degenerative disc disease), lumbosacral  Assessment & Plan  Continue gabapentin scheduled  Continue morphine scheduled    Depression  Assessment & Plan  Patient states she is no longer taking FX or  Continue Seroquel at bedtime    Chronic, continuous use of opioids  Assessment & Plan  Avoid unnecessary opioids  However, patient reports allergies to NSAIDs and Tylenol  Continue pain regimen with morphine and Dilaudid  Continue gabapentin  Consider pain management or palliative care consult    Generalized anxiety disorder  Assessment & Plan  Continue home dose of scheduled Valium t i d     Osteopenia  Pt reports she takes alendronate daily  Recommend continuing while hospitalized - dose is listed as 70mg, but recommend verifying dose with patient before continuing daily regimen    History and Physical - [facility]    Patient Information: Janelle Pina 62 y o  female MRN: [id number]  Unit/Bed#: -01 Encounter: [id number]  Admitting Physician: Melia Holcomb DO  PCP: Emily Linn DO  Date of Admission:  11/03/19      VTE Prophylaxis: Heparin  / sequential compression device   Code Status: Level 1 - Full Code  POLST: POLST form is not discussed and not completed at this time  Anticipated Length of Stay:  Patient will be admitted on an Observation basis with an anticipated length of stay of < 2 midnights  Justification for Hospital Stay: Please see detailed plans noted above  Total Time for Visit, including Counseling / Coordination of Care: 1 hour  Greater than 50% of this total time spent on direct patient counseling and coordination of care  Chief Complaint:    Abdominal pain, leg edema    History of Present Illness:    Janelle Pina is a 62 y o  female who presents with bilateral lower extremity swelling, more pronounced on the left  In addition, she has been noticing progressively worsening abdominal pain over the last 3 4 days    Of note, patient has a somewhat complicated recent past medical history involving multiple abdominal surgeries initially starting with treatment of a small-bowel obstruction, then followed by surgical intervention for 4 fistulas  Surgical intervention for the 5th fistula failed, as well as that for a perforated bowel  Patient was just discharged on Wednesday of last week  However, she has been having worsening abdominal pain and over last 2 days, noticed progressively worsening lower extremity edema and pain  She describes her abdominal pain as her usual abdominal pain, but somewhat worse  Yesterday she noticed some passage of blood with clots  The pain in her feet is severe, sharp, burning  Better with rest   Because of the pain, and increased difficulty walking, she reports that she has been having decreased oral intake  In addition, her  is injured and bed bound  Thus, she has limited ability to take care of herself at home  Initial workup in the ED revealed a potassium of 2 9 compared to a previous value of 3 1, chronic anemia  She had a slight respiratory acidosis noted on VBG  CT scan of the abdomen revealed several air-fluid collections at the anterior abdominal wall with over wounds in the lower anterior abdominal wall, which appear to be chronic  There was no definite intra-abdominal abscess seen, but repeat scanning with IV and enteric contrast was recommended in case of clinical concern for abscess or infection  Patient was admitted for observation  On my exam, patient is in no acute distress, however, she reports continued 8/10 abdominal pain and severe pain in her bilateral lower extremities  She currently has potassium running in her right foot owing to difficulty with obtaining IV in the upper extremities  She has a chest line in place  She currently denies chest pain, shortness of breath, fever/chills  No vomiting, but reports nausea and occasional dizziness      Review of Systems:    Review of Systems   Constitutional: Negative for activity change, chills and fever  HENT: Negative  Negative for hearing loss, sore throat and trouble swallowing  Eyes: Negative for visual disturbance  Respiratory: Negative for cough, shortness of breath and wheezing  Cardiovascular: Negative for chest pain, palpitations and leg swelling  Gastrointestinal: Positive for abdominal pain, blood in stool and nausea  Negative for abdominal distention, constipation, diarrhea and vomiting  Endocrine: Negative  Genitourinary: Negative for dysuria, frequency and hematuria  Musculoskeletal: Positive for myalgias  Negative for arthralgias, back pain and joint swelling  Skin: Positive for wound  Negative for rash  Allergic/Immunologic: Negative for immunocompromised state  Neurological: Positive for dizziness  Negative for facial asymmetry, speech difficulty, weakness, light-headedness, numbness and headaches  Hematological: Negative  Psychiatric/Behavioral: Negative for behavioral problems, confusion, dysphoric mood and self-injury  Past Medical and Surgical History:     Past Medical History:   Diagnosis Date    Asthma     Bowel obstruction (HCC)     Chronic back pain     Colon polyp     Enlarged kidney     Ileus ([facility] [address] ) 7/2/2018    Overview:  Last Assessment & Plan:  Patient presented with generalized abdominal pain nausea and bilious vomiting ,imaging study reports " diffuse bowel dilatation involving distal small bowel and the entire colon to the panel verge obstruction mass is not identified and this may represent adynamic ileus in the postoperative setting possible related to medication"   Patient states that she still ha    Liver mass        Past Surgical History:   Procedure Laterality Date    ABDOMINAL SURGERY      x 25    BACK SURGERY      x 3    COLONOSCOPY N/A 12/4/2018    Procedure: COLONOSCOPY;  Surgeon: Reena Calderon MD;  Location: [facility]; Service: Gastroenterology    HYSTERECTOMY      TONSILLECTOMY         Meds/Allergies:    Prior to Admission medications    Medication Sig Start Date End Date Taking? Authorizing Provider   alendronate (FOSAMAX) 70 mg tablet TAKE 1 TABLET 30-60 MINUTES PRIOR TO BREAKFAST ON AN EMPTY STOMAC     (REFER TO PRESCRIPTION NOTES)  4/7/19   Historical Provider, MD   clonazePAM (KlonoPIN) 0 5 mg tablet take 1/2 tablet by mouth twice a day 2/2/19   Historical Provider, MD   diazepam (VALIUM) 5 mg tablet 3 (three) times a day Morning, 3pm and 8pm 11/3/17   Historical Provider, MD   diclofenac sodium (VOLTAREN) 1 %  9/14/18   Historical Provider, MD   escitalopram (LEXAPRO) 20 mg tablet daily  10/23/17   Historical Provider, MD   fluconazole (DIFLUCAN) 200 mg tablet take 1 tablet by mouth ON DAY ONE OF SYMPTOMS AND MAY REPEAT THE      (REFER TO PRESCRIPTION NOTES)  3/3/19   Historical Provider, MD   gabapentin (NEURONTIN) 300 mg capsule take 2 capsules by mouth three times a day 11/6/17   Historical Provider, MD   hydrochlorothiazide (MICROZIDE) 12 5 mg capsule every morning   4/20/15   Historical Provider, MD   HYDROmorphone (DILAUDID) 4 mg tablet Take 4 mg by mouth 4 (four) times a day    Historical Provider, MD   Linaclotide 290 MCG CAPS Take 1 capsule by mouth daily  Patient not taking: Reported on 5/29/2019 5/21/19   Megan Cabello MD   metoclopramide (REGLAN) 10 mg tablet Take 1 tablet (10 mg total) by mouth 3 (three) times a day  Patient not taking: Reported on 11/3/2019 5/21/19   Megan Cabello MD   morphine (MS CONTIN) 30 mg 12 hr tablet take 1 tablet by mouth every 8 hours 11/10/18   Historical Provider, MD   naloxegol oxalate (MOVANTIK) 25 MG tablet Take 1 tablet in the morning either 1 hour prior to meal or 2 hours after meal   Patient not taking: Reported on 11/3/2019 6/12/19   Nancy Gant PA-C   phenazopyridine (PYRIDIUM) 200 mg tablet take 1 tablet by mouth three times a day if needed for bladder SPASMS 3/30/19   Historical Provider, MD   polyethylene glycol-electrolytes (NULYTELY) 4000 mL solution Take 4,000 mL by mouth once for 1 dose 19  Laure Fernandes MD   QUEtiapine (SEROquel) 100 mg tablet Take 150 mg by mouth daily at bedtime     Historical Provider, MD   temazepam (RESTORIL) 30 mg capsule 30 mg daily at bedtime   17   Historical Provider, MD   venlafaxine (EFFEXOR) 75 mg tablet Take 75 mg by mouth 2 (two) times a day    Historical Provider, MD     I have reviewed home medications with patient personally  Allergies:    Allergies   Allergen Reactions    Nsaids      Irritable, upset stomach  Irritable, upset stomach    Tolmetin      Irritable, upset stomach    Codeine Hives     Per [address] admission orders    Tylenol [Acetaminophen]        Social History:     Marital Status: /Civil Union   Occupation: None  Patient Pre-hospital Living Situation: Home with spouse  Patient Pre-hospital Level of Mobility: Ambulatory  Patient Pre-hospital Diet Restrictions: None  Substance Use History:   Social History     Substance and Sexual Activity   Alcohol Use Never    Alcohol/week: 0 0 standard drinks    Frequency: Never    Binge frequency: Never    Comment: social drinker     Social History     Tobacco Use   Smoking Status Former Smoker    Last attempt to quit: 1980    Years since quittin 3   Smokeless Tobacco Never Used     Social History     Substance and Sexual Activity   Drug Use No       Family History:    Family History   Problem Relation Age of Onset    Diabetes Mother        Physical Exam:     Vitals:   Blood Pressure: 105/65 (19)  Pulse: 83 (19)  Temperature: 98 5 °F (36 9 °C) (19)  Temp Source: Oral (19)  Respirations: 16 (19)  Height: 5' 4" (162 6 cm) (19)  Weight - Scale: 69 9 kg (154 lb) (19)  SpO2: 98 % (19)    Physical Exam   Constitutional: She is oriented to person, place, and time  She appears well-developed and well-nourished  No distress  HENT:   Head: Normocephalic and atraumatic  Nose: Nose normal    Mouth/Throat: No oropharyngeal exudate  Eyes: Pupils are equal, round, and reactive to light  Conjunctivae and EOM are normal    Neck: Normal range of motion  Neck supple  No JVD present  Cardiovascular: Normal rate, regular rhythm, normal heart sounds and intact distal pulses  Pulmonary/Chest: Effort normal and breath sounds normal  No respiratory distress  Abdominal: There is generalized tenderness  Diffuse tenderness, most pronounced in right lower quadrant  There appears to be subcutaneous collection of air or fluid neuro open surgical site  Open fistula site draining brown stool  No blood or purulent drainage from site noted  Musculoskeletal: She exhibits edema  She exhibits no deformity  Right lower leg: She exhibits tenderness and edema  Left lower leg: She exhibits tenderness and edema  Hip extension and flexion limited secondary to lower extremity pain  Lymphadenopathy:     She has no cervical adenopathy  Neurological: She is alert and oriented to person, place, and time  No sensory deficit  She exhibits normal muscle tone  Skin: Skin is warm and dry  No rash noted  She is not diaphoretic  Psychiatric: She has a normal mood and affect  Her behavior is normal    Vitals reviewed  Additional Data:     Lab Results: I have personally reviewed pertinent reports       Results from last 7 days   Lab Units 11/03/19  1842   WBC Thousand/uL 7 52   HEMOGLOBIN g/dL 10 3*   HEMATOCRIT % 32 1*   PLATELETS Thousands/uL 346   NEUTROS PCT % 71   LYMPHS PCT % 18   MONOS PCT % 9   EOS PCT % 1     Results from last 7 days   Lab Units 11/03/19  1842   POTASSIUM mmol/L 2 9*   CHLORIDE mmol/L 103   CO2 mmol/L 30   BUN mg/dL 4*   CREATININE mg/dL 0 74   CALCIUM mg/dL 7 7*   ALK PHOS U/L 116   ALT U/L 17   AST U/L 16     Results from last 7 days   Lab Units 11/03/19  1843   INR  1 10       Imaging: I have personally reviewed pertinent reports  and I have personally reviewed pertinent films in PACS    Vas Lower Limb Venous Duplex Study, Unilateral/limited    Result Date: 11/3/2019  Narrative:  [facility] REPORT [facility]INICAL: Indications: Swelling of Limb [R22 4]  Bilateral swelling, left worse than right  Multiple surgeries and hospitalizations in the last 3 months  No history of DVT  FINDINGS:  Left     Impression       CFV      Normal (Patent)     CONCLUSION: RIGHT LOWER LIMB LIMITED: Evaluation shows no evidence of thrombus in the common femoral vein  Doppler evaluation shows a normal response to augmentation maneuvers  LEFT LOWER LIMB: No evidence of acute or chronic deep vein thrombosis No evidence of superficial thrombophlebitis noted  Doppler evaluation shows a normal response to augmentation maneuvers  Popliteal, posterior tibial and anterior tibial arterial Doppler waveforms are triphasic  Technical findings were given to Dr Filipe Lan at time of scan  Ct Abdomen Pelvis With Contrast    Result Date: 11/3/2019  Narrative: CT ABDOMEN AND PELVIS WITH IV CONTRAST INDICATION:   Multiple bowel surgeries, concern for intraabdominal abscess  COMPARISON:  CT abdomen pelvis 5/15/2019 TECHNIQUE:  CT examination of the abdomen and pelvis was performed  Axial, sagittal, and coronal 2D reformatted images were created from the source data and submitted for interpretation  Radiation dose length product (DLP) for this visit:  427 mGy-cm   This examination, like all CT scans performed in the [address] [place] [address], was performed utilizing techniques to minimize radiation dose exposure, including the use of iterative reconstruction and automated exposure control  IV Contrast:  100 mL of iohexol (OMNIPAQUE) Enteric Contrast:  Enteric contrast was not administered   FINDINGS: ABDOMEN LOWER CHEST:  Chronic patchy infiltrate in the bilateral lower lobes more extensive on the left  This is essentially unchanged from the prior CT exam  LIVER/BILIARY TREE:  There are one or more hepatic simple cyst(s) present  No CT evidence of suspicious solid hepatic mass  Normal hepatic contours  Stable dilatation of the central biliary ducts, unchanged  No findings for choledocholithiasis GALLBLADDER:  Gallbladder is surgically absent  SPLEEN:  Unremarkable  PANCREAS:  Unremarkable  ADRENAL GLANDS:  Unremarkable  KIDNEYS/URETERS:  Unremarkable  No hydronephrosis  STOMACH AND BOWEL:  Limited evaluation without enteric contrast   Mild-to-moderate distention of the remaining colon  No small bowel dilatation  Evidence of prior resection of the proximal colon  Linear scarring identified at the anterior abdominal cavity anteriorly and on the right  This leads to the anterior abdominal wall  APPENDIX:  No findings to suggest appendicitis  ABDOMINOPELVIC CAVITY:  No ascites or free intraperitoneal air  No lymphadenopathy  VESSELS:  Unremarkable for patient's age  PELVIS REPRODUCTIVE ORGANS:  Patient is status post hysterectomy  URINARY BLADDER:  Unremarkable  ABDOMINAL WALL/INGUINAL REGIONS:  Several air and fluid collections at the anterior abdominal wall at midline  A fluid pocket at midline measures 3 7 x 1 3 cm image 35 series 2  More inferiorly there are open wounds at midline  OSSEOUS STRUCTURES:  No acute fracture or destructive osseous lesion  Prior posterior fusion with spinal rods and pedicle screws extending from L2 to L5  Impression: 1  Several air-fluid collections at the anterior abdominal wall at midline with open wounds in the lower anterior abdominal wall  Correlate for draining wound/abscess collections  2   No definite findings for intra-abdominal abscess but repeat study with enteric and IV contrast material may be helpful given the altered anatomy from prior surgeries if clinical concern persists   The study was marked in [facility] for significant notification  Workstation performed: [id number]       EKG, Pathology, and Other Studies Reviewed on Admission:   · EKG: Not on file    Allscripts / Epic Records Reviewed: Yes     Portions of the record may have been created with voice recognition software  Occasional wrong word or "sound a like" substitutions may have occurred due to the inherent limitations of voice recognition software  Read the chart carefully and recognize, using context, where substitutions have occurred

## 2019-11-04 NOTE — ASSESSMENT & PLAN NOTE
Hemoglobin stable, at baseline  Likely from inadequate absorption secondary to multiple bowel surgeries    Follow-up iron panel  Start iron supplementation if needed

## 2019-11-04 NOTE — UTILIZATION REVIEW
Initial Clinical Review    Admission: Date/Time/Statement: OBS  11/3 2034  Orders Placed This Encounter   Procedures    Place in Observation     Standing Status:   Standing     Number of Occurrences:   1     Order Specific Question:   Admitting Physician     Answer:   Meron Jamison [[id number]]     Order Specific Question:   Level of Care     Answer:   Med Surg [[id number]]     ED Arrival Information     Expected Arrival Acuity Means of Arrival Escorted By Service Admission Type    11/3/2019  11/3/2019 16:13 Emergent [id number] Hospitalist Emergency    Arrival Complaint    pedal edema        Chief Complaint   Patient presents with    Leg Swelling     pt seen at [facility] and d/c wednesday for bowel obstruction  pt presents today for bilateral leg edema      Assessment/Plan: 62 y o  female who presents with bilateral lower extremity swelling, more pronounced on the left  In addition, she has been noticing progressively worsening abdominal pain over the last 3 4 days  Of note, patient has a somewhat complicated recent past medical history involving multiple abdominal surgeries initially starting with treatment of a small-bowel obstruction, then followed by surgical intervention for 4 fistulas  Surgical intervention for the 5th fistula failed, as well as that for a perforated bowel  Patient was just discharged on Wednesday of last week  However, she has been having worsening abdominal pain and over last 2 days, noticed progressively worsening lower extremity edema and pain  She describes her abdominal pain as her usual abdominal pain, but somewhat worse  Yesterday she noticed some passage of blood with clots  The pain in her feet is severe, sharp, burning  Better with rest   Because of the pain, and increased difficulty walking, she reports that she has been having decreased oral intake  In addition, her  is injured and bed bound  Thus, she has limited ability to take care of herself at home    Initial workup in the ED revealed a potassium of 2 9 compared to a previous value of 3 1, chronic anemia  She had a slight respiratory acidosis noted on VBG  CT scan of the abdomen revealed several air-fluid collections at the anterior abdominal wall with over wounds in the lower anterior abdominal wall, which appear to be chronic  There was no definite intra-abdominal abscess seen, but repeat scanning with IV and enteric contrast was recommended in case of clinical concern for abscess or infection  Patient was admitted for observation       * Abdominal pain  Assessment & Plan  CT scan of the abdomen revealed several air-fluid collections at the anterior abdominal wall with over wounds in the lower anterior abdominal wall, which appear to be chronic  There was no definite intra-abdominal abscess seen, but repeat scanning with IV and enteric contrast was recommended in case of clinical concern for abscess or infection  She denies purulent drainage from her abdomen  · Continue scheduled morphine  · Dilaudid p r n  · Await surgical consult  · NPO, sips with meds  · Gentle IV hydration overnight while NPO  · Owing to severe pain, and multiple issues, would consider palliative care consult      Physical deconditioning  Assessment & Plan  Patient reports difficulty with ambulation and difficulty with oral intake, likely secondary to multifactorial etiology including lower extremity pain, abdominal pain, and recent multiple surgeries  · Continue pain management  · Consider PT/OT evaluation     Bilateral lower extremity edema  Assessment & Plan  Elevate lower extremities  Fluid restriction  Given hypokalemia, will hold off on Lasix    If edema not improved worsening, will trial spironolactone     Bilateral lower extremity pain  Assessment & Plan  Unclear etiology, but differential includes worsening edema versus neuropathic pain  · Continue gabapentin scheduled  · Elevate extremities  · Follow-up B12, MMA, homocystine level to rule out B12/folate deficiency  · Continue pain regimen with morphine and Dilaudid as otherwise ordered  · See plan for lower extremity edema     Iron deficiency anemia  Assessment & Plan  Hemoglobin stable, at baseline  Likely from inadequate absorption secondary to multiple bowel surgeries  Follow-up iron panel  Start iron supplementation if needed     DDD (degenerative disc disease), lumbosacral  Assessment & Plan  Continue gabapentin scheduled  Continue morphine scheduled     Depression  Assessment & Plan  Patient states she is no longer taking FX or  Continue Seroquel at bedtime     Chronic, continuous use of opioids  Assessment & Plan  Avoid unnecessary opioids  However, patient reports allergies to NSAIDs and Tylenol  Continue pain regimen with morphine and Dilaudid  Continue gabapentin  Consider pain management or palliative care consult     Generalized anxiety disorder  Assessment & Plan  Continue home dose of scheduled Valium t i d      Osteopenia  Pt reports she takes alendronate daily  Recommend continuing while hospitalized - dose is listed as 70mg, but recommend verifying dose with patient before continuing daily regimen     History and Physical - 57 [address] [facility]     Patient Information: Alice Germain 62 y o  female MRN: [id number]  Unit/Bed#: -01 Encounter: [id number]  Admitting Physician: Maria Isabel Orozco DO  PCP: Zaheer Ledezma DO  Date of Admission:  11/03/19        VTE Prophylaxis: Heparin  / sequential compression device   Code Status: Level 1 - Full Code  POLST: POLST form is not discussed and not completed at this time      Anticipated Length of Stay:  Patient will be admitted on an Observation basis with an anticipated length of stay of < 2 midnights  Justification for Hospital Stay: Please see detailed plans noted above      11/4 Surgical consult    Enterocutaneous fistula  History of multiple abdominal surgeries  Short gut syndrome   Abdominal pain  Bilateral lower extremity edema, and weakness  - views ultrasound showed no evidence of DVTs  Hypokalemia  no acute surgical intervention at this time  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  She does have small fluid collection as noted on CT scan but this is likely secondary to the fistula  Patient does not show any signs of infection, no leukocytosis and patient is afebrile  She is tolerating a diet, passing flatus and having bowel movements  -- she will have nutritional issues secondary to short gut syndrome due to her extensive abdominal surgeries  Therefore recommend regular life time supplemental nutritional supplements  Patient may also need to be on chronic potassium and have intermittent monitoring of her electrolytes    -- continue with management of medical issues as per primary team   -- patient should follow up outpatient with her general surgeon, Dr Jahaira Sabillon  ED Triage Vitals   Temperature Pulse Respirations Blood Pressure SpO2   11/03/19 1623 11/03/19 1623 11/03/19 1623 11/03/19 1623 11/03/19 1623   97 8 °F (36 6 °C) 94 18 128/73 97 %      Temp Source Heart Rate Source Patient Position - Orthostatic VS BP Location FiO2 (%)   11/03/19 1623 11/03/19 1623 11/03/19 1736 11/03/19 1736 --   Oral Monitor Lying Right arm       Pain Score       11/03/19 1623       No Pain        Wt Readings from Last 1 Encounters:   11/03/19 69 9 kg (154 lb)     Additional Vital Signs:   11/04/19 07:30:58  97 4 °F (36 3 °C)Abnormal   82    102/54  70  97 %       11/04/19 00:27:46    82    88/53Abnormal   65  95 %       11/03/19 23:45:47  98 3 °F (36 8 °C)  81  18  88/53Abnormal   65  96 %    Lying   11/03/19 2300            93 %       11/03/19 2200            95 %  None (Room air)     11/03/19 21:52:58  98 5 °F (36 9 °C)  83  16  105/65  78  98 %  None (Room air)  Lying   11/03/19 2043    83  16  97/55    95 %  None (Room air)  Lying   11/03/19 1848    80  14  101/59    98 % None (Room air)     11/03/19 1736    82  14  97/53    97 %  None (Room air       Pertinent Labs/Diagnostic Test Results:   11/3 PCXR No radiographic evidence of acute intrathoracic process    Stable right apical pleural parenchymal scarring    11/3 CT abd 1  Several air-fluid collections at the anterior abdominal wall at midline with open wounds in the lower anterior abdominal wall  Correlate for draining wound/abscess collections  2   No definite findings for intra-abdominal abscess but repeat study with enteric and IV contrast material may be helpful given the altered anatomy from prior surgeries if clinical concern persists     11/3 Venous duplex BLE -wnl   Results from last 7 days   Lab Units 11/04/19  0450 11/03/19  1842 10/28/19  1916   WBC Thousand/uL 7 14 7 52 6 29   HEMOGLOBIN g/dL 9 7* 10 3* 9 3*   HEMATOCRIT % 30 4* 32 1* 28 6*   PLATELETS Thousands/uL 322 346 295   NEUTROS ABS Thousands/µL  --  5 32 3 48     Results from last 7 days   Lab Units 11/04/19  0450 11/03/19  1842 10/28/19  1916   SODIUM mmol/L 145 141 136   POTASSIUM mmol/L 3 1*  3 1* 2 9* 3 1*   CHLORIDE mmol/L 107 103 100   CO2 mmol/L 28 30 28   ANION GAP mmol/L 10 8 8   BUN mg/dL 3* 4* 2*   CREATININE mg/dL 0 66 0 74 0 80   EGFR ml/min/1 73sq m 94 86 79   CALCIUM mg/dL 7 5* 7 7* 7 2*   MAGNESIUM mg/dL  --   --  1 0*   PHOSPHORUS mg/dL  --   --  2 2*     Results from last 7 days   Lab Units 11/03/19  1842 10/28/19  1916   AST U/L 16 17   ALT U/L 17 27   ALK PHOS U/L 116 125*   TOTAL PROTEIN g/dL 6 3* 5 5*   ALBUMIN g/dL 2 3* 2 4*   TOTAL BILIRUBIN mg/dL 0 30 0 30     Results from last 7 days   Lab Units 11/04/19  0450 11/03/19  1842 10/28/19  1916   GLUCOSE RANDOM mg/dL 107 93 68     Results from last 7 days   Lab Units 11/03/19 1842   TROPONIN I ng/mL <0 02     Results from last 7 days   Lab Units 11/03/19 1843   PROTIME seconds 14 2   INR  1 10   PTT seconds 30     Results from last 7 days   Lab Units 11/03/19  6132   NT-PRO BNP pg/mL 61     Results from last 7 days   Lab Units 11/04/19  0021   CLARITY UA  Slightly Cloudy   COLOR UA  Yellow   SPEC GRAV UA  <=1 005   PH UA  6 5   GLUCOSE UA mg/dl Negative   KETONES UA mg/dl Negative   BLOOD UA  Negative   PROTEIN UA mg/dl Negative   NITRITE UA  Negative   BILIRUBIN UA  Negative   UROBILINOGEN UA E U /dl 0 2   LEUKOCYTES UA  Trace*   WBC UA /hpf 10-20*   RBC UA /hpf 0-1*   BACTERIA UA /hpf Occasional   EPITHELIAL CELLS WET PREP /hpf Moderate*       ED Treatment:   Medication Administration from 11/03/2019 1432 to 11/03/2019 2128       Date/Time Order Dose Route Action     11/03/2019 1844 ondansetron (ZOFRAN) injection 4 mg 4 mg Intravenous Given     11/03/2019 1845 HYDROmorphone (DILAUDID) injection 0 5 mg 0 5 mg Intravenous Given     11/03/2019 2046 potassium chloride 20 mEq IVPB (premix) 20 mEq Intravenous New Bag     11/03/2019 2043 HYDROmorphone (DILAUDID) injection 0 5 mg 0 5 mg Intravenous Given        Past Medical History:   Diagnosis Date    Asthma     Bowel obstruction (HCC)     Chronic back pain     Colon polyp     Enlarged kidney     Ileus ([facility] [place] [address] ) 7/2/2018    Overview:  Last Assessment & Plan:  Patient presented with generalized abdominal pain nausea and bilious vomiting ,imaging study reports " diffuse bowel dilatation involving distal small bowel and the entire colon to the panel verge obstruction mass is not identified and this may represent adynamic ileus in the postoperative setting possible related to medication"   Patient states that she still ha    Liver mass      Present on Admission:   Bilateral lower extremity edema   Abdominal pain   Chronic, continuous use of opioids   Depression   Generalized anxiety disorder   Hypokalemia   Physical deconditioning   DDD (degenerative disc disease), lumbosacral   Iron deficiency anemia   Bilateral lower extremity pain   Osteopenia      Admitting Diagnosis: Hypokalemia [E87 6]  Abdominal pain [R10 9]  Leg swelling [M79 89]  Bilateral lower extremity edema [R60 0]  Age/Sex: 62 y o  female  Admission Orders:  Scheduled Medications:    Medications:  alendronate 70 mg Oral Weekly Before Breakfast   diazepam 5 mg Oral TID   gabapentin 600 mg Oral TID   heparin (porcine) 5,000 Units Subcutaneous Q8H [address]   morphine 30 mg Oral Q8H   QUEtiapine 150 mg Oral HS   senna-docusate sodium 1 tablet Oral BID     Continuous IV Infusions:     PRN Meds:    HYDROmorphone 0 5 mg Intravenous Q2H PRN   metoclopramide 10 mg Intravenous Q6H PRN     NPO   SCD  Up w/ assist   IP CONSULT TO ACUTE CARE SURGERY    Network Utilization Review Department  [email or web address]  ATTENTION: Please call with any questions or concerns to [phone] and carefully listen to the prompts so that you are directed to the right person  All voicemails are confidential   Lucas Calles all requests for admission clinical reviews, approved or denied determinations and any other requests to dedicated fax number below belonging to the campus where the patient is receiving treatment    FACILITY NAME UR FAX NUMBER   ADMISSION DENIALS (Administrative/Medical Necessity) [address] [facility] (Maternity/NICU/Pediatrics) [phone]   [facility] [address] [facility] [phone]   [address] [place] [address] [address] [facility] [phone]   Kelly Fong [address] [phone]

## 2019-11-04 NOTE — ASSESSMENT & PLAN NOTE
Patient reports difficulty with ambulation and difficulty with oral intake, likely secondary to multifactorial etiology including lower extremity pain, abdominal pain, and recent multiple surgeries  · Continue pain management  · Consider PT/OT evaluation

## 2019-11-04 NOTE — MALNUTRITION/BMI
This medical record reflects one or more clinical indicators suggestive of malnutrition    Malnutrition Findings:   Malnutrition type: Chronic illness  Degree of Malnutrition: Malnutrition of mild degree  Malnutrition Characteristics: Fat loss, Muscle loss, Inadequate energy     mild malnutrition r/t malabsorption 2/2 short bowel syndrome as evidenced by unable/unwilling to eat sufficient energy/protein, intake meeting <50-75% estimated nutrient needs, mild muscle loss and loss of subcutaneous fat in temporal, tricep and orbital regions; to be treated with regular diet with addition of nutritional supplementation    BMI Findings: Body mass index is 26 43 kg/m²  See Nutrition note dated 11/04/2019 for additional details  Completed nutrition assessment is viewable in the nutrition documentation

## 2019-11-04 NOTE — ASSESSMENT & PLAN NOTE
Avoid unnecessary opioids    However, patient reports allergies to NSAIDs and Tylenol  Continue pain regimen with morphine and Dilaudid  Continue gabapentin  Consider pain management or palliative care consult

## 2019-11-04 NOTE — ASSESSMENT & PLAN NOTE
Elevate lower extremities  Fluid restriction  Given hypokalemia, will hold off on Lasix    If edema not improved worsening, will trial spironolactone

## 2019-11-04 NOTE — CONSULTS
Consultation - General Surgery  Ashlie Morales 62 y o  female MRN: [id number]  Unit/Bed#: -01 Encounter: [id number]                                                  Inpatient consult to Acute Care Surgery  Consult performed by: Stevie Lopez PA-C  Consult ordered by: Darrius Rivera DO        Assessment/Plan   Enterocutaneous fistula  History of multiple abdominal surgeries  Short gut syndrome   Abdominal pain  Bilateral lower extremity edema, and weakness  - views ultrasound showed no evidence of DVTs  Hypokalemia    13-year-old female with history of multiple abdominal surgeries which have resulted in a persistent enterocutaneous fistula and short gut syndrome  Patient likely has approximately 50 cm of small bowel  Patient complains of loose stool this would be expected given the short gut  She complains of abdominal pain but shows no symptoms of infectious process  No leukocytosis and patient is afebrile  She is tolerating his diet and is having bowel function  Patient does need Education for nutrition and will need nutritional supplementation  Patient will likely need chronic potassium replacement as well  No concern for acute surgical intervention at this time  Patient should follow up outpatient with her general surgeon  Plan  -- no acute surgical intervention at this time  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  She does have small fluid collection as noted on CT scan but this is likely secondary to the fistula  Patient does not show any signs of infection, no leukocytosis and patient is afebrile  She is tolerating a diet, passing flatus and having bowel movements  -- she will have nutritional issues secondary to short gut syndrome due to her extensive abdominal surgeries  Therefore recommend regular life time supplemental nutritional supplements    Patient may also need to be on chronic potassium and have intermittent monitoring of her electrolytes  -- continue with management of medical issues as per primary team   -- patient should follow up outpatient with her general surgeon, Dr Lam Carmona  ______________________________________________________________________    CHIEF COMPLAINT:  I had this abdominal pain since shortly after I was discharged from the hospital    HPI: Ese Rubi is a 62y o  year old female who was admitted to the hospital last night with generalized abdominal pain and bilateral lower extremity swelling and weakness  She has a PMHx of multiple abdominal surgeries, most recently which included extended hospital stay at [facility] and a total of 3 abdominal surgeries, as per patient  Patient states that her 1st abdominal surgery was when she had a  section at least 30 years ago  She developed a wound infection at that time  Since that time she has had multiple issues with abdominal obstructions and healing including what sounds like enterocutaneous fistulas which would occur and resolved  Patient states that she would develop tender regions in her abdominal skin which would become inflamed and eventually would break open and drain but then would resolve  Patient has also had multiple abdominal bowel surgeries since that time, presumed due to difficulties with obstructions and adhesive disease  About a month ago patient was admitted to [facility] with a small-bowel obstruction  She was planned for a abdominal surgery with a diverting colostomy but intraoperatively patient was found to have 5 small bowel fistulas  These fistulas were managed in left open and patient was kept in the hospital for several weeks to optimize her nutritional healing  She was taken back to the operating room for closure of the fistula as but ultimately surgeries did result in 1 fistula remaining  Patient's abdomen was closed with the mesh    At this time patient has a fistula in the lower portion of her anterior abdomen with exposed mesh  Patient is complaining of abdominal pain primarily in the area surrounding the fistula  She states the pain started shortly after she was discharged from [facility]   She has had intermittent episodes of blood in her fistula bag  She was seen in the hospital for this, but resolved  She states she has had decreased appetite but is still able to tolerate food and is having bowel movements  She states she normally does have loose stool but the past couple days she has had a lot more episodes of loose stool as well  Review of Systems   Abdominal pain  Diarrhea  Lower extremity swelling and weakness with pain    Meds/Allergies   Allergies   Allergen Reactions    Nsaids      Irritable, upset stomach  Irritable, upset stomach    Tolmetin      Irritable, upset stomach    Codeine Hives     Per [address] admission orders    Tylenol [Acetaminophen]       all current active meds have been reviewed       Historical Information   Past Medical History:   Diagnosis Date    Asthma     Bowel obstruction (HCC)     Chronic back pain     Colon polyp     Enlarged kidney     Ileus ([facility] [address] ) 7/2/2018    Overview:  Last Assessment & Plan:  Patient presented with generalized abdominal pain nausea and bilious vomiting ,imaging study reports " diffuse bowel dilatation involving distal small bowel and the entire colon to the panel verge obstruction mass is not identified and this may represent adynamic ileus in the postoperative setting possible related to medication"  Patient states that she still ha    Liver mass      Past Surgical History:   Procedure Laterality Date    ABDOMINAL SURGERY      x 25    BACK SURGERY      x 3    COLONOSCOPY N/A 12/4/2018    Procedure: COLONOSCOPY;  Surgeon: Godwin Bentley MD;  Location: [facility];   Service: Gastroenterology    HYSTERECTOMY      TONSILLECTOMY       Social History   Social History     Substance and Sexual Activity   Alcohol Use Never    Alcohol/week: 0 0 standard drinks    Frequency: Never    Binge frequency: Never    Comment: social drinker     Social History     Substance and Sexual Activity   Drug Use No     Social History     Tobacco Use   Smoking Status Former Smoker    Last attempt to quit: 1980    Years since quittin 3   Smokeless Tobacco Never Used       Family History: non-contributory      Objective   Lab Results:   Lab Results   Component Value Date    WBC 7 14 2019    HGB 9 7 (L) 2019    HCT 30 4 (L) 2019     2019     Lab Results   Component Value Date    K 3 1 (L) 2019    K 3 1 (L) 2019     2019    CO2 28 2019    BUN 3 (L) 2019    CREATININE 0 66 2019     Lab Results   Component Value Date    CALCIUM 7 5 (L) 2019    MG 1 0 (L) 10/28/2019    PHOS 2 2 (L) 10/28/2019     Lab Results   Component Value Date    AST 16 2019    ALT 17 2019    ALKPHOS 116 2019    TBILI 0 30 2019    ALB 2 3 (L) 2019     Lab Results   Component Value Date    INR 1 10 2019     Lab Results   Component Value Date    COLORU Yellow 2019    CLARITYU Slightly Cloudy 2019    SPECGRAV <=1 005 2019    PHUR 6 5 2019    PHUR 5 5 2019    GLUCOSEU Negative 2019    KETONESU Negative 2019    BLOODU Negative 2019    NITRITE Negative 2019    LEUKOCYTESUR Trace (A) 2019    BILIRUBINUR Negative 2019    UROBILINOGEN 0 2 2019    RBCUA 0-1 (A) 2019    WBCUA 10-20 (A) 2019    BACTERIA Occasional 2019     Lab Results   Component Value Date    TROPONINI <0 02 2019       No intake or output data in the 24 hours ending 19 1128  Invasive Devices     Peripherally Inserted Central Catheter Line            PICC Line Right Other (Comment) -- days                Physical Exam  Vitals: /54   Pulse 82   Temp (!) 97 4 °F (36 3 °C)   Resp 18   Ht 5' 4" (1 626 m)   Wt 69 9 kg (154 lb)   LMP  (LMP Unknown)   SpO2 97%   BMI 26 43 kg/m²   GEN: A & O x 3, cooperative, no acute distress  HEENT: PERRLA EOMI, sclera anicterus, oral mucosa , moist  NECK: supple   LUNGS: clear throughout, no wheezes or rhonchi  COR: RRR no murmur  ABD:  Normoactive bowel sounds, multiple surgical incisional scars with no palpable hernias  Enterocutaneous fistula at midline and lower abdomen with small bowel output, succus, no melena or bright red blood  There is exposure of abdominal mesh in the fistula  Some tenderness to palpation in the surrounding area of the fistula especially superior and to the left side of the abdomen  Occasional guarding but no signs of acute abdomen or generalized rigidity  EXTREM: FROM no joint deformities  Edema bilateral lower extremity, left foot with tenderness with palpation and manipulation  SKIN:  Warm, dry, no rash  NEURO: CN II -XII intact, no tremor, affect appropriate    Imaging Studies:   Xr Chest 1 View Portable    Result Date: 11/4/2019  Impression: No radiographic evidence of acute intrathoracic process  Stable right apical pleural parenchymal scarring  Workstation performed: [id number]     Ct Abdomen Pelvis With Contrast    Result Date: 11/3/2019  Impression: 1  Several air-fluid collections at the anterior abdominal wall at midline with open wounds in the lower anterior abdominal wall  Correlate for draining wound/abscess collections  2   No definite findings for intra-abdominal abscess but repeat study with enteric and IV contrast material may be helpful given the altered anatomy from prior surgeries if clinical concern persists  The study was marked in EPIC for significant notification   Workstation performed: Doc Zimmerman [id number] Naty Gonzalez PA-C  11/4/2019

## 2019-11-04 NOTE — PLAN OF CARE
Problem: Potential for Falls  Goal: Patient will remain free of falls  Description  INTERVENTIONS:  - Assess patient frequently for physical needs  -  Identify cognitive and physical deficits and behaviors that affect risk of falls    -  [place] fall precautions as indicated by assessment   - Educate patient/family on patient safety including physical limitations  - Instruct patient to call for assistance with activity based on assessment  - Modify environment to reduce risk of injury  - Consider OT/PT consult to assist with strengthening/mobility  Outcome: Progressing     Problem: GASTROINTESTINAL - ADULT  Goal: Minimal or absence of nausea and/or vomiting  Description  INTERVENTIONS:  - Administer IV fluids if ordered to ensure adequate hydration  - Maintain NPO status until nausea and vomiting are resolved  - Nasogastric tube if ordered  - Administer ordered antiemetic medications as needed  - Provide nonpharmacologic comfort measures as appropriate  - Advance diet as tolerated, if ordered  - Consider nutrition services referral to assist patient with adequate nutrition and appropriate food choices  Outcome: Progressing  Goal: Maintains or returns to baseline bowel function  Description  INTERVENTIONS:  - Assess bowel function  - Encourage oral fluids to ensure adequate hydration  - Administer IV fluids if ordered to ensure adequate hydration  - Administer ordered medications as needed  - Encourage mobilization and activity  - Consider nutritional services referral to assist patient with adequate nutrition and appropriate food choices  Outcome: Progressing  Goal: Maintains adequate nutritional intake  Description  INTERVENTIONS:  - Monitor percentage of each meal consumed  - Identify factors contributing to decreased intake, treat as appropriate  - Assist with meals as needed  - Monitor I&O, weight, and lab values if indicated  - Obtain nutrition services referral as needed  Outcome: Progressing     Problem: PAIN - ADULT  Goal: Verbalizes/displays adequate comfort level or baseline comfort level  Description  Interventions:  - Encourage patient to monitor pain and request assistance  - Assess pain using appropriate pain scale  - Administer analgesics based on type and severity of pain and evaluate response  - Implement non-pharmacological measures as appropriate and evaluate response  - Consider cultural and social influences on pain and pain management  - Notify physician/advanced practitioner if interventions unsuccessful or patient reports new pain  Outcome: Progressing     Problem: INFECTION - ADULT  Goal: Absence or prevention of progression during hospitalization  Description  INTERVENTIONS:  - Assess and monitor for signs and symptoms of infection  - Monitor lab/diagnostic results  - Monitor all insertion sites, i e  indwelling lines, tubes, and drains  - Monitor endotracheal if appropriate and nasal secretions for changes in amount and color  - [place] appropriate cooling/warming therapies per order  - Administer medications as ordered  - Instruct and encourage patient and family to use good hand hygiene technique  - Identify and instruct in appropriate isolation precautions for identified infection/condition  Outcome: Progressing     Problem: SAFETY ADULT  Goal: Patient will remain free of falls  Description  INTERVENTIONS:  - Assess patient frequently for physical needs  -  Identify cognitive and physical deficits and behaviors that affect risk of falls    -  [place] fall precautions as indicated by assessment   - Educate patient/family on patient safety including physical limitations  - Instruct patient to call for assistance with activity based on assessment  - Modify environment to reduce risk of injury  - Consider OT/PT consult to assist with strengthening/mobility  Outcome: Progressing     Problem: DISCHARGE PLANNING  Goal: Discharge to home or other facility with appropriate resources  Description  INTERVENTIONS:  - Identify barriers to discharge w/patient and caregiver  - Arrange for needed discharge resources and transportation as appropriate  - Identify discharge learning needs (meds, wound care, etc )  - Arrange for interpretive services to assist at discharge as needed  - Refer to Case Management Department for coordinating discharge planning if the patient needs post-hospital services based on physician/advanced practitioner order or complex needs related to functional status, cognitive ability, or social support system  Outcome: Progressing     Problem: Knowledge Deficit  Goal: Patient/family/caregiver demonstrates understanding of disease process, treatment plan, medications, and discharge instructions  Description  Complete learning assessment and assess knowledge base    Interventions:  - Provide teaching at level of understanding  - Provide teaching via preferred learning methods  Outcome: Progressing

## 2019-11-04 NOTE — ASSESSMENT & PLAN NOTE
CT scan of the abdomen revealed several air-fluid collections at the anterior abdominal wall with over wounds in the lower anterior abdominal wall, which appear to be chronic  There was no definite intra-abdominal abscess seen, but repeat scanning with IV and enteric contrast was recommended in case of clinical concern for abscess or infection  She denies purulent drainage from her abdomen  Prior history of multiple abdominal surgeries and enterocutaneous fistula  Complaining of pain around colostomy site    · Continue her home medications including morphine and hydromorphone  PDMP verfied  · Iv Dilaudid p r n    · Discussed with General surgery no further inpatient management at this time  · Nutrition consulted for short-gut syndrome  · Supportive care

## 2019-11-04 NOTE — ASSESSMENT & PLAN NOTE
Unclear etiology, but differential includes worsening edema versus neuropathic pain  · Continue gabapentin scheduled  · Elevate extremities  · Follow-up B12, MMA, homocystine level to rule out B12/folate deficiency  · Continue pain regimen with morphine and Dilaudid as otherwise ordered  · See plan for lower extremity edema

## 2019-11-04 NOTE — ASSESSMENT & PLAN NOTE
CT scan of the abdomen revealed several air-fluid collections at the anterior abdominal wall with over wounds in the lower anterior abdominal wall, which appear to be chronic  There was no definite intra-abdominal abscess seen, but repeat scanning with IV and enteric contrast was recommended in case of clinical concern for abscess or infection  She denies purulent drainage from her abdomen  · Continue scheduled morphine  · Dilaudid p r n  · Await surgical consult  · NPO, sips with meds  · Gentle IV hydration overnight while NPO  · Owing to severe pain, and multiple issues, would consider palliative care consult

## 2019-11-04 NOTE — SOCIAL WORK
Observation notice reviewed with patient and copy left at beside  Observation brochure provided  Signed copy placed in medical records  Cm spoke with the pt at bedside to discuss dcp  The pt resides with  her  and brother in a 1 story home located in [place] with [facility]  The pt uses a walker and is able to complete all of her daily living skills  The pt is current with [facility] [address] for nursing and would like to [place] at dc  The pt fills Rx at [facility] in [facility] and is able to afford her medications  The pt , Angel Luis Montoya is her POA  The pt does not have a hx os SA/MH  The pt is retired and transports herself to and from [facility]  The pt  will transport her home at dc  CM reviewed discharge planning process including the following: identifying caregivers at home, preference for d/c planning needs, availability of treatment team to discuss questions or concerns patient and/or family may have regarding diagnosis, plan of care, old or new medications and discharge planning  Discharge Checklist reviewed and CM will continue to monitor for progress toward discharge goals in nursing and provider rounds  CM referred the pt per her request for [place] [id number] for nursing and her  will transport her home at dc

## 2019-11-04 NOTE — PLAN OF CARE
Problem: Potential for Falls  Goal: Patient will remain free of falls  Description  INTERVENTIONS:  - Assess patient frequently for physical needs  -  Identify cognitive and physical deficits and behaviors that affect risk of falls    -  [place] fall precautions as indicated by assessment   - Educate patient/family on patient safety including physical limitations  - Instruct patient to call for assistance with activity based on assessment  - Modify environment to reduce risk of injury  - Consider OT/PT consult to assist with strengthening/mobility  Outcome: Progressing     Problem: GASTROINTESTINAL - ADULT  Goal: Minimal or absence of nausea and/or vomiting  Description  INTERVENTIONS:  - Administer IV fluids if ordered to ensure adequate hydration  - Maintain NPO status until nausea and vomiting are resolved  - Nasogastric tube if ordered  - Administer ordered antiemetic medications as needed  - Provide nonpharmacologic comfort measures as appropriate  - Advance diet as tolerated, if ordered  - Consider nutrition services referral to assist patient with adequate nutrition and appropriate food choices  Outcome: Progressing  Goal: Maintains or returns to baseline bowel function  Description  INTERVENTIONS:  - Assess bowel function  - Encourage oral fluids to ensure adequate hydration  - Administer IV fluids if ordered to ensure adequate hydration  - Administer ordered medications as needed  - Encourage mobilization and activity  - Consider nutritional services referral to assist patient with adequate nutrition and appropriate food choices  Outcome: Progressing  Goal: Maintains adequate nutritional intake  Description  INTERVENTIONS:  - Monitor percentage of each meal consumed  - Identify factors contributing to decreased intake, treat as appropriate  - Assist with meals as needed  - Monitor I&O, weight, and lab values if indicated  - Obtain nutrition services referral as needed  Outcome: Progressing     Problem: PAIN - ADULT  Goal: Verbalizes/displays adequate comfort level or baseline comfort level  Description  Interventions:  - Encourage patient to monitor pain and request assistance  - Assess pain using appropriate pain scale  - Administer analgesics based on type and severity of pain and evaluate response  - Implement non-pharmacological measures as appropriate and evaluate response  - Consider cultural and social influences on pain and pain management  - Notify physician/advanced practitioner if interventions unsuccessful or patient reports new pain  Outcome: Progressing     Problem: INFECTION - ADULT  Goal: Absence or prevention of progression during hospitalization  Description  INTERVENTIONS:  - Assess and monitor for signs and symptoms of infection  - Monitor lab/diagnostic results  - Monitor all insertion sites, i e  indwelling lines, tubes, and drains  - Monitor endotracheal if appropriate and nasal secretions for changes in amount and color  - [place] appropriate cooling/warming therapies per order  - Administer medications as ordered  - Instruct and encourage patient and family to use good hand hygiene technique  - Identify and instruct in appropriate isolation precautions for identified infection/condition  Outcome: Progressing     Problem: SAFETY ADULT  Goal: Patient will remain free of falls  Description  INTERVENTIONS:  - Assess patient frequently for physical needs  -  Identify cognitive and physical deficits and behaviors that affect risk of falls    -  [place] fall precautions as indicated by assessment   - Educate patient/family on patient safety including physical limitations  - Instruct patient to call for assistance with activity based on assessment  - Modify environment to reduce risk of injury  - Consider OT/PT consult to assist with strengthening/mobility  Outcome: Progressing     Problem: DISCHARGE PLANNING  Goal: Discharge to home or other facility with appropriate resources  Description  INTERVENTIONS:  - Identify barriers to discharge w/patient and caregiver  - Arrange for needed discharge resources and transportation as appropriate  - Identify discharge learning needs (meds, wound care, etc )  - Arrange for interpretive services to assist at discharge as needed  - Refer to Case Management Department for coordinating discharge planning if the patient needs post-hospital services based on physician/advanced practitioner order or complex needs related to functional status, cognitive ability, or social support system  Outcome: Progressing     Problem: Knowledge Deficit  Goal: Patient/family/caregiver demonstrates understanding of disease process, treatment plan, medications, and discharge instructions  Description  Complete learning assessment and assess knowledge base    Interventions:  - Provide teaching at level of understanding  - Provide teaching via preferred learning methods  Outcome: Progressing

## 2019-11-05 PROBLEM — E44.1 MILD PROTEIN MALNUTRITION (HCC): Status: ACTIVE | Noted: 2019-11-05

## 2019-11-05 PROBLEM — E83.42 HYPOMAGNESEMIA: Status: ACTIVE | Noted: 2019-11-05

## 2019-11-05 LAB
ANION GAP SERPL CALCULATED.3IONS-SCNC: 7 MMOL/L (ref 4–13)
BUN SERPL-MCNC: 4 MG/DL (ref 5–25)
CALCIUM SERPL-MCNC: 7.6 MG/DL (ref 8.3–10.1)
CHLORIDE SERPL-SCNC: 106 MMOL/L (ref 100–108)
CO2 SERPL-SCNC: 28 MMOL/L (ref 21–32)
CREAT SERPL-MCNC: 0.52 MG/DL (ref 0.6–1.3)
GFR SERPL CREATININE-BSD FRML MDRD: 102 ML/MIN/1.73SQ M
GLUCOSE SERPL-MCNC: 110 MG/DL (ref 65–140)
MAGNESIUM SERPL-MCNC: 1.5 MG/DL (ref 1.6–2.6)
POTASSIUM SERPL-SCNC: 3.9 MMOL/L (ref 3.5–5.3)
SODIUM SERPL-SCNC: 141 MMOL/L (ref 136–145)

## 2019-11-05 PROCEDURE — 99232 SBSQ HOSP IP/OBS MODERATE 35: CPT | Performed by: STUDENT IN AN ORGANIZED HEALTH CARE EDUCATION/TRAINING PROGRAM

## 2019-11-05 PROCEDURE — 80048 BASIC METABOLIC PNL TOTAL CA: CPT | Performed by: INTERNAL MEDICINE

## 2019-11-05 PROCEDURE — 83735 ASSAY OF MAGNESIUM: CPT | Performed by: INTERNAL MEDICINE

## 2019-11-05 PROCEDURE — 83550 IRON BINDING TEST: CPT | Performed by: STUDENT IN AN ORGANIZED HEALTH CARE EDUCATION/TRAINING PROGRAM

## 2019-11-05 PROCEDURE — 83540 ASSAY OF IRON: CPT | Performed by: STUDENT IN AN ORGANIZED HEALTH CARE EDUCATION/TRAINING PROGRAM

## 2019-11-05 PROCEDURE — 82728 ASSAY OF FERRITIN: CPT | Performed by: STUDENT IN AN ORGANIZED HEALTH CARE EDUCATION/TRAINING PROGRAM

## 2019-11-05 RX ORDER — MAGNESIUM SULFATE HEPTAHYDRATE 40 MG/ML
2 INJECTION, SOLUTION INTRAVENOUS ONCE
Status: COMPLETED | OUTPATIENT
Start: 2019-11-05 | End: 2019-11-05

## 2019-11-05 RX ORDER — HYDROMORPHONE HCL 110MG/55ML
2 PATIENT CONTROLLED ANALGESIA SYRINGE INTRAVENOUS EVERY 4 HOURS PRN
Status: DISCONTINUED | OUTPATIENT
Start: 2019-11-05 | End: 2019-11-06 | Stop reason: HOSPADM

## 2019-11-05 RX ORDER — MAGNESIUM SULFATE 1 G/100ML
1 INJECTION INTRAVENOUS ONCE
Status: DISCONTINUED | OUTPATIENT
Start: 2019-11-05 | End: 2019-11-05

## 2019-11-05 RX ADMIN — HYDROMORPHONE HYDROCHLORIDE 2 MG: 2 INJECTION INTRAMUSCULAR; INTRAVENOUS; SUBCUTANEOUS at 17:53

## 2019-11-05 RX ADMIN — MAGNESIUM SULFATE HEPTAHYDRATE 2 G: 40 INJECTION, SOLUTION INTRAVENOUS at 13:28

## 2019-11-05 RX ADMIN — GABAPENTIN 600 MG: 300 CAPSULE ORAL at 17:17

## 2019-11-05 RX ADMIN — Medication 400 MG: at 08:25

## 2019-11-05 RX ADMIN — HEPARIN SODIUM 5000 UNITS: 5000 INJECTION INTRAVENOUS; SUBCUTANEOUS at 06:25

## 2019-11-05 RX ADMIN — GABAPENTIN 600 MG: 300 CAPSULE ORAL at 08:25

## 2019-11-05 RX ADMIN — HYDROMORPHONE HYDROCHLORIDE 4 MG: 2 TABLET ORAL at 04:10

## 2019-11-05 RX ADMIN — DIAZEPAM 5 MG: 5 TABLET ORAL at 17:17

## 2019-11-05 RX ADMIN — HEPARIN SODIUM 5000 UNITS: 5000 INJECTION INTRAVENOUS; SUBCUTANEOUS at 23:14

## 2019-11-05 RX ADMIN — HYDROMORPHONE HYDROCHLORIDE 1 MG: 1 INJECTION, SOLUTION INTRAMUSCULAR; INTRAVENOUS; SUBCUTANEOUS at 11:30

## 2019-11-05 RX ADMIN — HYDROMORPHONE HYDROCHLORIDE 1 MG: 1 INJECTION, SOLUTION INTRAMUSCULAR; INTRAVENOUS; SUBCUTANEOUS at 05:23

## 2019-11-05 RX ADMIN — Medication 400 MG: at 17:17

## 2019-11-05 RX ADMIN — MORPHINE SULFATE 30 MG: 30 TABLET, FILM COATED, EXTENDED RELEASE ORAL at 13:30

## 2019-11-05 RX ADMIN — HYDROMORPHONE HYDROCHLORIDE 1 MG: 1 INJECTION, SOLUTION INTRAMUSCULAR; INTRAVENOUS; SUBCUTANEOUS at 15:20

## 2019-11-05 RX ADMIN — MORPHINE SULFATE 30 MG: 30 TABLET, FILM COATED, EXTENDED RELEASE ORAL at 06:25

## 2019-11-05 RX ADMIN — DIAZEPAM 5 MG: 5 TABLET ORAL at 23:15

## 2019-11-05 RX ADMIN — HYDROMORPHONE HYDROCHLORIDE 2 MG: 2 INJECTION INTRAMUSCULAR; INTRAVENOUS; SUBCUTANEOUS at 22:15

## 2019-11-05 RX ADMIN — HYDROMORPHONE HYDROCHLORIDE 1 MG: 1 INJECTION, SOLUTION INTRAMUSCULAR; INTRAVENOUS; SUBCUTANEOUS at 08:25

## 2019-11-05 RX ADMIN — MORPHINE SULFATE 30 MG: 30 TABLET, FILM COATED, EXTENDED RELEASE ORAL at 23:14

## 2019-11-05 RX ADMIN — DIAZEPAM 5 MG: 5 TABLET ORAL at 08:25

## 2019-11-05 RX ADMIN — HEPARIN SODIUM 5000 UNITS: 5000 INJECTION INTRAVENOUS; SUBCUTANEOUS at 13:26

## 2019-11-05 RX ADMIN — GABAPENTIN 600 MG: 300 CAPSULE ORAL at 23:14

## 2019-11-05 NOTE — PLAN OF CARE
Problem: Potential for Falls  Goal: Patient will remain free of falls  Description  INTERVENTIONS:  - Assess patient frequently for physical needs  -  Identify cognitive and physical deficits and behaviors that affect risk of falls    -  [place] fall precautions as indicated by assessment   - Educate patient/family on patient safety including physical limitations  - Instruct patient to call for assistance with activity based on assessment  - Modify environment to reduce risk of injury  - Consider OT/PT consult to assist with strengthening/mobility  Outcome: Progressing     Problem: GASTROINTESTINAL - ADULT  Goal: Minimal or absence of nausea and/or vomiting  Description  INTERVENTIONS:  - Administer IV fluids if ordered to ensure adequate hydration  - Maintain NPO status until nausea and vomiting are resolved  - Nasogastric tube if ordered  - Administer ordered antiemetic medications as needed  - Provide nonpharmacologic comfort measures as appropriate  - Advance diet as tolerated, if ordered  - Consider nutrition services referral to assist patient with adequate nutrition and appropriate food choices  Outcome: Progressing  Goal: Maintains or returns to baseline bowel function  Description  INTERVENTIONS:  - Assess bowel function  - Encourage oral fluids to ensure adequate hydration  - Administer IV fluids if ordered to ensure adequate hydration  - Administer ordered medications as needed  - Encourage mobilization and activity  - Consider nutritional services referral to assist patient with adequate nutrition and appropriate food choices  Outcome: Progressing  Goal: Maintains adequate nutritional intake  Description  INTERVENTIONS:  - Monitor percentage of each meal consumed  - Identify factors contributing to decreased intake, treat as appropriate  - Assist with meals as needed  - Monitor I&O, weight, and lab values if indicated  - Obtain nutrition services referral as needed  Outcome: Progressing     Problem: PAIN - ADULT  Goal: Verbalizes/displays adequate comfort level or baseline comfort level  Description  Interventions:  - Encourage patient to monitor pain and request assistance  - Assess pain using appropriate pain scale  - Administer analgesics based on type and severity of pain and evaluate response  - Implement non-pharmacological measures as appropriate and evaluate response  - Consider cultural and social influences on pain and pain management  - Notify physician/advanced practitioner if interventions unsuccessful or patient reports new pain  Outcome: Progressing     Problem: INFECTION - ADULT  Goal: Absence or prevention of progression during hospitalization  Description  INTERVENTIONS:  - Assess and monitor for signs and symptoms of infection  - Monitor lab/diagnostic results  - Monitor all insertion sites, i e  indwelling lines, tubes, and drains  - Monitor endotracheal if appropriate and nasal secretions for changes in amount and color  - [place] appropriate cooling/warming therapies per order  - Administer medications as ordered  - Instruct and encourage patient and family to use good hand hygiene technique  - Identify and instruct in appropriate isolation precautions for identified infection/condition  Outcome: Progressing     Problem: SAFETY ADULT  Goal: Patient will remain free of falls  Description  INTERVENTIONS:  - Assess patient frequently for physical needs  -  Identify cognitive and physical deficits and behaviors that affect risk of falls    -  [place] fall precautions as indicated by assessment   - Educate patient/family on patient safety including physical limitations  - Instruct patient to call for assistance with activity based on assessment  - Modify environment to reduce risk of injury  - Consider OT/PT consult to assist with strengthening/mobility  Outcome: Progressing     Problem: DISCHARGE PLANNING  Goal: Discharge to home or other facility with appropriate resources  Description  INTERVENTIONS:  - Identify barriers to discharge w/patient and caregiver  - Arrange for needed discharge resources and transportation as appropriate  - Identify discharge learning needs (meds, wound care, etc )  - Arrange for interpretive services to assist at discharge as needed  - Refer to Case Management Department for coordinating discharge planning if the patient needs post-hospital services based on physician/advanced practitioner order or complex needs related to functional status, cognitive ability, or social support system  Outcome: Progressing     Problem: Knowledge Deficit  Goal: Patient/family/caregiver demonstrates understanding of disease process, treatment plan, medications, and discharge instructions  Description  Complete learning assessment and assess knowledge base  Interventions:  - Provide teaching at level of understanding  - Provide teaching via preferred learning methods  Outcome: Progressing     Problem: Nutrition/Hydration-ADULT  Goal: Nutrient/Hydration intake appropriate for improving, restoring or maintaining nutritional needs  Description  Monitor and assess patient's nutrition/hydration status for malnutrition  Collaborate with interdisciplinary team and initiate plan and interventions as ordered  Monitor patient's weight and dietary intake as ordered or per policy  Utilize nutrition screening tool and intervene as necessary  Determine patient's food preferences and provide high-protein, high-caloric foods as appropriate       INTERVENTIONS:  - Monitor oral intake, urinary output, labs, and treatment plans  - Assess nutrition and hydration status and recommend course of action  - Evaluate amount of meals eaten  - Assist patient with eating if necessary   - Allow adequate time for meals  - Recommend/ encourage appropriate diets, oral nutritional supplements, and vitamin/mineral supplements  - Order, calculate, and assess calorie counts as needed  - Recommend, monitor, and adjust tube feedings and TPN/PPN based on assessed needs  - Assess need for intravenous fluids  - Provide specific nutrition/hydration education as appropriate  - Include patient/family/caregiver in decisions related to nutrition  Outcome: Progressing

## 2019-11-05 NOTE — PROGRESS NOTES
Progress Note - Jayaisela Linn 1955, 62 y o  female MRN: [id number]    Unit/Bed#: -01 Encounter: [id number]    Primary Care Provider: Robbie Bagley DO   Date and time admitted to hospital: 11/3/2019  4:17 PM        * Abdominal pain  Assessment & Plan  CT scan of the abdomen revealed several air-fluid collections at the anterior abdominal wall with over wounds in the lower anterior abdominal wall, which appear to be chronic  She denies purulent drainage from her abdomen  Prior history of multiple abdominal surgeries and enterocutaneous fistula  Complaining of pain around colostomy site  · Continue her home medications including morphine and hydromorphone  PDMP verfied  · IV Dilaudid increased to 2 mg p r n  Since she was reporting pain uncontrolled on current regimen  · No further surgical intervention per surgery recommendation  · Nutrition consulted for short-gut syndrome  · Supportive care    Enterocutaneous fistula  Assessment & Plan  No surgical intervention per surgery recommendation noted  Osteopenia  Assessment & Plan  Resume alendronate upon discharge  Recommended close follow-up with PCP  Bilateral lower extremity pain  Assessment & Plan  · Unclear etiology, but differential includes worsening edema versus neuropathic pain  · Venous Doppler negative for DVT  · Continue gabapentin scheduled  · Elevate extremitie  · Continue pain regimen with morphine and Dilaudid as otherwise ordered  · See plan for lower extremity edema    Iron deficiency anemia  Assessment & Plan  Hemoglobin stable, at baseline  Likely from inadequate absorption secondary to multiple bowel surgeries    Follow-up iron panel  Start iron supplementation if needed    Physical deconditioning  Assessment & Plan  Patient reports difficulty with ambulation and difficulty with oral intake, likely secondary to multifactorial etiology including lower extremity pain, abdominal pain, and recent multiple surgeries  · Continue pain management  · Consider PT/OT evaluation    DDD (degenerative disc disease), lumbosacral  Assessment & Plan  Continue gabapentin scheduled  Continue current regimen of opioids  Recommended close follow-up with PCP and pain management outpatient upon discharge  Bilateral lower extremity edema  Assessment & Plan  Suspected due to poor nutritional status  Elevate lower extremities  Recommended protein supplement  Venous Dopplers negative for DVT  Depression  Assessment & Plan  Patient states she is no longer taking Lexapro  Denies homicidal, suicidal ideation  Patient is refusing for inpatient psych consult  Continue Seroquel at bedtime  Recommended close follow-up with PCP and Psychiatry outpatient  Chronic, continuous use of opioids  Assessment & Plan  Avoid unnecessary opioids  However, patient reports allergies to NSAIDs and Tylenol  Continue pain regimen with morphine and Dilaudid  Continue gabapentin  Recommended close follow-up with pain management outpatient upon discharge  Generalized anxiety disorder  Assessment & Plan  Continue home dose of scheduled Valium t i d  Stable  Recommended close follow-up with PCP and Psychiatry outpatient  Hypokalemia  Assessment & Plan  Resolved      VTE Pharmacologic Prophylaxis:   Pharmacologic: Heparin    Patient Centered Rounds: I have performed bedside rounds with nursing staff today  Education and Discussions with Family / Patient: pt  Time Spent for Care: 30 minutes  More than 50% of total time spent on counseling and coordination of care as described above  Current Length of Stay: 1 day(s)    Current Patient Status: Inpatient   Certification Statement: The patient will continue to require additional inpatient hospital stay due to Abdominal pain uncontrolled adjusting medications  Awaiting PT OT eval     Discharge Plan:  Hopefully within next 48 hours      Code Status: Level 1 - Full Code      Subjective: Afebrile, hemodynamically stable appears comfortable  complaining of uncontrolled abdominal pain and requesting for IV Dilaudid  Reports that overall medications are not working  Denies chest pain, dyspnea, fever, chills, nausea, vomiting, any other new complaints  No other events reported  Objective:     Vitals:   Temp (24hrs), Av 6 °F (37 °C), Min:98 3 °F (36 8 °C), Max:98 9 °F (37 2 °C)    Temp:  [98 3 °F (36 8 °C)-98 9 °F (37 2 °C)] 98 9 °F (37 2 °C)  HR:  [78-91] 82  Resp:  [18] 18  BP: ()/(48-58) 100/58  SpO2:  [94 %-96 %] 96 %  Body mass index is 26 43 kg/m²  Input and Output Summary (last 24 hours): Intake/Output Summary (Last 24 hours) at 2019 1903  Last data filed at 2019 1801  Gross per 24 hour   Intake 1040 ml   Output 1200 ml   Net -160 ml       Physical Exam:     Physical Exam   Constitutional: She is oriented to person, place, and time  No distress  Chronically ill, deconditioned, not in acute distress  HENT:   Head: Normocephalic and atraumatic  Eyes: Pupils are equal, round, and reactive to light  EOM are normal    Neck: Normal range of motion  Neck supple  Cardiovascular: Normal rate and regular rhythm  Pulmonary/Chest: Effort normal and breath sounds normal  No stridor  No respiratory distress  She has no wheezes  Abdominal: Soft  Bowel sounds are normal  She exhibits no distension  There is tenderness (Minimal generalized tenderness noted on exam )  There is no rebound and no guarding  Ostomy bag noted  Musculoskeletal: Normal range of motion  Neurological: She is alert and oriented to person, place, and time  Skin: She is not diaphoretic     Psychiatric: Her behavior is normal      Additional Data:     Labs:    Results from last 7 days   Lab Units 19  0450 19  1842   WBC Thousand/uL 7 14 7 52   HEMOGLOBIN g/dL 9 7* 10 3*   HEMATOCRIT % 30 4* 32 1*   PLATELETS Thousands/uL 322 346   NEUTROS PCT %  --  71   LYMPHS PCT %  --  18 MONOS PCT %  --  9   EOS PCT %  --  1     Results from last 7 days   Lab Units 11/05/19  0521  11/03/19  1842   SODIUM mmol/L 141   < > 141   POTASSIUM mmol/L 3 9   < > 2 9*   CHLORIDE mmol/L 106   < > 103   CO2 mmol/L 28   < > 30   BUN mg/dL 4*   < > 4*   CREATININE mg/dL 0 52*   < > 0 74   ANION GAP mmol/L 7   < > 8   CALCIUM mg/dL 7 6*   < > 7 7*   ALBUMIN g/dL  --   --  2 3*   TOTAL BILIRUBIN mg/dL  --   --  0 30   ALK PHOS U/L  --   --  116   ALT U/L  --   --  17   AST U/L  --   --  16   GLUCOSE RANDOM mg/dL 110   < > 93    < > = values in this interval not displayed  Results from last 7 days   Lab Units 11/03/19  1843   INR  1 10                       * I Have Reviewed All Lab Data Listed Above  * Additional Pertinent Lab Tests Reviewed: All Labs Within Last 24 Hours Reviewed      Recent Cultures (last 7 days):           Last 24 Hours Medication List:     Current Facility-Administered Medications:  diazepam 5 mg Oral TID Marv Michaud DO   gabapentin 600 mg Oral TID Marv Michaud DO   heparin (porcine) 5,000 Units Subcutaneous Q8H [address] Abiel Shah DO   HYDROmorphone 2 mg Intravenous Q4H PRN Erick MONTOYA MD   HYDROmorphone 4 mg Oral Q6H PRN Bandar Wilcox MD   magnesium oxide 400 mg Oral BID Bandar Wilcox MD   metoclopramide 10 mg Intravenous Q6H PRN Marv Michaud DO   morphine 30 mg Oral Q8H Abiel Shah DO   naloxone 0 04 mg Intravenous Q1MIN PRN Heather Stanley PA-C   potassium chloride 40 mEq Oral Once Bandar Wilcox MD   pramoxine-phenylephrine-glycerin-petrolatum  Rectal BID PRN Roly Stanley PA-C   QUEtiapine 150 mg Oral HS Nancy Shah DO   senna-docusate sodium 1 tablet Oral BID Marv Michaud DO        Today, Patient Was Seen By: Heather Spencer MD    ** Please Note: Dictation voice to text software may have been used in the creation of this document   **

## 2019-11-05 NOTE — ASSESSMENT & PLAN NOTE
CT scan of the abdomen revealed several air-fluid collections at the anterior abdominal wall with over wounds in the lower anterior abdominal wall, which appear to be chronic  She denies purulent drainage from her abdomen  Prior history of multiple abdominal surgeries and enterocutaneous fistula  Complaining of pain around colostomy site  · Continue her home medications including morphine and hydromorphone  PDMP verfied  · IV Dilaudid increased to 2 mg p r n  Since she was reporting pain uncontrolled on current regimen  · No further surgical intervention per surgery recommendation    · Nutrition consulted for short-gut syndrome  · Supportive care

## 2019-11-06 VITALS
RESPIRATION RATE: 17 BRPM | HEART RATE: 78 BPM | OXYGEN SATURATION: 97 % | TEMPERATURE: 98.3 F | DIASTOLIC BLOOD PRESSURE: 49 MMHG | SYSTOLIC BLOOD PRESSURE: 95 MMHG | HEIGHT: 64 IN | BODY MASS INDEX: 26.29 KG/M2 | WEIGHT: 154 LBS

## 2019-11-06 LAB
ANION GAP SERPL CALCULATED.3IONS-SCNC: 9 MMOL/L (ref 4–13)
BUN SERPL-MCNC: 6 MG/DL (ref 5–25)
CALCIUM SERPL-MCNC: 7.4 MG/DL (ref 8.3–10.1)
CHLORIDE SERPL-SCNC: 105 MMOL/L (ref 100–108)
CO2 SERPL-SCNC: 27 MMOL/L (ref 21–32)
CREAT SERPL-MCNC: 0.59 MG/DL (ref 0.6–1.3)
FERRITIN SERPL-MCNC: 29 NG/ML (ref 8–388)
GFR SERPL CREATININE-BSD FRML MDRD: 98 ML/MIN/1.73SQ M
GLUCOSE SERPL-MCNC: 88 MG/DL (ref 65–140)
IRON SATN MFR SERPL: 18 %
IRON SERPL-MCNC: 46 UG/DL (ref 50–170)
MAGNESIUM SERPL-MCNC: 1.8 MG/DL (ref 1.6–2.6)
METHYLMALONATE SERPL-SCNC: 175 NMOL/L (ref 0–378)
POTASSIUM SERPL-SCNC: 3 MMOL/L (ref 3.5–5.3)
SL AMB DISCLAIMER: NORMAL
SODIUM SERPL-SCNC: 141 MMOL/L (ref 136–145)
TIBC SERPL-MCNC: 257 UG/DL (ref 250–450)

## 2019-11-06 PROCEDURE — 83735 ASSAY OF MAGNESIUM: CPT | Performed by: STUDENT IN AN ORGANIZED HEALTH CARE EDUCATION/TRAINING PROGRAM

## 2019-11-06 PROCEDURE — 99239 HOSP IP/OBS DSCHRG MGMT >30: CPT | Performed by: STUDENT IN AN ORGANIZED HEALTH CARE EDUCATION/TRAINING PROGRAM

## 2019-11-06 PROCEDURE — 80048 BASIC METABOLIC PNL TOTAL CA: CPT | Performed by: STUDENT IN AN ORGANIZED HEALTH CARE EDUCATION/TRAINING PROGRAM

## 2019-11-06 RX ORDER — POTASSIUM CHLORIDE 20 MEQ/1
40 TABLET, EXTENDED RELEASE ORAL DAILY
Qty: 60 TABLET | Refills: 0 | Status: SHIPPED | OUTPATIENT
Start: 2019-11-06 | End: 2020-07-20 | Stop reason: ALTCHOICE

## 2019-11-06 RX ORDER — POTASSIUM CHLORIDE 20MEQ/15ML
40 LIQUID (ML) ORAL ONCE
Status: COMPLETED | OUTPATIENT
Start: 2019-11-06 | End: 2019-11-06

## 2019-11-06 RX ORDER — FERROUS SULFATE TAB EC 324 MG (65 MG FE EQUIVALENT) 324 (65 FE) MG
324 TABLET DELAYED RESPONSE ORAL
Qty: 60 TABLET | Refills: 0 | Status: SHIPPED | OUTPATIENT
Start: 2019-11-06 | End: 2020-07-20 | Stop reason: ALTCHOICE

## 2019-11-06 RX ADMIN — DIAZEPAM 5 MG: 5 TABLET ORAL at 08:48

## 2019-11-06 RX ADMIN — HYDROMORPHONE HYDROCHLORIDE 2 MG: 2 INJECTION INTRAMUSCULAR; INTRAVENOUS; SUBCUTANEOUS at 08:48

## 2019-11-06 RX ADMIN — SENNOSIDES AND DOCUSATE SODIUM 1 TABLET: 8.6; 5 TABLET ORAL at 08:48

## 2019-11-06 RX ADMIN — HYDROMORPHONE HYDROCHLORIDE 2 MG: 2 INJECTION INTRAMUSCULAR; INTRAVENOUS; SUBCUTANEOUS at 03:25

## 2019-11-06 RX ADMIN — HEPARIN SODIUM 5000 UNITS: 5000 INJECTION INTRAVENOUS; SUBCUTANEOUS at 06:11

## 2019-11-06 RX ADMIN — POTASSIUM CHLORIDE 40 MEQ: 20 SOLUTION ORAL at 12:09

## 2019-11-06 RX ADMIN — HYDROMORPHONE HYDROCHLORIDE 2 MG: 2 INJECTION INTRAMUSCULAR; INTRAVENOUS; SUBCUTANEOUS at 13:00

## 2019-11-06 RX ADMIN — Medication 400 MG: at 08:48

## 2019-11-06 RX ADMIN — GABAPENTIN 600 MG: 300 CAPSULE ORAL at 08:48

## 2019-11-06 RX ADMIN — MORPHINE SULFATE 30 MG: 30 TABLET, FILM COATED, EXTENDED RELEASE ORAL at 06:11

## 2019-11-06 NOTE — DISCHARGE INSTR - AVS FIRST PAGE
Close follow-up with primary care provider in 3-5 days of discharge    Recommended close follow-up with primary surgeon at [facility]

## 2019-11-06 NOTE — DISCHARGE SUMMARY
Discharge- Twila Mars 1955, 62 y o  female MRN: [id number]    Unit/Bed#: -01 Encounter: [id number]    Primary Care Provider: Sondra De La Cruz DO   Date and time admitted to hospital: 11/3/2019  4:17 PM        * Abdominal pain  Assessment & Plan  CT scan of the abdomen revealed several air-fluid collections at the anterior abdominal wall with over wounds in the lower anterior abdominal wall, which appear to be chronic  She denies purulent drainage from her abdomen  Prior history of multiple abdominal surgeries and enterocutaneous fistula  Complaining of pain around colostomy site  · Continue her home medications including morphine and hydromorphone  PDMP verfied  · Was evaluated by surgery and recommended no surgical intervention and stable from surgery end  · Nutrition consulted for short-gut syndrome  · Supportive care  · Pt  Is requesting to go home so she can follow up her primary surgeon Rosa De La Cruz at [facility]      Hypomagnesemia  Assessment & Plan  Continue with PO supplement  F/u with PCP  Mild protein malnutrition (HCC)  Assessment & Plan  Malnutrition Findings:   Malnutrition type: Chronic illness  Degree of Malnutrition: Malnutrition of mild degree    BMI Findings: Body mass index is 26 43 kg/m²  Mild protein calorie malnutrition due to short bowel syndrome as evidenced by unable / unwillingness to eat sufficient intake  Recommended protein supplement with diet  Nutrition consultation  Enterocutaneous fistula  Assessment & Plan  No surgical intervention planned per surgery team's recommendations  Pt  Wants to go home so she can f/w with per primary surgeon Dr Zaid White at [facility]      Osteopenia  Assessment & Plan  Resume alendronate upon discharge  Recommended close follow-up with PCP      Bilateral lower extremity pain  Assessment & Plan  · Unclear etiology, but differential includes worsening edema versus neuropathic pain  · Venous Doppler negative for DVT  · Continue gabapentin scheduled  · Elevate extremitie  · Continue pain regimen with morphine and Dilaudid as otherwise ordered  · See plan for lower extremity edema  Iron deficiency anemia  Assessment & Plan  Hemoglobin stable, at baseline  Likely from inadequate absorption secondary to multiple bowel surgeries  Start on Iron supplement  F/u with PCP    Physical deconditioning  Assessment & Plan   likely secondary to multifactorial etiology including lower extremity pain, abdominal pain, and recent multiple surgeries  Seen ambulating in her room w/o any major issues  Requesting to go home so she can follow up with her primary surgeon at [facility]       DDD (degenerative disc disease), lumbosacral  Assessment & Plan  Continue gabapentin scheduled  Continue current regimen of opioids  Recommended close follow-up with PCP and pain management outpatient upon discharge  Bilateral lower extremity edema  Assessment & Plan  Suspected due to poor nutritional status  Elevate lower extremities  Recommended protein supplement  Venous Dopplers negative for DVT  F/u with PCP    Depression  Assessment & Plan  Patient states she is no longer taking Lexapro  Denies homicidal, suicidal ideation  Patient is refusing for inpatient psych consult  Continue Seroquel at bedtime  Recommended close follow-up with PCP and Psychiatry outpatient  Chronic, continuous use of opioids  Assessment & Plan  Avoid unnecessary opioids  However, patient reports allergies to NSAIDs and Tylenol  Continue pain regimen with morphine and Dilaudid  Continue gabapentin  Recommended close follow-up with pain management outpatient upon discharge  Generalized anxiety disorder  Assessment & Plan  Continue home dose of scheduled Valium t i d  Stable  Recommended close follow-up with PCP and Psychiatry outpatient  Hypokalemia  Assessment & Plan  Resolved  Continue PO supplement     F/u with PCP    Discharging Physician / Practitioner: Devi Umaña MD  PCP: Curtis España DO  Admission Date:   Admission Orders (From admission, onward)     Ordered        11/04/19 1448  Inpatient Admission  Once         11/03/19 2034  Place in Observation  Once                   Discharge Date: 11/06/19    Resolved Problems  Date Reviewed: 11/6/2019    None          Consultations During Hospital Stay:  · Gen  Surgery    Procedures Performed:   · none    Significant Findings / Test Results:   · Refer to CT abd  Report  Reason for Admission: abdominal pain  Hospital Course:     Johan Bruce is a 62 y o  female patient with pmhx of DDD, depression, anxiety, chronic opioid use,hx of multiple abdominal surgeries with approximately 50cm of small bowel left per surgery note, resulted in a persistent enterocutaneous fistula and short bowel syndrome who originally presented to the hospital on 11/3/2019 due to abdominal pain  Cxr report noted for stable right apical pleural parenchymal scarring, no acute process  CT abd report noted with several air fluid collections at the anterior wall at midline likely secondary to fistula per surgery team, no definite findings for intra abdominal abscess  No fever, no leukocytosis  Noted  No signs of active overt infection noted  Can tolerate diet but unwilling to eat adequate suspected due to fear of discomfort  Passing flatus and having bowel movement  Remained afebrile and hemodynamically stable off of Abx  BP of lowe side but pt is asymptomatic  Note with hypokalemia and hypomagnesemia which was supplemented  Surgery recommended no surgical intervention and stable to start diet and stable for DC  Pt  Is requesting to go home so she can f/u with her Primary Surgery Dr Krzysztof Jiménez at [facility]  Will dc with po potassium and magnesium supplement and recommended close f/u with PCP for monitoring  Seen ambulation in her room  Stable for DC  counseled on adequate po intake and protein supplement   No other events reported  Refer to earlier notes for further clarifications  Please see above list of diagnoses and related plan for additional information  Condition at Discharge: stable     Discharge Day Visit / Exam:     Subjective:  Afebrile, hemodynamically stable  BP on low side but pt is asymptomatic  C/o of abdominal discomfort  Requesting to go home so she can f/u with Dr Seth Ricketts at [facility]  Report that her  will come pick her up  No other events reported  Vitals: Blood Pressure: (!) 95/49 (11/06/19 0719)  Pulse: 78 (11/06/19 0719)  Temperature: 98 3 °F (36 8 °C) (11/06/19 0719)  Temp Source: Oral (11/03/19 2152)  Respirations: 17 (11/06/19 0719)  Height: 5' 4" (162 6 cm) (11/04/19 1438)  Weight - Scale: 69 9 kg (154 lb) (11/04/19 1438)  SpO2: 97 % (11/06/19 0719)  Exam:   Physical Exam   Constitutional: She is oriented to person, place, and time  No distress  Chronically ill, deconditioned, not in acute distress  HENT:   Head: Normocephalic and atraumatic  Eyes: Pupils are equal, round, and reactive to light  EOM are normal    Neck: Normal range of motion  Neck supple  Cardiovascular: Normal rate and regular rhythm  Pulmonary/Chest: Effort normal and breath sounds normal  No stridor  No respiratory distress  She has no wheezes  Abdominal: Soft  Bowel sounds are normal  She exhibits no distension  There is no rebound and no guarding  Previous surgical scar noted  Ostomy bag noted  Musculoskeletal: Normal range of motion  Neurological: She is alert and oriented to person, place, and time  Skin: She is not diaphoretic  Psychiatric: Her behavior is normal        Discharge instructions/Information to patient and family:   See after visit summary for information provided to patient and family  Provisions for Follow-Up Care:  See after visit summary for information related to follow-up care and any pertinent home health orders        Disposition:     Home    For Discharges to [facility] SNF:   · Not Applicable to this Patient - Not Applicable to this Patient       Discharge Statement:  I spent 35  minutes discharging the patient  This time was spent on the day of discharge  I had direct contact with the patient on the day of discharge  Greater than 50% of the total time was spent examining patient, answering all patient questions, arranging and discussing plan of care with patient as well as directly providing post-discharge instructions  Additional time then spent on discharge activities  Discharge Medications:  See after visit summary for reconciled discharge medications provided to patient and family        ** Please Note: This note has been constructed using a voice recognition system **

## 2019-11-06 NOTE — WOUND OSTOMY CARE
Progress Note - Wound   Zhao Hooks 62 y o  female MRN: [id number]  Unit/Bed#: -01 Encounter: [id number]      Assessment:  Wound care to see patient at request of nursing for assistance with pouching abdominal enterocutaneous fistula  Per nursing the patients pouches are leaking and not staying on for more than an hour  Patient with history of multiple abdominal surgeries with a surgeon from [facility]  Evaluated by acute care surgery at this [place] as well  Patient is continent and ambulatory  Declined a full skin assessment  Patient reports she has had this fistula with drainage for about 2 months  Has been using ostomy pouches at home  Longest wear time was about 1 day  She reports she has supplies at home  Nursing can provide some more supplies for her if she desires from the unit storeroom  1  Abdominal fistula with mesh in place  POA  Round shape  Measures approx  3 5x3 5cm  I did not measure depth due to the presence of mesh and sutures  Patient reports the drainage from the fistula has been increasing lately  No visible os  There is some scattered partial thickness wounds in the liudmila-fistular area related to the exposure of the feculent material to the skin from 3 - 7o'clock, 100% pink areas of irregular shape  Liudmila-fistular skin is slightly denuded and sore from moisture exposure  Due to the multiple surgeries the skin is dimpled with multiple areas of scarring  Nursing reports the leaking occurs from 3-9 o'clock  Effluent is moderate volume of thin yellow feculent material     Patient agreeable to trying a different pouching method as she is frustrated with the leaking  Need to treat the denuded open skin and will use flexible one piece barrier to hopefully conform to the contours of the liudmila-fistular skin  Pouching plan:  1  Cleanse the skin with warm water and pat dry  2  Cut to fit one piece ostomy pouch   3  Crust the liudmila-fistular skin   Dust with stoma powder and seal with 3m no sting  Do this x 2   4  Apply 1/2 catherine ring from 3 - 9 o'clock ( this is the deepest dip in the skin + the leak point )  5  Apply 3m no sting to the reminder of the liudmila-fistular skin  6  Apply one piece ostomy pouch  No induration, fluctuance, redness, or purulence noted to the above noted  New pouch applied with good seal noted  Patient tolerated well, thankful for evaluation  Primary nurse aware of plan of care  Discussed with Dr Leo Juan, [facility]  Plan:   1  Empty pouch PRN when no more than 1/3 full  2  Change appliance when leaking, if not every 3 days  3  Will follow PRN  Objective:    Vitals: Blood pressure (!) 95/49, pulse 78, temperature 98 3 °F (36 8 °C), resp  rate 17, height 5' 4" (1 626 m), weight 69 9 kg (154 lb), SpO2 97 %, not currently breastfeeding  ,Body mass index is 26 43 kg/m²  Will follow along with patient PRN during the hospital stay    Ariella Ceja RN BSN CWON

## 2019-11-06 NOTE — ASSESSMENT & PLAN NOTE
· Unclear etiology, but differential includes worsening edema versus neuropathic pain  · Venous Doppler negative for DVT    · Continue gabapentin scheduled  · Elevate extremitie  · Continue pain regimen with morphine and Dilaudid as otherwise ordered  · See plan for lower extremity edema

## 2019-11-06 NOTE — ASSESSMENT & PLAN NOTE
Continue home dose of scheduled Valium t i d  Stable  Recommended close follow-up with PCP and Psychiatry outpatient

## 2019-11-06 NOTE — ASSESSMENT & PLAN NOTE
Avoid unnecessary opioids  However, patient reports allergies to NSAIDs and Tylenol  Continue pain regimen with morphine and Dilaudid  Continue gabapentin  Recommended close follow-up with pain management outpatient upon discharge

## 2019-11-06 NOTE — ASSESSMENT & PLAN NOTE
Suspected due to poor nutritional status  Elevate lower extremities  Recommended protein supplement  Venous Dopplers negative for DVT

## 2019-11-06 NOTE — DISCHARGE INSTRUCTIONS
Short Bowel Syndrome   WHAT YOU NEED TO KNOW:   Short bowel syndrome is a condition that prevents your intestines from absorbing the nutrients it needs  Short bowel syndrome occurs when the intestine is shorter than normal or does not work the way it should  DISCHARGE INSTRUCTIONS:   Medicines:   · Antibiotics: This medicine will help fight or prevent an infection  Take your antibiotics until they are gone, even if you feel better  · Diarrhea medicine: This medicine is given to decrease the amount of diarrhea you are having  Some of these medicines coat the intestine and make bowel movements less watery  Other medicines work by slowing down how fast the intestines move food through  · Antacids: You may need antacids to decrease stomach acid  · NSAIDs , such as ibuprofen, help decrease swelling, pain, and fever  This medicine is available with or without a doctor's order  NSAIDs can cause stomach bleeding or kidney problems in certain people  If you take blood thinner medicine, always ask your healthcare provider if NSAIDs are safe for you  Always read the medicine label and follow directions  · Nutritional supplements: You may need vitamin and mineral supplements because your intestines cannot absorb these nutrients from food  · Take your medicine as directed  Contact your healthcare provider if you think your medicine is not helping or if you have side effects  Tell him or her if you are allergic to any medicine  Keep a list of the medicines, vitamins, and herbs you take  Include the amounts, and when and why you take them  Bring the list or the pill bottles to follow-up visits  Carry your medicine list with you in case of an emergency  Follow up with your healthcare provider as directed:  Write down your questions so you remember to ask them during your visits  Rest:  Rest when you feel it is needed  Slowly start to do more each day  Return to your daily activities as directed  Nutrition:  Nutrition may depend on the cause of your short bowel syndrome or how much bowel is left  You may need to meet with a dietician to talk about the best foods for you  Weigh yourself daily before breakfast:  Record your weight in a journal  Healthcare providers may need to compare your weight from day to day  This helps them determine the amount of body fluid you have  If you lose too much body fluid, you can become dehydrated  If you have too much body fluid, you may have trouble breathing  Ask what your weight should be  Contact your healthcare provider if:   · You have a fever  · You feel achy, or have chills, weakness, or a cough  · Your abdominal pain does not go away, even after treatment  · You have questions or concerns about your condition or care  Seek care immediately or call 911 if:   · You have severe abdominal pain  · Your bowel movements are dark or have blood in them  · You feel like you are going to pass out  © 2017 [address] Information is for End User's use only and may not be sold, redistributed or otherwise used for commercial purposes  All illustrations and images included in CareNotes® are the copyrighted property of [employer]  or [employer]  The above information is an  only  It is not intended as medical advice for individual conditions or treatments  Talk to your doctor, nurse or pharmacist before following any medical regimen to see if it is safe and effective for you

## 2019-11-06 NOTE — PROGRESS NOTES
Pt continues to complain about abdominal pain and requesting IV dilaudid  Explained to patient it is not due yet and its for breakthrough pain, and that the oral dilaudid should be taken first and the IV is available if the pills do not help  Pt states she will not take the oral dilaudid because it does not work and she will wait for the IV dilaudid

## 2019-11-06 NOTE — ASSESSMENT & PLAN NOTE
Patient states she is no longer taking Lexapro  Denies homicidal, suicidal ideation  Patient is refusing for inpatient psych consult  Continue Seroquel at bedtime  Recommended close follow-up with PCP and Psychiatry outpatient

## 2019-11-06 NOTE — ASSESSMENT & PLAN NOTE
Continue gabapentin scheduled  Continue current regimen of opioids  Recommended close follow-up with PCP and pain management outpatient upon discharge

## 2019-11-07 NOTE — ASSESSMENT & PLAN NOTE
likely secondary to multifactorial etiology including lower extremity pain, abdominal pain, and recent multiple surgeries  Seen ambulating in her room w/o any major issues    Requesting to go home so she can follow up with her primary surgeon at [facility]

## 2019-11-07 NOTE — ASSESSMENT & PLAN NOTE
CT scan of the abdomen revealed several air-fluid collections at the anterior abdominal wall with over wounds in the lower anterior abdominal wall, which appear to be chronic  She denies purulent drainage from her abdomen  Prior history of multiple abdominal surgeries and enterocutaneous fistula  Complaining of pain around colostomy site  · Continue her home medications including morphine and hydromorphone  PDMP verfied  · Was evaluated by surgery and recommended no surgical intervention and stable from surgery end  · Nutrition consulted for short-gut syndrome  · Supportive care  · Pt   Is requesting to go home so she can follow up her primary surgeon Rosa De La Cruz at [facility]

## 2019-11-07 NOTE — ASSESSMENT & PLAN NOTE
Hemoglobin stable, at baseline  Likely from inadequate absorption secondary to multiple bowel surgeries  Start on Iron supplement    F/u with PCP

## 2019-11-07 NOTE — ASSESSMENT & PLAN NOTE
Malnutrition Findings:   Malnutrition type: Chronic illness  Degree of Malnutrition: Malnutrition of mild degree    BMI Findings: Body mass index is 26 43 kg/m²  Mild protein calorie malnutrition due to short bowel syndrome as evidenced by unable / unwillingness to eat sufficient intake  Recommended protein supplement with diet  Nutrition consultation

## 2019-11-07 NOTE — ASSESSMENT & PLAN NOTE
Suspected due to poor nutritional status  Elevate lower extremities  Recommended protein supplement  Venous Dopplers negative for DVT    F/u with PCP

## 2019-11-07 NOTE — ASSESSMENT & PLAN NOTE
No surgical intervention planned per surgery team's recommendations     Pt  Wants to go home so she can f/w with per primary surgeon Dr Franco Found at [facility]

## 2019-12-11 ENCOUNTER — HOSPITAL ENCOUNTER (EMERGENCY)
Facility: HOSPITAL | Age: 64
Discharge: HOME | End: 2019-12-11
Attending: EMERGENCY MEDICINE
Payer: MEDICARE

## 2019-12-11 VITALS
TEMPERATURE: 98.3 F | DIASTOLIC BLOOD PRESSURE: 60 MMHG | SYSTOLIC BLOOD PRESSURE: 109 MMHG | BODY MASS INDEX: 28.48 KG/M2 | HEART RATE: 87 BPM | WEIGHT: 160.72 LBS | OXYGEN SATURATION: 97 % | RESPIRATION RATE: 18 BRPM | HEIGHT: 63 IN

## 2019-12-11 DIAGNOSIS — Z45.2 ENCOUNTER FOR CARE RELATED TO VASCULAR ACCESS PORT: Primary | ICD-10-CM

## 2019-12-11 PROCEDURE — 99283 EMERGENCY DEPT VISIT LOW MDM: CPT

## 2019-12-11 PROCEDURE — 99282 EMERGENCY DEPT VISIT SF MDM: CPT | Performed by: PHYSICIAN ASSISTANT

## 2019-12-11 RX ADMIN — ALTEPLASE 2 MG: 2.2 INJECTION, POWDER, LYOPHILIZED, FOR SOLUTION INTRAVENOUS at 15:41

## 2019-12-11 NOTE — ED PROVIDER NOTES
History  Chief Complaint   Patient presents with    Vascular Access Problem     pt reports clogged central line  pt reports she forgot to flush her line and now the line is clogged  pt reports needing TPN at home  61 yo with right subclavian catheter which has become obstructed  Uses for TPN  Last used last night  She has had this before and with TPA has gotten catheter to function again  Denies any chest pain or sob  History provided by:  Patient   used: No    Medical Problem   Location:  See hpi  Severity:  Mild  Onset quality:  Sudden  Duration:  1 day  Timing:  Constant  Progression:  Unchanged  Chronicity:  New  Associated symptoms: no abdominal pain, no chest pain, no congestion, no cough, no diarrhea, no fatigue, no fever, no headaches, no myalgias, no nausea, no rash, no rhinorrhea, no shortness of breath, no sore throat, no vomiting and no wheezing        Prior to Admission Medications   Prescriptions Last Dose Informant Patient Reported? Taking? HYDROmorphone (DILAUDID) 4 mg tablet  Self Yes No   Sig: Take 4 mg by mouth 4 (four) times a day   alendronate (FOSAMAX) 70 mg tablet  Self Yes No   Sig: TAKE 1 TABLET 30-60 MINUTES PRIOR TO BREAKFAST ON AN EMPTY STOMAC     (REFER TO PRESCRIPTION NOTES)  diazepam (VALIUM) 5 mg tablet  Self Yes No   Sig: 3 (three) times a day Morning, 3pm and 8pm   diclofenac sodium (VOLTAREN) 1 %  Self Yes No   escitalopram (LEXAPRO) 20 mg tablet  Self Yes No   Sig: daily       ferrous sulfate 324 (65 Fe) mg   No No   Sig: Take 1 tablet (324 mg total) by mouth 2 (two) times a day before meals   gabapentin (NEURONTIN) 300 mg capsule  Self Yes No   Sig: take 2 capsules by mouth three times a day   hydrochlorothiazide (MICROZIDE) 12 5 mg capsule  Self Yes No   Sig: every morning    magnesium oxide (MAG-OX) 400 mg   No No   Sig: Take 1 tablet (400 mg total) by mouth 2 (two) times a day   morphine (MS CONTIN) 30 mg 12 hr tablet  Self Yes No   Sig: take 1 tablet by mouth every 8 hours   naloxegol oxalate (MOVANTIK) 25 MG tablet   No No   Sig: Take 1 tablet in the morning either 1 hour prior to meal or 2 hours after meal    Patient not taking: Reported on 11/3/2019   polyethylene glycol-electrolytes (NULYTELY) 4000 mL solution   No No   Sig: Take 4,000 mL by mouth once for 1 dose   potassium chloride (K-DUR,KLOR-CON) 20 mEq tablet   No No   Sig: Take 2 tablets (40 mEq total) by mouth daily      Facility-Administered Medications: None       Past Medical History:   Diagnosis Date    Asthma     Bowel obstruction (HCC)     Chronic back pain     Colon polyp     Enlarged kidney     Enterocutaneous fistula 2019    Ileus ([address] ) 2018    Overview:  Last Assessment & Plan:  Patient presented with generalized abdominal pain nausea and bilious vomiting ,imaging study reports " diffuse bowel dilatation involving distal small bowel and the entire colon to the panel verge obstruction mass is not identified and this may represent adynamic ileus in the postoperative setting possible related to medication"  Patient states that she still ha    Liver mass        Past Surgical History:   Procedure Laterality Date    ABDOMINAL SURGERY      x 25    BACK SURGERY      x 3    COLONOSCOPY N/A 2018    Procedure: COLONOSCOPY;  Surgeon: Ron Palomo MD;  Location: [facility]; Service: Gastroenterology    GASTROCUTANEOUS FISTULA CLOSURE      HYSTERECTOMY      TONSILLECTOMY         Family History   Problem Relation Age of Onset    Diabetes Mother      I have reviewed and agree with the history as documented      Social History     Tobacco Use    Smoking status: Former Smoker     Last attempt to quit: 1980     Years since quittin 5    Smokeless tobacco: Never Used   Substance Use Topics    Alcohol use: Never     Alcohol/week: 0 0 standard drinks     Frequency: Never     Binge frequency: Never     Comment: social drinker    Drug use: No        Review of Systems   Constitutional: Negative for activity change, appetite change, chills, diaphoresis, fatigue, fever and unexpected weight change  HENT: Negative for congestion, rhinorrhea, sinus pressure, sore throat and trouble swallowing  Eyes: Negative for photophobia and visual disturbance  Respiratory: Negative for apnea, cough, choking, chest tightness, shortness of breath, wheezing and stridor  Cardiovascular: Negative for chest pain, palpitations and leg swelling  Gastrointestinal: Negative for abdominal distention, abdominal pain, blood in stool, constipation, diarrhea, nausea and vomiting  Genitourinary: Negative for decreased urine volume, difficulty urinating, dysuria, enuresis, flank pain, frequency, hematuria and urgency  Musculoskeletal: Negative for arthralgias, myalgias, neck pain and neck stiffness  Skin: Negative for color change, pallor, rash and wound  Allergic/Immunologic: Negative  Neurological: Negative for dizziness, tremors, syncope, weakness, light-headedness, numbness and headaches  Hematological: Negative  Psychiatric/Behavioral: Negative  All other systems reviewed and are negative  Physical Exam  Physical Exam   Constitutional: She is oriented to person, place, and time  She appears well-developed and well-nourished  Non-toxic appearance  She does not have a sickly appearance  She does not appear ill  No distress  HENT:   Head: Normocephalic and atraumatic  Eyes: Pupils are equal, round, and reactive to light  EOM and lids are normal    Neck: Normal range of motion  Neck supple  Cardiovascular: Normal rate, regular rhythm, S1 normal, S2 normal, normal heart sounds, intact distal pulses and normal pulses  Exam reveals no gallop, no distant heart sounds, no friction rub and no decreased pulses  No murmur heard  Pulses:       Radial pulses are 2+ on the right side, and 2+ on the left side     Pulmonary/Chest: Effort normal and breath sounds normal  No accessory muscle usage  No apnea, no tachypnea and no bradypnea  No respiratory distress  She has no decreased breath sounds  She has no wheezes  She has no rhonchi  She has no rales  Abdominal: Soft  Normal appearance  She exhibits no distension  There is no tenderness  There is no rigidity, no rebound and no guarding  Musculoskeletal: Normal range of motion  She exhibits no edema, tenderness or deformity  Neurological: She is alert and oriented to person, place, and time  No cranial nerve deficit  GCS eye subscore is 4  GCS verbal subscore is 5  GCS motor subscore is 6  Skin: Skin is warm, dry and intact  No rash noted  She is not diaphoretic  No erythema  No pallor  Psychiatric: Her speech is normal    Nursing note and vitals reviewed  Vital Signs  ED Triage Vitals [12/11/19 1436]   Temperature Pulse Respirations Blood Pressure SpO2   98 3 °F (36 8 °C) 87 18 109/60 97 %      Temp Source Heart Rate Source Patient Position - Orthostatic VS BP Location FiO2 (%)   Oral Monitor Sitting Left arm --      Pain Score       --           Vitals:    12/11/19 1436   BP: 109/60   Pulse: 87   Patient Position - Orthostatic VS: Sitting         Visual Acuity      ED Medications  Medications   alteplase (CATHFLO) injection 2 mg (2 mg Intracatheter Given 12/11/19 1541)       Diagnostic Studies  Results Reviewed     None                 No orders to display              Procedures  Procedures         ED Course                               MDM  Number of Diagnoses or Management Options  Encounter for care related to vascular access port: new and requires workup  Diagnosis management comments: ddx includes but is not limited to occluded catheter, catheter malfunction, infection  Plan: will use cathflo  dispo pending  Risk of Complications, Morbidity, and/or Mortality  Presenting problems: low  Management options: low  General comments: 61 yo with malfunctioning CVC   Now resolved and functioning appropriately after cathflo  Recommended close follow up with PCP  Return parameters provided  Pt understands and agrees with plan  Patient Progress  Patient progress: stable        Disposition  Final diagnoses:   Encounter for care related to vascular access port     Time reflects when diagnosis was documented in both MDM as applicable and the Disposition within this note     Time User Action Codes Description Comment    12/11/2019  3:43 PM Nancyaleida Javier Add [Z45 2] Encounter for care related to vascular access port       ED Disposition     ED Disposition Condition Date/Time Comment    Discharge Stable Wed Dec 11, 2019  3:43 PM Anai Nieto discharge to home/self care  Follow-up Information     Follow up With Specialties Details Why Contact Info Additional Information    Ayde Acuña, DO Family Medicine Call  As needed Alexey Dasilva [address] [place]       [facility] Emergency Department Emergency Medicine Go to  If symptoms worsen [address] [facility] [id number]  [address] [place] [address] ED, [address] [place], [place], [address]          Discharge Medication List as of 12/11/2019  3:44 PM      CONTINUE these medications which have NOT CHANGED    Details   alendronate (FOSAMAX) 70 mg tablet TAKE 1 TABLET 30-60 MINUTES PRIOR TO BREAKFAST ON AN EMPTY STOMAC     (REFER TO PRESCRIPTION NOTES)  , Historical Med      diazepam (VALIUM) 5 mg tablet 3 (three) times a day Morning, 3pm and 8pm, Starting Fri 11/3/2017, Historical Med      diclofenac sodium (VOLTAREN) 1 % Historical Med      escitalopram (LEXAPRO) 20 mg tablet daily    , Historical Med      ferrous sulfate 324 (65 Fe) mg Take 1 tablet (324 mg total) by mouth 2 (two) times a day before meals, Starting Wed 11/6/2019, Normal      gabapentin (NEURONTIN) 300 mg capsule take 2 capsules by mouth three times a day, Historical Med      hydrochlorothiazide (MICROZIDE) 12 5 mg capsule every morning , Historical Med      HYDROmorphone (DILAUDID) 4 mg tablet Take 4 mg by mouth 4 (four) times a day, Historical Med      magnesium oxide (MAG-OX) 400 mg Take 1 tablet (400 mg total) by mouth 2 (two) times a day, Starting Wed 11/6/2019, Normal      morphine (MS CONTIN) 30 mg 12 hr tablet take 1 tablet by mouth every 8 hours, Historical Med      naloxegol oxalate (MOVANTIK) 25 MG tablet Take 1 tablet in the morning either 1 hour prior to meal or 2 hours after meal , Normal      polyethylene glycol-electrolytes (NULYTELY) 4000 mL solution Take 4,000 mL by mouth once for 1 dose, Starting Tue 5/21/2019, Normal      potassium chloride (K-DUR,KLOR-CON) 20 mEq tablet Take 2 tablets (40 mEq total) by mouth daily, Starting Wed 11/6/2019, Normal           No discharge procedures on file      ED Provider  Electronically Signed by           Kathy Carrera PA-C  12/23/19 7075

## 2019-12-11 NOTE — PROGRESS NOTES
Able to flush CVC freely, no blood return noted  1mg tPA to dwell in each port per physician order  Will check for blood return in 30 minutes

## 2019-12-11 NOTE — ED NOTES
Care completed by VICTORIA Lundberg, flushes without difficulty and caps replaced  Patient ambulatory to waiting room       Arleen Peck RN  12/11/19 6510

## 2020-01-02 ENCOUNTER — LAB REQUISITION (OUTPATIENT)
Dept: LAB | Facility: HOSPITAL | Age: 65
End: 2020-01-02
Payer: MEDICARE

## 2020-01-02 DIAGNOSIS — K91.2 POSTSURGICAL MALABSORPTION, NOT ELSEWHERE CLASSIFIED: ICD-10-CM

## 2020-01-02 PROCEDURE — 82607 VITAMIN B-12: CPT | Performed by: SURGERY

## 2020-01-02 PROCEDURE — 83540 ASSAY OF IRON: CPT | Performed by: SURGERY

## 2020-01-02 PROCEDURE — 82746 ASSAY OF FOLIC ACID SERUM: CPT | Performed by: SURGERY

## 2020-01-03 LAB
FOLATE SERPL-MCNC: 15.7 NG/ML (ref 3.1–17.5)
IRON SERPL-MCNC: 241 UG/DL (ref 50–170)
VIT B12 SERPL-MCNC: 510 PG/ML (ref 100–900)

## 2020-01-10 ENCOUNTER — TELEPHONE (OUTPATIENT)
Dept: GASTROENTEROLOGY | Facility: CLINIC | Age: 65
End: 2020-01-10

## 2020-01-10 NOTE — TELEPHONE ENCOUNTER
rcvd request for prior auth for relistor   Submitted prior auth by fax to [facility] with clinicals  Awaiting response

## 2020-01-13 ENCOUNTER — TELEPHONE (OUTPATIENT)
Dept: GASTROENTEROLOGY | Facility: CLINIC | Age: 65
End: 2020-01-13

## 2020-01-13 NOTE — TELEPHONE ENCOUNTER
Received approval from [facility] [phone] for Relistor 150mg from 12/31/20 to 12/31/20

## 2020-03-16 ENCOUNTER — APPOINTMENT (OUTPATIENT)
Dept: LAB | Facility: CLINIC | Age: 65
End: 2020-03-16
Payer: MEDICARE

## 2020-03-16 ENCOUNTER — TRANSCRIBE ORDERS (OUTPATIENT)
Dept: ADMINISTRATIVE | Facility: HOSPITAL | Age: 65
End: 2020-03-16

## 2020-03-16 DIAGNOSIS — K63.0 ABSCESS OF INTESTINE: ICD-10-CM

## 2020-03-16 DIAGNOSIS — E43 ALIMENTARY EDEMA (HCC): ICD-10-CM

## 2020-03-16 DIAGNOSIS — K63.0 ABSCESS OF INTESTINE: Primary | ICD-10-CM

## 2020-03-16 LAB
ALBUMIN SERPL BCP-MCNC: 3.4 G/DL (ref 3.5–5)
ALP SERPL-CCNC: 235 U/L (ref 46–116)
ALT SERPL W P-5'-P-CCNC: 37 U/L (ref 12–78)
ANION GAP SERPL CALCULATED.3IONS-SCNC: 8 MMOL/L (ref 4–13)
AST SERPL W P-5'-P-CCNC: 30 U/L (ref 5–45)
BASOPHILS # BLD AUTO: 0.04 THOUSANDS/ΜL (ref 0–0.1)
BASOPHILS NFR BLD AUTO: 1 % (ref 0–1)
BILIRUB SERPL-MCNC: 0.51 MG/DL (ref 0.2–1)
BUN SERPL-MCNC: 8 MG/DL (ref 5–25)
CALCIUM SERPL-MCNC: 7.9 MG/DL (ref 8.3–10.1)
CHLORIDE SERPL-SCNC: 107 MMOL/L (ref 100–108)
CO2 SERPL-SCNC: 25 MMOL/L (ref 21–32)
CREAT SERPL-MCNC: 0.89 MG/DL (ref 0.6–1.3)
EOSINOPHIL # BLD AUTO: 0.09 THOUSAND/ΜL (ref 0–0.61)
EOSINOPHIL NFR BLD AUTO: 2 % (ref 0–6)
ERYTHROCYTE [DISTWIDTH] IN BLOOD BY AUTOMATED COUNT: 15.3 % (ref 11.6–15.1)
GFR SERPL CREATININE-BSD FRML MDRD: 69 ML/MIN/1.73SQ M
GLUCOSE SERPL-MCNC: 60 MG/DL (ref 65–140)
HCT VFR BLD AUTO: 30.7 % (ref 34.8–46.1)
HGB BLD-MCNC: 9.7 G/DL (ref 11.5–15.4)
IMM GRANULOCYTES # BLD AUTO: 0.01 THOUSAND/UL (ref 0–0.2)
IMM GRANULOCYTES NFR BLD AUTO: 0 % (ref 0–2)
LYMPHOCYTES # BLD AUTO: 1.59 THOUSANDS/ΜL (ref 0.6–4.47)
LYMPHOCYTES NFR BLD AUTO: 32 % (ref 14–44)
MAGNESIUM SERPL-MCNC: 2 MG/DL (ref 1.6–2.6)
MCH RBC QN AUTO: 26.8 PG (ref 26.8–34.3)
MCHC RBC AUTO-ENTMCNC: 31.6 G/DL (ref 31.4–37.4)
MCV RBC AUTO: 85 FL (ref 82–98)
MONOCYTES # BLD AUTO: 0.46 THOUSAND/ΜL (ref 0.17–1.22)
MONOCYTES NFR BLD AUTO: 9 % (ref 4–12)
NEUTROPHILS # BLD AUTO: 2.8 THOUSANDS/ΜL (ref 1.85–7.62)
NEUTS SEG NFR BLD AUTO: 56 % (ref 43–75)
NRBC BLD AUTO-RTO: 0 /100 WBCS
PHOSPHATE SERPL-MCNC: 3.3 MG/DL (ref 2.3–4.1)
PLATELET # BLD AUTO: 212 THOUSANDS/UL (ref 149–390)
PMV BLD AUTO: 11.1 FL (ref 8.9–12.7)
POTASSIUM SERPL-SCNC: 3.9 MMOL/L (ref 3.5–5.3)
PROT SERPL-MCNC: 6.6 G/DL (ref 6.4–8.2)
RBC # BLD AUTO: 3.62 MILLION/UL (ref 3.81–5.12)
SODIUM SERPL-SCNC: 140 MMOL/L (ref 136–145)
TRIGL SERPL-MCNC: 100 MG/DL
WBC # BLD AUTO: 4.99 THOUSAND/UL (ref 4.31–10.16)

## 2020-03-16 PROCEDURE — 80053 COMPREHEN METABOLIC PANEL: CPT | Performed by: SURGERY

## 2020-03-16 PROCEDURE — 85025 COMPLETE CBC W/AUTO DIFF WBC: CPT | Performed by: SURGERY

## 2020-03-16 PROCEDURE — 84100 ASSAY OF PHOSPHORUS: CPT

## 2020-03-16 PROCEDURE — 36415 COLL VENOUS BLD VENIPUNCTURE: CPT | Performed by: SURGERY

## 2020-03-16 PROCEDURE — 83735 ASSAY OF MAGNESIUM: CPT

## 2020-03-16 PROCEDURE — 84478 ASSAY OF TRIGLYCERIDES: CPT

## 2020-03-25 ENCOUNTER — LAB REQUISITION (OUTPATIENT)
Dept: LAB | Facility: HOSPITAL | Age: 65
End: 2020-03-25
Payer: MEDICARE

## 2020-03-25 DIAGNOSIS — K63.2 FISTULA OF INTESTINE: ICD-10-CM

## 2020-03-25 LAB
ALBUMIN SERPL BCP-MCNC: 2.8 G/DL (ref 3.5–5)
ALP SERPL-CCNC: 237 U/L (ref 46–116)
ALT SERPL W P-5'-P-CCNC: 39 U/L (ref 12–78)
ANION GAP SERPL CALCULATED.3IONS-SCNC: 6 MMOL/L (ref 4–13)
AST SERPL W P-5'-P-CCNC: 27 U/L (ref 5–45)
BASOPHILS # BLD AUTO: 0.03 THOUSANDS/ΜL (ref 0–0.1)
BASOPHILS NFR BLD AUTO: 1 % (ref 0–1)
BILIRUB SERPL-MCNC: 0.2 MG/DL (ref 0.2–1)
BUN SERPL-MCNC: 16 MG/DL (ref 5–25)
CALCIUM SERPL-MCNC: 8.1 MG/DL (ref 8.3–10.1)
CHLORIDE SERPL-SCNC: 106 MMOL/L (ref 100–108)
CO2 SERPL-SCNC: 29 MMOL/L (ref 21–32)
CREAT SERPL-MCNC: 0.84 MG/DL (ref 0.6–1.3)
EOSINOPHIL # BLD AUTO: 0.16 THOUSAND/ΜL (ref 0–0.61)
EOSINOPHIL NFR BLD AUTO: 5 % (ref 0–6)
ERYTHROCYTE [DISTWIDTH] IN BLOOD BY AUTOMATED COUNT: 15.9 % (ref 11.6–15.1)
GFR SERPL CREATININE-BSD FRML MDRD: 74 ML/MIN/1.73SQ M
GLUCOSE SERPL-MCNC: 129 MG/DL (ref 65–140)
HCT VFR BLD AUTO: 28.6 % (ref 34.8–46.1)
HGB BLD-MCNC: 9.1 G/DL (ref 11.5–15.4)
IMM GRANULOCYTES # BLD AUTO: 0.01 THOUSAND/UL (ref 0–0.2)
IMM GRANULOCYTES NFR BLD AUTO: 0 % (ref 0–2)
LYMPHOCYTES # BLD AUTO: 0.98 THOUSANDS/ΜL (ref 0.6–4.47)
LYMPHOCYTES NFR BLD AUTO: 29 % (ref 14–44)
MAGNESIUM SERPL-MCNC: 2 MG/DL (ref 1.6–2.6)
MCH RBC QN AUTO: 27.2 PG (ref 26.8–34.3)
MCHC RBC AUTO-ENTMCNC: 31.8 G/DL (ref 31.4–37.4)
MCV RBC AUTO: 86 FL (ref 82–98)
MONOCYTES # BLD AUTO: 0.43 THOUSAND/ΜL (ref 0.17–1.22)
MONOCYTES NFR BLD AUTO: 13 % (ref 4–12)
NEUTROPHILS # BLD AUTO: 1.78 THOUSANDS/ΜL (ref 1.85–7.62)
NEUTS SEG NFR BLD AUTO: 52 % (ref 43–75)
NRBC BLD AUTO-RTO: 0 /100 WBCS
PHOSPHATE SERPL-MCNC: 3.4 MG/DL (ref 2.3–4.1)
PLATELET # BLD AUTO: 158 THOUSANDS/UL (ref 149–390)
PMV BLD AUTO: 10.9 FL (ref 8.9–12.7)
POTASSIUM SERPL-SCNC: 4.6 MMOL/L (ref 3.5–5.3)
PROT SERPL-MCNC: 5.8 G/DL (ref 6.4–8.2)
RBC # BLD AUTO: 3.34 MILLION/UL (ref 3.81–5.12)
SODIUM SERPL-SCNC: 141 MMOL/L (ref 136–145)
TRIGL SERPL-MCNC: 79 MG/DL
WBC # BLD AUTO: 3.39 THOUSAND/UL (ref 4.31–10.16)

## 2020-03-25 PROCEDURE — 84100 ASSAY OF PHOSPHORUS: CPT | Performed by: SURGERY

## 2020-03-25 PROCEDURE — 80053 COMPREHEN METABOLIC PANEL: CPT | Performed by: SURGERY

## 2020-03-25 PROCEDURE — 83735 ASSAY OF MAGNESIUM: CPT | Performed by: SURGERY

## 2020-03-25 PROCEDURE — 84478 ASSAY OF TRIGLYCERIDES: CPT | Performed by: SURGERY

## 2020-03-25 PROCEDURE — 85025 COMPLETE CBC W/AUTO DIFF WBC: CPT | Performed by: SURGERY

## 2020-07-20 ENCOUNTER — OFFICE VISIT (OUTPATIENT)
Dept: FAMILY MEDICINE CLINIC | Facility: CLINIC | Age: 65
End: 2020-07-20
Payer: MEDICARE

## 2020-07-20 VITALS
HEIGHT: 63 IN | HEART RATE: 73 BPM | WEIGHT: 154 LBS | SYSTOLIC BLOOD PRESSURE: 110 MMHG | DIASTOLIC BLOOD PRESSURE: 70 MMHG | BODY MASS INDEX: 27.29 KG/M2 | TEMPERATURE: 96.9 F | OXYGEN SATURATION: 99 %

## 2020-07-20 DIAGNOSIS — K91.2 SHORT GUT SYNDROME: ICD-10-CM

## 2020-07-20 DIAGNOSIS — M54.6 CHRONIC MIDLINE THORACIC BACK PAIN: ICD-10-CM

## 2020-07-20 DIAGNOSIS — Z00.00 MEDICARE ANNUAL WELLNESS VISIT, INITIAL: ICD-10-CM

## 2020-07-20 DIAGNOSIS — K63.2 ENTEROCUTANEOUS FISTULA: Primary | ICD-10-CM

## 2020-07-20 DIAGNOSIS — Z13.820 SCREENING FOR OSTEOPOROSIS: ICD-10-CM

## 2020-07-20 DIAGNOSIS — Z12.39 SCREENING FOR BREAST CANCER: ICD-10-CM

## 2020-07-20 DIAGNOSIS — R33.9 URINARY RETENTION: ICD-10-CM

## 2020-07-20 DIAGNOSIS — G89.29 CHRONIC MIDLINE THORACIC BACK PAIN: ICD-10-CM

## 2020-07-20 DIAGNOSIS — F11.90 CHRONIC, CONTINUOUS USE OF OPIOIDS: ICD-10-CM

## 2020-07-20 PROBLEM — E87.6 HYPOKALEMIA: Status: RESOLVED | Noted: 2018-07-02 | Resolved: 2020-07-20

## 2020-07-20 PROBLEM — R10.9 ABDOMINAL PAIN: Status: RESOLVED | Noted: 2019-05-16 | Resolved: 2020-07-20

## 2020-07-20 PROBLEM — M79.604 BILATERAL LOWER EXTREMITY PAIN: Status: RESOLVED | Noted: 2019-11-03 | Resolved: 2020-07-20

## 2020-07-20 PROBLEM — K90.829 SHORT GUT SYNDROME: Status: ACTIVE | Noted: 2020-07-20

## 2020-07-20 PROBLEM — R93.89 ABNORMAL CT OF THE CHEST: Status: RESOLVED | Noted: 2019-02-26 | Resolved: 2020-07-20

## 2020-07-20 PROBLEM — R60.0 BILATERAL LOWER EXTREMITY EDEMA: Status: RESOLVED | Noted: 2019-11-03 | Resolved: 2020-07-20

## 2020-07-20 PROBLEM — D64.9 ANEMIA: Status: RESOLVED | Noted: 2018-07-05 | Resolved: 2020-07-20

## 2020-07-20 PROBLEM — F11.20 OPIOID DEPENDENCE (HCC): Status: RESOLVED | Noted: 2019-02-26 | Resolved: 2020-07-20

## 2020-07-20 PROBLEM — R74.8 ELEVATED LIVER ENZYMES: Status: RESOLVED | Noted: 2018-07-02 | Resolved: 2020-07-20

## 2020-07-20 PROBLEM — J45.41 MODERATE PERSISTENT ASTHMA WITH ACUTE EXACERBATION: Status: RESOLVED | Noted: 2019-05-10 | Resolved: 2020-07-20

## 2020-07-20 PROBLEM — R74.8 ABNORMAL SERUM LEVEL OF LIPASE: Status: RESOLVED | Noted: 2019-02-27 | Resolved: 2020-07-20

## 2020-07-20 PROBLEM — E83.42 HYPOMAGNESEMIA: Status: RESOLVED | Noted: 2019-11-05 | Resolved: 2020-07-20

## 2020-07-20 PROBLEM — M79.605 BILATERAL LOWER EXTREMITY PAIN: Status: RESOLVED | Noted: 2019-11-03 | Resolved: 2020-07-20

## 2020-07-20 PROCEDURE — 1036F TOBACCO NON-USER: CPT | Performed by: FAMILY MEDICINE

## 2020-07-20 PROCEDURE — 3008F BODY MASS INDEX DOCD: CPT | Performed by: FAMILY MEDICINE

## 2020-07-20 PROCEDURE — G0438 PPPS, INITIAL VISIT: HCPCS | Performed by: FAMILY MEDICINE

## 2020-07-20 PROCEDURE — 99204 OFFICE O/P NEW MOD 45 MIN: CPT | Performed by: FAMILY MEDICINE

## 2020-07-20 RX ORDER — PANTOPRAZOLE SODIUM 40 MG/1
40 TABLET, DELAYED RELEASE ORAL DAILY
COMMUNITY
End: 2020-12-03

## 2020-07-20 RX ORDER — LEVOFLOXACIN 500 MG/1
500 TABLET, FILM COATED ORAL EVERY 24 HOURS
Status: ON HOLD | COMMUNITY
End: 2020-10-23

## 2020-07-20 RX ORDER — TAMSULOSIN HYDROCHLORIDE 0.4 MG/1
0.8 CAPSULE ORAL
COMMUNITY
End: 2021-03-02 | Stop reason: SDUPTHER

## 2020-07-20 NOTE — PROGRESS NOTES
Assessment and Plan:     Problem List Items Addressed This Visit        Digestive    Enterocutaneous fistula - Primary    Short gut syndrome       Genitourinary    Urinary retention    Relevant Medications    tamsulosin (FLOMAX) 0 4 mg       Other    Chronic, continuous use of opioids    Chronic midline thoracic back pain    Medicare annual wellness visit, initial      Other Visit Diagnoses     Screening for breast cancer        Relevant Orders    Mammo screening bilateral w 3d & cad    Screening for osteoporosis        Relevant Orders    DXA bone density spine hip and pelvis           Preventive health issues were discussed with patient, and age appropriate screening tests were ordered as noted in patient's After Visit Summary  Personalized health advice and appropriate referrals for health education or preventive services given if needed, as noted in patient's After Visit Summary       History of Present Illness:     Patient presents for Medicare Annual Wellness visit    Patient Care Team:  Sanjuanita Blandon DO as PCP - General (Family Medicine)  MD Amandeep Herndon MD as Endoscopist     Problem List:     Patient Active Problem List   Diagnosis    Generalized anxiety disorder    Chronic, continuous use of opioids    Urinary retention    Chronic midline thoracic back pain    Depression    Hydronephrosis    Hx of colonic polyp    Therapeutic opioid induced constipation    Peapack's syndrome    DDD (degenerative disc disease), lumbosacral    Physical deconditioning    Iron deficiency anemia    Osteopenia    Enterocutaneous fistula    Mild protein malnutrition ([address] )    Short gut syndrome    Medicare annual wellness visit, initial      Past Medical and Surgical History:     Past Medical History:   Diagnosis Date    Asthma     Bowel obstruction ([address] )     Chronic back pain     Colon polyp     Enlarged kidney     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    Ileus ([address] ) 7/2/2018 Overview:  Last Assessment & Plan:  Patient presented with generalized abdominal pain nausea and bilious vomiting ,imaging study reports " diffuse bowel dilatation involving distal small bowel and the entire colon to the panel verge obstruction mass is not identified and this may represent adynamic ileus in the postoperative setting possible related to medication"  Patient states that she still ha    Liver mass      Past Surgical History:   Procedure Laterality Date    ABDOMINAL SURGERY      x 25    BACK SURGERY      x 3    COLONOSCOPY N/A 2018    Procedure: COLONOSCOPY;  Surgeon: Coty Bautista MD;  Location: [facility];   Service: Gastroenterology    GASTROCUTANEOUS FISTULA CLOSURE      HYSTERECTOMY      TONSILLECTOMY        Family History:     Family History   Problem Relation Age of Onset    Diabetes Mother       Social History:        Social History     Socioeconomic History    Marital status: /Civil Union     Spouse name: None    Number of children: None    Years of education: None    Highest education level: None   Occupational History    None   Social Needs    Financial resource strain: None    Food insecurity:     Worry: None     Inability: None    Transportation needs:     Medical: None     Non-medical: None   Tobacco Use    Smoking status: Former Smoker     Last attempt to quit: 1980     Years since quittin 0    Smokeless tobacco: Never Used   Substance and Sexual Activity    Alcohol use: Never     Alcohol/week: 0 0 standard drinks     Frequency: Never     Binge frequency: Never     Comment: social drinker    Drug use: No    Sexual activity: Yes   Lifestyle    Physical activity:     Days per week: None     Minutes per session: None    Stress: None   Relationships    Social connections:     Talks on phone: None     Gets together: None     Attends Sikh service: None     Active member of club or organization: None     Attends meetings of clubs or organizations: None     Relationship status: None    Intimate partner violence:     Fear of current or ex partner: None     Emotionally abused: None     Physically abused: None     Forced sexual activity: None   Other Topics Concern    None   Social History Narrative    Lives with , dog, and brother     Retired      Medications and Allergies:     Current Outpatient Medications   Medication Sig Dispense Refill    diazepam (VALIUM) 5 mg tablet 3 (three) times a day Morning, 3pm and 8pm      escitalopram (LEXAPRO) 20 mg tablet daily   gabapentin (NEURONTIN) 300 mg capsule take 2 capsules by mouth three times a day      HYDROmorphone (DILAUDID) 4 mg tablet Take 4 mg by mouth 4 (four) times a day      levofloxacin (LEVAQUIN) 500 mg tablet Take 500 mg by mouth every 24 hours      morphine (MS CONTIN) 30 mg 12 hr tablet take 1 tablet by mouth every 8 hours      pantoprazole (PROTONIX) 40 mg tablet Take 40 mg by mouth daily      tamsulosin (FLOMAX) 0 4 mg Take 0 8 mg by mouth daily with dinner      diclofenac sodium (VOLTAREN) 1 %        No current facility-administered medications for this visit        Allergies   Allergen Reactions    Nsaids      Irritable, upset stomach  Irritable, upset stomach    Tolmetin      Irritable, upset stomach    Codeine Hives     Per [address] admission orders    Morphine      Pt states that she is unable to take morphine, even though it is prescribed     Tylenol [Acetaminophen]       Immunizations:     Immunization History   Administered Date(s) Administered    INFLUENZA 08/14/2014, 08/10/2015, 08/04/2016, 08/01/2017, 08/16/2018    Influenza, injectable, quadrivalent, preservative free 0 5 mL 08/16/2018    Pneumococcal Conjugate 13-Valent 03/31/2016    Pneumococcal Polysaccharide PPV23 01/08/2015    Tdap 06/08/2015, 04/14/2016, 09/25/2017    Zoster 04/14/2016      Health Maintenance:         Topic Date Due    MAMMOGRAM  1955    Cervical Cancer Screening 02/12/1977    Hepatitis C Screening  07/20/2021 (Originally 1955)    CRC Screening: Colonoscopy  12/04/2028         Topic Date Due    Influenza Vaccine  07/01/2020      Medicare Health Risk Assessment:     /70   Pulse 73   Temp (!) 96 9 °F (36 1 °C)   Ht 5' 3" (1 6 m)   Wt 69 9 kg (154 lb)   LMP  (LMP Unknown)   SpO2 99%   BMI 27 28 kg/m²      Milagros Sanford is here for her Initial Wellness visit  Health Risk Assessment:   Patient rates overall health as good  Patient feels that their physical health rating is much worse  Eyesight was rated as same  Hearing was rated as same  Patient feels that their emotional and mental health rating is same  Pain experienced in the last 7 days has been a lot  Patient's pain rating has been 8/10  Patient states that she has experienced no weight loss or gain in last 6 months  Depression Screening:   PHQ-2 Score: 0      Fall Risk Screening: In the past year, patient has experienced: no history of falling in past year      Urinary Incontinence Screening:   Patient has leaked urine accidently in the last six months  UTI RECENTLY    URINARY RETENTION    Home Safety:  Patient does not have trouble with stairs inside or outside of their home  Patient has working smoke alarms and has working carbon monoxide detector  Home safety hazards include: none  Nutrition:   Current diet is Regular  Medications:   Patient is currently taking over-the-counter supplements  OTC medications include: see medication list  Patient is able to manage medications  Activities of Daily Living (ADLs)/Instrumental Activities of Daily Living (IADLs):   Walk and transfer into and out of bed and chair?: Yes  Dress and groom yourself?: Yes    Bathe or shower yourself?: Yes    Feed yourself?  Yes  Do your laundry/housekeeping?: Yes  Manage your money, pay your bills and track your expenses?: Yes  Make your own meals?: Yes    Do your own shopping?: Yes    Durable Medical Equipment Suppliers  colostomy and TPN supplies     Previous Hospitalizations:   Any hospitalizations or ED visits within the last 12 months?: Yes    How many hospitalizations have you had in the last year?: more than 4    Advance Care Planning:   Living will: No    Durable POA for healthcare: No    Advanced directive: No      Cognitive Screening:   Provider or family/friend/caregiver concerned regarding cognition?: No    PREVENTIVE SCREENINGS      Cardiovascular Screening:    General: Screening Current      Diabetes Screening:     General: Screening Current      Colorectal Cancer Screening:     General: Screening Current      Breast Cancer Screening:     General: Risks and Benefits Discussed    Due for: Mammogram        Cervical Cancer Screening:    General: Screening Not Indicated      Osteoporosis Screening:    General: Risks and Benefits Discussed    Due for: DXA Appendicular      Abdominal Aortic Aneurysm (AAA) Screening:        General: Screening Not Indicated      Lung Cancer Screening:     General: Screening Not Indicated      Hepatitis C Screening:    General: Risks and Benefits Discussed    Hep C Screening Accepted: No       Vianey Lee DO

## 2020-07-20 NOTE — PROGRESS NOTES
Jane Beal 1955 female MRN: [id number]      ASSESSMENT/PLAN  Problem List Items Addressed This Visit        Digestive    Enterocutaneous fistula - Primary    Short gut syndrome       Genitourinary    Urinary retention    Relevant Medications    tamsulosin (FLOMAX) 0 4 mg       Other    Chronic, continuous use of opioids    Chronic midline thoracic back pain    Medicare annual wellness visit, initial      Other Visit Diagnoses     Screening for breast cancer        Relevant Orders    Mammo screening bilateral w 3d & cad    Screening for osteoporosis        Relevant Orders    DXA bone density spine hip and pelvis        Chronic Back Pain: Follow with Pain Management as scheduled   Enterocutaneous Fistula/Short Gut Syndrome, on TPN: Managed by Dr Randolph Cates   Recurrent Urinary Retention: On Flomax, follow up with Urology PRN     BMI Counseling: Body mass index is 27 28 kg/m²  The BMI is above normal  Nutrition recommendations include 3-5 servings of fruits/vegetables daily  Pt is chronically malnourished due to short gut syndrome  She is on TPN and following with Dr Marisa Luna, who manages this regularly  No future appointments  SUBJECTIVE  CC: Establish Care (Patient seen in office today for a new patient to establish care - patient has a complex history of 25 + abdominal surgeries , back sx )      HPI:  Jane Beal is a 60 y o  female who presents to establish care  History reviewed and updated as below  Multiple Abdominal Surgeries: Had an obstruction surgery, and ended up with 6 fistulas -- at this point has only one open  Only five feet of small bowel left  On TPN -- slowly weaning off  PICC in place  Dr Marisa Luna managing -- usually every 2-4 weeks  Chronic Back Pain: Has had 3 back surgeries -- with the first one, put in inappropriate size of screws, which bent, and also caused nerve damage     Then saw Dr Fanny Sevilla -- stated she never healed from the first surgery, so he did two additional surgeries, the second time placed two steel rods in   Follows with Pain Management   Has a lot of muscle spasm and pinched nerved     Follows with Urology:   Recent UTI: On Levaquin  Urinary retention: Can't feel it so started on Flomax     Review of Systems   Constitutional: Negative for unexpected weight change  HENT: Positive for postnasal drip  Negative for congestion, ear pain, rhinorrhea and sore throat  Eyes: Negative for visual disturbance  Respiratory: Negative for cough, chest tightness and shortness of breath  Cardiovascular: Negative for chest pain, palpitations and leg swelling  Gastrointestinal: Positive for abdominal pain (+) Cramping and diarrhea (chronic, unchanged)  Negative for constipation  Endocrine: Negative for polyuria  Genitourinary: Negative for dysuria and hematuria  Neurological: Negative for dizziness and light-headedness (orthostatic)  Psychiatric/Behavioral: Negative for sleep disturbance  Historical Information   The patient history was reviewed and updated as follows:    Past Medical History:   Diagnosis Date    Asthma     Bowel obstruction (HCC)     Chronic back pain     Colon polyp     Enlarged kidney     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    Ileus ([address] ) 7/2/2018    Overview:  Last Assessment & Plan:  Patient presented with generalized abdominal pain nausea and bilious vomiting ,imaging study reports " diffuse bowel dilatation involving distal small bowel and the entire colon to the panel verge obstruction mass is not identified and this may represent adynamic ileus in the postoperative setting possible related to medication"  Patient states that she still ha    Liver mass      Past Surgical History:   Procedure Laterality Date    ABDOMINAL SURGERY      x 25    BACK SURGERY      x 3    COLONOSCOPY N/A 12/4/2018    Procedure: COLONOSCOPY;  Surgeon: Amandeep Landry MD;  Location: [facility];   Service: Gastroenterology    GASTROCUTANEOUS FISTULA CLOSURE      TONSILLECTOMY      TOTAL ABDOMINAL HYSTERECTOMY       Family History   Problem Relation Age of Onset    Diabetes Mother       Social History   Social History     Substance and Sexual Activity   Alcohol Use Never    Alcohol/week: 0 0 standard drinks    Frequency: Never    Binge frequency: Never    Comment: social drinker     Social History     Substance and Sexual Activity   Drug Use No     Social History     Tobacco Use   Smoking Status Former Smoker    Last attempt to quit: 1980    Years since quittin 0   Smokeless Tobacco Never Used       Medications:     Current Outpatient Medications:     diazepam (VALIUM) 5 mg tablet, 3 (three) times a day Morning, 3pm and 8pm, Disp: , Rfl:     escitalopram (LEXAPRO) 20 mg tablet, daily    , Disp: , Rfl:     gabapentin (NEURONTIN) 300 mg capsule, take 2 capsules by mouth three times a day, Disp: , Rfl:     HYDROmorphone (DILAUDID) 4 mg tablet, Take 4 mg by mouth 4 (four) times a day, Disp: , Rfl:     levofloxacin (LEVAQUIN) 500 mg tablet, Take 500 mg by mouth every 24 hours, Disp: , Rfl:     morphine (MS CONTIN) 30 mg 12 hr tablet, take 1 tablet by mouth every 8 hours, Disp: , Rfl:     pantoprazole (PROTONIX) 40 mg tablet, Take 40 mg by mouth daily, Disp: , Rfl:     tamsulosin (FLOMAX) 0 4 mg, Take 0 8 mg by mouth daily with dinner, Disp: , Rfl:     diclofenac sodium (VOLTAREN) 1 %, , Disp: , Rfl:   Allergies   Allergen Reactions    Nsaids      Irritable, upset stomach  Irritable, upset stomach    Tolmetin      Irritable, upset stomach    Codeine Hives     Per [address] admission orders    Morphine      Pt states that she is unable to take morphine, even though it is prescribed     Tylenol [Acetaminophen]        OBJECTIVE    Vitals:   Vitals:    20 1558   BP: 110/70   Pulse: 73   Temp: (!) 96 9 °F (36 1 °C)   SpO2: 99%   Weight: 69 9 kg (154 lb)   Height: 5' 3" (1 6 m)           Physical Exam   Constitutional: She appears well-developed and well-nourished  No distress  HENT:   Head: Normocephalic and atraumatic  Right Ear: External ear normal    Left Ear: External ear normal    Nose: Nose normal    Mouth/Throat: Oropharynx is clear and moist    Eyes: Conjunctivae are normal    Neck: No thyromegaly present  Cardiovascular: Normal rate and regular rhythm  Pulmonary/Chest: Effort normal and breath sounds normal  No respiratory distress  Abdominal: Soft  Bowel sounds are normal  She exhibits no distension  There is no tenderness  Musculoskeletal: She exhibits no edema  Lymphadenopathy:     She has no cervical adenopathy  Neurological: She is alert  Skin: Skin is warm and dry  Psychiatric: She has a normal mood and affect  Vitals reviewed                   DO Martell Bernal [facility]   7/20/2020  4:51 PM

## 2020-07-20 NOTE — PATIENT INSTRUCTIONS
Medicare Preventive Visit Patient Instructions  Thank you for completing your Welcome to Medicare Visit or Medicare Annual Wellness Visit today  Your next wellness visit will be due in one year (7/20/2021)  The screening/preventive services that you may require over the next 5-10 years are detailed below  Some tests may not apply to you based off risk factors and/or age  Screening tests ordered at today's visit but not completed yet may show as past due  Also, please note that scanned in results may not display below  Preventive Screenings:  Service Recommendations Previous Testing/Comments   Colorectal Cancer Screening  * Colonoscopy    * Fecal Occult Blood Test (FOBT)/Fecal Immunochemical Test (FIT)  * Fecal DNA/Cologuard Test  * Flexible Sigmoidoscopy Age: 54-65 years old   Colonoscopy: every 10 years (may be performed more frequently if at higher risk)  OR  FOBT/FIT: every 1 year  OR  Cologuard: every 3 years  OR  Sigmoidoscopy: every 5 years  Screening may be recommended earlier than age 48 if at higher risk for colorectal cancer  Also, an individualized decision between you and your healthcare provider will decide whether screening between the ages of 74-80 would be appropriate  Colonoscopy: 12/04/2018  FOBT/FIT: Not on file  Cologuard: Not on file  Sigmoidoscopy: Not on file    Screening Current     Breast Cancer Screening Age: 36 years old  Frequency: every 1-2 years  Not required if history of left and right mastectomy Mammogram: Not on file       Cervical Cancer Screening Between the ages of 21-29, pap smear recommended once every 3 years  Between the ages of 33-67, can perform pap smear with HPV co-testing every 5 years     Recommendations may differ for women with a history of total hysterectomy, cervical cancer, or abnormal pap smears in past  Pap Smear: Not on file       Hepatitis C Screening Once for adults born between 1945 and 1965  More frequently in patients at high risk for Hepatitis C Hep C Antibody: Not on file       Diabetes Screening 1-2 times per year if you're at risk for diabetes or have pre-diabetes Fasting glucose: 107 mg/dL   A1C: No results in last 5 years    Screening Current   Cholesterol Screening Once every 5 years if you don't have a lipid disorder  May order more often based on risk factors  Lipid panel: 12/16/2019    Screening Current     Other Preventive Screenings Covered by Medicare:  1  Abdominal Aortic Aneurysm (AAA) Screening: covered once if your at risk  You're considered to be at risk if you have a family history of AAA  2  Lung Cancer Screening: covers low dose CT scan once per year if you meet all of the following conditions: (1) Age 50-69; (2) No signs or symptoms of lung cancer; (3) Current smoker or have quit smoking within the last 15 years; (4) You have a tobacco smoking history of at least 30 pack years (packs per day multiplied by number of years you smoked); (5) You get a written order from a healthcare provider  3  Glaucoma Screening: covered annually if you're considered high risk: (1) You have diabetes OR (2) Family history of glaucoma OR (3)  aged 48 and older OR (3)  American aged 72 and older  3  Osteoporosis Screening: covered every 2 years if you meet one of the following conditions: (1) You're estrogen deficient and at risk for osteoporosis based off medical history and other findings; (2) Have a vertebral abnormality; (3) On glucocorticoid therapy for more than 3 months; (4) Have primary hyperparathyroidism; (5) On osteoporosis medications and need to assess response to drug therapy  · Last bone density test (DXA Scan): Not on file  5  HIV Screening: covered annually if you're between the age of 12-76  Also covered annually if you are younger than 13 and older than 72 with risk factors for HIV infection  For pregnant patients, it is covered up to 3 times per pregnancy      Immunizations:  Immunization Recommendations Influenza Vaccine Annual influenza vaccination during flu season is recommended for all persons aged >= 6 months who do not have contraindications   Pneumococcal Vaccine (Prevnar and Pneumovax)  * Prevnar = PCV13  * Pneumovax = PPSV23   Adults 25-60 years old: 1-3 doses may be recommended based on certain risk factors  Adults 72 years old: Prevnar (PCV13) vaccine recommended followed by Pneumovax (PPSV23) vaccine  If already received PPSV23 since turning 65, then PCV13 recommended at least one year after PPSV23 dose  Hepatitis B Vaccine 3 dose series if at intermediate or high risk (ex: diabetes, end stage renal disease, liver disease)   Tetanus (Td) Vaccine - COST NOT COVERED BY MEDICARE PART B Following completion of primary series, a booster dose should be given every 10 years to maintain immunity against tetanus  Td may also be given as tetanus wound prophylaxis  Tdap Vaccine - COST NOT COVERED BY MEDICARE PART B Recommended at least once for all adults  For pregnant patients, recommended with each pregnancy  Shingles Vaccine (Shingrix) - COST NOT COVERED BY MEDICARE PART B  2 shot series recommended in those aged 48 and above     Health Maintenance Due:      Topic Date Due    Hepatitis C Screening  1955    MAMMOGRAM  1955    Cervical Cancer Screening  02/12/1977    CRC Screening: Colonoscopy  12/04/2028     Immunizations Due:      Topic Date Due    Influenza Vaccine  07/01/2020     Advance Directives   What are advance directives? Advance directives are legal documents that state your wishes and plans for medical care  These plans are made ahead of time in case you lose your ability to make decisions for yourself  Advance directives can apply to any medical decision, such as the treatments you want, and if you want to donate organs  What are the types of advance directives? There are many types of advance directives, and each state has rules about how to use them   You may choose a combination of any of the following:  · Living will: This is a written record of the treatment you want  You can also choose which treatments you do not want, which to limit, and which to stop at a certain time  This includes surgery, medicine, IV fluid, and tube feedings  · Durable power of  for healthcare [facility]): This is a written record that states who you want to make healthcare choices for you when you are unable to make them for yourself  This person, called a proxy, is usually a family member or a friend  You may choose more than 1 proxy  · Do not resuscitate (DNR) order:  A DNR order is used in case your heart stops beating or you stop breathing  It is a request not to have certain forms of treatment, such as CPR  A DNR order may be included in other types of advance directives  · Medical directive: This covers the care that you want if you are in a coma, near death, or unable to make decisions for yourself  You can list the treatments you want for each condition  Treatment may include pain medicine, surgery, blood transfusions, dialysis, IV or tube feedings, and a ventilator (breathing machine)  · Values history: This document has questions about your views, beliefs, and how you feel and think about life  This information can help others choose the care that you would choose  Why are advance directives important? An advance directive helps you control your care  Although spoken wishes may be used, it is better to have your wishes written down  Spoken wishes can be misunderstood, or not followed  Treatments may be given even if you do not want them  An advance directive may make it easier for your family to make difficult choices about your care  Urinary Incontinence   Urinary incontinence (UI)  is when you lose control of your bladder  UI develops because your bladder cannot store or empty urine properly   The 3 most common types of UI are stress incontinence, urge incontinence, or both   Medicines:   · May be given to help strengthen your bladder control  Report any side effects of medication to your healthcare provider  Do pelvic muscle exercises often:  Your pelvic muscles help you stop urinating  Squeeze these muscles tight for 5 seconds, then relax for 5 seconds  Gradually work up to squeezing for 10 seconds  Do 3 sets of 15 repetitions a day, or as directed  This will help strengthen your pelvic muscles and improve bladder control  Train your bladder:  Go to the bathroom at set times, such as every 2 hours, even if you do not feel the urge to go  You can also try to hold your urine when you feel the urge to go  For example, hold your urine for 5 minutes when you feel the urge to go  As that becomes easier, hold your urine for 10 minutes  Self-care:   · Keep a UI record  Write down how often you leak urine and how much you leak  Make a note of what you were doing when you leaked urine  · Drink liquids as directed  You may need to limit the amount of liquid you drink to help control your urine leakage  Do not drink any liquid right before you go to bed  Limit or do not have drinks that contain caffeine or alcohol  · Prevent constipation  Eat a variety of high-fiber foods  Good examples are high-fiber cereals, beans, vegetables, and whole-grain breads  Walking is the best way to trigger your intestines to have a bowel movement  · Exercise regularly and maintain a healthy weight  Weight loss and exercise will decrease pressure on your bladder and help you control your leakage  · Use a catheter as directed  to help empty your bladder  A catheter is a tiny, plastic tube that is put into your bladder to drain your urine  · Go to behavior therapy as directed  Behavior therapy may be used to help you learn to control your urge to urinate      Weight Management   Why it is important to manage your weight:  Being overweight increases your risk of health conditions such as heart disease, high blood pressure, type 2 diabetes, and certain types of cancer  It can also increase your risk for osteoarthritis, sleep apnea, and other respiratory problems  Aim for a slow, steady weight loss  Even a small amount of weight loss can lower your risk of health problems  How to lose weight safely:  A safe and healthy way to lose weight is to eat fewer calories and get regular exercise  You can lose up about 1 pound a week by decreasing the number of calories you eat by 500 calories each day  Healthy meal plan for weight management:  A healthy meal plan includes a variety of foods, contains fewer calories, and helps you stay healthy  A healthy meal plan includes the following:  · Eat whole-grain foods more often  A healthy meal plan should contain fiber  Fiber is the part of grains, fruits, and vegetables that is not broken down by your body  Whole-grain foods are healthy and provide extra fiber in your diet  Some examples of whole-grain foods are whole-wheat breads and pastas, oatmeal, brown rice, and bulgur  · Eat a variety of vegetables every day  Include dark, leafy greens such as spinach, kale, adrien greens, and mustard greens  Eat yellow and orange vegetables such as carrots, sweet potatoes, and winter squash  · Eat a variety of fruits every day  Choose fresh or canned fruit (canned in its own juice or light syrup) instead of juice  Fruit juice has very little or no fiber  · Eat low-fat dairy foods  Drink fat-free (skim) milk or 1% milk  Eat fat-free yogurt and low-fat cottage cheese  Try low-fat cheeses such as mozzarella and other reduced-fat cheeses  · Choose meat and other protein foods that are low in fat  Choose beans or other legumes such as split peas or lentils  Choose fish, skinless poultry (chicken or turkey), or lean cuts of red meat (beef or pork)  Before you cook meat or poultry, cut off any visible fat  · Use less fat and oil  Try baking foods instead of frying them  Add less fat, such as margarine, sour cream, regular salad dressing and mayonnaise to foods  Eat fewer high-fat foods  Some examples of high-fat foods include french fries, doughnuts, ice cream, and cakes  · Eat fewer sweets  Limit foods and drinks that are high in sugar  This includes candy, cookies, regular soda, and sweetened drinks  Exercise:  Exercise at least 30 minutes per day on most days of the week  Some examples of exercise include walking, biking, dancing, and swimming  You can also fit in more physical activity by taking the stairs instead of the elevator or parking farther away from stores  Ask your healthcare provider about the best exercise plan for you  © Copyright [employer] 2018 Information is for End User's use only and may not be sold, redistributed or otherwise used for commercial purposes   All illustrations and images included in CareNotes® are the copyrighted property of [employer]  or [address] [place] [address]

## 2020-08-06 ENCOUNTER — TELEPHONE (OUTPATIENT)
Dept: FAMILY MEDICINE CLINIC | Facility: CLINIC | Age: 65
End: 2020-08-06

## 2020-08-06 DIAGNOSIS — K12.0 CANKER SORE: ICD-10-CM

## 2020-08-06 DIAGNOSIS — K64.9 HEMORRHOIDS, UNSPECIFIED HEMORRHOID TYPE: Primary | ICD-10-CM

## 2020-08-06 RX ORDER — CLONIDINE HYDROCHLORIDE 0.1 MG/1
0.1 TABLET ORAL
COMMUNITY
Start: 2020-07-20 | End: 2021-02-04 | Stop reason: ALTCHOICE

## 2020-08-06 RX ORDER — DICLOFENAC SODIUM AND MISOPROSTOL 50; 200 MG/1; UG/1
1 TABLET, DELAYED RELEASE ORAL 2 TIMES DAILY
COMMUNITY
Start: 2020-07-20 | End: 2021-02-04 | Stop reason: ALTCHOICE

## 2020-08-06 RX ORDER — LIDOCAINE HYDROCHLORIDE 20 MG/ML
JELLY TOPICAL EVERY 4 HOURS PRN
Qty: 30 ML | Refills: 2 | Status: SHIPPED | OUTPATIENT
Start: 2020-08-06 | End: 2020-11-18

## 2020-08-06 RX ORDER — ONDANSETRON 4 MG/1
TABLET, FILM COATED ORAL
COMMUNITY
Start: 2020-07-16 | End: 2021-01-01

## 2020-08-06 RX ORDER — METHOCARBAMOL 500 MG/1
TABLET, FILM COATED ORAL
COMMUNITY
Start: 2020-07-30 | End: 2021-01-01

## 2020-08-06 RX ORDER — LIDOCAINE HYDROCHLORIDE 20 MG/ML
JELLY TOPICAL AS NEEDED
Qty: 30 ML | Refills: 2 | Status: SHIPPED | OUTPATIENT
Start: 2020-08-06 | End: 2020-08-06 | Stop reason: SDUPTHER

## 2020-08-06 RX ORDER — HYDROCORTISONE ACETATE 25 MG/1
25 SUPPOSITORY RECTAL 2 TIMES DAILY PRN
Qty: 12 SUPPOSITORY | Refills: 2 | Status: SHIPPED | OUTPATIENT
Start: 2020-08-06 | End: 2020-11-18

## 2020-08-06 NOTE — TELEPHONE ENCOUNTER
New script for lidocaine sent to pharmacy to clarify  It doesn't look like her insurance covers any of the rectal treatments for hemorrhoids  I would suggest trying OTC Tucks pads or similar  Thanks!

## 2020-08-06 NOTE — TELEPHONE ENCOUNTER
Parminder Marcial called and is asking if you can send in 3 scripts for her  She is asking for:    Lidocaine Hydrochloride Jelly USP 2%  Magic Mouthwash  And something for hemorrhoids as she can not us OTC treatments  She has bad internal hemorrhoids and canker sores in her mouth    She uses [facility] in [place]

## 2020-08-10 ENCOUNTER — TELEPHONE (OUTPATIENT)
Dept: FAMILY MEDICINE CLINIC | Facility: CLINIC | Age: 65
End: 2020-08-10

## 2020-08-10 DIAGNOSIS — K13.79 RECURRENT ORAL ULCERS: Primary | ICD-10-CM

## 2020-08-10 RX ORDER — CHLORHEXIDINE GLUCONATE 0.12 MG/ML
15 RINSE ORAL 2 TIMES DAILY
Qty: 120 ML | Refills: 0 | Status: SHIPPED | OUTPATIENT
Start: 2020-08-10 | End: 2020-11-18

## 2020-08-10 NOTE — TELEPHONE ENCOUNTER
Pt calls asking for the other mouth rinse for her canker sores in her mouth  She remembers it as a swish and spit  W/ antibiotic   The one that you gave is really only a numbing solution, erx to [facility]  Call  Pt

## 2020-08-10 NOTE — TELEPHONE ENCOUNTER
Does she have the old script available? I'm not aware of a magic mouthwash that has antibiotic in it? Unless she is referring to Nystatin, though this is usually for thrush? Thanks!

## 2020-08-10 NOTE — TELEPHONE ENCOUNTER
She did not have it I called [facility] they said on 8/6 she was given magic mouth wash

## 2020-09-11 ENCOUNTER — TELEPHONE (OUTPATIENT)
Dept: FAMILY MEDICINE CLINIC | Facility: CLINIC | Age: 65
End: 2020-09-11

## 2020-09-11 DIAGNOSIS — R30.0 DYSURIA: Primary | ICD-10-CM

## 2020-09-11 RX ORDER — SULFAMETHOXAZOLE AND TRIMETHOPRIM 800; 160 MG/1; MG/1
1 TABLET ORAL 2 TIMES DAILY
Qty: 6 TABLET | Refills: 0 | Status: SHIPPED | OUTPATIENT
Start: 2020-09-11 | End: 2020-09-14

## 2020-09-11 NOTE — TELEPHONE ENCOUNTER
I sent Bactrim in to the pharmacy for her  If her symptoms do not improve over the next few days I would recommend she be seen in the office  Does she still have a catheter or was it removed prior to discharge? If she still has it, please be sure she has Urology follow up  Thanks!

## 2020-09-11 NOTE — TELEPHONE ENCOUNTER
Skippy Clamp calls stating she was in the [facility] dx with urinary retention, infection, also needed blood transfusion  She was recently discharged  She had a catheter and now has horrible burning with urinating  Unbearable at times  She is known for utis and knows that is what this is  Can not come in at this time  Please erx [facility]s to [facility] [place]   bfp

## 2020-09-11 NOTE — TELEPHONE ENCOUNTER
9/11/2020 pt notified- she said the catheter was removed while in the hospital  She said thank you very much

## 2020-10-08 ENCOUNTER — OFFICE VISIT (OUTPATIENT)
Dept: SURGERY | Facility: CLINIC | Age: 65
End: 2020-10-08
Payer: MEDICARE

## 2020-10-08 VITALS
DIASTOLIC BLOOD PRESSURE: 82 MMHG | WEIGHT: 140 LBS | HEIGHT: 64 IN | SYSTOLIC BLOOD PRESSURE: 110 MMHG | HEART RATE: 65 BPM | BODY MASS INDEX: 23.9 KG/M2

## 2020-10-08 DIAGNOSIS — K63.2 ENTEROCUTANEOUS FISTULA: Primary | ICD-10-CM

## 2020-10-08 PROBLEM — K59.81 OGILVIE'S SYNDROME: Status: RESOLVED | Noted: 2019-05-21 | Resolved: 2020-10-08

## 2020-10-08 PROCEDURE — 99213 OFFICE O/P EST LOW 20 MIN: CPT | Performed by: SURGERY

## 2020-10-22 ENCOUNTER — APPOINTMENT (EMERGENCY)
Dept: CT IMAGING | Facility: HOSPITAL | Age: 65
DRG: 389 | End: 2020-10-22
Payer: MEDICARE

## 2020-10-22 ENCOUNTER — HOSPITAL ENCOUNTER (INPATIENT)
Facility: HOSPITAL | Age: 65
LOS: 2 days | Discharge: HOME | DRG: 389 | End: 2020-10-24
Attending: EMERGENCY MEDICINE | Admitting: INTERNAL MEDICINE
Payer: MEDICARE

## 2020-10-22 DIAGNOSIS — K56.7 ILEUS FOLLOWING GASTROINTESTINAL SURGERY (HCC): ICD-10-CM

## 2020-10-22 DIAGNOSIS — K56.7 ILEUS (HCC): Primary | ICD-10-CM

## 2020-10-22 DIAGNOSIS — Z78.9 ON TOTAL PARENTERAL NUTRITION (TPN): ICD-10-CM

## 2020-10-22 DIAGNOSIS — K91.89 ILEUS FOLLOWING GASTROINTESTINAL SURGERY (HCC): ICD-10-CM

## 2020-10-22 PROBLEM — G89.4 CHRONIC PAIN DISORDER: Status: ACTIVE | Noted: 2020-10-16

## 2020-10-22 LAB
ALBUMIN SERPL BCP-MCNC: 3.4 G/DL (ref 3.5–5)
ALP SERPL-CCNC: 140 U/L (ref 46–116)
ALT SERPL W P-5'-P-CCNC: 25 U/L (ref 12–78)
ANION GAP SERPL CALCULATED.3IONS-SCNC: 10 MMOL/L (ref 4–13)
AST SERPL W P-5'-P-CCNC: 21 U/L (ref 5–45)
BASOPHILS # BLD AUTO: 0.02 THOUSANDS/ΜL (ref 0–0.1)
BASOPHILS NFR BLD AUTO: 0 % (ref 0–1)
BILIRUB DIRECT SERPL-MCNC: 0.2 MG/DL (ref 0–0.2)
BILIRUB SERPL-MCNC: 0.6 MG/DL (ref 0.2–1)
BUN SERPL-MCNC: 10 MG/DL (ref 5–25)
CALCIUM SERPL-MCNC: 8.7 MG/DL (ref 8.3–10.1)
CHLORIDE SERPL-SCNC: 103 MMOL/L (ref 100–108)
CO2 SERPL-SCNC: 27 MMOL/L (ref 21–32)
CREAT SERPL-MCNC: 0.91 MG/DL (ref 0.6–1.3)
EOSINOPHIL # BLD AUTO: 0.04 THOUSAND/ΜL (ref 0–0.61)
EOSINOPHIL NFR BLD AUTO: 1 % (ref 0–6)
ERYTHROCYTE [DISTWIDTH] IN BLOOD BY AUTOMATED COUNT: 16.6 % (ref 11.6–15.1)
GFR SERPL CREATININE-BSD FRML MDRD: 67 ML/MIN/1.73SQ M
GLUCOSE SERPL-MCNC: 119 MG/DL (ref 65–140)
HCT VFR BLD AUTO: 36.6 % (ref 34.8–46.1)
HGB BLD-MCNC: 11 G/DL (ref 11.5–15.4)
IMM GRANULOCYTES # BLD AUTO: 0.02 THOUSAND/UL (ref 0–0.2)
IMM GRANULOCYTES NFR BLD AUTO: 0 % (ref 0–2)
INR PPP: 1.03 (ref 0.84–1.19)
LYMPHOCYTES # BLD AUTO: 0.78 THOUSANDS/ΜL (ref 0.6–4.47)
LYMPHOCYTES NFR BLD AUTO: 11 % (ref 14–44)
MAGNESIUM SERPL-MCNC: 1.9 MG/DL (ref 1.6–2.6)
MCH RBC QN AUTO: 24.2 PG (ref 26.8–34.3)
MCHC RBC AUTO-ENTMCNC: 30.1 G/DL (ref 31.4–37.4)
MCV RBC AUTO: 80 FL (ref 82–98)
MONOCYTES # BLD AUTO: 0.72 THOUSAND/ΜL (ref 0.17–1.22)
MONOCYTES NFR BLD AUTO: 10 % (ref 4–12)
NEUTROPHILS # BLD AUTO: 5.84 THOUSANDS/ΜL (ref 1.85–7.62)
NEUTS SEG NFR BLD AUTO: 78 % (ref 43–75)
NRBC BLD AUTO-RTO: 0 /100 WBCS
PLATELET # BLD AUTO: 256 THOUSANDS/UL (ref 149–390)
PMV BLD AUTO: 9.4 FL (ref 8.9–12.7)
POTASSIUM SERPL-SCNC: 3.6 MMOL/L (ref 3.5–5.3)
PROT SERPL-MCNC: 7.7 G/DL (ref 6.4–8.2)
PROTHROMBIN TIME: 13 SECONDS (ref 11.6–14.5)
RBC # BLD AUTO: 4.55 MILLION/UL (ref 3.81–5.12)
SODIUM SERPL-SCNC: 140 MMOL/L (ref 136–145)
TROPONIN I SERPL-MCNC: <0.02 NG/ML
WBC # BLD AUTO: 7.42 THOUSAND/UL (ref 4.31–10.16)

## 2020-10-22 PROCEDURE — 85610 PROTHROMBIN TIME: CPT | Performed by: EMERGENCY MEDICINE

## 2020-10-22 PROCEDURE — 96376 TX/PRO/DX INJ SAME DRUG ADON: CPT

## 2020-10-22 PROCEDURE — 80048 BASIC METABOLIC PNL TOTAL CA: CPT | Performed by: EMERGENCY MEDICINE

## 2020-10-22 PROCEDURE — 96375 TX/PRO/DX INJ NEW DRUG ADDON: CPT

## 2020-10-22 PROCEDURE — 93005 ELECTROCARDIOGRAM TRACING: CPT

## 2020-10-22 PROCEDURE — 83735 ASSAY OF MAGNESIUM: CPT | Performed by: EMERGENCY MEDICINE

## 2020-10-22 PROCEDURE — 80076 HEPATIC FUNCTION PANEL: CPT | Performed by: EMERGENCY MEDICINE

## 2020-10-22 PROCEDURE — 85025 COMPLETE CBC W/AUTO DIFF WBC: CPT | Performed by: EMERGENCY MEDICINE

## 2020-10-22 PROCEDURE — 83690 ASSAY OF LIPASE: CPT | Performed by: PHYSICIAN ASSISTANT

## 2020-10-22 PROCEDURE — 36415 COLL VENOUS BLD VENIPUNCTURE: CPT | Performed by: EMERGENCY MEDICINE

## 2020-10-22 PROCEDURE — 96361 HYDRATE IV INFUSION ADD-ON: CPT

## 2020-10-22 PROCEDURE — 99285 EMERGENCY DEPT VISIT HI MDM: CPT

## 2020-10-22 PROCEDURE — G1004 CDSM NDSC: HCPCS

## 2020-10-22 PROCEDURE — 84484 ASSAY OF TROPONIN QUANT: CPT | Performed by: EMERGENCY MEDICINE

## 2020-10-22 PROCEDURE — 74177 CT ABD & PELVIS W/CONTRAST: CPT

## 2020-10-22 PROCEDURE — 96374 THER/PROPH/DIAG INJ IV PUSH: CPT

## 2020-10-22 PROCEDURE — 99285 EMERGENCY DEPT VISIT HI MDM: CPT | Performed by: EMERGENCY MEDICINE

## 2020-10-22 RX ORDER — FENTANYL CITRATE 50 UG/ML
50 INJECTION, SOLUTION INTRAMUSCULAR; INTRAVENOUS ONCE
Status: COMPLETED | OUTPATIENT
Start: 2020-10-22 | End: 2020-10-22

## 2020-10-22 RX ORDER — FENTANYL CITRATE 50 UG/ML
100 INJECTION, SOLUTION INTRAMUSCULAR; INTRAVENOUS ONCE
Status: COMPLETED | OUTPATIENT
Start: 2020-10-22 | End: 2020-10-22

## 2020-10-22 RX ORDER — DIPHENHYDRAMINE HYDROCHLORIDE 50 MG/ML
50 INJECTION INTRAMUSCULAR; INTRAVENOUS ONCE
Status: COMPLETED | OUTPATIENT
Start: 2020-10-22 | End: 2020-10-22

## 2020-10-22 RX ORDER — HYDROMORPHONE HCL/PF 1 MG/ML
1 SYRINGE (ML) INJECTION ONCE
Status: COMPLETED | OUTPATIENT
Start: 2020-10-22 | End: 2020-10-22

## 2020-10-22 RX ORDER — DIAZEPAM 5 MG/ML
5 INJECTION, SOLUTION INTRAMUSCULAR; INTRAVENOUS ONCE
Status: COMPLETED | OUTPATIENT
Start: 2020-10-22 | End: 2020-10-22

## 2020-10-22 RX ORDER — ONDANSETRON 2 MG/ML
4 INJECTION INTRAMUSCULAR; INTRAVENOUS ONCE
Status: COMPLETED | OUTPATIENT
Start: 2020-10-22 | End: 2020-10-22

## 2020-10-22 RX ADMIN — DIPHENHYDRAMINE HYDROCHLORIDE 50 MG: 50 INJECTION, SOLUTION INTRAMUSCULAR; INTRAVENOUS at 17:46

## 2020-10-22 RX ADMIN — FENTANYL CITRATE 50 MCG: 50 INJECTION INTRAMUSCULAR; INTRAVENOUS at 18:24

## 2020-10-22 RX ADMIN — SODIUM CHLORIDE 1000 ML: 0.9 INJECTION, SOLUTION INTRAVENOUS at 17:42

## 2020-10-22 RX ADMIN — ONDANSETRON 4 MG: 2 INJECTION INTRAMUSCULAR; INTRAVENOUS at 17:42

## 2020-10-22 RX ADMIN — IOHEXOL 100 ML: 350 INJECTION, SOLUTION INTRAVENOUS at 20:02

## 2020-10-22 RX ADMIN — FENTANYL CITRATE 100 MCG: 50 INJECTION INTRAMUSCULAR; INTRAVENOUS at 21:05

## 2020-10-22 RX ADMIN — DIPHENHYDRAMINE HYDROCHLORIDE 50 MG: 50 INJECTION, SOLUTION INTRAMUSCULAR; INTRAVENOUS at 22:51

## 2020-10-22 RX ADMIN — DIAZEPAM 5 MG: 10 INJECTION, SOLUTION INTRAMUSCULAR; INTRAVENOUS at 17:50

## 2020-10-22 RX ADMIN — HYDROMORPHONE HYDROCHLORIDE 1 MG: 1 INJECTION, SOLUTION INTRAMUSCULAR; INTRAVENOUS; SUBCUTANEOUS at 22:51

## 2020-10-22 RX ADMIN — IOHEXOL 50 ML: 240 INJECTION, SOLUTION INTRATHECAL; INTRAVASCULAR; INTRAVENOUS; ORAL at 18:41

## 2020-10-23 ENCOUNTER — APPOINTMENT (INPATIENT)
Dept: INTERVENTIONAL RADIOLOGY/VASCULAR | Facility: HOSPITAL | Age: 65
DRG: 389 | End: 2020-10-23
Attending: RADIOLOGY
Payer: MEDICARE

## 2020-10-23 ENCOUNTER — APPOINTMENT (INPATIENT)
Dept: RADIOLOGY | Facility: HOSPITAL | Age: 65
DRG: 389 | End: 2020-10-23
Payer: MEDICARE

## 2020-10-23 LAB
ALBUMIN SERPL BCP-MCNC: 2.9 G/DL (ref 3.5–5)
ALP SERPL-CCNC: 116 U/L (ref 46–116)
ALT SERPL W P-5'-P-CCNC: 22 U/L (ref 12–78)
ANION GAP SERPL CALCULATED.3IONS-SCNC: 8 MMOL/L (ref 4–13)
ANION GAP SERPL CALCULATED.3IONS-SCNC: 8 MMOL/L (ref 4–13)
AST SERPL W P-5'-P-CCNC: 17 U/L (ref 5–45)
ATRIAL RATE: 83 BPM
BASOPHILS # BLD AUTO: 0.01 THOUSANDS/ΜL (ref 0–0.1)
BASOPHILS NFR BLD AUTO: 0 % (ref 0–1)
BILIRUB SERPL-MCNC: 0.7 MG/DL (ref 0.2–1)
BUN SERPL-MCNC: 11 MG/DL (ref 5–25)
BUN SERPL-MCNC: 17 MG/DL (ref 5–25)
CALCIUM ALBUM COR SERPL-MCNC: 9 MG/DL (ref 8.3–10.1)
CALCIUM SERPL-MCNC: 8.1 MG/DL (ref 8.3–10.1)
CALCIUM SERPL-MCNC: 8.2 MG/DL (ref 8.3–10.1)
CHLORIDE SERPL-SCNC: 103 MMOL/L (ref 100–108)
CHLORIDE SERPL-SCNC: 103 MMOL/L (ref 100–108)
CO2 SERPL-SCNC: 29 MMOL/L (ref 21–32)
CO2 SERPL-SCNC: 30 MMOL/L (ref 21–32)
CREAT SERPL-MCNC: 0.83 MG/DL (ref 0.6–1.3)
CREAT SERPL-MCNC: 1.02 MG/DL (ref 0.6–1.3)
EOSINOPHIL # BLD AUTO: 0.05 THOUSAND/ΜL (ref 0–0.61)
EOSINOPHIL NFR BLD AUTO: 1 % (ref 0–6)
ERYTHROCYTE [DISTWIDTH] IN BLOOD BY AUTOMATED COUNT: 16.6 % (ref 11.6–15.1)
GFR SERPL CREATININE-BSD FRML MDRD: 58 ML/MIN/1.73SQ M
GFR SERPL CREATININE-BSD FRML MDRD: 75 ML/MIN/1.73SQ M
GLUCOSE SERPL-MCNC: 102 MG/DL (ref 65–140)
GLUCOSE SERPL-MCNC: 85 MG/DL (ref 65–140)
HCT VFR BLD AUTO: 32.8 % (ref 34.8–46.1)
HGB BLD-MCNC: 9.9 G/DL (ref 11.5–15.4)
IMM GRANULOCYTES # BLD AUTO: 0.02 THOUSAND/UL (ref 0–0.2)
IMM GRANULOCYTES NFR BLD AUTO: 0 % (ref 0–2)
LACTATE SERPL-SCNC: 0.7 MMOL/L (ref 0.5–2)
LIPASE SERPL-CCNC: 45 U/L (ref 73–393)
LYMPHOCYTES # BLD AUTO: 1.04 THOUSANDS/ΜL (ref 0.6–4.47)
LYMPHOCYTES NFR BLD AUTO: 21 % (ref 14–44)
MAGNESIUM SERPL-MCNC: 1.9 MG/DL (ref 1.6–2.6)
MCH RBC QN AUTO: 24.3 PG (ref 26.8–34.3)
MCHC RBC AUTO-ENTMCNC: 30.2 G/DL (ref 31.4–37.4)
MCV RBC AUTO: 80 FL (ref 82–98)
MONOCYTES # BLD AUTO: 0.75 THOUSAND/ΜL (ref 0.17–1.22)
MONOCYTES NFR BLD AUTO: 15 % (ref 4–12)
NEUTROPHILS # BLD AUTO: 3.09 THOUSANDS/ΜL (ref 1.85–7.62)
NEUTS SEG NFR BLD AUTO: 63 % (ref 43–75)
NRBC BLD AUTO-RTO: 0 /100 WBCS
P AXIS: 50 DEGREES
PHOSPHATE SERPL-MCNC: 2.7 MG/DL (ref 2.3–4.1)
PLATELET # BLD AUTO: 222 THOUSANDS/UL (ref 149–390)
PLATELET # BLD AUTO: 228 THOUSANDS/UL (ref 149–390)
PMV BLD AUTO: 9.4 FL (ref 8.9–12.7)
PMV BLD AUTO: 9.5 FL (ref 8.9–12.7)
POTASSIUM SERPL-SCNC: 3.2 MMOL/L (ref 3.5–5.3)
POTASSIUM SERPL-SCNC: 4 MMOL/L (ref 3.5–5.3)
PR INTERVAL: 140 MS
PROT SERPL-MCNC: 6.7 G/DL (ref 6.4–8.2)
QRS AXIS: 71 DEGREES
QRSD INTERVAL: 82 MS
QT INTERVAL: 360 MS
QTC INTERVAL: 423 MS
RBC # BLD AUTO: 4.08 MILLION/UL (ref 3.81–5.12)
SODIUM SERPL-SCNC: 140 MMOL/L (ref 136–145)
SODIUM SERPL-SCNC: 141 MMOL/L (ref 136–145)
T WAVE AXIS: 63 DEGREES
TRIGL SERPL-MCNC: 87 MG/DL
VENTRICULAR RATE: 83 BPM
WBC # BLD AUTO: 4.96 THOUSAND/UL (ref 4.31–10.16)

## 2020-10-23 PROCEDURE — 85049 AUTOMATED PLATELET COUNT: CPT | Performed by: PHYSICIAN ASSISTANT

## 2020-10-23 PROCEDURE — 71045 X-RAY EXAM CHEST 1 VIEW: CPT

## 2020-10-23 PROCEDURE — 99223 1ST HOSP IP/OBS HIGH 75: CPT | Performed by: PHYSICIAN ASSISTANT

## 2020-10-23 PROCEDURE — 99223 1ST HOSP IP/OBS HIGH 75: CPT | Performed by: FAMILY MEDICINE

## 2020-10-23 PROCEDURE — 83605 ASSAY OF LACTIC ACID: CPT | Performed by: PHYSICIAN ASSISTANT

## 2020-10-23 PROCEDURE — 93010 ELECTROCARDIOGRAM REPORT: CPT | Performed by: INTERNAL MEDICINE

## 2020-10-23 PROCEDURE — 84100 ASSAY OF PHOSPHORUS: CPT | Performed by: FAMILY MEDICINE

## 2020-10-23 PROCEDURE — 83735 ASSAY OF MAGNESIUM: CPT | Performed by: FAMILY MEDICINE

## 2020-10-23 PROCEDURE — C9113 INJ PANTOPRAZOLE SODIUM, VIA: HCPCS | Performed by: PHYSICIAN ASSISTANT

## 2020-10-23 PROCEDURE — NC001 PR NO CHARGE: Performed by: PHYSICIAN ASSISTANT

## 2020-10-23 PROCEDURE — 99221 1ST HOSP IP/OBS SF/LOW 40: CPT | Performed by: SURGERY

## 2020-10-23 PROCEDURE — 84478 ASSAY OF TRIGLYCERIDES: CPT | Performed by: FAMILY MEDICINE

## 2020-10-23 PROCEDURE — 80053 COMPREHEN METABOLIC PANEL: CPT | Performed by: FAMILY MEDICINE

## 2020-10-23 PROCEDURE — 85025 COMPLETE CBC W/AUTO DIFF WBC: CPT | Performed by: PHYSICIAN ASSISTANT

## 2020-10-23 PROCEDURE — 80048 BASIC METABOLIC PNL TOTAL CA: CPT | Performed by: PHYSICIAN ASSISTANT

## 2020-10-23 PROCEDURE — 93005 ELECTROCARDIOGRAM TRACING: CPT

## 2020-10-23 RX ORDER — HYDROMORPHONE HCL/PF 1 MG/ML
1 SYRINGE (ML) INJECTION ONCE
Status: COMPLETED | OUTPATIENT
Start: 2020-10-23 | End: 2020-10-23

## 2020-10-23 RX ORDER — GABAPENTIN 300 MG/1
600 CAPSULE ORAL 3 TIMES DAILY
Status: DISCONTINUED | OUTPATIENT
Start: 2020-10-23 | End: 2020-10-23

## 2020-10-23 RX ORDER — KETOROLAC TROMETHAMINE 30 MG/ML
15 INJECTION, SOLUTION INTRAMUSCULAR; INTRAVENOUS EVERY 6 HOURS PRN
Status: DISCONTINUED | OUTPATIENT
Start: 2020-10-23 | End: 2020-10-23

## 2020-10-23 RX ORDER — POTASSIUM CHLORIDE 29.8 MG/ML
40 INJECTION INTRAVENOUS ONCE
Status: DISCONTINUED | OUTPATIENT
Start: 2020-10-23 | End: 2020-10-23

## 2020-10-23 RX ORDER — MORPHINE SULFATE 30 MG/1
30 TABLET, FILM COATED, EXTENDED RELEASE ORAL EVERY 8 HOURS SCHEDULED
Status: DISCONTINUED | OUTPATIENT
Start: 2020-10-23 | End: 2020-10-23

## 2020-10-23 RX ORDER — ESCITALOPRAM OXALATE 10 MG/1
20 TABLET ORAL DAILY
Status: DISCONTINUED | OUTPATIENT
Start: 2020-10-23 | End: 2020-10-24 | Stop reason: HOSPADM

## 2020-10-23 RX ORDER — PANTOPRAZOLE SODIUM 40 MG/1
40 INJECTION, POWDER, FOR SOLUTION INTRAVENOUS
Status: DISCONTINUED | OUTPATIENT
Start: 2020-10-23 | End: 2020-10-24 | Stop reason: HOSPADM

## 2020-10-23 RX ORDER — DIAZEPAM 5 MG/ML
5 INJECTION, SOLUTION INTRAMUSCULAR; INTRAVENOUS 3 TIMES DAILY PRN
Status: DISCONTINUED | OUTPATIENT
Start: 2020-10-24 | End: 2020-10-24 | Stop reason: HOSPADM

## 2020-10-23 RX ORDER — ONDANSETRON 2 MG/ML
4 INJECTION INTRAMUSCULAR; INTRAVENOUS EVERY 6 HOURS PRN
Status: DISCONTINUED | OUTPATIENT
Start: 2020-10-23 | End: 2020-10-24 | Stop reason: HOSPADM

## 2020-10-23 RX ORDER — POTASSIUM CHLORIDE 14.9 MG/ML
20 INJECTION INTRAVENOUS ONCE
Status: COMPLETED | OUTPATIENT
Start: 2020-10-23 | End: 2020-10-23

## 2020-10-23 RX ORDER — POTASSIUM CHLORIDE 20 MEQ/1
20 TABLET, EXTENDED RELEASE ORAL ONCE
Status: DISCONTINUED | OUTPATIENT
Start: 2020-10-23 | End: 2020-10-23

## 2020-10-23 RX ORDER — LIDOCAINE HYDROCHLORIDE 20 MG/ML
JELLY TOPICAL ONCE
Status: COMPLETED | OUTPATIENT
Start: 2020-10-23 | End: 2020-10-23

## 2020-10-23 RX ORDER — LIDOCAINE HYDROCHLORIDE 20 MG/ML
JELLY TOPICAL
Status: COMPLETED
Start: 2020-10-23 | End: 2020-10-23

## 2020-10-23 RX ORDER — TAMSULOSIN HYDROCHLORIDE 0.4 MG/1
0.8 CAPSULE ORAL
Status: DISCONTINUED | OUTPATIENT
Start: 2020-10-23 | End: 2020-10-24 | Stop reason: HOSPADM

## 2020-10-23 RX ORDER — HYDROMORPHONE HCL/PF 1 MG/ML
1 SYRINGE (ML) INJECTION
Status: DISCONTINUED | OUTPATIENT
Start: 2020-10-23 | End: 2020-10-24 | Stop reason: HOSPADM

## 2020-10-23 RX ORDER — MORPHINE SULFATE 30 MG/1
30 TABLET, FILM COATED, EXTENDED RELEASE ORAL EVERY 12 HOURS SCHEDULED
Status: DISCONTINUED | OUTPATIENT
Start: 2020-10-23 | End: 2020-10-23

## 2020-10-23 RX ORDER — HYDROMORPHONE HCL/PF 1 MG/ML
0.8 SYRINGE (ML) INJECTION EVERY 4 HOURS PRN
Status: DISCONTINUED | OUTPATIENT
Start: 2020-10-23 | End: 2020-10-23

## 2020-10-23 RX ORDER — FENTANYL CITRATE 50 UG/ML
25 INJECTION, SOLUTION INTRAMUSCULAR; INTRAVENOUS ONCE
Status: COMPLETED | OUTPATIENT
Start: 2020-10-23 | End: 2020-10-23

## 2020-10-23 RX ADMIN — Medication: at 21:36

## 2020-10-23 RX ADMIN — HYDROMORPHONE HYDROCHLORIDE 1 MG: 1 INJECTION, SOLUTION INTRAMUSCULAR; INTRAVENOUS; SUBCUTANEOUS at 08:05

## 2020-10-23 RX ADMIN — HYDROMORPHONE HYDROCHLORIDE 1 MG: 1 INJECTION, SOLUTION INTRAMUSCULAR; INTRAVENOUS; SUBCUTANEOUS at 02:21

## 2020-10-23 RX ADMIN — PANTOPRAZOLE SODIUM 40 MG: 40 INJECTION, POWDER, FOR SOLUTION INTRAVENOUS at 10:53

## 2020-10-23 RX ADMIN — FENTANYL CITRATE 25 MCG: 50 INJECTION INTRAMUSCULAR; INTRAVENOUS at 10:53

## 2020-10-23 RX ADMIN — POTASSIUM CHLORIDE 20 MEQ: 14.9 INJECTION, SOLUTION INTRAVENOUS at 15:25

## 2020-10-23 RX ADMIN — ONDANSETRON 4 MG: 2 INJECTION INTRAMUSCULAR; INTRAVENOUS at 02:06

## 2020-10-23 RX ADMIN — LIDOCAINE HYDROCHLORIDE: 20 JELLY TOPICAL at 02:01

## 2020-10-23 RX ADMIN — HYDROMORPHONE HYDROCHLORIDE 1 MG: 1 INJECTION, SOLUTION INTRAMUSCULAR; INTRAVENOUS; SUBCUTANEOUS at 15:23

## 2020-10-23 RX ADMIN — HYDROMORPHONE HYDROCHLORIDE 1 MG: 1 INJECTION, SOLUTION INTRAMUSCULAR; INTRAVENOUS; SUBCUTANEOUS at 18:30

## 2020-10-23 RX ADMIN — HYDROMORPHONE HYDROCHLORIDE 0.8 MG: 1 INJECTION, SOLUTION INTRAMUSCULAR; INTRAVENOUS; SUBCUTANEOUS at 07:02

## 2020-10-23 RX ADMIN — HYDROMORPHONE HYDROCHLORIDE 1 MG: 1 INJECTION, SOLUTION INTRAMUSCULAR; INTRAVENOUS; SUBCUTANEOUS at 21:34

## 2020-10-23 RX ADMIN — HYDROMORPHONE HYDROCHLORIDE 0.8 MG: 1 INJECTION, SOLUTION INTRAMUSCULAR; INTRAVENOUS; SUBCUTANEOUS at 01:34

## 2020-10-23 RX ADMIN — KETOROLAC TROMETHAMINE 15 MG: 30 INJECTION, SOLUTION INTRAMUSCULAR at 06:36

## 2020-10-23 RX ADMIN — HYDROMORPHONE HYDROCHLORIDE 1 MG: 1 INJECTION, SOLUTION INTRAMUSCULAR; INTRAVENOUS; SUBCUTANEOUS at 12:12

## 2020-10-24 VITALS
BODY MASS INDEX: 25.03 KG/M2 | SYSTOLIC BLOOD PRESSURE: 114 MMHG | HEART RATE: 62 BPM | RESPIRATION RATE: 19 BRPM | HEIGHT: 64 IN | WEIGHT: 146.61 LBS | OXYGEN SATURATION: 95 % | DIASTOLIC BLOOD PRESSURE: 64 MMHG | TEMPERATURE: 98.8 F

## 2020-10-24 LAB
ANION GAP SERPL CALCULATED.3IONS-SCNC: 7 MMOL/L (ref 4–13)
BUN SERPL-MCNC: 15 MG/DL (ref 5–25)
CALCIUM SERPL-MCNC: 8.2 MG/DL (ref 8.3–10.1)
CHLORIDE SERPL-SCNC: 102 MMOL/L (ref 100–108)
CO2 SERPL-SCNC: 28 MMOL/L (ref 21–32)
CREAT SERPL-MCNC: 0.86 MG/DL (ref 0.6–1.3)
ERYTHROCYTE [DISTWIDTH] IN BLOOD BY AUTOMATED COUNT: 16 % (ref 11.6–15.1)
GFR SERPL CREATININE-BSD FRML MDRD: 72 ML/MIN/1.73SQ M
GLUCOSE SERPL-MCNC: 134 MG/DL (ref 65–140)
HCT VFR BLD AUTO: 29.6 % (ref 34.8–46.1)
HGB BLD-MCNC: 9 G/DL (ref 11.5–15.4)
MCH RBC QN AUTO: 24.4 PG (ref 26.8–34.3)
MCHC RBC AUTO-ENTMCNC: 30.4 G/DL (ref 31.4–37.4)
MCV RBC AUTO: 80 FL (ref 82–98)
PLATELET # BLD AUTO: 203 THOUSANDS/UL (ref 149–390)
PMV BLD AUTO: 9.5 FL (ref 8.9–12.7)
POTASSIUM SERPL-SCNC: 3.9 MMOL/L (ref 3.5–5.3)
RBC # BLD AUTO: 3.69 MILLION/UL (ref 3.81–5.12)
SODIUM SERPL-SCNC: 137 MMOL/L (ref 136–145)
WBC # BLD AUTO: 5.2 THOUSAND/UL (ref 4.31–10.16)

## 2020-10-24 PROCEDURE — 99239 HOSP IP/OBS DSCHRG MGMT >30: CPT | Performed by: FAMILY MEDICINE

## 2020-10-24 PROCEDURE — 85027 COMPLETE CBC AUTOMATED: CPT | Performed by: FAMILY MEDICINE

## 2020-10-24 PROCEDURE — C9113 INJ PANTOPRAZOLE SODIUM, VIA: HCPCS | Performed by: PHYSICIAN ASSISTANT

## 2020-10-24 PROCEDURE — 99233 SBSQ HOSP IP/OBS HIGH 50: CPT | Performed by: SURGERY

## 2020-10-24 PROCEDURE — 80048 BASIC METABOLIC PNL TOTAL CA: CPT | Performed by: FAMILY MEDICINE

## 2020-10-24 RX ADMIN — HYDROMORPHONE HYDROCHLORIDE 1 MG: 1 INJECTION, SOLUTION INTRAMUSCULAR; INTRAVENOUS; SUBCUTANEOUS at 03:29

## 2020-10-24 RX ADMIN — HYDROMORPHONE HYDROCHLORIDE 1 MG: 1 INJECTION, SOLUTION INTRAMUSCULAR; INTRAVENOUS; SUBCUTANEOUS at 09:34

## 2020-10-24 RX ADMIN — HYDROMORPHONE HYDROCHLORIDE 1 MG: 1 INJECTION, SOLUTION INTRAMUSCULAR; INTRAVENOUS; SUBCUTANEOUS at 06:31

## 2020-10-24 RX ADMIN — PANTOPRAZOLE SODIUM 40 MG: 40 INJECTION, POWDER, FOR SOLUTION INTRAVENOUS at 09:03

## 2020-10-24 RX ADMIN — ESCITALOPRAM OXALATE 20 MG: 10 TABLET ORAL at 09:03

## 2020-10-31 LAB
ATRIAL RATE: 73 BPM
P AXIS: 40 DEGREES
PR INTERVAL: 156 MS
QRS AXIS: 48 DEGREES
QRSD INTERVAL: 76 MS
QT INTERVAL: 412 MS
QTC INTERVAL: 453 MS
T WAVE AXIS: 25 DEGREES
VENTRICULAR RATE: 73 BPM

## 2020-10-31 PROCEDURE — 93010 ELECTROCARDIOGRAM REPORT: CPT | Performed by: INTERNAL MEDICINE

## 2020-11-03 ENCOUNTER — APPOINTMENT (EMERGENCY)
Dept: CT IMAGING | Facility: HOSPITAL | Age: 65
DRG: 394 | End: 2020-11-03
Payer: MEDICARE

## 2020-11-03 ENCOUNTER — HOSPITAL ENCOUNTER (INPATIENT)
Facility: HOSPITAL | Age: 65
LOS: 5 days | Discharge: LEFT BEFORE DISCHARGE | DRG: 394 | End: 2020-11-08
Attending: EMERGENCY MEDICINE | Admitting: SURGERY
Payer: MEDICARE

## 2020-11-03 DIAGNOSIS — F11.90 CHRONIC, CONTINUOUS USE OF OPIOIDS: ICD-10-CM

## 2020-11-03 DIAGNOSIS — K63.2 ENTEROCUTANEOUS FISTULA: Primary | ICD-10-CM

## 2020-11-03 DIAGNOSIS — K52.9 COLITIS: ICD-10-CM

## 2020-11-03 LAB
ALBUMIN SERPL BCP-MCNC: 3.3 G/DL (ref 3.5–5)
ALP SERPL-CCNC: 149 U/L (ref 46–116)
ALT SERPL W P-5'-P-CCNC: 68 U/L (ref 12–78)
ANION GAP SERPL CALCULATED.3IONS-SCNC: 10 MMOL/L (ref 4–13)
AST SERPL W P-5'-P-CCNC: 50 U/L (ref 5–45)
BASOPHILS # BLD AUTO: 0.04 THOUSANDS/ΜL (ref 0–0.1)
BASOPHILS NFR BLD AUTO: 1 % (ref 0–1)
BILIRUB SERPL-MCNC: 0.54 MG/DL (ref 0.2–1)
BUN SERPL-MCNC: 9 MG/DL (ref 5–25)
CALCIUM ALBUM COR SERPL-MCNC: 9.2 MG/DL (ref 8.3–10.1)
CALCIUM SERPL-MCNC: 8.6 MG/DL (ref 8.3–10.1)
CHLORIDE SERPL-SCNC: 103 MMOL/L (ref 100–108)
CO2 SERPL-SCNC: 25 MMOL/L (ref 21–32)
CREAT SERPL-MCNC: 0.82 MG/DL (ref 0.6–1.3)
EOSINOPHIL # BLD AUTO: 0.19 THOUSAND/ΜL (ref 0–0.61)
EOSINOPHIL NFR BLD AUTO: 3 % (ref 0–6)
ERYTHROCYTE [DISTWIDTH] IN BLOOD BY AUTOMATED COUNT: 15.9 % (ref 11.6–15.1)
GFR SERPL CREATININE-BSD FRML MDRD: 76 ML/MIN/1.73SQ M
GLUCOSE SERPL-MCNC: 99 MG/DL (ref 65–140)
HCT VFR BLD AUTO: 30.7 % (ref 34.8–46.1)
HGB BLD-MCNC: 9.3 G/DL (ref 11.5–15.4)
IMM GRANULOCYTES # BLD AUTO: 0.02 THOUSAND/UL (ref 0–0.2)
IMM GRANULOCYTES NFR BLD AUTO: 0 % (ref 0–2)
LIPASE SERPL-CCNC: 336 U/L (ref 73–393)
LYMPHOCYTES # BLD AUTO: 1.59 THOUSANDS/ΜL (ref 0.6–4.47)
LYMPHOCYTES NFR BLD AUTO: 24 % (ref 14–44)
MCH RBC QN AUTO: 24.7 PG (ref 26.8–34.3)
MCHC RBC AUTO-ENTMCNC: 30.3 G/DL (ref 31.4–37.4)
MCV RBC AUTO: 81 FL (ref 82–98)
MONOCYTES # BLD AUTO: 0.51 THOUSAND/ΜL (ref 0.17–1.22)
MONOCYTES NFR BLD AUTO: 8 % (ref 4–12)
NEUTROPHILS # BLD AUTO: 4.33 THOUSANDS/ΜL (ref 1.85–7.62)
NEUTS SEG NFR BLD AUTO: 64 % (ref 43–75)
NRBC BLD AUTO-RTO: 0 /100 WBCS
PLATELET # BLD AUTO: 289 THOUSANDS/UL (ref 149–390)
PMV BLD AUTO: 10.4 FL (ref 8.9–12.7)
POTASSIUM SERPL-SCNC: 3.1 MMOL/L (ref 3.5–5.3)
PROT SERPL-MCNC: 6.9 G/DL (ref 6.4–8.2)
RBC # BLD AUTO: 3.77 MILLION/UL (ref 3.81–5.12)
SODIUM SERPL-SCNC: 138 MMOL/L (ref 136–145)
WBC # BLD AUTO: 6.68 THOUSAND/UL (ref 4.31–10.16)

## 2020-11-03 PROCEDURE — 36415 COLL VENOUS BLD VENIPUNCTURE: CPT | Performed by: FAMILY MEDICINE

## 2020-11-03 PROCEDURE — 99285 EMERGENCY DEPT VISIT HI MDM: CPT

## 2020-11-03 PROCEDURE — 96375 TX/PRO/DX INJ NEW DRUG ADDON: CPT

## 2020-11-03 PROCEDURE — G1004 CDSM NDSC: HCPCS

## 2020-11-03 PROCEDURE — 80053 COMPREHEN METABOLIC PANEL: CPT | Performed by: FAMILY MEDICINE

## 2020-11-03 PROCEDURE — 83690 ASSAY OF LIPASE: CPT | Performed by: FAMILY MEDICINE

## 2020-11-03 PROCEDURE — 74177 CT ABD & PELVIS W/CONTRAST: CPT

## 2020-11-03 PROCEDURE — 96361 HYDRATE IV INFUSION ADD-ON: CPT

## 2020-11-03 PROCEDURE — 99285 EMERGENCY DEPT VISIT HI MDM: CPT | Performed by: EMERGENCY MEDICINE

## 2020-11-03 PROCEDURE — 96376 TX/PRO/DX INJ SAME DRUG ADON: CPT

## 2020-11-03 PROCEDURE — 85025 COMPLETE CBC W/AUTO DIFF WBC: CPT | Performed by: FAMILY MEDICINE

## 2020-11-03 PROCEDURE — NC001 PR NO CHARGE: Performed by: SURGERY

## 2020-11-03 PROCEDURE — 96374 THER/PROPH/DIAG INJ IV PUSH: CPT

## 2020-11-03 RX ORDER — HEPARIN SODIUM 5000 [USP'U]/ML
5000 INJECTION, SOLUTION INTRAVENOUS; SUBCUTANEOUS EVERY 8 HOURS SCHEDULED
Status: DISCONTINUED | OUTPATIENT
Start: 2020-11-03 | End: 2020-11-04

## 2020-11-03 RX ORDER — HYDROMORPHONE HCL/PF 1 MG/ML
1 SYRINGE (ML) INJECTION ONCE
Status: COMPLETED | OUTPATIENT
Start: 2020-11-03 | End: 2020-11-03

## 2020-11-03 RX ORDER — HYDROMORPHONE HCL 110MG/55ML
2 PATIENT CONTROLLED ANALGESIA SYRINGE INTRAVENOUS EVERY 4 HOURS PRN
Status: DISCONTINUED | OUTPATIENT
Start: 2020-11-03 | End: 2020-11-04

## 2020-11-03 RX ORDER — PANTOPRAZOLE SODIUM 40 MG/1
40 TABLET, DELAYED RELEASE ORAL DAILY
Status: DISCONTINUED | OUTPATIENT
Start: 2020-11-03 | End: 2020-11-08 | Stop reason: HOSPADM

## 2020-11-03 RX ORDER — ONDANSETRON 2 MG/ML
4 INJECTION INTRAMUSCULAR; INTRAVENOUS ONCE
Status: COMPLETED | OUTPATIENT
Start: 2020-11-03 | End: 2020-11-03

## 2020-11-03 RX ORDER — TAMSULOSIN HYDROCHLORIDE 0.4 MG/1
0.4 CAPSULE ORAL
Status: DISCONTINUED | OUTPATIENT
Start: 2020-11-03 | End: 2020-11-08 | Stop reason: HOSPADM

## 2020-11-03 RX ORDER — GABAPENTIN 300 MG/1
600 CAPSULE ORAL 3 TIMES DAILY
Status: DISCONTINUED | OUTPATIENT
Start: 2020-11-03 | End: 2020-11-08 | Stop reason: HOSPADM

## 2020-11-03 RX ORDER — HYDROMORPHONE HCL/PF 1 MG/ML
1 SYRINGE (ML) INJECTION EVERY 4 HOURS PRN
Status: DISCONTINUED | OUTPATIENT
Start: 2020-11-03 | End: 2020-11-03

## 2020-11-03 RX ORDER — POTASSIUM CHLORIDE 29.8 MG/ML
40 INJECTION INTRAVENOUS ONCE
Status: COMPLETED | OUTPATIENT
Start: 2020-11-03 | End: 2020-11-04

## 2020-11-03 RX ORDER — DEXTROSE, SODIUM CHLORIDE, AND POTASSIUM CHLORIDE 5; .45; .15 G/100ML; G/100ML; G/100ML
125 INJECTION INTRAVENOUS CONTINUOUS
Status: DISCONTINUED | OUTPATIENT
Start: 2020-11-03 | End: 2020-11-05

## 2020-11-03 RX ORDER — POTASSIUM CHLORIDE 20 MEQ/1
40 TABLET, EXTENDED RELEASE ORAL ONCE
Status: COMPLETED | OUTPATIENT
Start: 2020-11-03 | End: 2020-11-03

## 2020-11-03 RX ORDER — ESCITALOPRAM OXALATE 10 MG/1
10 TABLET ORAL DAILY
Status: DISCONTINUED | OUTPATIENT
Start: 2020-11-03 | End: 2020-11-08 | Stop reason: HOSPADM

## 2020-11-03 RX ADMIN — IOHEXOL 100 ML: 350 INJECTION, SOLUTION INTRAVENOUS at 13:30

## 2020-11-03 RX ADMIN — POTASSIUM CHLORIDE 40 MEQ: 1500 TABLET, EXTENDED RELEASE ORAL at 13:45

## 2020-11-03 RX ADMIN — HYDROMORPHONE HYDROCHLORIDE 1 MG: 1 INJECTION, SOLUTION INTRAMUSCULAR; INTRAVENOUS; SUBCUTANEOUS at 18:52

## 2020-11-03 RX ADMIN — SODIUM CHLORIDE 1000 ML: 0.9 INJECTION, SOLUTION INTRAVENOUS at 12:39

## 2020-11-03 RX ADMIN — POTASSIUM CHLORIDE 40 MEQ: 29.8 INJECTION, SOLUTION INTRAVENOUS at 21:48

## 2020-11-03 RX ADMIN — HYDROMORPHONE HYDROCHLORIDE 1 MG: 1 INJECTION, SOLUTION INTRAMUSCULAR; INTRAVENOUS; SUBCUTANEOUS at 12:20

## 2020-11-03 RX ADMIN — HEPARIN SODIUM 5000 UNITS: 5000 INJECTION INTRAVENOUS; SUBCUTANEOUS at 21:47

## 2020-11-03 RX ADMIN — ONDANSETRON 4 MG: 2 INJECTION INTRAMUSCULAR; INTRAVENOUS at 12:18

## 2020-11-03 RX ADMIN — ONDANSETRON 4 MG: 2 INJECTION INTRAMUSCULAR; INTRAVENOUS at 14:48

## 2020-11-03 RX ADMIN — HYDROMORPHONE HYDROCHLORIDE 1 MG: 1 INJECTION, SOLUTION INTRAMUSCULAR; INTRAVENOUS; SUBCUTANEOUS at 14:51

## 2020-11-03 RX ADMIN — DEXTROSE, SODIUM CHLORIDE, AND POTASSIUM CHLORIDE 125 ML/HR: 5; .45; .15 INJECTION INTRAVENOUS at 16:54

## 2020-11-03 RX ADMIN — HYDROMORPHONE HYDROCHLORIDE 2 MG: 2 INJECTION, SOLUTION INTRAMUSCULAR; INTRAVENOUS; SUBCUTANEOUS at 22:52

## 2020-11-04 PROBLEM — K56.7 ILEUS FOLLOWING GASTROINTESTINAL SURGERY (HCC): Status: RESOLVED | Noted: 2020-10-22 | Resolved: 2020-11-04

## 2020-11-04 PROBLEM — T40.2X5A THERAPEUTIC OPIOID INDUCED CONSTIPATION: Status: RESOLVED | Noted: 2019-05-16 | Resolved: 2020-11-04

## 2020-11-04 PROBLEM — R53.81 PHYSICAL DECONDITIONING: Status: RESOLVED | Noted: 2019-11-03 | Resolved: 2020-11-04

## 2020-11-04 PROBLEM — K59.03 THERAPEUTIC OPIOID INDUCED CONSTIPATION: Status: RESOLVED | Noted: 2019-05-16 | Resolved: 2020-11-04

## 2020-11-04 PROBLEM — Q45.3 PANCREATIC DIVISUM: Status: ACTIVE | Noted: 2020-10-27

## 2020-11-04 PROBLEM — J20.9 ACUTE BRONCHITIS: Status: ACTIVE | Noted: 2020-10-26

## 2020-11-04 PROBLEM — K91.89 ILEUS FOLLOWING GASTROINTESTINAL SURGERY (HCC): Status: RESOLVED | Noted: 2020-10-22 | Resolved: 2020-11-04

## 2020-11-04 PROBLEM — J20.9 ACUTE BRONCHITIS: Status: RESOLVED | Noted: 2020-10-26 | Resolved: 2020-11-04

## 2020-11-04 PROBLEM — D50.9 IRON DEFICIENCY ANEMIA: Status: RESOLVED | Noted: 2019-11-03 | Resolved: 2020-11-04

## 2020-11-04 LAB
ALBUMIN SERPL BCP-MCNC: 2.7 G/DL (ref 3.5–5)
ALP SERPL-CCNC: 139 U/L (ref 46–116)
ALT SERPL W P-5'-P-CCNC: 47 U/L (ref 12–78)
ANION GAP SERPL CALCULATED.3IONS-SCNC: 7 MMOL/L (ref 4–13)
AST SERPL W P-5'-P-CCNC: 29 U/L (ref 5–45)
BILIRUB SERPL-MCNC: 0.25 MG/DL (ref 0.2–1)
BILIRUB UR QL STRIP: NEGATIVE
BUN SERPL-MCNC: 5 MG/DL (ref 5–25)
CALCIUM ALBUM COR SERPL-MCNC: 9.3 MG/DL (ref 8.3–10.1)
CALCIUM SERPL-MCNC: 8.3 MG/DL (ref 8.3–10.1)
CHLORIDE SERPL-SCNC: 109 MMOL/L (ref 100–108)
CLARITY UR: CLEAR
CO2 SERPL-SCNC: 25 MMOL/L (ref 21–32)
COLOR UR: YELLOW
CREAT SERPL-MCNC: 0.81 MG/DL (ref 0.6–1.3)
GFR SERPL CREATININE-BSD FRML MDRD: 77 ML/MIN/1.73SQ M
GLUCOSE SERPL-MCNC: 105 MG/DL (ref 65–140)
GLUCOSE UR STRIP-MCNC: NEGATIVE MG/DL
HGB UR QL STRIP.AUTO: NEGATIVE
KETONES UR STRIP-MCNC: NEGATIVE MG/DL
LEUKOCYTE ESTERASE UR QL STRIP: NEGATIVE
MAGNESIUM SERPL-MCNC: 1.7 MG/DL (ref 1.6–2.6)
NITRITE UR QL STRIP: NEGATIVE
PH UR STRIP.AUTO: 6 [PH]
PHOSPHATE SERPL-MCNC: 2.6 MG/DL (ref 2.3–4.1)
POTASSIUM SERPL-SCNC: 3.7 MMOL/L (ref 3.5–5.3)
PROT SERPL-MCNC: 6 G/DL (ref 6.4–8.2)
PROT UR STRIP-MCNC: NEGATIVE MG/DL
SODIUM SERPL-SCNC: 141 MMOL/L (ref 136–145)
SP GR UR STRIP.AUTO: <=1.005 (ref 1–1.03)
UROBILINOGEN UR QL STRIP.AUTO: 0.2 E.U./DL

## 2020-11-04 PROCEDURE — 83735 ASSAY OF MAGNESIUM: CPT | Performed by: SURGERY

## 2020-11-04 PROCEDURE — 81003 URINALYSIS AUTO W/O SCOPE: CPT | Performed by: FAMILY MEDICINE

## 2020-11-04 PROCEDURE — 80053 COMPREHEN METABOLIC PANEL: CPT | Performed by: SURGERY

## 2020-11-04 PROCEDURE — 99222 1ST HOSP IP/OBS MODERATE 55: CPT | Performed by: SURGERY

## 2020-11-04 PROCEDURE — 84100 ASSAY OF PHOSPHORUS: CPT | Performed by: SURGERY

## 2020-11-04 PROCEDURE — NC001 PR NO CHARGE: Performed by: SURGERY

## 2020-11-04 RX ORDER — HYDROMORPHONE HCL/PF 1 MG/ML
0.5 SYRINGE (ML) INJECTION EVERY 2 HOUR PRN
Status: DISCONTINUED | OUTPATIENT
Start: 2020-11-04 | End: 2020-11-06

## 2020-11-04 RX ORDER — ONDANSETRON 2 MG/ML
4 INJECTION INTRAMUSCULAR; INTRAVENOUS EVERY 6 HOURS PRN
Status: DISCONTINUED | OUTPATIENT
Start: 2020-11-04 | End: 2020-11-08 | Stop reason: HOSPADM

## 2020-11-04 RX ORDER — HYDROMORPHONE HCL/PF 1 MG/ML
1 SYRINGE (ML) INJECTION EVERY 2 HOUR PRN
Status: DISCONTINUED | OUTPATIENT
Start: 2020-11-04 | End: 2020-11-08

## 2020-11-04 RX ADMIN — HYDROMORPHONE HYDROCHLORIDE 1 MG: 1 INJECTION, SOLUTION INTRAMUSCULAR; INTRAVENOUS; SUBCUTANEOUS at 22:38

## 2020-11-04 RX ADMIN — DEXTROSE, SODIUM CHLORIDE, AND POTASSIUM CHLORIDE 125 ML/HR: 5; .45; .15 INJECTION INTRAVENOUS at 20:31

## 2020-11-04 RX ADMIN — HYDROMORPHONE HYDROCHLORIDE 1 MG: 1 INJECTION, SOLUTION INTRAMUSCULAR; INTRAVENOUS; SUBCUTANEOUS at 11:31

## 2020-11-04 RX ADMIN — DEXTROSE, SODIUM CHLORIDE, AND POTASSIUM CHLORIDE 125 ML/HR: 5; .45; .15 INJECTION INTRAVENOUS at 01:57

## 2020-11-04 RX ADMIN — ONDANSETRON 4 MG: 2 INJECTION INTRAMUSCULAR; INTRAVENOUS at 11:31

## 2020-11-04 RX ADMIN — DEXTROSE, SODIUM CHLORIDE, AND POTASSIUM CHLORIDE 125 ML/HR: 5; .45; .15 INJECTION INTRAVENOUS at 12:36

## 2020-11-04 RX ADMIN — HYDROMORPHONE HYDROCHLORIDE 1 MG: 1 INJECTION, SOLUTION INTRAMUSCULAR; INTRAVENOUS; SUBCUTANEOUS at 15:42

## 2020-11-04 RX ADMIN — HYDROMORPHONE HYDROCHLORIDE 1 MG: 1 INJECTION, SOLUTION INTRAMUSCULAR; INTRAVENOUS; SUBCUTANEOUS at 20:24

## 2020-11-04 RX ADMIN — ENOXAPARIN SODIUM 40 MG: 40 INJECTION SUBCUTANEOUS at 11:31

## 2020-11-04 RX ADMIN — HYDROMORPHONE HYDROCHLORIDE 2 MG: 2 INJECTION, SOLUTION INTRAMUSCULAR; INTRAVENOUS; SUBCUTANEOUS at 03:07

## 2020-11-04 RX ADMIN — HEPARIN SODIUM 5000 UNITS: 5000 INJECTION INTRAVENOUS; SUBCUTANEOUS at 05:11

## 2020-11-04 RX ADMIN — HYDROMORPHONE HYDROCHLORIDE 1 MG: 1 INJECTION, SOLUTION INTRAMUSCULAR; INTRAVENOUS; SUBCUTANEOUS at 17:57

## 2020-11-04 RX ADMIN — ONDANSETRON 4 MG: 2 INJECTION INTRAMUSCULAR; INTRAVENOUS at 17:57

## 2020-11-04 RX ADMIN — HYDROMORPHONE HYDROCHLORIDE 1 MG: 1 INJECTION, SOLUTION INTRAMUSCULAR; INTRAVENOUS; SUBCUTANEOUS at 13:33

## 2020-11-04 RX ADMIN — HYDROMORPHONE HYDROCHLORIDE 2 MG: 2 INJECTION, SOLUTION INTRAMUSCULAR; INTRAVENOUS; SUBCUTANEOUS at 07:39

## 2020-11-05 LAB
AMYLASE SERPL-CCNC: 54 IU/L (ref 25–115)
ANION GAP SERPL CALCULATED.3IONS-SCNC: 5 MMOL/L (ref 4–13)
BUN SERPL-MCNC: 1 MG/DL (ref 5–25)
CALCIUM SERPL-MCNC: 7.7 MG/DL (ref 8.3–10.1)
CHLORIDE SERPL-SCNC: 106 MMOL/L (ref 100–108)
CO2 SERPL-SCNC: 24 MMOL/L (ref 21–32)
CREAT SERPL-MCNC: 0.86 MG/DL (ref 0.6–1.3)
GFR SERPL CREATININE-BSD FRML MDRD: 72 ML/MIN/1.73SQ M
GLUCOSE SERPL-MCNC: 440 MG/DL (ref 65–140)
POTASSIUM SERPL-SCNC: 5.4 MMOL/L (ref 3.5–5.3)
PREALB SERPL-MCNC: 13.8 MG/DL (ref 18–40)
SODIUM SERPL-SCNC: 135 MMOL/L (ref 136–145)

## 2020-11-05 PROCEDURE — 82150 ASSAY OF AMYLASE: CPT | Performed by: SURGERY

## 2020-11-05 PROCEDURE — 84134 ASSAY OF PREALBUMIN: CPT | Performed by: SURGERY

## 2020-11-05 PROCEDURE — 99232 SBSQ HOSP IP/OBS MODERATE 35: CPT | Performed by: SURGERY

## 2020-11-05 PROCEDURE — 80048 BASIC METABOLIC PNL TOTAL CA: CPT | Performed by: SURGERY

## 2020-11-05 RX ORDER — DEXTROSE AND SODIUM CHLORIDE 5; .45 G/100ML; G/100ML
75 INJECTION, SOLUTION INTRAVENOUS CONTINUOUS
Status: DISCONTINUED | OUTPATIENT
Start: 2020-11-05 | End: 2020-11-08 | Stop reason: HOSPADM

## 2020-11-05 RX ADMIN — DEXTROSE AND SODIUM CHLORIDE 100 ML/HR: 5; .45 INJECTION, SOLUTION INTRAVENOUS at 18:15

## 2020-11-05 RX ADMIN — HYDROMORPHONE HYDROCHLORIDE 1 MG: 1 INJECTION, SOLUTION INTRAMUSCULAR; INTRAVENOUS; SUBCUTANEOUS at 20:03

## 2020-11-05 RX ADMIN — HYDROMORPHONE HYDROCHLORIDE 1 MG: 1 INJECTION, SOLUTION INTRAMUSCULAR; INTRAVENOUS; SUBCUTANEOUS at 22:13

## 2020-11-05 RX ADMIN — HYDROMORPHONE HYDROCHLORIDE 1 MG: 1 INJECTION, SOLUTION INTRAMUSCULAR; INTRAVENOUS; SUBCUTANEOUS at 02:56

## 2020-11-05 RX ADMIN — HYDROMORPHONE HYDROCHLORIDE 0.5 MG: 1 INJECTION, SOLUTION INTRAMUSCULAR; INTRAVENOUS; SUBCUTANEOUS at 19:08

## 2020-11-05 RX ADMIN — DEXTROSE AND SODIUM CHLORIDE 100 ML/HR: 5; .45 INJECTION, SOLUTION INTRAVENOUS at 09:22

## 2020-11-05 RX ADMIN — ENOXAPARIN SODIUM 40 MG: 40 INJECTION SUBCUTANEOUS at 08:36

## 2020-11-05 RX ADMIN — HYDROMORPHONE HYDROCHLORIDE 1 MG: 1 INJECTION, SOLUTION INTRAMUSCULAR; INTRAVENOUS; SUBCUTANEOUS at 04:59

## 2020-11-05 RX ADMIN — HYDROMORPHONE HYDROCHLORIDE 1 MG: 1 INJECTION, SOLUTION INTRAMUSCULAR; INTRAVENOUS; SUBCUTANEOUS at 17:46

## 2020-11-05 RX ADMIN — HYDROMORPHONE HYDROCHLORIDE 1 MG: 1 INJECTION, SOLUTION INTRAMUSCULAR; INTRAVENOUS; SUBCUTANEOUS at 09:21

## 2020-11-05 RX ADMIN — HYDROMORPHONE HYDROCHLORIDE 0.5 MG: 1 INJECTION, SOLUTION INTRAMUSCULAR; INTRAVENOUS; SUBCUTANEOUS at 21:00

## 2020-11-05 RX ADMIN — HYDROMORPHONE HYDROCHLORIDE 1 MG: 1 INJECTION, SOLUTION INTRAMUSCULAR; INTRAVENOUS; SUBCUTANEOUS at 11:23

## 2020-11-05 RX ADMIN — HYDROMORPHONE HYDROCHLORIDE 1 MG: 1 INJECTION, SOLUTION INTRAMUSCULAR; INTRAVENOUS; SUBCUTANEOUS at 07:34

## 2020-11-05 RX ADMIN — DEXTROSE, SODIUM CHLORIDE, AND POTASSIUM CHLORIDE 125 ML/HR: 5; .45; .15 INJECTION INTRAVENOUS at 04:24

## 2020-11-05 RX ADMIN — HYDROMORPHONE HYDROCHLORIDE 1 MG: 1 INJECTION, SOLUTION INTRAMUSCULAR; INTRAVENOUS; SUBCUTANEOUS at 13:47

## 2020-11-05 RX ADMIN — HYDROMORPHONE HYDROCHLORIDE 1 MG: 1 INJECTION, SOLUTION INTRAMUSCULAR; INTRAVENOUS; SUBCUTANEOUS at 15:43

## 2020-11-05 RX ADMIN — ONDANSETRON 4 MG: 2 INJECTION INTRAMUSCULAR; INTRAVENOUS at 13:51

## 2020-11-05 RX ADMIN — ONDANSETRON 4 MG: 2 INJECTION INTRAMUSCULAR; INTRAVENOUS at 02:58

## 2020-11-05 RX ADMIN — HYDROMORPHONE HYDROCHLORIDE 1 MG: 1 INJECTION, SOLUTION INTRAMUSCULAR; INTRAVENOUS; SUBCUTANEOUS at 00:44

## 2020-11-05 RX ADMIN — HYDROMORPHONE HYDROCHLORIDE 0.5 MG: 1 INJECTION, SOLUTION INTRAMUSCULAR; INTRAVENOUS; SUBCUTANEOUS at 23:57

## 2020-11-06 ENCOUNTER — APPOINTMENT (INPATIENT)
Dept: CT IMAGING | Facility: HOSPITAL | Age: 65
DRG: 394 | End: 2020-11-06
Payer: MEDICARE

## 2020-11-06 LAB
ANION GAP SERPL CALCULATED.3IONS-SCNC: 10 MMOL/L (ref 4–13)
BUN SERPL-MCNC: 2 MG/DL (ref 5–25)
CALCIUM SERPL-MCNC: 7.9 MG/DL (ref 8.3–10.1)
CHLORIDE SERPL-SCNC: 107 MMOL/L (ref 100–108)
CO2 SERPL-SCNC: 23 MMOL/L (ref 21–32)
CREAT SERPL-MCNC: 0.76 MG/DL (ref 0.6–1.3)
GFR SERPL CREATININE-BSD FRML MDRD: 83 ML/MIN/1.73SQ M
GLUCOSE SERPL-MCNC: 102 MG/DL (ref 65–140)
POTASSIUM SERPL-SCNC: 3.3 MMOL/L (ref 3.5–5.3)
SODIUM SERPL-SCNC: 140 MMOL/L (ref 136–145)

## 2020-11-06 PROCEDURE — 99232 SBSQ HOSP IP/OBS MODERATE 35: CPT | Performed by: SURGERY

## 2020-11-06 PROCEDURE — 80048 BASIC METABOLIC PNL TOTAL CA: CPT | Performed by: SURGERY

## 2020-11-06 PROCEDURE — NC001 PR NO CHARGE: Performed by: SURGERY

## 2020-11-06 PROCEDURE — 74176 CT ABD & PELVIS W/O CONTRAST: CPT

## 2020-11-06 PROCEDURE — G1004 CDSM NDSC: HCPCS

## 2020-11-06 RX ORDER — POTASSIUM CHLORIDE 20 MEQ/1
20 TABLET, EXTENDED RELEASE ORAL ONCE
Status: DISCONTINUED | OUTPATIENT
Start: 2020-11-06 | End: 2020-11-06

## 2020-11-06 RX ORDER — POTASSIUM CHLORIDE 14.9 MG/ML
20 INJECTION INTRAVENOUS ONCE
Status: COMPLETED | OUTPATIENT
Start: 2020-11-06 | End: 2020-11-06

## 2020-11-06 RX ORDER — HYDROMORPHONE HCL/PF 1 MG/ML
0.2 SYRINGE (ML) INJECTION EVERY 2 HOUR PRN
Status: DISCONTINUED | OUTPATIENT
Start: 2020-11-06 | End: 2020-11-08

## 2020-11-06 RX ORDER — HYDROMORPHONE HCL/PF 1 MG/ML
0.5 SYRINGE (ML) INJECTION
Status: DISCONTINUED | OUTPATIENT
Start: 2020-11-06 | End: 2020-11-08

## 2020-11-06 RX ADMIN — Medication: at 21:08

## 2020-11-06 RX ADMIN — MICONAZOLE NITRATE 200 MG: 200 SUPPOSITORY VAGINAL at 21:02

## 2020-11-06 RX ADMIN — HYDROMORPHONE HYDROCHLORIDE 1 MG: 1 INJECTION, SOLUTION INTRAMUSCULAR; INTRAVENOUS; SUBCUTANEOUS at 10:38

## 2020-11-06 RX ADMIN — HYDROMORPHONE HYDROCHLORIDE 1 MG: 1 INJECTION, SOLUTION INTRAMUSCULAR; INTRAVENOUS; SUBCUTANEOUS at 16:45

## 2020-11-06 RX ADMIN — HYDROMORPHONE HYDROCHLORIDE 0.5 MG: 1 INJECTION, SOLUTION INTRAMUSCULAR; INTRAVENOUS; SUBCUTANEOUS at 12:05

## 2020-11-06 RX ADMIN — HYDROMORPHONE HYDROCHLORIDE 0.5 MG: 1 INJECTION, SOLUTION INTRAMUSCULAR; INTRAVENOUS; SUBCUTANEOUS at 15:47

## 2020-11-06 RX ADMIN — HYDROMORPHONE HYDROCHLORIDE 1 MG: 1 INJECTION, SOLUTION INTRAMUSCULAR; INTRAVENOUS; SUBCUTANEOUS at 01:22

## 2020-11-06 RX ADMIN — HYDROMORPHONE HYDROCHLORIDE 1 MG: 1 INJECTION, SOLUTION INTRAMUSCULAR; INTRAVENOUS; SUBCUTANEOUS at 21:01

## 2020-11-06 RX ADMIN — ONDANSETRON 4 MG: 2 INJECTION INTRAMUSCULAR; INTRAVENOUS at 16:45

## 2020-11-06 RX ADMIN — HYDROMORPHONE HYDROCHLORIDE 1 MG: 1 INJECTION, SOLUTION INTRAMUSCULAR; INTRAVENOUS; SUBCUTANEOUS at 05:58

## 2020-11-06 RX ADMIN — DEXTROSE AND SODIUM CHLORIDE 100 ML/HR: 5; .45 INJECTION, SOLUTION INTRAVENOUS at 04:55

## 2020-11-06 RX ADMIN — HYDROMORPHONE HYDROCHLORIDE 1 MG: 1 INJECTION, SOLUTION INTRAMUSCULAR; INTRAVENOUS; SUBCUTANEOUS at 14:38

## 2020-11-06 RX ADMIN — HYDROMORPHONE HYDROCHLORIDE 0.5 MG: 1 INJECTION, SOLUTION INTRAMUSCULAR; INTRAVENOUS; SUBCUTANEOUS at 22:19

## 2020-11-06 RX ADMIN — ENOXAPARIN SODIUM 40 MG: 40 INJECTION SUBCUTANEOUS at 08:15

## 2020-11-06 RX ADMIN — HYDROMORPHONE HYDROCHLORIDE 1 MG: 1 INJECTION, SOLUTION INTRAMUSCULAR; INTRAVENOUS; SUBCUTANEOUS at 12:42

## 2020-11-06 RX ADMIN — POTASSIUM CHLORIDE 20 MEQ: 14.9 INJECTION, SOLUTION INTRAVENOUS at 12:01

## 2020-11-06 RX ADMIN — HYDROMORPHONE HYDROCHLORIDE 0.2 MG: 1 INJECTION, SOLUTION INTRAMUSCULAR; INTRAVENOUS; SUBCUTANEOUS at 17:58

## 2020-11-06 RX ADMIN — HYDROMORPHONE HYDROCHLORIDE 1 MG: 1 INJECTION, SOLUTION INTRAMUSCULAR; INTRAVENOUS; SUBCUTANEOUS at 23:14

## 2020-11-06 RX ADMIN — MICONAZOLE NITRATE: KIT VAGINAL at 20:10

## 2020-11-06 RX ADMIN — ONDANSETRON 4 MG: 2 INJECTION INTRAMUSCULAR; INTRAVENOUS at 01:22

## 2020-11-06 RX ADMIN — HYDROMORPHONE HYDROCHLORIDE 1 MG: 1 INJECTION, SOLUTION INTRAMUSCULAR; INTRAVENOUS; SUBCUTANEOUS at 03:25

## 2020-11-06 RX ADMIN — HYDROMORPHONE HYDROCHLORIDE 1 MG: 1 INJECTION, SOLUTION INTRAMUSCULAR; INTRAVENOUS; SUBCUTANEOUS at 08:15

## 2020-11-06 RX ADMIN — DEXTROSE AND SODIUM CHLORIDE 75 ML/HR: 5; .45 INJECTION, SOLUTION INTRAVENOUS at 16:52

## 2020-11-06 RX ADMIN — HYDROMORPHONE HYDROCHLORIDE 1 MG: 1 INJECTION, SOLUTION INTRAMUSCULAR; INTRAVENOUS; SUBCUTANEOUS at 18:58

## 2020-11-07 LAB
ANION GAP SERPL CALCULATED.3IONS-SCNC: 9 MMOL/L (ref 4–13)
BASOPHILS # BLD AUTO: 0.05 THOUSANDS/ΜL (ref 0–0.1)
BASOPHILS NFR BLD AUTO: 1 % (ref 0–1)
BUN SERPL-MCNC: 7 MG/DL (ref 5–25)
CALCIUM SERPL-MCNC: 8 MG/DL (ref 8.3–10.1)
CHLORIDE SERPL-SCNC: 107 MMOL/L (ref 100–108)
CO2 SERPL-SCNC: 26 MMOL/L (ref 21–32)
CREAT SERPL-MCNC: 0.65 MG/DL (ref 0.6–1.3)
EOSINOPHIL # BLD AUTO: 0.17 THOUSAND/ΜL (ref 0–0.61)
EOSINOPHIL NFR BLD AUTO: 5 % (ref 0–6)
ERYTHROCYTE [DISTWIDTH] IN BLOOD BY AUTOMATED COUNT: 15.8 % (ref 11.6–15.1)
GFR SERPL CREATININE-BSD FRML MDRD: 94 ML/MIN/1.73SQ M
GLUCOSE SERPL-MCNC: 93 MG/DL (ref 65–140)
GLUCOSE SERPL-MCNC: 95 MG/DL (ref 65–140)
HCT VFR BLD AUTO: 27.4 % (ref 34.8–46.1)
HGB BLD-MCNC: 8.1 G/DL (ref 11.5–15.4)
IMM GRANULOCYTES # BLD AUTO: 0.01 THOUSAND/UL (ref 0–0.2)
IMM GRANULOCYTES NFR BLD AUTO: 0 % (ref 0–2)
LYMPHOCYTES # BLD AUTO: 0.99 THOUSANDS/ΜL (ref 0.6–4.47)
LYMPHOCYTES NFR BLD AUTO: 28 % (ref 14–44)
MAGNESIUM SERPL-MCNC: 2.2 MG/DL (ref 1.6–2.6)
MCH RBC QN AUTO: 25 PG (ref 26.8–34.3)
MCHC RBC AUTO-ENTMCNC: 29.6 G/DL (ref 31.4–37.4)
MCV RBC AUTO: 85 FL (ref 82–98)
MONOCYTES # BLD AUTO: 0.5 THOUSAND/ΜL (ref 0.17–1.22)
MONOCYTES NFR BLD AUTO: 14 % (ref 4–12)
NEUTROPHILS # BLD AUTO: 1.85 THOUSANDS/ΜL (ref 1.85–7.62)
NEUTS SEG NFR BLD AUTO: 52 % (ref 43–75)
NRBC BLD AUTO-RTO: 0 /100 WBCS
PHOSPHATE SERPL-MCNC: 6.7 MG/DL (ref 2.3–4.1)
PLATELET # BLD AUTO: 247 THOUSANDS/UL (ref 149–390)
PMV BLD AUTO: 9.8 FL (ref 8.9–12.7)
POTASSIUM SERPL-SCNC: 3.3 MMOL/L (ref 3.5–5.3)
RBC # BLD AUTO: 3.24 MILLION/UL (ref 3.81–5.12)
SODIUM SERPL-SCNC: 142 MMOL/L (ref 136–145)
WBC # BLD AUTO: 3.57 THOUSAND/UL (ref 4.31–10.16)

## 2020-11-07 PROCEDURE — 82948 REAGENT STRIP/BLOOD GLUCOSE: CPT

## 2020-11-07 PROCEDURE — 83735 ASSAY OF MAGNESIUM: CPT | Performed by: SURGERY

## 2020-11-07 PROCEDURE — 99232 SBSQ HOSP IP/OBS MODERATE 35: CPT | Performed by: SURGERY

## 2020-11-07 PROCEDURE — 84100 ASSAY OF PHOSPHORUS: CPT | Performed by: SURGERY

## 2020-11-07 PROCEDURE — 80048 BASIC METABOLIC PNL TOTAL CA: CPT | Performed by: PHYSICIAN ASSISTANT

## 2020-11-07 PROCEDURE — 3E0336Z INTRODUCTION OF NUTRITIONAL SUBSTANCE INTO PERIPHERAL VEIN, PERCUTANEOUS APPROACH: ICD-10-PCS | Performed by: SURGERY

## 2020-11-07 PROCEDURE — 85025 COMPLETE CBC W/AUTO DIFF WBC: CPT | Performed by: SURGERY

## 2020-11-07 RX ORDER — POTASSIUM CHLORIDE 14.9 MG/ML
20 INJECTION INTRAVENOUS ONCE
Status: COMPLETED | OUTPATIENT
Start: 2020-11-07 | End: 2020-11-07

## 2020-11-07 RX ORDER — POTASSIUM CHLORIDE 29.8 MG/ML
40 INJECTION INTRAVENOUS ONCE
Status: COMPLETED | OUTPATIENT
Start: 2020-11-07 | End: 2020-11-07

## 2020-11-07 RX ADMIN — HYDROMORPHONE HYDROCHLORIDE 1 MG: 1 INJECTION, SOLUTION INTRAMUSCULAR; INTRAVENOUS; SUBCUTANEOUS at 10:04

## 2020-11-07 RX ADMIN — HYDROMORPHONE HYDROCHLORIDE 1 MG: 1 INJECTION, SOLUTION INTRAMUSCULAR; INTRAVENOUS; SUBCUTANEOUS at 12:42

## 2020-11-07 RX ADMIN — HYDROMORPHONE HYDROCHLORIDE 0.5 MG: 1 INJECTION, SOLUTION INTRAMUSCULAR; INTRAVENOUS; SUBCUTANEOUS at 21:34

## 2020-11-07 RX ADMIN — HYDROMORPHONE HYDROCHLORIDE 0.5 MG: 1 INJECTION, SOLUTION INTRAMUSCULAR; INTRAVENOUS; SUBCUTANEOUS at 16:12

## 2020-11-07 RX ADMIN — HYDROMORPHONE HYDROCHLORIDE 1 MG: 1 INJECTION, SOLUTION INTRAMUSCULAR; INTRAVENOUS; SUBCUTANEOUS at 03:11

## 2020-11-07 RX ADMIN — HYDROMORPHONE HYDROCHLORIDE 1 MG: 1 INJECTION, SOLUTION INTRAMUSCULAR; INTRAVENOUS; SUBCUTANEOUS at 06:06

## 2020-11-07 RX ADMIN — HYDROMORPHONE HYDROCHLORIDE 1 MG: 1 INJECTION, SOLUTION INTRAMUSCULAR; INTRAVENOUS; SUBCUTANEOUS at 01:10

## 2020-11-07 RX ADMIN — HYDROMORPHONE HYDROCHLORIDE 1 MG: 1 INJECTION, SOLUTION INTRAMUSCULAR; INTRAVENOUS; SUBCUTANEOUS at 15:10

## 2020-11-07 RX ADMIN — POTASSIUM CHLORIDE 20 MEQ: 14.9 INJECTION, SOLUTION INTRAVENOUS at 18:41

## 2020-11-07 RX ADMIN — POTASSIUM CHLORIDE 40 MEQ: 29.8 INJECTION, SOLUTION INTRAVENOUS at 14:36

## 2020-11-07 RX ADMIN — HYDROMORPHONE HYDROCHLORIDE 1 MG: 1 INJECTION, SOLUTION INTRAMUSCULAR; INTRAVENOUS; SUBCUTANEOUS at 17:42

## 2020-11-07 RX ADMIN — HYDROMORPHONE HYDROCHLORIDE 1 MG: 1 INJECTION, SOLUTION INTRAMUSCULAR; INTRAVENOUS; SUBCUTANEOUS at 08:11

## 2020-11-07 RX ADMIN — MICONAZOLE NITRATE 200 MG: 200 SUPPOSITORY VAGINAL at 21:31

## 2020-11-07 RX ADMIN — HYDROMORPHONE HYDROCHLORIDE 0.5 MG: 1 INJECTION, SOLUTION INTRAMUSCULAR; INTRAVENOUS; SUBCUTANEOUS at 04:14

## 2020-11-07 RX ADMIN — HYDROMORPHONE HYDROCHLORIDE 0.2 MG: 1 INJECTION, SOLUTION INTRAMUSCULAR; INTRAVENOUS; SUBCUTANEOUS at 14:03

## 2020-11-07 RX ADMIN — DEXTROSE AND SODIUM CHLORIDE 75 ML/HR: 5; .45 INJECTION, SOLUTION INTRAVENOUS at 11:17

## 2020-11-07 RX ADMIN — ONDANSETRON 4 MG: 2 INJECTION INTRAMUSCULAR; INTRAVENOUS at 22:54

## 2020-11-07 RX ADMIN — ALTEPLASE 2 MG: 2.2 INJECTION, POWDER, LYOPHILIZED, FOR SOLUTION INTRAVENOUS at 00:28

## 2020-11-07 RX ADMIN — HYDROMORPHONE HYDROCHLORIDE 0.5 MG: 1 INJECTION, SOLUTION INTRAMUSCULAR; INTRAVENOUS; SUBCUTANEOUS at 11:22

## 2020-11-07 RX ADMIN — ENOXAPARIN SODIUM 40 MG: 40 INJECTION SUBCUTANEOUS at 08:10

## 2020-11-07 RX ADMIN — HYDROMORPHONE HYDROCHLORIDE 1 MG: 1 INJECTION, SOLUTION INTRAMUSCULAR; INTRAVENOUS; SUBCUTANEOUS at 20:18

## 2020-11-07 RX ADMIN — HYDROMORPHONE HYDROCHLORIDE 1 MG: 1 INJECTION, SOLUTION INTRAMUSCULAR; INTRAVENOUS; SUBCUTANEOUS at 22:54

## 2020-11-07 RX ADMIN — Medication: at 21:25

## 2020-11-08 VITALS
WEIGHT: 143.52 LBS | RESPIRATION RATE: 18 BRPM | TEMPERATURE: 98 F | HEART RATE: 61 BPM | DIASTOLIC BLOOD PRESSURE: 64 MMHG | OXYGEN SATURATION: 100 % | BODY MASS INDEX: 24.64 KG/M2 | SYSTOLIC BLOOD PRESSURE: 129 MMHG

## 2020-11-08 LAB
ANION GAP SERPL CALCULATED.3IONS-SCNC: 9 MMOL/L (ref 4–13)
BASOPHILS # BLD AUTO: 0.03 THOUSANDS/ΜL (ref 0–0.1)
BASOPHILS NFR BLD AUTO: 1 % (ref 0–1)
BUN SERPL-MCNC: 9 MG/DL (ref 5–25)
CALCIUM SERPL-MCNC: 8.2 MG/DL (ref 8.3–10.1)
CHLORIDE SERPL-SCNC: 108 MMOL/L (ref 100–108)
CO2 SERPL-SCNC: 25 MMOL/L (ref 21–32)
CREAT SERPL-MCNC: 0.63 MG/DL (ref 0.6–1.3)
EOSINOPHIL # BLD AUTO: 0.23 THOUSAND/ΜL (ref 0–0.61)
EOSINOPHIL NFR BLD AUTO: 6 % (ref 0–6)
ERYTHROCYTE [DISTWIDTH] IN BLOOD BY AUTOMATED COUNT: 15.6 % (ref 11.6–15.1)
GFR SERPL CREATININE-BSD FRML MDRD: 95 ML/MIN/1.73SQ M
GLUCOSE SERPL-MCNC: 95 MG/DL (ref 65–140)
HCT VFR BLD AUTO: 27.7 % (ref 34.8–46.1)
HGB BLD-MCNC: 8.4 G/DL (ref 11.5–15.4)
IMM GRANULOCYTES # BLD AUTO: 0.02 THOUSAND/UL (ref 0–0.2)
IMM GRANULOCYTES NFR BLD AUTO: 1 % (ref 0–2)
LYMPHOCYTES # BLD AUTO: 1.3 THOUSANDS/ΜL (ref 0.6–4.47)
LYMPHOCYTES NFR BLD AUTO: 32 % (ref 14–44)
MAGNESIUM SERPL-MCNC: 1.7 MG/DL (ref 1.6–2.6)
MCH RBC QN AUTO: 24.6 PG (ref 26.8–34.3)
MCHC RBC AUTO-ENTMCNC: 30.3 G/DL (ref 31.4–37.4)
MCV RBC AUTO: 81 FL (ref 82–98)
MONOCYTES # BLD AUTO: 0.48 THOUSAND/ΜL (ref 0.17–1.22)
MONOCYTES NFR BLD AUTO: 12 % (ref 4–12)
NEUTROPHILS # BLD AUTO: 2.07 THOUSANDS/ΜL (ref 1.85–7.62)
NEUTS SEG NFR BLD AUTO: 48 % (ref 43–75)
NRBC BLD AUTO-RTO: 0 /100 WBCS
PHOSPHATE SERPL-MCNC: 2.7 MG/DL (ref 2.3–4.1)
PLATELET # BLD AUTO: 241 THOUSANDS/UL (ref 149–390)
PMV BLD AUTO: 9.5 FL (ref 8.9–12.7)
POTASSIUM SERPL-SCNC: 4 MMOL/L (ref 3.5–5.3)
RBC # BLD AUTO: 3.42 MILLION/UL (ref 3.81–5.12)
SODIUM SERPL-SCNC: 142 MMOL/L (ref 136–145)
WBC # BLD AUTO: 4.13 THOUSAND/UL (ref 4.31–10.16)

## 2020-11-08 PROCEDURE — 80048 BASIC METABOLIC PNL TOTAL CA: CPT | Performed by: SURGERY

## 2020-11-08 PROCEDURE — NC001 PR NO CHARGE: Performed by: SURGERY

## 2020-11-08 PROCEDURE — 84100 ASSAY OF PHOSPHORUS: CPT | Performed by: SURGERY

## 2020-11-08 PROCEDURE — 85025 COMPLETE CBC W/AUTO DIFF WBC: CPT | Performed by: SURGERY

## 2020-11-08 PROCEDURE — 83735 ASSAY OF MAGNESIUM: CPT | Performed by: SURGERY

## 2020-11-08 RX ORDER — KETOROLAC TROMETHAMINE 30 MG/ML
15 INJECTION, SOLUTION INTRAMUSCULAR; INTRAVENOUS EVERY 6 HOURS SCHEDULED
Status: DISCONTINUED | OUTPATIENT
Start: 2020-11-08 | End: 2020-11-08 | Stop reason: HOSPADM

## 2020-11-08 RX ORDER — MORPHINE SULFATE 30 MG/1
30 TABLET, FILM COATED, EXTENDED RELEASE ORAL EVERY 8 HOURS SCHEDULED
Status: DISCONTINUED | OUTPATIENT
Start: 2020-11-08 | End: 2020-11-08 | Stop reason: HOSPADM

## 2020-11-08 RX ORDER — MAGNESIUM SULFATE HEPTAHYDRATE 40 MG/ML
2 INJECTION, SOLUTION INTRAVENOUS ONCE
Status: COMPLETED | OUTPATIENT
Start: 2020-11-08 | End: 2020-11-08

## 2020-11-08 RX ORDER — HYDROMORPHONE HYDROCHLORIDE 2 MG/1
4 TABLET ORAL EVERY 6 HOURS PRN
Status: DISCONTINUED | OUTPATIENT
Start: 2020-11-08 | End: 2020-11-08 | Stop reason: HOSPADM

## 2020-11-08 RX ADMIN — HYDROMORPHONE HYDROCHLORIDE 1 MG: 1 INJECTION, SOLUTION INTRAMUSCULAR; INTRAVENOUS; SUBCUTANEOUS at 07:48

## 2020-11-08 RX ADMIN — HYDROMORPHONE HYDROCHLORIDE 1 MG: 1 INJECTION, SOLUTION INTRAMUSCULAR; INTRAVENOUS; SUBCUTANEOUS at 01:45

## 2020-11-08 RX ADMIN — HYDROMORPHONE HYDROCHLORIDE 4 MG: 2 TABLET ORAL at 11:58

## 2020-11-08 RX ADMIN — PANTOPRAZOLE SODIUM 40 MG: 40 TABLET, DELAYED RELEASE ORAL at 08:32

## 2020-11-08 RX ADMIN — HYDROMORPHONE HYDROCHLORIDE 0.5 MG: 1 INJECTION, SOLUTION INTRAMUSCULAR; INTRAVENOUS; SUBCUTANEOUS at 00:39

## 2020-11-08 RX ADMIN — ENOXAPARIN SODIUM 40 MG: 40 INJECTION SUBCUTANEOUS at 08:32

## 2020-11-08 RX ADMIN — MAGNESIUM SULFATE IN WATER 2 G: 40 INJECTION, SOLUTION INTRAVENOUS at 09:17

## 2020-11-08 RX ADMIN — HYDROMORPHONE HYDROCHLORIDE 0.5 MG: 1 INJECTION, SOLUTION INTRAMUSCULAR; INTRAVENOUS; SUBCUTANEOUS at 05:26

## 2020-11-08 RX ADMIN — ESCITALOPRAM OXALATE 10 MG: 10 TABLET ORAL at 08:31

## 2020-11-08 RX ADMIN — GABAPENTIN 600 MG: 300 CAPSULE ORAL at 08:31

## 2020-11-08 RX ADMIN — MORPHINE SULFATE 30 MG: 30 TABLET, EXTENDED RELEASE ORAL at 08:32

## 2020-11-08 RX ADMIN — HYDROMORPHONE HYDROCHLORIDE 1 MG: 1 INJECTION, SOLUTION INTRAMUSCULAR; INTRAVENOUS; SUBCUTANEOUS at 03:51

## 2020-11-08 RX ADMIN — POTASSIUM PHOSPHATE, MONOBASIC AND POTASSIUM PHOSPHATE, DIBASIC 21 MMOL: 224; 236 INJECTION, SOLUTION, CONCENTRATE INTRAVENOUS at 13:19

## 2020-11-08 RX ADMIN — METHYLNALTREXONE BROMIDE 8 MG: 8 INJECTION, SOLUTION SUBCUTANEOUS at 11:11

## 2020-11-10 ENCOUNTER — TRANSITIONAL CARE MANAGEMENT (OUTPATIENT)
Dept: FAMILY MEDICINE CLINIC | Facility: CLINIC | Age: 65
End: 2020-11-10

## 2020-11-17 RX ORDER — METAXALONE 400 MG/1
TABLET ORAL
COMMUNITY
Start: 2020-10-20 | End: 2021-01-01

## 2020-11-17 RX ORDER — ERGOCALCIFEROL 1.25 MG/1
50000 CAPSULE ORAL WEEKLY
COMMUNITY
Start: 2020-08-24 | End: 2021-02-04 | Stop reason: ALTCHOICE

## 2020-11-18 ENCOUNTER — OFFICE VISIT (OUTPATIENT)
Dept: FAMILY MEDICINE CLINIC | Facility: CLINIC | Age: 65
End: 2020-11-18
Payer: MEDICARE

## 2020-11-18 VITALS
DIASTOLIC BLOOD PRESSURE: 60 MMHG | WEIGHT: 143.8 LBS | HEART RATE: 92 BPM | HEIGHT: 64 IN | TEMPERATURE: 96.4 F | BODY MASS INDEX: 24.55 KG/M2 | OXYGEN SATURATION: 93 % | SYSTOLIC BLOOD PRESSURE: 122 MMHG

## 2020-11-18 DIAGNOSIS — K91.89 POST-ERCP ACUTE PANCREATITIS: ICD-10-CM

## 2020-11-18 DIAGNOSIS — K56.7 ILEUS (HCC): ICD-10-CM

## 2020-11-18 DIAGNOSIS — K63.2 ENTEROCUTANEOUS FISTULA: ICD-10-CM

## 2020-11-18 DIAGNOSIS — Z76.89 ENCOUNTER FOR SUPPORT AND COORDINATION OF TRANSITION OF CARE: ICD-10-CM

## 2020-11-18 DIAGNOSIS — K91.2 SHORT GUT SYNDROME: Primary | ICD-10-CM

## 2020-11-18 DIAGNOSIS — K80.50 CHOLEDOCHOLITHIASIS: ICD-10-CM

## 2020-11-18 DIAGNOSIS — K85.90 POST-ERCP ACUTE PANCREATITIS: ICD-10-CM

## 2020-11-18 PROBLEM — Z00.00 MEDICARE ANNUAL WELLNESS VISIT, INITIAL: Status: RESOLVED | Noted: 2020-07-20 | Resolved: 2020-11-18

## 2020-11-18 PROCEDURE — 99495 TRANSJ CARE MGMT MOD F2F 14D: CPT | Performed by: FAMILY MEDICINE

## 2020-12-03 DIAGNOSIS — K91.2 SHORT GUT SYNDROME: Primary | ICD-10-CM

## 2020-12-03 RX ORDER — PANTOPRAZOLE SODIUM 40 MG/1
TABLET, DELAYED RELEASE ORAL
Qty: 90 TABLET | Refills: 3 | Status: SHIPPED | OUTPATIENT
Start: 2020-12-03 | End: 2021-01-01

## 2020-12-05 ENCOUNTER — HOSPITAL ENCOUNTER (OUTPATIENT)
Facility: HOSPITAL | Age: 65
Setting detail: OBSERVATION
Discharge: LEFT BEFORE DISCHARGE | End: 2020-12-06
Attending: EMERGENCY MEDICINE | Admitting: STUDENT IN AN ORGANIZED HEALTH CARE EDUCATION/TRAINING PROGRAM
Payer: MEDICARE

## 2020-12-05 ENCOUNTER — APPOINTMENT (EMERGENCY)
Dept: CT IMAGING | Facility: HOSPITAL | Age: 65
End: 2020-12-05
Payer: MEDICARE

## 2020-12-05 VITALS
HEART RATE: 61 BPM | SYSTOLIC BLOOD PRESSURE: 98 MMHG | TEMPERATURE: 98.6 F | HEIGHT: 64 IN | BODY MASS INDEX: 27.31 KG/M2 | WEIGHT: 160 LBS | RESPIRATION RATE: 18 BRPM | OXYGEN SATURATION: 94 % | DIASTOLIC BLOOD PRESSURE: 56 MMHG

## 2020-12-05 DIAGNOSIS — Z98.890 HISTORY OF ERCP: ICD-10-CM

## 2020-12-05 DIAGNOSIS — R11.2 INTRACTABLE NAUSEA AND VOMITING: ICD-10-CM

## 2020-12-05 DIAGNOSIS — K52.9 COLITIS: Primary | ICD-10-CM

## 2020-12-05 PROBLEM — R11.10 ABDOMINAL PAIN WITH VOMITING: Status: ACTIVE | Noted: 2020-12-05

## 2020-12-05 PROBLEM — R10.9 ABDOMINAL PAIN WITH VOMITING: Status: ACTIVE | Noted: 2020-12-05

## 2020-12-05 LAB
ALBUMIN SERPL BCP-MCNC: 3.1 G/DL (ref 3.5–5)
ALP SERPL-CCNC: 133 U/L (ref 46–116)
ALT SERPL W P-5'-P-CCNC: 47 U/L (ref 12–78)
ANION GAP SERPL CALCULATED.3IONS-SCNC: 7 MMOL/L (ref 4–13)
AST SERPL W P-5'-P-CCNC: 25 U/L (ref 5–45)
BASOPHILS # BLD AUTO: 0.02 THOUSANDS/ΜL (ref 0–0.1)
BASOPHILS NFR BLD AUTO: 0 % (ref 0–1)
BILIRUB SERPL-MCNC: 0.5 MG/DL (ref 0.2–1)
BUN SERPL-MCNC: 21 MG/DL (ref 5–25)
CALCIUM ALBUM COR SERPL-MCNC: 8.5 MG/DL (ref 8.3–10.1)
CALCIUM SERPL-MCNC: 7.8 MG/DL (ref 8.3–10.1)
CHLORIDE SERPL-SCNC: 102 MMOL/L (ref 100–108)
CO2 SERPL-SCNC: 28 MMOL/L (ref 21–32)
CREAT SERPL-MCNC: 0.82 MG/DL (ref 0.6–1.3)
EOSINOPHIL # BLD AUTO: 0.15 THOUSAND/ΜL (ref 0–0.61)
EOSINOPHIL NFR BLD AUTO: 2 % (ref 0–6)
ERYTHROCYTE [DISTWIDTH] IN BLOOD BY AUTOMATED COUNT: 15.2 % (ref 11.6–15.1)
GFR SERPL CREATININE-BSD FRML MDRD: 76 ML/MIN/1.73SQ M
GLUCOSE SERPL-MCNC: 108 MG/DL (ref 65–140)
HCT VFR BLD AUTO: 28.4 % (ref 34.8–46.1)
HGB BLD-MCNC: 8.7 G/DL (ref 11.5–15.4)
IMM GRANULOCYTES # BLD AUTO: 0.05 THOUSAND/UL (ref 0–0.2)
IMM GRANULOCYTES NFR BLD AUTO: 1 % (ref 0–2)
LIPASE SERPL-CCNC: 92 U/L (ref 73–393)
LYMPHOCYTES # BLD AUTO: 1.07 THOUSANDS/ΜL (ref 0.6–4.47)
LYMPHOCYTES NFR BLD AUTO: 16 % (ref 14–44)
MCH RBC QN AUTO: 24.3 PG (ref 26.8–34.3)
MCHC RBC AUTO-ENTMCNC: 30.6 G/DL (ref 31.4–37.4)
MCV RBC AUTO: 79 FL (ref 82–98)
MONOCYTES # BLD AUTO: 0.48 THOUSAND/ΜL (ref 0.17–1.22)
MONOCYTES NFR BLD AUTO: 7 % (ref 4–12)
NEUTROPHILS # BLD AUTO: 4.89 THOUSANDS/ΜL (ref 1.85–7.62)
NEUTS SEG NFR BLD AUTO: 74 % (ref 43–75)
NRBC BLD AUTO-RTO: 0 /100 WBCS
PLATELET # BLD AUTO: 218 THOUSANDS/UL (ref 149–390)
PMV BLD AUTO: 10.2 FL (ref 8.9–12.7)
POTASSIUM SERPL-SCNC: 3.6 MMOL/L (ref 3.5–5.3)
PROT SERPL-MCNC: 6.7 G/DL (ref 6.4–8.2)
RBC # BLD AUTO: 3.58 MILLION/UL (ref 3.81–5.12)
SODIUM SERPL-SCNC: 137 MMOL/L (ref 136–145)
WBC # BLD AUTO: 6.66 THOUSAND/UL (ref 4.31–10.16)

## 2020-12-05 PROCEDURE — 96365 THER/PROPH/DIAG IV INF INIT: CPT

## 2020-12-05 PROCEDURE — 99220 PR INITIAL OBSERVATION CARE/DAY 70 MINUTES: CPT | Performed by: STUDENT IN AN ORGANIZED HEALTH CARE EDUCATION/TRAINING PROGRAM

## 2020-12-05 PROCEDURE — 74177 CT ABD & PELVIS W/CONTRAST: CPT

## 2020-12-05 PROCEDURE — 96375 TX/PRO/DX INJ NEW DRUG ADDON: CPT

## 2020-12-05 PROCEDURE — 99285 EMERGENCY DEPT VISIT HI MDM: CPT | Performed by: EMERGENCY MEDICINE

## 2020-12-05 PROCEDURE — 83690 ASSAY OF LIPASE: CPT | Performed by: EMERGENCY MEDICINE

## 2020-12-05 PROCEDURE — 99285 EMERGENCY DEPT VISIT HI MDM: CPT

## 2020-12-05 PROCEDURE — 36415 COLL VENOUS BLD VENIPUNCTURE: CPT | Performed by: EMERGENCY MEDICINE

## 2020-12-05 PROCEDURE — 96376 TX/PRO/DX INJ SAME DRUG ADON: CPT

## 2020-12-05 PROCEDURE — 85025 COMPLETE CBC W/AUTO DIFF WBC: CPT | Performed by: EMERGENCY MEDICINE

## 2020-12-05 PROCEDURE — 96361 HYDRATE IV INFUSION ADD-ON: CPT

## 2020-12-05 PROCEDURE — G1004 CDSM NDSC: HCPCS

## 2020-12-05 PROCEDURE — 80053 COMPREHEN METABOLIC PANEL: CPT | Performed by: EMERGENCY MEDICINE

## 2020-12-05 RX ORDER — ONDANSETRON 2 MG/ML
4 INJECTION INTRAMUSCULAR; INTRAVENOUS ONCE
Status: COMPLETED | OUTPATIENT
Start: 2020-12-05 | End: 2020-12-05

## 2020-12-05 RX ORDER — CLONIDINE HYDROCHLORIDE 0.1 MG/1
0.1 TABLET ORAL
Status: DISCONTINUED | OUTPATIENT
Start: 2020-12-05 | End: 2020-12-05

## 2020-12-05 RX ORDER — HYDROMORPHONE HCL/PF 1 MG/ML
0.5 SYRINGE (ML) INJECTION ONCE
Status: COMPLETED | OUTPATIENT
Start: 2020-12-05 | End: 2020-12-05

## 2020-12-05 RX ORDER — SODIUM CHLORIDE, SODIUM LACTATE, POTASSIUM CHLORIDE, CALCIUM CHLORIDE 600; 310; 30; 20 MG/100ML; MG/100ML; MG/100ML; MG/100ML
125 INJECTION, SOLUTION INTRAVENOUS CONTINUOUS
Status: DISPENSED | OUTPATIENT
Start: 2020-12-05 | End: 2020-12-06

## 2020-12-05 RX ORDER — PANTOPRAZOLE SODIUM 40 MG/1
40 TABLET, DELAYED RELEASE ORAL DAILY
Status: DISCONTINUED | OUTPATIENT
Start: 2020-12-06 | End: 2020-12-06 | Stop reason: HOSPADM

## 2020-12-05 RX ORDER — DIAZEPAM 5 MG/1
5 TABLET ORAL EVERY 8 HOURS PRN
Status: DISCONTINUED | OUTPATIENT
Start: 2020-12-05 | End: 2020-12-06 | Stop reason: HOSPADM

## 2020-12-05 RX ORDER — MORPHINE SULFATE 30 MG/1
30 TABLET, FILM COATED, EXTENDED RELEASE ORAL EVERY 8 HOURS PRN
Status: DISCONTINUED | OUTPATIENT
Start: 2020-12-05 | End: 2020-12-05

## 2020-12-05 RX ORDER — MORPHINE SULFATE 30 MG/1
30 TABLET, FILM COATED, EXTENDED RELEASE ORAL EVERY 8 HOURS SCHEDULED
Status: DISCONTINUED | OUTPATIENT
Start: 2020-12-05 | End: 2020-12-06 | Stop reason: HOSPADM

## 2020-12-05 RX ORDER — MORPHINE SULFATE 30 MG/1
30 TABLET, FILM COATED, EXTENDED RELEASE ORAL EVERY 8 HOURS SCHEDULED
Status: DISCONTINUED | OUTPATIENT
Start: 2020-12-05 | End: 2020-12-05

## 2020-12-05 RX ORDER — HYDROMORPHONE HYDROCHLORIDE 2 MG/1
4 TABLET ORAL EVERY 6 HOURS PRN
Status: DISCONTINUED | OUTPATIENT
Start: 2020-12-05 | End: 2020-12-05

## 2020-12-05 RX ORDER — ESCITALOPRAM OXALATE 10 MG/1
20 TABLET ORAL DAILY
Status: DISCONTINUED | OUTPATIENT
Start: 2020-12-06 | End: 2020-12-06 | Stop reason: HOSPADM

## 2020-12-05 RX ORDER — ONDANSETRON 2 MG/ML
4 INJECTION INTRAMUSCULAR; INTRAVENOUS EVERY 6 HOURS PRN
Status: DISCONTINUED | OUTPATIENT
Start: 2020-12-05 | End: 2020-12-06 | Stop reason: HOSPADM

## 2020-12-05 RX ORDER — HYDROMORPHONE HYDROCHLORIDE 2 MG/1
4 TABLET ORAL EVERY 6 HOURS PRN
Status: DISCONTINUED | OUTPATIENT
Start: 2020-12-05 | End: 2020-12-06 | Stop reason: HOSPADM

## 2020-12-05 RX ORDER — TAMSULOSIN HYDROCHLORIDE 0.4 MG/1
0.8 CAPSULE ORAL
Status: DISCONTINUED | OUTPATIENT
Start: 2020-12-05 | End: 2020-12-06 | Stop reason: HOSPADM

## 2020-12-05 RX ORDER — SODIUM CHLORIDE, SODIUM LACTATE, POTASSIUM CHLORIDE, CALCIUM CHLORIDE 600; 310; 30; 20 MG/100ML; MG/100ML; MG/100ML; MG/100ML
100 INJECTION, SOLUTION INTRAVENOUS CONTINUOUS
Status: DISCONTINUED | OUTPATIENT
Start: 2020-12-05 | End: 2020-12-05

## 2020-12-05 RX ORDER — GABAPENTIN 300 MG/1
600 CAPSULE ORAL 3 TIMES DAILY
Status: DISCONTINUED | OUTPATIENT
Start: 2020-12-05 | End: 2020-12-05

## 2020-12-05 RX ADMIN — ONDANSETRON 4 MG: 2 INJECTION INTRAMUSCULAR; INTRAVENOUS at 12:54

## 2020-12-05 RX ADMIN — IOHEXOL 100 ML: 350 INJECTION, SOLUTION INTRAVENOUS at 11:37

## 2020-12-05 RX ADMIN — HYDROMORPHONE HYDROCHLORIDE 0.5 MG: 1 INJECTION, SOLUTION INTRAMUSCULAR; INTRAVENOUS; SUBCUTANEOUS at 10:46

## 2020-12-05 RX ADMIN — SODIUM CHLORIDE, SODIUM LACTATE, POTASSIUM CHLORIDE, AND CALCIUM CHLORIDE 100 ML/HR: .6; .31; .03; .02 INJECTION, SOLUTION INTRAVENOUS at 16:10

## 2020-12-05 RX ADMIN — ONDANSETRON 4 MG: 2 INJECTION INTRAMUSCULAR; INTRAVENOUS at 10:44

## 2020-12-05 RX ADMIN — HYDROMORPHONE HYDROCHLORIDE 4 MG: 2 TABLET ORAL at 16:53

## 2020-12-05 RX ADMIN — AMPICILLIN SODIUM AND SULBACTAM SODIUM 3 G: 10; 5 INJECTION, POWDER, FOR SOLUTION INTRAVENOUS at 13:33

## 2020-12-05 RX ADMIN — SODIUM CHLORIDE 1000 ML: 0.9 INJECTION, SOLUTION INTRAVENOUS at 10:41

## 2020-12-05 RX ADMIN — MORPHINE SULFATE 30 MG: 30 TABLET, EXTENDED RELEASE ORAL at 19:50

## 2020-12-05 RX ADMIN — ONDANSETRON 4 MG: 2 INJECTION INTRAMUSCULAR; INTRAVENOUS at 16:54

## 2020-12-05 RX ADMIN — HYDROMORPHONE HYDROCHLORIDE 0.5 MG: 1 INJECTION, SOLUTION INTRAMUSCULAR; INTRAVENOUS; SUBCUTANEOUS at 12:54

## 2020-12-06 PROCEDURE — 99217 PR OBSERVATION CARE DISCHARGE MANAGEMENT: CPT | Performed by: INTERNAL MEDICINE

## 2020-12-06 RX ORDER — SODIUM CHLORIDE, SODIUM LACTATE, POTASSIUM CHLORIDE, CALCIUM CHLORIDE 600; 310; 30; 20 MG/100ML; MG/100ML; MG/100ML; MG/100ML
75 INJECTION, SOLUTION INTRAVENOUS CONTINUOUS
Status: DISCONTINUED | OUTPATIENT
Start: 2020-12-06 | End: 2020-12-06 | Stop reason: HOSPADM

## 2020-12-06 RX ADMIN — HYDROMORPHONE HYDROCHLORIDE 4 MG: 2 TABLET ORAL at 00:26

## 2020-12-06 RX ADMIN — SODIUM CHLORIDE, SODIUM LACTATE, POTASSIUM CHLORIDE, AND CALCIUM CHLORIDE 75 ML/HR: .6; .31; .03; .02 INJECTION, SOLUTION INTRAVENOUS at 07:51

## 2020-12-06 RX ADMIN — MORPHINE SULFATE 30 MG: 30 TABLET, EXTENDED RELEASE ORAL at 06:01

## 2020-12-06 RX ADMIN — SODIUM CHLORIDE, SODIUM LACTATE, POTASSIUM CHLORIDE, AND CALCIUM CHLORIDE 125 ML/HR: .6; .31; .03; .02 INJECTION, SOLUTION INTRAVENOUS at 00:28

## 2020-12-09 ENCOUNTER — TRANSITIONAL CARE MANAGEMENT (OUTPATIENT)
Dept: FAMILY MEDICINE CLINIC | Facility: CLINIC | Age: 65
End: 2020-12-09

## 2020-12-10 PROBLEM — D64.9 CHRONIC ANEMIA: Chronic | Status: ACTIVE | Noted: 2020-12-10

## 2021-01-01 ENCOUNTER — TELEPHONE (OUTPATIENT)
Dept: UROLOGY | Facility: CLINIC | Age: 66
End: 2021-01-01

## 2021-01-01 ENCOUNTER — TELEPHONE (OUTPATIENT)
Dept: FAMILY MEDICINE CLINIC | Facility: CLINIC | Age: 66
End: 2021-01-01

## 2021-01-01 ENCOUNTER — APPOINTMENT (EMERGENCY)
Dept: RADIOLOGY | Facility: HOSPITAL | Age: 66
DRG: 389 | End: 2021-01-01
Payer: MEDICARE

## 2021-01-01 ENCOUNTER — HOSPITAL ENCOUNTER (INPATIENT)
Facility: HOSPITAL | Age: 66
LOS: 4 days | Discharge: HOME HEALTH | DRG: 389 | End: 2021-12-14
Attending: EMERGENCY MEDICINE | Admitting: SURGERY
Payer: MEDICARE

## 2021-01-01 ENCOUNTER — OFFICE VISIT (OUTPATIENT)
Dept: GASTROENTEROLOGY | Facility: CLINIC | Age: 66
End: 2021-01-01
Payer: MEDICARE

## 2021-01-01 ENCOUNTER — CLINICAL SUPPORT (OUTPATIENT)
Dept: FAMILY MEDICINE CLINIC | Facility: CLINIC | Age: 66
End: 2021-01-01
Payer: MEDICARE

## 2021-01-01 ENCOUNTER — TELEPHONE (OUTPATIENT)
Dept: OTHER | Facility: OTHER | Age: 66
End: 2021-01-01

## 2021-01-01 ENCOUNTER — LAB REQUISITION (OUTPATIENT)
Dept: LAB | Facility: HOSPITAL | Age: 66
End: 2021-01-01
Payer: MEDICARE

## 2021-01-01 ENCOUNTER — TELEPHONE (OUTPATIENT)
Dept: NEPHROLOGY | Facility: CLINIC | Age: 66
End: 2021-01-01

## 2021-01-01 ENCOUNTER — APPOINTMENT (OUTPATIENT)
Dept: PHYSICAL THERAPY | Facility: CLINIC | Age: 66
End: 2021-01-01
Payer: COMMERCIAL

## 2021-01-01 ENCOUNTER — HOSPITAL ENCOUNTER (INPATIENT)
Facility: HOSPITAL | Age: 66
LOS: 4 days | Discharge: HOME HEALTH | DRG: 389 | End: 2021-05-27
Attending: EMERGENCY MEDICINE | Admitting: INTERNAL MEDICINE
Payer: MEDICARE

## 2021-01-01 ENCOUNTER — TRANSITIONAL CARE MANAGEMENT (OUTPATIENT)
Dept: FAMILY MEDICINE CLINIC | Facility: CLINIC | Age: 66
End: 2021-01-01

## 2021-01-01 ENCOUNTER — EVALUATION (OUTPATIENT)
Dept: PHYSICAL THERAPY | Facility: CLINIC | Age: 66
End: 2021-01-01
Payer: COMMERCIAL

## 2021-01-01 ENCOUNTER — APPOINTMENT (OUTPATIENT)
Dept: RADIOLOGY | Facility: CLINIC | Age: 66
End: 2021-01-01
Payer: MEDICARE

## 2021-01-01 ENCOUNTER — APPOINTMENT (EMERGENCY)
Dept: CT IMAGING | Facility: HOSPITAL | Age: 66
DRG: 389 | End: 2021-01-01
Payer: MEDICARE

## 2021-01-01 ENCOUNTER — OFFICE VISIT (OUTPATIENT)
Dept: FAMILY MEDICINE CLINIC | Facility: CLINIC | Age: 66
End: 2021-01-01
Payer: MEDICARE

## 2021-01-01 ENCOUNTER — APPOINTMENT (INPATIENT)
Dept: RADIOLOGY | Facility: HOSPITAL | Age: 66
DRG: 389 | End: 2021-01-01
Payer: MEDICARE

## 2021-01-01 ENCOUNTER — TELEPHONE (OUTPATIENT)
Dept: GASTROENTEROLOGY | Facility: CLINIC | Age: 66
End: 2021-01-01

## 2021-01-01 ENCOUNTER — OFFICE VISIT (OUTPATIENT)
Dept: SURGERY | Facility: CLINIC | Age: 66
End: 2021-01-01
Payer: MEDICARE

## 2021-01-01 ENCOUNTER — HOSPITAL ENCOUNTER (OUTPATIENT)
Dept: GASTROENTEROLOGY | Facility: HOSPITAL | Age: 66
Setting detail: OUTPATIENT SURGERY
Discharge: HOME | End: 2021-06-16
Attending: INTERNAL MEDICINE | Admitting: INTERNAL MEDICINE
Payer: MEDICARE

## 2021-01-01 ENCOUNTER — HOSPITAL ENCOUNTER (INPATIENT)
Facility: HOSPITAL | Age: 66
LOS: 1 days | Discharge: HOME HEALTH | DRG: 392 | End: 2021-07-20
Attending: EMERGENCY MEDICINE | Admitting: SURGERY
Payer: MEDICARE

## 2021-01-01 ENCOUNTER — APPOINTMENT (EMERGENCY)
Dept: CT IMAGING | Facility: HOSPITAL | Age: 66
End: 2021-01-01
Payer: MEDICARE

## 2021-01-01 ENCOUNTER — TELEPHONE (OUTPATIENT)
Dept: GASTROENTEROLOGY | Facility: HOSPITAL | Age: 66
End: 2021-01-01

## 2021-01-01 ENCOUNTER — ANESTHESIA EVENT (OUTPATIENT)
Dept: GASTROENTEROLOGY | Facility: HOSPITAL | Age: 66
End: 2021-01-01

## 2021-01-01 ENCOUNTER — HOSPITAL ENCOUNTER (INPATIENT)
Facility: HOSPITAL | Age: 66
LOS: 2 days | Discharge: HOME | DRG: 389 | End: 2021-12-18
Attending: EMERGENCY MEDICINE | Admitting: SURGERY
Payer: MEDICARE

## 2021-01-01 ENCOUNTER — APPOINTMENT (INPATIENT)
Dept: RADIOLOGY | Facility: HOSPITAL | Age: 66
DRG: 392 | End: 2021-01-01
Payer: MEDICARE

## 2021-01-01 ENCOUNTER — HOSPITAL ENCOUNTER (OUTPATIENT)
Facility: HOSPITAL | Age: 66
Setting detail: OBSERVATION
Discharge: HOME | End: 2021-08-19
Attending: EMERGENCY MEDICINE | Admitting: SURGERY
Payer: MEDICARE

## 2021-01-01 ENCOUNTER — APPOINTMENT (INPATIENT)
Dept: CT IMAGING | Facility: HOSPITAL | Age: 66
DRG: 389 | End: 2021-01-01
Payer: MEDICARE

## 2021-01-01 ENCOUNTER — HOSPITAL ENCOUNTER (INPATIENT)
Facility: HOSPITAL | Age: 66
LOS: 9 days | Discharge: HOME HEALTH | DRG: 392 | End: 2021-11-14
Attending: EMERGENCY MEDICINE | Admitting: INTERNAL MEDICINE
Payer: MEDICARE

## 2021-01-01 ENCOUNTER — ANESTHESIA (OUTPATIENT)
Dept: GASTROENTEROLOGY | Facility: HOSPITAL | Age: 66
End: 2021-01-01

## 2021-01-01 ENCOUNTER — APPOINTMENT (OUTPATIENT)
Dept: LAB | Facility: CLINIC | Age: 66
End: 2021-01-01
Payer: MEDICARE

## 2021-01-01 ENCOUNTER — APPOINTMENT (EMERGENCY)
Dept: CT IMAGING | Facility: HOSPITAL | Age: 66
DRG: 392 | End: 2021-01-01
Payer: MEDICARE

## 2021-01-01 ENCOUNTER — TELEPHONE (OUTPATIENT)
Dept: OBGYN CLINIC | Facility: HOSPITAL | Age: 66
End: 2021-01-01

## 2021-01-01 ENCOUNTER — HOSPITAL ENCOUNTER (OUTPATIENT)
Facility: HOSPITAL | Age: 66
Setting detail: OBSERVATION
Discharge: HOME | End: 2021-09-27
Attending: EMERGENCY MEDICINE | Admitting: SURGERY
Payer: MEDICARE

## 2021-01-01 VITALS
HEIGHT: 64 IN | SYSTOLIC BLOOD PRESSURE: 110 MMHG | BODY MASS INDEX: 25.86 KG/M2 | WEIGHT: 151.46 LBS | RESPIRATION RATE: 18 BRPM | OXYGEN SATURATION: 94 % | TEMPERATURE: 98 F | HEART RATE: 63 BPM | DIASTOLIC BLOOD PRESSURE: 63 MMHG

## 2021-01-01 VITALS
RESPIRATION RATE: 15 BRPM | WEIGHT: 139.99 LBS | HEART RATE: 72 BPM | BODY MASS INDEX: 23.9 KG/M2 | HEIGHT: 64 IN | SYSTOLIC BLOOD PRESSURE: 102 MMHG | TEMPERATURE: 98.2 F | OXYGEN SATURATION: 97 % | DIASTOLIC BLOOD PRESSURE: 55 MMHG

## 2021-01-01 VITALS
WEIGHT: 139 LBS | BODY MASS INDEX: 23.73 KG/M2 | OXYGEN SATURATION: 95 % | HEART RATE: 86 BPM | TEMPERATURE: 98.4 F | DIASTOLIC BLOOD PRESSURE: 70 MMHG | SYSTOLIC BLOOD PRESSURE: 122 MMHG | HEIGHT: 64 IN

## 2021-01-01 VITALS
BODY MASS INDEX: 23.9 KG/M2 | WEIGHT: 140 LBS | DIASTOLIC BLOOD PRESSURE: 60 MMHG | HEIGHT: 64 IN | TEMPERATURE: 98.4 F | SYSTOLIC BLOOD PRESSURE: 90 MMHG | HEART RATE: 79 BPM | OXYGEN SATURATION: 95 %

## 2021-01-01 VITALS
WEIGHT: 135 LBS | TEMPERATURE: 97.7 F | BODY MASS INDEX: 23.05 KG/M2 | SYSTOLIC BLOOD PRESSURE: 116 MMHG | DIASTOLIC BLOOD PRESSURE: 65 MMHG | RESPIRATION RATE: 18 BRPM | HEIGHT: 64 IN | OXYGEN SATURATION: 95 % | HEART RATE: 65 BPM

## 2021-01-01 VITALS
HEART RATE: 78 BPM | DIASTOLIC BLOOD PRESSURE: 61 MMHG | BODY MASS INDEX: 21.85 KG/M2 | SYSTOLIC BLOOD PRESSURE: 109 MMHG | OXYGEN SATURATION: 95 % | RESPIRATION RATE: 18 BRPM | HEIGHT: 64 IN | TEMPERATURE: 98.3 F | WEIGHT: 128 LBS

## 2021-01-01 VITALS
HEART RATE: 68 BPM | OXYGEN SATURATION: 98 % | BODY MASS INDEX: 24.11 KG/M2 | SYSTOLIC BLOOD PRESSURE: 110 MMHG | TEMPERATURE: 97.8 F | WEIGHT: 141.2 LBS | HEIGHT: 64 IN | DIASTOLIC BLOOD PRESSURE: 78 MMHG

## 2021-01-01 VITALS
DIASTOLIC BLOOD PRESSURE: 76 MMHG | HEART RATE: 56 BPM | HEIGHT: 64 IN | SYSTOLIC BLOOD PRESSURE: 126 MMHG | TEMPERATURE: 98.7 F | RESPIRATION RATE: 18 BRPM | WEIGHT: 140 LBS | OXYGEN SATURATION: 98 % | BODY MASS INDEX: 23.9 KG/M2

## 2021-01-01 VITALS
DIASTOLIC BLOOD PRESSURE: 64 MMHG | HEART RATE: 77 BPM | BODY MASS INDEX: 23.05 KG/M2 | SYSTOLIC BLOOD PRESSURE: 98 MMHG | HEIGHT: 64 IN | TEMPERATURE: 97.3 F | OXYGEN SATURATION: 98 % | WEIGHT: 135 LBS

## 2021-01-01 VITALS
BODY MASS INDEX: 25.27 KG/M2 | HEIGHT: 64 IN | TEMPERATURE: 97.1 F | OXYGEN SATURATION: 97 % | WEIGHT: 148 LBS | SYSTOLIC BLOOD PRESSURE: 117 MMHG | DIASTOLIC BLOOD PRESSURE: 68 MMHG | HEART RATE: 73 BPM

## 2021-01-01 VITALS
SYSTOLIC BLOOD PRESSURE: 100 MMHG | HEIGHT: 64 IN | WEIGHT: 135 LBS | DIASTOLIC BLOOD PRESSURE: 72 MMHG | HEART RATE: 69 BPM | BODY MASS INDEX: 23.05 KG/M2

## 2021-01-01 VITALS
HEART RATE: 76 BPM | BODY MASS INDEX: 23.9 KG/M2 | WEIGHT: 140 LBS | HEIGHT: 64 IN | SYSTOLIC BLOOD PRESSURE: 110 MMHG | DIASTOLIC BLOOD PRESSURE: 70 MMHG | RESPIRATION RATE: 16 BRPM

## 2021-01-01 VITALS
OXYGEN SATURATION: 96 % | HEART RATE: 72 BPM | SYSTOLIC BLOOD PRESSURE: 112 MMHG | DIASTOLIC BLOOD PRESSURE: 64 MMHG | WEIGHT: 134 LBS | BODY MASS INDEX: 22.88 KG/M2 | HEIGHT: 64 IN | TEMPERATURE: 97.9 F

## 2021-01-01 VITALS
DIASTOLIC BLOOD PRESSURE: 64 MMHG | WEIGHT: 130.51 LBS | SYSTOLIC BLOOD PRESSURE: 128 MMHG | HEIGHT: 64 IN | HEART RATE: 75 BPM | OXYGEN SATURATION: 97 % | RESPIRATION RATE: 18 BRPM | TEMPERATURE: 99.2 F | BODY MASS INDEX: 22.28 KG/M2

## 2021-01-01 VITALS
BODY MASS INDEX: 24.75 KG/M2 | HEIGHT: 64 IN | DIASTOLIC BLOOD PRESSURE: 76 MMHG | SYSTOLIC BLOOD PRESSURE: 100 MMHG | HEART RATE: 81 BPM | WEIGHT: 145 LBS

## 2021-01-01 VITALS
SYSTOLIC BLOOD PRESSURE: 126 MMHG | TEMPERATURE: 98.1 F | WEIGHT: 141.54 LBS | BODY MASS INDEX: 24.16 KG/M2 | DIASTOLIC BLOOD PRESSURE: 58 MMHG | RESPIRATION RATE: 19 BRPM | OXYGEN SATURATION: 96 % | HEIGHT: 64 IN | HEART RATE: 55 BPM

## 2021-01-01 VITALS
SYSTOLIC BLOOD PRESSURE: 112 MMHG | HEART RATE: 73 BPM | WEIGHT: 140.8 LBS | HEIGHT: 64 IN | TEMPERATURE: 97.5 F | DIASTOLIC BLOOD PRESSURE: 54 MMHG | OXYGEN SATURATION: 99 % | BODY MASS INDEX: 24.04 KG/M2

## 2021-01-01 VITALS
BODY MASS INDEX: 20.85 KG/M2 | OXYGEN SATURATION: 98 % | TEMPERATURE: 98.1 F | HEART RATE: 67 BPM | WEIGHT: 122.14 LBS | RESPIRATION RATE: 16 BRPM | SYSTOLIC BLOOD PRESSURE: 115 MMHG | DIASTOLIC BLOOD PRESSURE: 64 MMHG | HEIGHT: 64 IN

## 2021-01-01 VITALS
DIASTOLIC BLOOD PRESSURE: 84 MMHG | WEIGHT: 132 LBS | TEMPERATURE: 97.8 F | HEIGHT: 64 IN | SYSTOLIC BLOOD PRESSURE: 138 MMHG | BODY MASS INDEX: 22.53 KG/M2 | HEART RATE: 73 BPM

## 2021-01-01 VITALS
SYSTOLIC BLOOD PRESSURE: 118 MMHG | HEART RATE: 98 BPM | BODY MASS INDEX: 23.56 KG/M2 | OXYGEN SATURATION: 98 % | WEIGHT: 138 LBS | HEIGHT: 64 IN | TEMPERATURE: 97.1 F | DIASTOLIC BLOOD PRESSURE: 70 MMHG

## 2021-01-01 VITALS
TEMPERATURE: 98.8 F | DIASTOLIC BLOOD PRESSURE: 76 MMHG | OXYGEN SATURATION: 97 % | RESPIRATION RATE: 20 BRPM | SYSTOLIC BLOOD PRESSURE: 140 MMHG | HEART RATE: 63 BPM

## 2021-01-01 VITALS
BODY MASS INDEX: 23.9 KG/M2 | OXYGEN SATURATION: 98 % | DIASTOLIC BLOOD PRESSURE: 70 MMHG | WEIGHT: 140 LBS | SYSTOLIC BLOOD PRESSURE: 106 MMHG | HEART RATE: 64 BPM | HEIGHT: 64 IN | TEMPERATURE: 97.7 F

## 2021-01-01 DIAGNOSIS — M54.6 CHRONIC MIDLINE THORACIC BACK PAIN: ICD-10-CM

## 2021-01-01 DIAGNOSIS — E53.8 B12 DEFICIENCY: ICD-10-CM

## 2021-01-01 DIAGNOSIS — Z13.820 SCREENING FOR OSTEOPOROSIS: ICD-10-CM

## 2021-01-01 DIAGNOSIS — G89.4 CHRONIC PAIN DISORDER: ICD-10-CM

## 2021-01-01 DIAGNOSIS — K63.2 ENTEROCUTANEOUS FISTULA: ICD-10-CM

## 2021-01-01 DIAGNOSIS — K91.2 SHORT GUT SYNDROME: ICD-10-CM

## 2021-01-01 DIAGNOSIS — K59.81 COLONIC PSEUDOOBSTRUCTION: ICD-10-CM

## 2021-01-01 DIAGNOSIS — M54.2 NECK PAIN ON RIGHT SIDE: ICD-10-CM

## 2021-01-01 DIAGNOSIS — R10.84 INTRACTABLE GENERALIZED ABDOMINAL PAIN: Primary | ICD-10-CM

## 2021-01-01 DIAGNOSIS — F41.1 GENERALIZED ANXIETY DISORDER: Chronic | ICD-10-CM

## 2021-01-01 DIAGNOSIS — J18.9 PNEUMONIA OF RIGHT LUNG DUE TO INFECTIOUS ORGANISM, UNSPECIFIED PART OF LUNG: Primary | ICD-10-CM

## 2021-01-01 DIAGNOSIS — F11.90 CHRONIC, CONTINUOUS USE OF OPIOIDS: ICD-10-CM

## 2021-01-01 DIAGNOSIS — K52.9 NONSPECIFIC COLITIS: Primary | ICD-10-CM

## 2021-01-01 DIAGNOSIS — K91.2 POSTSURGICAL MALABSORPTION, NOT ELSEWHERE CLASSIFIED: ICD-10-CM

## 2021-01-01 DIAGNOSIS — G89.29 CHRONIC MIDLINE THORACIC BACK PAIN: ICD-10-CM

## 2021-01-01 DIAGNOSIS — K52.9 COLITIS: Primary | ICD-10-CM

## 2021-01-01 DIAGNOSIS — Z78.0 POSTMENOPAUSAL ESTROGEN DEFICIENCY: ICD-10-CM

## 2021-01-01 DIAGNOSIS — M54.6 ACUTE RIGHT-SIDED THORACIC BACK PAIN: ICD-10-CM

## 2021-01-01 DIAGNOSIS — R33.9 URINARY RETENTION: ICD-10-CM

## 2021-01-01 DIAGNOSIS — R14.0 ABDOMINAL DISTENTION: ICD-10-CM

## 2021-01-01 DIAGNOSIS — D64.9 ANEMIA, UNSPECIFIED TYPE: Primary | ICD-10-CM

## 2021-01-01 DIAGNOSIS — R10.84 GENERALIZED ABDOMINAL PAIN: ICD-10-CM

## 2021-01-01 DIAGNOSIS — Z12.31 ENCOUNTER FOR SCREENING MAMMOGRAM FOR MALIGNANT NEOPLASM OF BREAST: ICD-10-CM

## 2021-01-01 DIAGNOSIS — K59.00 CONSTIPATED: ICD-10-CM

## 2021-01-01 DIAGNOSIS — M87.052 AVASCULAR NECROSIS OF FEMORAL HEAD, LEFT (HCC): ICD-10-CM

## 2021-01-01 DIAGNOSIS — Z23 NEEDS FLU SHOT: ICD-10-CM

## 2021-01-01 DIAGNOSIS — E87.6 HYPOKALEMIA: ICD-10-CM

## 2021-01-01 DIAGNOSIS — K63.2 FISTULA OF INTESTINE: ICD-10-CM

## 2021-01-01 DIAGNOSIS — R53.81 PHYSICAL DECONDITIONING: ICD-10-CM

## 2021-01-01 DIAGNOSIS — D69.6 THROMBOCYTOPENIA (HCC): ICD-10-CM

## 2021-01-01 DIAGNOSIS — K56.609 BOWEL OBSTRUCTION (HCC): Primary | ICD-10-CM

## 2021-01-01 DIAGNOSIS — R39.9 UTI SYMPTOMS: Primary | ICD-10-CM

## 2021-01-01 DIAGNOSIS — R07.89 RIGHT-SIDED CHEST WALL PAIN: Primary | ICD-10-CM

## 2021-01-01 DIAGNOSIS — D64.9 ANEMIA, UNSPECIFIED TYPE: ICD-10-CM

## 2021-01-01 DIAGNOSIS — R10.84 GENERALIZED ABDOMINAL PAIN: Primary | ICD-10-CM

## 2021-01-01 DIAGNOSIS — R11.2 NAUSEA AND VOMITING: ICD-10-CM

## 2021-01-01 DIAGNOSIS — F41.1 GENERALIZED ANXIETY DISORDER: ICD-10-CM

## 2021-01-01 DIAGNOSIS — J18.9 PNEUMONIA OF RIGHT LUNG DUE TO INFECTIOUS ORGANISM, UNSPECIFIED PART OF LUNG: ICD-10-CM

## 2021-01-01 DIAGNOSIS — R10.9 ABDOMINAL PAIN: Primary | ICD-10-CM

## 2021-01-01 DIAGNOSIS — F32.A DEPRESSION, UNSPECIFIED DEPRESSION TYPE: Primary | ICD-10-CM

## 2021-01-01 DIAGNOSIS — R10.13 EPIGASTRIC PAIN: ICD-10-CM

## 2021-01-01 DIAGNOSIS — F32.A DEPRESSION, UNSPECIFIED DEPRESSION TYPE: ICD-10-CM

## 2021-01-01 DIAGNOSIS — D70.9 NEUTROPENIA, UNSPECIFIED TYPE (HCC): Primary | ICD-10-CM

## 2021-01-01 DIAGNOSIS — F11.90 CHRONIC, CONTINUOUS USE OF OPIOIDS: Primary | ICD-10-CM

## 2021-01-01 DIAGNOSIS — E83.42 HYPOMAGNESEMIA: ICD-10-CM

## 2021-01-01 DIAGNOSIS — K91.2 SHORT GUT SYNDROME: Primary | ICD-10-CM

## 2021-01-01 DIAGNOSIS — K59.81 COLONIC PSEUDOOBSTRUCTION: Primary | ICD-10-CM

## 2021-01-01 DIAGNOSIS — K91.2 SGS (SHORT GUT SYNDROME): ICD-10-CM

## 2021-01-01 DIAGNOSIS — K59.03 DRUG-INDUCED CONSTIPATION: ICD-10-CM

## 2021-01-01 DIAGNOSIS — R79.89 ABNORMAL LFTS: ICD-10-CM

## 2021-01-01 DIAGNOSIS — Z45.2 ENCOUNTER FOR ADJUSTMENT AND MANAGEMENT OF VASCULAR ACCESS DEVICE: ICD-10-CM

## 2021-01-01 DIAGNOSIS — R10.9 INTRACTABLE ABDOMINAL PAIN: Primary | ICD-10-CM

## 2021-01-01 DIAGNOSIS — M25.511 ACUTE PAIN OF RIGHT SHOULDER: ICD-10-CM

## 2021-01-01 DIAGNOSIS — Z00.00 MEDICARE ANNUAL WELLNESS VISIT, SUBSEQUENT: ICD-10-CM

## 2021-01-01 DIAGNOSIS — R11.10 INTRACTABLE VOMITING: ICD-10-CM

## 2021-01-01 DIAGNOSIS — Z93.2 ILEOSTOMY PRESENT (HCC): ICD-10-CM

## 2021-01-01 DIAGNOSIS — K63.2 ENTEROCUTANEOUS FISTULA: Primary | ICD-10-CM

## 2021-01-01 DIAGNOSIS — R11.2 NAUSEA AND VOMITING: Primary | ICD-10-CM

## 2021-01-01 DIAGNOSIS — Z76.89 ENCOUNTER FOR SUPPORT AND COORDINATION OF TRANSITION OF CARE: ICD-10-CM

## 2021-01-01 DIAGNOSIS — R11.2 INTRACTABLE NAUSEA AND VOMITING: Primary | ICD-10-CM

## 2021-01-01 DIAGNOSIS — M25.511 ACUTE PAIN OF RIGHT SHOULDER: Primary | ICD-10-CM

## 2021-01-01 DIAGNOSIS — G89.4 CHRONIC PAIN DISORDER: Primary | ICD-10-CM

## 2021-01-01 DIAGNOSIS — E53.8 B12 DEFICIENCY: Primary | ICD-10-CM

## 2021-01-01 LAB
ALBUMIN SERPL BCP-MCNC: 2.4 G/DL (ref 3.5–5)
ALBUMIN SERPL BCP-MCNC: 2.7 G/DL (ref 3.5–5)
ALBUMIN SERPL BCP-MCNC: 2.9 G/DL (ref 3.5–5)
ALBUMIN SERPL BCP-MCNC: 3 G/DL (ref 3.5–5)
ALBUMIN SERPL BCP-MCNC: 3.1 G/DL (ref 3.5–5)
ALBUMIN SERPL BCP-MCNC: 3.1 G/DL (ref 3.5–5)
ALBUMIN SERPL BCP-MCNC: 3.2 G/DL (ref 3.5–5)
ALBUMIN SERPL BCP-MCNC: 3.3 G/DL (ref 3.5–5)
ALBUMIN SERPL BCP-MCNC: 3.3 G/DL (ref 3.5–5)
ALBUMIN SERPL BCP-MCNC: 3.4 G/DL (ref 3.5–5)
ALBUMIN SERPL BCP-MCNC: 3.5 G/DL (ref 3.5–5)
ALBUMIN SERPL BCP-MCNC: 3.5 G/DL (ref 3.5–5)
ALP SERPL-CCNC: 102 U/L (ref 46–116)
ALP SERPL-CCNC: 105 U/L (ref 46–116)
ALP SERPL-CCNC: 115 U/L (ref 46–116)
ALP SERPL-CCNC: 116 U/L (ref 46–116)
ALP SERPL-CCNC: 119 U/L (ref 46–116)
ALP SERPL-CCNC: 127 U/L (ref 46–116)
ALP SERPL-CCNC: 127 U/L (ref 46–116)
ALP SERPL-CCNC: 135 U/L (ref 46–116)
ALP SERPL-CCNC: 138 U/L (ref 46–116)
ALP SERPL-CCNC: 143 U/L (ref 46–116)
ALP SERPL-CCNC: 143 U/L (ref 46–116)
ALP SERPL-CCNC: 159 U/L (ref 46–116)
ALP SERPL-CCNC: 190 U/L (ref 46–116)
ALP SERPL-CCNC: 198 U/L (ref 46–116)
ALP SERPL-CCNC: 199 U/L (ref 46–116)
ALP SERPL-CCNC: 79 U/L (ref 46–116)
ALP SERPL-CCNC: 93 U/L (ref 46–116)
ALP SERPL-CCNC: 94 U/L (ref 46–116)
ALT SERPL W P-5'-P-CCNC: 11 U/L (ref 12–78)
ALT SERPL W P-5'-P-CCNC: 111 U/L (ref 12–78)
ALT SERPL W P-5'-P-CCNC: 176 U/L (ref 12–78)
ALT SERPL W P-5'-P-CCNC: 18 U/L (ref 12–78)
ALT SERPL W P-5'-P-CCNC: 190 U/L (ref 12–78)
ALT SERPL W P-5'-P-CCNC: 21 U/L (ref 12–78)
ALT SERPL W P-5'-P-CCNC: 21 U/L (ref 12–78)
ALT SERPL W P-5'-P-CCNC: 24 U/L (ref 12–78)
ALT SERPL W P-5'-P-CCNC: 27 U/L (ref 12–78)
ALT SERPL W P-5'-P-CCNC: 29 U/L (ref 12–78)
ALT SERPL W P-5'-P-CCNC: 38 U/L (ref 12–78)
ALT SERPL W P-5'-P-CCNC: 45 U/L (ref 12–78)
ALT SERPL W P-5'-P-CCNC: 46 U/L (ref 12–78)
ALT SERPL W P-5'-P-CCNC: 47 U/L (ref 12–78)
ALT SERPL W P-5'-P-CCNC: 48 U/L (ref 12–78)
ALT SERPL W P-5'-P-CCNC: 63 U/L (ref 12–78)
ALT SERPL W P-5'-P-CCNC: 66 U/L (ref 12–78)
ALT SERPL W P-5'-P-CCNC: 85 U/L (ref 12–78)
ANION GAP SERPL CALCULATED.3IONS-SCNC: 10 MMOL/L (ref 4–13)
ANION GAP SERPL CALCULATED.3IONS-SCNC: 11 MMOL/L (ref 4–13)
ANION GAP SERPL CALCULATED.3IONS-SCNC: 12 MMOL/L (ref 4–13)
ANION GAP SERPL CALCULATED.3IONS-SCNC: 13 MMOL/L (ref 4–13)
ANION GAP SERPL CALCULATED.3IONS-SCNC: 4 MMOL/L (ref 4–13)
ANION GAP SERPL CALCULATED.3IONS-SCNC: 5 MMOL/L (ref 4–13)
ANION GAP SERPL CALCULATED.3IONS-SCNC: 5 MMOL/L (ref 4–13)
ANION GAP SERPL CALCULATED.3IONS-SCNC: 6 MMOL/L (ref 4–13)
ANION GAP SERPL CALCULATED.3IONS-SCNC: 7 MMOL/L (ref 4–13)
ANION GAP SERPL CALCULATED.3IONS-SCNC: 8 MMOL/L (ref 4–13)
ANION GAP SERPL CALCULATED.3IONS-SCNC: 9 MMOL/L (ref 4–13)
APTT PPP: 28 SECONDS (ref 23–37)
AST SERPL W P-5'-P-CCNC: 107 U/L (ref 5–45)
AST SERPL W P-5'-P-CCNC: 13 U/L (ref 5–45)
AST SERPL W P-5'-P-CCNC: 134 U/L (ref 5–45)
AST SERPL W P-5'-P-CCNC: 14 U/L (ref 5–45)
AST SERPL W P-5'-P-CCNC: 15 U/L (ref 5–45)
AST SERPL W P-5'-P-CCNC: 16 U/L (ref 5–45)
AST SERPL W P-5'-P-CCNC: 17 U/L (ref 5–45)
AST SERPL W P-5'-P-CCNC: 18 U/L (ref 5–45)
AST SERPL W P-5'-P-CCNC: 19 U/L (ref 5–45)
AST SERPL W P-5'-P-CCNC: 19 U/L (ref 5–45)
AST SERPL W P-5'-P-CCNC: 20 U/L (ref 5–45)
AST SERPL W P-5'-P-CCNC: 27 U/L (ref 5–45)
AST SERPL W P-5'-P-CCNC: 27 U/L (ref 5–45)
AST SERPL W P-5'-P-CCNC: 37 U/L (ref 5–45)
AST SERPL W P-5'-P-CCNC: 39 U/L (ref 5–45)
AST SERPL W P-5'-P-CCNC: 45 U/L (ref 5–45)
AST SERPL W P-5'-P-CCNC: 51 U/L (ref 5–45)
AST SERPL W P-5'-P-CCNC: 97 U/L (ref 5–45)
ATRIAL RATE: 61 BPM
ATRIAL RATE: 91 BPM
BACTERIA BLD CULT: NORMAL
BACTERIA BLD CULT: NORMAL
BASOPHILS # BLD AUTO: 0.02 THOUSANDS/ΜL (ref 0–0.1)
BASOPHILS # BLD AUTO: 0.03 THOUSANDS/ΜL (ref 0–0.1)
BASOPHILS # BLD AUTO: 0.04 THOUSANDS/ΜL (ref 0–0.1)
BASOPHILS # BLD AUTO: 0.05 THOUSANDS/ΜL (ref 0–0.1)
BASOPHILS # BLD AUTO: 0.06 THOUSANDS/ΜL (ref 0–0.1)
BASOPHILS # BLD AUTO: 0.07 THOUSANDS/ΜL (ref 0–0.1)
BASOPHILS NFR BLD AUTO: 1 % (ref 0–1)
BASOPHILS NFR BLD AUTO: 2 % (ref 0–1)
BILIRUB SERPL-MCNC: 0.23 MG/DL (ref 0.2–1)
BILIRUB SERPL-MCNC: 0.24 MG/DL (ref 0.2–1)
BILIRUB SERPL-MCNC: 0.26 MG/DL (ref 0.2–1)
BILIRUB SERPL-MCNC: 0.26 MG/DL (ref 0.2–1)
BILIRUB SERPL-MCNC: 0.27 MG/DL (ref 0.2–1)
BILIRUB SERPL-MCNC: 0.31 MG/DL (ref 0.2–1)
BILIRUB SERPL-MCNC: 0.34 MG/DL (ref 0.2–1)
BILIRUB SERPL-MCNC: 0.34 MG/DL (ref 0.2–1)
BILIRUB SERPL-MCNC: 0.35 MG/DL (ref 0.2–1)
BILIRUB SERPL-MCNC: 0.37 MG/DL (ref 0.2–1)
BILIRUB SERPL-MCNC: 0.41 MG/DL (ref 0.2–1)
BILIRUB SERPL-MCNC: 0.43 MG/DL (ref 0.2–1)
BILIRUB SERPL-MCNC: 0.48 MG/DL (ref 0.2–1)
BILIRUB SERPL-MCNC: 0.55 MG/DL (ref 0.2–1)
BILIRUB SERPL-MCNC: 0.55 MG/DL (ref 0.2–1)
BILIRUB SERPL-MCNC: 0.6 MG/DL (ref 0.2–1)
BILIRUB SERPL-MCNC: 0.62 MG/DL (ref 0.2–1)
BILIRUB SERPL-MCNC: 0.62 MG/DL (ref 0.2–1)
BILIRUB UR QL STRIP: NEGATIVE
BILIRUB UR QL STRIP: NEGATIVE
BUN SERPL-MCNC: 10 MG/DL (ref 5–25)
BUN SERPL-MCNC: 11 MG/DL (ref 5–25)
BUN SERPL-MCNC: 11 MG/DL (ref 5–25)
BUN SERPL-MCNC: 12 MG/DL (ref 5–25)
BUN SERPL-MCNC: 13 MG/DL (ref 5–25)
BUN SERPL-MCNC: 14 MG/DL (ref 5–25)
BUN SERPL-MCNC: 15 MG/DL (ref 5–25)
BUN SERPL-MCNC: 16 MG/DL (ref 5–25)
BUN SERPL-MCNC: 17 MG/DL (ref 5–25)
BUN SERPL-MCNC: 18 MG/DL (ref 5–25)
BUN SERPL-MCNC: 19 MG/DL (ref 5–25)
BUN SERPL-MCNC: 25 MG/DL (ref 5–25)
BUN SERPL-MCNC: 4 MG/DL (ref 5–25)
BUN SERPL-MCNC: 5 MG/DL (ref 5–25)
BUN SERPL-MCNC: 6 MG/DL (ref 5–25)
BUN SERPL-MCNC: 7 MG/DL (ref 5–25)
BUN SERPL-MCNC: 8 MG/DL (ref 5–25)
C DIFF TOX B TCDB STL QL NAA+PROBE: NEGATIVE
CALCIUM ALBUM COR SERPL-MCNC: 8.7 MG/DL (ref 8.3–10.1)
CALCIUM ALBUM COR SERPL-MCNC: 8.8 MG/DL (ref 8.3–10.1)
CALCIUM ALBUM COR SERPL-MCNC: 8.9 MG/DL (ref 8.3–10.1)
CALCIUM ALBUM COR SERPL-MCNC: 9 MG/DL (ref 8.3–10.1)
CALCIUM ALBUM COR SERPL-MCNC: 9 MG/DL (ref 8.3–10.1)
CALCIUM ALBUM COR SERPL-MCNC: 9.1 MG/DL (ref 8.3–10.1)
CALCIUM ALBUM COR SERPL-MCNC: 9.2 MG/DL (ref 8.3–10.1)
CALCIUM ALBUM COR SERPL-MCNC: 9.3 MG/DL (ref 8.3–10.1)
CALCIUM ALBUM COR SERPL-MCNC: 9.3 MG/DL (ref 8.3–10.1)
CALCIUM SERPL-MCNC: 7.7 MG/DL (ref 8.3–10.1)
CALCIUM SERPL-MCNC: 7.8 MG/DL (ref 8.3–10.1)
CALCIUM SERPL-MCNC: 7.9 MG/DL (ref 8.3–10.1)
CALCIUM SERPL-MCNC: 8 MG/DL (ref 8.3–10.1)
CALCIUM SERPL-MCNC: 8.1 MG/DL (ref 8.3–10.1)
CALCIUM SERPL-MCNC: 8.2 MG/DL (ref 8.3–10.1)
CALCIUM SERPL-MCNC: 8.2 MG/DL (ref 8.3–10.1)
CALCIUM SERPL-MCNC: 8.3 MG/DL (ref 8.3–10.1)
CALCIUM SERPL-MCNC: 8.4 MG/DL (ref 8.3–10.1)
CALCIUM SERPL-MCNC: 8.5 MG/DL (ref 8.3–10.1)
CALCIUM SERPL-MCNC: 8.6 MG/DL (ref 8.3–10.1)
CALCIUM SERPL-MCNC: 8.6 MG/DL (ref 8.3–10.1)
CALCIUM SERPL-MCNC: 8.7 MG/DL (ref 8.3–10.1)
CALCIUM SERPL-MCNC: 8.9 MG/DL (ref 8.3–10.1)
CHLORIDE SERPL-SCNC: 102 MMOL/L (ref 100–108)
CHLORIDE SERPL-SCNC: 102 MMOL/L (ref 100–108)
CHLORIDE SERPL-SCNC: 103 MMOL/L (ref 100–108)
CHLORIDE SERPL-SCNC: 103 MMOL/L (ref 100–108)
CHLORIDE SERPL-SCNC: 104 MMOL/L (ref 100–108)
CHLORIDE SERPL-SCNC: 105 MMOL/L (ref 100–108)
CHLORIDE SERPL-SCNC: 106 MMOL/L (ref 100–108)
CHLORIDE SERPL-SCNC: 107 MMOL/L (ref 100–108)
CHLORIDE SERPL-SCNC: 108 MMOL/L (ref 100–108)
CHLORIDE SERPL-SCNC: 109 MMOL/L (ref 100–108)
CHLORIDE SERPL-SCNC: 111 MMOL/L (ref 100–108)
CHLORIDE SERPL-SCNC: 99 MMOL/L (ref 100–108)
CLARITY UR: CLEAR
CLARITY UR: CLEAR
CO2 SERPL-SCNC: 20 MMOL/L (ref 21–32)
CO2 SERPL-SCNC: 22 MMOL/L (ref 21–32)
CO2 SERPL-SCNC: 22 MMOL/L (ref 21–32)
CO2 SERPL-SCNC: 23 MMOL/L (ref 21–32)
CO2 SERPL-SCNC: 25 MMOL/L (ref 21–32)
CO2 SERPL-SCNC: 26 MMOL/L (ref 21–32)
CO2 SERPL-SCNC: 27 MMOL/L (ref 21–32)
CO2 SERPL-SCNC: 28 MMOL/L (ref 21–32)
CO2 SERPL-SCNC: 29 MMOL/L (ref 21–32)
CO2 SERPL-SCNC: 29 MMOL/L (ref 21–32)
CO2 SERPL-SCNC: 30 MMOL/L (ref 21–32)
CO2 SERPL-SCNC: 31 MMOL/L (ref 21–32)
COLOR UR: YELLOW
COLOR UR: YELLOW
CREAT SERPL-MCNC: 0.57 MG/DL (ref 0.6–1.3)
CREAT SERPL-MCNC: 0.63 MG/DL (ref 0.6–1.3)
CREAT SERPL-MCNC: 0.64 MG/DL (ref 0.6–1.3)
CREAT SERPL-MCNC: 0.7 MG/DL (ref 0.6–1.3)
CREAT SERPL-MCNC: 0.7 MG/DL (ref 0.6–1.3)
CREAT SERPL-MCNC: 0.71 MG/DL (ref 0.6–1.3)
CREAT SERPL-MCNC: 0.73 MG/DL (ref 0.6–1.3)
CREAT SERPL-MCNC: 0.73 MG/DL (ref 0.6–1.3)
CREAT SERPL-MCNC: 0.77 MG/DL (ref 0.6–1.3)
CREAT SERPL-MCNC: 0.78 MG/DL (ref 0.6–1.3)
CREAT SERPL-MCNC: 0.79 MG/DL (ref 0.6–1.3)
CREAT SERPL-MCNC: 0.81 MG/DL (ref 0.6–1.3)
CREAT SERPL-MCNC: 0.82 MG/DL (ref 0.6–1.3)
CREAT SERPL-MCNC: 0.82 MG/DL (ref 0.6–1.3)
CREAT SERPL-MCNC: 0.83 MG/DL (ref 0.6–1.3)
CREAT SERPL-MCNC: 0.83 MG/DL (ref 0.6–1.3)
CREAT SERPL-MCNC: 0.84 MG/DL (ref 0.6–1.3)
CREAT SERPL-MCNC: 0.86 MG/DL (ref 0.6–1.3)
CREAT SERPL-MCNC: 0.88 MG/DL (ref 0.6–1.3)
CREAT SERPL-MCNC: 0.88 MG/DL (ref 0.6–1.3)
CREAT SERPL-MCNC: 0.89 MG/DL (ref 0.6–1.3)
CREAT SERPL-MCNC: 0.91 MG/DL (ref 0.6–1.3)
CREAT SERPL-MCNC: 0.91 MG/DL (ref 0.6–1.3)
CREAT SERPL-MCNC: 0.92 MG/DL (ref 0.6–1.3)
CREAT SERPL-MCNC: 0.93 MG/DL (ref 0.6–1.3)
CREAT SERPL-MCNC: 0.93 MG/DL (ref 0.6–1.3)
CREAT SERPL-MCNC: 0.94 MG/DL (ref 0.6–1.3)
CREAT SERPL-MCNC: 0.95 MG/DL (ref 0.6–1.3)
CREAT SERPL-MCNC: 0.97 MG/DL (ref 0.6–1.3)
EOSINOPHIL # BLD AUTO: 0.03 THOUSAND/ΜL (ref 0–0.61)
EOSINOPHIL # BLD AUTO: 0.03 THOUSAND/ΜL (ref 0–0.61)
EOSINOPHIL # BLD AUTO: 0.04 THOUSAND/ΜL (ref 0–0.61)
EOSINOPHIL # BLD AUTO: 0.07 THOUSAND/ΜL (ref 0–0.61)
EOSINOPHIL # BLD AUTO: 0.08 THOUSAND/ΜL (ref 0–0.61)
EOSINOPHIL # BLD AUTO: 0.08 THOUSAND/ΜL (ref 0–0.61)
EOSINOPHIL # BLD AUTO: 0.09 THOUSAND/ΜL (ref 0–0.61)
EOSINOPHIL # BLD AUTO: 0.09 THOUSAND/ΜL (ref 0–0.61)
EOSINOPHIL # BLD AUTO: 0.1 THOUSAND/ΜL (ref 0–0.61)
EOSINOPHIL # BLD AUTO: 0.11 THOUSAND/ΜL (ref 0–0.61)
EOSINOPHIL # BLD AUTO: 0.12 THOUSAND/ΜL (ref 0–0.61)
EOSINOPHIL # BLD AUTO: 0.12 THOUSAND/ΜL (ref 0–0.61)
EOSINOPHIL # BLD AUTO: 0.14 THOUSAND/ΜL (ref 0–0.61)
EOSINOPHIL # BLD AUTO: 0.16 THOUSAND/ΜL (ref 0–0.61)
EOSINOPHIL # BLD AUTO: 0.16 THOUSAND/ΜL (ref 0–0.61)
EOSINOPHIL # BLD AUTO: 0.17 THOUSAND/ΜL (ref 0–0.61)
EOSINOPHIL # BLD AUTO: 0.25 THOUSAND/ΜL (ref 0–0.61)
EOSINOPHIL # BLD AUTO: 0.27 THOUSAND/ΜL (ref 0–0.61)
EOSINOPHIL # BLD AUTO: 0.27 THOUSAND/ΜL (ref 0–0.61)
EOSINOPHIL # BLD AUTO: 0.29 THOUSAND/ΜL (ref 0–0.61)
EOSINOPHIL NFR BLD AUTO: 0 % (ref 0–6)
EOSINOPHIL NFR BLD AUTO: 1 % (ref 0–6)
EOSINOPHIL NFR BLD AUTO: 2 % (ref 0–6)
EOSINOPHIL NFR BLD AUTO: 3 % (ref 0–6)
EOSINOPHIL NFR BLD AUTO: 4 % (ref 0–6)
EOSINOPHIL NFR BLD AUTO: 5 % (ref 0–6)
EOSINOPHIL NFR BLD AUTO: 6 % (ref 0–6)
EOSINOPHIL NFR BLD AUTO: 7 % (ref 0–6)
EOSINOPHIL NFR BLD AUTO: 7 % (ref 0–6)
EOSINOPHIL NFR BLD AUTO: 9 % (ref 0–6)
ERYTHROCYTE [DISTWIDTH] IN BLOOD BY AUTOMATED COUNT: 12.8 % (ref 11.6–15.1)
ERYTHROCYTE [DISTWIDTH] IN BLOOD BY AUTOMATED COUNT: 12.9 % (ref 11.6–15.1)
ERYTHROCYTE [DISTWIDTH] IN BLOOD BY AUTOMATED COUNT: 13.1 % (ref 11.6–15.1)
ERYTHROCYTE [DISTWIDTH] IN BLOOD BY AUTOMATED COUNT: 13.2 % (ref 11.6–15.1)
ERYTHROCYTE [DISTWIDTH] IN BLOOD BY AUTOMATED COUNT: 13.3 % (ref 11.6–15.1)
ERYTHROCYTE [DISTWIDTH] IN BLOOD BY AUTOMATED COUNT: 13.4 % (ref 11.6–15.1)
ERYTHROCYTE [DISTWIDTH] IN BLOOD BY AUTOMATED COUNT: 13.4 % (ref 11.6–15.1)
ERYTHROCYTE [DISTWIDTH] IN BLOOD BY AUTOMATED COUNT: 13.5 % (ref 11.6–15.1)
ERYTHROCYTE [DISTWIDTH] IN BLOOD BY AUTOMATED COUNT: 13.9 % (ref 11.6–15.1)
ERYTHROCYTE [DISTWIDTH] IN BLOOD BY AUTOMATED COUNT: 13.9 % (ref 11.6–15.1)
ERYTHROCYTE [DISTWIDTH] IN BLOOD BY AUTOMATED COUNT: 14 % (ref 11.6–15.1)
ERYTHROCYTE [DISTWIDTH] IN BLOOD BY AUTOMATED COUNT: 14.1 % (ref 11.6–15.1)
ERYTHROCYTE [DISTWIDTH] IN BLOOD BY AUTOMATED COUNT: 14.2 % (ref 11.6–15.1)
ERYTHROCYTE [DISTWIDTH] IN BLOOD BY AUTOMATED COUNT: 14.3 % (ref 11.6–15.1)
ERYTHROCYTE [DISTWIDTH] IN BLOOD BY AUTOMATED COUNT: 14.3 % (ref 11.6–15.1)
ERYTHROCYTE [DISTWIDTH] IN BLOOD BY AUTOMATED COUNT: 14.4 % (ref 11.6–15.1)
ERYTHROCYTE [DISTWIDTH] IN BLOOD BY AUTOMATED COUNT: 14.5 % (ref 11.6–15.1)
ERYTHROCYTE [DISTWIDTH] IN BLOOD BY AUTOMATED COUNT: 14.5 % (ref 11.6–15.1)
GFR SERPL CREATININE-BSD FRML MDRD: 61 ML/MIN/1.73SQ M
GFR SERPL CREATININE-BSD FRML MDRD: 63 ML/MIN/1.73SQ M
GFR SERPL CREATININE-BSD FRML MDRD: 64 ML/MIN/1.73SQ M
GFR SERPL CREATININE-BSD FRML MDRD: 65 ML/MIN/1.73SQ M
GFR SERPL CREATININE-BSD FRML MDRD: 66 ML/MIN/1.73SQ M
GFR SERPL CREATININE-BSD FRML MDRD: 66 ML/MIN/1.73SQ M
GFR SERPL CREATININE-BSD FRML MDRD: 68 ML/MIN/1.73SQ M
GFR SERPL CREATININE-BSD FRML MDRD: 69 ML/MIN/1.73SQ M
GFR SERPL CREATININE-BSD FRML MDRD: 69 ML/MIN/1.73SQ M
GFR SERPL CREATININE-BSD FRML MDRD: 71 ML/MIN/1.73SQ M
GFR SERPL CREATININE-BSD FRML MDRD: 73 ML/MIN/1.73SQ M
GFR SERPL CREATININE-BSD FRML MDRD: 74 ML/MIN/1.73SQ M
GFR SERPL CREATININE-BSD FRML MDRD: 74 ML/MIN/1.73SQ M
GFR SERPL CREATININE-BSD FRML MDRD: 75 ML/MIN/1.73SQ M
GFR SERPL CREATININE-BSD FRML MDRD: 75 ML/MIN/1.73SQ M
GFR SERPL CREATININE-BSD FRML MDRD: 76 ML/MIN/1.73SQ M
GFR SERPL CREATININE-BSD FRML MDRD: 79 ML/MIN/1.73SQ M
GFR SERPL CREATININE-BSD FRML MDRD: 80 ML/MIN/1.73SQ M
GFR SERPL CREATININE-BSD FRML MDRD: 81 ML/MIN/1.73SQ M
GFR SERPL CREATININE-BSD FRML MDRD: 87 ML/MIN/1.73SQ M
GFR SERPL CREATININE-BSD FRML MDRD: 87 ML/MIN/1.73SQ M
GFR SERPL CREATININE-BSD FRML MDRD: 90 ML/MIN/1.73SQ M
GFR SERPL CREATININE-BSD FRML MDRD: 91 ML/MIN/1.73SQ M
GFR SERPL CREATININE-BSD FRML MDRD: 91 ML/MIN/1.73SQ M
GFR SERPL CREATININE-BSD FRML MDRD: 94 ML/MIN/1.73SQ M
GFR SERPL CREATININE-BSD FRML MDRD: 94 ML/MIN/1.73SQ M
GFR SERPL CREATININE-BSD FRML MDRD: 98 ML/MIN/1.73SQ M
GLUCOSE P FAST SERPL-MCNC: 100 MG/DL (ref 65–99)
GLUCOSE SERPL-MCNC: 100 MG/DL (ref 65–140)
GLUCOSE SERPL-MCNC: 101 MG/DL (ref 65–140)
GLUCOSE SERPL-MCNC: 101 MG/DL (ref 65–140)
GLUCOSE SERPL-MCNC: 103 MG/DL (ref 65–140)
GLUCOSE SERPL-MCNC: 104 MG/DL (ref 65–140)
GLUCOSE SERPL-MCNC: 105 MG/DL (ref 65–140)
GLUCOSE SERPL-MCNC: 105 MG/DL (ref 65–140)
GLUCOSE SERPL-MCNC: 106 MG/DL (ref 65–140)
GLUCOSE SERPL-MCNC: 108 MG/DL (ref 65–140)
GLUCOSE SERPL-MCNC: 113 MG/DL (ref 65–140)
GLUCOSE SERPL-MCNC: 116 MG/DL (ref 65–140)
GLUCOSE SERPL-MCNC: 118 MG/DL (ref 65–140)
GLUCOSE SERPL-MCNC: 122 MG/DL (ref 65–140)
GLUCOSE SERPL-MCNC: 125 MG/DL (ref 65–140)
GLUCOSE SERPL-MCNC: 136 MG/DL (ref 65–140)
GLUCOSE SERPL-MCNC: 149 MG/DL (ref 65–140)
GLUCOSE SERPL-MCNC: 172 MG/DL (ref 65–140)
GLUCOSE SERPL-MCNC: 72 MG/DL (ref 65–140)
GLUCOSE SERPL-MCNC: 74 MG/DL (ref 65–140)
GLUCOSE SERPL-MCNC: 77 MG/DL (ref 65–140)
GLUCOSE SERPL-MCNC: 77 MG/DL (ref 65–140)
GLUCOSE SERPL-MCNC: 80 MG/DL (ref 65–140)
GLUCOSE SERPL-MCNC: 81 MG/DL (ref 65–140)
GLUCOSE SERPL-MCNC: 83 MG/DL (ref 65–140)
GLUCOSE SERPL-MCNC: 85 MG/DL (ref 65–140)
GLUCOSE SERPL-MCNC: 86 MG/DL (ref 65–140)
GLUCOSE SERPL-MCNC: 90 MG/DL (ref 65–140)
GLUCOSE SERPL-MCNC: 91 MG/DL (ref 65–140)
GLUCOSE SERPL-MCNC: 91 MG/DL (ref 65–140)
GLUCOSE SERPL-MCNC: 92 MG/DL (ref 65–140)
GLUCOSE SERPL-MCNC: 97 MG/DL (ref 65–140)
GLUCOSE SERPL-MCNC: 98 MG/DL (ref 65–140)
GLUCOSE UR STRIP-MCNC: NEGATIVE MG/DL
GLUCOSE UR STRIP-MCNC: NEGATIVE MG/DL
HCT VFR BLD AUTO: 30 % (ref 34.8–46.1)
HCT VFR BLD AUTO: 30.4 % (ref 34.8–46.1)
HCT VFR BLD AUTO: 31.1 % (ref 34.8–46.1)
HCT VFR BLD AUTO: 31.5 % (ref 34.8–46.1)
HCT VFR BLD AUTO: 32.2 % (ref 34.8–46.1)
HCT VFR BLD AUTO: 32.3 % (ref 34.8–46.1)
HCT VFR BLD AUTO: 32.8 % (ref 34.8–46.1)
HCT VFR BLD AUTO: 32.9 % (ref 34.8–46.1)
HCT VFR BLD AUTO: 33 % (ref 34.8–46.1)
HCT VFR BLD AUTO: 33.1 % (ref 34.8–46.1)
HCT VFR BLD AUTO: 33.2 % (ref 34.8–46.1)
HCT VFR BLD AUTO: 33.6 % (ref 34.8–46.1)
HCT VFR BLD AUTO: 33.6 % (ref 34.8–46.1)
HCT VFR BLD AUTO: 34 % (ref 34.8–46.1)
HCT VFR BLD AUTO: 34.7 % (ref 34.8–46.1)
HCT VFR BLD AUTO: 34.9 % (ref 34.8–46.1)
HCT VFR BLD AUTO: 35 % (ref 34.8–46.1)
HCT VFR BLD AUTO: 35.1 % (ref 34.8–46.1)
HCT VFR BLD AUTO: 35.2 % (ref 34.8–46.1)
HCT VFR BLD AUTO: 35.2 % (ref 34.8–46.1)
HCT VFR BLD AUTO: 35.8 % (ref 34.8–46.1)
HCT VFR BLD AUTO: 36.1 % (ref 34.8–46.1)
HCT VFR BLD AUTO: 36.1 % (ref 34.8–46.1)
HCT VFR BLD AUTO: 37.1 % (ref 34.8–46.1)
HCT VFR BLD AUTO: 37.4 % (ref 34.8–46.1)
HCT VFR BLD AUTO: 37.9 % (ref 34.8–46.1)
HCT VFR BLD AUTO: 38.6 % (ref 34.8–46.1)
HCT VFR BLD AUTO: 40.2 % (ref 34.8–46.1)
HGB BLD-MCNC: 10 G/DL (ref 11.5–15.4)
HGB BLD-MCNC: 10.1 G/DL (ref 11.5–15.4)
HGB BLD-MCNC: 10.3 G/DL (ref 11.5–15.4)
HGB BLD-MCNC: 10.3 G/DL (ref 11.5–15.4)
HGB BLD-MCNC: 10.4 G/DL (ref 11.5–15.4)
HGB BLD-MCNC: 10.4 G/DL (ref 11.5–15.4)
HGB BLD-MCNC: 10.5 G/DL (ref 11.5–15.4)
HGB BLD-MCNC: 10.6 G/DL (ref 11.5–15.4)
HGB BLD-MCNC: 10.7 G/DL (ref 11.5–15.4)
HGB BLD-MCNC: 10.9 G/DL (ref 11.5–15.4)
HGB BLD-MCNC: 10.9 G/DL (ref 11.5–15.4)
HGB BLD-MCNC: 11 G/DL (ref 11.5–15.4)
HGB BLD-MCNC: 11 G/DL (ref 11.5–15.4)
HGB BLD-MCNC: 11.1 G/DL (ref 11.5–15.4)
HGB BLD-MCNC: 11.1 G/DL (ref 11.5–15.4)
HGB BLD-MCNC: 11.3 G/DL (ref 11.5–15.4)
HGB BLD-MCNC: 11.5 G/DL (ref 11.5–15.4)
HGB BLD-MCNC: 11.6 G/DL (ref 11.5–15.4)
HGB BLD-MCNC: 11.7 G/DL (ref 11.5–15.4)
HGB BLD-MCNC: 11.7 G/DL (ref 11.5–15.4)
HGB BLD-MCNC: 11.8 G/DL (ref 11.5–15.4)
HGB BLD-MCNC: 12 G/DL (ref 11.5–15.4)
HGB BLD-MCNC: 12.1 G/DL (ref 11.5–15.4)
HGB BLD-MCNC: 12.2 G/DL (ref 11.5–15.4)
HGB BLD-MCNC: 12.4 G/DL (ref 11.5–15.4)
HGB BLD-MCNC: 13.1 G/DL (ref 11.5–15.4)
HGB BLD-MCNC: 9.2 G/DL (ref 11.5–15.4)
HGB BLD-MCNC: 9.8 G/DL (ref 11.5–15.4)
HGB UR QL STRIP.AUTO: NEGATIVE
HGB UR QL STRIP.AUTO: NEGATIVE
IMM GRANULOCYTES # BLD AUTO: 0 THOUSAND/UL (ref 0–0.2)
IMM GRANULOCYTES # BLD AUTO: 0.01 THOUSAND/UL (ref 0–0.2)
IMM GRANULOCYTES # BLD AUTO: 0.02 THOUSAND/UL (ref 0–0.2)
IMM GRANULOCYTES # BLD AUTO: 0.03 THOUSAND/UL (ref 0–0.2)
IMM GRANULOCYTES # BLD AUTO: 0.03 THOUSAND/UL (ref 0–0.2)
IMM GRANULOCYTES # BLD AUTO: 0.04 THOUSAND/UL (ref 0–0.2)
IMM GRANULOCYTES # BLD AUTO: 0.05 THOUSAND/UL (ref 0–0.2)
IMM GRANULOCYTES NFR BLD AUTO: 0 % (ref 0–2)
IMM GRANULOCYTES NFR BLD AUTO: 1 % (ref 0–2)
INR PPP: 0.98 (ref 0.84–1.19)
KETONES UR STRIP-MCNC: NEGATIVE MG/DL
KETONES UR STRIP-MCNC: NEGATIVE MG/DL
LACTATE SERPL-SCNC: 0.9 MMOL/L (ref 0.5–2)
LACTATE SERPL-SCNC: 0.9 MMOL/L (ref 0.5–2)
LACTATE SERPL-SCNC: 1 MMOL/L (ref 0.5–2)
LACTATE SERPL-SCNC: 1.6 MMOL/L (ref 0.5–2)
LACTATE SERPL-SCNC: 1.9 MMOL/L (ref 0.5–2)
LEUKOCYTE ESTERASE UR QL STRIP: NEGATIVE
LEUKOCYTE ESTERASE UR QL STRIP: NEGATIVE
LIPASE SERPL-CCNC: 121 U/L (ref 73–393)
LIPASE SERPL-CCNC: 54 U/L (ref 73–393)
LIPASE SERPL-CCNC: 59 U/L (ref 73–393)
LIPASE SERPL-CCNC: 66 U/L (ref 73–393)
LIPASE SERPL-CCNC: 73 U/L (ref 73–393)
LIPASE SERPL-CCNC: 74 U/L (ref 73–393)
LYMPHOCYTES # BLD AUTO: 0.5 THOUSANDS/ΜL (ref 0.6–4.47)
LYMPHOCYTES # BLD AUTO: 1.08 THOUSANDS/ΜL (ref 0.6–4.47)
LYMPHOCYTES # BLD AUTO: 1.18 THOUSANDS/ΜL (ref 0.6–4.47)
LYMPHOCYTES # BLD AUTO: 1.22 THOUSANDS/ΜL (ref 0.6–4.47)
LYMPHOCYTES # BLD AUTO: 1.27 THOUSANDS/ΜL (ref 0.6–4.47)
LYMPHOCYTES # BLD AUTO: 1.34 THOUSANDS/ΜL (ref 0.6–4.47)
LYMPHOCYTES # BLD AUTO: 1.4 THOUSANDS/ΜL (ref 0.6–4.47)
LYMPHOCYTES # BLD AUTO: 1.48 THOUSANDS/ΜL (ref 0.6–4.47)
LYMPHOCYTES # BLD AUTO: 1.5 THOUSANDS/ΜL (ref 0.6–4.47)
LYMPHOCYTES # BLD AUTO: 1.51 THOUSANDS/ΜL (ref 0.6–4.47)
LYMPHOCYTES # BLD AUTO: 1.6 THOUSANDS/ΜL (ref 0.6–4.47)
LYMPHOCYTES # BLD AUTO: 1.62 THOUSANDS/ΜL (ref 0.6–4.47)
LYMPHOCYTES # BLD AUTO: 1.63 THOUSANDS/ΜL (ref 0.6–4.47)
LYMPHOCYTES # BLD AUTO: 1.64 THOUSANDS/ΜL (ref 0.6–4.47)
LYMPHOCYTES # BLD AUTO: 1.64 THOUSANDS/ΜL (ref 0.6–4.47)
LYMPHOCYTES # BLD AUTO: 1.7 THOUSANDS/ΜL (ref 0.6–4.47)
LYMPHOCYTES # BLD AUTO: 1.78 THOUSANDS/ΜL (ref 0.6–4.47)
LYMPHOCYTES # BLD AUTO: 1.81 THOUSANDS/ΜL (ref 0.6–4.47)
LYMPHOCYTES # BLD AUTO: 2.2 THOUSANDS/ΜL (ref 0.6–4.47)
LYMPHOCYTES # BLD AUTO: 2.23 THOUSANDS/ΜL (ref 0.6–4.47)
LYMPHOCYTES NFR BLD AUTO: 12 % (ref 14–44)
LYMPHOCYTES NFR BLD AUTO: 24 % (ref 14–44)
LYMPHOCYTES NFR BLD AUTO: 25 % (ref 14–44)
LYMPHOCYTES NFR BLD AUTO: 25 % (ref 14–44)
LYMPHOCYTES NFR BLD AUTO: 26 % (ref 14–44)
LYMPHOCYTES NFR BLD AUTO: 26 % (ref 14–44)
LYMPHOCYTES NFR BLD AUTO: 27 % (ref 14–44)
LYMPHOCYTES NFR BLD AUTO: 28 % (ref 14–44)
LYMPHOCYTES NFR BLD AUTO: 28 % (ref 14–44)
LYMPHOCYTES NFR BLD AUTO: 30 % (ref 14–44)
LYMPHOCYTES NFR BLD AUTO: 31 % (ref 14–44)
LYMPHOCYTES NFR BLD AUTO: 32 % (ref 14–44)
LYMPHOCYTES NFR BLD AUTO: 32 % (ref 14–44)
LYMPHOCYTES NFR BLD AUTO: 34 % (ref 14–44)
LYMPHOCYTES NFR BLD AUTO: 35 % (ref 14–44)
LYMPHOCYTES NFR BLD AUTO: 37 % (ref 14–44)
LYMPHOCYTES NFR BLD AUTO: 39 % (ref 14–44)
LYMPHOCYTES NFR BLD AUTO: 40 % (ref 14–44)
LYMPHOCYTES NFR BLD AUTO: 41 % (ref 14–44)
LYMPHOCYTES NFR BLD AUTO: 46 % (ref 14–44)
MAGNESIUM SERPL-MCNC: 1.4 MG/DL (ref 1.6–2.6)
MAGNESIUM SERPL-MCNC: 1.4 MG/DL (ref 1.6–2.6)
MAGNESIUM SERPL-MCNC: 1.5 MG/DL (ref 1.6–2.6)
MAGNESIUM SERPL-MCNC: 1.6 MG/DL (ref 1.6–2.6)
MAGNESIUM SERPL-MCNC: 1.7 MG/DL (ref 1.6–2.6)
MAGNESIUM SERPL-MCNC: 1.7 MG/DL (ref 1.6–2.6)
MAGNESIUM SERPL-MCNC: 1.8 MG/DL (ref 1.6–2.6)
MAGNESIUM SERPL-MCNC: 1.9 MG/DL (ref 1.6–2.6)
MAGNESIUM SERPL-MCNC: 2 MG/DL (ref 1.6–2.6)
MAGNESIUM SERPL-MCNC: 2 MG/DL (ref 1.6–2.6)
MAGNESIUM SERPL-MCNC: 2.1 MG/DL (ref 1.6–2.6)
MAGNESIUM SERPL-MCNC: 2.1 MG/DL (ref 1.6–2.6)
MAGNESIUM SERPL-MCNC: 2.3 MG/DL (ref 1.6–2.6)
MAGNESIUM SERPL-MCNC: 2.3 MG/DL (ref 1.6–2.6)
MAGNESIUM SERPL-MCNC: 2.4 MG/DL (ref 1.6–2.6)
MCH RBC QN AUTO: 27.3 PG (ref 26.8–34.3)
MCH RBC QN AUTO: 27.6 PG (ref 26.8–34.3)
MCH RBC QN AUTO: 28 PG (ref 26.8–34.3)
MCH RBC QN AUTO: 28.1 PG (ref 26.8–34.3)
MCH RBC QN AUTO: 28.1 PG (ref 26.8–34.3)
MCH RBC QN AUTO: 28.2 PG (ref 26.8–34.3)
MCH RBC QN AUTO: 28.2 PG (ref 26.8–34.3)
MCH RBC QN AUTO: 28.4 PG (ref 26.8–34.3)
MCH RBC QN AUTO: 28.5 PG (ref 26.8–34.3)
MCH RBC QN AUTO: 28.7 PG (ref 26.8–34.3)
MCH RBC QN AUTO: 28.7 PG (ref 26.8–34.3)
MCH RBC QN AUTO: 28.8 PG (ref 26.8–34.3)
MCH RBC QN AUTO: 29 PG (ref 26.8–34.3)
MCH RBC QN AUTO: 29.1 PG (ref 26.8–34.3)
MCH RBC QN AUTO: 29.2 PG (ref 26.8–34.3)
MCH RBC QN AUTO: 29.3 PG (ref 26.8–34.3)
MCH RBC QN AUTO: 29.4 PG (ref 26.8–34.3)
MCH RBC QN AUTO: 29.4 PG (ref 26.8–34.3)
MCH RBC QN AUTO: 29.5 PG (ref 26.8–34.3)
MCH RBC QN AUTO: 29.5 PG (ref 26.8–34.3)
MCH RBC QN AUTO: 29.6 PG (ref 26.8–34.3)
MCH RBC QN AUTO: 30 PG (ref 26.8–34.3)
MCHC RBC AUTO-ENTMCNC: 30.7 G/DL (ref 31.4–37.4)
MCHC RBC AUTO-ENTMCNC: 30.9 G/DL (ref 31.4–37.4)
MCHC RBC AUTO-ENTMCNC: 31 G/DL (ref 31.4–37.4)
MCHC RBC AUTO-ENTMCNC: 31 G/DL (ref 31.4–37.4)
MCHC RBC AUTO-ENTMCNC: 31.3 G/DL (ref 31.4–37.4)
MCHC RBC AUTO-ENTMCNC: 31.4 G/DL (ref 31.4–37.4)
MCHC RBC AUTO-ENTMCNC: 31.4 G/DL (ref 31.4–37.4)
MCHC RBC AUTO-ENTMCNC: 31.7 G/DL (ref 31.4–37.4)
MCHC RBC AUTO-ENTMCNC: 31.7 G/DL (ref 31.4–37.4)
MCHC RBC AUTO-ENTMCNC: 32.1 G/DL (ref 31.4–37.4)
MCHC RBC AUTO-ENTMCNC: 32.2 G/DL (ref 31.4–37.4)
MCHC RBC AUTO-ENTMCNC: 32.3 G/DL (ref 31.4–37.4)
MCHC RBC AUTO-ENTMCNC: 32.4 G/DL (ref 31.4–37.4)
MCHC RBC AUTO-ENTMCNC: 32.6 G/DL (ref 31.4–37.4)
MCHC RBC AUTO-ENTMCNC: 32.7 G/DL (ref 31.4–37.4)
MCHC RBC AUTO-ENTMCNC: 33 G/DL (ref 31.4–37.4)
MCHC RBC AUTO-ENTMCNC: 33.5 G/DL (ref 31.4–37.4)
MCV RBC AUTO: 85 FL (ref 82–98)
MCV RBC AUTO: 87 FL (ref 82–98)
MCV RBC AUTO: 87 FL (ref 82–98)
MCV RBC AUTO: 88 FL (ref 82–98)
MCV RBC AUTO: 89 FL (ref 82–98)
MCV RBC AUTO: 90 FL (ref 82–98)
MCV RBC AUTO: 91 FL (ref 82–98)
MCV RBC AUTO: 92 FL (ref 82–98)
MCV RBC AUTO: 92 FL (ref 82–98)
MCV RBC AUTO: 93 FL (ref 82–98)
MONOCYTES # BLD AUTO: 0.12 THOUSAND/ΜL (ref 0.17–1.22)
MONOCYTES # BLD AUTO: 0.27 THOUSAND/ΜL (ref 0.17–1.22)
MONOCYTES # BLD AUTO: 0.3 THOUSAND/ΜL (ref 0.17–1.22)
MONOCYTES # BLD AUTO: 0.34 THOUSAND/ΜL (ref 0.17–1.22)
MONOCYTES # BLD AUTO: 0.34 THOUSAND/ΜL (ref 0.17–1.22)
MONOCYTES # BLD AUTO: 0.36 THOUSAND/ΜL (ref 0.17–1.22)
MONOCYTES # BLD AUTO: 0.38 THOUSAND/ΜL (ref 0.17–1.22)
MONOCYTES # BLD AUTO: 0.38 THOUSAND/ΜL (ref 0.17–1.22)
MONOCYTES # BLD AUTO: 0.39 THOUSAND/ΜL (ref 0.17–1.22)
MONOCYTES # BLD AUTO: 0.42 THOUSAND/ΜL (ref 0.17–1.22)
MONOCYTES # BLD AUTO: 0.44 THOUSAND/ΜL (ref 0.17–1.22)
MONOCYTES # BLD AUTO: 0.45 THOUSAND/ΜL (ref 0.17–1.22)
MONOCYTES # BLD AUTO: 0.46 THOUSAND/ΜL (ref 0.17–1.22)
MONOCYTES # BLD AUTO: 0.47 THOUSAND/ΜL (ref 0.17–1.22)
MONOCYTES # BLD AUTO: 0.48 THOUSAND/ΜL (ref 0.17–1.22)
MONOCYTES # BLD AUTO: 0.58 THOUSAND/ΜL (ref 0.17–1.22)
MONOCYTES # BLD AUTO: 0.68 THOUSAND/ΜL (ref 0.17–1.22)
MONOCYTES # BLD AUTO: 0.94 THOUSAND/ΜL (ref 0.17–1.22)
MONOCYTES NFR BLD AUTO: 10 % (ref 4–12)
MONOCYTES NFR BLD AUTO: 11 % (ref 4–12)
MONOCYTES NFR BLD AUTO: 6 % (ref 4–12)
MONOCYTES NFR BLD AUTO: 7 % (ref 4–12)
MONOCYTES NFR BLD AUTO: 8 % (ref 4–12)
MONOCYTES NFR BLD AUTO: 9 % (ref 4–12)
NEUTROPHILS # BLD AUTO: 1.09 THOUSANDS/ΜL (ref 1.85–7.62)
NEUTROPHILS # BLD AUTO: 1.45 THOUSANDS/ΜL (ref 1.85–7.62)
NEUTROPHILS # BLD AUTO: 1.69 THOUSANDS/ΜL (ref 1.85–7.62)
NEUTROPHILS # BLD AUTO: 1.7 THOUSANDS/ΜL (ref 1.85–7.62)
NEUTROPHILS # BLD AUTO: 2.04 THOUSANDS/ΜL (ref 1.85–7.62)
NEUTROPHILS # BLD AUTO: 2.3 THOUSANDS/ΜL (ref 1.85–7.62)
NEUTROPHILS # BLD AUTO: 2.34 THOUSANDS/ΜL (ref 1.85–7.62)
NEUTROPHILS # BLD AUTO: 2.58 THOUSANDS/ΜL (ref 1.85–7.62)
NEUTROPHILS # BLD AUTO: 2.58 THOUSANDS/ΜL (ref 1.85–7.62)
NEUTROPHILS # BLD AUTO: 2.64 THOUSANDS/ΜL (ref 1.85–7.62)
NEUTROPHILS # BLD AUTO: 2.8 THOUSANDS/ΜL (ref 1.85–7.62)
NEUTROPHILS # BLD AUTO: 2.99 THOUSANDS/ΜL (ref 1.85–7.62)
NEUTROPHILS # BLD AUTO: 3.07 THOUSANDS/ΜL (ref 1.85–7.62)
NEUTROPHILS # BLD AUTO: 3.17 THOUSANDS/ΜL (ref 1.85–7.62)
NEUTROPHILS # BLD AUTO: 3.23 THOUSANDS/ΜL (ref 1.85–7.62)
NEUTROPHILS # BLD AUTO: 3.58 THOUSANDS/ΜL (ref 1.85–7.62)
NEUTROPHILS # BLD AUTO: 3.71 THOUSANDS/ΜL (ref 1.85–7.62)
NEUTROPHILS # BLD AUTO: 4.46 THOUSANDS/ΜL (ref 1.85–7.62)
NEUTROPHILS # BLD AUTO: 4.51 THOUSANDS/ΜL (ref 1.85–7.62)
NEUTROPHILS # BLD AUTO: 8.62 THOUSANDS/ΜL (ref 1.85–7.62)
NEUTS SEG NFR BLD AUTO: 41 % (ref 43–75)
NEUTS SEG NFR BLD AUTO: 42 % (ref 43–75)
NEUTS SEG NFR BLD AUTO: 45 % (ref 43–75)
NEUTS SEG NFR BLD AUTO: 46 % (ref 43–75)
NEUTS SEG NFR BLD AUTO: 50 % (ref 43–75)
NEUTS SEG NFR BLD AUTO: 50 % (ref 43–75)
NEUTS SEG NFR BLD AUTO: 54 % (ref 43–75)
NEUTS SEG NFR BLD AUTO: 56 % (ref 43–75)
NEUTS SEG NFR BLD AUTO: 56 % (ref 43–75)
NEUTS SEG NFR BLD AUTO: 58 % (ref 43–75)
NEUTS SEG NFR BLD AUTO: 58 % (ref 43–75)
NEUTS SEG NFR BLD AUTO: 59 % (ref 43–75)
NEUTS SEG NFR BLD AUTO: 59 % (ref 43–75)
NEUTS SEG NFR BLD AUTO: 60 % (ref 43–75)
NEUTS SEG NFR BLD AUTO: 60 % (ref 43–75)
NEUTS SEG NFR BLD AUTO: 61 % (ref 43–75)
NEUTS SEG NFR BLD AUTO: 62 % (ref 43–75)
NEUTS SEG NFR BLD AUTO: 62 % (ref 43–75)
NEUTS SEG NFR BLD AUTO: 68 % (ref 43–75)
NEUTS SEG NFR BLD AUTO: 77 % (ref 43–75)
NITRITE UR QL STRIP: NEGATIVE
NITRITE UR QL STRIP: NEGATIVE
NRBC BLD AUTO-RTO: 0 /100 WBCS
P AXIS: 48 DEGREES
P AXIS: 63 DEGREES
PH UR STRIP.AUTO: 5 [PH]
PH UR STRIP.AUTO: 6 [PH] (ref 4.5–8)
PHOSPHATE SERPL-MCNC: 1.7 MG/DL (ref 2.3–4.1)
PHOSPHATE SERPL-MCNC: 2 MG/DL (ref 2.3–4.1)
PHOSPHATE SERPL-MCNC: 2.6 MG/DL (ref 2.3–4.1)
PHOSPHATE SERPL-MCNC: 2.8 MG/DL (ref 2.3–4.1)
PHOSPHATE SERPL-MCNC: 3 MG/DL (ref 2.3–4.1)
PHOSPHATE SERPL-MCNC: 3 MG/DL (ref 2.3–4.1)
PHOSPHATE SERPL-MCNC: 3.2 MG/DL (ref 2.3–4.1)
PHOSPHATE SERPL-MCNC: 3.4 MG/DL (ref 2.3–4.1)
PHOSPHATE SERPL-MCNC: 3.5 MG/DL (ref 2.3–4.1)
PHOSPHATE SERPL-MCNC: 3.8 MG/DL (ref 2.3–4.1)
PHOSPHATE SERPL-MCNC: 3.8 MG/DL (ref 2.3–4.1)
PHOSPHATE SERPL-MCNC: 4 MG/DL (ref 2.3–4.1)
PHOSPHATE SERPL-MCNC: 4.7 MG/DL (ref 2.3–4.1)
PHOSPHATE SERPL-MCNC: 6.2 MG/DL (ref 2.3–4.1)
PLATELET # BLD AUTO: 160 THOUSANDS/UL (ref 149–390)
PLATELET # BLD AUTO: 161 THOUSANDS/UL (ref 149–390)
PLATELET # BLD AUTO: 166 THOUSANDS/UL (ref 149–390)
PLATELET # BLD AUTO: 166 THOUSANDS/UL (ref 149–390)
PLATELET # BLD AUTO: 169 THOUSANDS/UL (ref 149–390)
PLATELET # BLD AUTO: 174 THOUSANDS/UL (ref 149–390)
PLATELET # BLD AUTO: 175 THOUSANDS/UL (ref 149–390)
PLATELET # BLD AUTO: 178 THOUSANDS/UL (ref 149–390)
PLATELET # BLD AUTO: 179 THOUSANDS/UL (ref 149–390)
PLATELET # BLD AUTO: 181 THOUSANDS/UL (ref 149–390)
PLATELET # BLD AUTO: 186 THOUSANDS/UL (ref 149–390)
PLATELET # BLD AUTO: 196 THOUSANDS/UL (ref 149–390)
PLATELET # BLD AUTO: 197 THOUSANDS/UL (ref 149–390)
PLATELET # BLD AUTO: 202 THOUSANDS/UL (ref 149–390)
PLATELET # BLD AUTO: 209 THOUSANDS/UL (ref 149–390)
PLATELET # BLD AUTO: 214 THOUSANDS/UL (ref 149–390)
PLATELET # BLD AUTO: 214 THOUSANDS/UL (ref 149–390)
PLATELET # BLD AUTO: 215 THOUSANDS/UL (ref 149–390)
PLATELET # BLD AUTO: 218 THOUSANDS/UL (ref 149–390)
PLATELET # BLD AUTO: 222 THOUSANDS/UL (ref 149–390)
PLATELET # BLD AUTO: 224 THOUSANDS/UL (ref 149–390)
PLATELET # BLD AUTO: 226 THOUSANDS/UL (ref 149–390)
PLATELET # BLD AUTO: 228 THOUSANDS/UL (ref 149–390)
PLATELET # BLD AUTO: 230 THOUSANDS/UL (ref 149–390)
PLATELET # BLD AUTO: 243 THOUSANDS/UL (ref 149–390)
PLATELET # BLD AUTO: 249 THOUSANDS/UL (ref 149–390)
PLATELET # BLD AUTO: 71 THOUSANDS/UL (ref 149–390)
PMV BLD AUTO: 10 FL (ref 8.9–12.7)
PMV BLD AUTO: 10.1 FL (ref 8.9–12.7)
PMV BLD AUTO: 10.1 FL (ref 8.9–12.7)
PMV BLD AUTO: 10.2 FL (ref 8.9–12.7)
PMV BLD AUTO: 10.3 FL (ref 8.9–12.7)
PMV BLD AUTO: 10.6 FL (ref 8.9–12.7)
PMV BLD AUTO: 11.1 FL (ref 8.9–12.7)
PMV BLD AUTO: 11.1 FL (ref 8.9–12.7)
PMV BLD AUTO: 11.3 FL (ref 8.9–12.7)
PMV BLD AUTO: 9 FL (ref 8.9–12.7)
PMV BLD AUTO: 9.1 FL (ref 8.9–12.7)
PMV BLD AUTO: 9.2 FL (ref 8.9–12.7)
PMV BLD AUTO: 9.3 FL (ref 8.9–12.7)
PMV BLD AUTO: 9.4 FL (ref 8.9–12.7)
PMV BLD AUTO: 9.4 FL (ref 8.9–12.7)
PMV BLD AUTO: 9.5 FL (ref 8.9–12.7)
PMV BLD AUTO: 9.6 FL (ref 8.9–12.7)
PMV BLD AUTO: 9.6 FL (ref 8.9–12.7)
PMV BLD AUTO: 9.7 FL (ref 8.9–12.7)
PMV BLD AUTO: 9.8 FL (ref 8.9–12.7)
PMV BLD AUTO: 9.9 FL (ref 8.9–12.7)
POTASSIUM SERPL-SCNC: 2.9 MMOL/L (ref 3.5–5.3)
POTASSIUM SERPL-SCNC: 3 MMOL/L (ref 3.5–5.3)
POTASSIUM SERPL-SCNC: 3.1 MMOL/L (ref 3.5–5.3)
POTASSIUM SERPL-SCNC: 3.3 MMOL/L (ref 3.5–5.3)
POTASSIUM SERPL-SCNC: 3.4 MMOL/L (ref 3.5–5.3)
POTASSIUM SERPL-SCNC: 3.4 MMOL/L (ref 3.5–5.3)
POTASSIUM SERPL-SCNC: 3.5 MMOL/L (ref 3.5–5.3)
POTASSIUM SERPL-SCNC: 3.6 MMOL/L (ref 3.5–5.3)
POTASSIUM SERPL-SCNC: 3.7 MMOL/L (ref 3.5–5.3)
POTASSIUM SERPL-SCNC: 3.8 MMOL/L (ref 3.5–5.3)
POTASSIUM SERPL-SCNC: 3.8 MMOL/L (ref 3.5–5.3)
POTASSIUM SERPL-SCNC: 3.9 MMOL/L (ref 3.5–5.3)
POTASSIUM SERPL-SCNC: 4 MMOL/L (ref 3.5–5.3)
POTASSIUM SERPL-SCNC: 4.1 MMOL/L (ref 3.5–5.3)
POTASSIUM SERPL-SCNC: 4.3 MMOL/L (ref 3.5–5.3)
POTASSIUM SERPL-SCNC: 4.3 MMOL/L (ref 3.5–5.3)
POTASSIUM SERPL-SCNC: 4.4 MMOL/L (ref 3.5–5.3)
PR INTERVAL: 158 MS
PR INTERVAL: 180 MS
PREALB SERPL-MCNC: 20.5 MG/DL (ref 18–40)
PREALB SERPL-MCNC: 21 MG/DL (ref 18–40)
PREALB SERPL-MCNC: 24 MG/DL (ref 18–40)
PROT SERPL-MCNC: 4.9 G/DL (ref 6.4–8.2)
PROT SERPL-MCNC: 6.1 G/DL (ref 6.4–8.2)
PROT SERPL-MCNC: 6.1 G/DL (ref 6.4–8.2)
PROT SERPL-MCNC: 6.2 G/DL (ref 6.4–8.2)
PROT SERPL-MCNC: 6.3 G/DL (ref 6.4–8.2)
PROT SERPL-MCNC: 6.5 G/DL (ref 6.4–8.2)
PROT SERPL-MCNC: 6.6 G/DL (ref 6.4–8.2)
PROT SERPL-MCNC: 6.7 G/DL (ref 6.4–8.2)
PROT SERPL-MCNC: 6.7 G/DL (ref 6.4–8.2)
PROT SERPL-MCNC: 6.8 G/DL (ref 6.4–8.2)
PROT SERPL-MCNC: 6.9 G/DL (ref 6.4–8.2)
PROT SERPL-MCNC: 7 G/DL (ref 6.4–8.2)
PROT SERPL-MCNC: 7 G/DL (ref 6.4–8.2)
PROT SERPL-MCNC: 7.1 G/DL (ref 6.4–8.2)
PROT SERPL-MCNC: 7.1 G/DL (ref 6.4–8.2)
PROT SERPL-MCNC: 7.4 G/DL (ref 6.4–8.2)
PROT UR STRIP-MCNC: NEGATIVE MG/DL
PROT UR STRIP-MCNC: NEGATIVE MG/DL
PROTHROMBIN TIME: 13.1 SECONDS (ref 11.6–14.5)
QRS AXIS: 46 DEGREES
QRS AXIS: 58 DEGREES
QRSD INTERVAL: 78 MS
QRSD INTERVAL: 94 MS
QT INTERVAL: 378 MS
QT INTERVAL: 454 MS
QTC INTERVAL: 455 MS
QTC INTERVAL: 457 MS
RBC # BLD AUTO: 3.23 MILLION/UL (ref 3.81–5.12)
RBC # BLD AUTO: 3.48 MILLION/UL (ref 3.81–5.12)
RBC # BLD AUTO: 3.56 MILLION/UL (ref 3.81–5.12)
RBC # BLD AUTO: 3.59 MILLION/UL (ref 3.81–5.12)
RBC # BLD AUTO: 3.62 MILLION/UL (ref 3.81–5.12)
RBC # BLD AUTO: 3.63 MILLION/UL (ref 3.81–5.12)
RBC # BLD AUTO: 3.65 MILLION/UL (ref 3.81–5.12)
RBC # BLD AUTO: 3.66 MILLION/UL (ref 3.81–5.12)
RBC # BLD AUTO: 3.68 MILLION/UL (ref 3.81–5.12)
RBC # BLD AUTO: 3.72 MILLION/UL (ref 3.81–5.12)
RBC # BLD AUTO: 3.76 MILLION/UL (ref 3.81–5.12)
RBC # BLD AUTO: 3.76 MILLION/UL (ref 3.81–5.12)
RBC # BLD AUTO: 3.77 MILLION/UL (ref 3.81–5.12)
RBC # BLD AUTO: 3.78 MILLION/UL (ref 3.81–5.12)
RBC # BLD AUTO: 3.84 MILLION/UL (ref 3.81–5.12)
RBC # BLD AUTO: 3.86 MILLION/UL (ref 3.81–5.12)
RBC # BLD AUTO: 3.9 MILLION/UL (ref 3.81–5.12)
RBC # BLD AUTO: 3.96 MILLION/UL (ref 3.81–5.12)
RBC # BLD AUTO: 3.97 MILLION/UL (ref 3.81–5.12)
RBC # BLD AUTO: 3.98 MILLION/UL (ref 3.81–5.12)
RBC # BLD AUTO: 4.02 MILLION/UL (ref 3.81–5.12)
RBC # BLD AUTO: 4.02 MILLION/UL (ref 3.81–5.12)
RBC # BLD AUTO: 4.04 MILLION/UL (ref 3.81–5.12)
RBC # BLD AUTO: 4.13 MILLION/UL (ref 3.81–5.12)
RBC # BLD AUTO: 4.14 MILLION/UL (ref 3.81–5.12)
RBC # BLD AUTO: 4.16 MILLION/UL (ref 3.81–5.12)
RBC # BLD AUTO: 4.32 MILLION/UL (ref 3.81–5.12)
RBC # BLD AUTO: 4.49 MILLION/UL (ref 3.81–5.12)
SARS-COV-2 RNA RESP QL NAA+PROBE: NEGATIVE
SODIUM SERPL-SCNC: 132 MMOL/L (ref 136–145)
SODIUM SERPL-SCNC: 137 MMOL/L (ref 136–145)
SODIUM SERPL-SCNC: 137 MMOL/L (ref 136–145)
SODIUM SERPL-SCNC: 138 MMOL/L (ref 136–145)
SODIUM SERPL-SCNC: 139 MMOL/L (ref 136–145)
SODIUM SERPL-SCNC: 140 MMOL/L (ref 136–145)
SODIUM SERPL-SCNC: 141 MMOL/L (ref 136–145)
SODIUM SERPL-SCNC: 142 MMOL/L (ref 136–145)
SODIUM SERPL-SCNC: 143 MMOL/L (ref 136–145)
SODIUM SERPL-SCNC: 144 MMOL/L (ref 136–145)
SODIUM SERPL-SCNC: 145 MMOL/L (ref 136–145)
SODIUM SERPL-SCNC: 145 MMOL/L (ref 136–145)
SP GR UR STRIP.AUTO: 1.02 (ref 1–1.03)
SP GR UR STRIP.AUTO: <=1.005 (ref 1–1.03)
T WAVE AXIS: 30 DEGREES
T WAVE AXIS: 34 DEGREES
TRIGL SERPL-MCNC: 80 MG/DL
TRIGL SERPL-MCNC: 92 MG/DL
TRIGL SERPL-MCNC: 94 MG/DL
TROPONIN I SERPL-MCNC: <0.02 NG/ML
UROBILINOGEN UR QL STRIP.AUTO: 0.2 E.U./DL
UROBILINOGEN UR QL STRIP.AUTO: 0.2 E.U./DL
VENTRICULAR RATE: 61 BPM
VENTRICULAR RATE: 87 BPM
WBC # BLD AUTO: 1.87 THOUSAND/UL (ref 4.31–10.16)
WBC # BLD AUTO: 11.07 THOUSAND/UL (ref 4.31–10.16)
WBC # BLD AUTO: 3.16 THOUSAND/UL (ref 4.31–10.16)
WBC # BLD AUTO: 3.75 THOUSAND/UL (ref 4.31–10.16)
WBC # BLD AUTO: 4.02 THOUSAND/UL (ref 4.31–10.16)
WBC # BLD AUTO: 4.12 THOUSAND/UL (ref 4.31–10.16)
WBC # BLD AUTO: 4.21 THOUSAND/UL (ref 4.31–10.16)
WBC # BLD AUTO: 4.5 THOUSAND/UL (ref 4.31–10.16)
WBC # BLD AUTO: 4.53 THOUSAND/UL (ref 4.31–10.16)
WBC # BLD AUTO: 4.62 THOUSAND/UL (ref 4.31–10.16)
WBC # BLD AUTO: 4.68 THOUSAND/UL (ref 4.31–10.16)
WBC # BLD AUTO: 4.71 THOUSAND/UL (ref 4.31–10.16)
WBC # BLD AUTO: 4.74 THOUSAND/UL (ref 4.31–10.16)
WBC # BLD AUTO: 4.89 THOUSAND/UL (ref 4.31–10.16)
WBC # BLD AUTO: 4.9 THOUSAND/UL (ref 4.31–10.16)
WBC # BLD AUTO: 4.91 THOUSAND/UL (ref 4.31–10.16)
WBC # BLD AUTO: 4.99 THOUSAND/UL (ref 4.31–10.16)
WBC # BLD AUTO: 5.01 THOUSAND/UL (ref 4.31–10.16)
WBC # BLD AUTO: 5.02 THOUSAND/UL (ref 4.31–10.16)
WBC # BLD AUTO: 5.18 THOUSAND/UL (ref 4.31–10.16)
WBC # BLD AUTO: 5.3 THOUSAND/UL (ref 4.31–10.16)
WBC # BLD AUTO: 5.38 THOUSAND/UL (ref 4.31–10.16)
WBC # BLD AUTO: 5.67 THOUSAND/UL (ref 4.31–10.16)
WBC # BLD AUTO: 5.85 THOUSAND/UL (ref 4.31–10.16)
WBC # BLD AUTO: 5.94 THOUSAND/UL (ref 4.31–10.16)
WBC # BLD AUTO: 6.07 THOUSAND/UL (ref 4.31–10.16)
WBC # BLD AUTO: 6.72 THOUSAND/UL (ref 4.31–10.16)
WBC # BLD AUTO: 7.22 THOUSAND/UL (ref 4.31–10.16)

## 2021-01-01 PROCEDURE — 83735 ASSAY OF MAGNESIUM: CPT | Performed by: INTERNAL MEDICINE

## 2021-01-01 PROCEDURE — 83605 ASSAY OF LACTIC ACID: CPT

## 2021-01-01 PROCEDURE — C9113 INJ PANTOPRAZOLE SODIUM, VIA: HCPCS | Performed by: SURGERY

## 2021-01-01 PROCEDURE — NC001 PR NO CHARGE: Performed by: SURGERY

## 2021-01-01 PROCEDURE — 84100 ASSAY OF PHOSPHORUS: CPT | Performed by: HOME HEALTH

## 2021-01-01 PROCEDURE — 96375 TX/PRO/DX INJ NEW DRUG ADDON: CPT

## 2021-01-01 PROCEDURE — 99285 EMERGENCY DEPT VISIT HI MDM: CPT

## 2021-01-01 PROCEDURE — 83605 ASSAY OF LACTIC ACID: CPT | Performed by: EMERGENCY MEDICINE

## 2021-01-01 PROCEDURE — 36415 COLL VENOUS BLD VENIPUNCTURE: CPT | Performed by: SURGERY

## 2021-01-01 PROCEDURE — 83605 ASSAY OF LACTIC ACID: CPT | Performed by: PHYSICIAN ASSISTANT

## 2021-01-01 PROCEDURE — 99213 OFFICE O/P EST LOW 20 MIN: CPT | Performed by: FAMILY MEDICINE

## 2021-01-01 PROCEDURE — 85027 COMPLETE CBC AUTOMATED: CPT | Performed by: INTERNAL MEDICINE

## 2021-01-01 PROCEDURE — 74022 RADEX COMPL AQT ABD SERIES: CPT

## 2021-01-01 PROCEDURE — 83735 ASSAY OF MAGNESIUM: CPT | Performed by: SURGERY

## 2021-01-01 PROCEDURE — 85025 COMPLETE CBC W/AUTO DIFF WBC: CPT | Performed by: SURGERY

## 2021-01-01 PROCEDURE — 71045 X-RAY EXAM CHEST 1 VIEW: CPT

## 2021-01-01 PROCEDURE — 80048 BASIC METABOLIC PNL TOTAL CA: CPT

## 2021-01-01 PROCEDURE — U0003 INFECTIOUS AGENT DETECTION BY NUCLEIC ACID (DNA OR RNA); SEVERE ACUTE RESPIRATORY SYNDROME CORONAVIRUS 2 (SARS-COV-2) (CORONAVIRUS DISEASE [COVID-19]), AMPLIFIED PROBE TECHNIQUE, MAKING USE OF HIGH THROUGHPUT TECHNOLOGIES AS DESCRIBED BY CMS-2020-01-R: HCPCS | Performed by: PHYSICIAN ASSISTANT

## 2021-01-01 PROCEDURE — 85025 COMPLETE CBC W/AUTO DIFF WBC: CPT | Performed by: HOME HEALTH

## 2021-01-01 PROCEDURE — 3E0436Z INTRODUCTION OF NUTRITIONAL SUBSTANCE INTO CENTRAL VEIN, PERCUTANEOUS APPROACH: ICD-10-PCS | Performed by: INTERNAL MEDICINE

## 2021-01-01 PROCEDURE — 84478 ASSAY OF TRIGLYCERIDES: CPT | Performed by: HOME HEALTH

## 2021-01-01 PROCEDURE — 83690 ASSAY OF LIPASE: CPT | Performed by: EMERGENCY MEDICINE

## 2021-01-01 PROCEDURE — C1751 CATH, INF, PER/CENT/MIDLINE: HCPCS

## 2021-01-01 PROCEDURE — 84134 ASSAY OF PREALBUMIN: CPT | Performed by: HOME HEALTH

## 2021-01-01 PROCEDURE — 84478 ASSAY OF TRIGLYCERIDES: CPT | Performed by: SURGERY

## 2021-01-01 PROCEDURE — 85027 COMPLETE CBC AUTOMATED: CPT

## 2021-01-01 PROCEDURE — 81003 URINALYSIS AUTO W/O SCOPE: CPT

## 2021-01-01 PROCEDURE — 99214 OFFICE O/P EST MOD 30 MIN: CPT | Performed by: SURGERY

## 2021-01-01 PROCEDURE — 80053 COMPREHEN METABOLIC PANEL: CPT | Performed by: PHYSICIAN ASSISTANT

## 2021-01-01 PROCEDURE — 84484 ASSAY OF TROPONIN QUANT: CPT | Performed by: PHYSICIAN ASSISTANT

## 2021-01-01 PROCEDURE — 82948 REAGENT STRIP/BLOOD GLUCOSE: CPT

## 2021-01-01 PROCEDURE — 71046 X-RAY EXAM CHEST 2 VIEWS: CPT

## 2021-01-01 PROCEDURE — 83735 ASSAY OF MAGNESIUM: CPT | Performed by: STUDENT IN AN ORGANIZED HEALTH CARE EDUCATION/TRAINING PROGRAM

## 2021-01-01 PROCEDURE — 99232 SBSQ HOSP IP/OBS MODERATE 35: CPT | Performed by: INTERNAL MEDICINE

## 2021-01-01 PROCEDURE — 99238 HOSP IP/OBS DSCHRG MGMT 30/<: CPT | Performed by: PHYSICIAN ASSISTANT

## 2021-01-01 PROCEDURE — 36415 COLL VENOUS BLD VENIPUNCTURE: CPT | Performed by: EMERGENCY MEDICINE

## 2021-01-01 PROCEDURE — 80053 COMPREHEN METABOLIC PANEL: CPT | Performed by: EMERGENCY MEDICINE

## 2021-01-01 PROCEDURE — 99232 SBSQ HOSP IP/OBS MODERATE 35: CPT | Performed by: SURGERY

## 2021-01-01 PROCEDURE — 85049 AUTOMATED PLATELET COUNT: CPT | Performed by: STUDENT IN AN ORGANIZED HEALTH CARE EDUCATION/TRAINING PROGRAM

## 2021-01-01 PROCEDURE — 94760 N-INVAS EAR/PLS OXIMETRY 1: CPT

## 2021-01-01 PROCEDURE — 97163 PT EVAL HIGH COMPLEX 45 MIN: CPT | Performed by: PHYSICAL THERAPIST

## 2021-01-01 PROCEDURE — 80048 BASIC METABOLIC PNL TOTAL CA: CPT | Performed by: SURGERY

## 2021-01-01 PROCEDURE — 93010 ELECTROCARDIOGRAM REPORT: CPT | Performed by: INTERNAL MEDICINE

## 2021-01-01 PROCEDURE — 80053 COMPREHEN METABOLIC PANEL: CPT | Performed by: HOME HEALTH

## 2021-01-01 PROCEDURE — 84100 ASSAY OF PHOSPHORUS: CPT | Performed by: SURGERY

## 2021-01-01 PROCEDURE — 83735 ASSAY OF MAGNESIUM: CPT | Performed by: EMERGENCY MEDICINE

## 2021-01-01 PROCEDURE — 99223 1ST HOSP IP/OBS HIGH 75: CPT | Performed by: ANESTHESIOLOGY

## 2021-01-01 PROCEDURE — 36415 COLL VENOUS BLD VENIPUNCTURE: CPT

## 2021-01-01 PROCEDURE — G1004 CDSM NDSC: HCPCS

## 2021-01-01 PROCEDURE — 93005 ELECTROCARDIOGRAM TRACING: CPT

## 2021-01-01 PROCEDURE — 85025 COMPLETE CBC W/AUTO DIFF WBC: CPT | Performed by: EMERGENCY MEDICINE

## 2021-01-01 PROCEDURE — 85027 COMPLETE CBC AUTOMATED: CPT | Performed by: PHYSICIAN ASSISTANT

## 2021-01-01 PROCEDURE — 96376 TX/PRO/DX INJ SAME DRUG ADON: CPT

## 2021-01-01 PROCEDURE — 96361 HYDRATE IV INFUSION ADD-ON: CPT

## 2021-01-01 PROCEDURE — 36569 INSJ PICC 5 YR+ W/O IMAGING: CPT

## 2021-01-01 PROCEDURE — 85025 COMPLETE CBC W/AUTO DIFF WBC: CPT | Performed by: STUDENT IN AN ORGANIZED HEALTH CARE EDUCATION/TRAINING PROGRAM

## 2021-01-01 PROCEDURE — 99495 TRANSJ CARE MGMT MOD F2F 14D: CPT | Performed by: FAMILY MEDICINE

## 2021-01-01 PROCEDURE — 85025 COMPLETE CBC W/AUTO DIFF WBC: CPT

## 2021-01-01 PROCEDURE — 96372 THER/PROPH/DIAG INJ SC/IM: CPT | Performed by: FAMILY MEDICINE

## 2021-01-01 PROCEDURE — 83735 ASSAY OF MAGNESIUM: CPT

## 2021-01-01 PROCEDURE — 99222 1ST HOSP IP/OBS MODERATE 55: CPT | Performed by: SURGERY

## 2021-01-01 PROCEDURE — G0439 PPPS, SUBSEQ VISIT: HCPCS | Performed by: FAMILY MEDICINE

## 2021-01-01 PROCEDURE — 80048 BASIC METABOLIC PNL TOTAL CA: CPT | Performed by: INTERNAL MEDICINE

## 2021-01-01 PROCEDURE — 96374 THER/PROPH/DIAG INJ IV PUSH: CPT

## 2021-01-01 PROCEDURE — 99213 OFFICE O/P EST LOW 20 MIN: CPT | Performed by: SURGERY

## 2021-01-01 PROCEDURE — 85025 COMPLETE CBC W/AUTO DIFF WBC: CPT | Performed by: PHYSICIAN ASSISTANT

## 2021-01-01 PROCEDURE — 80048 BASIC METABOLIC PNL TOTAL CA: CPT | Performed by: STUDENT IN AN ORGANIZED HEALTH CARE EDUCATION/TRAINING PROGRAM

## 2021-01-01 PROCEDURE — 80053 COMPREHEN METABOLIC PANEL: CPT | Performed by: SURGERY

## 2021-01-01 PROCEDURE — 84100 ASSAY OF PHOSPHORUS: CPT | Performed by: STUDENT IN AN ORGANIZED HEALTH CARE EDUCATION/TRAINING PROGRAM

## 2021-01-01 PROCEDURE — 43247 EGD REMOVE FOREIGN BODY: CPT | Performed by: INTERNAL MEDICINE

## 2021-01-01 PROCEDURE — 74177 CT ABD & PELVIS W/CONTRAST: CPT

## 2021-01-01 PROCEDURE — 99285 EMERGENCY DEPT VISIT HI MDM: CPT | Performed by: EMERGENCY MEDICINE

## 2021-01-01 PROCEDURE — 96365 THER/PROPH/DIAG IV INF INIT: CPT

## 2021-01-01 PROCEDURE — 99284 EMERGENCY DEPT VISIT MOD MDM: CPT

## 2021-01-01 PROCEDURE — 99214 OFFICE O/P EST MOD 30 MIN: CPT | Performed by: FAMILY MEDICINE

## 2021-01-01 PROCEDURE — U0005 INFEC AGEN DETEC AMPLI PROBE: HCPCS | Performed by: PHYSICIAN ASSISTANT

## 2021-01-01 PROCEDURE — 99232 SBSQ HOSP IP/OBS MODERATE 35: CPT | Performed by: ANESTHESIOLOGY

## 2021-01-01 PROCEDURE — 90662 IIV NO PRSV INCREASED AG IM: CPT

## 2021-01-01 PROCEDURE — 83735 ASSAY OF MAGNESIUM: CPT | Performed by: PHYSICIAN ASSISTANT

## 2021-01-01 PROCEDURE — 99239 HOSP IP/OBS DSCHRG MGMT >30: CPT | Performed by: INTERNAL MEDICINE

## 2021-01-01 PROCEDURE — 85025 COMPLETE CBC W/AUTO DIFF WBC: CPT | Performed by: INTERNAL MEDICINE

## 2021-01-01 PROCEDURE — 85027 COMPLETE CBC AUTOMATED: CPT | Performed by: SURGERY

## 2021-01-01 PROCEDURE — 99285 EMERGENCY DEPT VISIT HI MDM: CPT | Performed by: PHYSICIAN ASSISTANT

## 2021-01-01 PROCEDURE — 99238 HOSP IP/OBS DSCHRG MGMT 30/<: CPT | Performed by: SURGERY

## 2021-01-01 PROCEDURE — 94762 N-INVAS EAR/PLS OXIMTRY CONT: CPT

## 2021-01-01 PROCEDURE — 80053 COMPREHEN METABOLIC PANEL: CPT | Performed by: INTERNAL MEDICINE

## 2021-01-01 PROCEDURE — 83735 ASSAY OF MAGNESIUM: CPT | Performed by: HOME HEALTH

## 2021-01-01 PROCEDURE — 85730 THROMBOPLASTIN TIME PARTIAL: CPT | Performed by: EMERGENCY MEDICINE

## 2021-01-01 PROCEDURE — 87493 C DIFF AMPLIFIED PROBE: CPT

## 2021-01-01 PROCEDURE — 99220 PR INITIAL OBSERVATION CARE/DAY 70 MINUTES: CPT | Performed by: PHYSICIAN ASSISTANT

## 2021-01-01 PROCEDURE — 99214 OFFICE O/P EST MOD 30 MIN: CPT | Performed by: INTERNAL MEDICINE

## 2021-01-01 PROCEDURE — 84100 ASSAY OF PHOSPHORUS: CPT | Performed by: INTERNAL MEDICINE

## 2021-01-01 PROCEDURE — 97110 THERAPEUTIC EXERCISES: CPT | Performed by: PHYSICAL THERAPIST

## 2021-01-01 PROCEDURE — 97140 MANUAL THERAPY 1/> REGIONS: CPT | Performed by: PHYSICAL THERAPIST

## 2021-01-01 PROCEDURE — 99224 PR SBSQ OBSERVATION CARE/DAY 15 MINUTES: CPT | Performed by: SURGERY

## 2021-01-01 PROCEDURE — 80053 COMPREHEN METABOLIC PANEL: CPT

## 2021-01-01 PROCEDURE — 84100 ASSAY OF PHOSPHORUS: CPT | Performed by: PHYSICIAN ASSISTANT

## 2021-01-01 PROCEDURE — G0008 ADMIN INFLUENZA VIRUS VAC: HCPCS

## 2021-01-01 PROCEDURE — 97163 PT EVAL HIGH COMPLEX 45 MIN: CPT

## 2021-01-01 PROCEDURE — 84100 ASSAY OF PHOSPHORUS: CPT

## 2021-01-01 PROCEDURE — 87040 BLOOD CULTURE FOR BACTERIA: CPT | Performed by: EMERGENCY MEDICINE

## 2021-01-01 PROCEDURE — 83690 ASSAY OF LIPASE: CPT | Performed by: PHYSICIAN ASSISTANT

## 2021-01-01 PROCEDURE — 36415 COLL VENOUS BLD VENIPUNCTURE: CPT | Performed by: PHYSICIAN ASSISTANT

## 2021-01-01 PROCEDURE — 84478 ASSAY OF TRIGLYCERIDES: CPT | Performed by: STUDENT IN AN ORGANIZED HEALTH CARE EDUCATION/TRAINING PROGRAM

## 2021-01-01 PROCEDURE — 99284 EMERGENCY DEPT VISIT MOD MDM: CPT | Performed by: EMERGENCY MEDICINE

## 2021-01-01 PROCEDURE — 81003 URINALYSIS AUTO W/O SCOPE: CPT | Performed by: STUDENT IN AN ORGANIZED HEALTH CARE EDUCATION/TRAINING PROGRAM

## 2021-01-01 PROCEDURE — 85610 PROTHROMBIN TIME: CPT | Performed by: EMERGENCY MEDICINE

## 2021-01-01 PROCEDURE — 99233 SBSQ HOSP IP/OBS HIGH 50: CPT | Performed by: SURGERY

## 2021-01-01 PROCEDURE — 36415 COLL VENOUS BLD VENIPUNCTURE: CPT | Performed by: STUDENT IN AN ORGANIZED HEALTH CARE EDUCATION/TRAINING PROGRAM

## 2021-01-01 PROCEDURE — 99222 1ST HOSP IP/OBS MODERATE 55: CPT | Performed by: INTERNAL MEDICINE

## 2021-01-01 RX ORDER — ACETAMINOPHEN 325 MG/1
650 TABLET ORAL EVERY 6 HOURS PRN
Status: DISCONTINUED | OUTPATIENT
Start: 2021-01-01 | End: 2021-01-01

## 2021-01-01 RX ORDER — HYDROMORPHONE HCL/PF 1 MG/ML
0.5 SYRINGE (ML) INJECTION
Status: DISCONTINUED | OUTPATIENT
Start: 2021-01-01 | End: 2021-01-01 | Stop reason: HOSPADM

## 2021-01-01 RX ORDER — HYDROMORPHONE HCL/PF 1 MG/ML
0.5 SYRINGE (ML) INJECTION EVERY 4 HOURS PRN
Status: DISCONTINUED | OUTPATIENT
Start: 2021-01-01 | End: 2021-01-01

## 2021-01-01 RX ORDER — MAGNESIUM SULFATE HEPTAHYDRATE 40 MG/ML
2 INJECTION, SOLUTION INTRAVENOUS ONCE
Status: COMPLETED | OUTPATIENT
Start: 2021-01-01 | End: 2021-01-01

## 2021-01-01 RX ORDER — MORPHINE SULFATE 30 MG/1
30 TABLET, FILM COATED, EXTENDED RELEASE ORAL EVERY 8 HOURS
Qty: 90 TABLET | Refills: 0 | OUTPATIENT
Start: 2021-01-01

## 2021-01-01 RX ORDER — HYDROMORPHONE HCL/PF 1 MG/ML
0.2 SYRINGE (ML) INJECTION
Status: DISCONTINUED | OUTPATIENT
Start: 2021-01-01 | End: 2021-01-01

## 2021-01-01 RX ORDER — SODIUM CHLORIDE, SODIUM LACTATE, POTASSIUM CHLORIDE, CALCIUM CHLORIDE 600; 310; 30; 20 MG/100ML; MG/100ML; MG/100ML; MG/100ML
100 INJECTION, SOLUTION INTRAVENOUS CONTINUOUS
Status: DISCONTINUED | OUTPATIENT
Start: 2021-01-01 | End: 2021-01-01

## 2021-01-01 RX ORDER — POTASSIUM CHLORIDE 14.9 MG/ML
20 INJECTION INTRAVENOUS ONCE
Status: DISCONTINUED | OUTPATIENT
Start: 2021-01-01 | End: 2021-01-01 | Stop reason: HOSPADM

## 2021-01-01 RX ORDER — MORPHINE SULFATE 30 MG/1
30 TABLET, FILM COATED, EXTENDED RELEASE ORAL EVERY 8 HOURS
Qty: 90 TABLET | Refills: 0 | Status: SHIPPED | OUTPATIENT
Start: 2021-01-01 | End: 2021-01-01 | Stop reason: SDUPTHER

## 2021-01-01 RX ORDER — HYDROMORPHONE HCL/PF 1 MG/ML
0.7 SYRINGE (ML) INJECTION
Status: DISCONTINUED | OUTPATIENT
Start: 2021-01-01 | End: 2021-01-01 | Stop reason: HOSPADM

## 2021-01-01 RX ORDER — ONDANSETRON 2 MG/ML
4 INJECTION INTRAMUSCULAR; INTRAVENOUS ONCE
Status: COMPLETED | OUTPATIENT
Start: 2021-01-01 | End: 2021-01-01

## 2021-01-01 RX ORDER — LORAZEPAM 0.5 MG/1
1 TABLET ORAL EVERY 8 HOURS PRN
Status: DISCONTINUED | OUTPATIENT
Start: 2021-01-01 | End: 2021-01-01 | Stop reason: HOSPADM

## 2021-01-01 RX ORDER — HYDROMORPHONE HCL/PF 1 MG/ML
0.5 SYRINGE (ML) INJECTION EVERY 4 HOURS PRN
Status: DISCONTINUED | OUTPATIENT
Start: 2021-01-01 | End: 2021-01-01 | Stop reason: HOSPADM

## 2021-01-01 RX ORDER — ONDANSETRON 2 MG/ML
4 INJECTION INTRAMUSCULAR; INTRAVENOUS EVERY 4 HOURS PRN
Status: DISCONTINUED | OUTPATIENT
Start: 2021-01-01 | End: 2021-01-01 | Stop reason: HOSPADM

## 2021-01-01 RX ORDER — HYDROMORPHONE HCL/PF 1 MG/ML
1 SYRINGE (ML) INJECTION ONCE
Status: COMPLETED | OUTPATIENT
Start: 2021-01-01 | End: 2021-01-01

## 2021-01-01 RX ORDER — METHOCARBAMOL 500 MG/1
750 TABLET, FILM COATED ORAL 3 TIMES DAILY
Status: DISCONTINUED | OUTPATIENT
Start: 2021-01-01 | End: 2021-01-01 | Stop reason: HOSPADM

## 2021-01-01 RX ORDER — GLYCOPYRROLATE 0.2 MG/ML
INJECTION INTRAMUSCULAR; INTRAVENOUS AS NEEDED
Status: DISCONTINUED | OUTPATIENT
Start: 2021-01-01 | End: 2021-01-01

## 2021-01-01 RX ORDER — SODIUM CHLORIDE, SODIUM LACTATE, POTASSIUM CHLORIDE, CALCIUM CHLORIDE 600; 310; 30; 20 MG/100ML; MG/100ML; MG/100ML; MG/100ML
75 INJECTION, SOLUTION INTRAVENOUS CONTINUOUS
Status: DISCONTINUED | OUTPATIENT
Start: 2021-01-01 | End: 2021-01-01

## 2021-01-01 RX ORDER — KETAMINE HYDROCHLORIDE 50 MG/ML
INJECTION, SOLUTION, CONCENTRATE INTRAMUSCULAR; INTRAVENOUS AS NEEDED
Status: DISCONTINUED | OUTPATIENT
Start: 2021-01-01 | End: 2021-01-01

## 2021-01-01 RX ORDER — HYDROMORPHONE HCL/PF 1 MG/ML
0.5 SYRINGE (ML) INJECTION ONCE
Status: COMPLETED | OUTPATIENT
Start: 2021-01-01 | End: 2021-01-01

## 2021-01-01 RX ORDER — LORAZEPAM 2 MG/ML
0.5 INJECTION INTRAMUSCULAR EVERY 2 HOUR PRN
Status: DISCONTINUED | OUTPATIENT
Start: 2021-01-01 | End: 2021-01-01

## 2021-01-01 RX ORDER — HYDROMORPHONE HCL/PF 1 MG/ML
0.5 SYRINGE (ML) INJECTION EVERY 6 HOURS PRN
Status: DISCONTINUED | OUTPATIENT
Start: 2021-01-01 | End: 2021-01-01

## 2021-01-01 RX ORDER — LORAZEPAM 1 MG/1
1 TABLET ORAL EVERY 8 HOURS PRN
Qty: 90 TABLET | Refills: 0 | Status: SHIPPED | OUTPATIENT
Start: 2021-01-01 | End: 2021-01-01 | Stop reason: SDUPTHER

## 2021-01-01 RX ORDER — HYDROMORPHONE HCL/PF 1 MG/ML
1 SYRINGE (ML) INJECTION EVERY 6 HOURS PRN
Status: DISCONTINUED | OUTPATIENT
Start: 2021-01-01 | End: 2021-01-01

## 2021-01-01 RX ORDER — POTASSIUM CHLORIDE 20 MEQ/1
40 TABLET, EXTENDED RELEASE ORAL ONCE
Status: DISCONTINUED | OUTPATIENT
Start: 2021-01-01 | End: 2021-01-01

## 2021-01-01 RX ORDER — ONDANSETRON 2 MG/ML
4 INJECTION INTRAMUSCULAR; INTRAVENOUS EVERY 6 HOURS PRN
Status: DISCONTINUED | OUTPATIENT
Start: 2021-01-01 | End: 2021-01-01 | Stop reason: HOSPADM

## 2021-01-01 RX ORDER — DIPHENHYDRAMINE HYDROCHLORIDE 50 MG/ML
25 INJECTION INTRAMUSCULAR; INTRAVENOUS ONCE
Status: COMPLETED | OUTPATIENT
Start: 2021-01-01 | End: 2021-01-01

## 2021-01-01 RX ORDER — TAMSULOSIN HYDROCHLORIDE 0.4 MG/1
0.8 CAPSULE ORAL
Status: DISCONTINUED | OUTPATIENT
Start: 2021-01-01 | End: 2021-01-01

## 2021-01-01 RX ORDER — METHOCARBAMOL 750 MG/1
750 TABLET, FILM COATED ORAL 3 TIMES DAILY
Qty: 90 TABLET | Refills: 2 | Status: SHIPPED | OUTPATIENT
Start: 2021-01-01 | End: 2021-01-01 | Stop reason: SDUPTHER

## 2021-01-01 RX ORDER — FENTANYL 25 UG/H
25 PATCH TRANSDERMAL
Status: DISCONTINUED | OUTPATIENT
Start: 2021-01-01 | End: 2021-01-01

## 2021-01-01 RX ORDER — FENTANYL CITRATE 50 UG/ML
50 INJECTION, SOLUTION INTRAMUSCULAR; INTRAVENOUS ONCE
Status: COMPLETED | OUTPATIENT
Start: 2021-01-01 | End: 2021-01-01

## 2021-01-01 RX ORDER — HYDROMORPHONE HYDROCHLORIDE 4 MG/1
4 TABLET ORAL 3 TIMES DAILY
Qty: 90 TABLET | Refills: 0 | OUTPATIENT
Start: 2021-01-01

## 2021-01-01 RX ORDER — DIPHENHYDRAMINE HYDROCHLORIDE 50 MG/ML
25 INJECTION INTRAMUSCULAR; INTRAVENOUS EVERY 6 HOURS PRN
Status: DISCONTINUED | OUTPATIENT
Start: 2021-01-01 | End: 2021-01-01

## 2021-01-01 RX ORDER — AMOXICILLIN 250 MG
1 CAPSULE ORAL 2 TIMES DAILY
Status: DISCONTINUED | OUTPATIENT
Start: 2021-01-01 | End: 2021-01-01

## 2021-01-01 RX ORDER — HEPARIN SODIUM 5000 [USP'U]/ML
5000 INJECTION, SOLUTION INTRAVENOUS; SUBCUTANEOUS EVERY 8 HOURS SCHEDULED
Status: DISCONTINUED | OUTPATIENT
Start: 2021-01-01 | End: 2021-01-01

## 2021-01-01 RX ORDER — DIPHENHYDRAMINE HCL 25 MG
25 TABLET ORAL ONCE
Status: COMPLETED | OUTPATIENT
Start: 2021-01-01 | End: 2021-01-01

## 2021-01-01 RX ORDER — HALOPERIDOL 5 MG/ML
2 INJECTION INTRAMUSCULAR
Status: DISCONTINUED | OUTPATIENT
Start: 2021-01-01 | End: 2021-01-01

## 2021-01-01 RX ORDER — PNV NO.95/FERROUS FUM/FOLIC AC 28MG-0.8MG
TABLET ORAL
Qty: 30 TABLET | Refills: 2 | Status: SHIPPED | OUTPATIENT
Start: 2021-01-01 | End: 2021-01-01

## 2021-01-01 RX ORDER — LIDOCAINE 50 MG/G
2 PATCH TOPICAL EVERY 24 HOURS
Status: DISCONTINUED | OUTPATIENT
Start: 2021-01-01 | End: 2021-01-01 | Stop reason: HOSPADM

## 2021-01-01 RX ORDER — HYDROMORPHONE HCL/PF 1 MG/ML
0.5 SYRINGE (ML) INJECTION
Status: DISCONTINUED | OUTPATIENT
Start: 2021-01-01 | End: 2021-01-01

## 2021-01-01 RX ORDER — GLYCOPYRROLATE 0.2 MG/ML
0.2 INJECTION INTRAMUSCULAR; INTRAVENOUS EVERY 4 HOURS PRN
Status: DISCONTINUED | OUTPATIENT
Start: 2021-01-01 | End: 2021-01-01

## 2021-01-01 RX ORDER — HYDROMORPHONE HCL/PF 1 MG/ML
1 SYRINGE (ML) INJECTION EVERY 2 HOUR PRN
Status: DISCONTINUED | OUTPATIENT
Start: 2021-01-01 | End: 2021-01-01

## 2021-01-01 RX ORDER — ONDANSETRON 4 MG/1
4 TABLET, FILM COATED ORAL EVERY 8 HOURS PRN
Qty: 50 TABLET | Refills: 1 | Status: SHIPPED | OUTPATIENT
Start: 2021-01-01 | End: 2021-01-01

## 2021-01-01 RX ORDER — PANTOPRAZOLE SODIUM 40 MG/1
40 INJECTION, POWDER, FOR SOLUTION INTRAVENOUS
Status: DISCONTINUED | OUTPATIENT
Start: 2021-01-01 | End: 2021-01-01 | Stop reason: HOSPADM

## 2021-01-01 RX ORDER — HYDROMORPHONE HCL/PF 1 MG/ML
0.7 SYRINGE (ML) INJECTION
Status: DISCONTINUED | OUTPATIENT
Start: 2021-01-01 | End: 2021-01-01

## 2021-01-01 RX ORDER — METHOCARBAMOL 500 MG/1
750 TABLET, FILM COATED ORAL 3 TIMES DAILY
Status: DISCONTINUED | OUTPATIENT
Start: 2021-01-01 | End: 2021-01-01

## 2021-01-01 RX ORDER — HYDROMORPHONE HYDROCHLORIDE 4 MG/1
4 TABLET ORAL EVERY 8 HOURS PRN
Qty: 90 TABLET | Refills: 0 | OUTPATIENT
Start: 2021-01-01

## 2021-01-01 RX ORDER — MAGNESIUM SULFATE HEPTAHYDRATE 40 MG/ML
2 INJECTION, SOLUTION INTRAVENOUS ONCE
Status: DISCONTINUED | OUTPATIENT
Start: 2021-01-01 | End: 2021-01-01

## 2021-01-01 RX ORDER — HYDROMORPHONE HCL/PF 1 MG/ML
1 SYRINGE (ML) INJECTION
Status: DISCONTINUED | OUTPATIENT
Start: 2021-01-01 | End: 2021-01-01

## 2021-01-01 RX ORDER — GABAPENTIN 300 MG/1
600 CAPSULE ORAL 2 TIMES DAILY
Status: DISCONTINUED | OUTPATIENT
Start: 2021-01-01 | End: 2021-01-01

## 2021-01-01 RX ORDER — GABAPENTIN 300 MG/1
600 CAPSULE ORAL 3 TIMES DAILY
Qty: 180 CAPSULE | Refills: 2 | Status: SHIPPED | OUTPATIENT
Start: 2021-01-01 | End: 2021-01-01

## 2021-01-01 RX ORDER — HEPARIN SODIUM 5000 [USP'U]/ML
5000 INJECTION, SOLUTION INTRAVENOUS; SUBCUTANEOUS EVERY 8 HOURS SCHEDULED
Status: DISCONTINUED | OUTPATIENT
Start: 2021-01-01 | End: 2021-01-01 | Stop reason: HOSPADM

## 2021-01-01 RX ORDER — ONDANSETRON 4 MG/1
4 TABLET, ORALLY DISINTEGRATING ORAL ONCE
Status: COMPLETED | OUTPATIENT
Start: 2021-01-01 | End: 2021-01-01

## 2021-01-01 RX ORDER — POLYETHYLENE GLYCOL 3350 17 G/17G
17 POWDER, FOR SOLUTION ORAL 2 TIMES DAILY
Status: DISCONTINUED | OUTPATIENT
Start: 2021-01-01 | End: 2021-01-01

## 2021-01-01 RX ORDER — HYDROMORPHONE HYDROCHLORIDE 2 MG/1
4 TABLET ORAL 3 TIMES DAILY
Status: DISCONTINUED | OUTPATIENT
Start: 2021-01-01 | End: 2021-01-01 | Stop reason: HOSPADM

## 2021-01-01 RX ORDER — HYDROMORPHONE HCL/PF 1 MG/ML
0.2 SYRINGE (ML) INJECTION EVERY 4 HOURS PRN
Status: DISCONTINUED | OUTPATIENT
Start: 2021-01-01 | End: 2021-01-01

## 2021-01-01 RX ORDER — DOCUSATE SODIUM 100 MG/1
100 CAPSULE, LIQUID FILLED ORAL 2 TIMES DAILY
Status: DISCONTINUED | OUTPATIENT
Start: 2021-01-01 | End: 2021-01-01

## 2021-01-01 RX ORDER — DIPHENHYDRAMINE HYDROCHLORIDE 50 MG/ML
50 INJECTION INTRAMUSCULAR; INTRAVENOUS EVERY 6 HOURS PRN
Status: COMPLETED | OUTPATIENT
Start: 2021-01-01 | End: 2021-01-01

## 2021-01-01 RX ORDER — HYDROMORPHONE HYDROCHLORIDE 4 MG/1
4 TABLET ORAL 3 TIMES DAILY
Qty: 90 TABLET | Refills: 0 | Status: SHIPPED | OUTPATIENT
Start: 2021-01-01 | End: 2021-01-01 | Stop reason: SDUPTHER

## 2021-01-01 RX ORDER — GABAPENTIN 300 MG/1
600 CAPSULE ORAL 3 TIMES DAILY
Status: DISCONTINUED | OUTPATIENT
Start: 2021-01-01 | End: 2021-01-01 | Stop reason: HOSPADM

## 2021-01-01 RX ORDER — MORPHINE SULFATE 30 MG/1
30 TABLET, FILM COATED, EXTENDED RELEASE ORAL EVERY 8 HOURS
Status: DISCONTINUED | OUTPATIENT
Start: 2021-01-01 | End: 2021-01-01 | Stop reason: HOSPADM

## 2021-01-01 RX ORDER — DEXTROSE AND SODIUM CHLORIDE 5; .9 G/100ML; G/100ML
100 INJECTION, SOLUTION INTRAVENOUS CONTINUOUS
Status: DISCONTINUED | OUTPATIENT
Start: 2021-01-01 | End: 2021-01-01 | Stop reason: HOSPADM

## 2021-01-01 RX ORDER — HYDROMORPHONE HCL/PF 1 MG/ML
0.2 SYRINGE (ML) INJECTION
Status: DISCONTINUED | OUTPATIENT
Start: 2021-01-01 | End: 2021-01-01 | Stop reason: HOSPADM

## 2021-01-01 RX ORDER — ESCITALOPRAM OXALATE 10 MG/1
10 TABLET ORAL DAILY
Status: DISCONTINUED | OUTPATIENT
Start: 2021-01-01 | End: 2021-01-01 | Stop reason: HOSPADM

## 2021-01-01 RX ORDER — ONDANSETRON 2 MG/ML
4 INJECTION INTRAMUSCULAR; INTRAVENOUS EVERY 6 HOURS PRN
Status: DISCONTINUED | OUTPATIENT
Start: 2021-01-01 | End: 2021-01-01

## 2021-01-01 RX ORDER — FOLIC ACID 1 MG/1
1 TABLET ORAL DAILY
Status: DISCONTINUED | OUTPATIENT
Start: 2021-01-01 | End: 2021-01-01 | Stop reason: HOSPADM

## 2021-01-01 RX ORDER — ALPRAZOLAM 0.25 MG/1
0.25 TABLET ORAL 2 TIMES DAILY PRN
Qty: 30 TABLET | Refills: 0 | Status: SHIPPED | OUTPATIENT
Start: 2021-01-01 | End: 2021-01-01 | Stop reason: SDUPTHER

## 2021-01-01 RX ORDER — AMOXICILLIN 250 MG
2 CAPSULE ORAL 2 TIMES DAILY
Status: DISCONTINUED | OUTPATIENT
Start: 2021-01-01 | End: 2021-01-01 | Stop reason: HOSPADM

## 2021-01-01 RX ORDER — ALBUMIN, HUMAN INJ 5% 5 %
12.5 SOLUTION INTRAVENOUS ONCE
Status: COMPLETED | OUTPATIENT
Start: 2021-01-01 | End: 2021-01-01

## 2021-01-01 RX ORDER — METHOCARBAMOL 750 MG/1
750 TABLET, FILM COATED ORAL 3 TIMES DAILY
COMMUNITY
Start: 2021-01-01 | End: 2021-01-01 | Stop reason: SDUPTHER

## 2021-01-01 RX ORDER — PANTOPRAZOLE SODIUM 40 MG/1
40 TABLET, DELAYED RELEASE ORAL
Status: DISCONTINUED | OUTPATIENT
Start: 2021-01-01 | End: 2021-01-01 | Stop reason: HOSPADM

## 2021-01-01 RX ORDER — ESCITALOPRAM OXALATE 20 MG/1
20 TABLET ORAL DAILY
Status: DISCONTINUED | OUTPATIENT
Start: 2021-01-01 | End: 2021-01-01

## 2021-01-01 RX ORDER — METHOCARBAMOL 750 MG/1
750 TABLET, FILM COATED ORAL 3 TIMES DAILY
COMMUNITY
Start: 2021-04-02 | End: 2021-01-01 | Stop reason: HOSPADM

## 2021-01-01 RX ORDER — POTASSIUM CHLORIDE 14.9 MG/ML
20 INJECTION INTRAVENOUS 3 TIMES DAILY
Status: DISCONTINUED | OUTPATIENT
Start: 2021-01-01 | End: 2021-01-01 | Stop reason: HOSPADM

## 2021-01-01 RX ORDER — TAMSULOSIN HYDROCHLORIDE 0.4 MG/1
CAPSULE ORAL
Qty: 180 CAPSULE | Refills: 1 | Status: SHIPPED | OUTPATIENT
Start: 2021-01-01 | End: 2021-01-01

## 2021-01-01 RX ORDER — LIDOCAINE HYDROCHLORIDE 20 MG/ML
1 JELLY TOPICAL ONCE
Status: DISCONTINUED | OUTPATIENT
Start: 2021-01-01 | End: 2021-01-01 | Stop reason: HOSPADM

## 2021-01-01 RX ORDER — DOCUSATE SODIUM 100 MG/1
100 CAPSULE, LIQUID FILLED ORAL 2 TIMES DAILY
Status: DISCONTINUED | OUTPATIENT
Start: 2021-01-01 | End: 2021-01-01 | Stop reason: HOSPADM

## 2021-01-01 RX ORDER — DIPHENHYDRAMINE HCL 50 MG
50 CAPSULE ORAL EVERY 6 HOURS PRN
COMMUNITY

## 2021-01-01 RX ORDER — PANTOPRAZOLE SODIUM 40 MG/1
40 TABLET, DELAYED RELEASE ORAL DAILY
Status: DISCONTINUED | OUTPATIENT
Start: 2021-01-01 | End: 2021-01-01 | Stop reason: HOSPADM

## 2021-01-01 RX ORDER — ESCITALOPRAM OXALATE 20 MG/1
10 TABLET ORAL DAILY
Qty: 90 TABLET | Refills: 1
Start: 2021-01-01 | End: 2022-01-01

## 2021-01-01 RX ORDER — LORAZEPAM 1 MG/1
1 TABLET ORAL EVERY 8 HOURS PRN
Qty: 90 TABLET | Refills: 0 | OUTPATIENT
Start: 2021-01-01

## 2021-01-01 RX ORDER — LIDOCAINE HYDROCHLORIDE 20 MG/ML
INJECTION, SOLUTION EPIDURAL; INFILTRATION; INTRACAUDAL; PERINEURAL AS NEEDED
Status: DISCONTINUED | OUTPATIENT
Start: 2021-01-01 | End: 2021-01-01

## 2021-01-01 RX ORDER — HYDROMORPHONE HCL/PF 1 MG/ML
0.2 SYRINGE (ML) INJECTION ONCE
Status: COMPLETED | OUTPATIENT
Start: 2021-01-01 | End: 2021-01-01

## 2021-01-01 RX ORDER — SULFAMETHOXAZOLE AND TRIMETHOPRIM 800; 160 MG/1; MG/1
1 TABLET ORAL 2 TIMES DAILY
Qty: 6 TABLET | Refills: 0 | Status: SHIPPED | OUTPATIENT
Start: 2021-01-01 | End: 2021-01-01

## 2021-01-01 RX ORDER — ALPRAZOLAM 0.25 MG/1
0.25 TABLET ORAL 3 TIMES DAILY PRN
Status: DISCONTINUED | OUTPATIENT
Start: 2021-01-01 | End: 2021-01-01 | Stop reason: HOSPADM

## 2021-01-01 RX ORDER — POTASSIUM CHLORIDE 14.9 MG/ML
20 INJECTION INTRAVENOUS ONCE
Status: COMPLETED | OUTPATIENT
Start: 2021-01-01 | End: 2021-01-01

## 2021-01-01 RX ORDER — MAGNESIUM SULFATE 1 G/100ML
1 INJECTION INTRAVENOUS ONCE
Status: COMPLETED | OUTPATIENT
Start: 2021-01-01 | End: 2021-01-01

## 2021-01-01 RX ORDER — HYDROMORPHONE HCL/PF 1 MG/ML
0.2 SYRINGE (ML) INJECTION EVERY 6 HOURS PRN
Status: DISCONTINUED | OUTPATIENT
Start: 2021-01-01 | End: 2021-01-01

## 2021-01-01 RX ORDER — POLYETHYLENE GLYCOL 3350 17 G/17G
17 POWDER, FOR SOLUTION ORAL DAILY PRN
Status: DISCONTINUED | OUTPATIENT
Start: 2021-01-01 | End: 2021-01-01

## 2021-01-01 RX ORDER — SODIUM CHLORIDE, SODIUM LACTATE, POTASSIUM CHLORIDE, CALCIUM CHLORIDE 600; 310; 30; 20 MG/100ML; MG/100ML; MG/100ML; MG/100ML
50 INJECTION, SOLUTION INTRAVENOUS CONTINUOUS
Status: DISCONTINUED | OUTPATIENT
Start: 2021-01-01 | End: 2021-01-01

## 2021-01-01 RX ORDER — DEXTROSE AND SODIUM CHLORIDE 5; .9 G/100ML; G/100ML
75 INJECTION, SOLUTION INTRAVENOUS CONTINUOUS
Status: DISCONTINUED | OUTPATIENT
Start: 2021-01-01 | End: 2021-01-01

## 2021-01-01 RX ORDER — HYDROMORPHONE HCL/PF 1 MG/ML
1 SYRINGE (ML) INJECTION EVERY 4 HOURS PRN
Status: DISCONTINUED | OUTPATIENT
Start: 2021-01-01 | End: 2021-01-01 | Stop reason: HOSPADM

## 2021-01-01 RX ORDER — BISACODYL 10 MG
10 SUPPOSITORY, RECTAL RECTAL DAILY
Status: DISCONTINUED | OUTPATIENT
Start: 2021-01-01 | End: 2021-01-01

## 2021-01-01 RX ORDER — PANTOPRAZOLE SODIUM 40 MG/1
40 INJECTION, POWDER, FOR SOLUTION INTRAVENOUS
Status: DISCONTINUED | OUTPATIENT
Start: 2021-01-01 | End: 2021-01-01

## 2021-01-01 RX ORDER — HYDROMORPHONE HCL/PF 1 MG/ML
1 SYRINGE (ML) INJECTION EVERY 4 HOURS PRN
Status: CANCELLED | OUTPATIENT
Start: 2021-01-01

## 2021-01-01 RX ORDER — CHOLESTYRAMINE 4 G/9G
1 POWDER, FOR SUSPENSION ORAL
Qty: 21 PACKET | Refills: 4 | Status: SHIPPED | OUTPATIENT
Start: 2021-01-01 | End: 2021-01-01 | Stop reason: HOSPADM

## 2021-01-01 RX ORDER — SODIUM CHLORIDE, SODIUM LACTATE, POTASSIUM CHLORIDE, CALCIUM CHLORIDE 600; 310; 30; 20 MG/100ML; MG/100ML; MG/100ML; MG/100ML
125 INJECTION, SOLUTION INTRAVENOUS CONTINUOUS
Status: DISCONTINUED | OUTPATIENT
Start: 2021-01-01 | End: 2021-01-01 | Stop reason: HOSPADM

## 2021-01-01 RX ORDER — POTASSIUM CHLORIDE 14.9 MG/ML
20 INJECTION INTRAVENOUS 2 TIMES DAILY
Status: COMPLETED | OUTPATIENT
Start: 2021-01-01 | End: 2021-01-01

## 2021-01-01 RX ORDER — TAMSULOSIN HYDROCHLORIDE 0.4 MG/1
0.8 CAPSULE ORAL
Status: DISCONTINUED | OUTPATIENT
Start: 2021-01-01 | End: 2021-01-01 | Stop reason: HOSPADM

## 2021-01-01 RX ORDER — ONDANSETRON 4 MG/1
4 TABLET, FILM COATED ORAL EVERY 8 HOURS PRN
Qty: 90 TABLET | Refills: 2 | Status: SHIPPED | OUTPATIENT
Start: 2021-01-01 | End: 2022-01-01 | Stop reason: SDUPTHER

## 2021-01-01 RX ORDER — PANTOPRAZOLE SODIUM 40 MG/1
40 TABLET, DELAYED RELEASE ORAL DAILY
Qty: 90 TABLET | Refills: 3 | Status: SHIPPED | OUTPATIENT
Start: 2021-01-01 | End: 2021-01-01

## 2021-01-01 RX ORDER — ACETAMINOPHEN 325 MG/1
650 TABLET ORAL EVERY 6 HOURS PRN
Status: DISCONTINUED | OUTPATIENT
Start: 2021-01-01 | End: 2021-01-01 | Stop reason: HOSPADM

## 2021-01-01 RX ORDER — NITROFURANTOIN 25; 75 MG/1; MG/1
100 CAPSULE ORAL 2 TIMES DAILY
Qty: 10 CAPSULE | Refills: 0 | Status: SHIPPED | OUTPATIENT
Start: 2021-01-01 | End: 2021-01-01

## 2021-01-01 RX ORDER — NALOXONE HYDROCHLORIDE 4 MG/.1ML
SPRAY NASAL
Qty: 1 EACH | Refills: 1 | Status: SHIPPED | OUTPATIENT
Start: 2021-01-01

## 2021-01-01 RX ORDER — FOLIC ACID 1 MG/1
1 TABLET ORAL DAILY
Status: DISCONTINUED | OUTPATIENT
Start: 2021-01-01 | End: 2021-01-01

## 2021-01-01 RX ORDER — METHOCARBAMOL 750 MG/1
750 TABLET, FILM COATED ORAL EVERY 6 HOURS PRN
Status: DISCONTINUED | OUTPATIENT
Start: 2021-01-01 | End: 2021-01-01 | Stop reason: HOSPADM

## 2021-01-01 RX ORDER — GABAPENTIN 300 MG/1
600 CAPSULE ORAL 2 TIMES DAILY
Status: DISCONTINUED | OUTPATIENT
Start: 2021-01-01 | End: 2021-01-01 | Stop reason: HOSPADM

## 2021-01-01 RX ORDER — ESCITALOPRAM OXALATE 20 MG/1
20 TABLET ORAL DAILY
Qty: 90 TABLET | Refills: 1 | Status: SHIPPED | OUTPATIENT
Start: 2021-01-01 | End: 2021-01-01

## 2021-01-01 RX ORDER — DIAZEPAM 5 MG/1
5 TABLET ORAL EVERY 8 HOURS PRN
Status: DISCONTINUED | OUTPATIENT
Start: 2021-01-01 | End: 2021-01-01

## 2021-01-01 RX ORDER — PROMETHAZINE HYDROCHLORIDE 25 MG/ML
25 INJECTION, SOLUTION INTRAMUSCULAR; INTRAVENOUS EVERY 6 HOURS PRN
Status: DISCONTINUED | OUTPATIENT
Start: 2021-01-01 | End: 2021-01-01 | Stop reason: HOSPADM

## 2021-01-01 RX ORDER — DIPHENHYDRAMINE HCL 50 MG
50 CAPSULE ORAL EVERY 6 HOURS PRN
Status: DISCONTINUED | OUTPATIENT
Start: 2021-01-01 | End: 2021-01-01

## 2021-01-01 RX ORDER — HYDROMORPHONE HCL/PF 1 MG/ML
0.5 SYRINGE (ML) INJECTION
Status: CANCELLED | OUTPATIENT
Start: 2021-01-01

## 2021-01-01 RX ORDER — NALOXONE HYDROCHLORIDE 4 MG/.1ML
4 SPRAY NASAL
Status: DISCONTINUED | OUTPATIENT
Start: 2021-01-01 | End: 2021-01-01

## 2021-01-01 RX ORDER — FERROUS SULFATE 325(65) MG
TABLET ORAL
Qty: 30 TABLET | Refills: 2 | Status: SHIPPED | OUTPATIENT
Start: 2021-01-01 | End: 2022-01-01

## 2021-01-01 RX ORDER — POTASSIUM CHLORIDE 29.8 MG/ML
40 INJECTION INTRAVENOUS ONCE
Status: COMPLETED | OUTPATIENT
Start: 2021-01-01 | End: 2021-01-01

## 2021-01-01 RX ORDER — ONDANSETRON 4 MG/1
4 TABLET, ORALLY DISINTEGRATING ORAL EVERY 6 HOURS PRN
Status: DISCONTINUED | OUTPATIENT
Start: 2021-01-01 | End: 2021-01-01 | Stop reason: HOSPADM

## 2021-01-01 RX ORDER — SODIUM CHLORIDE, SODIUM LACTATE, POTASSIUM CHLORIDE, CALCIUM CHLORIDE 600; 310; 30; 20 MG/100ML; MG/100ML; MG/100ML; MG/100ML
100 INJECTION, SOLUTION INTRAVENOUS CONTINUOUS
Status: DISCONTINUED | OUTPATIENT
Start: 2021-01-01 | End: 2021-01-01 | Stop reason: HOSPADM

## 2021-01-01 RX ORDER — MELATONIN
1000 2 TIMES DAILY
Status: DISCONTINUED | OUTPATIENT
Start: 2021-01-01 | End: 2021-01-01

## 2021-01-01 RX ORDER — POLYETHYLENE GLYCOL 3350 17 G/17G
17 POWDER, FOR SOLUTION ORAL DAILY PRN
Qty: 12 EACH | Refills: 0 | Status: SHIPPED | OUTPATIENT
Start: 2021-01-01 | End: 2022-01-01

## 2021-01-01 RX ORDER — HYDROMORPHONE HYDROCHLORIDE 2 MG/1
4 TABLET ORAL 4 TIMES DAILY PRN
Status: DISCONTINUED | OUTPATIENT
Start: 2021-01-01 | End: 2021-01-01

## 2021-01-01 RX ORDER — LACTULOSE 20 G/30ML
20 SOLUTION ORAL 2 TIMES DAILY
Status: DISCONTINUED | OUTPATIENT
Start: 2021-01-01 | End: 2021-01-01

## 2021-01-01 RX ORDER — HYDROMORPHONE HCL/PF 1 MG/ML
1 SYRINGE (ML) INJECTION EVERY 4 HOURS PRN
Status: DISCONTINUED | OUTPATIENT
Start: 2021-01-01 | End: 2021-01-01

## 2021-01-01 RX ORDER — METHOCARBAMOL 750 MG/1
750 TABLET, FILM COATED ORAL 3 TIMES DAILY
Qty: 90 TABLET | Refills: 2 | OUTPATIENT
Start: 2021-01-01

## 2021-01-01 RX ORDER — METHOCARBAMOL 750 MG/1
750 TABLET, FILM COATED ORAL 3 TIMES DAILY
COMMUNITY
Start: 2021-01-01 | End: 2022-01-01 | Stop reason: SDUPTHER

## 2021-01-01 RX ORDER — PROPOFOL 10 MG/ML
INJECTION, EMULSION INTRAVENOUS AS NEEDED
Status: DISCONTINUED | OUTPATIENT
Start: 2021-01-01 | End: 2021-01-01

## 2021-01-01 RX ORDER — POLYETHYLENE GLYCOL 3350 17 G/17G
17 POWDER, FOR SOLUTION ORAL DAILY
Status: DISCONTINUED | OUTPATIENT
Start: 2021-01-01 | End: 2021-01-01 | Stop reason: HOSPADM

## 2021-01-01 RX ORDER — HYDROMORPHONE HCL/PF 1 MG/ML
1 SYRINGE (ML) INJECTION
Status: DISCONTINUED | OUTPATIENT
Start: 2021-01-01 | End: 2021-01-01 | Stop reason: HOSPADM

## 2021-01-01 RX ORDER — DIPHENHYDRAMINE HYDROCHLORIDE 50 MG/ML
25 INJECTION INTRAMUSCULAR; INTRAVENOUS EVERY 6 HOURS PRN
Status: DISCONTINUED | OUTPATIENT
Start: 2021-01-01 | End: 2021-01-01 | Stop reason: HOSPADM

## 2021-01-01 RX ORDER — MAGNESIUM CHLORIDE 64 MG
64 TABLET, DELAYED RELEASE (ENTERIC COATED) ORAL DAILY
Status: DISCONTINUED | OUTPATIENT
Start: 2021-01-01 | End: 2021-01-01 | Stop reason: HOSPADM

## 2021-01-01 RX ORDER — LACTULOSE 20 G/30ML
20 SOLUTION ORAL 2 TIMES DAILY
Status: DISCONTINUED | OUTPATIENT
Start: 2021-01-01 | End: 2021-01-01 | Stop reason: HOSPADM

## 2021-01-01 RX ORDER — MORPHINE SULFATE 30 MG/1
30 TABLET, FILM COATED, EXTENDED RELEASE ORAL EVERY 8 HOURS
Status: DISCONTINUED | OUTPATIENT
Start: 2021-01-01 | End: 2021-01-01

## 2021-01-01 RX ORDER — SODIUM CHLORIDE 9 MG/ML
75 INJECTION, SOLUTION INTRAVENOUS CONTINUOUS
Status: DISCONTINUED | OUTPATIENT
Start: 2021-01-01 | End: 2021-01-01

## 2021-01-01 RX ORDER — HYDROMORPHONE HYDROCHLORIDE 2 MG/1
2 TABLET ORAL EVERY 4 HOURS PRN
Status: DISCONTINUED | OUTPATIENT
Start: 2021-01-01 | End: 2021-01-01 | Stop reason: HOSPADM

## 2021-01-01 RX ORDER — SODIUM CHLORIDE, SODIUM LACTATE, POTASSIUM CHLORIDE, CALCIUM CHLORIDE 600; 310; 30; 20 MG/100ML; MG/100ML; MG/100ML; MG/100ML
125 INJECTION, SOLUTION INTRAVENOUS CONTINUOUS
Status: DISCONTINUED | OUTPATIENT
Start: 2021-01-01 | End: 2021-01-01

## 2021-01-01 RX ORDER — ALPRAZOLAM 1 MG/1
1 TABLET ORAL 3 TIMES DAILY PRN
Qty: 90 TABLET | Refills: 0 | Status: SHIPPED | OUTPATIENT
Start: 2021-01-01 | End: 2021-01-01 | Stop reason: ALTCHOICE

## 2021-01-01 RX ORDER — METHOCARBAMOL 750 MG/1
750 TABLET, FILM COATED ORAL EVERY 8 HOURS PRN
Status: DISCONTINUED | OUTPATIENT
Start: 2021-01-01 | End: 2021-01-01

## 2021-01-01 RX ORDER — DIPHENHYDRAMINE HYDROCHLORIDE 50 MG/ML
50 INJECTION INTRAMUSCULAR; INTRAVENOUS EVERY 6 HOURS PRN
Status: DISCONTINUED | OUTPATIENT
Start: 2021-01-01 | End: 2021-01-01 | Stop reason: HOSPADM

## 2021-01-01 RX ORDER — DIPHENHYDRAMINE HYDROCHLORIDE 50 MG/ML
50 INJECTION INTRAMUSCULAR; INTRAVENOUS EVERY 6 HOURS PRN
Status: DISCONTINUED | OUTPATIENT
Start: 2021-01-01 | End: 2021-01-01

## 2021-01-01 RX ORDER — HYDROMORPHONE HCL/PF 1 MG/ML
0.5 SYRINGE (ML) INJECTION EVERY 2 HOUR PRN
Status: DISCONTINUED | OUTPATIENT
Start: 2021-01-01 | End: 2021-01-01

## 2021-01-01 RX ORDER — ONDANSETRON 4 MG/1
4 TABLET, FILM COATED ORAL EVERY 8 HOURS PRN
COMMUNITY
Start: 2021-01-01 | End: 2021-01-01 | Stop reason: SDUPTHER

## 2021-01-01 RX ORDER — DIAZEPAM 5 MG/1
5 TABLET ORAL EVERY 8 HOURS PRN
Status: DISCONTINUED | OUTPATIENT
Start: 2021-01-01 | End: 2021-01-01 | Stop reason: HOSPADM

## 2021-01-01 RX ORDER — SENNOSIDES 8.6 MG
1 TABLET ORAL DAILY
Status: DISCONTINUED | OUTPATIENT
Start: 2021-01-01 | End: 2021-01-01 | Stop reason: HOSPADM

## 2021-01-01 RX ORDER — FERROUS SULFATE 325(65) MG
325 TABLET ORAL DAILY
Status: DISCONTINUED | OUTPATIENT
Start: 2021-01-01 | End: 2021-01-01 | Stop reason: HOSPADM

## 2021-01-01 RX ORDER — METOCLOPRAMIDE HYDROCHLORIDE 5 MG/ML
10 INJECTION INTRAMUSCULAR; INTRAVENOUS EVERY 6 HOURS PRN
Status: DISCONTINUED | OUTPATIENT
Start: 2021-01-01 | End: 2021-01-01 | Stop reason: HOSPADM

## 2021-01-01 RX ORDER — DEXTROSE, SODIUM CHLORIDE, AND POTASSIUM CHLORIDE 5; .45; .15 G/100ML; G/100ML; G/100ML
100 INJECTION INTRAVENOUS CONTINUOUS
Status: DISCONTINUED | OUTPATIENT
Start: 2021-01-01 | End: 2021-01-01

## 2021-01-01 RX ORDER — DEXTROSE, SODIUM CHLORIDE, AND POTASSIUM CHLORIDE 5; .45; .15 G/100ML; G/100ML; G/100ML
100 INJECTION INTRAVENOUS CONTINUOUS
Status: DISCONTINUED | OUTPATIENT
Start: 2021-01-01 | End: 2021-01-01 | Stop reason: HOSPADM

## 2021-01-01 RX ORDER — HYDROMORPHONE HYDROCHLORIDE 2 MG/1
4 TABLET ORAL 4 TIMES DAILY
Status: DISCONTINUED | OUTPATIENT
Start: 2021-01-01 | End: 2021-01-01 | Stop reason: HOSPADM

## 2021-01-01 RX ORDER — POTASSIUM CHLORIDE 29.8 MG/ML
40 INJECTION INTRAVENOUS ONCE
Status: DISCONTINUED | OUTPATIENT
Start: 2021-01-01 | End: 2021-01-01 | Stop reason: HOSPADM

## 2021-01-01 RX ORDER — DIAZEPAM 5 MG/ML
5 INJECTION, SOLUTION INTRAMUSCULAR; INTRAVENOUS EVERY 8 HOURS PRN
Status: DISCONTINUED | OUTPATIENT
Start: 2021-01-01 | End: 2021-01-01 | Stop reason: HOSPADM

## 2021-01-01 RX ORDER — POTASSIUM CHLORIDE 20 MEQ/1
40 TABLET, EXTENDED RELEASE ORAL ONCE
Status: DISCONTINUED | OUTPATIENT
Start: 2021-01-01 | End: 2021-01-01 | Stop reason: HOSPADM

## 2021-01-01 RX ADMIN — POLYETHYLENE GLYCOL 3350 17 G: 17 POWDER, FOR SOLUTION ORAL at 08:13

## 2021-01-01 RX ADMIN — ONDANSETRON 4 MG: 2 INJECTION INTRAMUSCULAR; INTRAVENOUS at 22:32

## 2021-01-01 RX ADMIN — DIPHENHYDRAMINE HYDROCHLORIDE 25 MG: 50 INJECTION, SOLUTION INTRAMUSCULAR; INTRAVENOUS at 04:49

## 2021-01-01 RX ADMIN — PANTOPRAZOLE SODIUM 40 MG: 40 INJECTION, POWDER, FOR SOLUTION INTRAVENOUS at 08:40

## 2021-01-01 RX ADMIN — HYDROMORPHONE HYDROCHLORIDE 1 MG: 1 INJECTION, SOLUTION INTRAMUSCULAR; INTRAVENOUS; SUBCUTANEOUS at 14:53

## 2021-01-01 RX ADMIN — HYDROMORPHONE HYDROCHLORIDE 0.5 MG: 1 INJECTION, SOLUTION INTRAMUSCULAR; INTRAVENOUS; SUBCUTANEOUS at 06:22

## 2021-01-01 RX ADMIN — HYDROMORPHONE HYDROCHLORIDE 0.5 MG: 1 INJECTION, SOLUTION INTRAMUSCULAR; INTRAVENOUS; SUBCUTANEOUS at 12:24

## 2021-01-01 RX ADMIN — DIPHENHYDRAMINE HYDROCHLORIDE 25 MG: 50 INJECTION, SOLUTION INTRAMUSCULAR; INTRAVENOUS at 22:12

## 2021-01-01 RX ADMIN — DIPHENHYDRAMINE HYDROCHLORIDE 50 MG: 50 INJECTION, SOLUTION INTRAMUSCULAR; INTRAVENOUS at 23:06

## 2021-01-01 RX ADMIN — HYDROMORPHONE HYDROCHLORIDE 0.5 MG: 1 INJECTION, SOLUTION INTRAMUSCULAR; INTRAVENOUS; SUBCUTANEOUS at 13:07

## 2021-01-01 RX ADMIN — HYDROMORPHONE HYDROCHLORIDE 0.2 MG: 1 INJECTION, SOLUTION INTRAMUSCULAR; INTRAVENOUS; SUBCUTANEOUS at 11:03

## 2021-01-01 RX ADMIN — DIPHENHYDRAMINE HYDROCHLORIDE 50 MG: 50 INJECTION, SOLUTION INTRAMUSCULAR; INTRAVENOUS at 01:11

## 2021-01-01 RX ADMIN — Medication: at 22:04

## 2021-01-01 RX ADMIN — ENOXAPARIN SODIUM 40 MG: 40 INJECTION SUBCUTANEOUS at 08:40

## 2021-01-01 RX ADMIN — HEPARIN SODIUM 5000 UNITS: 5000 INJECTION INTRAVENOUS; SUBCUTANEOUS at 08:52

## 2021-01-01 RX ADMIN — FENTANYL CITRATE 50 MCG: 50 INJECTION INTRAMUSCULAR; INTRAVENOUS at 16:40

## 2021-01-01 RX ADMIN — DEXTROSE, SODIUM CHLORIDE, AND POTASSIUM CHLORIDE 100 ML/HR: 5; .45; .15 INJECTION INTRAVENOUS at 03:32

## 2021-01-01 RX ADMIN — ONDANSETRON 4 MG: 2 INJECTION INTRAMUSCULAR; INTRAVENOUS at 10:12

## 2021-01-01 RX ADMIN — DIPHENHYDRAMINE HYDROCHLORIDE 25 MG: 50 INJECTION, SOLUTION INTRAMUSCULAR; INTRAVENOUS at 17:28

## 2021-01-01 RX ADMIN — PANTOPRAZOLE SODIUM 40 MG: 40 INJECTION, POWDER, FOR SOLUTION INTRAVENOUS at 08:30

## 2021-01-01 RX ADMIN — ONDANSETRON 4 MG: 4 TABLET, ORALLY DISINTEGRATING ORAL at 14:06

## 2021-01-01 RX ADMIN — POTASSIUM CHLORIDE 20 MEQ: 14.9 INJECTION, SOLUTION INTRAVENOUS at 13:40

## 2021-01-01 RX ADMIN — HYDROMORPHONE HYDROCHLORIDE 0.7 MG: 1 INJECTION, SOLUTION INTRAMUSCULAR; INTRAVENOUS; SUBCUTANEOUS at 13:11

## 2021-01-01 RX ADMIN — HYDROMORPHONE HYDROCHLORIDE 1 MG: 1 INJECTION, SOLUTION INTRAMUSCULAR; INTRAVENOUS; SUBCUTANEOUS at 21:52

## 2021-01-01 RX ADMIN — POTASSIUM PHOSPHATE, MONOBASIC POTASSIUM PHOSPHATE, DIBASIC 30 MMOL: 224; 236 INJECTION, SOLUTION, CONCENTRATE INTRAVENOUS at 09:08

## 2021-01-01 RX ADMIN — HYDROMORPHONE HYDROCHLORIDE 0.5 MG: 1 INJECTION, SOLUTION INTRAMUSCULAR; INTRAVENOUS; SUBCUTANEOUS at 17:28

## 2021-01-01 RX ADMIN — ONDANSETRON 4 MG: 2 INJECTION INTRAMUSCULAR; INTRAVENOUS at 08:45

## 2021-01-01 RX ADMIN — HYDROMORPHONE HYDROCHLORIDE 0.7 MG: 1 INJECTION, SOLUTION INTRAMUSCULAR; INTRAVENOUS; SUBCUTANEOUS at 04:12

## 2021-01-01 RX ADMIN — HYDROMORPHONE HYDROCHLORIDE 1 MG: 1 INJECTION, SOLUTION INTRAMUSCULAR; INTRAVENOUS; SUBCUTANEOUS at 21:31

## 2021-01-01 RX ADMIN — DEXTROSE AND SODIUM CHLORIDE 100 ML/HR: 5; .9 INJECTION, SOLUTION INTRAVENOUS at 14:59

## 2021-01-01 RX ADMIN — ONDANSETRON 4 MG: 2 INJECTION INTRAMUSCULAR; INTRAVENOUS at 10:28

## 2021-01-01 RX ADMIN — HYDROMORPHONE HYDROCHLORIDE 0.7 MG: 1 INJECTION, SOLUTION INTRAMUSCULAR; INTRAVENOUS; SUBCUTANEOUS at 16:30

## 2021-01-01 RX ADMIN — DIPHENHYDRAMINE HYDROCHLORIDE 50 MG: 50 INJECTION, SOLUTION INTRAMUSCULAR; INTRAVENOUS at 15:44

## 2021-01-01 RX ADMIN — METHYLNALTREXONE BROMIDE 4 MG: 8 INJECTION, SOLUTION SUBCUTANEOUS at 12:30

## 2021-01-01 RX ADMIN — DIPHENHYDRAMINE HYDROCHLORIDE 25 MG: 50 INJECTION, SOLUTION INTRAMUSCULAR; INTRAVENOUS at 10:12

## 2021-01-01 RX ADMIN — HYDROMORPHONE HYDROCHLORIDE 1 MG: 1 INJECTION, SOLUTION INTRAMUSCULAR; INTRAVENOUS; SUBCUTANEOUS at 05:25

## 2021-01-01 RX ADMIN — HEPARIN SODIUM 5000 UNITS: 5000 INJECTION INTRAVENOUS; SUBCUTANEOUS at 21:09

## 2021-01-01 RX ADMIN — DOCUSATE SODIUM AND SENNOSIDES 1 TABLET: 8.6; 5 TABLET ORAL at 17:38

## 2021-01-01 RX ADMIN — Medication: at 22:30

## 2021-01-01 RX ADMIN — PANTOPRAZOLE SODIUM 40 MG: 40 INJECTION, POWDER, FOR SOLUTION INTRAVENOUS at 09:22

## 2021-01-01 RX ADMIN — MORPHINE SULFATE 2 MG: 2 INJECTION, SOLUTION INTRAMUSCULAR; INTRAVENOUS at 03:20

## 2021-01-01 RX ADMIN — DIPHENHYDRAMINE HYDROCHLORIDE 25 MG: 50 INJECTION, SOLUTION INTRAMUSCULAR; INTRAVENOUS at 13:55

## 2021-01-01 RX ADMIN — HYDROMORPHONE HYDROCHLORIDE 0.5 MG: 1 INJECTION, SOLUTION INTRAMUSCULAR; INTRAVENOUS; SUBCUTANEOUS at 07:26

## 2021-01-01 RX ADMIN — HYDROMORPHONE HYDROCHLORIDE 1 MG: 1 INJECTION, SOLUTION INTRAMUSCULAR; INTRAVENOUS; SUBCUTANEOUS at 17:56

## 2021-01-01 RX ADMIN — SODIUM CHLORIDE, SODIUM LACTATE, POTASSIUM CHLORIDE, AND CALCIUM CHLORIDE 1000 ML: .6; .31; .03; .02 INJECTION, SOLUTION INTRAVENOUS at 14:43

## 2021-01-01 RX ADMIN — SODIUM CHLORIDE, SODIUM LACTATE, POTASSIUM CHLORIDE, AND CALCIUM CHLORIDE 125 ML/HR: .6; .31; .03; .02 INJECTION, SOLUTION INTRAVENOUS at 17:10

## 2021-01-01 RX ADMIN — HYDROMORPHONE HYDROCHLORIDE 0.5 MG: 1 INJECTION, SOLUTION INTRAMUSCULAR; INTRAVENOUS; SUBCUTANEOUS at 04:45

## 2021-01-01 RX ADMIN — HYDROMORPHONE HYDROCHLORIDE 0.5 MG: 1 INJECTION, SOLUTION INTRAMUSCULAR; INTRAVENOUS; SUBCUTANEOUS at 09:24

## 2021-01-01 RX ADMIN — PANTOPRAZOLE SODIUM 40 MG: 40 INJECTION, POWDER, FOR SOLUTION INTRAVENOUS at 14:54

## 2021-01-01 RX ADMIN — ESCITALOPRAM OXALATE 10 MG: 10 TABLET ORAL at 08:30

## 2021-01-01 RX ADMIN — ENOXAPARIN SODIUM 40 MG: 40 INJECTION SUBCUTANEOUS at 08:26

## 2021-01-01 RX ADMIN — ONDANSETRON 4 MG: 2 INJECTION INTRAMUSCULAR; INTRAVENOUS at 18:24

## 2021-01-01 RX ADMIN — PANTOPRAZOLE SODIUM 40 MG: 40 INJECTION, POWDER, FOR SOLUTION INTRAVENOUS at 08:23

## 2021-01-01 RX ADMIN — HYDROMORPHONE HYDROCHLORIDE 0.5 MG: 1 INJECTION, SOLUTION INTRAMUSCULAR; INTRAVENOUS; SUBCUTANEOUS at 22:47

## 2021-01-01 RX ADMIN — IOHEXOL 50 ML: 240 INJECTION, SOLUTION INTRATHECAL; INTRAVASCULAR; INTRAVENOUS; ORAL at 16:44

## 2021-01-01 RX ADMIN — ONDANSETRON 4 MG: 2 INJECTION INTRAMUSCULAR; INTRAVENOUS at 12:11

## 2021-01-01 RX ADMIN — DIPHENHYDRAMINE HYDROCHLORIDE 25 MG: 50 INJECTION, SOLUTION INTRAMUSCULAR; INTRAVENOUS at 02:07

## 2021-01-01 RX ADMIN — ENOXAPARIN SODIUM 40 MG: 40 INJECTION SUBCUTANEOUS at 13:31

## 2021-01-01 RX ADMIN — CYANOCOBALAMIN 1000 MCG: 1000 INJECTION INTRAMUSCULAR; SUBCUTANEOUS at 11:12

## 2021-01-01 RX ADMIN — HYDROMORPHONE HYDROCHLORIDE 0.5 MG: 1 INJECTION, SOLUTION INTRAMUSCULAR; INTRAVENOUS; SUBCUTANEOUS at 10:49

## 2021-01-01 RX ADMIN — MAGNESIUM 64 MG (MAGNESIUM CHLORIDE) TABLET,DELAYED RELEASE 64 MG: at 14:15

## 2021-01-01 RX ADMIN — ONDANSETRON 4 MG: 2 INJECTION INTRAMUSCULAR; INTRAVENOUS at 14:14

## 2021-01-01 RX ADMIN — HEPARIN SODIUM 5000 UNITS: 5000 INJECTION INTRAVENOUS; SUBCUTANEOUS at 17:51

## 2021-01-01 RX ADMIN — HYDROMORPHONE HYDROCHLORIDE 0.5 MG: 1 INJECTION, SOLUTION INTRAMUSCULAR; INTRAVENOUS; SUBCUTANEOUS at 02:06

## 2021-01-01 RX ADMIN — HYDROMORPHONE HYDROCHLORIDE 1 MG: 1 INJECTION, SOLUTION INTRAMUSCULAR; INTRAVENOUS; SUBCUTANEOUS at 01:23

## 2021-01-01 RX ADMIN — HYDROMORPHONE HYDROCHLORIDE 0.5 MG: 1 INJECTION, SOLUTION INTRAMUSCULAR; INTRAVENOUS; SUBCUTANEOUS at 11:09

## 2021-01-01 RX ADMIN — HYDROMORPHONE HYDROCHLORIDE 0.5 MG: 1 INJECTION, SOLUTION INTRAMUSCULAR; INTRAVENOUS; SUBCUTANEOUS at 20:04

## 2021-01-01 RX ADMIN — HEPARIN SODIUM 5000 UNITS: 5000 INJECTION INTRAVENOUS; SUBCUTANEOUS at 22:33

## 2021-01-01 RX ADMIN — ONDANSETRON 4 MG: 2 INJECTION INTRAMUSCULAR; INTRAVENOUS at 05:31

## 2021-01-01 RX ADMIN — IOHEXOL 100 ML: 350 INJECTION, SOLUTION INTRAVENOUS at 18:56

## 2021-01-01 RX ADMIN — HYDROMORPHONE HYDROCHLORIDE 1 MG: 1 INJECTION, SOLUTION INTRAMUSCULAR; INTRAVENOUS; SUBCUTANEOUS at 05:56

## 2021-01-01 RX ADMIN — DIPHENHYDRAMINE HYDROCHLORIDE 25 MG: 50 INJECTION, SOLUTION INTRAMUSCULAR; INTRAVENOUS at 07:27

## 2021-01-01 RX ADMIN — HYDROMORPHONE HYDROCHLORIDE 1 MG: 1 INJECTION, SOLUTION INTRAMUSCULAR; INTRAVENOUS; SUBCUTANEOUS at 08:12

## 2021-01-01 RX ADMIN — ESCITALOPRAM OXALATE 10 MG: 10 TABLET ORAL at 08:17

## 2021-01-01 RX ADMIN — HYDROMORPHONE HYDROCHLORIDE 0.5 MG: 1 INJECTION, SOLUTION INTRAMUSCULAR; INTRAVENOUS; SUBCUTANEOUS at 22:39

## 2021-01-01 RX ADMIN — HYDROMORPHONE HYDROCHLORIDE 0.5 MG: 1 INJECTION, SOLUTION INTRAMUSCULAR; INTRAVENOUS; SUBCUTANEOUS at 02:26

## 2021-01-01 RX ADMIN — DEXTROSE AND SODIUM CHLORIDE 100 ML/HR: 5; .9 INJECTION, SOLUTION INTRAVENOUS at 00:54

## 2021-01-01 RX ADMIN — Medication: at 15:37

## 2021-01-01 RX ADMIN — HYDROMORPHONE HYDROCHLORIDE 0.5 MG: 1 INJECTION, SOLUTION INTRAMUSCULAR; INTRAVENOUS; SUBCUTANEOUS at 08:25

## 2021-01-01 RX ADMIN — DIPHENHYDRAMINE HYDROCHLORIDE 50 MG: 50 INJECTION, SOLUTION INTRAMUSCULAR; INTRAVENOUS at 01:58

## 2021-01-01 RX ADMIN — HYDROMORPHONE HYDROCHLORIDE 0.5 MG: 1 INJECTION, SOLUTION INTRAMUSCULAR; INTRAVENOUS; SUBCUTANEOUS at 09:21

## 2021-01-01 RX ADMIN — POTASSIUM CHLORIDE 20 MEQ: 14.9 INJECTION, SOLUTION INTRAVENOUS at 19:32

## 2021-01-01 RX ADMIN — HYDROMORPHONE HYDROCHLORIDE 0.7 MG: 1 INJECTION, SOLUTION INTRAMUSCULAR; INTRAVENOUS; SUBCUTANEOUS at 00:54

## 2021-01-01 RX ADMIN — SODIUM CHLORIDE, SODIUM LACTATE, POTASSIUM CHLORIDE, AND CALCIUM CHLORIDE 100 ML/HR: .6; .31; .03; .02 INJECTION, SOLUTION INTRAVENOUS at 04:46

## 2021-01-01 RX ADMIN — HYDROMORPHONE HYDROCHLORIDE 0.5 MG: 1 INJECTION, SOLUTION INTRAMUSCULAR; INTRAVENOUS; SUBCUTANEOUS at 20:06

## 2021-01-01 RX ADMIN — HYDROMORPHONE HYDROCHLORIDE 0.5 MG: 1 INJECTION, SOLUTION INTRAMUSCULAR; INTRAVENOUS; SUBCUTANEOUS at 08:20

## 2021-01-01 RX ADMIN — HYDROMORPHONE HYDROCHLORIDE 0.5 MG: 1 INJECTION, SOLUTION INTRAMUSCULAR; INTRAVENOUS; SUBCUTANEOUS at 22:57

## 2021-01-01 RX ADMIN — HEPARIN SODIUM 5000 UNITS: 5000 INJECTION INTRAVENOUS; SUBCUTANEOUS at 22:44

## 2021-01-01 RX ADMIN — CYANOCOBALAMIN 1000 MCG: 1000 INJECTION INTRAMUSCULAR; SUBCUTANEOUS at 13:05

## 2021-01-01 RX ADMIN — METHYLNALTREXONE BROMIDE 8 MG: 8 INJECTION, SOLUTION SUBCUTANEOUS at 08:07

## 2021-01-01 RX ADMIN — HYDROMORPHONE HYDROCHLORIDE 0.7 MG: 1 INJECTION, SOLUTION INTRAMUSCULAR; INTRAVENOUS; SUBCUTANEOUS at 19:09

## 2021-01-01 RX ADMIN — PANTOPRAZOLE SODIUM 40 MG: 40 INJECTION, POWDER, FOR SOLUTION INTRAVENOUS at 08:03

## 2021-01-01 RX ADMIN — METHYLNALTREXONE BROMIDE 8 MG: 8 INJECTION, SOLUTION SUBCUTANEOUS at 08:41

## 2021-01-01 RX ADMIN — MORPHINE SULFATE 30 MG: 30 TABLET, EXTENDED RELEASE ORAL at 12:33

## 2021-01-01 RX ADMIN — HYDROMORPHONE HYDROCHLORIDE 0.2 MG: 1 INJECTION, SOLUTION INTRAMUSCULAR; INTRAVENOUS; SUBCUTANEOUS at 07:42

## 2021-01-01 RX ADMIN — HYDROMORPHONE HYDROCHLORIDE 0.5 MG: 1 INJECTION, SOLUTION INTRAMUSCULAR; INTRAVENOUS; SUBCUTANEOUS at 15:46

## 2021-01-01 RX ADMIN — Medication: at 22:00

## 2021-01-01 RX ADMIN — HEPARIN SODIUM 5000 UNITS: 5000 INJECTION INTRAVENOUS; SUBCUTANEOUS at 05:01

## 2021-01-01 RX ADMIN — SODIUM CHLORIDE, SODIUM LACTATE, POTASSIUM CHLORIDE, AND CALCIUM CHLORIDE 100 ML/HR: .6; .31; .03; .02 INJECTION, SOLUTION INTRAVENOUS at 22:56

## 2021-01-01 RX ADMIN — HYDROMORPHONE HYDROCHLORIDE 1 MG: 1 INJECTION, SOLUTION INTRAMUSCULAR; INTRAVENOUS; SUBCUTANEOUS at 20:03

## 2021-01-01 RX ADMIN — HEPARIN SODIUM 5000 UNITS: 5000 INJECTION INTRAVENOUS; SUBCUTANEOUS at 05:00

## 2021-01-01 RX ADMIN — DEXTROSE AND SODIUM CHLORIDE 75 ML/HR: 5; .9 INJECTION, SOLUTION INTRAVENOUS at 11:17

## 2021-01-01 RX ADMIN — HYDROMORPHONE HYDROCHLORIDE 1 MG: 1 INJECTION, SOLUTION INTRAMUSCULAR; INTRAVENOUS; SUBCUTANEOUS at 11:24

## 2021-01-01 RX ADMIN — KETAMINE HYDROCHLORIDE 25 MG: 50 INJECTION INTRAMUSCULAR; INTRAVENOUS at 10:02

## 2021-01-01 RX ADMIN — DIPHENHYDRAMINE HYDROCHLORIDE 25 MG: 50 INJECTION, SOLUTION INTRAMUSCULAR; INTRAVENOUS at 12:07

## 2021-01-01 RX ADMIN — HYDROMORPHONE HYDROCHLORIDE 0.5 MG: 1 INJECTION, SOLUTION INTRAMUSCULAR; INTRAVENOUS; SUBCUTANEOUS at 02:04

## 2021-01-01 RX ADMIN — ENOXAPARIN SODIUM 40 MG: 40 INJECTION SUBCUTANEOUS at 17:29

## 2021-01-01 RX ADMIN — HYDROMORPHONE HYDROCHLORIDE 0.7 MG: 1 INJECTION, SOLUTION INTRAMUSCULAR; INTRAVENOUS; SUBCUTANEOUS at 20:28

## 2021-01-01 RX ADMIN — PANTOPRAZOLE SODIUM 40 MG: 40 INJECTION, POWDER, FOR SOLUTION INTRAVENOUS at 16:36

## 2021-01-01 RX ADMIN — HYDROMORPHONE HYDROCHLORIDE 0.5 MG: 1 INJECTION, SOLUTION INTRAMUSCULAR; INTRAVENOUS; SUBCUTANEOUS at 13:56

## 2021-01-01 RX ADMIN — DIPHENHYDRAMINE HYDROCHLORIDE 50 MG: 50 INJECTION, SOLUTION INTRAMUSCULAR; INTRAVENOUS at 22:12

## 2021-01-01 RX ADMIN — METHYLNALTREXONE BROMIDE 8 MG: 8 INJECTION, SOLUTION SUBCUTANEOUS at 09:06

## 2021-01-01 RX ADMIN — HYDROMORPHONE HYDROCHLORIDE 0.5 MG: 1 INJECTION, SOLUTION INTRAMUSCULAR; INTRAVENOUS; SUBCUTANEOUS at 02:13

## 2021-01-01 RX ADMIN — HYDROMORPHONE HYDROCHLORIDE 0.5 MG: 1 INJECTION, SOLUTION INTRAMUSCULAR; INTRAVENOUS; SUBCUTANEOUS at 18:32

## 2021-01-01 RX ADMIN — HYDROMORPHONE HYDROCHLORIDE 1 MG: 1 INJECTION, SOLUTION INTRAMUSCULAR; INTRAVENOUS; SUBCUTANEOUS at 04:43

## 2021-01-01 RX ADMIN — SODIUM CHLORIDE 500 ML: 0.9 INJECTION, SOLUTION INTRAVENOUS at 15:38

## 2021-01-01 RX ADMIN — GLYCOPYRROLATE 0.2 MG: 0.2 INJECTION, SOLUTION INTRAMUSCULAR; INTRAVENOUS at 10:02

## 2021-01-01 RX ADMIN — HYDROMORPHONE HYDROCHLORIDE 1 MG: 1 INJECTION, SOLUTION INTRAMUSCULAR; INTRAVENOUS; SUBCUTANEOUS at 16:31

## 2021-01-01 RX ADMIN — HYDROMORPHONE HYDROCHLORIDE 1 MG: 1 INJECTION, SOLUTION INTRAMUSCULAR; INTRAVENOUS; SUBCUTANEOUS at 06:45

## 2021-01-01 RX ADMIN — SODIUM CHLORIDE, SODIUM LACTATE, POTASSIUM CHLORIDE, AND CALCIUM CHLORIDE 100 ML/HR: .6; .31; .03; .02 INJECTION, SOLUTION INTRAVENOUS at 16:35

## 2021-01-01 RX ADMIN — HYDROMORPHONE HYDROCHLORIDE 0.7 MG: 1 INJECTION, SOLUTION INTRAMUSCULAR; INTRAVENOUS; SUBCUTANEOUS at 14:29

## 2021-01-01 RX ADMIN — METHYLNALTREXONE BROMIDE 8 MG: 8 INJECTION, SOLUTION SUBCUTANEOUS at 07:30

## 2021-01-01 RX ADMIN — POLYETHYLENE GLYCOL 3350 17 G: 17 POWDER, FOR SOLUTION ORAL at 17:38

## 2021-01-01 RX ADMIN — HYDROMORPHONE HYDROCHLORIDE 0.5 MG: 1 INJECTION, SOLUTION INTRAMUSCULAR; INTRAVENOUS; SUBCUTANEOUS at 21:06

## 2021-01-01 RX ADMIN — HYDROMORPHONE HYDROCHLORIDE 0.5 MG: 1 INJECTION, SOLUTION INTRAMUSCULAR; INTRAVENOUS; SUBCUTANEOUS at 11:26

## 2021-01-01 RX ADMIN — BISACODYL 10 MG: 10 SUPPOSITORY RECTAL at 10:57

## 2021-01-01 RX ADMIN — CYANOCOBALAMIN 1000 MCG: 1000 INJECTION INTRAMUSCULAR; SUBCUTANEOUS at 10:47

## 2021-01-01 RX ADMIN — DEXTROSE, SODIUM CHLORIDE, AND POTASSIUM CHLORIDE 100 ML/HR: 5; .45; .15 INJECTION INTRAVENOUS at 09:33

## 2021-01-01 RX ADMIN — DIPHENHYDRAMINE HYDROCHLORIDE 25 MG: 50 INJECTION, SOLUTION INTRAMUSCULAR; INTRAVENOUS at 02:27

## 2021-01-01 RX ADMIN — HEPARIN SODIUM 5000 UNITS: 5000 INJECTION INTRAVENOUS; SUBCUTANEOUS at 13:20

## 2021-01-01 RX ADMIN — POTASSIUM CHLORIDE 20 MEQ: 14.9 INJECTION, SOLUTION INTRAVENOUS at 22:04

## 2021-01-01 RX ADMIN — ONDANSETRON 4 MG: 2 INJECTION INTRAMUSCULAR; INTRAVENOUS at 06:45

## 2021-01-01 RX ADMIN — IOHEXOL 100 ML: 350 INJECTION, SOLUTION INTRAVENOUS at 13:51

## 2021-01-01 RX ADMIN — SODIUM CHLORIDE, SODIUM LACTATE, POTASSIUM CHLORIDE, AND CALCIUM CHLORIDE 100 ML/HR: .6; .31; .03; .02 INJECTION, SOLUTION INTRAVENOUS at 09:26

## 2021-01-01 RX ADMIN — PANTOPRAZOLE SODIUM 40 MG: 40 INJECTION, POWDER, FOR SOLUTION INTRAVENOUS at 17:28

## 2021-01-01 RX ADMIN — DIPHENHYDRAMINE HYDROCHLORIDE 50 MG: 50 INJECTION, SOLUTION INTRAMUSCULAR; INTRAVENOUS at 08:40

## 2021-01-01 RX ADMIN — HYDROMORPHONE HYDROCHLORIDE 0.5 MG: 1 INJECTION, SOLUTION INTRAMUSCULAR; INTRAVENOUS; SUBCUTANEOUS at 20:34

## 2021-01-01 RX ADMIN — DIPHENHYDRAMINE HYDROCHLORIDE 50 MG: 50 INJECTION, SOLUTION INTRAMUSCULAR; INTRAVENOUS at 21:54

## 2021-01-01 RX ADMIN — HYDROMORPHONE HYDROCHLORIDE 0.5 MG: 1 INJECTION, SOLUTION INTRAMUSCULAR; INTRAVENOUS; SUBCUTANEOUS at 06:33

## 2021-01-01 RX ADMIN — HYDROMORPHONE HYDROCHLORIDE 1 MG: 1 INJECTION, SOLUTION INTRAMUSCULAR; INTRAVENOUS; SUBCUTANEOUS at 00:31

## 2021-01-01 RX ADMIN — PANTOPRAZOLE SODIUM 40 MG: 40 INJECTION, POWDER, FOR SOLUTION INTRAVENOUS at 09:06

## 2021-01-01 RX ADMIN — SODIUM CHLORIDE 0.15 MG/KG/HR: 0.9 INJECTION, SOLUTION INTRAVENOUS at 11:27

## 2021-01-01 RX ADMIN — HYDROMORPHONE HYDROCHLORIDE 0.5 MG: 1 INJECTION, SOLUTION INTRAMUSCULAR; INTRAVENOUS; SUBCUTANEOUS at 13:06

## 2021-01-01 RX ADMIN — DIPHENHYDRAMINE HYDROCHLORIDE 50 MG: 50 INJECTION, SOLUTION INTRAMUSCULAR; INTRAVENOUS at 19:48

## 2021-01-01 RX ADMIN — DIAZEPAM 5 MG: 10 INJECTION, SOLUTION INTRAMUSCULAR; INTRAVENOUS at 07:04

## 2021-01-01 RX ADMIN — ONDANSETRON 4 MG: 2 INJECTION INTRAMUSCULAR; INTRAVENOUS at 12:33

## 2021-01-01 RX ADMIN — HEPARIN SODIUM 5000 UNITS: 5000 INJECTION INTRAVENOUS; SUBCUTANEOUS at 15:23

## 2021-01-01 RX ADMIN — ENOXAPARIN SODIUM 40 MG: 40 INJECTION SUBCUTANEOUS at 08:54

## 2021-01-01 RX ADMIN — DIPHENHYDRAMINE HYDROCHLORIDE 50 MG: 50 INJECTION, SOLUTION INTRAMUSCULAR; INTRAVENOUS at 19:11

## 2021-01-01 RX ADMIN — HYDROMORPHONE HYDROCHLORIDE 1 MG: 1 INJECTION, SOLUTION INTRAMUSCULAR; INTRAVENOUS; SUBCUTANEOUS at 20:15

## 2021-01-01 RX ADMIN — Medication: at 22:54

## 2021-01-01 RX ADMIN — ENOXAPARIN SODIUM 40 MG: 40 INJECTION SUBCUTANEOUS at 08:04

## 2021-01-01 RX ADMIN — ONDANSETRON 4 MG: 2 INJECTION INTRAMUSCULAR; INTRAVENOUS at 02:13

## 2021-01-01 RX ADMIN — HYDROMORPHONE HYDROCHLORIDE 1 MG: 1 INJECTION, SOLUTION INTRAMUSCULAR; INTRAVENOUS; SUBCUTANEOUS at 18:22

## 2021-01-01 RX ADMIN — MAGNESIUM SULFATE HEPTAHYDRATE 1 G: 1 INJECTION, SOLUTION INTRAVENOUS at 13:30

## 2021-01-01 RX ADMIN — CYANOCOBALAMIN 1000 MCG: 1000 INJECTION INTRAMUSCULAR; SUBCUTANEOUS at 12:02

## 2021-01-01 RX ADMIN — HYDROMORPHONE HYDROCHLORIDE 1 MG: 1 INJECTION, SOLUTION INTRAMUSCULAR; INTRAVENOUS; SUBCUTANEOUS at 02:33

## 2021-01-01 RX ADMIN — DEXTROSE AND SODIUM CHLORIDE 75 ML/HR: 5; .9 INJECTION, SOLUTION INTRAVENOUS at 21:12

## 2021-01-01 RX ADMIN — DEXTROSE AND SODIUM CHLORIDE 100 ML/HR: 5; .9 INJECTION, SOLUTION INTRAVENOUS at 04:11

## 2021-01-01 RX ADMIN — DEXTROSE AND SODIUM CHLORIDE 100 ML/HR: 5; .9 INJECTION, SOLUTION INTRAVENOUS at 06:14

## 2021-01-01 RX ADMIN — ONDANSETRON 4 MG: 2 INJECTION INTRAMUSCULAR; INTRAVENOUS at 17:30

## 2021-01-01 RX ADMIN — HYDROMORPHONE HYDROCHLORIDE 1 MG: 1 INJECTION, SOLUTION INTRAMUSCULAR; INTRAVENOUS; SUBCUTANEOUS at 10:56

## 2021-01-01 RX ADMIN — HYDROMORPHONE HYDROCHLORIDE 1 MG: 1 INJECTION, SOLUTION INTRAMUSCULAR; INTRAVENOUS; SUBCUTANEOUS at 06:27

## 2021-01-01 RX ADMIN — HYDROMORPHONE HYDROCHLORIDE 0.5 MG: 1 INJECTION, SOLUTION INTRAMUSCULAR; INTRAVENOUS; SUBCUTANEOUS at 22:32

## 2021-01-01 RX ADMIN — DIPHENHYDRAMINE HYDROCHLORIDE 25 MG: 50 INJECTION, SOLUTION INTRAMUSCULAR; INTRAVENOUS at 00:17

## 2021-01-01 RX ADMIN — HYDROMORPHONE HYDROCHLORIDE 0.5 MG: 1 INJECTION, SOLUTION INTRAMUSCULAR; INTRAVENOUS; SUBCUTANEOUS at 23:00

## 2021-01-01 RX ADMIN — HYDROMORPHONE HYDROCHLORIDE 0.7 MG: 1 INJECTION, SOLUTION INTRAMUSCULAR; INTRAVENOUS; SUBCUTANEOUS at 21:20

## 2021-01-01 RX ADMIN — HYDROMORPHONE HYDROCHLORIDE 0.5 MG: 1 INJECTION, SOLUTION INTRAMUSCULAR; INTRAVENOUS; SUBCUTANEOUS at 21:03

## 2021-01-01 RX ADMIN — ONDANSETRON 4 MG: 2 INJECTION INTRAMUSCULAR; INTRAVENOUS at 02:25

## 2021-01-01 RX ADMIN — SODIUM CHLORIDE 500 ML: 0.9 INJECTION, SOLUTION INTRAVENOUS at 00:22

## 2021-01-01 RX ADMIN — ONDANSETRON 4 MG: 2 INJECTION INTRAMUSCULAR; INTRAVENOUS at 13:37

## 2021-01-01 RX ADMIN — HYDROMORPHONE HYDROCHLORIDE 1 MG: 1 INJECTION, SOLUTION INTRAMUSCULAR; INTRAVENOUS; SUBCUTANEOUS at 09:23

## 2021-01-01 RX ADMIN — ENOXAPARIN SODIUM 40 MG: 40 INJECTION SUBCUTANEOUS at 08:51

## 2021-01-01 RX ADMIN — METHYLNALTREXONE BROMIDE 8 MG: 8 INJECTION, SOLUTION SUBCUTANEOUS at 09:29

## 2021-01-01 RX ADMIN — HYDROMORPHONE HYDROCHLORIDE 0.5 MG: 1 INJECTION, SOLUTION INTRAMUSCULAR; INTRAVENOUS; SUBCUTANEOUS at 09:54

## 2021-01-01 RX ADMIN — HYDROMORPHONE HYDROCHLORIDE 0.5 MG: 1 INJECTION, SOLUTION INTRAMUSCULAR; INTRAVENOUS; SUBCUTANEOUS at 18:12

## 2021-01-01 RX ADMIN — HYDROMORPHONE HYDROCHLORIDE 2 MG: 2 INJECTION INTRAMUSCULAR; INTRAVENOUS; SUBCUTANEOUS at 08:17

## 2021-01-01 RX ADMIN — HYDROMORPHONE HYDROCHLORIDE 0.5 MG: 1 INJECTION, SOLUTION INTRAMUSCULAR; INTRAVENOUS; SUBCUTANEOUS at 09:09

## 2021-01-01 RX ADMIN — DEXTROSE AND SODIUM CHLORIDE 100 ML/HR: 5; .9 INJECTION, SOLUTION INTRAVENOUS at 17:46

## 2021-01-01 RX ADMIN — HYDROMORPHONE HYDROCHLORIDE 0.5 MG: 1 INJECTION, SOLUTION INTRAMUSCULAR; INTRAVENOUS; SUBCUTANEOUS at 18:15

## 2021-01-01 RX ADMIN — IOHEXOL 100 ML: 350 INJECTION, SOLUTION INTRAVENOUS at 17:46

## 2021-01-01 RX ADMIN — SODIUM CHLORIDE 500 ML: 0.9 INJECTION, SOLUTION INTRAVENOUS at 23:59

## 2021-01-01 RX ADMIN — HYDROMORPHONE HYDROCHLORIDE 0.5 MG: 1 INJECTION, SOLUTION INTRAMUSCULAR; INTRAVENOUS; SUBCUTANEOUS at 12:03

## 2021-01-01 RX ADMIN — HYDROMORPHONE HYDROCHLORIDE 0.5 MG: 1 INJECTION, SOLUTION INTRAMUSCULAR; INTRAVENOUS; SUBCUTANEOUS at 15:43

## 2021-01-01 RX ADMIN — FENTANYL CITRATE 50 MCG: 50 INJECTION INTRAMUSCULAR; INTRAVENOUS at 13:38

## 2021-01-01 RX ADMIN — METHYLNALTREXONE BROMIDE 8 MG: 8 INJECTION, SOLUTION SUBCUTANEOUS at 23:51

## 2021-01-01 RX ADMIN — HYDROMORPHONE HYDROCHLORIDE 0.5 MG: 1 INJECTION, SOLUTION INTRAMUSCULAR; INTRAVENOUS; SUBCUTANEOUS at 22:21

## 2021-01-01 RX ADMIN — HYDROMORPHONE HYDROCHLORIDE 0.5 MG: 1 INJECTION, SOLUTION INTRAMUSCULAR; INTRAVENOUS; SUBCUTANEOUS at 18:08

## 2021-01-01 RX ADMIN — FERROUS SULFATE TAB 325 MG (65 MG ELEMENTAL FE) 325 MG: 325 (65 FE) TAB at 08:30

## 2021-01-01 RX ADMIN — METHYLNALTREXONE BROMIDE 4 MG: 8 INJECTION, SOLUTION SUBCUTANEOUS at 09:54

## 2021-01-01 RX ADMIN — HEPARIN SODIUM 5000 UNITS: 5000 INJECTION INTRAVENOUS; SUBCUTANEOUS at 05:23

## 2021-01-01 RX ADMIN — FENTANYL 25 MCG: 25 PATCH TRANSDERMAL at 12:24

## 2021-01-01 RX ADMIN — METHYLNALTREXONE BROMIDE 4 MG: 8 INJECTION, SOLUTION SUBCUTANEOUS at 09:17

## 2021-01-01 RX ADMIN — GABAPENTIN 600 MG: 300 CAPSULE ORAL at 08:17

## 2021-01-01 RX ADMIN — ONDANSETRON 4 MG: 2 INJECTION INTRAMUSCULAR; INTRAVENOUS at 11:25

## 2021-01-01 RX ADMIN — HYDROMORPHONE HYDROCHLORIDE 1 MG: 1 INJECTION, SOLUTION INTRAMUSCULAR; INTRAVENOUS; SUBCUTANEOUS at 16:53

## 2021-01-01 RX ADMIN — HYDROMORPHONE HYDROCHLORIDE 0.5 MG: 1 INJECTION, SOLUTION INTRAMUSCULAR; INTRAVENOUS; SUBCUTANEOUS at 05:31

## 2021-01-01 RX ADMIN — HYDROMORPHONE HYDROCHLORIDE 1 MG: 1 INJECTION, SOLUTION INTRAMUSCULAR; INTRAVENOUS; SUBCUTANEOUS at 12:24

## 2021-01-01 RX ADMIN — METHYLNALTREXONE BROMIDE 8 MG: 8 INJECTION, SOLUTION SUBCUTANEOUS at 08:40

## 2021-01-01 RX ADMIN — HYDROMORPHONE HYDROCHLORIDE 0.5 MG: 1 INJECTION, SOLUTION INTRAMUSCULAR; INTRAVENOUS; SUBCUTANEOUS at 23:45

## 2021-01-01 RX ADMIN — Medication 1 SPRAY: at 14:00

## 2021-01-01 RX ADMIN — LIDOCAINE 5% 2 PATCH: 700 PATCH TOPICAL at 11:57

## 2021-01-01 RX ADMIN — HYDROMORPHONE HYDROCHLORIDE 0.5 MG: 1 INJECTION, SOLUTION INTRAMUSCULAR; INTRAVENOUS; SUBCUTANEOUS at 21:09

## 2021-01-01 RX ADMIN — HYDROMORPHONE HYDROCHLORIDE 0.7 MG: 1 INJECTION, SOLUTION INTRAMUSCULAR; INTRAVENOUS; SUBCUTANEOUS at 12:04

## 2021-01-01 RX ADMIN — HEPARIN SODIUM 5000 UNITS: 5000 INJECTION INTRAVENOUS; SUBCUTANEOUS at 14:21

## 2021-01-01 RX ADMIN — HYDROMORPHONE HYDROCHLORIDE 0.5 MG: 1 INJECTION, SOLUTION INTRAMUSCULAR; INTRAVENOUS; SUBCUTANEOUS at 17:30

## 2021-01-01 RX ADMIN — DIPHENHYDRAMINE HYDROCHLORIDE 50 MG: 50 INJECTION, SOLUTION INTRAMUSCULAR; INTRAVENOUS at 04:19

## 2021-01-01 RX ADMIN — DIPHENHYDRAMINE HYDROCHLORIDE 50 MG: 50 INJECTION, SOLUTION INTRAMUSCULAR; INTRAVENOUS at 05:21

## 2021-01-01 RX ADMIN — FENTANYL CITRATE 50 MCG: 50 INJECTION INTRAMUSCULAR; INTRAVENOUS at 18:40

## 2021-01-01 RX ADMIN — HYDROMORPHONE HYDROCHLORIDE 0.5 MG: 1 INJECTION, SOLUTION INTRAMUSCULAR; INTRAVENOUS; SUBCUTANEOUS at 04:57

## 2021-01-01 RX ADMIN — HYDROMORPHONE HYDROCHLORIDE 0.7 MG: 1 INJECTION, SOLUTION INTRAMUSCULAR; INTRAVENOUS; SUBCUTANEOUS at 11:09

## 2021-01-01 RX ADMIN — SODIUM CHLORIDE, SODIUM LACTATE, POTASSIUM CHLORIDE, AND CALCIUM CHLORIDE 125 ML/HR: .6; .31; .03; .02 INJECTION, SOLUTION INTRAVENOUS at 09:16

## 2021-01-01 RX ADMIN — DIPHENHYDRAMINE HYDROCHLORIDE 25 MG: 50 INJECTION, SOLUTION INTRAMUSCULAR; INTRAVENOUS at 03:20

## 2021-01-01 RX ADMIN — Medication: at 11:17

## 2021-01-01 RX ADMIN — CALCIUM GLUCONATE: 94 INJECTION, SOLUTION INTRAVENOUS at 21:02

## 2021-01-01 RX ADMIN — HYDROMORPHONE HYDROCHLORIDE 1 MG: 1 INJECTION, SOLUTION INTRAMUSCULAR; INTRAVENOUS; SUBCUTANEOUS at 09:27

## 2021-01-01 RX ADMIN — HYDROMORPHONE HYDROCHLORIDE 2 MG: 2 INJECTION INTRAMUSCULAR; INTRAVENOUS; SUBCUTANEOUS at 12:36

## 2021-01-01 RX ADMIN — HYDROMORPHONE HYDROCHLORIDE 1 MG: 1 INJECTION, SOLUTION INTRAMUSCULAR; INTRAVENOUS; SUBCUTANEOUS at 04:06

## 2021-01-01 RX ADMIN — SODIUM CHLORIDE, SODIUM LACTATE, POTASSIUM CHLORIDE, AND CALCIUM CHLORIDE 100 ML/HR: .6; .31; .03; .02 INJECTION, SOLUTION INTRAVENOUS at 15:17

## 2021-01-01 RX ADMIN — HYDROMORPHONE HYDROCHLORIDE 0.5 MG: 1 INJECTION, SOLUTION INTRAMUSCULAR; INTRAVENOUS; SUBCUTANEOUS at 16:14

## 2021-01-01 RX ADMIN — DEXTROSE, SODIUM CHLORIDE, AND POTASSIUM CHLORIDE 100 ML/HR: 5; .45; .15 INJECTION INTRAVENOUS at 00:34

## 2021-01-01 RX ADMIN — ENOXAPARIN SODIUM 40 MG: 40 INJECTION SUBCUTANEOUS at 08:14

## 2021-01-01 RX ADMIN — PANTOPRAZOLE SODIUM 40 MG: 40 INJECTION, POWDER, FOR SOLUTION INTRAVENOUS at 08:51

## 2021-01-01 RX ADMIN — HYDROMORPHONE HYDROCHLORIDE 0.5 MG: 1 INJECTION, SOLUTION INTRAMUSCULAR; INTRAVENOUS; SUBCUTANEOUS at 13:29

## 2021-01-01 RX ADMIN — HYDROMORPHONE HYDROCHLORIDE 1 MG: 1 INJECTION, SOLUTION INTRAMUSCULAR; INTRAVENOUS; SUBCUTANEOUS at 23:58

## 2021-01-01 RX ADMIN — HYDROMORPHONE HYDROCHLORIDE 0.5 MG: 1 INJECTION, SOLUTION INTRAMUSCULAR; INTRAVENOUS; SUBCUTANEOUS at 02:31

## 2021-01-01 RX ADMIN — PANTOPRAZOLE SODIUM 40 MG: 40 INJECTION, POWDER, FOR SOLUTION INTRAVENOUS at 08:05

## 2021-01-01 RX ADMIN — ONDANSETRON 4 MG: 2 INJECTION INTRAMUSCULAR; INTRAVENOUS at 02:19

## 2021-01-01 RX ADMIN — SODIUM CHLORIDE, SODIUM LACTATE, POTASSIUM CHLORIDE, AND CALCIUM CHLORIDE 1000 ML: .6; .31; .03; .02 INJECTION, SOLUTION INTRAVENOUS at 13:36

## 2021-01-01 RX ADMIN — HYDROMORPHONE HYDROCHLORIDE 2 MG: 2 INJECTION INTRAMUSCULAR; INTRAVENOUS; SUBCUTANEOUS at 05:22

## 2021-01-01 RX ADMIN — IOHEXOL 100 ML: 350 INJECTION, SOLUTION INTRAVENOUS at 16:24

## 2021-01-01 RX ADMIN — HYDROMORPHONE HYDROCHLORIDE 0.5 MG: 1 INJECTION, SOLUTION INTRAMUSCULAR; INTRAVENOUS; SUBCUTANEOUS at 10:11

## 2021-01-01 RX ADMIN — DEXTROSE, SODIUM CHLORIDE, AND POTASSIUM CHLORIDE 100 ML/HR: 5; .45; .15 INJECTION INTRAVENOUS at 08:58

## 2021-01-01 RX ADMIN — HEPARIN SODIUM 5000 UNITS: 5000 INJECTION INTRAVENOUS; SUBCUTANEOUS at 21:48

## 2021-01-01 RX ADMIN — METHYLNALTREXONE BROMIDE 12 MG: 12 INJECTION, SOLUTION SUBCUTANEOUS at 22:32

## 2021-01-01 RX ADMIN — FENTANYL CITRATE 50 MCG: 50 INJECTION INTRAMUSCULAR; INTRAVENOUS at 17:06

## 2021-01-01 RX ADMIN — HYDROMORPHONE HYDROCHLORIDE 0.5 MG: 1 INJECTION, SOLUTION INTRAMUSCULAR; INTRAVENOUS; SUBCUTANEOUS at 03:18

## 2021-01-01 RX ADMIN — ONDANSETRON 4 MG: 2 INJECTION INTRAMUSCULAR; INTRAVENOUS at 03:23

## 2021-01-01 RX ADMIN — ONDANSETRON 4 MG: 2 INJECTION INTRAMUSCULAR; INTRAVENOUS at 16:39

## 2021-01-01 RX ADMIN — ENOXAPARIN SODIUM 40 MG: 40 INJECTION SUBCUTANEOUS at 08:07

## 2021-01-01 RX ADMIN — HYDROMORPHONE HYDROCHLORIDE 1 MG: 1 INJECTION, SOLUTION INTRAMUSCULAR; INTRAVENOUS; SUBCUTANEOUS at 18:15

## 2021-01-01 RX ADMIN — HYDROMORPHONE HYDROCHLORIDE 2 MG: 2 INJECTION INTRAMUSCULAR; INTRAVENOUS; SUBCUTANEOUS at 10:28

## 2021-01-01 RX ADMIN — HYDROMORPHONE HYDROCHLORIDE 0.5 MG: 1 INJECTION, SOLUTION INTRAMUSCULAR; INTRAVENOUS; SUBCUTANEOUS at 22:44

## 2021-01-01 RX ADMIN — IOHEXOL 100 ML: 350 INJECTION, SOLUTION INTRAVENOUS at 16:44

## 2021-01-01 RX ADMIN — SODIUM CHLORIDE 500 ML: 0.9 INJECTION, SOLUTION INTRAVENOUS at 03:43

## 2021-01-01 RX ADMIN — HYDROMORPHONE HYDROCHLORIDE 0.5 MG: 1 INJECTION, SOLUTION INTRAMUSCULAR; INTRAVENOUS; SUBCUTANEOUS at 05:35

## 2021-01-01 RX ADMIN — PANTOPRAZOLE SODIUM 40 MG: 40 INJECTION, POWDER, FOR SOLUTION INTRAVENOUS at 08:15

## 2021-01-01 RX ADMIN — SODIUM CHLORIDE 500 ML: 0.9 INJECTION, SOLUTION INTRAVENOUS at 23:44

## 2021-01-01 RX ADMIN — LACTULOSE 20 G: 10 SOLUTION ORAL at 13:11

## 2021-01-01 RX ADMIN — METHYLNALTREXONE BROMIDE 4 MG: 8 INJECTION, SOLUTION SUBCUTANEOUS at 15:20

## 2021-01-01 RX ADMIN — PANTOPRAZOLE SODIUM 40 MG: 40 INJECTION, POWDER, FOR SOLUTION INTRAVENOUS at 08:21

## 2021-01-01 RX ADMIN — PANTOPRAZOLE SODIUM 40 MG: 40 INJECTION, POWDER, FOR SOLUTION INTRAVENOUS at 08:07

## 2021-01-01 RX ADMIN — HYDROMORPHONE HYDROCHLORIDE 0.7 MG: 1 INJECTION, SOLUTION INTRAMUSCULAR; INTRAVENOUS; SUBCUTANEOUS at 18:13

## 2021-01-01 RX ADMIN — HYDROMORPHONE HYDROCHLORIDE 0.5 MG: 1 INJECTION, SOLUTION INTRAMUSCULAR; INTRAVENOUS; SUBCUTANEOUS at 12:14

## 2021-01-01 RX ADMIN — ALBUMIN (HUMAN) 12.5 G: 12.5 INJECTION, SOLUTION INTRAVENOUS at 02:37

## 2021-01-01 RX ADMIN — HYDROMORPHONE HYDROCHLORIDE 1 MG: 1 INJECTION, SOLUTION INTRAMUSCULAR; INTRAVENOUS; SUBCUTANEOUS at 22:00

## 2021-01-01 RX ADMIN — HYDROMORPHONE HYDROCHLORIDE 1 MG: 1 INJECTION, SOLUTION INTRAMUSCULAR; INTRAVENOUS; SUBCUTANEOUS at 03:55

## 2021-01-01 RX ADMIN — Medication: at 22:45

## 2021-01-01 RX ADMIN — ENOXAPARIN SODIUM 40 MG: 40 INJECTION SUBCUTANEOUS at 16:41

## 2021-01-01 RX ADMIN — HYDROMORPHONE HYDROCHLORIDE 0.5 MG: 1 INJECTION, SOLUTION INTRAMUSCULAR; INTRAVENOUS; SUBCUTANEOUS at 03:53

## 2021-01-01 RX ADMIN — DIPHENHYDRAMINE HYDROCHLORIDE 25 MG: 50 INJECTION, SOLUTION INTRAMUSCULAR; INTRAVENOUS at 18:13

## 2021-01-01 RX ADMIN — DIPHENHYDRAMINE HCL 25 MG: 25 TABLET, COATED ORAL at 14:15

## 2021-01-01 RX ADMIN — METHYLNALTREXONE BROMIDE 6 MG: 12 INJECTION, SOLUTION SUBCUTANEOUS at 17:52

## 2021-01-01 RX ADMIN — POTASSIUM CHLORIDE 20 MEQ: 14.9 INJECTION, SOLUTION INTRAVENOUS at 08:04

## 2021-01-01 RX ADMIN — ENOXAPARIN SODIUM 40 MG: 40 INJECTION SUBCUTANEOUS at 09:06

## 2021-01-01 RX ADMIN — METHYLNALTREXONE BROMIDE 6 MG: 8 INJECTION, SOLUTION SUBCUTANEOUS at 13:07

## 2021-01-01 RX ADMIN — HYDROMORPHONE HYDROCHLORIDE 1 MG: 1 INJECTION, SOLUTION INTRAMUSCULAR; INTRAVENOUS; SUBCUTANEOUS at 09:07

## 2021-01-01 RX ADMIN — ONDANSETRON 4 MG: 2 INJECTION INTRAMUSCULAR; INTRAVENOUS at 22:12

## 2021-01-01 RX ADMIN — CALCIUM GLUCONATE: 94 INJECTION, SOLUTION INTRAVENOUS at 22:13

## 2021-01-01 RX ADMIN — HYDROMORPHONE HYDROCHLORIDE 0.5 MG: 1 INJECTION, SOLUTION INTRAMUSCULAR; INTRAVENOUS; SUBCUTANEOUS at 00:35

## 2021-01-01 RX ADMIN — DIPHENHYDRAMINE HYDROCHLORIDE 25 MG: 50 INJECTION, SOLUTION INTRAMUSCULAR; INTRAVENOUS at 20:29

## 2021-01-01 RX ADMIN — HYDROMORPHONE HYDROCHLORIDE 1 MG: 1 INJECTION, SOLUTION INTRAMUSCULAR; INTRAVENOUS; SUBCUTANEOUS at 04:12

## 2021-01-01 RX ADMIN — ONDANSETRON 4 MG: 2 INJECTION INTRAMUSCULAR; INTRAVENOUS at 06:19

## 2021-01-01 RX ADMIN — HYDROMORPHONE HYDROCHLORIDE 1 MG: 1 INJECTION, SOLUTION INTRAMUSCULAR; INTRAVENOUS; SUBCUTANEOUS at 02:29

## 2021-01-01 RX ADMIN — CALCIUM GLUCONATE: 94 INJECTION, SOLUTION INTRAVENOUS at 22:02

## 2021-01-01 RX ADMIN — PANTOPRAZOLE SODIUM 40 MG: 40 INJECTION, POWDER, FOR SOLUTION INTRAVENOUS at 08:37

## 2021-01-01 RX ADMIN — HYDROMORPHONE HYDROCHLORIDE 0.7 MG: 1 INJECTION, SOLUTION INTRAMUSCULAR; INTRAVENOUS; SUBCUTANEOUS at 08:03

## 2021-01-01 RX ADMIN — ONDANSETRON 4 MG: 2 INJECTION INTRAMUSCULAR; INTRAVENOUS at 22:44

## 2021-01-01 RX ADMIN — DIPHENHYDRAMINE HYDROCHLORIDE 25 MG: 50 INJECTION, SOLUTION INTRAMUSCULAR; INTRAVENOUS at 09:42

## 2021-01-01 RX ADMIN — HYDROMORPHONE HYDROCHLORIDE 0.5 MG: 1 INJECTION, SOLUTION INTRAMUSCULAR; INTRAVENOUS; SUBCUTANEOUS at 20:02

## 2021-01-01 RX ADMIN — ONDANSETRON 4 MG: 2 INJECTION INTRAMUSCULAR; INTRAVENOUS at 15:45

## 2021-01-01 RX ADMIN — HYDROMORPHONE HYDROCHLORIDE 1 MG: 1 INJECTION, SOLUTION INTRAMUSCULAR; INTRAVENOUS; SUBCUTANEOUS at 05:09

## 2021-01-01 RX ADMIN — HYDROMORPHONE HYDROCHLORIDE 1 MG: 1 INJECTION, SOLUTION INTRAMUSCULAR; INTRAVENOUS; SUBCUTANEOUS at 07:04

## 2021-01-01 RX ADMIN — METHYLNALTREXONE BROMIDE 8 MG: 8 INJECTION, SOLUTION SUBCUTANEOUS at 11:44

## 2021-01-01 RX ADMIN — HYDROMORPHONE HYDROCHLORIDE 0.5 MG: 1 INJECTION, SOLUTION INTRAMUSCULAR; INTRAVENOUS; SUBCUTANEOUS at 23:16

## 2021-01-01 RX ADMIN — HYDROMORPHONE HYDROCHLORIDE 0.5 MG: 1 INJECTION, SOLUTION INTRAMUSCULAR; INTRAVENOUS; SUBCUTANEOUS at 16:58

## 2021-01-01 RX ADMIN — HYDROMORPHONE HYDROCHLORIDE 2 MG: 2 INJECTION INTRAMUSCULAR; INTRAVENOUS; SUBCUTANEOUS at 01:13

## 2021-01-01 RX ADMIN — FENTANYL CITRATE 50 MCG: 50 INJECTION INTRAMUSCULAR; INTRAVENOUS at 14:42

## 2021-01-01 RX ADMIN — DIPHENHYDRAMINE HYDROCHLORIDE 25 MG: 50 INJECTION, SOLUTION INTRAMUSCULAR; INTRAVENOUS at 14:57

## 2021-01-01 RX ADMIN — HYDROMORPHONE HYDROCHLORIDE 0.5 MG: 1 INJECTION, SOLUTION INTRAMUSCULAR; INTRAVENOUS; SUBCUTANEOUS at 05:36

## 2021-01-01 RX ADMIN — ONDANSETRON 4 MG: 2 INJECTION INTRAMUSCULAR; INTRAVENOUS at 16:53

## 2021-01-01 RX ADMIN — SODIUM CHLORIDE 1000 ML: 0.9 INJECTION, SOLUTION INTRAVENOUS at 12:09

## 2021-01-01 RX ADMIN — Medication: at 21:21

## 2021-01-01 RX ADMIN — SODIUM CHLORIDE, SODIUM LACTATE, POTASSIUM CHLORIDE, AND CALCIUM CHLORIDE 125 ML/HR: .6; .31; .03; .02 INJECTION, SOLUTION INTRAVENOUS at 17:04

## 2021-01-01 RX ADMIN — HYDROMORPHONE HYDROCHLORIDE 0.5 MG: 1 INJECTION, SOLUTION INTRAMUSCULAR; INTRAVENOUS; SUBCUTANEOUS at 07:36

## 2021-01-01 RX ADMIN — CYANOCOBALAMIN 1000 MCG: 1000 INJECTION INTRAMUSCULAR; SUBCUTANEOUS at 16:14

## 2021-01-01 RX ADMIN — HYDROMORPHONE HYDROCHLORIDE 0.7 MG: 1 INJECTION, SOLUTION INTRAMUSCULAR; INTRAVENOUS; SUBCUTANEOUS at 00:42

## 2021-01-01 RX ADMIN — DIPHENHYDRAMINE HYDROCHLORIDE 50 MG: 50 INJECTION, SOLUTION INTRAMUSCULAR; INTRAVENOUS at 10:40

## 2021-01-01 RX ADMIN — POTASSIUM & SODIUM PHOSPHATES POWDER PACK 280-160-250 MG 1 PACKET: 280-160-250 PACK at 09:51

## 2021-01-01 RX ADMIN — DIPHENHYDRAMINE HYDROCHLORIDE 50 MG: 50 INJECTION, SOLUTION INTRAMUSCULAR; INTRAVENOUS at 07:12

## 2021-01-01 RX ADMIN — MAGNESIUM SULFATE HEPTAHYDRATE 2 G: 40 INJECTION, SOLUTION INTRAVENOUS at 09:39

## 2021-01-01 RX ADMIN — HYDROMORPHONE HYDROCHLORIDE 1 MG: 1 INJECTION, SOLUTION INTRAMUSCULAR; INTRAVENOUS; SUBCUTANEOUS at 19:23

## 2021-01-01 RX ADMIN — HYDROMORPHONE HYDROCHLORIDE 0.5 MG: 1 INJECTION, SOLUTION INTRAMUSCULAR; INTRAVENOUS; SUBCUTANEOUS at 12:35

## 2021-01-01 RX ADMIN — ONDANSETRON 4 MG: 2 INJECTION INTRAMUSCULAR; INTRAVENOUS at 13:20

## 2021-01-01 RX ADMIN — HYDROMORPHONE HYDROCHLORIDE 0.5 MG: 1 INJECTION, SOLUTION INTRAMUSCULAR; INTRAVENOUS; SUBCUTANEOUS at 08:59

## 2021-01-01 RX ADMIN — SODIUM CHLORIDE, SODIUM LACTATE, POTASSIUM CHLORIDE, AND CALCIUM CHLORIDE 1000 ML: .6; .31; .03; .02 INJECTION, SOLUTION INTRAVENOUS at 10:11

## 2021-01-01 RX ADMIN — DIPHENHYDRAMINE HYDROCHLORIDE 50 MG: 50 INJECTION, SOLUTION INTRAMUSCULAR; INTRAVENOUS at 15:59

## 2021-01-01 RX ADMIN — ONDANSETRON 4 MG: 2 INJECTION INTRAMUSCULAR; INTRAVENOUS at 04:57

## 2021-01-01 RX ADMIN — DIPHENHYDRAMINE HYDROCHLORIDE 50 MG: 50 INJECTION, SOLUTION INTRAMUSCULAR; INTRAVENOUS at 10:08

## 2021-01-01 RX ADMIN — METHYLNALTREXONE BROMIDE 4 MG: 8 INJECTION, SOLUTION SUBCUTANEOUS at 13:16

## 2021-01-01 RX ADMIN — HYDROMORPHONE HYDROCHLORIDE 1 MG: 1 INJECTION, SOLUTION INTRAMUSCULAR; INTRAVENOUS; SUBCUTANEOUS at 16:36

## 2021-01-01 RX ADMIN — HYDROMORPHONE HYDROCHLORIDE 1 MG: 1 INJECTION, SOLUTION INTRAMUSCULAR; INTRAVENOUS; SUBCUTANEOUS at 22:51

## 2021-01-01 RX ADMIN — PANTOPRAZOLE SODIUM 40 MG: 40 INJECTION, POWDER, FOR SOLUTION INTRAVENOUS at 08:26

## 2021-01-01 RX ADMIN — HYDROMORPHONE HYDROCHLORIDE 1 MG: 1 INJECTION, SOLUTION INTRAMUSCULAR; INTRAVENOUS; SUBCUTANEOUS at 18:33

## 2021-01-01 RX ADMIN — ONDANSETRON 4 MG: 2 INJECTION INTRAMUSCULAR; INTRAVENOUS at 21:02

## 2021-01-01 RX ADMIN — MAGNESIUM SULFATE HEPTAHYDRATE 2 G: 40 INJECTION, SOLUTION INTRAVENOUS at 12:22

## 2021-01-01 RX ADMIN — HYDROMORPHONE HYDROCHLORIDE 1 MG: 1 INJECTION, SOLUTION INTRAMUSCULAR; INTRAVENOUS; SUBCUTANEOUS at 15:32

## 2021-01-01 RX ADMIN — MAGNESIUM SULFATE HEPTAHYDRATE 2 G: 40 INJECTION, SOLUTION INTRAVENOUS at 09:22

## 2021-01-01 RX ADMIN — HYDROMORPHONE HYDROCHLORIDE 0.5 MG: 1 INJECTION, SOLUTION INTRAMUSCULAR; INTRAVENOUS; SUBCUTANEOUS at 00:05

## 2021-01-01 RX ADMIN — ONDANSETRON 4 MG: 2 INJECTION INTRAMUSCULAR; INTRAVENOUS at 19:23

## 2021-01-01 RX ADMIN — POTASSIUM CHLORIDE 20 MEQ: 14.9 INJECTION, SOLUTION INTRAVENOUS at 09:39

## 2021-01-01 RX ADMIN — CALCIUM GLUCONATE: 94 INJECTION, SOLUTION INTRAVENOUS at 20:47

## 2021-01-01 RX ADMIN — HYDROMORPHONE HYDROCHLORIDE 0.5 MG: 1 INJECTION, SOLUTION INTRAMUSCULAR; INTRAVENOUS; SUBCUTANEOUS at 16:06

## 2021-01-01 RX ADMIN — LIDOCAINE HYDROCHLORIDE 100 MG: 20 INJECTION, SOLUTION EPIDURAL; INFILTRATION; INTRACAUDAL; PERINEURAL at 10:02

## 2021-01-01 RX ADMIN — HYDROMORPHONE HYDROCHLORIDE 1 MG: 1 INJECTION, SOLUTION INTRAMUSCULAR; INTRAVENOUS; SUBCUTANEOUS at 14:36

## 2021-01-01 RX ADMIN — METHYLNALTREXONE BROMIDE 4 MG: 8 INJECTION, SOLUTION SUBCUTANEOUS at 09:23

## 2021-01-01 RX ADMIN — HYDROMORPHONE HYDROCHLORIDE 1 MG: 1 INJECTION, SOLUTION INTRAMUSCULAR; INTRAVENOUS; SUBCUTANEOUS at 08:19

## 2021-01-01 RX ADMIN — SODIUM CHLORIDE 500 ML: 0.9 INJECTION, SOLUTION INTRAVENOUS at 15:32

## 2021-01-01 RX ADMIN — SODIUM CHLORIDE, SODIUM LACTATE, POTASSIUM CHLORIDE, AND CALCIUM CHLORIDE 125 ML/HR: .6; .31; .03; .02 INJECTION, SOLUTION INTRAVENOUS at 22:56

## 2021-01-01 RX ADMIN — HYDROMORPHONE HYDROCHLORIDE 1 MG: 1 INJECTION, SOLUTION INTRAMUSCULAR; INTRAVENOUS; SUBCUTANEOUS at 20:46

## 2021-01-01 RX ADMIN — HYDROMORPHONE HYDROCHLORIDE 1 MG: 1 INJECTION, SOLUTION INTRAMUSCULAR; INTRAVENOUS; SUBCUTANEOUS at 12:29

## 2021-01-01 RX ADMIN — HYDROMORPHONE HYDROCHLORIDE 1 MG: 1 INJECTION, SOLUTION INTRAMUSCULAR; INTRAVENOUS; SUBCUTANEOUS at 23:13

## 2021-01-01 RX ADMIN — SODIUM CHLORIDE, SODIUM LACTATE, POTASSIUM CHLORIDE, AND CALCIUM CHLORIDE 100 ML/HR: .6; .31; .03; .02 INJECTION, SOLUTION INTRAVENOUS at 22:12

## 2021-01-01 RX ADMIN — ONDANSETRON 4 MG: 2 INJECTION INTRAMUSCULAR; INTRAVENOUS at 20:05

## 2021-01-01 RX ADMIN — DIPHENHYDRAMINE HYDROCHLORIDE 25 MG: 50 INJECTION, SOLUTION INTRAMUSCULAR; INTRAVENOUS at 21:20

## 2021-01-01 RX ADMIN — HYDROMORPHONE HYDROCHLORIDE 1 MG: 1 INJECTION, SOLUTION INTRAMUSCULAR; INTRAVENOUS; SUBCUTANEOUS at 23:07

## 2021-01-01 RX ADMIN — HYDROMORPHONE HYDROCHLORIDE 0.5 MG: 1 INJECTION, SOLUTION INTRAMUSCULAR; INTRAVENOUS; SUBCUTANEOUS at 19:39

## 2021-01-01 RX ADMIN — ENOXAPARIN SODIUM 40 MG: 40 INJECTION SUBCUTANEOUS at 08:17

## 2021-01-01 RX ADMIN — HYDROMORPHONE HYDROCHLORIDE 0.7 MG: 1 INJECTION, SOLUTION INTRAMUSCULAR; INTRAVENOUS; SUBCUTANEOUS at 06:01

## 2021-01-01 RX ADMIN — ONDANSETRON 4 MG: 2 INJECTION INTRAMUSCULAR; INTRAVENOUS at 09:15

## 2021-01-01 RX ADMIN — SODIUM CHLORIDE 0.15 MG/KG/HR: 0.9 INJECTION, SOLUTION INTRAVENOUS at 07:59

## 2021-01-01 RX ADMIN — HYDROMORPHONE HYDROCHLORIDE 0.5 MG: 1 INJECTION, SOLUTION INTRAMUSCULAR; INTRAVENOUS; SUBCUTANEOUS at 20:14

## 2021-01-01 RX ADMIN — HYDROMORPHONE HYDROCHLORIDE 0.5 MG: 1 INJECTION, SOLUTION INTRAMUSCULAR; INTRAVENOUS; SUBCUTANEOUS at 10:29

## 2021-01-01 RX ADMIN — HYDROMORPHONE HYDROCHLORIDE 1 MG: 1 INJECTION, SOLUTION INTRAMUSCULAR; INTRAVENOUS; SUBCUTANEOUS at 04:13

## 2021-01-01 RX ADMIN — HYDROMORPHONE HYDROCHLORIDE 1 MG: 1 INJECTION, SOLUTION INTRAMUSCULAR; INTRAVENOUS; SUBCUTANEOUS at 21:53

## 2021-01-01 RX ADMIN — HYDROMORPHONE HYDROCHLORIDE 0.7 MG: 1 INJECTION, SOLUTION INTRAMUSCULAR; INTRAVENOUS; SUBCUTANEOUS at 08:55

## 2021-01-01 RX ADMIN — HYDROMORPHONE HYDROCHLORIDE 1 MG: 1 INJECTION, SOLUTION INTRAMUSCULAR; INTRAVENOUS; SUBCUTANEOUS at 10:19

## 2021-01-01 RX ADMIN — HYDROMORPHONE HYDROCHLORIDE 0.5 MG: 1 INJECTION, SOLUTION INTRAMUSCULAR; INTRAVENOUS; SUBCUTANEOUS at 14:21

## 2021-01-01 RX ADMIN — SODIUM CHLORIDE 0.15 MG/KG/HR: 0.9 INJECTION, SOLUTION INTRAVENOUS at 16:25

## 2021-01-01 RX ADMIN — CALCIUM GLUCONATE: 94 INJECTION, SOLUTION INTRAVENOUS at 22:22

## 2021-01-01 RX ADMIN — ONDANSETRON 4 MG: 2 INJECTION INTRAMUSCULAR; INTRAVENOUS at 04:44

## 2021-01-01 RX ADMIN — ONDANSETRON 4 MG: 2 INJECTION INTRAMUSCULAR; INTRAVENOUS at 20:13

## 2021-01-01 RX ADMIN — HYDROMORPHONE HYDROCHLORIDE 0.5 MG: 1 INJECTION, SOLUTION INTRAMUSCULAR; INTRAVENOUS; SUBCUTANEOUS at 02:07

## 2021-01-01 RX ADMIN — HYDROMORPHONE HYDROCHLORIDE 0.2 MG: 1 INJECTION, SOLUTION INTRAMUSCULAR; INTRAVENOUS; SUBCUTANEOUS at 16:39

## 2021-01-01 RX ADMIN — HYDROMORPHONE HYDROCHLORIDE 1 MG: 1 INJECTION, SOLUTION INTRAMUSCULAR; INTRAVENOUS; SUBCUTANEOUS at 15:55

## 2021-01-01 RX ADMIN — HYDROMORPHONE HYDROCHLORIDE 1 MG: 1 INJECTION, SOLUTION INTRAMUSCULAR; INTRAVENOUS; SUBCUTANEOUS at 10:03

## 2021-01-01 RX ADMIN — FENTANYL CITRATE 50 MCG: 50 INJECTION INTRAMUSCULAR; INTRAVENOUS at 15:45

## 2021-01-01 RX ADMIN — CYANOCOBALAMIN 1000 MCG: 1000 INJECTION INTRAMUSCULAR; SUBCUTANEOUS at 15:22

## 2021-01-01 RX ADMIN — HYDROMORPHONE HYDROCHLORIDE 0.5 MG: 1 INJECTION, SOLUTION INTRAMUSCULAR; INTRAVENOUS; SUBCUTANEOUS at 11:27

## 2021-01-01 RX ADMIN — METHYLNALTREXONE BROMIDE 4 MG: 8 INJECTION, SOLUTION SUBCUTANEOUS at 22:44

## 2021-01-01 RX ADMIN — HYDROMORPHONE HYDROCHLORIDE 1 MG: 1 INJECTION, SOLUTION INTRAMUSCULAR; INTRAVENOUS; SUBCUTANEOUS at 19:40

## 2021-01-01 RX ADMIN — DIPHENHYDRAMINE HYDROCHLORIDE 50 MG: 50 INJECTION, SOLUTION INTRAMUSCULAR; INTRAVENOUS at 13:11

## 2021-01-01 RX ADMIN — ONDANSETRON 4 MG: 2 INJECTION INTRAMUSCULAR; INTRAVENOUS at 21:52

## 2021-01-01 RX ADMIN — HYDROMORPHONE HYDROCHLORIDE 1 MG: 1 INJECTION, SOLUTION INTRAMUSCULAR; INTRAVENOUS; SUBCUTANEOUS at 01:16

## 2021-01-01 RX ADMIN — HYDROMORPHONE HYDROCHLORIDE 0.5 MG: 1 INJECTION, SOLUTION INTRAMUSCULAR; INTRAVENOUS; SUBCUTANEOUS at 02:11

## 2021-01-01 RX ADMIN — ONDANSETRON 4 MG: 2 INJECTION INTRAMUSCULAR; INTRAVENOUS at 09:07

## 2021-01-01 RX ADMIN — PANTOPRAZOLE SODIUM 40 MG: 40 INJECTION, POWDER, FOR SOLUTION INTRAVENOUS at 08:54

## 2021-01-01 RX ADMIN — HEPARIN SODIUM 5000 UNITS: 5000 INJECTION INTRAVENOUS; SUBCUTANEOUS at 21:17

## 2021-01-01 RX ADMIN — DIPHENHYDRAMINE HYDROCHLORIDE 25 MG: 50 INJECTION, SOLUTION INTRAMUSCULAR; INTRAVENOUS at 08:26

## 2021-01-01 RX ADMIN — PANTOPRAZOLE SODIUM 40 MG: 40 INJECTION, POWDER, FOR SOLUTION INTRAVENOUS at 09:07

## 2021-01-01 RX ADMIN — DEXTROSE, SODIUM CHLORIDE, AND POTASSIUM CHLORIDE 100 ML/HR: 5; .45; .15 INJECTION INTRAVENOUS at 20:03

## 2021-01-01 RX ADMIN — SODIUM CHLORIDE 75 ML/HR: 0.9 INJECTION, SOLUTION INTRAVENOUS at 10:58

## 2021-01-01 RX ADMIN — HYDROMORPHONE HYDROCHLORIDE 0.5 MG: 1 INJECTION, SOLUTION INTRAMUSCULAR; INTRAVENOUS; SUBCUTANEOUS at 09:29

## 2021-01-01 RX ADMIN — HYDROMORPHONE HYDROCHLORIDE 0.5 MG: 1 INJECTION, SOLUTION INTRAMUSCULAR; INTRAVENOUS; SUBCUTANEOUS at 20:07

## 2021-01-01 RX ADMIN — HYDROMORPHONE HYDROCHLORIDE 0.5 MG: 1 INJECTION, SOLUTION INTRAMUSCULAR; INTRAVENOUS; SUBCUTANEOUS at 02:33

## 2021-01-01 RX ADMIN — HEPARIN SODIUM 5000 UNITS: 5000 INJECTION INTRAVENOUS; SUBCUTANEOUS at 14:31

## 2021-01-01 RX ADMIN — ONDANSETRON 4 MG: 2 INJECTION INTRAMUSCULAR; INTRAVENOUS at 13:12

## 2021-01-01 RX ADMIN — HYDROMORPHONE HYDROCHLORIDE 0.5 MG: 1 INJECTION, SOLUTION INTRAMUSCULAR; INTRAVENOUS; SUBCUTANEOUS at 02:36

## 2021-01-01 RX ADMIN — PANTOPRAZOLE SODIUM 40 MG: 40 INJECTION, POWDER, FOR SOLUTION INTRAVENOUS at 08:14

## 2021-01-01 RX ADMIN — HYDROMORPHONE HYDROCHLORIDE 1 MG: 1 INJECTION, SOLUTION INTRAMUSCULAR; INTRAVENOUS; SUBCUTANEOUS at 11:57

## 2021-01-01 RX ADMIN — ONDANSETRON 4 MG: 4 TABLET, ORALLY DISINTEGRATING ORAL at 14:15

## 2021-01-01 RX ADMIN — SODIUM CHLORIDE 75 ML/HR: 0.9 INJECTION, SOLUTION INTRAVENOUS at 21:53

## 2021-01-01 RX ADMIN — SODIUM CHLORIDE 75 ML/HR: 0.9 INJECTION, SOLUTION INTRAVENOUS at 23:18

## 2021-01-01 RX ADMIN — HYDROMORPHONE HYDROCHLORIDE 0.5 MG: 1 INJECTION, SOLUTION INTRAMUSCULAR; INTRAVENOUS; SUBCUTANEOUS at 15:05

## 2021-01-01 RX ADMIN — SODIUM CHLORIDE 0.15 MG/KG/HR: 0.9 INJECTION, SOLUTION INTRAVENOUS at 21:57

## 2021-01-01 RX ADMIN — HYDROMORPHONE HYDROCHLORIDE 1 MG: 1 INJECTION, SOLUTION INTRAMUSCULAR; INTRAVENOUS; SUBCUTANEOUS at 13:42

## 2021-01-01 RX ADMIN — HYDROMORPHONE HYDROCHLORIDE 0.5 MG: 1 INJECTION, SOLUTION INTRAMUSCULAR; INTRAVENOUS; SUBCUTANEOUS at 03:20

## 2021-01-01 RX ADMIN — PANTOPRAZOLE SODIUM 40 MG: 40 INJECTION, POWDER, FOR SOLUTION INTRAVENOUS at 13:31

## 2021-01-01 RX ADMIN — Medication: at 22:49

## 2021-01-01 RX ADMIN — HEPARIN SODIUM 5000 UNITS: 5000 INJECTION INTRAVENOUS; SUBCUTANEOUS at 21:46

## 2021-01-01 RX ADMIN — DIPHENHYDRAMINE HYDROCHLORIDE 25 MG: 50 INJECTION, SOLUTION INTRAMUSCULAR; INTRAVENOUS at 11:18

## 2021-01-01 RX ADMIN — HYDROMORPHONE HYDROCHLORIDE 1 MG: 1 INJECTION, SOLUTION INTRAMUSCULAR; INTRAVENOUS; SUBCUTANEOUS at 02:51

## 2021-01-01 RX ADMIN — DEXTROSE, SODIUM CHLORIDE, AND POTASSIUM CHLORIDE 100 ML/HR: 5; .45; .15 INJECTION INTRAVENOUS at 14:28

## 2021-01-01 RX ADMIN — POTASSIUM CHLORIDE 20 MEQ: 14.9 INJECTION, SOLUTION INTRAVENOUS at 11:10

## 2021-01-01 RX ADMIN — Medication: at 21:18

## 2021-01-01 RX ADMIN — HYDROMORPHONE HYDROCHLORIDE 1 MG: 1 INJECTION, SOLUTION INTRAMUSCULAR; INTRAVENOUS; SUBCUTANEOUS at 22:30

## 2021-01-01 RX ADMIN — HYDROMORPHONE HYDROCHLORIDE 0.2 MG: 1 INJECTION, SOLUTION INTRAMUSCULAR; INTRAVENOUS; SUBCUTANEOUS at 23:11

## 2021-01-01 RX ADMIN — DIPHENHYDRAMINE HYDROCHLORIDE 50 MG: 50 INJECTION, SOLUTION INTRAMUSCULAR; INTRAVENOUS at 23:12

## 2021-01-01 RX ADMIN — HEPARIN SODIUM 5000 UNITS: 5000 INJECTION INTRAVENOUS; SUBCUTANEOUS at 13:42

## 2021-01-01 RX ADMIN — HYDROMORPHONE HYDROCHLORIDE 1 MG: 1 INJECTION, SOLUTION INTRAMUSCULAR; INTRAVENOUS; SUBCUTANEOUS at 18:36

## 2021-01-01 RX ADMIN — PANTOPRAZOLE SODIUM 40 MG: 40 INJECTION, POWDER, FOR SOLUTION INTRAVENOUS at 08:04

## 2021-01-01 RX ADMIN — ONDANSETRON 4 MG: 2 INJECTION INTRAMUSCULAR; INTRAVENOUS at 07:26

## 2021-01-01 RX ADMIN — PROPOFOL 100 MG: 10 INJECTION, EMULSION INTRAVENOUS at 10:02

## 2021-01-01 RX ADMIN — HYDROMORPHONE HYDROCHLORIDE 1 MG: 1 INJECTION, SOLUTION INTRAMUSCULAR; INTRAVENOUS; SUBCUTANEOUS at 23:18

## 2021-01-01 RX ADMIN — DIPHENHYDRAMINE HYDROCHLORIDE 50 MG: 50 INJECTION, SOLUTION INTRAMUSCULAR; INTRAVENOUS at 04:09

## 2021-01-01 RX ADMIN — HYDROMORPHONE HYDROCHLORIDE 0.5 MG: 1 INJECTION, SOLUTION INTRAMUSCULAR; INTRAVENOUS; SUBCUTANEOUS at 16:18

## 2021-01-01 RX ADMIN — Medication: at 19:16

## 2021-01-01 RX ADMIN — SODIUM CHLORIDE 1000 ML: 0.9 INJECTION, SOLUTION INTRAVENOUS at 16:53

## 2021-01-01 RX ADMIN — HYDROMORPHONE HYDROCHLORIDE 1 MG: 1 INJECTION, SOLUTION INTRAMUSCULAR; INTRAVENOUS; SUBCUTANEOUS at 09:41

## 2021-01-01 RX ADMIN — DIPHENHYDRAMINE HYDROCHLORIDE 50 MG: 50 INJECTION, SOLUTION INTRAMUSCULAR; INTRAVENOUS at 13:12

## 2021-01-01 RX ADMIN — DIPHENHYDRAMINE HYDROCHLORIDE 25 MG: 50 INJECTION, SOLUTION INTRAMUSCULAR; INTRAVENOUS at 08:44

## 2021-01-01 RX ADMIN — CALCIUM GLUCONATE: 94 INJECTION, SOLUTION INTRAVENOUS at 21:52

## 2021-01-01 RX ADMIN — HYDROMORPHONE HYDROCHLORIDE 1 MG: 1 INJECTION, SOLUTION INTRAMUSCULAR; INTRAVENOUS; SUBCUTANEOUS at 09:06

## 2021-01-01 RX ADMIN — HYDROMORPHONE HYDROCHLORIDE 4 MG: 2 TABLET ORAL at 09:15

## 2021-01-01 RX ADMIN — ENOXAPARIN SODIUM 40 MG: 40 INJECTION SUBCUTANEOUS at 09:22

## 2021-01-01 RX ADMIN — SODIUM CHLORIDE, SODIUM LACTATE, POTASSIUM CHLORIDE, AND CALCIUM CHLORIDE 100 ML/HR: .6; .31; .03; .02 INJECTION, SOLUTION INTRAVENOUS at 09:08

## 2021-01-01 RX ADMIN — HYDROMORPHONE HYDROCHLORIDE 0.5 MG: 1 INJECTION, SOLUTION INTRAMUSCULAR; INTRAVENOUS; SUBCUTANEOUS at 13:31

## 2021-01-01 RX ADMIN — HYDROMORPHONE HYDROCHLORIDE 1 MG: 1 INJECTION, SOLUTION INTRAMUSCULAR; INTRAVENOUS; SUBCUTANEOUS at 13:20

## 2021-01-01 RX ADMIN — CALCIUM GLUCONATE: 94 INJECTION, SOLUTION INTRAVENOUS at 21:30

## 2021-01-01 RX ADMIN — DIPHENHYDRAMINE HYDROCHLORIDE 25 MG: 50 INJECTION, SOLUTION INTRAMUSCULAR; INTRAVENOUS at 17:04

## 2021-01-01 RX ADMIN — HEPARIN SODIUM 5000 UNITS: 5000 INJECTION INTRAVENOUS; SUBCUTANEOUS at 06:45

## 2021-01-01 RX ADMIN — HYDROMORPHONE HYDROCHLORIDE 0.5 MG: 1 INJECTION, SOLUTION INTRAMUSCULAR; INTRAVENOUS; SUBCUTANEOUS at 06:38

## 2021-01-01 RX ADMIN — ENOXAPARIN SODIUM 40 MG: 40 INJECTION SUBCUTANEOUS at 09:23

## 2021-01-01 RX ADMIN — IOHEXOL 100 ML: 350 INJECTION, SOLUTION INTRAVENOUS at 15:53

## 2021-01-01 RX ADMIN — ONDANSETRON 4 MG: 2 INJECTION INTRAMUSCULAR; INTRAVENOUS at 09:21

## 2021-01-01 RX ADMIN — MAGNESIUM SULFATE HEPTAHYDRATE 2 G: 40 INJECTION, SOLUTION INTRAVENOUS at 13:39

## 2021-01-01 RX ADMIN — ENOXAPARIN SODIUM 40 MG: 40 INJECTION SUBCUTANEOUS at 08:03

## 2021-01-01 RX ADMIN — HYDROMORPHONE HYDROCHLORIDE 0.2 MG: 1 INJECTION, SOLUTION INTRAMUSCULAR; INTRAVENOUS; SUBCUTANEOUS at 14:41

## 2021-01-01 RX ADMIN — PANTOPRAZOLE SODIUM 40 MG: 40 INJECTION, POWDER, FOR SOLUTION INTRAVENOUS at 08:17

## 2021-01-01 RX ADMIN — HYDROMORPHONE HYDROCHLORIDE 0.5 MG: 1 INJECTION, SOLUTION INTRAMUSCULAR; INTRAVENOUS; SUBCUTANEOUS at 13:52

## 2021-02-04 ENCOUNTER — OFFICE VISIT (OUTPATIENT)
Dept: SURGERY | Facility: CLINIC | Age: 66
End: 2021-02-04
Payer: MEDICARE

## 2021-02-04 VITALS
BODY MASS INDEX: 24.59 KG/M2 | HEIGHT: 64 IN | DIASTOLIC BLOOD PRESSURE: 78 MMHG | SYSTOLIC BLOOD PRESSURE: 110 MMHG | WEIGHT: 144 LBS | HEART RATE: 64 BPM

## 2021-02-04 DIAGNOSIS — K91.2 SHORT GUT SYNDROME: Primary | ICD-10-CM

## 2021-02-04 PROCEDURE — 99213 OFFICE O/P EST LOW 20 MIN: CPT | Performed by: SURGERY

## 2021-02-04 RX ORDER — DIPHENOXYLATE HYDROCHLORIDE AND ATROPINE SULFATE 2.5; .025 MG/1; MG/1
2 TABLET ORAL 4 TIMES DAILY PRN
COMMUNITY
End: 2021-03-09 | Stop reason: SDUPTHER

## 2021-02-04 NOTE — PATIENT INSTRUCTIONS
Needs to take iron tablets and vitamin tablets as ordered before  Needs to get monthly injections of Vitamin B12 from her PCP  This was told to the patient on the last visit but has not been started yet

## 2021-02-04 NOTE — PROGRESS NOTES
Assessment/Plan:    No problem-specific Assessment & Plan notes found for this encounter  Diagnoses and all orders for this visit:    Short gut syndrome    Other orders  -     diphenoxylate-atropine (LOMOTIL) 2 5-0 025 mg per tablet; Take 2 tablets by mouth 4 (four) times a day as needed       Ordered via telephone this week    Subjective:      Patient ID: Salo Rosa is a 60 y o  female  Patient with short-gut syndrome and enterocutaneous fistula  On home TPN given nocturnally 2 to 3 times a week  On Gattex injections to heal fistula  HPI     Main complaint is chronic diarrhea especially at night which keeps the patient awake  She was started on Lomotil this week  She has noticed some improvement  Has nausea but no vomiting  Complains of gas pains  Is maintaining her body weight  The following portions of the patient's history were reviewed and updated as appropriate: allergies, current medications, past family history, past medical history, past social history, past surgical history, and problem list     Review of Systems      No fever  Objective:      /78   Pulse 64   Ht 5' 4" (1 626 m)   Wt 65 3 kg (144 lb)   LMP  (LMP Unknown)   BMI 24 72 kg/m²        Physical Exam      Mild pallor  No icterus  Abdominal exam reveals well-healed laparotomy scars  Fistula bag is applied well with no output noticed

## 2021-03-01 ENCOUNTER — HOSPITAL ENCOUNTER (EMERGENCY)
Facility: HOSPITAL | Age: 66
Discharge: HOME | End: 2021-03-01
Attending: EMERGENCY MEDICINE | Admitting: EMERGENCY MEDICINE
Payer: MEDICARE

## 2021-03-01 VITALS
RESPIRATION RATE: 16 BRPM | DIASTOLIC BLOOD PRESSURE: 57 MMHG | WEIGHT: 130 LBS | HEIGHT: 64 IN | BODY MASS INDEX: 22.2 KG/M2 | SYSTOLIC BLOOD PRESSURE: 101 MMHG | OXYGEN SATURATION: 97 % | TEMPERATURE: 98 F | HEART RATE: 71 BPM

## 2021-03-01 DIAGNOSIS — T82.898A OCCLUSION OF PERIPHERALLY INSERTED CENTRAL CATHETER (PICC) LINE, INITIAL ENCOUNTER (HCC): Primary | ICD-10-CM

## 2021-03-01 PROCEDURE — 99283 EMERGENCY DEPT VISIT LOW MDM: CPT

## 2021-03-01 PROCEDURE — 99282 EMERGENCY DEPT VISIT SF MDM: CPT | Performed by: EMERGENCY MEDICINE

## 2021-03-01 RX ORDER — MELATONIN: COMMUNITY

## 2021-03-01 RX ORDER — TEDUGLUTIDE 5 MG
KIT SUBCUTANEOUS
COMMUNITY
Start: 2021-02-01

## 2021-03-01 RX ORDER — DIAZEPAM 5 MG/1
TABLET ORAL
COMMUNITY
Start: 2021-02-04 | End: 2021-01-01

## 2021-03-01 NOTE — ED PROVIDER NOTES
History  Chief Complaint   Patient presents with    Vascular Access Problem     pt reports double lumen TPN access clogged, noticed this morning  HPI  72 yo F presents with clogged PICC line  She has history of enterocutaneous fistula and receives TPN through a left sided PICC line  She states she first noticed it was clogged this morning and would not flush  No other complaints at this time  Prior to Admission Medications   Prescriptions Last Dose Informant Patient Reported? Taking? HYDROmorphone (DILAUDID) 4 mg tablet  Self Yes No   Sig: Take 4 mg by mouth 4 (four) times a day   diphenoxylate-atropine (LOMOTIL) 2 5-0 025 mg per tablet   Yes No   Sig: Take 2 tablets by mouth 4 (four) times a day as needed   escitalopram (LEXAPRO) 20 mg tablet  Self Yes No   Sig: daily       ferrous sulfate 324 (65 Fe) mg   No No   Sig: Take 1 tablet (324 mg total) by mouth 2 (two) times a day before meals   folic acid (FOLVITE) 1 mg tablet   No No   Sig: Take 1 tablet (1 mg total) by mouth daily   gabapentin (NEURONTIN) 300 mg capsule  Self Yes No   Sig: Take 600 mg by mouth 2 (two) times a day   metaxalone (SKELAXIN) 400 MG tablet   Yes No   methocarbamol (ROBAXIN) 500 mg tablet   Yes No   Sig: take 1 tablet by mouth every 8 hours if needed for SPASMS   morphine (MS CONTIN) 30 mg 12 hr tablet  Self Yes No   Sig: take 1 tablet by mouth every 8 hours   ondansetron (ZOFRAN) 4 mg tablet   Yes No   Sig: take 1 tablet by mouth every 6 hours if needed for nausea and vomiting   pantoprazole (PROTONIX) 40 mg tablet   No No   Sig: take 1 tablet by mouth once daily   tamsulosin (FLOMAX) 0 4 mg  Self Yes No   Sig: Take 0 8 mg by mouth daily with dinner      Facility-Administered Medications: None       Past Medical History:   Diagnosis Date    Asthma     Bowel obstruction (HCC)     Chronic back pain     Colon polyp     Enlarged kidney     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    Ileus ([address] ) 7/2/2018    Overview:  Last Assessment & Plan:  Patient presented with generalized abdominal pain nausea and bilious vomiting ,imaging study reports " diffuse bowel dilatation involving distal small bowel and the entire colon to the panel verge obstruction mass is not identified and this may represent adynamic ileus in the postoperative setting possible related to medication"  Patient states that she still ha    Liver mass     Opiate dependence ([address] )     Short gut syndrome     5 feet small bowel    Small bowel fistula     Urinary retention with incomplete bladder emptying        Past Surgical History:   Procedure Laterality Date    ABDOMINAL SURGERY      x 25    APPENDECTOMY      Open     BACK SURGERY      x 3     SECTION      x1    CHOLECYSTECTOMY      Open    COLON SURGERY      Sigmoid     COLONOSCOPY N/A 2018    Procedure: COLONOSCOPY;  Surgeon: Shira Ghotra MD;  Location: [facility]; Service: Gastroenterology    COLONOSCOPY  2018    EXPLORATORY LAPAROTOMY      Several for adhesive disease; has had several bowel resections; has a small bowel fistula    GASTROCUTANEOUS FISTULA CLOSURE      HERNIA REPAIR      Incisional     MAMMO (HISTORICAL)  2017    SPINAL FUSION      Lower back     TONSILLECTOMY      TOTAL ABDOMINAL HYSTERECTOMY      Not due to cancer - complete     TRACHEOSTOMY  2019       Family History   Problem Relation Age of Onset    Diabetes Mother     Lung cancer Mother      I have reviewed and agree with the history as documented      E-Cigarette/Vaping    E-Cigarette Use Never User      E-Cigarette/Vaping Substances     Social History     Tobacco Use    Smoking status: Former Smoker     Quit date: 1980     Years since quittin 6    Smokeless tobacco: Never Used    Tobacco comment: Never smoker - As per [employer]    Substance Use Topics    Alcohol use: Not Currently     Alcohol/week: 0 0 standard drinks     Frequency: Never     Binge frequency: Never     Comment: social drinker  Drug use: No       Review of Systems   Constitutional: Negative for chills and fever  HENT: Negative for dental problem and ear pain  Eyes: Negative for pain and redness  Respiratory: Negative for cough and shortness of breath  Cardiovascular: Negative for chest pain and palpitations  Gastrointestinal: Negative for abdominal pain and nausea  Endocrine: Negative for polydipsia and polyphagia  Genitourinary: Negative for dysuria and frequency  Musculoskeletal: Negative for arthralgias and joint swelling  +Clogged picc line   Skin: Negative for color change and rash  Neurological: Negative for dizziness and headaches  Psychiatric/Behavioral: Negative for behavioral problems and confusion  All other systems reviewed and are negative  Physical Exam  Physical Exam  Vitals signs and nursing note reviewed  Constitutional:       General: She is not in acute distress  HENT:      Head: Normocephalic and atraumatic  Right Ear: External ear normal       Left Ear: External ear normal       Nose: Nose normal    Eyes:      General: No scleral icterus  Cardiovascular:      Rate and Rhythm: Normal rate  Pulmonary:      Effort: Pulmonary effort is normal  No respiratory distress  Abdominal:      General: There is no distension  Musculoskeletal: Normal range of motion  General: No deformity  Comments: LUE with Picc line in place, no surrounding erythema   Skin:     Findings: No rash  Neurological:      General: No focal deficit present  Mental Status: She is alert        Gait: Gait normal    Psychiatric:         Mood and Affect: Mood normal          Vital Signs  ED Triage Vitals [03/01/21 1050]   Temperature Pulse Respirations Blood Pressure SpO2   98 °F (36 7 °C) 71 16 101/57 97 %      Temp Source Heart Rate Source Patient Position - Orthostatic VS BP Location FiO2 (%)   Oral Monitor Lying Right arm --      Pain Score       --           Vitals:    03/01/21 1050 BP: 101/57   Pulse: 71   Patient Position - Orthostatic VS: Lying         Visual Acuity      ED Medications  Medications - No data to display    Diagnostic Studies  Results Reviewed     None                 No orders to display              Procedures  Procedures         ED Course                             SBIRT 20yo+      Most Recent Value   SBIRT (22 yo +)   In order to provide better care to our patients, we are screening all of our patients for alcohol and drug use  Would it be okay to ask you these screening questions? No Filed at: 03/01/2021 1136                    [facility]  Patient presents with occlusion of PICC line  Attempted cath ariadne however nurse unable to instill due to clog  Patient declines removal and replacement by PICC team at this time, requests discharge and would like to contact her surgeon about whether she still needs the line  Disposition  Final diagnoses:   Occlusion of peripherally inserted central catheter (PICC) line, initial encounter [facility])     Time reflects when diagnosis was documented in both MDM as applicable and the Disposition within this note     Time User Action Codes Description Comment    3/1/2021 12:04 PM Mike Mcfarlane Add [T09 171G] Occlusion of peripherally inserted central catheter (PICC) line, initial encounter [facility])       ED Disposition     ED Disposition Condition Date/Time Comment    Discharge Stable Mon Mar 1, 2021 12:03 PM Johan Bruce discharge to home/self care              Follow-up Information     Follow up With Specialties Details Why Contact Info Additional Information    [address] [facility] Emergency Medicine  As needed [address] [facility] [id number] [facility], [address] [place], [place], [address]          Discharge Medication List as of 3/1/2021 12:05 PM      CONTINUE these medications which have NOT CHANGED    Details diphenoxylate-atropine (LOMOTIL) 2 5-0 025 mg per tablet Take 2 tablets by mouth 4 (four) times a day as needed, Historical Med      escitalopram (LEXAPRO) 20 mg tablet daily  , Historical Med      ferrous sulfate 324 (65 Fe) mg Take 1 tablet (324 mg total) by mouth 2 (two) times a day before meals, Starting Thu 42/33/3247, Normal      folic acid (FOLVITE) 1 mg tablet Take 1 tablet (1 mg total) by mouth daily, Starting Thu 12/10/2020, Normal      gabapentin (NEURONTIN) 300 mg capsule Take 600 mg by mouth 2 (two) times a day, Starting Mon 11/6/2017, Historical Med      HYDROmorphone (DILAUDID) 4 mg tablet Take 4 mg by mouth 4 (four) times a day, Historical Med      metaxalone (SKELAXIN) 400 MG tablet Starting Tue 10/20/2020, Historical Med      methocarbamol (ROBAXIN) 500 mg tablet take 1 tablet by mouth every 8 hours if needed for SPASMS, Historical Med      morphine (MS CONTIN) 30 mg 12 hr tablet take 1 tablet by mouth every 8 hours, Historical Med      ondansetron (ZOFRAN) 4 mg tablet take 1 tablet by mouth every 6 hours if needed for nausea and vomiting, Historical Med      pantoprazole (PROTONIX) 40 mg tablet take 1 tablet by mouth once daily, Normal      tamsulosin (FLOMAX) 0 4 mg Take 0 8 mg by mouth daily with dinner, Historical Med           No discharge procedures on file      PDMP Review       Value Time User    PDMP Reviewed  Yes 2/1/2021  9:41 AM Kj Conroy MD          ED Provider  Electronically Signed by           Eugene Olivares MD  03/01/21 9463

## 2021-03-02 ENCOUNTER — TELEPHONE (OUTPATIENT)
Dept: INTERVENTIONAL RADIOLOGY/VASCULAR | Facility: CLINIC | Age: 66
End: 2021-03-02

## 2021-03-02 ENCOUNTER — OFFICE VISIT (OUTPATIENT)
Dept: FAMILY MEDICINE CLINIC | Facility: CLINIC | Age: 66
End: 2021-03-02
Payer: MEDICARE

## 2021-03-02 VITALS
DIASTOLIC BLOOD PRESSURE: 68 MMHG | TEMPERATURE: 96.7 F | SYSTOLIC BLOOD PRESSURE: 112 MMHG | OXYGEN SATURATION: 98 % | RESPIRATION RATE: 16 BRPM | HEIGHT: 64 IN | HEART RATE: 78 BPM | WEIGHT: 139.8 LBS | BODY MASS INDEX: 23.87 KG/M2

## 2021-03-02 DIAGNOSIS — E44.1 MILD PROTEIN MALNUTRITION (HCC): ICD-10-CM

## 2021-03-02 DIAGNOSIS — K91.2 SHORT GUT SYNDROME: Primary | ICD-10-CM

## 2021-03-02 DIAGNOSIS — R33.9 URINARY RETENTION: ICD-10-CM

## 2021-03-02 DIAGNOSIS — G89.4 CHRONIC PAIN DISORDER: ICD-10-CM

## 2021-03-02 DIAGNOSIS — E53.8 B12 DEFICIENCY: ICD-10-CM

## 2021-03-02 DIAGNOSIS — K91.2 SHORT GUT SYNDROME: ICD-10-CM

## 2021-03-02 DIAGNOSIS — K63.2 ENTEROCUTANEOUS FISTULA: Primary | ICD-10-CM

## 2021-03-02 DIAGNOSIS — G89.29 CHRONIC MIDLINE THORACIC BACK PAIN: ICD-10-CM

## 2021-03-02 DIAGNOSIS — M54.6 CHRONIC MIDLINE THORACIC BACK PAIN: ICD-10-CM

## 2021-03-02 DIAGNOSIS — F11.90 CHRONIC, CONTINUOUS USE OF OPIOIDS: ICD-10-CM

## 2021-03-02 PROBLEM — R10.9 ABDOMINAL PAIN WITH VOMITING: Status: RESOLVED | Noted: 2020-12-05 | Resolved: 2021-03-02

## 2021-03-02 PROBLEM — T82.898A OCCLUDED PICC LINE (HCC): Status: ACTIVE | Noted: 2021-03-02

## 2021-03-02 PROBLEM — K91.89 POST-ERCP ACUTE PANCREATITIS: Status: RESOLVED | Noted: 2020-11-18 | Resolved: 2021-03-02

## 2021-03-02 PROBLEM — N13.30 HYDRONEPHROSIS: Status: RESOLVED | Noted: 2019-02-26 | Resolved: 2021-03-02

## 2021-03-02 PROBLEM — K80.50 CHOLEDOCHOLITHIASIS: Status: RESOLVED | Noted: 2020-11-18 | Resolved: 2021-03-02

## 2021-03-02 PROBLEM — K85.90 POST-ERCP ACUTE PANCREATITIS: Status: RESOLVED | Noted: 2020-11-18 | Resolved: 2021-03-02

## 2021-03-02 PROBLEM — R11.10 ABDOMINAL PAIN WITH VOMITING: Status: RESOLVED | Noted: 2020-12-05 | Resolved: 2021-03-02

## 2021-03-02 PROCEDURE — 99214 OFFICE O/P EST MOD 30 MIN: CPT | Performed by: FAMILY MEDICINE

## 2021-03-02 PROCEDURE — 96372 THER/PROPH/DIAG INJ SC/IM: CPT | Performed by: FAMILY MEDICINE

## 2021-03-02 RX ORDER — CYANOCOBALAMIN 1000 UG/ML
1000 INJECTION INTRAMUSCULAR; SUBCUTANEOUS
Status: SHIPPED | OUTPATIENT
Start: 2021-03-02

## 2021-03-02 RX ORDER — TAMSULOSIN HYDROCHLORIDE 0.4 MG/1
0.8 CAPSULE ORAL
Qty: 180 CAPSULE | Refills: 1 | Status: SHIPPED | OUTPATIENT
Start: 2021-03-02 | End: 2021-01-01

## 2021-03-02 RX ADMIN — CYANOCOBALAMIN 1000 MCG: 1000 INJECTION INTRAMUSCULAR; SUBCUTANEOUS at 11:52

## 2021-03-02 RX ADMIN — CYANOCOBALAMIN 1000 MCG: 1000 INJECTION INTRAMUSCULAR; SUBCUTANEOUS at 11:25

## 2021-03-02 RX ADMIN — CYANOCOBALAMIN 1000 MCG: 1000 INJECTION INTRAMUSCULAR; SUBCUTANEOUS at 11:57

## 2021-03-02 NOTE — PROGRESS NOTES
Jessica Houser 1955 female MRN: [id number]      ASSESSMENT/PLAN  Problem List Items Addressed This Visit        Digestive    Enterocutaneous fistula    Relevant Medications    cyanocobalamin injection 1,000 mcg    Pancreatic divisum    Short gut syndrome    Relevant Medications    cyanocobalamin injection 1,000 mcg       Genitourinary    Urinary retention - Primary    Relevant Medications    tamsulosin (FLOMAX) 0 4 mg       Other    Chronic midline thoracic back pain    Chronic pain disorder    Chronic, continuous use of opioids    Mild protein malnutrition (HCC)      Other Visit Diagnoses     B12 deficiency        Relevant Medications    cyanocobalamin injection 1,000 mcg              Future Appointments   Date Time Provider Amanda Enrique   3/30/2021 10:30 AM Treva Schreiber MD GEN SURG [facility]   3/31/2021 11:00  [facility]   5/4/2021 10:30 AM Treva Schreiber MD GEN SURG [facility]          SUBJECTIVE  CC: Follow-up (on medical issues )      HPI:  Jessica Houser is a 73 y o  female who presents for chronic follow up as below  Enterocutaneous Fistula/Short gut syndrome/Malnutrition:   Following with Gen Surg (last seen on 2/4) -- initiated on Lomitil (which is not helping)  Encouraged B12 shots once monthly (level was 359)  On TPN M/W/F -- gets labs every other week  Gattex did not help  Does have home health nurse coming in once weekly -- changing IV dressings, monitoring  Planning to put port in to chest due to clogging on PICC in LUE    Chronic Pain on Opioids: Following with Pain Medicine -- added muscle relaxant to help with back pain    Needs refill on Flomax     Review of Systems   Constitutional: Positive for fatigue  Respiratory: Negative for shortness of breath  Cardiovascular: Negative for chest pain  Gastrointestinal: Positive for abdominal pain and diarrhea     Genitourinary:        Urinary incontinence Musculoskeletal: Positive for arthralgias, back pain and myalgias  Psychiatric/Behavioral: Positive for sleep disturbance  Historical Information   The patient history was reviewed and updated as follows:    Past Medical History:   Diagnosis Date    Asthma     Bowel obstruction ([address] )     Choledocholithiasis 2020    Chronic back pain     Colon polyp     Enlarged kidney     Enterocutaneous fistula 2019    Ileus ([address] ) 2018    Overview:  Last Assessment & Plan:  Patient presented with generalized abdominal pain nausea and bilious vomiting ,imaging study reports " diffuse bowel dilatation involving distal small bowel and the entire colon to the panel verge obstruction mass is not identified and this may represent adynamic ileus in the postoperative setting possible related to medication"  Patient states that she still ha    Liver mass     [address] [address]     Post-ERCP acute pancreatitis 2020    Short gut syndrome     5 feet small bowel    Small bowel fistula     Urinary retention with incomplete bladder emptying      Past Surgical History:   Procedure Laterality Date    ABDOMINAL SURGERY      x 25    APPENDECTOMY      Open     BACK SURGERY      x 3     SECTION      x1    CHOLECYSTECTOMY      Open    COLON SURGERY      Sigmoid     COLONOSCOPY N/A 2018    Procedure: COLONOSCOPY;  Surgeon: Mckenna Frye MD;  Location: [facility];   Service: Gastroenterology    COLONOSCOPY  2018    EXPLORATORY LAPAROTOMY      Several for adhesive disease; has had several bowel resections; has a small bowel fistula    GASTROCUTANEOUS FISTULA CLOSURE      HERNIA REPAIR      Incisional     MAMMO (HISTORICAL)  2017    SPINAL FUSION      Lower back     TONSILLECTOMY      TOTAL ABDOMINAL HYSTERECTOMY      Not due to cancer - complete     TRACHEOSTOMY  2019     Family History   Problem Relation Age of Onset    Diabetes Mother     Lung cancer Mother Social History   Social History     Substance and Sexual Activity   Alcohol Use Not Currently    Alcohol/week: 0 0 standard drinks    Frequency: Never    Binge frequency: Never    Comment: social drinker     Social History     Substance and Sexual Activity   Drug Use No     Social History     Tobacco Use   Smoking Status Former Smoker    Quit date: 1980    Years since quittin 6   Smokeless Tobacco Never Used   Tobacco Comment    Never smoker - As per [employer]        Medications:     Current Outpatient Medications:     cholecalciferol (VITAMIN D3) 1,000 units tablet, 1 tablet 2x daily, Disp: , Rfl:     diazepam (VALIUM) 5 mg tablet, , Disp: , Rfl:     diphenoxylate-atropine (LOMOTIL) 2 5-0 025 mg per tablet, Take 2 tablets by mouth 4 (four) times a day as needed, Disp: , Rfl:     escitalopram (LEXAPRO) 20 mg tablet, daily    , Disp: , Rfl:     ferrous sulfate 324 (65 Fe) mg, Take 1 tablet (324 mg total) by mouth 2 (two) times a day before meals, Disp: 180 tablet, Rfl: 3    folic acid (FOLVITE) 1 mg tablet, Take 1 tablet (1 mg total) by mouth daily, Disp: 90 tablet, Rfl: 3    gabapentin (NEURONTIN) 300 mg capsule, Take 600 mg by mouth 2 (two) times a day, Disp: , Rfl:     HYDROmorphone (DILAUDID) 4 mg tablet, Take 4 mg by mouth 4 (four) times a day, Disp: , Rfl:     methocarbamol (ROBAXIN) 500 mg tablet, take 1 tablet by mouth every 8 hours if needed for SPASMS, Disp: , Rfl:     morphine (MS CONTIN) 30 mg 12 hr tablet, take 1 tablet by mouth every 8 hours, Disp: , Rfl:     ondansetron (ZOFRAN) 4 mg tablet, take 1 tablet by mouth every 6 hours if needed for nausea and vomiting, Disp: , Rfl:     pantoprazole (PROTONIX) 40 mg tablet, take 1 tablet by mouth once daily, Disp: 90 tablet, Rfl: 3    Gattex 5 MG KIT, , Disp: , Rfl:     metaxalone (SKELAXIN) 400 MG tablet, , Disp: , Rfl:     tamsulosin (FLOMAX) 0 4 mg, Take 2 capsules (0 8 mg total) by mouth daily with dinner, Disp: 180 capsule, Rfl: 1    Current Facility-Administered Medications:     cyanocobalamin injection 1,000 mcg, 1,000 mcg, Intramuscular, Q30 Days, Venus Hernandez DO, 1,000 mcg at 03/02/21 1125  Allergies   Allergen Reactions    Nsaids      Irritable, upset stomach  Irritable, upset stomach    Tolmetin      Irritable, upset stomach    Codeine Hives     Per [address] admission orders    Morphine      Pt states that she is unable to take morphine, even though it is prescribed     Oxycodone-Acetaminophen GI Intolerance     Percocet Tabs    Tylenol [Acetaminophen]        OBJECTIVE    Vitals:   Vitals:    03/02/21 1104   BP: 112/68   BP Location: Right arm   Patient Position: Sitting   Cuff Size: Standard   Pulse: 78   Resp: 16   Temp: (!) 96 7 °F (35 9 °C)   SpO2: 98%   Weight: 63 4 kg (139 lb 12 8 oz)   Height: 5' 4" (1 626 m)           Physical Exam  Vitals signs and nursing note reviewed  Constitutional:       General: She is not in acute distress  Appearance: Normal appearance  HENT:      Head: Normocephalic and atraumatic  Right Ear: Tympanic membrane and ear canal normal       Left Ear: Tympanic membrane and ear canal normal    Eyes:      Conjunctiva/sclera: Conjunctivae normal    Cardiovascular:      Rate and Rhythm: Normal rate and regular rhythm  Pulmonary:      Effort: Pulmonary effort is normal  No respiratory distress  Breath sounds: Normal breath sounds  Abdominal:      General: Bowel sounds are normal  There is no distension  Palpations: Abdomen is soft  Tenderness: There is no abdominal tenderness  Comments: Significantly scarred abdomen s/p multiple surgeries   Musculoskeletal:      Right lower leg: No edema  Left lower leg: No edema  Lymphadenopathy:      Cervical: No cervical adenopathy  Skin:     General: Skin is warm and dry  Neurological:      General: No focal deficit present  Mental Status: She is alert     Psychiatric:         Mood and Affect: Mood normal                     DO Mirella [facility] [address] [facility]   3/2/2021  11:36 AM

## 2021-03-02 NOTE — PROGRESS NOTES
Patient had called the office to state that her PICC line has become occluded  This is an old PICC line  We are scheduling the patient to have a new PICC line placed by Interventional Radiology at the [facility]

## 2021-03-02 NOTE — TELEMEDICINE
Pt w/ PICC line for TPN  Not needed every day  Short gut syndrome    Line is occluded    Exchange/new line is indicated    May be able to do over the wire exchange, possible new PICC     May be candidate for tunneled IJ cath if needed long term    D/w Dr Steffany Amaral

## 2021-03-03 ENCOUNTER — HOSPITAL ENCOUNTER (OUTPATIENT)
Dept: INTERVENTIONAL RADIOLOGY/VASCULAR | Facility: HOSPITAL | Age: 66
Discharge: HOME | End: 2021-03-03
Attending: RADIOLOGY | Admitting: RADIOLOGY
Payer: MEDICARE

## 2021-03-03 DIAGNOSIS — K91.2 SHORT GUT SYNDROME: ICD-10-CM

## 2021-03-03 PROCEDURE — C1751 CATH, INF, PER/CENT/MIDLINE: HCPCS

## 2021-03-03 PROCEDURE — 36584 COMPL RPLCMT PICC RS&I: CPT

## 2021-03-03 PROCEDURE — 36584 COMPL RPLCMT PICC RS&I: CPT | Performed by: RADIOLOGY

## 2021-03-03 RX ORDER — LIDOCAINE WITH 8.4% SOD BICARB 0.9%(10ML)
SYRINGE (ML) INJECTION CODE/TRAUMA/SEDATION MEDICATION
Status: COMPLETED | OUTPATIENT
Start: 2021-03-03 | End: 2021-03-03

## 2021-03-03 RX ADMIN — Medication 5 ML: at 11:52

## 2021-03-09 DIAGNOSIS — K91.2 SHORT GUT SYNDROME: Primary | ICD-10-CM

## 2021-03-09 PROCEDURE — 99024 POSTOP FOLLOW-UP VISIT: CPT | Performed by: SURGERY

## 2021-03-09 RX ORDER — DIPHENOXYLATE HYDROCHLORIDE AND ATROPINE SULFATE 2.5; .025 MG/1; MG/1
2 TABLET ORAL 4 TIMES DAILY PRN
Qty: 240 TABLET | Refills: 3 | Status: SHIPPED | OUTPATIENT
Start: 2021-03-09 | End: 2021-01-01

## 2021-03-10 RX ORDER — DIPHENOXYLATE HYDROCHLORIDE AND ATROPINE SULFATE 2.5; .025 MG/1; MG/1
TABLET ORAL
Qty: 240 TABLET | Refills: 3 | Status: SHIPPED | OUTPATIENT
Start: 2021-03-10 | End: 2021-04-01 | Stop reason: CLARIF

## 2021-03-31 ENCOUNTER — CLINICAL SUPPORT (OUTPATIENT)
Dept: FAMILY MEDICINE CLINIC | Facility: CLINIC | Age: 66
End: 2021-03-31
Payer: MEDICARE

## 2021-03-31 DIAGNOSIS — E53.8 B12 DEFICIENCY: ICD-10-CM

## 2021-03-31 PROCEDURE — 96372 THER/PROPH/DIAG INJ SC/IM: CPT | Performed by: FAMILY MEDICINE

## 2021-03-31 RX ADMIN — CYANOCOBALAMIN 1000 MCG: 1000 INJECTION INTRAMUSCULAR; SUBCUTANEOUS at 10:38

## 2021-04-01 ENCOUNTER — CONSULT (OUTPATIENT)
Dept: SURGERY | Facility: CLINIC | Age: 66
End: 2021-04-01
Payer: MEDICARE

## 2021-04-01 VITALS
SYSTOLIC BLOOD PRESSURE: 100 MMHG | HEART RATE: 97 BPM | BODY MASS INDEX: 23.9 KG/M2 | WEIGHT: 140 LBS | HEIGHT: 64 IN | DIASTOLIC BLOOD PRESSURE: 72 MMHG

## 2021-04-01 DIAGNOSIS — K63.2 ENTEROCUTANEOUS FISTULA: Primary | ICD-10-CM

## 2021-04-01 DIAGNOSIS — Z46.89 ENCOUNTER FOR REMOVAL OF BILIARY STENT: ICD-10-CM

## 2021-04-01 PROBLEM — D64.9 CHRONIC ANEMIA: Chronic | Status: RESOLVED | Noted: 2020-12-10 | Resolved: 2021-04-01

## 2021-04-01 PROBLEM — E44.1 MILD PROTEIN MALNUTRITION (HCC): Status: RESOLVED | Noted: 2019-11-05 | Resolved: 2021-04-01

## 2021-04-01 PROBLEM — T82.898A OCCLUDED PICC LINE (HCC): Status: RESOLVED | Noted: 2021-03-02 | Resolved: 2021-04-01

## 2021-04-01 PROBLEM — M85.80 OSTEOPENIA: Status: RESOLVED | Noted: 2019-11-04 | Resolved: 2021-04-01

## 2021-04-01 PROCEDURE — 99214 OFFICE O/P EST MOD 30 MIN: CPT | Performed by: SURGERY

## 2021-04-01 NOTE — PATIENT INSTRUCTIONS
Continue with nocturnal TPN twice per week     Continue with vitamin and iron replacement as ordered   Continue with Lomotil p r n  to control diarrhea    Make appointment with Dr Kulwant Case, Gastroenterologist, for removal of ERCP stent

## 2021-04-01 NOTE — PROGRESS NOTES
Assessment/Plan:   Diagnoses and all orders for this visit:    Enterocutaneous fistula    Encounter for removal of biliary stent  -     Ambulatory referral to Gastroenterology; Future      Patient is doing satisfactorily  Will continue with TPN 2 times per week  Patient continues with Gattex injections  Subjective:      Patient ID: Delilah Chilel is a 73 y o  female  Patient known to have short bowel syndrome and chronic enterocutaneous fistula  Patient has history of chronic opiate dependence  HPI   Patient has been doing well since the last office visit  She continues to have watery diarrhea with up to 3 bowel movements at night  This is associated with abdominal cramps  We are trying to control the diarrhea with Lomotil which she takes up to 8 tablets a day  She was unable to afford cholestyramine  Patient has been on Gattex injections for the last 2 months  This has significantly decreased the fistula output  Patient now changes the bag every 2-3 days and the drainage amount is minimal   There has been no leakage of fistula contents on the skin  Patient has been diligent about taking her vitamin replacements including B12 injections  Review of lab studies show that she has a normal hemoglobin and normal albumin for the 1st time since I have been taking care of this patient for the last more than 18 months  The following portions of the patient's history were reviewed and updated as appropriate: allergies, current medications, past family history, past medical history, past social history, past surgical history, and problem list     Review of Systems    No anorexia or weight loss  No fever  Patient had a new PICC line placed at the [facility]      Objective:      /72   Pulse 97   Ht 5' 4" (1 626 m)   Wt 63 5 kg (140 lb)   LMP  (LMP Unknown)   BMI 24 03 kg/m²        Physical Exam    No pallor or icterus   No cervical lymphadenopathy    Heart sounds are normal Chest is clear to auscultation     Abdominal exam reveals numerous operative scars  There is a fistula in the lower midline in the suprapubic area  The bag is appropriately placed over the fistula

## 2021-04-08 ENCOUNTER — HOSPITAL ENCOUNTER (INPATIENT)
Facility: HOSPITAL | Age: 66
LOS: 1 days | DRG: 381 | End: 2021-04-09
Attending: EMERGENCY MEDICINE | Admitting: HOSPITALIST
Payer: MEDICARE

## 2021-04-08 DIAGNOSIS — R10.9 ABDOMINAL PAIN: ICD-10-CM

## 2021-04-08 DIAGNOSIS — K56.7 ILEUS (HCC): ICD-10-CM

## 2021-04-08 DIAGNOSIS — K31.1 GASTRIC OUTLET OBSTRUCTION: Primary | ICD-10-CM

## 2021-04-08 PROCEDURE — 99223 1ST HOSP IP/OBS HIGH 75: CPT | Performed by: HOSPITALIST

## 2021-04-08 PROCEDURE — 1124F ACP DISCUSS-NO DSCNMKR DOCD: CPT | Performed by: EMERGENCY MEDICINE

## 2021-04-08 PROCEDURE — 93005 ELECTROCARDIOGRAM TRACING: CPT

## 2021-04-08 PROCEDURE — 99285 EMERGENCY DEPT VISIT HI MDM: CPT

## 2021-04-08 RX ORDER — ONDANSETRON 2 MG/ML
1 INJECTION INTRAMUSCULAR; INTRAVENOUS ONCE
Status: COMPLETED | OUTPATIENT
Start: 2021-04-09 | End: 2021-04-08

## 2021-04-08 NOTE — Clinical Note
Case was discussed with [facility] and the patient's admission status was agreed to be Admission Status: inpt status to the service of Dr YVETTE DINERO

## 2021-04-09 ENCOUNTER — HOSPITAL ENCOUNTER (INPATIENT)
Facility: HOSPITAL | Age: 66
LOS: 4 days | Discharge: HOME HEALTH | DRG: 389 | End: 2021-04-13
Attending: SURGERY | Admitting: SURGERY
Payer: MEDICARE

## 2021-04-09 ENCOUNTER — APPOINTMENT (EMERGENCY)
Dept: CT IMAGING | Facility: HOSPITAL | Age: 66
DRG: 381 | End: 2021-04-09
Payer: MEDICARE

## 2021-04-09 VITALS
HEIGHT: 64 IN | RESPIRATION RATE: 16 BRPM | DIASTOLIC BLOOD PRESSURE: 58 MMHG | BODY MASS INDEX: 23.9 KG/M2 | OXYGEN SATURATION: 97 % | HEART RATE: 77 BPM | TEMPERATURE: 98.1 F | WEIGHT: 140 LBS | SYSTOLIC BLOOD PRESSURE: 95 MMHG

## 2021-04-09 DIAGNOSIS — K59.81 COLONIC PSEUDOOBSTRUCTION: Primary | ICD-10-CM

## 2021-04-09 DIAGNOSIS — K63.2 ENTEROCUTANEOUS FISTULA: ICD-10-CM

## 2021-04-09 PROBLEM — K31.1 PARTIAL GASTRIC OUTLET OBSTRUCTION: Status: RESOLVED | Noted: 2021-04-09 | Resolved: 2021-04-09

## 2021-04-09 PROBLEM — K31.1 PARTIAL GASTRIC OUTLET OBSTRUCTION: Status: ACTIVE | Noted: 2021-04-09

## 2021-04-09 LAB
ALBUMIN SERPL BCP-MCNC: 3.2 G/DL (ref 3.5–5)
ALBUMIN SERPL BCP-MCNC: 3.4 G/DL (ref 3.5–5)
ALP SERPL-CCNC: 145 U/L (ref 46–116)
ALP SERPL-CCNC: 156 U/L (ref 46–116)
ALT SERPL W P-5'-P-CCNC: 80 U/L (ref 12–78)
ALT SERPL W P-5'-P-CCNC: 90 U/L (ref 12–78)
ANION GAP SERPL CALCULATED.3IONS-SCNC: 9 MMOL/L (ref 4–13)
ANION GAP SERPL CALCULATED.3IONS-SCNC: 9 MMOL/L (ref 4–13)
AST SERPL W P-5'-P-CCNC: 173 U/L (ref 5–45)
AST SERPL W P-5'-P-CCNC: 96 U/L (ref 5–45)
ATRIAL RATE: 73 BPM
BACTERIA UR QL AUTO: ABNORMAL /HPF
BASOPHILS # BLD AUTO: 0.06 THOUSANDS/ΜL (ref 0–0.1)
BASOPHILS NFR BLD AUTO: 1 % (ref 0–1)
BILIRUB DIRECT SERPL-MCNC: 0.13 MG/DL (ref 0–0.2)
BILIRUB SERPL-MCNC: 0.3 MG/DL (ref 0.2–1)
BILIRUB SERPL-MCNC: 0.44 MG/DL (ref 0.2–1)
BILIRUB UR QL STRIP: NEGATIVE
BUN SERPL-MCNC: 9 MG/DL (ref 5–25)
BUN SERPL-MCNC: 9 MG/DL (ref 5–25)
CALCIUM ALBUM COR SERPL-MCNC: 8.8 MG/DL (ref 8.3–10.1)
CALCIUM SERPL-MCNC: 8.3 MG/DL (ref 8.3–10.1)
CALCIUM SERPL-MCNC: 8.4 MG/DL (ref 8.3–10.1)
CHLORIDE SERPL-SCNC: 104 MMOL/L (ref 100–108)
CHLORIDE SERPL-SCNC: 110 MMOL/L (ref 100–108)
CLARITY UR: CLEAR
CO2 SERPL-SCNC: 25 MMOL/L (ref 21–32)
CO2 SERPL-SCNC: 25 MMOL/L (ref 21–32)
COLOR UR: YELLOW
CREAT SERPL-MCNC: 0.9 MG/DL (ref 0.6–1.3)
CREAT SERPL-MCNC: 0.96 MG/DL (ref 0.6–1.3)
EOSINOPHIL # BLD AUTO: 0.18 THOUSAND/ΜL (ref 0–0.61)
EOSINOPHIL NFR BLD AUTO: 2 % (ref 0–6)
ERYTHROCYTE [DISTWIDTH] IN BLOOD BY AUTOMATED COUNT: 14.6 % (ref 11.6–15.1)
ERYTHROCYTE [DISTWIDTH] IN BLOOD BY AUTOMATED COUNT: 14.9 % (ref 11.6–15.1)
GFR SERPL CREATININE-BSD FRML MDRD: 62 ML/MIN/1.73SQ M
GFR SERPL CREATININE-BSD FRML MDRD: 67 ML/MIN/1.73SQ M
GLUCOSE SERPL-MCNC: 100 MG/DL (ref 65–140)
GLUCOSE SERPL-MCNC: 104 MG/DL (ref 65–140)
GLUCOSE UR STRIP-MCNC: NEGATIVE MG/DL
HCT VFR BLD AUTO: 34.9 % (ref 34.8–46.1)
HCT VFR BLD AUTO: 35.8 % (ref 34.8–46.1)
HGB BLD-MCNC: 11 G/DL (ref 11.5–15.4)
HGB BLD-MCNC: 11.1 G/DL (ref 11.5–15.4)
HGB UR QL STRIP.AUTO: NEGATIVE
IMM GRANULOCYTES # BLD AUTO: 0.03 THOUSAND/UL (ref 0–0.2)
IMM GRANULOCYTES NFR BLD AUTO: 0 % (ref 0–2)
KETONES UR STRIP-MCNC: NEGATIVE MG/DL
LACTATE SERPL-SCNC: 0.9 MMOL/L (ref 0.5–2)
LEUKOCYTE ESTERASE UR QL STRIP: NEGATIVE
LIPASE SERPL-CCNC: 72 U/L (ref 73–393)
LYMPHOCYTES # BLD AUTO: 1.38 THOUSANDS/ΜL (ref 0.6–4.47)
LYMPHOCYTES NFR BLD AUTO: 15 % (ref 14–44)
MCH RBC QN AUTO: 26.8 PG (ref 26.8–34.3)
MCH RBC QN AUTO: 27.3 PG (ref 26.8–34.3)
MCHC RBC AUTO-ENTMCNC: 31 G/DL (ref 31.4–37.4)
MCHC RBC AUTO-ENTMCNC: 31.5 G/DL (ref 31.4–37.4)
MCV RBC AUTO: 87 FL (ref 82–98)
MCV RBC AUTO: 87 FL (ref 82–98)
MONOCYTES # BLD AUTO: 0.72 THOUSAND/ΜL (ref 0.17–1.22)
MONOCYTES NFR BLD AUTO: 8 % (ref 4–12)
NEUTROPHILS # BLD AUTO: 6.62 THOUSANDS/ΜL (ref 1.85–7.62)
NEUTS SEG NFR BLD AUTO: 74 % (ref 43–75)
NITRITE UR QL STRIP: POSITIVE
NON-SQ EPI CELLS URNS QL MICRO: ABNORMAL /HPF
NRBC BLD AUTO-RTO: 0 /100 WBCS
P AXIS: 33 DEGREES
PH UR STRIP.AUTO: 5 [PH]
PLATELET # BLD AUTO: 206 THOUSANDS/UL (ref 149–390)
PLATELET # BLD AUTO: 221 THOUSANDS/UL (ref 149–390)
PMV BLD AUTO: 9.2 FL (ref 8.9–12.7)
PMV BLD AUTO: 9.4 FL (ref 8.9–12.7)
POTASSIUM SERPL-SCNC: 3.9 MMOL/L (ref 3.5–5.3)
POTASSIUM SERPL-SCNC: 4.7 MMOL/L (ref 3.5–5.3)
PR INTERVAL: 156 MS
PROT SERPL-MCNC: 6.9 G/DL (ref 6.4–8.2)
PROT SERPL-MCNC: 7.2 G/DL (ref 6.4–8.2)
PROT UR STRIP-MCNC: NEGATIVE MG/DL
QRS AXIS: 59 DEGREES
QRSD INTERVAL: 90 MS
QT INTERVAL: 392 MS
QTC INTERVAL: 431 MS
RBC # BLD AUTO: 4.03 MILLION/UL (ref 3.81–5.12)
RBC # BLD AUTO: 4.14 MILLION/UL (ref 3.81–5.12)
RBC #/AREA URNS AUTO: ABNORMAL /HPF
SODIUM SERPL-SCNC: 138 MMOL/L (ref 136–145)
SODIUM SERPL-SCNC: 144 MMOL/L (ref 136–145)
SP GR UR STRIP.AUTO: <=1.005 (ref 1–1.03)
T WAVE AXIS: 35 DEGREES
UROBILINOGEN UR QL STRIP.AUTO: 0.2 E.U./DL
VENTRICULAR RATE: 73 BPM
WBC # BLD AUTO: 6.12 THOUSAND/UL (ref 4.31–10.16)
WBC # BLD AUTO: 8.99 THOUSAND/UL (ref 4.31–10.16)
WBC #/AREA URNS AUTO: ABNORMAL /HPF

## 2021-04-09 PROCEDURE — 36415 COLL VENOUS BLD VENIPUNCTURE: CPT | Performed by: EMERGENCY MEDICINE

## 2021-04-09 PROCEDURE — 80048 BASIC METABOLIC PNL TOTAL CA: CPT | Performed by: HOSPITALIST

## 2021-04-09 PROCEDURE — 99222 1ST HOSP IP/OBS MODERATE 55: CPT | Performed by: SURGERY

## 2021-04-09 PROCEDURE — 83605 ASSAY OF LACTIC ACID: CPT | Performed by: EMERGENCY MEDICINE

## 2021-04-09 PROCEDURE — 80053 COMPREHEN METABOLIC PANEL: CPT | Performed by: EMERGENCY MEDICINE

## 2021-04-09 PROCEDURE — 99239 HOSP IP/OBS DSCHRG MGMT >30: CPT | Performed by: NURSE PRACTITIONER

## 2021-04-09 PROCEDURE — 80076 HEPATIC FUNCTION PANEL: CPT | Performed by: HOSPITALIST

## 2021-04-09 PROCEDURE — 96361 HYDRATE IV INFUSION ADD-ON: CPT

## 2021-04-09 PROCEDURE — 85027 COMPLETE CBC AUTOMATED: CPT | Performed by: HOSPITALIST

## 2021-04-09 PROCEDURE — 96374 THER/PROPH/DIAG INJ IV PUSH: CPT

## 2021-04-09 PROCEDURE — 74177 CT ABD & PELVIS W/CONTRAST: CPT

## 2021-04-09 PROCEDURE — 93010 ELECTROCARDIOGRAM REPORT: CPT | Performed by: INTERNAL MEDICINE

## 2021-04-09 PROCEDURE — C9113 INJ PANTOPRAZOLE SODIUM, VIA: HCPCS | Performed by: HOSPITALIST

## 2021-04-09 PROCEDURE — 85025 COMPLETE CBC W/AUTO DIFF WBC: CPT | Performed by: EMERGENCY MEDICINE

## 2021-04-09 PROCEDURE — 83690 ASSAY OF LIPASE: CPT | Performed by: EMERGENCY MEDICINE

## 2021-04-09 PROCEDURE — 96375 TX/PRO/DX INJ NEW DRUG ADDON: CPT

## 2021-04-09 PROCEDURE — 81001 URINALYSIS AUTO W/SCOPE: CPT | Performed by: EMERGENCY MEDICINE

## 2021-04-09 PROCEDURE — 99285 EMERGENCY DEPT VISIT HI MDM: CPT | Performed by: EMERGENCY MEDICINE

## 2021-04-09 RX ORDER — HYDROMORPHONE HCL/PF 1 MG/ML
0.5 SYRINGE (ML) INJECTION
Status: DISCONTINUED | OUTPATIENT
Start: 2021-04-09 | End: 2021-04-09 | Stop reason: HOSPADM

## 2021-04-09 RX ORDER — LORAZEPAM 2 MG/ML
0.5 INJECTION INTRAMUSCULAR EVERY 6 HOURS PRN
Status: CANCELLED | OUTPATIENT
Start: 2021-04-09

## 2021-04-09 RX ORDER — PANTOPRAZOLE SODIUM 40 MG/1
40 INJECTION, POWDER, FOR SOLUTION INTRAVENOUS
Status: DISCONTINUED | OUTPATIENT
Start: 2021-04-10 | End: 2021-01-01

## 2021-04-09 RX ORDER — HYDROMORPHONE HCL/PF 1 MG/ML
0.5 SYRINGE (ML) INJECTION
Status: CANCELLED | OUTPATIENT
Start: 2021-04-09

## 2021-04-09 RX ORDER — HYDROMORPHONE HCL 110MG/55ML
2 PATIENT CONTROLLED ANALGESIA SYRINGE INTRAVENOUS ONCE
Status: COMPLETED | OUTPATIENT
Start: 2021-04-09 | End: 2021-04-09

## 2021-04-09 RX ORDER — LORAZEPAM 2 MG/ML
0.5 INJECTION INTRAMUSCULAR EVERY 6 HOURS PRN
Status: DISCONTINUED | OUTPATIENT
Start: 2021-04-09 | End: 2021-04-09 | Stop reason: HOSPADM

## 2021-04-09 RX ORDER — NALOXONE HYDROCHLORIDE 0.4 MG/ML
0.1 INJECTION, SOLUTION INTRAMUSCULAR; INTRAVENOUS; SUBCUTANEOUS
Status: DISCONTINUED | OUTPATIENT
Start: 2021-04-09 | End: 2021-01-01 | Stop reason: HOSPADM

## 2021-04-09 RX ORDER — HYDROMORPHONE HCL 110MG/55ML
2 PATIENT CONTROLLED ANALGESIA SYRINGE INTRAVENOUS
Status: DISCONTINUED | OUTPATIENT
Start: 2021-04-09 | End: 2021-01-01 | Stop reason: HOSPADM

## 2021-04-09 RX ORDER — DEXTROSE AND SODIUM CHLORIDE 5; .9 G/100ML; G/100ML
125 INJECTION, SOLUTION INTRAVENOUS CONTINUOUS
Status: DISCONTINUED | OUTPATIENT
Start: 2021-04-09 | End: 2021-04-10

## 2021-04-09 RX ORDER — HYDROMORPHONE HCL/PF 1 MG/ML
1 SYRINGE (ML) INJECTION ONCE
Status: COMPLETED | OUTPATIENT
Start: 2021-04-09 | End: 2021-04-09

## 2021-04-09 RX ORDER — SODIUM CHLORIDE, SODIUM LACTATE, POTASSIUM CHLORIDE, CALCIUM CHLORIDE 600; 310; 30; 20 MG/100ML; MG/100ML; MG/100ML; MG/100ML
100 INJECTION, SOLUTION INTRAVENOUS CONTINUOUS
Status: DISCONTINUED | OUTPATIENT
Start: 2021-04-09 | End: 2021-04-09 | Stop reason: HOSPADM

## 2021-04-09 RX ORDER — FENTANYL CITRATE 50 UG/ML
50 INJECTION, SOLUTION INTRAMUSCULAR; INTRAVENOUS ONCE
Status: COMPLETED | OUTPATIENT
Start: 2021-04-09 | End: 2021-04-09

## 2021-04-09 RX ORDER — PANTOPRAZOLE SODIUM 40 MG/1
40 INJECTION, POWDER, FOR SOLUTION INTRAVENOUS
Status: DISCONTINUED | OUTPATIENT
Start: 2021-04-09 | End: 2021-04-09 | Stop reason: HOSPADM

## 2021-04-09 RX ORDER — ONDANSETRON 2 MG/ML
4 INJECTION INTRAMUSCULAR; INTRAVENOUS EVERY 6 HOURS PRN
Status: DISCONTINUED | OUTPATIENT
Start: 2021-04-09 | End: 2021-01-01 | Stop reason: HOSPADM

## 2021-04-09 RX ORDER — ONDANSETRON 2 MG/ML
4 INJECTION INTRAMUSCULAR; INTRAVENOUS EVERY 4 HOURS PRN
Status: CANCELLED | OUTPATIENT
Start: 2021-04-09

## 2021-04-09 RX ORDER — ONDANSETRON 2 MG/ML
4 INJECTION INTRAMUSCULAR; INTRAVENOUS EVERY 4 HOURS PRN
Status: DISCONTINUED | OUTPATIENT
Start: 2021-04-09 | End: 2021-04-09 | Stop reason: HOSPADM

## 2021-04-09 RX ORDER — PANTOPRAZOLE SODIUM 40 MG/1
40 INJECTION, POWDER, FOR SOLUTION INTRAVENOUS
Status: CANCELLED | OUTPATIENT
Start: 2021-04-10

## 2021-04-09 RX ORDER — SODIUM CHLORIDE, SODIUM LACTATE, POTASSIUM CHLORIDE, CALCIUM CHLORIDE 600; 310; 30; 20 MG/100ML; MG/100ML; MG/100ML; MG/100ML
100 INJECTION, SOLUTION INTRAVENOUS CONTINUOUS
Status: CANCELLED | OUTPATIENT
Start: 2021-04-09

## 2021-04-09 RX ORDER — HYDROMORPHONE HCL 110MG/55ML
2 PATIENT CONTROLLED ANALGESIA SYRINGE INTRAVENOUS ONCE
Status: DISCONTINUED | OUTPATIENT
Start: 2021-04-09 | End: 2021-04-09

## 2021-04-09 RX ADMIN — IOHEXOL 100 ML: 350 INJECTION, SOLUTION INTRAVENOUS at 00:44

## 2021-04-09 RX ADMIN — HYDROMORPHONE HYDROCHLORIDE 2 MG: 2 INJECTION, SOLUTION INTRAMUSCULAR; INTRAVENOUS; SUBCUTANEOUS at 02:40

## 2021-04-09 RX ADMIN — HYDROMORPHONE HYDROCHLORIDE 1 MG: 1 INJECTION, SOLUTION INTRAMUSCULAR; INTRAVENOUS; SUBCUTANEOUS at 01:04

## 2021-04-09 RX ADMIN — HYDROMORPHONE HYDROCHLORIDE 2 MG: 2 INJECTION INTRAMUSCULAR; INTRAVENOUS; SUBCUTANEOUS at 16:50

## 2021-04-09 RX ADMIN — HYDROMORPHONE HYDROCHLORIDE 2 MG: 2 INJECTION INTRAMUSCULAR; INTRAVENOUS; SUBCUTANEOUS at 22:41

## 2021-04-09 RX ADMIN — SODIUM CHLORIDE 1000 ML: 0.9 INJECTION, SOLUTION INTRAVENOUS at 00:26

## 2021-04-09 RX ADMIN — HYDROMORPHONE HYDROCHLORIDE 0.5 MG: 1 INJECTION, SOLUTION INTRAMUSCULAR; INTRAVENOUS; SUBCUTANEOUS at 13:09

## 2021-04-09 RX ADMIN — HYDROMORPHONE HYDROCHLORIDE 1 MG: 1 INJECTION, SOLUTION INTRAMUSCULAR; INTRAVENOUS; SUBCUTANEOUS at 01:18

## 2021-04-09 RX ADMIN — DEXTROSE AND SODIUM CHLORIDE 125 ML/HR: 5; .9 INJECTION, SOLUTION INTRAVENOUS at 16:51

## 2021-04-09 RX ADMIN — SODIUM CHLORIDE, SODIUM LACTATE, POTASSIUM CHLORIDE, AND CALCIUM CHLORIDE 100 ML/HR: .6; .31; .03; .02 INJECTION, SOLUTION INTRAVENOUS at 02:41

## 2021-04-09 RX ADMIN — ONDANSETRON 4 MG: 2 INJECTION INTRAMUSCULAR; INTRAVENOUS at 02:41

## 2021-04-09 RX ADMIN — ENOXAPARIN SODIUM 40 MG: 40 INJECTION SUBCUTANEOUS at 09:08

## 2021-04-09 RX ADMIN — HYDROMORPHONE HYDROCHLORIDE 2 MG: 2 INJECTION INTRAMUSCULAR; INTRAVENOUS; SUBCUTANEOUS at 19:34

## 2021-04-09 RX ADMIN — HYDROMORPHONE HYDROCHLORIDE 0.5 MG: 1 INJECTION, SOLUTION INTRAMUSCULAR; INTRAVENOUS; SUBCUTANEOUS at 11:06

## 2021-04-09 RX ADMIN — PANTOPRAZOLE SODIUM 40 MG: 40 INJECTION, POWDER, FOR SOLUTION INTRAVENOUS at 09:09

## 2021-04-09 RX ADMIN — FENTANYL CITRATE 50 MCG: 50 INJECTION, SOLUTION INTRAMUSCULAR; INTRAVENOUS at 00:24

## 2021-04-09 RX ADMIN — HYDROMORPHONE HYDROCHLORIDE 0.5 MG: 1 INJECTION, SOLUTION INTRAMUSCULAR; INTRAVENOUS; SUBCUTANEOUS at 07:07

## 2021-04-09 NOTE — H&P
[facility]  H&P- Aydee Sin 1955, 73 y o  female MRN: [id number]  Unit/Bed#: ED 08 Encounter: [id number]  Primary Care Provider: Annie Moses DO   Date and time admitted to hospital: 4/8/2021 11:43 PM         [facility] - History and Physical:       Aydee Sin 73 y o  female MRN: [id number]  Unit/Bed#: ED 08 Encounter: [id number]  Admitting Physician: Jazmyne Umanzor MD  PCP: Annie Moses DO  Date of Admission:  04/09/21        Assessment and Plan:     * Partial gastric outlet obstruction  Assessment & Plan  · Keep strict NPO for now  · IV Protonix  · IV Zofran p r n  for nausea vomiting  · Consult to both GI and General surgery placed  · I had discussed the case with General surgery on-call for concerns of possible acute abdomen since the patient stated that she has not passed any gas for the last couple of days  The patient is also significantly tender and distended  Her abdomen is also tense  B12 deficiency  Assessment & Plan  · Patient getting vitamin B12 injections every month    Chronic pain disorder  Assessment & Plan  · For now will continue with IV Dilaudid  · Patient might benefit from another dose of Restoril that was given on previous admission    Short gut syndrome  Assessment & Plan  Noted  · Hold Lomotil    Enterocutaneous fistula  Assessment & Plan  Noted    Depression  Assessment & Plan  · Resume oral medications when possible    Generalized anxiety disorder  Assessment & Plan  · IV Ativan p r n  while NPO            VTE Prophylaxis: Enoxaparin (Lovenox)  / sequential compression device   Code Status: full      Anticipated Length of Stay:  Patient will be admitted on an Inpatient basis with an anticipated length of stay of  2 midnights  Justification for Hospital Stay:  Nausea and vomiting    Total Time for Visit, including Counseling / Coordination of Care: 30 minutes    Greater than 50% of this total time spent on direct patient counseling and coordination of care  Chief Complaint:   Abdominal pain/nausea and vomiting    History of Present Illness:    Yanira Briggs is a 73 y o  female who presents to the ED with complaints of severe abdominal pain which started about a week ago but today was particularly at its worse  She also had associated nausea and multiple episodes of vomiting  The patient had a CT scan done which showed the possibility of partial gastric outlet obstruction  She had an NG-tube placed in the ED but continues to complain of pain  The patient stated that she has not passed gas for the last couple of days  Review of Systems:    Review of Systems   Constitutional: Negative  HENT: Negative  Eyes: Negative  Respiratory: Negative  Cardiovascular: Negative  Gastrointestinal: Positive for abdominal distention, abdominal pain, constipation, nausea and vomiting  Endocrine: Negative  Genitourinary: Negative  Musculoskeletal: Negative  Skin: Negative  Neurological: Negative  Psychiatric/Behavioral: Negative  Past Medical and Surgical History:     Past Medical History:   Diagnosis Date    Asthma     Bowel obstruction ([address] )     Choledocholithiasis 11/18/2020    Chronic back pain     Colon polyp     Enlarged kidney     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    Hydronephrosis 2/26/2019    Ileus ([address] ) 7/2/2018    Overview:  Last Assessment & Plan:  Patient presented with generalized abdominal pain nausea and bilious vomiting ,imaging study reports " diffuse bowel dilatation involving distal small bowel and the entire colon to the panel verge obstruction mass is not identified and this may represent adynamic ileus in the postoperative setting possible related to medication"   Patient states that she still ha    Liver mass     [facility] dependence [address] [address]     Post-ERCP acute pancreatitis 11/18/2020    Short gut syndrome     5 feet small bowel    Small bowel fistula     Urinary retention with incomplete bladder emptying        Past Surgical History:   Procedure Laterality Date    ABDOMINAL SURGERY      x 25    APPENDECTOMY      Open     BACK SURGERY      x 3     SECTION      x1    CHOLECYSTECTOMY      Open    COLON SURGERY      Sigmoid     COLONOSCOPY N/A 2018    Procedure: COLONOSCOPY;  Surgeon: Randal May MD;  Location: [facility]; Service: Gastroenterology    COLONOSCOPY  2018    EXPLORATORY LAPAROTOMY      Several for adhesive disease; has had several bowel resections; has a small bowel fistula    GASTROCUTANEOUS FISTULA CLOSURE      HERNIA REPAIR      Incisional     IR PICC REPOSITION  3/3/2021    MAMMO (HISTORICAL)  2017    SPINAL FUSION      Lower back     TONSILLECTOMY      TOTAL ABDOMINAL HYSTERECTOMY      Not due to cancer - complete     TRACHEOSTOMY  2019       Meds/Allergies:    Prior to Admission medications    Medication Sig Start Date End Date Taking? Authorizing Provider   cholecalciferol (VITAMIN D3) 1,000 units tablet 1 tablet 2x daily    Historical Provider, MD   diazepam (VALIUM) 5 mg tablet  21   Historical Provider, MD   diphenoxylate-atropine (LOMOTIL) 2 5-0 025 mg per tablet Take 2 tablets by mouth 4 (four) times a day as needed for diarrhea 3/9/21   Tiffanie Cortez MD   escitalopram (LEXAPRO) 20 mg tablet daily     10/23/17   Historical Provider, MD   ferrous sulfate 324 (65 Fe) mg Take 1 tablet (324 mg total) by mouth 2 (two) times a day before meals 12/10/20   Tiffanie Cortez MD   folic acid (FOLVITE) 1 mg tablet Take 1 tablet (1 mg total) by mouth daily 12/10/20   Tiffanie Cortez MD   gabapentin (NEURONTIN) 300 mg capsule Take 600 mg by mouth 2 (two) times a day 17   Historical Provider, MD   Gattex 5 MG KIT  21   Historical Provider, MD   HYDROmorphone (DILAUDID) 4 mg tablet Take 4 mg by mouth 4 (four) times a day    Historical Provider, MD   metaxalone (SKELAXIN) 400 MG tablet  10/20/20   Historical Provider, MD   methocarbamol (ROBAXIN) 500 mg tablet take 1 tablet by mouth every 8 hours if needed for SPASMS 20   Historical Provider, MD   morphine (MS CONTIN) 30 mg 12 hr tablet take 1 tablet by mouth every 8 hours 11/10/18   Historical Provider, MD   ondansetron (ZOFRAN) 4 mg tablet take 1 tablet by mouth every 6 hours if needed for nausea and vomiting 20   Historical Provider, MD   pantoprazole (PROTONIX) 40 mg tablet take 1 tablet by mouth once daily 12/3/20 3/3/21  Leobardo Cardoso, MD   tamsulosin (FLOMAX) 0 4 mg Take 2 capsules (0 8 mg total) by mouth daily with dinner 3/2/21   Jerry Valdes,      I have reviewed home medications with patient personally  Allergies:    Allergies   Allergen Reactions    Nsaids      Irritable, upset stomach  Irritable, upset stomach    Tolmetin      Irritable, upset stomach    Codeine Hives     Per [address] admission orders    Oxycodone-Acetaminophen GI Intolerance     Percocet Tabs    Tylenol [Acetaminophen]        Social History:     Marital Status: /Civil Union     Social History     Substance and Sexual Activity   Alcohol Use Not Currently    Alcohol/week: 0 0 standard drinks    Frequency: Never    Binge frequency: Never    Comment: social drinker     Social History     Tobacco Use   Smoking Status Former Smoker    Quit date: 1980    Years since quittin 7   Smokeless Tobacco Never Used   Tobacco Comment    Never smoker - As per [employer]      Social History     Substance and Sexual Activity   Drug Use No       Family History:    non-contributory    Physical Exam:     Vitals:   Blood Pressure: 102/55 (21)  Pulse: 78 (21)  Temperature: 98 1 °F (36 7 °C) (21)  Temp Source: Oral (21)  Respirations: 16 (21)  Height: 5' 4" (162 6 cm) (21)  Weight - Scale: 63 5 kg (140 lb) (21)  SpO2: 91 % (21)    Physical Exam  Constitutional:       General: She is in acute distress  Appearance: Normal appearance  HENT:      Head: Normocephalic and atraumatic  Eyes:      Extraocular Movements: Extraocular movements intact  Pupils: Pupils are equal, round, and reactive to light  Neck:      Musculoskeletal: Neck supple  Cardiovascular:      Rate and Rhythm: Normal rate and regular rhythm  Pulmonary:      Effort: Pulmonary effort is normal       Breath sounds: Normal breath sounds  Abdominal:      General: There is distension  Tenderness: There is abdominal tenderness  Comments: Enterocutaneous fistula   Musculoskeletal: Normal range of motion  Skin:     General: Skin is warm and dry  Neurological:      Mental Status: She is alert and oriented to person, place, and time  Psychiatric:         Mood and Affect: Mood normal        Additional Data:     Lab Results: I have personally reviewed pertinent reports  Results from last 7 days   Lab Units 04/09/21  0020   WBC Thousand/uL 8 99   HEMOGLOBIN g/dL 11 1*   HEMATOCRIT % 35 8   PLATELETS Thousands/uL 221   NEUTROS PCT % 74   LYMPHS PCT % 15   MONOS PCT % 8   EOS PCT % 2     Results from last 7 days   Lab Units 04/09/21  0020   SODIUM mmol/L 138   POTASSIUM mmol/L 3 9   CHLORIDE mmol/L 104   CO2 mmol/L 25   BUN mg/dL 9   CREATININE mg/dL 0 96   ANION GAP mmol/L 9   CALCIUM mg/dL 8 3   ALBUMIN g/dL 3 4*   TOTAL BILIRUBIN mg/dL 0 44   ALK PHOS U/L 156*   ALT U/L 90*   AST U/L 173*   GLUCOSE RANDOM mg/dL 104                 Results from last 7 days   Lab Units 04/09/21  0020   LACTIC ACID mmol/L 0 9       Imaging: I have personally reviewed pertinent reports  CT abdomen pelvis with contrast   ED Interpretation by Piyush Simmons DO (04/09 0146)     Marked dilatation of the colon with liquid stool likely representing ileus      Marked distention of the stomach with mild distention of the distal esophagus    Clinical correlation for partial gastric outlet obstruction is recommended      Persistent enterocutaneous fistula  Final Result by Nazia Rolle DO (04/09 0117)      Marked dilatation of the colon with liquid stool likely representing ileus  Marked distention of the stomach with mild distention of the distal esophagus  Clinical correlation for partial gastric outlet obstruction is recommended  Persistent enterocutaneous fistula  I personally discussed this study with Sujatha Valdez on 4/9/2021 at 1:14 AM                Workstation performed: [id number]                 [employer]scripts / Epic Records Reviewed: Yes     ** Please Note: This note has been constructed using a voice recognition system   **

## 2021-04-09 NOTE — ASSESSMENT & PLAN NOTE
· Keep strict NPO for now  · IV Protonix  · IV Zofran p r n  for nausea vomiting  · Consult to both GI and General surgery placed  · I had discussed the case with General surgery on-call for concerns of possible acute abdomen since the patient stated that she has not passed any gas for the last couple of days  The patient is also significantly tender and distended  Her abdomen is also tense

## 2021-04-09 NOTE — QUICK NOTE
I saw patient in the ED this morning, but unable to write full consult as patient was already transferred  My attending was not able to see patient  Agree with NGT placement  She is planned to see her surgeon, Dr Yumiko Antunez for evaluation at [facility]  Please re-consult GI at [facility] if needed  Thank you

## 2021-04-09 NOTE — ED NOTES
All labs sent for testing  Pt aware that UA is needed and will let me know when she is able to provide a specimen  Pt medicated as per md order         Heidi Nieto RN  04/09/21 0030

## 2021-04-09 NOTE — ASSESSMENT & PLAN NOTE
· NPO  · NG tube  · IV Protonix  · IV Zofran p r n  for nausea vomiting  · Patient evaluated by general surgery who recommended patient be transferred to [facility] where her surgeon evaluate patient   ·  Dr Vick Zuniga who is aware the patient will be the attending transfer placed  · Previous provider the case with General surgery on-call for concerns of possible acute abdomen since the patient stated that she has not passed any gas for the last couple of days  The patient is also significantly tender and distended  Her abdomen is also tense

## 2021-04-09 NOTE — DISCHARGE SUMMARY
[address] [facility]  Discharge- Mariposa Hernandez 1955, 73 y o  female MRN: [id number]  Unit/Bed#: ED 08 Encounter: [id number]  Primary Care Provider: Ana Cr DO   Date and time admitted to hospital: 4/8/2021 11:43 PM    * Partial gastric outlet obstruction  Assessment & Plan  · NPO  · NG tube  · IV Protonix  · IV Zofran p r n  for nausea vomiting  · Patient evaluated by general surgery who recommended patient be transferred to [address] [place] [address] where her surgeon evaluate patient   ·  Dr Armando Puente who is aware the patient will be the attending transfer placed  · Previous provider the case with General surgery on-call for concerns of possible acute abdomen since the patient stated that she has not passed any gas for the last couple of days  The patient is also significantly tender and distended  Her abdomen is also tense       B12 deficiency  Assessment & Plan  · Patient getting vitamin B12 injections every month    Chronic pain disorder  Assessment & Plan  · For now will continue with IV Dilaudid  · Patient might benefit from another dose of Restoril that was given on previous admission    Short gut syndrome  Assessment & Plan  Noted  · Hold Lomotil    Enterocutaneous fistula  Assessment & Plan  Noted    Depression  Assessment & Plan  · Resume oral medications when possible    Generalized anxiety disorder  Assessment & Plan  · IV Ativan p r n  while NPO      Discharging Physician / Practitioner: Jerlyn Schilder, CRNP  PCP: Ana Cr DO  Admission Date:   Admission Orders (From admission, onward)     Ordered        04/09/21 0212  Inpatient Admission  Once                   Discharge Date: 04/09/21    Resolved Problems  Date Reviewed: 4/9/2021    None          Consultations During Hospital Stay:  · General surgery    Procedures Performed:   · None    Significant Findings / Test Results:   · Concern for acute abdomen CTA findings gastric outlet obstruction  · Patient with known history of opioid induced colon dysmotility with rebound  · Short-bowel syndrome    Incidental Findings:   · Hypoalbuminemia     Test Results Pending at Discharge (will require follow up): · None     Outpatient Tests Requested:  · Her surgeon    Complications:  Acute abdomen need for transfer    Reason for Admission:  Acute abdomen gastric outlet obstruction    Hospital Course:     Gladis Bran is a 73 y o  female patient who originally presented to the hospital on 4/8/2021 due to known history but not limited to opioid induced colon dysmotility short bowel syndrome ongoing be syndrome presenting with acute abdominal pain CTA imaging was completed that showed concern for gastric outlet obstruction patient was made NPO placed on IV fluids along with a pain regimen  Patient was evaluated the next morning by surgery who recommended patient be transferred to her known surgeon who is at [address]  Patient overall was medically stable transportation was arranged patient was discharged via ACLS  Please see above list of diagnoses and related plan for additional information  Condition at Discharge: stable     Discharge Day Visit / Exam:     * Please refer to separate progress note for these details *    Discussion with Family:      Discharge instructions/Information to patient and family:   See after visit summary for information provided to patient and family  Provisions for Follow-Up Care:  See after visit summary for information related to follow-up care and any pertinent home health orders  Disposition:     [address] Transfer to [address] [place] [address] to [facility] SNF:   · Not Applicable to this Patient - Not Applicable to this Patient    Planned Readmission:  None     Discharge Statement:  I spent 35 minutes discharging the patient  This time was spent on the day of discharge  I had direct contact with the patient on the day of discharge   Greater than 50% of the total time was spent examining patient, answering all patient questions, arranging and discussing plan of care with patient as well as directly providing post-discharge instructions  Additional time then spent on discharge activities  Discharge Medications:  See after visit summary for reconciled discharge medications provided to patient and family        ** Please Note: This note has been constructed using a voice recognition system **

## 2021-04-09 NOTE — APP STUDENT NOTE
H&P Exam - General Surgery   Fransico Fay 73 y o  female MRN: [id number]  Unit/Bed#: ED 08 Encounter: [id number]    Assessment/Plan     Assessment:  ogilives syndrome  Colon dysmotility likely caused by chronic opioid dependence  SBS  Numerous past abdominal surgeries    Plan:  Transfer to Dr Crista Causey service  NGT  NPO       HPI:  Fransico Fay is a 73 y o  female who presents with PMH of SBS, extensive abdominal surgeries, back surgery, opioid dependence, enterocutaneous fistula with ileostomy bag, bowel resections, hemicolectomy  Presents with increasing abdominal pain that began 3 days ago  Pain is 8/10  States it was accompanied by vomiting a lot  Yesterday she had an "explosion" of air and watery stool that entered her ileostomy bag and notes that she had a recent increase in the amount of drainage in her bag  She feels distended and had generalized abdominal pain  States she has not been passing gas  Her last bowel movement was about a week and a half ago  She also has not had an appetite and only has nibbled on food for the past week  She gets TPN a few times a week and is taking Gattex  Nothing makes the pain better  Pain is worse with palpation  She was planning to present to Dr Crista Causey service at [facility] but came to [place] because she could not tolerate the pain with the longer drive  Review of Systems   Constitutional: Positive for activity change and appetite change  Negative for chills and fever  HENT: Negative  Eyes: Negative  Respiratory: Negative  Cardiovascular: Negative  Gastrointestinal: Positive for abdominal distention, abdominal pain, diarrhea, nausea and vomiting  Negative for anal bleeding, blood in stool, constipation and rectal pain  Endocrine: Negative  Genitourinary: Negative for difficulty urinating and dysuria  Musculoskeletal: Positive for back pain and myalgias  Skin: Negative  Allergic/Immunologic: Negative  Neurological: Negative  Hematological: Negative  Psychiatric/Behavioral: Negative  Historical Information   Past Medical History:   Diagnosis Date    Asthma     Bowel obstruction ([address] )     Choledocholithiasis 2020    Chronic back pain     Colon polyp     Enlarged kidney     Enterocutaneous fistula 2019    Hydronephrosis 2019    Ileus ([address] ) 2018    Overview:  Last Assessment & Plan:  Patient presented with generalized abdominal pain nausea and bilious vomiting ,imaging study reports " diffuse bowel dilatation involving distal small bowel and the entire colon to the panel verge obstruction mass is not identified and this may represent adynamic ileus in the postoperative setting possible related to medication"  Patient states that she still ha    Liver mass     O[facility]ate [facility] [address] [facility]     Post-ERCP acute pancreatitis 2020    Short gut syndrome     5 feet small bowel    Small bowel fistula     Urinary retention with incomplete bladder emptying      Past Surgical History:   Procedure Laterality Date    ABDOMINAL SURGERY      x 25    APPENDECTOMY      Open     BACK SURGERY      x 3     SECTION      x1    CHOLECYSTECTOMY      Open    COLON SURGERY      Sigmoid     COLONOSCOPY N/A 2018    Procedure: COLONOSCOPY;  Surgeon: Wilfrido Velasco MD;  Location: [facility];   Service: Gastroenterology    COLONOSCOPY  2018    EXPLORATORY LAPAROTOMY      Several for adhesive disease; has had several bowel resections; has a small bowel fistula    GASTROCUTANEOUS FISTULA CLOSURE      HERNIA REPAIR      Incisional     IR PICC REPOSITION  3/3/2021    MAMMO (HISTORICAL)  2017    SPINAL FUSION      Lower back     TONSILLECTOMY      TOTAL ABDOMINAL HYSTERECTOMY      Not due to cancer - complete     TRACHEOSTOMY  2019     Social History   Social History     Substance and Sexual Activity   Alcohol Use Not Currently    Alcohol/week: 0 0 standard drinks    Frequency: Never    Binge frequency: Never    Comment: social drinker     Social History     Substance and Sexual Activity   Drug Use No     Social History     Tobacco Use   Smoking Status Former Smoker    Quit date: 1980    Years since quittin 7   Smokeless Tobacco Never Used   Tobacco Comment    Never smoker - As per [employer]      E-Cigarette/Vaping    E-Cigarette Use Never User      E-Cigarette/Vaping Substances     Family History: non-contributory    Meds/Allergies   all medications and allergies reviewed  Allergies   Allergen Reactions    Nsaids      Irritable, upset stomach  Irritable, upset stomach    Tolmetin      Irritable, upset stomach    Codeine Hives     Per [address] admission orders    Oxycodone-Acetaminophen GI Intolerance     Percocet Tabs    Tylenol [Acetaminophen]        Objective   First Vitals:   Blood Pressure: 106/65 (21)  Pulse: 75 (21)  Temperature: 98 1 °F (36 7 °C) (21)  Temp Source: Oral (21)  Respirations: 20 (21)  Height: 5' 4" (162 6 cm) (21)  Weight - Scale: 63 5 kg (140 lb) (21)  SpO2: 91 % (21)    Current Vitals:   Blood Pressure: 103/59 (21 1100)  Pulse: 74 (21)  Temperature: 98 1 °F (36 7 °C) (21)  Temp Source: Oral (21)  Respirations: 17 (21)  Height: 5' 4" (162 6 cm) (21)  Weight - Scale: 63 5 kg (140 lb) (21)  SpO2: 96 % (21)      Intake/Output Summary (Last 24 hours) at 2021 1140  Last data filed at 2021 0240  Gross per 24 hour   Intake 1000 ml   Output --   Net 1000 ml       Invasive Devices     Peripherally Inserted Central Catheter Line            PICC Line Left Brachial -- days    PICC Line 73/95/66 Left Basilic 36 days          Drain            NG/OG/Enteral Tube Nasogastric 18 Fr Right nares less than 1 day                Physical Exam  Constitutional:       Appearance: She is ill-appearing  HENT:      Nose:      Comments: NG tube present     Mouth/Throat:      Mouth: Mucous membranes are dry  Cardiovascular:      Rate and Rhythm: Normal rate and regular rhythm  Pulses: Normal pulses  Heart sounds: No murmur  Pulmonary:      Effort: Pulmonary effort is normal       Breath sounds: Normal breath sounds  No wheezing  Abdominal:      General: A surgical scar is present  The ostomy site is clean  There is distension  Palpations: Abdomen is rigid  Tenderness: There is generalized abdominal tenderness  There is guarding  Comments: ileostomy present with air and stool in bag   Musculoskeletal:         General: Swelling present  Skin:     Comments: Numerous abdominal scars from prior surgeries     Neurological:      Mental Status: She is oriented to person, place, and time  Psychiatric:         Mood and Affect: Mood normal          Behavior: Behavior normal          Lab Results: reviewed  Imaging: I have personally reviewed pertinent reports  EKG, Pathology, and Other Studies: I have personally reviewed pertinent reports

## 2021-04-09 NOTE — H&P
H&P Exam - General Surgery   Isaías Wick 73 y o  female MRN: [id number]  Unit/Bed#: S -01 Encounter: [id number]    Assessment/Plan     Assessment:  73 F with extensive past surgical history now affected with chronic short gut syndrome, enterocutaneous fistula, and opioid dependence p/w 3 days of abdominal distension, worsening abdominal pain, lack of bowel movement, and increasing fistula output  Patient has been managed once for similar presentation in the last 6 months with ileus and dysmotility due to chronic opioid and antimotility agent use  VSS, afebrile    Abdomen is soft but distended, numerous laparotomy scars present, diffuse tenderness, enterocutaneous fistula in the lower midline suprapubic area with wound manager in place, draining succus      WBC 6 12  Hemoglobin 11    CMP with mild transaminitis, chronic likely in setting of TPN, otherwise no gross abnormalities    CT scan obtained today at transferring facility showing diffuse pan colonic dilatation and small bowel dilatation consistent with ileus  Plan:  -NPO  -IV fluid hydration  -nasogastric tube placed, maintained to suction  -hold regularly scheduled Lomotil  -IV Dilaudid scheduled  -nocturnal TPN, patient has chronic PICC line in place  -await return of bowel function    History of Present Illness   History, ROS and PFSH unobtainable from any source due to none  HPI:  Isaías Wick is a 73 y o  female who presents with a 3 day history of decreased appetite, abdominal distention, lack of bowel movements, increasing output from her enterocutaneous fistula, and increasing abdominal pain  She chronically has diarrheal bowel movements and is affected by short gut syndrome status post multiple laparotomies and has a known enterocutaneous fistula which has had previously controlled minimal output    She follows closely with her surgeon, and was doing well on her outpatient regimen until 3 days ago at which point the symptoms commenced  She had a similar episode within the last 6 months during which she was admitted to the hospital with an ileus and treated non operatively for return of bowel function  She continues to take high-dose opioids at home notably morphine and oral Dilaudid as well as multiple nonopioid pain medications for combination of chronic pain associated with her abdomen and multiple operations on her spine  She denies fever and chills, no chest pain, no shortness of breath  She reports no bowel movement for 3 days, endorses no urinary habit changes, no discomfort, dysuria, or urinary frequency  She denies recent changes in diet, has no sick contacts or recent travel history, and is consistent with her daily diet and twice weekly nocturnal TPN  Review of Systems   Constitutional: Positive for activity change and appetite change  Negative for chills and fever  Respiratory: Negative for chest tightness and shortness of breath  Cardiovascular: Negative for chest pain and palpitations  Gastrointestinal: Positive for abdominal distention, abdominal pain, constipation, nausea and vomiting  Negative for diarrhea  Skin: Positive for color change and pallor  Hematological: Negative for adenopathy  All other systems reviewed and are negative  Historical Information   Past Medical History:   Diagnosis Date    Asthma     Bowel obstruction ([facility] [address] )     Choledocholithiasis 11/18/2020    Chronic back pain     Colon polyp     Enlarged kidney     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    Hydronephrosis 2/26/2019    Ileus ([address] ) 7/2/2018    Overview:  Last Assessment & Plan:  Patient presented with generalized abdominal pain nausea and bilious vomiting ,imaging study reports " diffuse bowel dilatation involving distal small bowel and the entire colon to the panel verge obstruction mass is not identified and this may represent adynamic ileus in the postoperative setting possible related to medication"   Patient states that she still ha    Liver mass     Opiate dependence ([facility] [address] )     Post-ERCP acute pancreatitis 2020    Short gut syndrome     5 feet small bowel    Small bowel fistula     Urinary retention with incomplete bladder emptying      Past Surgical History:   Procedure Laterality Date    ABDOMINAL SURGERY      x 25    APPENDECTOMY      Open     BACK SURGERY      x 3     SECTION      x1    CHOLECYSTECTOMY      Open    COLON SURGERY      Sigmoid     COLONOSCOPY N/A 2018    Procedure: COLONOSCOPY;  Surgeon: Jose Mock MD;  Location: [facility];   Service: Gastroenterology    COLONOSCOPY  2018    EXPLORATORY LAPAROTOMY      Several for adhesive disease; has had several bowel resections; has a small bowel fistula    GASTROCUTANEOUS FISTULA CLOSURE      HERNIA REPAIR      Incisional     IR PICC REPOSITION  3/3/2021    MAMMO (HISTORICAL)  2017    SPINAL FUSION      Lower back     TONSILLECTOMY      TOTAL ABDOMINAL HYSTERECTOMY      Not due to cancer - complete     TRACHEOSTOMY  2019     Social History   Social History     Substance and Sexual Activity   Alcohol Use Not Currently    Alcohol/week: 0 0 standard drinks    Frequency: Never    Binge frequency: Never    Comment: social drinker     Social History     Substance and Sexual Activity   Drug Use No     Social History     Tobacco Use   Smoking Status Former Smoker    Quit date: 1980    Years since quittin 7   Smokeless Tobacco Never Used   Tobacco Comment    Never smoker - As per [employer]      E-Cigarette/Vaping    E-Cigarette Use Never User      E-Cigarette/Vaping Substances     Family History: non-contributory    Meds/Allergies   all medications and allergies reviewed  Allergies   Allergen Reactions    Nsaids      Irritable, upset stomach  Irritable, upset stomach    Tolmetin      Irritable, upset stomach    Codeine Hives     Per [address] admission orders    Oxycodone-Acetaminophen GI Intolerance Percocet Tabs    Tylenol [Acetaminophen]        Objective   First Vitals:   Blood Pressure: 103/59 (04/09/21 1530)  Pulse: 85 (04/09/21 1530)  Temperature: 99 6 °F (37 6 °C) (04/09/21 1530)  Temp Source: Oral (04/09/21 1530)  Respirations: 15 (04/09/21 1530)  Height: 5' 4" (162 6 cm) (04/09/21 1530)  Weight - Scale: 68 7 kg (151 lb 7 3 oz)(with blankets) (04/09/21 1530)  SpO2: 91 % (04/09/21 1530)    Current Vitals:   Blood Pressure: 103/59 (04/09/21 1530)  Pulse: 85 (04/09/21 1530)  Temperature: 99 6 °F (37 6 °C) (04/09/21 1530)  Temp Source: Oral (04/09/21 1530)  Respirations: 15 (04/09/21 1530)  Height: 5' 4" (162 6 cm) (04/09/21 1530)  Weight - Scale: 68 7 kg (151 lb 7 3 oz)(with blankets) (04/09/21 1530)  SpO2: 91 % (04/09/21 1530)    No intake or output data in the 24 hours ending 04/09/21 1625    Invasive Devices     Peripherally Inserted Central Catheter Line            PICC Line Left Brachial -- days    PICC Line 63/16/98 Left Basilic 37 days          Drain            NG/OG/Enteral Tube Nasogastric 18 Fr Right nares less than 1 day                Physical Exam  Vitals signs reviewed  Constitutional:       Appearance: She is ill-appearing  She is not toxic-appearing  HENT:      Head: Normocephalic and atraumatic  Mouth/Throat:      Mouth: Mucous membranes are moist    Eyes:      General: No scleral icterus  Pupils: Pupils are equal, round, and reactive to light  Cardiovascular:      Rate and Rhythm: Normal rate and regular rhythm  Heart sounds: No murmur  Pulmonary:      Effort: Pulmonary effort is normal  No respiratory distress  Abdominal:      General: A surgical scar is present  There is distension  Palpations: Abdomen is soft  Tenderness: There is generalized abdominal tenderness  There is no guarding or rebound            Comments: Numerous laparotomy scars noted on the abdomen, palpable hernia mesh in the upper midline from prior operation   Lymphadenopathy: Cervical: No cervical adenopathy  Skin:     General: Skin is warm and dry  Capillary Refill: Capillary refill takes less than 2 seconds  Coloration: Skin is not jaundiced  Neurological:      Mental Status: She is alert and oriented to person, place, and time  Mental status is at baseline  Psychiatric:         Attention and Perception: Attention normal          Mood and Affect: Mood is not depressed  Affect is tearful  Lab Results:   I have personally reviewed pertinent lab results  , CBC:   Lab Results   Component Value Date    WBC 6 12 04/09/2021    HGB 11 0 (L) 04/09/2021    HCT 34 9 04/09/2021    MCV 87 04/09/2021     04/09/2021    MCH 27 3 04/09/2021    MCHC 31 5 04/09/2021    RDW 14 9 04/09/2021    MPV 9 2 04/09/2021    NRBC 0 04/09/2021   , CMP:   Lab Results   Component Value Date    SODIUM 144 04/09/2021    K 4 7 04/09/2021     (H) 04/09/2021    CO2 25 04/09/2021    BUN 9 04/09/2021    CREATININE 0 90 04/09/2021    CALCIUM 8 4 04/09/2021    AST 96 (H) 04/09/2021    ALT 80 (H) 04/09/2021    ALKPHOS 145 (H) 04/09/2021    EGFR 67 04/09/2021   , Coagulation: No results found for: PT, INR, APTT, Urinalysis:   Lab Results   Component Value Date    COLORU Yellow 04/09/2021    CLARITYU Clear 04/09/2021    SPECGRAV <=1 005 04/09/2021    PHUR 5 0 04/09/2021    LEUKOCYTESUR Negative 04/09/2021    NITRITE Positive (A) 04/09/2021    GLUCOSEU Negative 04/09/2021    KETONESU Negative 04/09/2021    BILIRUBINUR Negative 04/09/2021    BLOODU Negative 04/09/2021   , Amylase: No results found for: AMYLASE, Lipase:   Lab Results   Component Value Date    LIPASE 72 (L) 04/09/2021     Imaging: I have personally reviewed pertinent reports  and I have personally reviewed pertinent films in PACS  EKG, Pathology, and Other Studies: I have personally reviewed pertinent reports     and I have personally reviewed pertinent films in PACS     Ct Abdomen Pelvis With Contrast    Result Date: 4/9/2021  Impression: Marked dilatation of the colon with liquid stool likely representing ileus  Marked distention of the stomach with mild distention of the distal esophagus  Clinical correlation for partial gastric outlet obstruction is recommended  Persistent enterocutaneous fistula    I personally discussed this study with Helena Green on 4/9/2021 at 1:14 AM  Workstation performed: [id number]       Code Status: Level 1 - Full Code  Advance Directive and Living Will:      Power of :    POLST:

## 2021-04-09 NOTE — ED NOTES
Report given to Solomon Singletary at [employer]        Veronika Mckenzie RN  04/09/21 3300

## 2021-04-09 NOTE — CONSULTS
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  Charlotte's Syndrome  Short Bowel Syndrome  Hypoalbuminemia   -NGT   -IVF  -NPO  -transfer to [facility] to Dr Radha Nation' Service  He is aware  Chief Complaint I had a few days of abdominal pain and no bowel movement in 1 5 weeks  HPI: Fransico Fay is a 73y o  year old female  PHM:  Multiple Bowel Resections, Right Hemicolectomy, Enterocutaneous Fistula,  Short Bowel Syndrome, Charlotte's Syndrome,  has failed on multiple outpatient meds  , Opioid Induced Dysmotility, rebound diarrhea  Pt is on Gattex and TPN several times per week  PICC line  who presents with several days of abdominal pain  She has not had a bowel movement in one and one half weeks  She had minimal output from the enterocutaneous fistula then yesterday she "had and explosion of gas and stool"  The pain was severe and she vomited bile  She spoke with Randi Elena and was advised to go to [facility] for evaluation however she stated that [facility] was closer and she would not have tolerated the ride  On exam she is distended, abdomen is tense  Hyperactive bowel sounds  Multiple well healed incisions  Enterocutaneous fistula with ostomy bag containing air and liquid stool  Her abdomen is significantly tender throughout  Mild elevation of LFTs,  No leukocytosis   Hbg 11 0   Hypalbuminemia 3 2   Imaging Studies: Ct Abdomen Pelvis With Contrast  Result Date: 4/9/2021  Impression: Marked dilatation of the colon with liquid stool likely representing ileus  Marked distention of the stomach with mild distention of the distal esophagus  Clinical correlation for partial gastric outlet obstruction is recommended  Persistent enterocutaneous fistula    I personally discussed this study with Emma Alba on 4/9/2021 at 1:14 AM  Workstation performed: [id number]     Lab Results:   CBC with diff:   Lab Results   Component Value Date    WBC 6 12 2021    HGB 11 0 (L) 2021    HCT 34 9 2021    MCV 87 2021     2021    MCH 27 3 2021    MCHC 31 5 2021    RDW 14 9 2021    MPV 9 2 2021    NRBC 0 2021   , BMP/CMP:   Lab Results   Component Value Date    SODIUM 144 2021    K 4 7 2021     (H) 2021    CO2 25 2021    BUN 9 2021    CREATININE 0 90 2021    CALCIUM 8 4 2021    AST 96 (H) 2021    ALT 80 (H) 2021    ALKPHOS 145 (H) 2021    EGFR 67 2021         Historical Information   Past Medical History:   Diagnosis Date    Asthma     Bowel obstruction ([address] )     Choledocholithiasis 2020    Chronic back pain     Colon polyp     Enlarged kidney     Enterocutaneous fistula 2019    Hydronephrosis 2019    Ileus ([address] ) 2018    Overview:  Last Assessment & Plan:  Patient presented with generalized abdominal pain nausea and bilious vomiting ,imaging study reports " diffuse bowel dilatation involving distal small bowel and the entire colon to the panel verge obstruction mass is not identified and this may represent adynamic ileus in the postoperative setting possible related to medication"  Patient states that she still ha    Liver mass     Opiate dependence ([address] )     Post-ERCP acute pancreatitis 2020    Short gut syndrome     5 feet small bowel    Small bowel fistula     Urinary retention with incomplete bladder emptying      Past Surgical History:   Procedure Laterality Date    ABDOMINAL SURGERY      x 25    APPENDECTOMY      Open     BACK SURGERY      x 3     SECTION      x1    CHOLECYSTECTOMY      Open    COLON SURGERY      Sigmoid     COLONOSCOPY N/A 2018    Procedure: COLONOSCOPY;  Surgeon: Jin Burgos MD;  Location: [facility];   Service: Gastroenterology    COLONOSCOPY  2018    EXPLORATORY LAPAROTOMY      Several for adhesive disease; has had several bowel resections; has a small bowel fistula    GASTROCUTANEOUS FISTULA CLOSURE      HERNIA REPAIR      Incisional     IR PICC REPOSITION  3/3/2021    MAMMO (HISTORICAL)  2017    SPINAL FUSION      Lower back     TONSILLECTOMY      TOTAL ABDOMINAL HYSTERECTOMY      Not due to cancer - complete     TRACHEOSTOMY  2019     Social History   Social History     Substance and Sexual Activity   Alcohol Use Not Currently    Alcohol/week: 0 0 standard drinks    Frequency: Never    Binge frequency: Never    Comment: social drinker     Social History     Substance and Sexual Activity   Drug Use No     Social History     Tobacco Use   Smoking Status Former Smoker    Quit date: 1980    Years since quittin 7   Smokeless Tobacco Never Used   Tobacco Comment    Never smoker - As per [employer]      Family History: no pertinent family history  Allergies   Allergen Reactions    Nsaids      Irritable, upset stomach  Irritable, upset stomach    Tolmetin      Irritable, upset stomach    Codeine Hives     Per [address] admission orders    Oxycodone-Acetaminophen GI Intolerance     Percocet Tabs    Tylenol [Acetaminophen]      Meds/Allergies     current meds:   Current Facility-Administered Medications   Medication Dose Route Frequency    cyanocobalamin injection 1,000 mcg  1,000 mcg Intramuscular Q30 Days    enoxaparin (LOVENOX) subcutaneous injection 40 mg  40 mg Subcutaneous Daily    HYDROmorphone (DILAUDID) injection 0 5 mg  0 5 mg Intravenous Q3H PRN    lactated ringers infusion  100 mL/hr Intravenous Continuous    LORazepam (ATIVAN) injection 0 5 mg  0 5 mg Intravenous Q6H PRN    ondansetron (ZOFRAN) injection 4 mg  4 mg Intravenous Q4H PRN    pantoprazole (PROTONIX) injection 40 mg  40 mg Intravenous Q24H SHENA         Objective   Vitals: ,Body mass index is 24 03 kg/m²  Intake/Output Summary (Last 24 hours) at 4/9/2021 0754  Last data filed at 4/9/2021 0240  Gross per 24 hour   Intake 1000 ml   Output --   Net 1000 ml     @LDASHORT    @ROS:  12 set ROS reviewed and negative except for:   Pain in my abdomen for a few days  I have not had a bowel movement for one and one half weeks  The fistula had an explosion of air and stool last night in the bag  I vomited bile  Physical Exam:Blood pressure 98/61, pulse 77, temperature 98 1 °F (36 7 °C), temperature source Oral, resp  rate 21, height 5' 4" (1 626 m), weight 63 5 kg (140 lb), SpO2 94 %, not currently breastfeeding  General appearance: alert, appears stated age and cooperative  HEENT: PERRLA, EOMI, sclera clear, anicterus, oral mucosa is dry   Back: no tenderness,deformity,   Lungs:clear throughout  Heart[de-identified] RRR, S1, S2 normal, no murmur  Abdomen: tense, distended, hyperactive bowel sounds  Healed incisions    Enterocutaneous fistula with liquid air and stool in the bad   Extremities: FROM no joint deformities, motor,sensory intact,pedal edema- none   Skin: no rashes lesions or jaundice   Neurologic: CN II-XII grossly intact, no tremor, affect appropriate        VTE Prophylaxis:per primary      Code Status: Level 1 - Full Code  Advance Directive and Living Will:      Power of :    POLST:      Colin Manuel PA-C  4/9/2021

## 2021-04-09 NOTE — EMTALA/ACUTE CARE TRANSFER
[address] [place] [address]  [address] [facility] [address]  Dept: [phone]      [id number] TRANSFER CONSENT    NAME Ricco Jackson                                         1955                              MRN [id number]    I have been informed of my rights regarding examination, treatment, and transfer   by Dr Manuel Lozada MD    Benefits: Specialized equipment and/or services available at the receiving facility (Include comment)________________________(available inpatient bed)    Risks: Potential for delay in receiving treatment, Potential deterioration of medical condition, Loss of IV, Increased discomfort during transfer, Possible worsening of condition or death during transfer      Consent for Transfer:  I acknowledge that my medical condition has been evaluated and explained to me by the emergency department physician or other qualified medical person and/or my attending physician, who has recommended that I be transferred to the service of  Accepting Physician: Dr Darrell Tolbert at [address] Name, [address] : [facility]  The above potential benefits of such transfer, the potential risks associated with such transfer, and the probable risks of not being transferred have been explained to me, and I fully understand them  The doctor has explained that, in my case, the benefits of transfer outweigh the risks  I agree to be transferred  I authorize the performance of emergency medical procedures and treatments upon me in both transit and upon arrival at the receiving facility  Additionally, I authorize the release of any and all medical records to the receiving facility and request they be transported with me, if possible  I understand that the safest mode of transportation during a medical emergency is an ambulance and that the Hospital advocates the use of this mode of transport   Risks of traveling to the receiving facility by car, including absence of medical control, life sustaining equipment, such as oxygen, and medical personnel has been explained to me and I fully understand them  ([place]SON CORRECT BOX BELOW)  [  ]  I consent to the stated transfer and to be transported by ambulance/helicopter  [  ]  I consent to the stated transfer, but refuse transportation by ambulance and accept full responsibility for my transportation by car  I understand the risks of non-ambulance transfers and I exonerate the Hospital and its staff from any deterioration in my condition that results from this refusal     X___________________________________________    DATE  21  TIME________  Signature of patient or legally responsible individual signing on patient behalf           RELATIONSHIP TO PATIENT_________________________          Provider Certification    NAME Karyle Fly                                         1955                              MRN [id number]    A medical screening exam was performed on the above named patient  Based on the examination:    Condition Necessitating Transfer The primary encounter diagnosis was Gastric outlet obstruction  Diagnoses of Abdominal pain and Ileus ([facility] [address] ) were also pertinent to this visit      Patient Condition: The patient has been stabilized such that within reasonable medical probability, no material deterioration of the patient condition or the condition of the unborn child(radha) is likely to result from the transfer    Reason for Transfer: No bed available at level of patient's needs, Patient/Family request    Transfer Requirements: [address]   · Space available and qualified personnel available for treatment as acknowledged by PACS  · Agreed to accept transfer and to provide appropriate medical treatment as acknowledged by       Dr Jad Aragon  · Appropriate medical records of the examination and treatment of the patient are provided at the time of transfer   [address] _______  · Transfer will be performed by qualified personnel from    and appropriate transfer equipment as required, including the use of necessary and appropriate life support measures  Provider Certification: I have examined the patient and explained the following risks and benefits of being transferred/refusing transfer to the patient/family:  General risk, such as traffic hazards, adverse weather conditions, rough terrain or turbulence, possible failure of equipment (including vehicle or aircraft), or consequences of actions of persons outside the control of the transport personnel, Unanticipated needs of medical equipment and personnel during transport, Risk of worsening condition, The possibility of a transport vehicle being unavailable      Based on these reasonable risks and benefits to the patient and/or the unborn child(radha), and based upon the information available at the time of the patients examination, I certify that the medical benefits reasonably to be expected from the provision of appropriate medical treatments at another medical facility outweigh the increasing risks, if any, to the individuals medical condition, and in the case of labor to the unborn child, from effecting the transfer      X____________________________________________ DATE 04/09/21        TIME_______      ORIGINAL - SEND TO MEDICAL RECORDS   COPY - SEND WITH PATIENT DURING TRANSFER

## 2021-04-09 NOTE — ED PROVIDER NOTES
History  Chief Complaint   Patient presents with    Abdominal Pain     pt states abdominal pain 10/10   started a few days ago  extensive GI surgical history including obstructions  80-year-old female presents with 10/10 abdominal pain  She states that her abdominal pain started a few days ago but it continued to worsen and now it has gotten to the point that brought her to the emergency department  Unfortunately this patient has an extensive GI history including obstructions from surgeries/strictures in the past   She states that this feels like her prior obstructions  She is vomiting  No fevers or chills  The pain is excruciating  Prior to Admission Medications   Prescriptions Last Dose Informant Patient Reported? Taking? Gattex 5 MG KIT   Yes Yes   HYDROmorphone (DILAUDID) 4 mg tablet  Self Yes Yes   Sig: Take 4 mg by mouth 4 (four) times a day   cholecalciferol (VITAMIN D3) 1,000 units tablet   Yes Yes   Si tablet 2x daily   diazepam (VALIUM) 5 mg tablet   Yes Yes   diphenoxylate-atropine (LOMOTIL) 2 5-0 025 mg per tablet   No Yes   Sig: Take 2 tablets by mouth 4 (four) times a day as needed for diarrhea   escitalopram (LEXAPRO) 20 mg tablet  Self Yes Yes   Sig: daily       ferrous sulfate 324 (65 Fe) mg   No Yes   Sig: Take 1 tablet (324 mg total) by mouth 2 (two) times a day before meals   folic acid (FOLVITE) 1 mg tablet   No Yes   Sig: Take 1 tablet (1 mg total) by mouth daily   gabapentin (NEURONTIN) 300 mg capsule  Self Yes Yes   Sig: Take 600 mg by mouth 2 (two) times a day   metaxalone (SKELAXIN) 400 MG tablet   Yes Yes   methocarbamol (ROBAXIN) 500 mg tablet   Yes Yes   Sig: take 1 tablet by mouth every 8 hours if needed for SPASMS   morphine (MS CONTIN) 30 mg 12 hr tablet  Self Yes Yes   Sig: take 1 tablet by mouth every 8 hours   ondansetron (ZOFRAN) 4 mg tablet   Yes Yes   Sig: take 1 tablet by mouth every 6 hours if needed for nausea and vomiting   pantoprazole (PROTONIX) 40 mg tablet   No No   Sig: take 1 tablet by mouth once daily   tamsulosin (FLOMAX) 0 4 mg   No Yes   Sig: Take 2 capsules (0 8 mg total) by mouth daily with dinner      Facility-Administered Medications Last Administration Doses Remaining   cyanocobalamin injection 1,000 mcg 3/31/2021 10:38 AM           Past Medical History:   Diagnosis Date    Asthma     [facility]el obstruction ([facility] [address] )     Choledocholithiasis 2020    Chronic back pain     Colon polyp     Enlarged kidney     Enterocutaneous fistula 2019    Hydronephrosis 2019    Ileus ([address] ) 2018    Overview:  Last Assessment & Plan:  Patient presented with generalized abdominal pain nausea and bilious vomiting ,imaging study reports " diffuse bowel dilatation involving distal small bowel and the entire colon to the panel verge obstruction mass is not identified and this may represent adynamic ileus in the postoperative setting possible related to medication"  Patient states that she still ha    Liver mass     Opiate dependence ([facility] [address] )     Post-ERCP acute pancreatitis 2020    Short gut syndrome     5 feet small bowel    Small bowel fistula     Urinary retention with incomplete bladder emptying        Past Surgical History:   Procedure Laterality Date    ABDOMINAL SURGERY      x 25    APPENDECTOMY      Open     BACK SURGERY      x 3     SECTION      x1    CHOLECYSTECTOMY      Open    COLON SURGERY      Sigmoid     COLONOSCOPY N/A 2018    Procedure: COLONOSCOPY;  Surgeon: Manley Babinski, MD;  Location: [facility];   Service: Gastroenterology    COLONOSCOPY  2018    EXPLORATORY LAPAROTOMY      Several for adhesive disease; has had several bowel resections; has a small bowel fistula    GASTROCUTANEOUS FISTULA CLOSURE      HERNIA REPAIR      Incisional     IR PICC REPOSITION  3/3/2021    MAMMO (HISTORICAL)  2017    SPINAL FUSION      Lower back     TONSILLECTOMY      TOTAL ABDOMINAL HYSTERECTOMY      Not due to cancer - complete     TRACHEOSTOMY  2019       Family History   Problem Relation Age of Onset    Diabetes Mother     Lung cancer Mother      I have reviewed and agree with the history as documented  E-Cigarette/Vaping    E-Cigarette Use Never User      E-Cigarette/Vaping Substances     Social History     Tobacco Use    Smoking status: Former Smoker     Quit date: 1980     Years since quittin 8    Smokeless tobacco: Never Used    Tobacco comment: Never smoker - As per [employer]    Substance Use Topics    Alcohol use: Not Currently     Alcohol/week: 0 0 standard drinks     Frequency: Never     Binge frequency: Never     Comment: social drinker    Drug use: No       Review of Systems   Constitutional: Positive for appetite change and diaphoresis (From pain)  Negative for chills, fatigue and fever  HENT: Negative for congestion and rhinorrhea  Respiratory: Negative for cough, chest tightness and shortness of breath  Cardiovascular: Negative for chest pain and palpitations  Gastrointestinal: Positive for abdominal distention, abdominal pain, constipation, nausea and vomiting  Negative for diarrhea  Genitourinary: Negative for dysuria and flank pain  Musculoskeletal: Negative for back pain and neck pain  Skin: Negative for color change and rash  Allergic/Immunologic: Negative for immunocompromised state  Neurological: Negative for dizziness, syncope and headaches  Physical Exam  Physical Exam  Vitals signs reviewed  Constitutional:       General: She is in acute distress  Appearance: She is well-developed  She is ill-appearing, toxic-appearing and diaphoretic (From pain)  HENT:      Head: Normocephalic and atraumatic  Eyes:      General: No scleral icterus  Right eye: No discharge  Left eye: No discharge  Conjunctiva/sclera: Conjunctivae normal       Pupils: Pupils are equal, round, and reactive to light     Neck:      Musculoskeletal: Normal range of motion and neck supple  Vascular: No JVD  Cardiovascular:      Rate and Rhythm: Normal rate and regular rhythm  Heart sounds: Normal heart sounds  No murmur  No friction rub  No gallop  Pulmonary:      Effort: Pulmonary effort is normal  No respiratory distress  Breath sounds: Normal breath sounds  No wheezing or rales  Chest:      Chest wall: No tenderness  Abdominal:      General: Bowel sounds are normal  There is distension  Tenderness: There is generalized abdominal tenderness  There is guarding  There is no rebound  Musculoskeletal: Normal range of motion  General: No tenderness or deformity  Skin:     General: Skin is warm  Coloration: Skin is not pale  Findings: No erythema or rash  Neurological:      Mental Status: She is alert and oriented to person, place, and time  Cranial Nerves: No cranial nerve deficit     Psychiatric:         Behavior: Behavior normal          Vital Signs  ED Triage Vitals [04/08/21 2358]   Temperature Pulse Respirations Blood Pressure SpO2   98 1 °F (36 7 °C) 75 20 106/65 91 %      Temp Source Heart Rate Source Patient Position - Orthostatic VS BP Location FiO2 (%)   Oral Monitor Lying Right arm --      Pain Score       Worst Possible Pain           Vitals:    04/09/21 1000 04/09/21 1100 04/09/21 1200 04/09/21 1300   BP: 104/62 103/59 96/58 95/58   Pulse: 78 74 75 77   Patient Position - Orthostatic VS: Lying Sitting Lying Lying         Visual Acuity      ED Medications  Medications   ondansetron (FOR EMS ONLY) (ZOFRAN) 4 mg/2 mL injection 4 mg (0 mg Does not apply Given to EMS 4/8/21 2355)   fentanyl citrate (PF) 100 MCG/2ML 50 mcg (50 mcg Intravenous Given 4/9/21 0024)   sodium chloride 0 9 % bolus 1,000 mL (0 mL Intravenous Stopped 4/9/21 0240)   iohexol (OMNIPAQUE) 350 MG/ML injection (SINGLE-DOSE) 100 mL (100 mL Intravenous Given 4/9/21 0044)   HYDROmorphone (DILAUDID) injection 1 mg (1 mg Intravenous Given 4/9/21 0104)   HYDROmorphone (DILAUDID) injection 1 mg (1 mg Intravenous Given 4/9/21 0118)   HYDROmorphone (DILAUDID) injection 2 mg (2 mg Intravenous Given 4/9/21 0240)       Diagnostic Studies  Results Reviewed     Procedure Component Value Units Date/Time    Hepatic function panel [[id number]]  (Abnormal) Collected: 04/09/21 0545    Lab Status: Final result Specimen: Blood from Arm, Left Updated: 04/09/21 0633     Total Bilirubin 0 30 mg/dL      Bilirubin, Direct 0 13 mg/dL      Alkaline Phosphatase 145 U/L      AST 96 U/L      ALT 80 U/L      Total Protein 6 9 g/dL      Albumin 3 2 g/dL     Basic metabolic panel [[id number]]  (Abnormal) Collected: 04/09/21 0545    Lab Status: Final result Specimen: Blood from Arm, Left Updated: 04/09/21 8485     Sodium 144 mmol/L      Potassium 4 7 mmol/L      Chloride 110 mmol/L      CO2 25 mmol/L      ANION GAP 9 mmol/L      BUN 9 mg/dL      Creatinine 0 90 mg/dL      Glucose 100 mg/dL      Calcium 8 4 mg/dL      eGFR 67 ml/min/1 73sq m     Narrative:      [place] guidelines for Chronic Kidney Disease (CKD):     Stage 1 with normal or high GFR (GFR > 90 mL/min/1 73 square meters)    Stage 2 Mild CKD (GFR = 60-89 mL/min/1 73 square meters)    Stage 3A Moderate CKD (GFR = 45-59 mL/min/1 73 square meters)    Stage 3B Moderate CKD (GFR = 30-44 mL/min/1 73 square meters)    Stage 4 Severe CKD (GFR = 15-29 mL/min/1 73 square meters)    Stage 5 End Stage CKD (GFR <15 mL/min/1 73 square meters)  Note: GFR calculation is accurate only with a steady state creatinine    Urine Microscopic [[id number]]  (Abnormal) Collected: 04/09/21 0549    Lab Status: Final result Specimen: Urine, Clean Catch Updated: 04/09/21 0626     RBC, UA 0-1 /hpf      WBC, UA 4-10 /hpf      Epithelial Cells Occasional /hpf      Bacteria, UA Moderate /hpf     CBC (With Platelets) [[id number]]  (Abnormal) Collected: 04/09/21 0545    Lab Status: Final result Specimen: Blood from Arm, Left Updated: 04/09/21 0607     WBC 6 12 Thousand/uL      RBC 4 03 Million/uL      Hemoglobin 11 0 g/dL      Hematocrit 34 9 %      MCV 87 fL      MCH 27 3 pg      MCHC 31 5 g/dL      RDW 14 9 %      Platelets 127 Thousands/uL      MPV 9 2 fL     UA w Reflex to Microscopic w Reflex to Culture [[id number]]  (Abnormal) Collected: 04/09/21 0549    Lab Status: Final result Specimen: Urine, Clean Catch Updated: 04/09/21 0602     Color, UA Yellow     Clarity, UA Clear     Specific Gravity, UA <=1 005     pH, UA 5 0     Leukocytes, UA Negative     Nitrite, UA Positive     Protein, UA Negative mg/dl      Glucose, UA Negative mg/dl      Ketones, UA Negative mg/dl      Urobilinogen, UA 0 2 E U /dl      Bilirubin, UA Negative     Blood, UA Negative    Lactic acid, plasma [[id number]]  (Normal) Collected: 04/09/21 0020    Lab Status: Final result Specimen: Blood from Arm, Left Updated: 04/09/21 0050     LACTIC ACID 0 9 mmol/L     Narrative:      Result may be elevated if tourniquet was used during collection      Lipase [[id number]]  (Abnormal) Collected: 04/09/21 0020    Lab Status: Final result Specimen: Blood from Arm, Left Updated: 04/09/21 0047     Lipase 72 u/L     Comprehensive metabolic panel [[id number]]  (Abnormal) Collected: 04/09/21 0020    Lab Status: Final result Specimen: Blood from Arm, Left Updated: 04/09/21 0047     Sodium 138 mmol/L      Potassium 3 9 mmol/L      Chloride 104 mmol/L      CO2 25 mmol/L      ANION GAP 9 mmol/L      BUN 9 mg/dL      Creatinine 0 96 mg/dL      Glucose 104 mg/dL      Calcium 8 3 mg/dL      Corrected Calcium 8 8 mg/dL       U/L      ALT 90 U/L      Alkaline Phosphatase 156 U/L      Total Protein 7 2 g/dL      Albumin 3 4 g/dL      Total Bilirubin 0 44 mg/dL      eGFR 62 ml/min/1 73sq m     Narrative:      [place] guidelines for Chronic Kidney Disease (CKD):     Stage 1 with normal or high GFR (GFR > 90 mL/min/1 73 square meters)    Stage 2 Mild CKD (GFR = 60-89 mL/min/1 73 square meters)    Stage 3A Moderate CKD (GFR = 45-59 mL/min/1 73 square meters)    Stage 3B Moderate CKD (GFR = 30-44 mL/min/1 73 square meters)    Stage 4 Severe CKD (GFR = 15-29 mL/min/1 73 square meters)    Stage 5 End Stage CKD (GFR <15 mL/min/1 73 square meters)  Note: GFR calculation is accurate only with a steady state creatinine    CBC and differential [[id number]]  (Abnormal) Collected: 04/09/21 0020    Lab Status: Final result Specimen: Blood from Arm, Left Updated: 04/09/21 0039     WBC 8 99 Thousand/uL      RBC 4 14 Million/uL      Hemoglobin 11 1 g/dL      Hematocrit 35 8 %      MCV 87 fL      MCH 26 8 pg      MCHC 31 0 g/dL      RDW 14 6 %      MPV 9 4 fL      Platelets 654 Thousands/uL      nRBC 0 /100 WBCs      Neutrophils Relative 74 %      Immat GRANS % 0 %      Lymphocytes Relative 15 %      Monocytes Relative 8 %      Eosinophils Relative 2 %      Basophils Relative 1 %      Neutrophils Absolute 6 62 Thousands/µL      Immature Grans Absolute 0 03 Thousand/uL      Lymphocytes Absolute 1 38 Thousands/µL      Monocytes Absolute 0 72 Thousand/µL      Eosinophils Absolute 0 18 Thousand/µL      Basophils Absolute 0 06 Thousands/µL                  CT abdomen pelvis with contrast   ED Interpretation by Fauzia Fragoso DO (04/09 0146)     Marked dilatation of the colon with liquid stool likely representing ileus      Marked distention of the stomach with mild distention of the distal esophagus  Clinical correlation for partial gastric outlet obstruction is recommended      Persistent enterocutaneous fistula  Final Result by Patti Paul DO (04/09 0117)      Marked dilatation of the colon with liquid stool likely representing ileus  Marked distention of the stomach with mild distention of the distal esophagus  Clinical correlation for partial gastric outlet obstruction is recommended  Persistent enterocutaneous fistula               I personally discussed this study with Emilia Teresa on 4/9/2021 at 1:14 AM                Workstation performed: [id number]                    Procedures  Procedures         ED Course  ED Course as of Apr 14 1729 Fri Apr 09, 2021   0101 Still in pain  Will attempt Dilaudid       0101 AST(!): 173   0149 TT [facility] to [facility] for [facility]  SBIRT 22yo+      Most Recent Value   SBIRT (22 yo +)   In order to provide better care to our patients, we are screening all of our patients for alcohol and drug use  Would it be okay to ask you these screening questions? Yes Filed at: 04/09/2021 0909   Initial Alcohol Screen: US AUDIT-C    1  How often do you have a drink containing alcohol?  0 Filed at: 04/09/2021 0909   2  How many drinks containing alcohol do you have on a typical day you are drinking? 0 Filed at: 04/09/2021 0909   3b  FEMALE Any Age, or MALE 65+: How often do you have 4 or more drinks on one occassion? 0 Filed at: 04/09/2021 0909   Audit-C Score  0 Filed at: 04/09/2021 3546   YONG: How many times in the past year have you    Used an illegal drug or used a prescription medication for non-medical reasons? Never Filed at: 04/09/2021 0909                    MDM  Number of Diagnoses or Management Options  Abdominal pain:   Ileus [facility]):   Diagnosis management comments: Abdominal pain, abdominal distension, concern for obstruction  Imaging reveals an ileus  Patient requires admission for further treatment of her ileus and for pain control  Patient agreeable to admission         Amount and/or Complexity of Data Reviewed  Clinical lab tests: ordered and reviewed  Tests in the radiology section of CPT®: ordered and reviewed        Disposition  Final diagnoses:   Abdominal pain   Ileus ([address] )     Time reflects when diagnosis was documented in both MDM as applicable and the Disposition within this note     Time User Action Codes Description Comment    4/9/2021  2:05 AM Beck Kirkland Add [K31 1] Gastric outlet obstruction     4/9/2021  2:11 AM NayelyJessica Add [R10 9] Abdominal pain     4/9/2021  2:11 AM Aleksandar Adler Add [K56 7] Ileus [facility])       ED Disposition     ED Disposition Condition Date/Time Comment    Transfer to Another Facility-In Network  Fri Apr 9, 2021  1:35 PM Patient should be transferred to Karime Ortiz MD Documentation      Most Recent Value   Patient Condition  The patient has been stabilized such that within reasonable medical probability, no material deterioration of the patient condition or the condition of the unborn child(radha) is likely to result from the transfer   Reason for Transfer  No bed available at level of patient's needs, Patient/Family request   Benefits of Transfer  Specialized equipment and/or services available at the receiving facility (Include comment)________________________ [facility] inpatient bed]   Risks of Transfer  Potential for delay in receiving treatment, Potential deterioration of medical condition, Loss of IV, Increased discomfort during transfer, Possible worsening of condition or death during transfer   Accepting Physician  Dr Phani Amaya Name, [id number]    (Name & Tel number)  PACS   Sending MD  Dr Robert Yeager   Provider Certification  General risk, such as traffic hazards, adverse weather conditions, rough terrain or turbulence, possible failure of equipment (including vehicle or aircraft), or consequences of actions of persons outside the control of the transport personnel, Unanticipated needs of medical equipment and personnel during transport, Risk of worsening condition, The possibility of a transport vehicle being unavailable      RN Documentation      [facility] Name, [id number] (Name & Tel number)  PACS      Follow-up Information    None         Discharge Medication List as of 4/9/2021  2:11 PM      CONTINUE these medications which have NOT CHANGED    Details   cholecalciferol (VITAMIN D3) 1,000 units tablet 1 tablet 2x daily, Historical Med      diazepam (VALIUM) 5 mg tablet Starting Thu 2/4/2021, Historical Med      diphenoxylate-atropine (LOMOTIL) 2 5-0 025 mg per tablet Take 2 tablets by mouth 4 (four) times a day as needed for diarrhea, Starting Tue 3/9/2021, Normal      escitalopram (LEXAPRO) 20 mg tablet daily  , Historical Med      ferrous sulfate 324 (65 Fe) mg Take 1 tablet (324 mg total) by mouth 2 (two) times a day before meals, Starting Thu 22/60/1229, Normal      folic acid (FOLVITE) 1 mg tablet Take 1 tablet (1 mg total) by mouth daily, Starting Thu 12/10/2020, Normal      gabapentin (NEURONTIN) 300 mg capsule Take 600 mg by mouth 2 (two) times a day, Starting Mon 11/6/2017, Historical Med      Gattex 5 MG KIT Starting Mon 2/1/2021, Historical Med      HYDROmorphone (DILAUDID) 4 mg tablet Take 4 mg by mouth 4 (four) times a day, Historical Med      metaxalone (SKELAXIN) 400 MG tablet Starting Tue 10/20/2020, Historical Med      methocarbamol (ROBAXIN) 500 mg tablet take 1 tablet by mouth every 8 hours if needed for SPASMS, Historical Med      morphine (MS CONTIN) 30 mg 12 hr tablet take 1 tablet by mouth every 8 hours, Historical Med      ondansetron (ZOFRAN) 4 mg tablet take 1 tablet by mouth every 6 hours if needed for nausea and vomiting, Historical Med      tamsulosin (FLOMAX) 0 4 mg Take 2 capsules (0 8 mg total) by mouth daily with dinner, Starting Tue 3/2/2021, Normal      pantoprazole (PROTONIX) 40 mg tablet take 1 tablet by mouth once daily, Normal           No discharge procedures on file      PDMP Review       Value Time User    PDMP Reviewed  Yes 3/9/2021 11:17 AM Bonnie Garcia MD          ED Provider  Electronically Signed by           Alessia Mulligan DO  04/14/21 5573

## 2021-04-09 NOTE — ASSESSMENT & PLAN NOTE
· For now will continue with IV Dilaudid  · Patient might benefit from another dose of Restoril that was given on previous admission

## 2021-04-10 LAB
ALBUMIN SERPL BCP-MCNC: 2.8 G/DL (ref 3.5–5)
ALP SERPL-CCNC: 145 U/L (ref 46–116)
ALT SERPL W P-5'-P-CCNC: 49 U/L (ref 12–78)
ANION GAP SERPL CALCULATED.3IONS-SCNC: 10 MMOL/L (ref 4–13)
AST SERPL W P-5'-P-CCNC: 33 U/L (ref 5–45)
BASOPHILS # BLD AUTO: 0.01 THOUSANDS/ΜL (ref 0–0.1)
BASOPHILS NFR BLD AUTO: 0 % (ref 0–1)
BILIRUB SERPL-MCNC: 0.43 MG/DL (ref 0.2–1)
BUN SERPL-MCNC: 16 MG/DL (ref 5–25)
CALCIUM ALBUM COR SERPL-MCNC: 8.6 MG/DL (ref 8.3–10.1)
CALCIUM SERPL-MCNC: 7.6 MG/DL (ref 8.3–10.1)
CHLORIDE SERPL-SCNC: 111 MMOL/L (ref 100–108)
CO2 SERPL-SCNC: 23 MMOL/L (ref 21–32)
CREAT SERPL-MCNC: 0.94 MG/DL (ref 0.6–1.3)
EOSINOPHIL # BLD AUTO: 0.07 THOUSAND/ΜL (ref 0–0.61)
EOSINOPHIL NFR BLD AUTO: 2 % (ref 0–6)
ERYTHROCYTE [DISTWIDTH] IN BLOOD BY AUTOMATED COUNT: 15.3 % (ref 11.6–15.1)
GFR SERPL CREATININE-BSD FRML MDRD: 64 ML/MIN/1.73SQ M
GLUCOSE SERPL-MCNC: 113 MG/DL (ref 65–140)
HCT VFR BLD AUTO: 36 % (ref 34.8–46.1)
HGB BLD-MCNC: 11 G/DL (ref 11.5–15.4)
IMM GRANULOCYTES # BLD AUTO: 0.01 THOUSAND/UL (ref 0–0.2)
IMM GRANULOCYTES NFR BLD AUTO: 0 % (ref 0–2)
LYMPHOCYTES # BLD AUTO: 1.09 THOUSANDS/ΜL (ref 0.6–4.47)
LYMPHOCYTES NFR BLD AUTO: 23 % (ref 14–44)
MAGNESIUM SERPL-MCNC: 1.7 MG/DL (ref 1.6–2.6)
MCH RBC QN AUTO: 27 PG (ref 26.8–34.3)
MCHC RBC AUTO-ENTMCNC: 30.6 G/DL (ref 31.4–37.4)
MCV RBC AUTO: 88 FL (ref 82–98)
MONOCYTES # BLD AUTO: 0.95 THOUSAND/ΜL (ref 0.17–1.22)
MONOCYTES NFR BLD AUTO: 20 % (ref 4–12)
NEUTROPHILS # BLD AUTO: 2.63 THOUSANDS/ΜL (ref 1.85–7.62)
NEUTS SEG NFR BLD AUTO: 55 % (ref 43–75)
NRBC BLD AUTO-RTO: 0 /100 WBCS
PHOSPHATE SERPL-MCNC: 2.9 MG/DL (ref 2.3–4.1)
PLATELET # BLD AUTO: 200 THOUSANDS/UL (ref 149–390)
PMV BLD AUTO: 9.4 FL (ref 8.9–12.7)
POTASSIUM SERPL-SCNC: 4 MMOL/L (ref 3.5–5.3)
PROT SERPL-MCNC: 6.5 G/DL (ref 6.4–8.2)
RBC # BLD AUTO: 4.08 MILLION/UL (ref 3.81–5.12)
SODIUM SERPL-SCNC: 144 MMOL/L (ref 136–145)
WBC # BLD AUTO: 4.76 THOUSAND/UL (ref 4.31–10.16)

## 2021-04-10 PROCEDURE — 85025 COMPLETE CBC W/AUTO DIFF WBC: CPT | Performed by: SURGERY

## 2021-04-10 PROCEDURE — 80053 COMPREHEN METABOLIC PANEL: CPT | Performed by: SURGERY

## 2021-04-10 PROCEDURE — 99232 SBSQ HOSP IP/OBS MODERATE 35: CPT | Performed by: SURGERY

## 2021-04-10 PROCEDURE — 84100 ASSAY OF PHOSPHORUS: CPT | Performed by: SURGERY

## 2021-04-10 PROCEDURE — 83735 ASSAY OF MAGNESIUM: CPT | Performed by: SURGERY

## 2021-04-10 PROCEDURE — C9113 INJ PANTOPRAZOLE SODIUM, VIA: HCPCS | Performed by: SURGERY

## 2021-04-10 RX ORDER — DEXTROSE AND SODIUM CHLORIDE 5; .9 G/100ML; G/100ML
100 INJECTION, SOLUTION INTRAVENOUS CONTINUOUS
Status: DISCONTINUED | OUTPATIENT
Start: 2021-04-10 | End: 2021-04-11

## 2021-04-10 RX ORDER — XYLITOL/YERBA SANTA
5 AEROSOL, SPRAY WITH PUMP (ML) MUCOUS MEMBRANE 4 TIMES DAILY PRN
Status: DISCONTINUED | OUTPATIENT
Start: 2021-04-10 | End: 2021-01-01 | Stop reason: HOSPADM

## 2021-04-10 RX ADMIN — ENOXAPARIN SODIUM 40 MG: 40 INJECTION SUBCUTANEOUS at 09:06

## 2021-04-10 RX ADMIN — HYDROMORPHONE HYDROCHLORIDE 2 MG: 2 INJECTION INTRAMUSCULAR; INTRAVENOUS; SUBCUTANEOUS at 01:37

## 2021-04-10 RX ADMIN — DEXTROSE AND SODIUM CHLORIDE 125 ML/HR: 5; .9 INJECTION, SOLUTION INTRAVENOUS at 00:46

## 2021-04-10 RX ADMIN — PANTOPRAZOLE SODIUM 40 MG: 40 INJECTION, POWDER, FOR SOLUTION INTRAVENOUS at 09:06

## 2021-04-10 RX ADMIN — Medication: at 22:00

## 2021-04-10 RX ADMIN — DEXTROSE AND SODIUM CHLORIDE 100 ML/HR: 5; .9 INJECTION, SOLUTION INTRAVENOUS at 09:09

## 2021-04-10 RX ADMIN — HYDROMORPHONE HYDROCHLORIDE 2 MG: 2 INJECTION INTRAMUSCULAR; INTRAVENOUS; SUBCUTANEOUS at 14:00

## 2021-04-10 RX ADMIN — HYDROMORPHONE HYDROCHLORIDE 2 MG: 2 INJECTION INTRAMUSCULAR; INTRAVENOUS; SUBCUTANEOUS at 19:59

## 2021-04-10 RX ADMIN — DEXTROSE AND SODIUM CHLORIDE 100 ML/HR: 5; .9 INJECTION, SOLUTION INTRAVENOUS at 20:11

## 2021-04-10 RX ADMIN — METHYLNALTREXONE BROMIDE 8 MG: 8 INJECTION, SOLUTION SUBCUTANEOUS at 10:32

## 2021-04-10 RX ADMIN — HYDROMORPHONE HYDROCHLORIDE 2 MG: 2 INJECTION INTRAMUSCULAR; INTRAVENOUS; SUBCUTANEOUS at 07:25

## 2021-04-10 RX ADMIN — ONDANSETRON 4 MG: 2 INJECTION INTRAMUSCULAR; INTRAVENOUS at 10:35

## 2021-04-10 RX ADMIN — HYDROMORPHONE HYDROCHLORIDE 2 MG: 2 INJECTION INTRAMUSCULAR; INTRAVENOUS; SUBCUTANEOUS at 10:12

## 2021-04-10 RX ADMIN — HYDROMORPHONE HYDROCHLORIDE 2 MG: 2 INJECTION INTRAMUSCULAR; INTRAVENOUS; SUBCUTANEOUS at 17:05

## 2021-04-10 RX ADMIN — HYDROMORPHONE HYDROCHLORIDE 2 MG: 2 INJECTION INTRAMUSCULAR; INTRAVENOUS; SUBCUTANEOUS at 04:45

## 2021-04-10 RX ADMIN — HYDROMORPHONE HYDROCHLORIDE 2 MG: 2 INJECTION INTRAMUSCULAR; INTRAVENOUS; SUBCUTANEOUS at 22:14

## 2021-04-10 NOTE — PROGRESS NOTES
Progress Note - General Surgery   Ruby Connolly 73 y o  female MRN: [id number]  Unit/Bed#: S -01 Encounter: [id number]    Assessment:  74 y/o F with extensive PSH, short gut syndrome, EC fistula, opioid dependence, p/w abdominal distension, worsening abdominal pain, increasing fistula output x 3 days, likely ileus secondary to opioid use      Plan:  --NPO/NGT  --IVF   --Await return of bowel function  --Pain control  --Continue TPN via PICC  --Monitor fistula output    Subjective/Objective     Subjective:     No acute events overnight  Pt c/o 8/10 RLQ pain this AM  Denies any nausea or emesis  No flatus or bowel movements  Objective:     Blood pressure 103/57, pulse 90, temperature 99 °F (37 2 °C), temperature source Oral, resp  rate 15, height 5' 4" (1 626 m), weight 68 7 kg (151 lb 7 3 oz), SpO2 90 %, not currently breastfeeding  ,Body mass index is 26 kg/m²  Intake/Output Summary (Last 24 hours) at 4/10/2021 2548  Last data filed at 4/10/2021 0522  Gross per 24 hour   Intake 1529 58 ml   Output 2100 ml   Net -570 42 ml       Invasive Devices     Peripherally Inserted Central Catheter Line            PICC Line Left Brachial -- days    PICC Line 98/64/57 Left Basilic 37 days          Drain            NG/OG/Enteral Tube Nasogastric 18 Fr Right nares 1 day                Physical Exam:     GEN: NAD  HEENT: MMM  CV: RRR  Lung: normal effort  Ab: Soft, NT, mild distension, EC fistula with tan-yellow effluent  Extrem: No CCE  Neuro:  A+Ox3, motor and sensation grossly intact    Lab, Imaging and other studies:  CBC:   Lab Results   Component Value Date    WBC 4 76 04/10/2021    HGB 11 0 (L) 04/10/2021    HCT 36 0 04/10/2021    MCV 88 04/10/2021     04/10/2021    MCH 27 0 04/10/2021    MCHC 30 6 (L) 04/10/2021    RDW 15 3 (H) 04/10/2021    MPV 9 4 04/10/2021    NRBC 0 04/10/2021   , CMP: No results found for: SODIUM, K, CL, CO2, ANIONGAP, BUN, CREATININE, GLUCOSE, CALCIUM, AST, ALT, ALKPHOS, PROT, BILITOT, EGFR, Coagulation: No results found for: PT, INR, APTT, Urinalysis: No results found for: COLORU, CLARITYU, SPECGRAV, PHUR, LEUKOCYTESUR, NITRITE, PROTEINUA, GLUCOSEU, KETONESU, BILIRUBINUR, BLOODU  VTE Pharmacologic Prophylaxis: Enoxaparin (Lovenox)  VTE Mechanical Prophylaxis: sequential compression device

## 2021-04-10 NOTE — PLAN OF CARE
Problem: PAIN - ADULT  Goal: Verbalizes/displays adequate comfort level or baseline comfort level  Description: Interventions:  - Encourage patient to monitor pain and request assistance  - Assess pain using appropriate pain scale  - Administer analgesics based on type and severity of pain and evaluate response  - Implement non-pharmacological measures as appropriate and evaluate response  - Consider cultural and social influences on pain and pain management  - Notify physician/advanced practitioner if interventions unsuccessful or patient reports new pain  Outcome: Progressing     Problem: INFECTION - ADULT  Goal: Absence or prevention of progression during hospitalization  Description: INTERVENTIONS:  - Assess and monitor for signs and symptoms of infection  - Monitor lab/diagnostic results  - Monitor all insertion sites, i e  indwelling lines, tubes, and drains  - Monitor endotracheal if appropriate and nasal secretions for changes in amount and color  - [place] appropriate cooling/warming therapies per order  - Administer medications as ordered  - Instruct and encourage patient and family to use good hand hygiene technique  - Identify and instruct in appropriate isolation precautions for identified infection/condition  Outcome: Progressing  Goal: Absence of fever/infection during neutropenic period  Description: INTERVENTIONS:  - Monitor WBC    Outcome: Progressing     Problem: SAFETY ADULT  Goal: Patient will remain free of falls  Description: INTERVENTIONS:  - Assess patient frequently for physical needs  -  Identify cognitive and physical deficits and behaviors that affect risk of falls    -  [place] fall precautions as indicated by assessment   - Educate patient/family on patient safety including physical limitations  - Instruct patient to call for assistance with activity based on assessment  - Modify environment to reduce risk of injury  - Consider OT/PT consult to assist with strengthening/mobility  Outcome: Progressing  Goal: Maintain or return to baseline ADL function  Description: INTERVENTIONS:  -  Assess patient's ability to carry out ADLs; assess patient's baseline for ADL function and identify physical deficits which impact ability to perform ADLs (bathing, care of mouth/teeth, toileting, grooming, dressing, etc )  - Assess/evaluate cause of self-care deficits   - Assess range of motion  - Assess patient's mobility; develop plan if impaired  - Assess patient's need for assistive devices and provide as appropriate  - Encourage maximum independence but intervene and supervise when necessary  - Involve family in performance of ADLs  - Assess for home care needs following discharge   - Consider OT consult to assist with ADL evaluation and planning for discharge  - Provide patient education as appropriate  Outcome: Progressing  Goal: Maintain or return mobility status to optimal level  Description: INTERVENTIONS:  - Assess patient's baseline mobility status (ambulation, transfers, stairs, etc )    - Identify cognitive and physical deficits and behaviors that affect mobility  - Identify mobility aids required to assist with transfers and/or ambulation (gait belt, sit-to-stand, lift, walker, cane, etc )  - [place] fall precautions as indicated by assessment  - Record patient progress and toleration of activity level on Mobility SBAR; progress patient to next Phase/Stage  - Instruct patient to call for assistance with activity based on assessment  - Consider rehabilitation consult to assist with strengthening/weightbearing, etc   Outcome: Progressing     Problem: DISCHARGE PLANNING  Goal: Discharge to home or other facility with appropriate resources  Description: INTERVENTIONS:  - Identify barriers to discharge w/patient and caregiver  - Arrange for needed discharge resources and transportation as appropriate  - Identify discharge learning needs (meds, wound care, etc )  - Arrange for interpretive services to assist at discharge as needed  - Refer to Case Management Department for coordinating discharge planning if the patient needs post-hospital services based on physician/advanced practitioner order or complex needs related to functional status, cognitive ability, or social support system  Outcome: Progressing     Problem: GASTROINTESTINAL - ADULT  Goal: Minimal or absence of nausea and/or vomiting  Description: INTERVENTIONS:  - Administer IV fluids if ordered to ensure adequate hydration  - Maintain NPO status until nausea and vomiting are resolved  - Nasogastric tube if ordered  - Administer ordered antiemetic medications as needed  - Provide nonpharmacologic comfort measures as appropriate  - Advance diet as tolerated, if ordered  - Consider nutrition services referral to assist patient with adequate nutrition and appropriate food choices  Outcome: Progressing  Goal: Maintains or returns to baseline bowel function  Description: INTERVENTIONS:  - Assess bowel function  - Encourage oral fluids to ensure adequate hydration  - Administer IV fluids if ordered to ensure adequate hydration  - Administer ordered medications as needed  - Encourage mobilization and activity  - Consider nutritional services referral to assist patient with adequate nutrition and appropriate food choices  Outcome: Progressing  Goal: Maintains adequate nutritional intake  Description: INTERVENTIONS:  - Monitor percentage of each meal consumed  - Identify factors contributing to decreased intake, treat as appropriate  - Assist with meals as needed  - Monitor I&O, weight, and lab values if indicated  - Obtain nutrition services referral as needed  Outcome: Progressing  Goal: Establish and maintain optimal ostomy function  Description: INTERVENTIONS:  - Assess bowel function  - Encourage oral fluids to ensure adequate hydration  - Administer IV fluids if ordered to ensure adequate hydration   - Administer ordered medications as needed  - Encourage mobilization and activity  - Nutrition services referral to assist patient with appropriate food choices  - Assess stoma site  - Consider wound care consult   Outcome: Progressing     Problem: METABOLIC, FLUID AND ELECTROLYTES - ADULT  Goal: Electrolytes maintained within normal limits  Description: INTERVENTIONS:  - Monitor labs and assess patient for signs and symptoms of electrolyte imbalances  - Administer electrolyte replacement as ordered  - Monitor response to electrolyte replacements, including repeat lab results as appropriate  - Instruct patient on fluid and nutrition as appropriate  Outcome: Progressing  Goal: Fluid balance maintained  Description: INTERVENTIONS:  - Monitor labs   - Monitor I/O and WT  - Instruct patient on fluid and nutrition as appropriate  - Assess for signs & symptoms of volume excess or deficit  Outcome: Progressing  Goal: Glucose maintained within target range  Description: INTERVENTIONS:  - Monitor Blood Glucose as ordered  - Assess for signs and symptoms of hyperglycemia and hypoglycemia  - Administer ordered medications to maintain glucose within target range  - Assess nutritional intake and initiate nutrition service referral as needed  Outcome: Progressing     Problem: SKIN/TISSUE INTEGRITY - ADULT  Goal: Skin integrity remains intact  Description: INTERVENTIONS  - Identify patients at risk for skin breakdown  - Assess and monitor skin integrity  - Assess and monitor nutrition and hydration status  - Monitor labs (i e  albumin)  - Assess for incontinence   - Turn and reposition patient  - Assist with mobility/ambulation  - Relieve pressure over bony prominences  - Avoid friction and shearing  - Provide appropriate hygiene as needed including keeping skin clean and dry  - Evaluate need for skin moisturizer/barrier cream  - Collaborate with interdisciplinary team (i e  Nutrition, Rehabilitation, etc )   - Patient/family teaching  Outcome: Progressing  Goal: Incision(s), wounds(s) or drain site(s) healing without S/S of infection  Description: INTERVENTIONS  - Assess and document risk factors for skin impairment   - Assess and document dressing, incision, wound bed, drain sites and surrounding tissue  - Consider nutrition services referral as needed  - Oral mucous membranes remain intact  - Provide patient/ family education  Outcome: Progressing  Goal: Oral mucous membranes remain intact  Description: INTERVENTIONS  - Assess oral mucosa and hygiene practices  - Implement preventative oral hygiene regimen  - Implement oral medicated treatments as ordered  - Initiate Nutrition services referral as needed  Outcome: Progressing     Problem: MUSCULOSKELETAL - ADULT  Goal: Maintain or return mobility to safest level of function  Description: INTERVENTIONS:  - Assess patient's ability to carry out ADLs; assess patient's baseline for ADL function and identify physical deficits which impact ability to perform ADLs (bathing, care of mouth/teeth, toileting, grooming, dressing, etc )  - Assess/evaluate cause of self-care deficits   - Assess range of motion  - Assess patient's mobility  - Assess patient's need for assistive devices and provide as appropriate  - Encourage maximum independence but intervene and supervise when necessary  - Involve family in performance of ADLs  - Assess for home care needs following discharge   - Consider OT consult to assist with ADL evaluation and planning for discharge  - Provide patient education as appropriate  Outcome: Progressing  Goal: Maintain proper alignment of affected body part  Description: INTERVENTIONS:  - Support, maintain and protect limb and body alignment  - Provide patient/ family with appropriate education  Outcome: Progressing

## 2021-04-11 LAB
ALBUMIN SERPL BCP-MCNC: 2.4 G/DL (ref 3.5–5)
ALP SERPL-CCNC: 118 U/L (ref 46–116)
ALT SERPL W P-5'-P-CCNC: 37 U/L (ref 12–78)
ANION GAP SERPL CALCULATED.3IONS-SCNC: 5 MMOL/L (ref 4–13)
AST SERPL W P-5'-P-CCNC: 15 U/L (ref 5–45)
BASOPHILS # BLD AUTO: 0.01 THOUSANDS/ΜL (ref 0–0.1)
BASOPHILS NFR BLD AUTO: 0 % (ref 0–1)
BILIRUB SERPL-MCNC: 0.33 MG/DL (ref 0.2–1)
BUN SERPL-MCNC: 10 MG/DL (ref 5–25)
CALCIUM ALBUM COR SERPL-MCNC: 9 MG/DL (ref 8.3–10.1)
CALCIUM SERPL-MCNC: 7.7 MG/DL (ref 8.3–10.1)
CHLORIDE SERPL-SCNC: 108 MMOL/L (ref 100–108)
CO2 SERPL-SCNC: 27 MMOL/L (ref 21–32)
CREAT SERPL-MCNC: 0.69 MG/DL (ref 0.6–1.3)
EOSINOPHIL # BLD AUTO: 0.08 THOUSAND/ΜL (ref 0–0.61)
EOSINOPHIL NFR BLD AUTO: 2 % (ref 0–6)
ERYTHROCYTE [DISTWIDTH] IN BLOOD BY AUTOMATED COUNT: 14.5 % (ref 11.6–15.1)
GFR SERPL CREATININE-BSD FRML MDRD: 92 ML/MIN/1.73SQ M
GLUCOSE SERPL-MCNC: 153 MG/DL (ref 65–140)
HCT VFR BLD AUTO: 29.3 % (ref 34.8–46.1)
HGB BLD-MCNC: 9.5 G/DL (ref 11.5–15.4)
IMM GRANULOCYTES # BLD AUTO: 0.01 THOUSAND/UL (ref 0–0.2)
IMM GRANULOCYTES NFR BLD AUTO: 0 % (ref 0–2)
LYMPHOCYTES # BLD AUTO: 1.01 THOUSANDS/ΜL (ref 0.6–4.47)
LYMPHOCYTES NFR BLD AUTO: 29 % (ref 14–44)
MAGNESIUM SERPL-MCNC: 1.7 MG/DL (ref 1.6–2.6)
MCH RBC QN AUTO: 27.8 PG (ref 26.8–34.3)
MCHC RBC AUTO-ENTMCNC: 32.4 G/DL (ref 31.4–37.4)
MCV RBC AUTO: 86 FL (ref 82–98)
MONOCYTES # BLD AUTO: 0.63 THOUSAND/ΜL (ref 0.17–1.22)
MONOCYTES NFR BLD AUTO: 18 % (ref 4–12)
NEUTROPHILS # BLD AUTO: 1.74 THOUSANDS/ΜL (ref 1.85–7.62)
NEUTS SEG NFR BLD AUTO: 51 % (ref 43–75)
NRBC BLD AUTO-RTO: 0 /100 WBCS
PHOSPHATE SERPL-MCNC: 1.6 MG/DL (ref 2.3–4.1)
PLATELET # BLD AUTO: 156 THOUSANDS/UL (ref 149–390)
PMV BLD AUTO: 9.4 FL (ref 8.9–12.7)
POTASSIUM SERPL-SCNC: 3.2 MMOL/L (ref 3.5–5.3)
PROT SERPL-MCNC: 5.9 G/DL (ref 6.4–8.2)
RBC # BLD AUTO: 3.42 MILLION/UL (ref 3.81–5.12)
SODIUM SERPL-SCNC: 140 MMOL/L (ref 136–145)
WBC # BLD AUTO: 3.48 THOUSAND/UL (ref 4.31–10.16)

## 2021-04-11 PROCEDURE — 85025 COMPLETE CBC W/AUTO DIFF WBC: CPT | Performed by: SURGERY

## 2021-04-11 PROCEDURE — 83735 ASSAY OF MAGNESIUM: CPT | Performed by: SURGERY

## 2021-04-11 PROCEDURE — C9113 INJ PANTOPRAZOLE SODIUM, VIA: HCPCS | Performed by: SURGERY

## 2021-04-11 PROCEDURE — 80053 COMPREHEN METABOLIC PANEL: CPT | Performed by: SURGERY

## 2021-04-11 PROCEDURE — 84100 ASSAY OF PHOSPHORUS: CPT | Performed by: SURGERY

## 2021-04-11 PROCEDURE — 99232 SBSQ HOSP IP/OBS MODERATE 35: CPT | Performed by: SURGERY

## 2021-04-11 RX ORDER — MAGNESIUM SULFATE 1 G/100ML
1 INJECTION INTRAVENOUS ONCE
Status: COMPLETED | OUTPATIENT
Start: 2021-04-11 | End: 2021-04-11

## 2021-04-11 RX ORDER — POTASSIUM CHLORIDE 14.9 MG/ML
20 INJECTION INTRAVENOUS 2 TIMES DAILY
Status: COMPLETED | OUTPATIENT
Start: 2021-04-11 | End: 2021-04-11

## 2021-04-11 RX ORDER — DEXTROSE, SODIUM CHLORIDE, AND POTASSIUM CHLORIDE 5; .45; .15 G/100ML; G/100ML; G/100ML
75 INJECTION INTRAVENOUS CONTINUOUS
Status: DISCONTINUED | OUTPATIENT
Start: 2021-04-11 | End: 2021-04-12

## 2021-04-11 RX ADMIN — HYDROMORPHONE HYDROCHLORIDE 2 MG: 2 INJECTION INTRAMUSCULAR; INTRAVENOUS; SUBCUTANEOUS at 04:25

## 2021-04-11 RX ADMIN — DEXTROSE, SODIUM CHLORIDE, AND POTASSIUM CHLORIDE 75 ML/HR: 5; .45; .15 INJECTION INTRAVENOUS at 21:43

## 2021-04-11 RX ADMIN — HYDROMORPHONE HYDROCHLORIDE 2 MG: 2 INJECTION INTRAMUSCULAR; INTRAVENOUS; SUBCUTANEOUS at 01:29

## 2021-04-11 RX ADMIN — MAGNESIUM SULFATE HEPTAHYDRATE 1 G: 1 INJECTION, SOLUTION INTRAVENOUS at 10:35

## 2021-04-11 RX ADMIN — ONDANSETRON 4 MG: 2 INJECTION INTRAMUSCULAR; INTRAVENOUS at 12:57

## 2021-04-11 RX ADMIN — PANTOPRAZOLE SODIUM 40 MG: 40 INJECTION, POWDER, FOR SOLUTION INTRAVENOUS at 08:01

## 2021-04-11 RX ADMIN — POTASSIUM CHLORIDE 20 MEQ: 14.9 INJECTION, SOLUTION INTRAVENOUS at 21:43

## 2021-04-11 RX ADMIN — HYDROMORPHONE HYDROCHLORIDE 2 MG: 2 INJECTION INTRAMUSCULAR; INTRAVENOUS; SUBCUTANEOUS at 07:10

## 2021-04-11 RX ADMIN — HYDROMORPHONE HYDROCHLORIDE 2 MG: 2 INJECTION INTRAMUSCULAR; INTRAVENOUS; SUBCUTANEOUS at 12:53

## 2021-04-11 RX ADMIN — ONDANSETRON 4 MG: 2 INJECTION INTRAMUSCULAR; INTRAVENOUS at 07:10

## 2021-04-11 RX ADMIN — HYDROMORPHONE HYDROCHLORIDE 2 MG: 2 INJECTION INTRAMUSCULAR; INTRAVENOUS; SUBCUTANEOUS at 15:55

## 2021-04-11 RX ADMIN — HYDROMORPHONE HYDROCHLORIDE 2 MG: 2 INJECTION INTRAMUSCULAR; INTRAVENOUS; SUBCUTANEOUS at 21:43

## 2021-04-11 RX ADMIN — HYDROMORPHONE HYDROCHLORIDE 2 MG: 2 INJECTION INTRAMUSCULAR; INTRAVENOUS; SUBCUTANEOUS at 09:56

## 2021-04-11 RX ADMIN — ENOXAPARIN SODIUM 40 MG: 40 INJECTION SUBCUTANEOUS at 08:01

## 2021-04-11 RX ADMIN — METHYLNALTREXONE BROMIDE 12 MG: 12 INJECTION, SOLUTION SUBCUTANEOUS at 09:55

## 2021-04-11 RX ADMIN — POTASSIUM CHLORIDE 20 MEQ: 14.9 INJECTION, SOLUTION INTRAVENOUS at 11:33

## 2021-04-11 RX ADMIN — HYDROMORPHONE HYDROCHLORIDE 2 MG: 2 INJECTION INTRAMUSCULAR; INTRAVENOUS; SUBCUTANEOUS at 19:12

## 2021-04-11 RX ADMIN — ONDANSETRON 4 MG: 2 INJECTION INTRAMUSCULAR; INTRAVENOUS at 21:54

## 2021-04-11 RX ADMIN — DEXTROSE, SODIUM CHLORIDE, AND POTASSIUM CHLORIDE 75 ML/HR: 5; .45; .15 INJECTION INTRAVENOUS at 07:10

## 2021-04-11 RX ADMIN — Medication: at 20:53

## 2021-04-11 RX ADMIN — POTASSIUM PHOSPHATE, MONOBASIC AND POTASSIUM PHOSPHATE, DIBASIC 21 MMOL: 224; 236 INJECTION, SOLUTION, CONCENTRATE INTRAVENOUS at 15:55

## 2021-04-11 NOTE — PLAN OF CARE
Problem: PAIN - ADULT  Goal: Verbalizes/displays adequate comfort level or baseline comfort level  Description: Interventions:  - Encourage patient to monitor pain and request assistance  - Assess pain using appropriate pain scale  - Administer analgesics based on type and severity of pain and evaluate response  - Implement non-pharmacological measures as appropriate and evaluate response  - Consider cultural and social influences on pain and pain management  - Notify physician/advanced practitioner if interventions unsuccessful or patient reports new pain  Outcome: Progressing     Problem: INFECTION - ADULT  Goal: Absence or prevention of progression during hospitalization  Description: INTERVENTIONS:  - Assess and monitor for signs and symptoms of infection  - Monitor lab/diagnostic results  - Monitor all insertion sites, i e  indwelling lines, tubes, and drains  - Monitor endotracheal if appropriate and nasal secretions for changes in amount and color  - [place] appropriate cooling/warming therapies per order  - Administer medications as ordered  - Instruct and encourage patient and family to use good hand hygiene technique  - Identify and instruct in appropriate isolation precautions for identified infection/condition  Outcome: Progressing  Goal: Absence of fever/infection during neutropenic period  Description: INTERVENTIONS:  - Monitor WBC    Outcome: Progressing     Problem: SAFETY ADULT  Goal: Patient will remain free of falls  Description: INTERVENTIONS:  - Assess patient frequently for physical needs  -  Identify cognitive and physical deficits and behaviors that affect risk of falls    -  [place] fall precautions as indicated by assessment   - Educate patient/family on patient safety including physical limitations  - Instruct patient to call for assistance with activity based on assessment  - Modify environment to reduce risk of injury  - Consider OT/PT consult to assist with strengthening/mobility  Outcome: Progressing  Goal: Maintain or return to baseline ADL function  Description: INTERVENTIONS:  -  Assess patient's ability to carry out ADLs; assess patient's baseline for ADL function and identify physical deficits which impact ability to perform ADLs (bathing, care of mouth/teeth, toileting, grooming, dressing, etc )  - Assess/evaluate cause of self-care deficits   - Assess range of motion  - Assess patient's mobility; develop plan if impaired  - Assess patient's need for assistive devices and provide as appropriate  - Encourage maximum independence but intervene and supervise when necessary  - Involve family in performance of ADLs  - Assess for home care needs following discharge   - Consider OT consult to assist with ADL evaluation and planning for discharge  - Provide patient education as appropriate  Outcome: Progressing  Goal: Maintain or return mobility status to optimal level  Description: INTERVENTIONS:  - Assess patient's baseline mobility status (ambulation, transfers, stairs, etc )    - Identify cognitive and physical deficits and behaviors that affect mobility  - Identify mobility aids required to assist with transfers and/or ambulation (gait belt, sit-to-stand, lift, walker, cane, etc )  - [place] fall precautions as indicated by assessment  - Record patient progress and toleration of activity level on Mobility SBAR; progress patient to next Phase/Stage  - Instruct patient to call for assistance with activity based on assessment  - Consider rehabilitation consult to assist with strengthening/weightbearing, etc   Outcome: Progressing

## 2021-04-11 NOTE — PLAN OF CARE
Problem: PAIN - ADULT  Goal: Verbalizes/displays adequate comfort level or baseline comfort level  Description: Interventions:  - Encourage patient to monitor pain and request assistance  - Assess pain using appropriate pain scale  - Administer analgesics based on type and severity of pain and evaluate response  - Implement non-pharmacological measures as appropriate and evaluate response  - Consider cultural and social influences on pain and pain management  - Notify physician/advanced practitioner if interventions unsuccessful or patient reports new pain  Outcome: Progressing     Problem: INFECTION - ADULT  Goal: Absence or prevention of progression during hospitalization  Description: INTERVENTIONS:  - Assess and monitor for signs and symptoms of infection  - Monitor lab/diagnostic results  - Monitor all insertion sites, i e  indwelling lines, tubes, and drains  - Monitor endotracheal if appropriate and nasal secretions for changes in amount and color  - [place] appropriate cooling/warming therapies per order  - Administer medications as ordered  - Instruct and encourage patient and family to use good hand hygiene technique  - Identify and instruct in appropriate isolation precautions for identified infection/condition  Outcome: Progressing  Goal: Absence of fever/infection during neutropenic period  Description: INTERVENTIONS:  - Monitor WBC    Outcome: Progressing     Problem: SAFETY ADULT  Goal: Patient will remain free of falls  Description: INTERVENTIONS:  - Assess patient frequently for physical needs  -  Identify cognitive and physical deficits and behaviors that affect risk of falls    -  [place] fall precautions as indicated by assessment   - Educate patient/family on patient safety including physical limitations  - Instruct patient to call for assistance with activity based on assessment  - Modify environment to reduce risk of injury  - Consider OT/PT consult to assist with strengthening/mobility  Outcome: Progressing  Goal: Maintain or return to baseline ADL function  Description: INTERVENTIONS:  -  Assess patient's ability to carry out ADLs; assess patient's baseline for ADL function and identify physical deficits which impact ability to perform ADLs (bathing, care of mouth/teeth, toileting, grooming, dressing, etc )  - Assess/evaluate cause of self-care deficits   - Assess range of motion  - Assess patient's mobility; develop plan if impaired  - Assess patient's need for assistive devices and provide as appropriate  - Encourage maximum independence but intervene and supervise when necessary  - Involve family in performance of ADLs  - Assess for home care needs following discharge   - Consider OT consult to assist with ADL evaluation and planning for discharge  - Provide patient education as appropriate  Outcome: Progressing  Goal: Maintain or return mobility status to optimal level  Description: INTERVENTIONS:  - Assess patient's baseline mobility status (ambulation, transfers, stairs, etc )    - Identify cognitive and physical deficits and behaviors that affect mobility  - Identify mobility aids required to assist with transfers and/or ambulation (gait belt, sit-to-stand, lift, walker, cane, etc )  - [place] fall precautions as indicated by assessment  - Record patient progress and toleration of activity level on Mobility SBAR; progress patient to next Phase/Stage  - Instruct patient to call for assistance with activity based on assessment  - Consider rehabilitation consult to assist with strengthening/weightbearing, etc   Outcome: Progressing     Problem: DISCHARGE PLANNING  Goal: Discharge to home or other facility with appropriate resources  Description: INTERVENTIONS:  - Identify barriers to discharge w/patient and caregiver  - Arrange for needed discharge resources and transportation as appropriate  - Identify discharge learning needs (meds, wound care, etc )  - Arrange for interpretive services to assist at discharge as needed  - Refer to Case Management Department for coordinating discharge planning if the patient needs post-hospital services based on physician/advanced practitioner order or complex needs related to functional status, cognitive ability, or social support system  Outcome: Progressing     Problem: GASTROINTESTINAL - ADULT  Goal: Minimal or absence of nausea and/or vomiting  Description: INTERVENTIONS:  - Administer IV fluids if ordered to ensure adequate hydration  - Maintain NPO status until nausea and vomiting are resolved  - Nasogastric tube if ordered  - Administer ordered antiemetic medications as needed  - Provide nonpharmacologic comfort measures as appropriate  - Advance diet as tolerated, if ordered  - Consider nutrition services referral to assist patient with adequate nutrition and appropriate food choices  Outcome: Progressing  Goal: Maintains or returns to baseline bowel function  Description: INTERVENTIONS:  - Assess bowel function  - Encourage oral fluids to ensure adequate hydration  - Administer IV fluids if ordered to ensure adequate hydration  - Administer ordered medications as needed  - Encourage mobilization and activity  - Consider nutritional services referral to assist patient with adequate nutrition and appropriate food choices  Outcome: Progressing  Goal: Maintains adequate nutritional intake  Description: INTERVENTIONS:  - Monitor percentage of each meal consumed  - Identify factors contributing to decreased intake, treat as appropriate  - Assist with meals as needed  - Monitor I&O, weight, and lab values if indicated  - Obtain nutrition services referral as needed  Outcome: Progressing  Goal: Establish and maintain optimal ostomy function  Description: INTERVENTIONS:  - Assess bowel function  - Encourage oral fluids to ensure adequate hydration  - Administer IV fluids if ordered to ensure adequate hydration   - Administer ordered medications as needed  - Encourage mobilization and activity  - Nutrition services referral to assist patient with appropriate food choices  - Assess stoma site  - Consider wound care consult   Outcome: Progressing     Problem: METABOLIC, FLUID AND ELECTROLYTES - ADULT  Goal: Electrolytes maintained within normal limits  Description: INTERVENTIONS:  - Monitor labs and assess patient for signs and symptoms of electrolyte imbalances  - Administer electrolyte replacement as ordered  - Monitor response to electrolyte replacements, including repeat lab results as appropriate  - Instruct patient on fluid and nutrition as appropriate  Outcome: Progressing  Goal: Fluid balance maintained  Description: INTERVENTIONS:  - Monitor labs   - Monitor I/O and WT  - Instruct patient on fluid and nutrition as appropriate  - Assess for signs & symptoms of volume excess or deficit  Outcome: Progressing  Goal: Glucose maintained within target range  Description: INTERVENTIONS:  - Monitor Blood Glucose as ordered  - Assess for signs and symptoms of hyperglycemia and hypoglycemia  - Administer ordered medications to maintain glucose within target range  - Assess nutritional intake and initiate nutrition service referral as needed  Outcome: Progressing     Problem: SKIN/TISSUE INTEGRITY - ADULT  Goal: Skin integrity remains intact  Description: INTERVENTIONS  - Identify patients at risk for skin breakdown  - Assess and monitor skin integrity  - Assess and monitor nutrition and hydration status  - Monitor labs (i e  albumin)  - Assess for incontinence   - Turn and reposition patient  - Assist with mobility/ambulation  - Relieve pressure over bony prominences  - Avoid friction and shearing  - Provide appropriate hygiene as needed including keeping skin clean and dry  - Evaluate need for skin moisturizer/barrier cream  - Collaborate with interdisciplinary team (i e  Nutrition, Rehabilitation, etc )   - Patient/family teaching  Outcome: Progressing  Goal: Incision(s), wounds(s) or drain site(s) healing without S/S of infection  Description: INTERVENTIONS  - Assess and document risk factors for skin impairment   - Assess and document dressing, incision, wound bed, drain sites and surrounding tissue  - Consider nutrition services referral as needed  - Oral mucous membranes remain intact  - Provide patient/ family education  Outcome: Progressing  Goal: Oral mucous membranes remain intact  Description: INTERVENTIONS  - Assess oral mucosa and hygiene practices  - Implement preventative oral hygiene regimen  - Implement oral medicated treatments as ordered  - Initiate Nutrition services referral as needed  Outcome: Progressing     Problem: MUSCULOSKELETAL - ADULT  Goal: Maintain or return mobility to safest level of function  Description: INTERVENTIONS:  - Assess patient's ability to carry out ADLs; assess patient's baseline for ADL function and identify physical deficits which impact ability to perform ADLs (bathing, care of mouth/teeth, toileting, grooming, dressing, etc )  - Assess/evaluate cause of self-care deficits   - Assess range of motion  - Assess patient's mobility  - Assess patient's need for assistive devices and provide as appropriate  - Encourage maximum independence but intervene and supervise when necessary  - Involve family in performance of ADLs  - Assess for home care needs following discharge   - Consider OT consult to assist with ADL evaluation and planning for discharge  - Provide patient education as appropriate  Outcome: Progressing  Goal: Maintain proper alignment of affected body part  Description: INTERVENTIONS:  - Support, maintain and protect limb and body alignment  - Provide patient/ family with appropriate education  Outcome: Progressing     Problem: Prexisting or High Potential for Compromised Skin Integrity  Goal: Skin integrity is maintained or improved  Description: INTERVENTIONS:  - Identify patients at risk for skin breakdown  - Assess and monitor skin integrity  - Assess and monitor nutrition and hydration status  - Monitor labs   - Assess for incontinence   - Turn and reposition patient  - Assist with mobility/ambulation  - Relieve pressure over bony prominences  - Avoid friction and shearing  - Provide appropriate hygiene as needed including keeping skin clean and dry  - Evaluate need for skin moisturizer/barrier cream  - Collaborate with interdisciplinary team   - Patient/family teaching  - Consider wound care consult   Outcome: Progressing     Problem: Nutrition/Hydration-ADULT  Goal: Nutrient/Hydration intake appropriate for improving, restoring or maintaining nutritional needs  Description: Monitor and assess patient's nutrition/hydration status for malnutrition  Collaborate with interdisciplinary team and initiate plan and interventions as ordered  Monitor patient's weight and dietary intake as ordered or per policy  Utilize nutrition screening tool and intervene as necessary  Determine patient's food preferences and provide high-protein, high-caloric foods as appropriate       INTERVENTIONS:  - Monitor oral intake, urinary output, labs, and treatment plans  - Assess nutrition and hydration status and recommend course of action  - Evaluate amount of meals eaten  - Assist patient with eating if necessary   - Allow adequate time for meals  - Recommend/ encourage appropriate diets, oral nutritional supplements, and vitamin/mineral supplements  - Order, calculate, and assess calorie counts as needed  - Recommend, monitor, and adjust tube feedings and TPN/PPN based on assessed needs  - Assess need for intravenous fluids  - Provide specific nutrition/hydration education as appropriate  - Include patient/family/caregiver in decisions related to nutrition  Outcome: Progressing

## 2021-04-11 NOTE — PROGRESS NOTES
Progress Note - General Surgery   Jannette Wiseman 73 y o  female MRN: [id number]  Unit/Bed#: S -01 Encounter: [id number]    Assessment:  73 F with extensive surgical history, chronically affected by short gut syndrome, Enterocutaneous fistula with managed output, and opioid dependence now p/w ileus and likely dysmotility with increased fistula output  VSS, afebrile    Nocturnal TPN running  [email or web address]  1 25L NGT outptut  Patient reports multiple episodes of urination  425cc succus from fistula, managed with wound appliance    Abdomen remains mildly distended but soft, diffusely tender, with fistula of lower midline and well-fitted wound manager  Multiple prior laparotomy scars    No return of flatus or bowel function     Plan:  -NPO with lozenges and sips of clears for comfort  -NGT  -add chloraseptic spray for throat comfort  -Continue Relistor  -Continue Daily TPN, adjust electrolytes as necessary based upon lab results  -strict I/O recording essential, special attention to urine/fistula,ngt output   -ambulation OOB  -DVT PPx    Subjective/Objective     Subjective: No acute events occurred overnight    Objective:     Blood pressure 94/60, pulse 82, temperature 98 7 °F (37 1 °C), temperature source Axillary, resp  rate 14, height 5' 4" (1 626 m), weight 68 7 kg (151 lb 7 3 oz), SpO2 93 %, not currently breastfeeding  ,Body mass index is 26 kg/m²        Intake/Output Summary (Last 24 hours) at 4/11/2021 2306  Last data filed at 4/11/2021 0133  Gross per 24 hour   Intake 251 ml   Output 1175 ml   Net -924 ml       Invasive Devices     Peripherally Inserted Central Catheter Line            PICC Line Left Brachial -- days    PICC Line 95/09/12 Left Basilic 38 days          Peripheral Intravenous Line            Peripheral IV 04/10/21 Right Antecubital less than 1 day          Drain            NG/OG/Enteral Tube Nasogastric 18 Fr Right nares 2 days                Physical Exam:   General: NAD, appears uncomfortable  HEENT: normocephalic, atraumatic  CV: RRR  Resp: no distress, on room air  Abdomen: soft, moderate diffuse tenderness, mildly distended, mutliple scars, lower midline fistula with wound manager appliance fitted   Lab, Imaging and other studies:  I have personally reviewed pertinent lab results    , CBC:   Lab Results   Component Value Date    WBC 3 48 (L) 04/11/2021    HGB 9 5 (L) 04/11/2021    HCT 29 3 (L) 04/11/2021    MCV 86 04/11/2021     04/11/2021    MCH 27 8 04/11/2021    MCHC 32 4 04/11/2021    RDW 14 5 04/11/2021    MPV 9 4 04/11/2021    NRBC 0 04/11/2021   , CMP:   Lab Results   Component Value Date    SODIUM 140 04/11/2021    K 3 2 (L) 04/11/2021     04/11/2021    CO2 27 04/11/2021    BUN 10 04/11/2021    CREATININE 0 69 04/11/2021    CALCIUM 7 7 (L) 04/11/2021    AST 15 04/11/2021    ALT 37 04/11/2021    ALKPHOS 118 (H) 04/11/2021    EGFR 92 04/11/2021     VTE Pharmacologic Prophylaxis: Heparin  VTE Mechanical Prophylaxis: sequential compression device

## 2021-04-12 LAB
ANION GAP SERPL CALCULATED.3IONS-SCNC: 8 MMOL/L (ref 4–13)
BASOPHILS # BLD AUTO: 0.01 THOUSANDS/ΜL (ref 0–0.1)
BASOPHILS NFR BLD AUTO: 0 % (ref 0–1)
BUN SERPL-MCNC: 5 MG/DL (ref 5–25)
CALCIUM SERPL-MCNC: 8 MG/DL (ref 8.3–10.1)
CHLORIDE SERPL-SCNC: 106 MMOL/L (ref 100–108)
CO2 SERPL-SCNC: 29 MMOL/L (ref 21–32)
CREAT SERPL-MCNC: 0.62 MG/DL (ref 0.6–1.3)
EOSINOPHIL # BLD AUTO: 0.16 THOUSAND/ΜL (ref 0–0.61)
EOSINOPHIL NFR BLD AUTO: 5 % (ref 0–6)
ERYTHROCYTE [DISTWIDTH] IN BLOOD BY AUTOMATED COUNT: 14.4 % (ref 11.6–15.1)
GFR SERPL CREATININE-BSD FRML MDRD: 95 ML/MIN/1.73SQ M
GLUCOSE SERPL-MCNC: 133 MG/DL (ref 65–140)
HCT VFR BLD AUTO: 30.4 % (ref 34.8–46.1)
HGB BLD-MCNC: 9.8 G/DL (ref 11.5–15.4)
IMM GRANULOCYTES # BLD AUTO: 0.02 THOUSAND/UL (ref 0–0.2)
IMM GRANULOCYTES NFR BLD AUTO: 1 % (ref 0–2)
LYMPHOCYTES # BLD AUTO: 1.09 THOUSANDS/ΜL (ref 0.6–4.47)
LYMPHOCYTES NFR BLD AUTO: 31 % (ref 14–44)
MAGNESIUM SERPL-MCNC: 2 MG/DL (ref 1.6–2.6)
MCH RBC QN AUTO: 27.1 PG (ref 26.8–34.3)
MCHC RBC AUTO-ENTMCNC: 32.2 G/DL (ref 31.4–37.4)
MCV RBC AUTO: 84 FL (ref 82–98)
MONOCYTES # BLD AUTO: 0.48 THOUSAND/ΜL (ref 0.17–1.22)
MONOCYTES NFR BLD AUTO: 14 % (ref 4–12)
NEUTROPHILS # BLD AUTO: 1.71 THOUSANDS/ΜL (ref 1.85–7.62)
NEUTS SEG NFR BLD AUTO: 49 % (ref 43–75)
NRBC BLD AUTO-RTO: 0 /100 WBCS
PHOSPHATE SERPL-MCNC: 2.3 MG/DL (ref 2.3–4.1)
PLATELET # BLD AUTO: 160 THOUSANDS/UL (ref 149–390)
PMV BLD AUTO: 9.4 FL (ref 8.9–12.7)
POTASSIUM SERPL-SCNC: 3.3 MMOL/L (ref 3.5–5.3)
RBC # BLD AUTO: 3.61 MILLION/UL (ref 3.81–5.12)
SODIUM SERPL-SCNC: 143 MMOL/L (ref 136–145)
WBC # BLD AUTO: 3.47 THOUSAND/UL (ref 4.31–10.16)

## 2021-04-12 PROCEDURE — 83735 ASSAY OF MAGNESIUM: CPT | Performed by: SURGERY

## 2021-04-12 PROCEDURE — 84100 ASSAY OF PHOSPHORUS: CPT | Performed by: SURGERY

## 2021-04-12 PROCEDURE — C9113 INJ PANTOPRAZOLE SODIUM, VIA: HCPCS | Performed by: SURGERY

## 2021-04-12 PROCEDURE — 85025 COMPLETE CBC W/AUTO DIFF WBC: CPT | Performed by: SURGERY

## 2021-04-12 PROCEDURE — 80048 BASIC METABOLIC PNL TOTAL CA: CPT | Performed by: SURGERY

## 2021-04-12 PROCEDURE — 99232 SBSQ HOSP IP/OBS MODERATE 35: CPT | Performed by: SURGERY

## 2021-04-12 RX ORDER — GABAPENTIN 300 MG/1
600 CAPSULE ORAL 2 TIMES DAILY
Status: DISCONTINUED | OUTPATIENT
Start: 2021-04-12 | End: 2021-01-01 | Stop reason: HOSPADM

## 2021-04-12 RX ORDER — TAMSULOSIN HYDROCHLORIDE 0.4 MG/1
0.8 CAPSULE ORAL
Status: DISCONTINUED | OUTPATIENT
Start: 2021-04-12 | End: 2021-01-01 | Stop reason: HOSPADM

## 2021-04-12 RX ORDER — ESCITALOPRAM OXALATE 10 MG/1
10 TABLET ORAL DAILY
Status: DISCONTINUED | OUTPATIENT
Start: 2021-04-12 | End: 2021-01-01 | Stop reason: HOSPADM

## 2021-04-12 RX ORDER — POTASSIUM CHLORIDE 14.9 MG/ML
20 INJECTION INTRAVENOUS
Status: COMPLETED | OUTPATIENT
Start: 2021-04-12 | End: 2021-04-12

## 2021-04-12 RX ADMIN — HYDROMORPHONE HYDROCHLORIDE 2 MG: 2 INJECTION INTRAMUSCULAR; INTRAVENOUS; SUBCUTANEOUS at 16:44

## 2021-04-12 RX ADMIN — POTASSIUM CHLORIDE 20 MEQ: 14.9 INJECTION, SOLUTION INTRAVENOUS at 08:51

## 2021-04-12 RX ADMIN — HYDROMORPHONE HYDROCHLORIDE 2 MG: 2 INJECTION INTRAMUSCULAR; INTRAVENOUS; SUBCUTANEOUS at 03:31

## 2021-04-12 RX ADMIN — ONDANSETRON 4 MG: 2 INJECTION INTRAMUSCULAR; INTRAVENOUS at 18:57

## 2021-04-12 RX ADMIN — GABAPENTIN 600 MG: 300 CAPSULE ORAL at 17:25

## 2021-04-12 RX ADMIN — TAMSULOSIN HYDROCHLORIDE 0.8 MG: 0.4 CAPSULE ORAL at 17:25

## 2021-04-12 RX ADMIN — ONDANSETRON 4 MG: 2 INJECTION INTRAMUSCULAR; INTRAVENOUS at 12:59

## 2021-04-12 RX ADMIN — ESCITALOPRAM OXALATE 10 MG: 10 TABLET ORAL at 08:50

## 2021-04-12 RX ADMIN — GABAPENTIN 600 MG: 300 CAPSULE ORAL at 08:50

## 2021-04-12 RX ADMIN — POTASSIUM CHLORIDE 20 MEQ: 14.9 INJECTION, SOLUTION INTRAVENOUS at 07:27

## 2021-04-12 RX ADMIN — PANTOPRAZOLE SODIUM 40 MG: 40 INJECTION, POWDER, FOR SOLUTION INTRAVENOUS at 08:29

## 2021-04-12 RX ADMIN — HYDROMORPHONE HYDROCHLORIDE 2 MG: 2 INJECTION INTRAMUSCULAR; INTRAVENOUS; SUBCUTANEOUS at 12:59

## 2021-04-12 RX ADMIN — HYDROMORPHONE HYDROCHLORIDE 2 MG: 2 INJECTION INTRAMUSCULAR; INTRAVENOUS; SUBCUTANEOUS at 18:57

## 2021-04-12 RX ADMIN — HYDROMORPHONE HYDROCHLORIDE 2 MG: 2 INJECTION INTRAMUSCULAR; INTRAVENOUS; SUBCUTANEOUS at 09:43

## 2021-04-12 RX ADMIN — HYDROMORPHONE HYDROCHLORIDE 2 MG: 2 INJECTION INTRAMUSCULAR; INTRAVENOUS; SUBCUTANEOUS at 06:30

## 2021-04-12 RX ADMIN — ONDANSETRON 4 MG: 2 INJECTION INTRAMUSCULAR; INTRAVENOUS at 03:35

## 2021-04-12 RX ADMIN — ENOXAPARIN SODIUM 40 MG: 40 INJECTION SUBCUTANEOUS at 08:29

## 2021-04-12 RX ADMIN — HYDROMORPHONE HYDROCHLORIDE 2 MG: 2 INJECTION INTRAMUSCULAR; INTRAVENOUS; SUBCUTANEOUS at 00:30

## 2021-04-12 RX ADMIN — HYDROMORPHONE HYDROCHLORIDE 2 MG: 2 INJECTION INTRAMUSCULAR; INTRAVENOUS; SUBCUTANEOUS at 21:54

## 2021-04-12 NOTE — PROGRESS NOTES
Progress Note - General Surgery   Isaías Spruce 73 y o  female MRN: [id number]  Unit/Bed#: S -01 Encounter: [id number]    Assessment:  72 y/o F with extensive PSH, short gut syndrome, EC fistula, opioid dependence, p/w abdominal distension, worsening abdominal pain, increasing fistula output x 3 days, likely ileus secondary to opioid use    Plan:  --NPO/NGT  --IVF  --Await return of bowel function  --Relistor doses x2 (8mg, 12mg) given over past 48 hours  --Continue TPN via PICC, Nutrition consulted for TPN recs  --Monitor fistula output    Subjective/Objective     Subjective:     No acute events overnight  This morning, pt denies any abdominal pain, N/V  Passing flatus and having bowel movements  Objective:     Blood pressure 140/64, pulse 59, temperature 98 6 °F (37 °C), temperature source Oral, resp  rate 18, height 5' 4" (1 626 m), weight 68 7 kg (151 lb 7 3 oz), SpO2 97 %, not currently breastfeeding  ,Body mass index is 26 kg/m²  Intake/Output Summary (Last 24 hours) at 4/11/2021 2014  Last data filed at 4/11/2021 1323  Gross per 24 hour   Intake 320 ml   Output 1100 ml   Net -780 ml       Invasive Devices     Peripherally Inserted Central Catheter Line            PICC Line Left Brachial -- days    PICC Line 63/95/35 Left Basilic 39 days          Peripheral Intravenous Line            Peripheral IV 04/10/21 Right Antecubital less than 1 day          Drain            NG/OG/Enteral Tube Nasogastric 18 Fr Right nares 2 days                Physical Exam:     GEN: NAD  HEENT: MMM  CV: RRR  Lung: normal effort  Ab: Soft, NT/ND, fistula with tan/yellow effluent   Extrem: No CCE  Neuro:  A+Ox3, motor and sensation grossly intact    Lab, Imaging and other studies:  CBC:   Lab Results   Component Value Date    WBC 3 48 (L) 04/11/2021    HGB 9 5 (L) 04/11/2021    HCT 29 3 (L) 04/11/2021    MCV 86 04/11/2021     04/11/2021    MCH 27 8 04/11/2021    MCHC 32 4 04/11/2021    RDW 14 5 04/11/2021    MPV 9 4 04/11/2021    NRBC 0 04/11/2021   , CMP:   Lab Results   Component Value Date    SODIUM 140 04/11/2021    K 3 2 (L) 04/11/2021     04/11/2021    CO2 27 04/11/2021    BUN 10 04/11/2021    CREATININE 0 69 04/11/2021    CALCIUM 7 7 (L) 04/11/2021    AST 15 04/11/2021    ALT 37 04/11/2021    ALKPHOS 118 (H) 04/11/2021    EGFR 92 04/11/2021   , Coagulation: No results found for: PT, INR, APTT, Urinalysis: No results found for: COLORU, CLARITYU, SPECGRAV, PHUR, LEUKOCYTESUR, NITRITE, PROTEINUA, GLUCOSEU, KETONESU, BILIRUBINUR, BLOODU  VTE Pharmacologic Prophylaxis: Enoxaparin (Lovenox)  VTE Mechanical Prophylaxis: sequential compression device

## 2021-04-12 NOTE — CASE MANAGEMENT
LOS 3   NOT A BUNDLE;  DISCHARGE/READMISSION;  YELLOW UNPLANNED READMISSION RISK  STEPHEN met with pt to review role and introduce self  Pt lives with her  and brother in a one level home  She states there is one LIANA  She has been independent with all ADLs and ambulation  She does not drive and her  assists  She has not been to rehab in the past however she is currently receiving VNA services from [facility] and TPN from [facility]  Pt prefers to have referrals made to them for resumption of care  Referrals have been made  Pt does not have a POA however her  would make decisions if needed  She does not reports MH/D&A hx   Pt receives SSD and is able to afford her medications with her RX coverage  She gets her medications at [facility] in [place]  Pt will have transport home from the hospital when [address]  CM reviewed the availability of treatment team to discuss questions or concerns patient and/or family may have regarding  understanding medications and recognizing signs and symptoms at discharge  CM also encouraged patient to follow up with all recommended appointments after discharge  CM reviewed the information that will be provided to pt/family on the discharge instructions  Patient advised of importance for patient and family to participate in managing patient's medical well being

## 2021-04-12 NOTE — PLAN OF CARE
Problem: PAIN - ADULT  Goal: Verbalizes/displays adequate comfort level or baseline comfort level  Description: Interventions:  - Encourage patient to monitor pain and request assistance  - Assess pain using appropriate pain scale  - Administer analgesics based on type and severity of pain and evaluate response  - Implement non-pharmacological measures as appropriate and evaluate response  - Consider cultural and social influences on pain and pain management  - Notify physician/advanced practitioner if interventions unsuccessful or patient reports new pain  Outcome: Progressing     Problem: INFECTION - ADULT  Goal: Absence or prevention of progression during hospitalization  Description: INTERVENTIONS:  - Assess and monitor for signs and symptoms of infection  - Monitor lab/diagnostic results  - Monitor all insertion sites, i e  indwelling lines, tubes, and drains  - Monitor endotracheal if appropriate and nasal secretions for changes in amount and color  - [place] appropriate cooling/warming therapies per order  - Administer medications as ordered  - Instruct and encourage patient and family to use good hand hygiene technique  - Identify and instruct in appropriate isolation precautions for identified infection/condition  Outcome: Progressing  Goal: Absence of fever/infection during neutropenic period  Description: INTERVENTIONS:  - Monitor WBC    Outcome: Progressing     Problem: SAFETY ADULT  Goal: Patient will remain free of falls  Description: INTERVENTIONS:  - Assess patient frequently for physical needs  -  Identify cognitive and physical deficits and behaviors that affect risk of falls    -  [place] fall precautions as indicated by assessment   - Educate patient/family on patient safety including physical limitations  - Instruct patient to call for assistance with activity based on assessment  - Modify environment to reduce risk of injury  - Consider OT/PT consult to assist with strengthening/mobility  Outcome: Progressing  Goal: Maintain or return to baseline ADL function  Description: INTERVENTIONS:  -  Assess patient's ability to carry out ADLs; assess patient's baseline for ADL function and identify physical deficits which impact ability to perform ADLs (bathing, care of mouth/teeth, toileting, grooming, dressing, etc )  - Assess/evaluate cause of self-care deficits   - Assess range of motion  - Assess patient's mobility; develop plan if impaired  - Assess patient's need for assistive devices and provide as appropriate  - Encourage maximum independence but intervene and supervise when necessary  - Involve family in performance of ADLs  - Assess for home care needs following discharge   - Consider OT consult to assist with ADL evaluation and planning for discharge  - Provide patient education as appropriate  Outcome: Progressing  Goal: Maintain or return mobility status to optimal level  Description: INTERVENTIONS:  - Assess patient's baseline mobility status (ambulation, transfers, stairs, etc )    - Identify cognitive and physical deficits and behaviors that affect mobility  - Identify mobility aids required to assist with transfers and/or ambulation (gait belt, sit-to-stand, lift, walker, cane, etc )  - [place] fall precautions as indicated by assessment  - Record patient progress and toleration of activity level on Mobility SBAR; progress patient to next Phase/Stage  - Instruct patient to call for assistance with activity based on assessment  - Consider rehabilitation consult to assist with strengthening/weightbearing, etc   Outcome: Progressing     Problem: DISCHARGE PLANNING  Goal: Discharge to home or other facility with appropriate resources  Description: INTERVENTIONS:  - Identify barriers to discharge w/patient and caregiver  - Arrange for needed discharge resources and transportation as appropriate  - Identify discharge learning needs (meds, wound care, etc )  - Arrange for interpretive services to assist at discharge as needed  - Refer to Case Management Department for coordinating discharge planning if the patient needs post-hospital services based on physician/advanced practitioner order or complex needs related to functional status, cognitive ability, or social support system  Outcome: Progressing     Problem: GASTROINTESTINAL - ADULT  Goal: Minimal or absence of nausea and/or vomiting  Description: INTERVENTIONS:  - Administer IV fluids if ordered to ensure adequate hydration  - Maintain NPO status until nausea and vomiting are resolved  - Nasogastric tube if ordered  - Administer ordered antiemetic medications as needed  - Provide nonpharmacologic comfort measures as appropriate  - Advance diet as tolerated, if ordered  - Consider nutrition services referral to assist patient with adequate nutrition and appropriate food choices  Outcome: Progressing  Goal: Maintains or returns to baseline bowel function  Description: INTERVENTIONS:  - Assess bowel function  - Encourage oral fluids to ensure adequate hydration  - Administer IV fluids if ordered to ensure adequate hydration  - Administer ordered medications as needed  - Encourage mobilization and activity  - Consider nutritional services referral to assist patient with adequate nutrition and appropriate food choices  Outcome: Progressing  Goal: Maintains adequate nutritional intake  Description: INTERVENTIONS:  - Monitor percentage of each meal consumed  - Identify factors contributing to decreased intake, treat as appropriate  - Assist with meals as needed  - Monitor I&O, weight, and lab values if indicated  - Obtain nutrition services referral as needed  Outcome: Progressing  Goal: Establish and maintain optimal ostomy function  Description: INTERVENTIONS:  - Assess bowel function  - Encourage oral fluids to ensure adequate hydration  - Administer IV fluids if ordered to ensure adequate hydration   - Administer ordered medications as needed  - Encourage mobilization and activity  - Nutrition services referral to assist patient with appropriate food choices  - Assess stoma site  - Consider wound care consult   Outcome: Progressing     Problem: METABOLIC, FLUID AND ELECTROLYTES - ADULT  Goal: Electrolytes maintained within normal limits  Description: INTERVENTIONS:  - Monitor labs and assess patient for signs and symptoms of electrolyte imbalances  - Administer electrolyte replacement as ordered  - Monitor response to electrolyte replacements, including repeat lab results as appropriate  - Instruct patient on fluid and nutrition as appropriate  Outcome: Progressing  Goal: Fluid balance maintained  Description: INTERVENTIONS:  - Monitor labs   - Monitor I/O and WT  - Instruct patient on fluid and nutrition as appropriate  - Assess for signs & symptoms of volume excess or deficit  Outcome: Progressing  Goal: Glucose maintained within target range  Description: INTERVENTIONS:  - Monitor Blood Glucose as ordered  - Assess for signs and symptoms of hyperglycemia and hypoglycemia  - Administer ordered medications to maintain glucose within target range  - Assess nutritional intake and initiate nutrition service referral as needed  Outcome: Progressing     Problem: SKIN/TISSUE INTEGRITY - ADULT  Goal: Skin integrity remains intact  Description: INTERVENTIONS  - Identify patients at risk for skin breakdown  - Assess and monitor skin integrity  - Assess and monitor nutrition and hydration status  - Monitor labs (i e  albumin)  - Assess for incontinence   - Turn and reposition patient  - Assist with mobility/ambulation  - Relieve pressure over bony prominences  - Avoid friction and shearing  - Provide appropriate hygiene as needed including keeping skin clean and dry  - Evaluate need for skin moisturizer/barrier cream  - Collaborate with interdisciplinary team (i e  Nutrition, Rehabilitation, etc )   - Patient/family teaching  Outcome: Progressing  Goal: Incision(s), wounds(s) or drain site(s) healing without S/S of infection  Description: INTERVENTIONS  - Assess and document risk factors for skin impairment   - Assess and document dressing, incision, wound bed, drain sites and surrounding tissue  - Consider nutrition services referral as needed  - Oral mucous membranes remain intact  - Provide patient/ family education  Outcome: Progressing  Goal: Oral mucous membranes remain intact  Description: INTERVENTIONS  - Assess oral mucosa and hygiene practices  - Implement preventative oral hygiene regimen  - Implement oral medicated treatments as ordered  - Initiate Nutrition services referral as needed  Outcome: Progressing     Problem: MUSCULOSKELETAL - ADULT  Goal: Maintain or return mobility to safest level of function  Description: INTERVENTIONS:  - Assess patient's ability to carry out ADLs; assess patient's baseline for ADL function and identify physical deficits which impact ability to perform ADLs (bathing, care of mouth/teeth, toileting, grooming, dressing, etc )  - Assess/evaluate cause of self-care deficits   - Assess range of motion  - Assess patient's mobility  - Assess patient's need for assistive devices and provide as appropriate  - Encourage maximum independence but intervene and supervise when necessary  - Involve family in performance of ADLs  - Assess for home care needs following discharge   - Consider OT consult to assist with ADL evaluation and planning for discharge  - Provide patient education as appropriate  Outcome: Progressing  Goal: Maintain proper alignment of affected body part  Description: INTERVENTIONS:  - Support, maintain and protect limb and body alignment  - Provide patient/ family with appropriate education  Outcome: Progressing     Problem: Prexisting or High Potential for Compromised Skin Integrity  Goal: Skin integrity is maintained or improved  Description: INTERVENTIONS:  - Identify patients at risk for skin breakdown  - Assess and monitor skin integrity  - Assess and monitor nutrition and hydration status  - Monitor labs   - Assess for incontinence   - Turn and reposition patient  - Assist with mobility/ambulation  - Relieve pressure over bony prominences  - Avoid friction and shearing  - Provide appropriate hygiene as needed including keeping skin clean and dry  - Evaluate need for skin moisturizer/barrier cream  - Collaborate with interdisciplinary team   - Patient/family teaching  - Consider wound care consult   Outcome: Progressing     Problem: Nutrition/Hydration-ADULT  Goal: Nutrient/Hydration intake appropriate for improving, restoring or maintaining nutritional needs  Description: Monitor and assess patient's nutrition/hydration status for malnutrition  Collaborate with interdisciplinary team and initiate plan and interventions as ordered  Monitor patient's weight and dietary intake as ordered or per policy  Utilize nutrition screening tool and intervene as necessary  Determine patient's food preferences and provide high-protein, high-caloric foods as appropriate       INTERVENTIONS:  - Monitor oral intake, urinary output, labs, and treatment plans  - Assess nutrition and hydration status and recommend course of action  - Evaluate amount of meals eaten  - Assist patient with eating if necessary   - Allow adequate time for meals  - Recommend/ encourage appropriate diets, oral nutritional supplements, and vitamin/mineral supplements  - Order, calculate, and assess calorie counts as needed  - Recommend, monitor, and adjust tube feedings and TPN/PPN based on assessed needs  - Assess need for intravenous fluids  - Provide specific nutrition/hydration education as appropriate  - Include patient/family/caregiver in decisions related to nutrition  Outcome: Progressing     Problem: Potential for Falls  Goal: Patient will remain free of falls  Description: INTERVENTIONS:  - Assess patient frequently for physical needs  - Identify cognitive and physical deficits and behaviors that affect risk of falls    -  [place] fall precautions as indicated by assessment   - Educate patient/family on patient safety including physical limitations  - Instruct patient to call for assistance with activity based on assessment  - Modify environment to reduce risk of injury  - Consider OT/PT consult to assist with strengthening/mobility  Outcome: Progressing

## 2021-04-13 NOTE — PROGRESS NOTES
Progress Note - General Surgery   Aydee Sin 73 y o  female MRN: [id number]  Unit/Bed#: S -01 Encounter: [id number]    Assessment:  73 F with SBO    Plan:  Diet as tolerated  Will discuss discharge with attending  Pain control    Subjective/Objective     Subjective: No acute events overnight  Tolerating diet and moving bowels  Objective:    Blood pressure 127/61, pulse 64, temperature 97 9 °F (36 6 °C), temperature source Oral, resp  rate 18, height 5' 4" (1 626 m), weight 68 7 kg (151 lb 7 3 oz), SpO2 95 %, not currently breastfeeding  ,Body mass index is 26 kg/m²        Intake/Output Summary (Last 24 hours) at 4/13/2021 0805  Last data filed at 4/13/2021 0606  Gross per 24 hour   Intake 0 ml   Output 1050 ml   Net -1050 ml       Invasive Devices     Peripherally Inserted Central Catheter Line            PICC Line Left Brachial -- days    PICC Line 81/36/34 Left Basilic 40 days          Peripheral Intravenous Line            Peripheral IV 04/12/21 Right;Upper Forearm less than 1 day                Physical Exam:   General: NAD, AAOx3  Eyes: PERRL  ENT: moist mucous membranes  Neck: supple  CV: RRR +S1/S2  Chest: respirations non-labored  Abdomen: soft, mildly distended, non-tender  Wound manager in place  Extremities: atraumatic, no edema      Results from last 7 days   Lab Units 04/13/21  0603 04/12/21  0351 04/11/21  0437   WBC Thousand/uL 3 16* 3 47* 3 48*   HEMOGLOBIN g/dL 9 8* 9 8* 9 5*   HEMATOCRIT % 30 4* 30 4* 29 3*   PLATELETS Thousands/uL 166 160 156     Results from last 7 days   Lab Units 04/13/21  0603 04/12/21  0351 04/11/21  0437   POTASSIUM mmol/L 3 5 3 3* 3 2*   CHLORIDE mmol/L 105 106 108   CO2 mmol/L 31 29 27   BUN mg/dL 7 5 10   CREATININE mg/dL 0 57* 0 62 0 69   CALCIUM mg/dL 8 5 8 0* 7 7*

## 2021-04-13 NOTE — PROGRESS NOTES
The pantoprazole has been converted to Oral per [facility] IV-to-PO Auto-Conversion Protocol for Adults as approved by the Pharmacy and Therapeutics Committee  The patient met all eligible criteria:  3 Age = 25years old   2) Received at least one dose of the IV form   3) Receiving at least one other scheduled oral/enteral medication   4) Tolerating an oral/enteral diet   and did not have any exclusions:   1) Critical care patient   2) Active GI bleed (IF assessing H2RAs or PPIs)   3) Continuous tube feeding (IF assessing cipro, doxycycline, levofloxacin, minocycline, rifampin, or voriconazole)   4) Receiving PO vancomycin (IF assessing metronidazole)   5) Persistent nausea and/or vomiting   6) Ileus or gastrointestinal obstruction   7) [place]/nasogastric tube set for continuous suction   8) Specific order not to automatically convert to PO (in the order's comments or if discussed in the most recent Infectious Disease or primary team's progress notes)

## 2021-04-13 NOTE — DISCHARGE SUMMARY
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, and increasing abdominal pain  She chronically has diarrheal bowel movements and is affected by short gut syndrome status post multiple laparotomies and has a known enterocutaneous fistula which has had previously controlled minimal output  She follows closely with her surgeon, and was doing well on her outpatient regimen until 3 days ago at which point the symptoms commenced  She had a similar episode within the last 6 months during which she was admitted to the hospital with an ileus and treated non operatively for return of bowel function  She continues to take high-dose opioids at home notably morphine and oral Dilaudid as well as multiple nonopioid pain medications for combination of chronic pain associated with her abdomen and multiple operations on her spine  She denies fever and chills, no chest pain, no shortness of breath  She reports no bowel movement for 3 days, endorses no urinary habit changes, no discomfort, dysuria, or urinary frequency  She denies recent changes in diet, has no sick contacts or recent travel history, and is consistent with her daily diet and twice weekly nocturnal TPN "    Procedures Performed: No orders of the defined types were placed in this encounter  Summary of Hospital Course:   The patient was transferred [facility] the emergency department [facility] to [facility] with a nasogastric tube in place, returning bilious contents  She was admitted to the hospital, maintained NPO with nasogastric tube in place, and measures for oral comfort were taken  Her electrolyte deficiencies were corrected, and she was given a pain regimen, though Lomotil was withheld at this time given concern for dysmotility  On day 1 of admission, she was given a dose of Relistor but did not have any meaningful return of bowel function  On day 2 of admission, she was again given a higher dose of Relistor at which point she began to pass flatus and bowel movements, and her fistula output in nasogastric tube output decreased  She was given sips of clear liquids for comfort, and rapidly began taking in very much orally, the decision was then made to discontinue her nasogastric tube and advance her diet  She was eventually advanced to a regular diet, which she tolerated without nausea and vomiting and continued to have bowel function  Fistula output remained modest and well contained via her wound manager, and her pain was appropriately controlled given that is chronic in nature and she has a complex outpatient pain regimen  On day 4 of admission, she desired to go home and noted that her appetite is much improved and that she was able to tolerate meaningful oral intake  After confirming that the patient indeed still had provisions in place for twice weekly home TPN as per before this admission, she was discharged to home at the discretion of her surgeon  Prior to her departure from the hospital, provisions for medication reconciliation, gradual resumption of Lomotil, GI follow-up for removal of an existing biliary stent, as well as surgical and primary care follow-up were made      Significant Findings, Care, Treatment and Services Provided:   4/9 CT abdomen and pelvis:  Diffuse gastric and small-bowel dilatation, enterocutaneous fistula    Complications: none    Discharge Diagnosis:  Intestinal dysmotility, colonic pseudo-obstruction    Resolved Problems  Date Reviewed: 4/9/2021    None          Condition at Discharge: fair         Discharge instructions/Information to patient and family:   See after visit summary for information provided to patient and family  Provisions for Follow-Up Care:  See after visit summary for information related to follow-up care and any pertinent home health orders  PCP: Quan Harding DO    Disposition: Home    Planned Readmission: No      Discharge Statement   I spent 27 minutes discharging the patient  This time was spent on the day of discharge  I had direct contact with the patient on the day of discharge  Additional documentation is required if more than 30 minutes were spent on discharge  Discharge Medications:  See after visit summary for reconciled discharge medications provided to patient and family

## 2021-04-13 NOTE — DISCHARGE INSTRUCTIONS
Ileus   WHAT YOU NEED TO KNOW:   Ileus is a condition that develops when the muscles of your intestines stop sanjay  This causes a blockage that prevents food and waste from passing through normally  DISCHARGE INSTRUCTIONS:   Medicines:   · Laxatives  may help your intestines contract again and soften your bowel movement to make it easier to pass  · Take your medicine as directed  Contact your healthcare provider if you think your medicine is not helping or if you have side effects  Tell him of her if you are allergic to any medicine  Keep a list of the medicines, vitamins, and herbs you take  Include the amounts, and when and why you take them  Bring the list or the pill bottles to follow-up visits  Carry your medicine list with you in case of an emergency  Follow up with your healthcare provider as directed:  Write down your questions so you remember to ask them during your visits  Contact your healthcare provider if:   · You have nausea or are vomiting  · You cannot pass gas or a bowel movement  · You have abdominal bloating or pain that does not go away  · You cannot eat  · You have questions or concerns about your condition or care  Return to the emergency department if:   · You have a fever and severe abdominal pain or bloating  · You have blood in your bowel movement  · Your heart is pounding or racing  © Copyright [address] Information is for End User's use only and may not be sold, redistributed or otherwise used for commercial purposes  All illustrations and images included in CareNotes® are the copyrighted property of [employer]  or [facility] Vinicio Kumar   The above information is an  only  It is not intended as medical advice for individual conditions or treatments  Talk to your doctor, nurse or pharmacist before following any medical regimen to see if it is safe and effective for you

## 2021-04-13 NOTE — PLAN OF CARE
Problem: PAIN - ADULT  Goal: Verbalizes/displays adequate comfort level or baseline comfort level  Description: Interventions:  - Encourage patient to monitor pain and request assistance  - Assess pain using appropriate pain scale  - Administer analgesics based on type and severity of pain and evaluate response  - Implement non-pharmacological measures as appropriate and evaluate response  - Consider cultural and social influences on pain and pain management  - Notify physician/advanced practitioner if interventions unsuccessful or patient reports new pain  Outcome: Progressing     Problem: INFECTION - ADULT  Goal: Absence or prevention of progression during hospitalization  Description: INTERVENTIONS:  - Assess and monitor for signs and symptoms of infection  - Monitor lab/diagnostic results  - Monitor all insertion sites, i e  indwelling lines, tubes, and drains  - Monitor endotracheal if appropriate and nasal secretions for changes in amount and color  - [place] appropriate cooling/warming therapies per order  - Administer medications as ordered  - Instruct and encourage patient and family to use good hand hygiene technique  - Identify and instruct in appropriate isolation precautions for identified infection/condition  Outcome: Progressing  Goal: Absence of fever/infection during neutropenic period  Description: INTERVENTIONS:  - Monitor WBC    Outcome: Progressing     Problem: SAFETY ADULT  Goal: Patient will remain free of falls  Description: INTERVENTIONS:  - Assess patient frequently for physical needs  -  Identify cognitive and physical deficits and behaviors that affect risk of falls    -  [place] fall precautions as indicated by assessment   - Educate patient/family on patient safety including physical limitations  - Instruct patient to call for assistance with activity based on assessment  - Modify environment to reduce risk of injury  - Consider OT/PT consult to assist with strengthening/mobility  Outcome: Progressing  Goal: Maintain or return to baseline ADL function  Description: INTERVENTIONS:  -  Assess patient's ability to carry out ADLs; assess patient's baseline for ADL function and identify physical deficits which impact ability to perform ADLs (bathing, care of mouth/teeth, toileting, grooming, dressing, etc )  - Assess/evaluate cause of self-care deficits   - Assess range of motion  - Assess patient's mobility; develop plan if impaired  - Assess patient's need for assistive devices and provide as appropriate  - Encourage maximum independence but intervene and supervise when necessary  - Involve family in performance of ADLs  - Assess for home care needs following discharge   - Consider OT consult to assist with ADL evaluation and planning for discharge  - Provide patient education as appropriate  Outcome: Progressing  Goal: Maintain or return mobility status to optimal level  Description: INTERVENTIONS:  - Assess patient's baseline mobility status (ambulation, transfers, stairs, etc )    - Identify cognitive and physical deficits and behaviors that affect mobility  - Identify mobility aids required to assist with transfers and/or ambulation (gait belt, sit-to-stand, lift, walker, cane, etc )  - [place] fall precautions as indicated by assessment  - Record patient progress and toleration of activity level on Mobility SBAR; progress patient to next Phase/Stage  - Instruct patient to call for assistance with activity based on assessment  - Consider rehabilitation consult to assist with strengthening/weightbearing, etc   Outcome: Progressing     Problem: DISCHARGE PLANNING  Goal: Discharge to home or other facility with appropriate resources  Description: INTERVENTIONS:  - Identify barriers to discharge w/patient and caregiver  - Arrange for needed discharge resources and transportation as appropriate  - Identify discharge learning needs (meds, wound care, etc )  - Arrange for interpretive services to assist at discharge as needed  - Refer to Case Management Department for coordinating discharge planning if the patient needs post-hospital services based on physician/advanced practitioner order or complex needs related to functional status, cognitive ability, or social support system  Outcome: Progressing     Problem: GASTROINTESTINAL - ADULT  Goal: Minimal or absence of nausea and/or vomiting  Description: INTERVENTIONS:  - Administer IV fluids if ordered to ensure adequate hydration  - Maintain NPO status until nausea and vomiting are resolved  - Nasogastric tube if ordered  - Administer ordered antiemetic medications as needed  - Provide nonpharmacologic comfort measures as appropriate  - Advance diet as tolerated, if ordered  - Consider nutrition services referral to assist patient with adequate nutrition and appropriate food choices  Outcome: Progressing  Goal: Maintains or returns to baseline bowel function  Description: INTERVENTIONS:  - Assess bowel function  - Encourage oral fluids to ensure adequate hydration  - Administer IV fluids if ordered to ensure adequate hydration  - Administer ordered medications as needed  - Encourage mobilization and activity  - Consider nutritional services referral to assist patient with adequate nutrition and appropriate food choices  Outcome: Progressing  Goal: Maintains adequate nutritional intake  Description: INTERVENTIONS:  - Monitor percentage of each meal consumed  - Identify factors contributing to decreased intake, treat as appropriate  - Assist with meals as needed  - Monitor I&O, weight, and lab values if indicated  - Obtain nutrition services referral as needed  Outcome: Progressing  Goal: Establish and maintain optimal ostomy function  Description: INTERVENTIONS:  - Assess bowel function  - Encourage oral fluids to ensure adequate hydration  - Administer IV fluids if ordered to ensure adequate hydration   - Administer ordered medications as needed  - Encourage mobilization and activity  - Nutrition services referral to assist patient with appropriate food choices  - Assess stoma site  - Consider wound care consult   Outcome: Progressing     Problem: METABOLIC, FLUID AND ELECTROLYTES - ADULT  Goal: Electrolytes maintained within normal limits  Description: INTERVENTIONS:  - Monitor labs and assess patient for signs and symptoms of electrolyte imbalances  - Administer electrolyte replacement as ordered  - Monitor response to electrolyte replacements, including repeat lab results as appropriate  - Instruct patient on fluid and nutrition as appropriate  Outcome: Progressing  Goal: Fluid balance maintained  Description: INTERVENTIONS:  - Monitor labs   - Monitor I/O and WT  - Instruct patient on fluid and nutrition as appropriate  - Assess for signs & symptoms of volume excess or deficit  Outcome: Progressing  Goal: Glucose maintained within target range  Description: INTERVENTIONS:  - Monitor Blood Glucose as ordered  - Assess for signs and symptoms of hyperglycemia and hypoglycemia  - Administer ordered medications to maintain glucose within target range  - Assess nutritional intake and initiate nutrition service referral as needed  Outcome: Progressing     Problem: SKIN/TISSUE INTEGRITY - ADULT  Goal: Skin integrity remains intact  Description: INTERVENTIONS  - Identify patients at risk for skin breakdown  - Assess and monitor skin integrity  - Assess and monitor nutrition and hydration status  - Monitor labs (i e  albumin)  - Assess for incontinence   - Turn and reposition patient  - Assist with mobility/ambulation  - Relieve pressure over bony prominences  - Avoid friction and shearing  - Provide appropriate hygiene as needed including keeping skin clean and dry  - Evaluate need for skin moisturizer/barrier cream  - Collaborate with interdisciplinary team (i e  Nutrition, Rehabilitation, etc )   - Patient/family teaching  Outcome: Progressing  Goal: Incision(s), wounds(s) or drain site(s) healing without S/S of infection  Description: INTERVENTIONS  - Assess and document risk factors for skin impairment   - Assess and document dressing, incision, wound bed, drain sites and surrounding tissue  - Consider nutrition services referral as needed  - Oral mucous membranes remain intact  - Provide patient/ family education  Outcome: Progressing  Goal: Oral mucous membranes remain intact  Description: INTERVENTIONS  - Assess oral mucosa and hygiene practices  - Implement preventative oral hygiene regimen  - Implement oral medicated treatments as ordered  - Initiate Nutrition services referral as needed  Outcome: Progressing     Problem: MUSCULOSKELETAL - ADULT  Goal: Maintain or return mobility to safest level of function  Description: INTERVENTIONS:  - Assess patient's ability to carry out ADLs; assess patient's baseline for ADL function and identify physical deficits which impact ability to perform ADLs (bathing, care of mouth/teeth, toileting, grooming, dressing, etc )  - Assess/evaluate cause of self-care deficits   - Assess range of motion  - Assess patient's mobility  - Assess patient's need for assistive devices and provide as appropriate  - Encourage maximum independence but intervene and supervise when necessary  - Involve family in performance of ADLs  - Assess for home care needs following discharge   - Consider OT consult to assist with ADL evaluation and planning for discharge  - Provide patient education as appropriate  Outcome: Progressing  Goal: Maintain proper alignment of affected body part  Description: INTERVENTIONS:  - Support, maintain and protect limb and body alignment  - Provide patient/ family with appropriate education  Outcome: Progressing     Problem: Prexisting or High Potential for Compromised Skin Integrity  Goal: Skin integrity is maintained or improved  Description: INTERVENTIONS:  - Identify patients at risk for skin breakdown  - Assess and monitor skin integrity  - Assess and monitor nutrition and hydration status  - Monitor labs   - Assess for incontinence   - Turn and reposition patient  - Assist with mobility/ambulation  - Relieve pressure over bony prominences  - Avoid friction and shearing  - Provide appropriate hygiene as needed including keeping skin clean and dry  - Evaluate need for skin moisturizer/barrier cream  - Collaborate with interdisciplinary team   - Patient/family teaching  - Consider wound care consult   Outcome: Progressing     Problem: Nutrition/Hydration-ADULT  Goal: Nutrient/Hydration intake appropriate for improving, restoring or maintaining nutritional needs  Description: Monitor and assess patient's nutrition/hydration status for malnutrition  Collaborate with interdisciplinary team and initiate plan and interventions as ordered  Monitor patient's weight and dietary intake as ordered or per policy  Utilize nutrition screening tool and intervene as necessary  Determine patient's food preferences and provide high-protein, high-caloric foods as appropriate       INTERVENTIONS:  - Monitor oral intake, urinary output, labs, and treatment plans  - Assess nutrition and hydration status and recommend course of action  - Evaluate amount of meals eaten  - Assist patient with eating if necessary   - Allow adequate time for meals  - Recommend/ encourage appropriate diets, oral nutritional supplements, and vitamin/mineral supplements  - Order, calculate, and assess calorie counts as needed  - Recommend, monitor, and adjust tube feedings and TPN/PPN based on assessed needs  - Assess need for intravenous fluids  - Provide specific nutrition/hydration education as appropriate  - Include patient/family/caregiver in decisions related to nutrition  Outcome: Progressing     Problem: Potential for Falls  Goal: Patient will remain free of falls  Description: INTERVENTIONS:  - Assess patient frequently for physical needs  - Identify cognitive and physical deficits and behaviors that affect risk of falls    -  [place] fall precautions as indicated by assessment   - Educate patient/family on patient safety including physical limitations  - Instruct patient to call for assistance with activity based on assessment  - Modify environment to reduce risk of injury  - Consider OT/PT consult to assist with strengthening/mobility  Outcome: Progressing

## 2021-04-14 NOTE — TELEPHONE ENCOUNTER
Dr Lay Andres - patient has an OV for stent removal on 05/13/21 with Dr Lay Andres - patient called the sent should of been removed over a month ago  Is it safe to wait this long and will Dr Lay Andres remove the stent?  Please call Waldon Rinne at [phone] ty

## 2021-04-15 NOTE — TELEPHONE ENCOUNTER
ALINA: Spoke with patient  History of abdominal pain, enterocutaneous fistula, ogilive Syndrome    Patient states she had a stent placed during an ERCP  She is unaware of when the ERCP was done  Between October 2020 and February of this year  She has been going to [facility], and has decided she does not want to follow-up care with [facility]  She would only like to see [facility] doctors  She has not been seen in our office since May of 2019   Patient is scheduled for an OV 5/19/2021 to consider possible stent removal

## 2021-04-23 NOTE — PROGRESS NOTES
Assessment/Plan:        Problem List Items Addressed This Visit        Digestive    Colonic pseudoobstruction - Primary         Overall stable from discharge  Continues to have significant acute on chronic pain and persistent watery stools  She is tolerating TPN and some PO, and maintaining good hydration  Continue current regimen and f/u with Pain Specialist as scheduled  Reason for visit is hospital follow up  Encounter provider Jerry Valdes DO       Provider located at [place] [address]  [address] [place] [phone]      Recent Visits  No visits were found meeting these conditions  Showing recent visits within past 7 days and meeting all other requirements     Today's Visits  Date Type Provider Dept   04/23/21 Office Visit Jerry Valdes DO [address] today's visits and meeting all other requirements     Future Appointments  No visits were found meeting these conditions  Showing future appointments within next 150 days and meeting all other requirements        After connecting through [employer], the patient was identified by name and date of birth  Dar Hernandez was informed that this is a telemedicine visit and that the visit is being conducted through [employer] and patient was informed that this is not a secure, HIPAA-compliant platform  She agrees to proceed     My office door was closed  No one else was in the room  She acknowledged consent and understanding of privacy and security of the video platform  The patient has agreed to participate and understands they can discontinue the visit at any time  Of note, visit did transition to telephone only due to poor [employer] connectivity  It was my intent to perform this visit via video technology but the patient was not able to do a video connection so the visit was completed via audio telephone only  Patient is aware this is a billable service        Subjective: Patient ID: Shalonda Giraldo is a 73 y o  female  HPI     Pt presents for hospital follow up s/p admission to [facility] from 4/9-4/13  Pt initially presented to [facility] due to abdominal pain  CT A/P demonstrated ileus  Pt was transferred to [facility] to be monitored by her usual provider  Pt had NG tube placed and was NPO  She received two doses of Relistor, with improvement in bowel function  She was able to tolerate advancement in her diet, and discharged to home  Today:   Pain 8/10 today   Poor appetite -- eating a little bit of dinner, trying to eat bananas to thicken up her bowel movements; is drinking plenty of fluids   Still doing TPN two days per week   Overall she is just frustrated with her health status -- she feels "cheated" due to recurrent hospitalizations and chronic pain       Review of Systems   Constitutional: Positive for fatigue  Gastrointestinal: Positive for abdominal distention and diarrhea  Musculoskeletal: Positive for back pain  Objective: There were no vitals filed for this visit  Physical Exam    No PE due to telephone only exam  No respiratory distress during conversation  Transitional Care Management Review:  Shalonda Giraldo is a 73 y o  female here for TCM follow up  During the TCM phone call patient stated:    TCM Call (since 3/23/2021)     Date and time call was made  4/14/2021 11:26 AM    Hospital care reviewed  Records reviewed    Patient was hospitialized at  [facility]        Date of Admission  04/09/21    Date of discharge  04/13/21    Diagnosis  Colonic pseudoobstruction    Disposition  Home    Were the patients medications reviewed and updated  No    Current Symptoms  Cough; Weakness    Cough Severity  Mild    Weakness severity  Mild      TCM Call (since 3/23/2021)     Post hospital issues  Reduced activity    Should patient be enrolled in anticoag monitoring? No    Scheduled for follow up?   Yes    Did you obtain your prescribed medications  Yes    Do you need help managing your prescriptions or medications  No    Is transportation to your appointment needed  No    I have advised the patient to call PCP with any new or worsening symptoms  BRIAN OCONNOR MA          I spent 10 minutes with the patient during this visit      Quan Harding, DO

## 2021-05-04 NOTE — PATIENT INSTRUCTIONS
Try weaning off Lomotil and see the effect on the diarrhea  If the diarrhea remains the same probably Lomotil is not helping and may be the cause of the colonic pseudo-obstruction

## 2021-05-04 NOTE — PROGRESS NOTES
Assessment/Plan:     Diagnoses and all orders for this visit:    Enterocutaneous fistula    Short gut syndrome        1  Recent discharge from [facility] with hospitalization for opiate induced colonic pseudo-obstruction  Encouraging patient to wean off Lomotil as this is not ameliorating her diarrhea  2  Schedule appointment with gastroenterologist for removal of ERCP stent  This is long overdue  3  Continue nocturnal TPN twice a week and GATTEX injections daily  Subjective:      Patient ID: Dar Hernandez is a 73 y o  female  Patient suffers with chronic enterocutaneous fistula and short gut syndrome  HPI     Patient was recently hospitalized at [facility] with abdominal pain, frequent vomiting and obstipation  This was due to 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  Patient was treated with bowel rest and Relistor  Patient has been well since discharge  Patient continues to have 3-5 loose bowel movements during the daytime and 2-3 loose bowel movements at night  According to the patient the Lomotil probably is not making any difference  The enterocutaneous fistula continues to drain and the patient has to change the bag every 2 or 3 days  She thinks that there has been some improvement in the fistula output with Gattex  Otherwise patient is maintaining good health and is taking all her vitamin replacements as ordered  She has scheduled an appointment with Dr Henry Hough at [facility] to have her ERCP stent removed      The following portions of the patient's history were reviewed and updated as appropriate: allergies, current medications, past family history, past medical history, past social history, past surgical history, and problem list     Review of Systems      As above    Objective:    /76   Pulse 81   Ht 5' 4" (1 626 m)   Wt 65 8 kg (145 lb)   LMP  (LMP Unknown)   BMI 24 89 kg/m²      Physical Exam     no pallor or icterus     Abdominal exam reveals well-healed laparotomy scars which are multiple  The bag is appropriately placed over the enterocutaneous fistula which is in the hypogastric area  The skin integrity is well maintained

## 2021-05-10 NOTE — TELEPHONE ENCOUNTER
Pt complaining of severe burning with urination, believes she has UTI- requesting medication be sent in to [facility] in [place]  Thank you!

## 2021-05-13 PROBLEM — R10.13 EPIGASTRIC PAIN: Status: ACTIVE | Noted: 2021-01-01

## 2021-05-13 PROBLEM — K59.1 FUNCTIONAL DIARRHEA: Status: ACTIVE | Noted: 2021-01-01

## 2021-05-13 NOTE — PROGRESS NOTES
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  In addition she had an episode of choledocholithiasis status post ERCP with stent placement and stone removal in February at [facility] and needs to have the stent removed    In addition we know her from years prior and she has chronic [place] syndrome with dilation of her bowel chronically from this; she has opiate induced constipation as well which exacerbates this problem  I personally reviewed her recent CT imaging  She does not have a gastric outlet obstruction  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    1 going to do an EGD with stent removal    2 she will continue on her gattex for treatment of enterocutaneous fistula and short bowel syndrome    3 she will continue to follow with Dr Milta Halsted for the enterocutaneous fistula    4 she has a PICC line and gets TPN twice a week      Reason:  Abdominal pain and stent removal    HPI:  She is a 71-year-old female with a chronic GI history  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  She also has opiate induced constipation which the gastric basis  We treated her with Relistor once and helped her tremendously  Then tried her on this as an outpatient and repeat with erythromycin and she could not afford these medications  She did have having 2 large surgeries in the last year at [facility] with Dr Milta Halsted and had small bowel resection secondary to adhesions and obstructions and sepsis  She reports to me that she was in the ICU twice    During 1 of these stays she was found to have choledocholithiasis and underwent an ERCP on February 11th  She tells me that the stones were removed  Her LFTs are normal   The stent is still seen on recent CT image  She gets TPN through her PICC line twice a week  She is on gattex for small bowel he will bowel syndrome  She had a gallbladder out over 20 years ago  She is still opiate dependent  She needs to have the stent removed  She had a colonoscopy with me in December 2018 with a dilated bowel with Charlotte syndrome  REVIEW OF SYSTEMS:     CONSTITUTIONAL: Denies any fever, chills, or rigors  Good appetite, and no recent weight loss  HEENT: No earache or tinnitus  Denies hearing loss or visual disturbances  CARDIOVASCULAR: No chest pain or palpitations  RESPIRATORY: Denies any cough, hemoptysis, shortness of breath or dyspnea on exertion  GASTROINTESTINAL: As noted in the History of Present Illness  GENITOURINARY: No problems with urination  Denies any hematuria or dysuria  NEUROLOGIC: No dizziness or vertigo, denies headaches  MUSCULOSKELETAL: Denies any muscle or joint pain  SKIN: Denies skin rashes or itching  ENDOCRINE: Denies excessive thirst  Denies intolerance to heat or cold  PSYCHOSOCIAL: Denies depression or anxiety  Denies any recent memory loss  Past Medical History:   Diagnosis Date    Asthma     Bowel obstruction ([facility] [address] )     Choledocholithiasis 11/18/2020    Chronic back pain     Colon polyp     Enlarged kidney     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    Hydronephrosis 2/26/2019    Ileus ([facility] [address] ) 7/2/2018    Overview:  Last Assessment & Plan:  Patient presented with generalized abdominal pain nausea and bilious vomiting ,imaging study reports " diffuse bowel dilatation involving distal small bowel and the entire colon to the panel verge obstruction mass is not identified and this may represent adynamic ileus in the postoperative setting possible related to medication"   Patient states that she still glass   Lily Silvere Liver mass     Opiate dependence ([facility] [address] [facility]     Post-ERCP acute pancreatitis 2020    Short gut syndrome     5 feet small bowel    Small bowel fistula     Urinary retention with incomplete bladder emptying       Past Surgical History:   Procedure Laterality Date    ABDOMINAL SURGERY      x 25    APPENDECTOMY      Open     BACK SURGERY      x 3     SECTION      x1    CHOLECYSTECTOMY      Open    COLON SURGERY      Sigmoid     COLONOSCOPY N/A 2018    Procedure: COLONOSCOPY;  Surgeon: Todd Lake MD;  Location: [facility];   Service: Gastroenterology    COLONOSCOPY      ERCP      EXPLORATORY LAPAROTOMY      Several for adhesive disease; has had several bowel resections; has a small bowel fistula    GASTROCUTANEOUS FISTULA CLOSURE      HERNIA REPAIR      Incisional     IR PICC REPOSITION  3/3/2021    MAMMO (HISTORICAL)  2017    SPINAL FUSION      Lower back     TONSILLECTOMY      TOTAL ABDOMINAL HYSTERECTOMY      Not due to cancer - complete     TRACHEOSTOMY  2019     Social History     Socioeconomic History    Marital status: /Civil Union     Spouse name: Not on file    Number of children: Not on file    Years of education: Not on file    Highest education level: Not on file   Occupational History    Not on file   Social Needs    Financial resource strain: Not on file    Food insecurity     Worry: Not on file     Inability: Not on file    Transportation needs     Medical: Not on file     Non-medical: Not on file   Tobacco Use    Smoking status: Former Smoker     Quit date: 1980     Years since quittin 8    Smokeless tobacco: Never Used    Tobacco comment: Never smoker - As per [employer]    Substance and Sexual Activity    Alcohol use: Not Currently     Alcohol/week: 0 0 standard drinks     Frequency: Never     Binge frequency: Never     Comment: social drinker    Drug use: No    Sexual activity: Yes   Lifestyle    Physical activity Days per week: Not on file     Minutes per session: Not on file    Stress: Not on file   Relationships    Social connections     Talks on phone: Not on file     Gets together: Not on file     Attends Orthodox service: Not on file     Active member of club or organization: Not on file     Attends meetings of clubs or organizations: Not on file     Relationship status: Not on file    Intimate partner violence     Fear of current or ex partner: Not on file     Emotionally abused: Not on file     Physically abused: Not on file     Forced sexual activity: Not on file   Other Topics Concern    Not on file   Social History Narrative    Lives with , dog, and brother     Retired        Nondrinker/no alcohol use - As per [employer]     Family History   Problem Relation Age of Onset    Lung cancer Mother     No Known Problems Father      Nsaids, Tolmetin, Codeine, Oxycodone-acetaminophen, and Tylenol [acetaminophen]  Current Outpatient Medications   Medication Sig Dispense Refill    cholecalciferol (VITAMIN D3) 1,000 units tablet 1 tablet 2x daily      diazepam (VALIUM) 5 mg tablet       escitalopram (LEXAPRO) 20 mg tablet Take 1 tablet (20 mg total) by mouth daily 90 tablet 1    folic acid (FOLVITE) 1 mg tablet Take 1 tablet (1 mg total) by mouth daily 90 tablet 3    gabapentin (NEURONTIN) 300 mg capsule Take 600 mg by mouth 2 (two) times a day      Gattex 5 MG KIT       HYDROmorphone (DILAUDID) 4 mg tablet Take 4 mg by mouth 4 (four) times a day      methocarbamol (ROBAXIN) 750 mg tablet Take 750 mg by mouth 3 (three) times a day      morphine (MS CONTIN) 30 mg 12 hr tablet take 1 tablet by mouth every 8 hours      ondansetron (ZOFRAN) 4 mg tablet take 1 tablet by mouth every 6 hours if needed for nausea and vomiting      tamsulosin (FLOMAX) 0 4 mg Take 2 capsules (0 8 mg total) by mouth daily with dinner 180 capsule 1    ferrous sulfate 324 (65 Fe) mg Take 1 tablet (324 mg total) by mouth 2 (two) times a day before meals (Patient not taking: Reported on 5/13/2021) 180 tablet 3    metaxalone (SKELAXIN) 400 MG tablet       methocarbamol (ROBAXIN) 500 mg tablet take 1 tablet by mouth every 8 hours if needed for SPASMS       Current Facility-Administered Medications   Medication Dose Route Frequency Provider Last Rate Last Admin    cyanocobalamin injection 1,000 mcg  1,000 mcg Intramuscular Q30 Days Venus Hernandez, DO   1,000 mcg at 05/05/21 1112       Blood pressure 110/70, pulse 76, resp  rate 16, height 5' 4" (1 626 m), weight 63 5 kg (140 lb), not currently breastfeeding  PHYSICAL EXAM:     General Appearance:   Alert, cooperative, no distress, appears stated age    HEENT:   Normocephalic, atraumatic, anicteric      Neck:  Supple, symmetrical, trachea midline, no adenopathy;    thyroid: no enlargement/tenderness/nodules; no carotid  bruit or JVD    Lungs:   Clear to auscultation bilaterally; no rales, rhonchi or wheezing; respirations unlabored    Heart[de-identified]   S1 and S2 normal; regular rate and rhythm; no murmur, rub, or gallop  Abdomen:   Soft, non-tender, non-distended; normal bowel sounds; no masses, she has an enterocutaneous fistula with an ostomy bag    She has multiple surgical scars no organomegaly    Genitalia:   Deferred    Rectal:   Deferred    Extremities:  No cyanosis, clubbing or edema    Pulses:  2+ and symmetric all extremities    Skin:  Skin color, texture, turgor normal, no rashes or lesions    Lymph nodes:  No palpable cervical, axillary or inguinal lymphadenopathy        Lab Results   Component Value Date    WBC 3 16 (L) 04/13/2021    HGB 9 8 (L) 04/13/2021    HCT 30 4 (L) 04/13/2021    MCV 85 04/13/2021     04/13/2021     Lab Results   Component Value Date    CALCIUM 8 5 04/13/2021    K 3 5 04/13/2021    CO2 31 04/13/2021     04/13/2021    BUN 7 04/13/2021    CREATININE 0 57 (L) 04/13/2021     Lab Results   Component Value Date    ALT 37 04/11/2021    AST 15 04/11/2021    ALKPHOS 118 (H) 04/11/2021     Lab Results   Component Value Date    INR 1 03 10/22/2020    INR 1 10 11/03/2019    INR 1 06 05/10/2019    PROTIME 13 0 10/22/2020    PROTIME 14 2 11/03/2019    PROTIME 13 7 05/10/2019

## 2021-05-14 NOTE — TELEPHONE ENCOUNTER
Pt called requesting a refill of Bactrim for her UTI  She is still experiencing frequent urination and burning

## 2021-05-14 NOTE — TELEPHONE ENCOUNTER
I will send her a different antibiotic, as I don't think a longer course will help if it hasn't cleared things already  If her symptoms do not improve over the weekend she needs to bring in a urine sample for re-evaluation, to make sure we're treating her appropriately  Thanks!

## 2021-05-21 NOTE — TELEPHONE ENCOUNTER
----- Message from Veronica Barfield MD sent at 5/20/2021 12:29 PM EDT -----  Please tell her this is negative

## 2021-05-23 PROBLEM — Z87.898 HISTORY OF URINARY RETENTION: Status: ACTIVE | Noted: 2018-07-04

## 2021-05-23 NOTE — PLAN OF CARE
Problem: Potential for Falls  Goal: Patient will remain free of falls  Description: INTERVENTIONS:  - Assess patient frequently for physical needs  -  Identify cognitive and physical deficits and behaviors that affect risk of falls  -  [place] fall precautions as indicated by assessment   - Educate patient/family on patient safety including physical limitations  - Instruct patient to call for assistance with activity based on assessment  - Modify environment to reduce risk of injury  - Consider OT/PT consult to assist with strengthening/mobility  Outcome: Progressing     Problem: Nutrition/Hydration-ADULT  Goal: Nutrient/Hydration intake appropriate for improving, restoring or maintaining nutritional needs  Description: Monitor and assess patient's nutrition/hydration status for malnutrition  Collaborate with interdisciplinary team and initiate plan and interventions as ordered  Monitor patient's weight and dietary intake as ordered or per policy  Utilize nutrition screening tool and intervene as necessary  Determine patient's food preferences and provide high-protein, high-caloric foods as appropriate       INTERVENTIONS:  - Monitor oral intake, urinary output, labs, and treatment plans  - Assess nutrition and hydration status and recommend course of action  - Evaluate amount of meals eaten  - Assist patient with eating if necessary   - Allow adequate time for meals  - Recommend/ encourage appropriate diets, oral nutritional supplements, and vitamin/mineral supplements  - Order, calculate, and assess calorie counts as needed  - Recommend, monitor, and adjust tube feedings and TPN/PPN based on assessed needs  - Assess need for intravenous fluids  - Provide specific nutrition/hydration education as appropriate  - Include patient/family/caregiver in decisions related to nutrition  Outcome: Progressing     Problem: PAIN - ADULT  Goal: Verbalizes/displays adequate comfort level or baseline comfort level  Description: Interventions:  - Encourage patient to monitor pain and request assistance  - Assess pain using appropriate pain scale  - Administer analgesics based on type and severity of pain and evaluate response  - Implement non-pharmacological measures as appropriate and evaluate response  - Consider cultural and social influences on pain and pain management  - Notify physician/advanced practitioner if interventions unsuccessful or patient reports new pain  Outcome: Progressing     Problem: INFECTION - ADULT  Goal: Absence or prevention of progression during hospitalization  Description: INTERVENTIONS:  - Assess and monitor for signs and symptoms of infection  - Monitor lab/diagnostic results  - Monitor all insertion sites, i e  indwelling lines, tubes, and drains  - Monitor endotracheal if appropriate and nasal secretions for changes in amount and color  - [place] appropriate cooling/warming therapies per order  - Administer medications as ordered  - Instruct and encourage patient and family to use good hand hygiene technique  - Identify and instruct in appropriate isolation precautions for identified infection/condition  Outcome: Progressing     Problem: SAFETY ADULT  Goal: Patient will remain free of falls  Description: INTERVENTIONS:  - Assess patient frequently for physical needs  -  Identify cognitive and physical deficits and behaviors that affect risk of falls    -  [place] fall precautions as indicated by assessment   - Educate patient/family on patient safety including physical limitations  - Instruct patient to call for assistance with activity based on assessment  - Modify environment to reduce risk of injury  - Consider OT/PT consult to assist with strengthening/mobility  Outcome: Progressing     Problem: DISCHARGE PLANNING  Goal: Discharge to home or other facility with appropriate resources  Description: INTERVENTIONS:  - Identify barriers to discharge w/patient and caregiver  - Arrange for needed discharge resources and transportation as appropriate  - Identify discharge learning needs (meds, wound care, etc )  - Arrange for interpretive services to assist at discharge as needed  - Refer to Case Management Department for coordinating discharge planning if the patient needs post-hospital services based on physician/advanced practitioner order or complex needs related to functional status, cognitive ability, or social support system  Outcome: Progressing     Problem: Knowledge Deficit  Goal: Patient/family/caregiver demonstrates understanding of disease process, treatment plan, medications, and discharge instructions  Description: Complete learning assessment and assess knowledge base    Interventions:  - Provide teaching at level of understanding  - Provide teaching via preferred learning methods  Outcome: Progressing

## 2021-05-23 NOTE — UTILIZATION REVIEW
Admission Orders (From admission, onward)     Ordered        05/23/21 1322  Inpatient Admission  Once         05/22/21 2029  Place in Observation  Once                     66-yr old female; well known to the surgical team with a H/o multiple abdominal surgeries & multiple hospitalizations; now with short gut syndrome, EC fistula, opioid dependence and opioid-induced colonic dysmotility (pseudo-obstruction)  Presenting today with abdominal pain, abdominal distention and constipation  Hemodynamically stable  Distended abdomen with no peritoneal signs  CT scan: marked distention of the stomach and colon similar to prior studies  CHANGED TO IP STATUS (ms level of care)  ON  5/23  Due to no resolution of  Likely opioid-induced colonic dysmotility (pseudo-obstruction) causing abd pain, abd distention, constipation  WILL CONT   NPO/bowel rest with NGT decompression;  IVF crystalloid resuscitation and then maintenance rate        Inpatient Admission Once     Question Answer Comment   Level of Care Med Surg    Estimated length of stay More than 2 Midnights    Certification I certify that inpatient services are medically necessary for this patient for a duration of greater than two midnights  See H&P and MD Progress Notes for additional information about the patient's course of treatment          05/23/21 6707

## 2021-05-23 NOTE — CONSULTS
Consultation - General Surgery   Julian Crawford 73 y o  female MRN: [id number]  Unit/Bed#: S -55 Encounter: [id number]     Assessment:  74-yr old female; well known to the surgical team with a H/o multiple abdominal surgeries & multiple hospitalizations; now with short gut syndrome, EC fistula, opioid dependence and opioid-induced colonic dysmotility (pseudo-obstruction)  Presenting today with abdominal pain, abdominal distention and constipation  Hemodynamically stable  Distended abdomen with no peritoneal signs  White count: 4 8  Lactate: 1 9  CT scan: marked distention of the stomach and colon similar to prior studies  Plan:  - NPO/bowel rest with NGT decompression   - restart nocturnal TPN (without lipids) as per home protocol/schedule  - will commence s/c relistor in view of her known H/o opioid-induced colonic pseudo-obstruction   - PRN and scheduled analgesia  - PRN zofran  - IVF crystalloid resuscitation and then maintenance rate  - DVT/PE prophylaxis   - PT/OT/IS  - care of skin around fistula site  - other care per primary team     History of Present Illness   HPI:  Julian Crawford is a 73 y o  female who presents with a 2-day H/o abdominal pain, abdominal distention and constipation  Abdominal pain is said to be generalized with a severity score of 9-10/10  She reports associated nausea and vomiting (x multiple episodes; recently ingested meals) and poor PO tolerance  She last passed flatus 2 days and this is same for her last BM  She denies any H/o of fever  She is well known to the surgical team and was recently discharged last month for inpatient management for opioid-induced colonic pseudo-obstruction  She reports her current symptoms as being similar to those of her last hospitalization  She describes her fistula output as being minimal for several weeks now  Extensive surgical history (see below); she has been on nocturnal TPN for her associated short gut syndrome  Inpatient consult to Acute Care Surgery     Performed by  Steph Lancaster MD     Authorized by Charity Villalobos PA-C              Review of Systems   Constitutional: Positive for activity change and appetite change  Negative for chills and fever  HENT: Negative  Eyes: Negative  Respiratory: Negative for cough and shortness of breath  Cardiovascular: Negative for chest pain, palpitations and leg swelling  Gastrointestinal: Positive for abdominal distention, abdominal pain, constipation, nausea and vomiting  Negative for blood in stool and rectal pain  Genitourinary: Negative  Musculoskeletal: Positive for back pain  Neurological: Negative  Psychiatric/Behavioral: Negative  Historical Information   Past Medical History:   Diagnosis Date    Asthma     Bowel obstruction ([facility] [address] )     Choledocholithiasis 2020    Chronic back pain     Colon polyp     Enlarged kidney     Enterocutaneous fistula 2019    Hydronephrosis 2019    Ileus ([address] ) 2018    Overview:  Last Assessment & Plan:  Patient presented with generalized abdominal pain nausea and bilious vomiting ,imaging study reports " diffuse bowel dilatation involving distal small bowel and the entire colon to the panel verge obstruction mass is not identified and this may represent adynamic ileus in the postoperative setting possible related to medication"   Patient states that she still ha    Liver mass     Opiate dependence ([facility] [address] )     Post-ERCP acute pancreatitis 2020    Short gut syndrome     5 feet small bowel    Small bowel fistula     Urinary retention with incomplete bladder emptying      Past Surgical History:   Procedure Laterality Date    ABDOMINAL SURGERY      x 25    APPENDECTOMY      Open     BACK SURGERY      x 3     SECTION      x1    CHOLECYSTECTOMY      Open    COLON SURGERY      Sigmoid     COLONOSCOPY N/A 2018    Procedure: COLONOSCOPY;  Surgeon: Coty Bautista MD;  Location: [facility];   Service: Gastroenterology    COLONOSCOPY  2018    ERCP      EXPLORATORY LAPAROTOMY      Several for adhesive disease; has had several bowel resections; has a small bowel fistula    GASTROCUTANEOUS FISTULA CLOSURE      HERNIA REPAIR      Incisional     IR PICC REPOSITION  3/3/2021    MAMMO (HISTORICAL)  2017    SPINAL FUSION      Lower back     TONSILLECTOMY      TOTAL ABDOMINAL HYSTERECTOMY      Not due to cancer - complete     TRACHEOSTOMY  2019     Social History   Social History     Substance and Sexual Activity   Alcohol Use Not Currently    Alcohol/week: 0 0 standard drinks    Frequency: Never    Binge frequency: Never    Comment: social drinker     Social History     Substance and Sexual Activity   Drug Use No     E-Cigarette/Vaping    E-Cigarette Use Never User      E-Cigarette/Vaping Substances     Social History     Tobacco Use   Smoking Status Former Smoker    Quit date: 1980    Years since quittin 9   Smokeless Tobacco Never Used   Tobacco Comment    Never smoker - As per [employer]      Family History: non-contributory    Meds/Allergies   all current active meds have been reviewed  Allergies   Allergen Reactions    Nsaids      Irritable, upset stomach  Irritable, upset stomach    Tolmetin      Irritable, upset stomach    Codeine Hives     Per [address] admission orders    Oxycodone-Acetaminophen GI Intolerance     Percocet Tabs    Tylenol [Acetaminophen]        Objective   First Vitals:   Blood Pressure: 118/76 (21 1637)  Pulse: 90 (21 163)  Temperature: 98 4 °F (36 9 °C) (21 163)  Temp Source: Oral (21 163)  Respirations: 18 (21)  SpO2: 97 % (21)    Current Vitals:   Blood Pressure: 113/82 (21)  Pulse: 73 (21)  Temperature: 97 8 °F (36 6 °C) (21)  Temp Source: Oral (21)  Respirations: 18 (21)  SpO2: 96 % (21)      Intake/Output Summary (Last 24 hours) at 5/22/2021 2250  Last data filed at 5/22/2021 2141  Gross per 24 hour   Intake --   Output 600 ml   Net -600 ml       Invasive Devices     Peripherally Inserted Central Catheter Line            PICC Line Left Brachial -- days    PICC Line 95/09/98 Left Basilic 80 days                Physical Exam  Vitals signs reviewed  Constitutional:       Appearance: She is ill-appearing  She is not toxic-appearing or diaphoretic  HENT:      Head: Normocephalic and atraumatic  Cardiovascular:      Rate and Rhythm: Normal rate and regular rhythm  Pulmonary:      Effort: Pulmonary effort is normal  No respiratory distress  Abdominal:      General: A surgical scar is present  There is distension  Tenderness: There is generalized abdominal tenderness  There is no guarding or rebound  Comments: ECF site skin edges contracted; no effluent in bag/appliance  Skin:     General: Skin is warm  Capillary Refill: Capillary refill takes less than 2 seconds  Neurological:      General: No focal deficit present  Mental Status: She is alert and oriented to person, place, and time  Psychiatric:         Mood and Affect: Mood is depressed  Lab Results: I have personally reviewed pertinent lab results  Imaging: I have personally reviewed pertinent reports  EKG, Pathology, and Other Studies: I have personally reviewed pertinent reports  Counseling / Coordination of Care  Total floor / unit time spent today 30 minutes  Greater than 50% of total time was spent with the patient and / or family counseling and / or coordination of care    A description of the counseling / coordination of care: farhan

## 2021-05-23 NOTE — ASSESSMENT & PLAN NOTE
· With history of ileus  · Continue home opioid regimen   · Consider bowel regimen, but will defer to surgery ultimately

## 2021-05-23 NOTE — ASSESSMENT & PLAN NOTE
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

## 2021-05-23 NOTE — PROGRESS NOTES
[facility]  Progress Note - Jane Beal 1955, 73 y o  female MRN: [id number]  Unit/Bed#: S -01 Encounter: [id number]  Primary Care Provider: Vianey Lee DO   Date and time admitted to hospital: 5/22/2021  4:35 PM      * Acute on chronic abdominal pain  Assessment & Plan  · Associated w/ distention, intractable nausea/vomiting, and lack of bowel movement/flatus   · Known to the surgical team with multiple prior abdominal surgeries/hospitalizations  · CT imaging noted fluid/gas in the inferior abdominal wall fairly unchanged due to pre-existing intracutaneous fistula  · Underwent NG tube placement for decompression per general surgery  · Per patient, receives nocturnal (12 hour) TPN twice weekly on Monday/Friday nights prescribed by her outpatient surgeon  · Continue IV fluids in monitor/replete electrolytes disturbances  · PRN emesis control necessary  · Supportive care otherwise    Enterocutaneous fistula  Assessment & Plan  · Known history  · General surgery following     Short gut syndrome  Assessment & Plan  · Has approximately "5 feet" of small bowel per patient recollection after prior surgeries  · General surgery following     History of urinary retention  Assessment & Plan  · Continue Flomax    Chronic opioid dependence  Assessment & Plan  · With prior h/o ileus  · C/w current analgesia regimen for pain control  · S/p a dose of Relistor per surgery for opioid induced colonic dysmotility       DVT Prophylaxis:  Heparin SC       Patient Centered Rounds:  I have performed bedside rounds and discussed plan of care with nursing today  Discussions with Specialists or Other Care Team Provider:  see above assessments if applicable    Education and Discussions with Family / Patient:  Patient at bedside    Time Spent for Care:  32 minutes  More than 50% of total time spent on counseling and coordination of care as described above      Current Length of Stay: 0 day(s)    Current Patient Status: Inpatient   Certification Statement:  Patient will continue to require additional hospital stay due to assessments as noted above  Code Status: Level 1 - Full Code        Subjective:     Resting fairly comfortably at the time my encounter with NG decompression in place  Does complain of waxing/waning abdominal discomfort but fairly controlled on current analgesic regimen  Denies flatus or bowel movements at this time  Objective:     Vitals:   Temp (24hrs), Av 3 °F (36 8 °C), Min:97 8 °F (36 6 °C), Max:98 8 °F (37 1 °C)    Temp:  [97 8 °F (36 6 °C)-98 8 °F (37 1 °C)] 98 8 °F (37 1 °C)  HR:  [61-90] 61  Resp:  [16-18] 16  BP: (113-120)/(68-82) 120/68  SpO2:  [93 %-97 %] 93 %  There is no height or weight on file to calculate BMI  Input and Output Summary (last 24 hours):        Intake/Output Summary (Last 24 hours) at 2021 1346  Last data filed at 2021 1058  Gross per 24 hour   Intake 981 25 ml   Output 800 ml   Net 181 25 ml       Physical Exam:     GENERAL:  Weak/fatigued  HEAD:  Normocephalic - atraumatic - NG tube in place  EYES: PERRL - EOMI   MOUTH:  Mucosa moist  NECK:  Supple - full range of motion  CARDIAC:  Rate controlled - S1/S2 positive  PULMONARY:  Fairly clear to auscultation - nonlabored respirations  ABDOMEN:  Soft - tender with distention - hypoactive bowel sounds  MUSCULOSKELETAL:  Motor strength/range of motion intact  NEUROLOGIC:  Alert/oriented at baseline  SKIN:  Chronic wrinkles/blemishes   PSYCHIATRIC:  Mood/affect stable      Additional Data:     Labs & Recent Cultures:    Results from last 7 days   Lab Units 21  0533 21  1713   WBC Thousand/uL 4 91 4 89   HEMOGLOBIN g/dL 10 1* 10 6*   HEMATOCRIT % 32 2* 32 9*   PLATELETS Thousands/uL 214 222   NEUTROS PCT %  --  42*   LYMPHS PCT %  --  46*   MONOS PCT %  --  9   EOS PCT %  --  2     Results from last 7 days   Lab Units 21  0533   POTASSIUM mmol/L 4 1   CHLORIDE mmol/L 109*   CO2 mmol/L 26   BUN mg/dL 8   CREATININE mg/dL 0 83   CALCIUM mg/dL 7 9*   ALK PHOS U/L 127*   ALT U/L 63   AST U/L 45                 Results from last 7 days   Lab Units 05/22/21  1731   LACTIC ACID mmol/L 1 9         Results from last 7 days   Lab Units 05/19/21  1132   C DIFF TOXIN B BY PCR  Negative         Last 24 Hours Medication List:   Current Facility-Administered Medications   Medication Dose Route Frequency Provider Last Rate    acetaminophen  650 mg Oral Q6H PRN Kvng Borges PA-C      bisacodyl  10 mg Rectal Daily Lee Burleson MD      cholecalciferol  1,000 Units Oral BID Bogdan Levin PA-C      escitalopram  20 mg Oral Daily Bogdan Levin PA-C      folic acid  1 mg Oral Daily Bogdan Levin PA-C      gabapentin  600 mg Oral BID Bogdan Levin PA-C      heparin (porcine)  5,000 Units Subcutaneous Q8H [facility] MD Barry      HYDROmorphone  0 5 mg Intravenous Q4H PRN Wendie Cram, CRNP      HYDROmorphone  0 5 mg Intravenous Q4H PRN Wendie Cram, CRNP      HYDROmorphone  1 mg Intravenous Q4H PRN Wendie Carym, CRNP      methocarbamol  750 mg Oral Q8H PRN Kvng Borges PA-C      morphine  30 mg Oral Q8H Bogdan Carlos PA-C      ondansetron  4 mg Intravenous Q6H PRN Kvng Borges PA-C      sodium chloride  75 mL/hr Intravenous Continuous Kvng Borges PA-C 75 mL/hr (05/23/21 1058)    tamsulosin  0 8 mg Oral Daily With Dinner Bogdan Levin PA-C                  ** Please Note: This note is constructed using a voice recognition dictation system  An occasional wrong word/phrase or sound-a-like substitution may have been picked up by dictation device due to the inherent limitations of voice recognition software  Read the chart carefully and recognize, using reasonable context, where substitutions may have occurred  **

## 2021-05-23 NOTE — H&P
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  CT scan with distention, albeit stable   · Admit patient to med/surg under observation status   · Consult surgery   · Continue home pain regimen  · Consider NGT/bowel regimen - defer to surgical team   · Monitor electrolytes  · Patient will need TPN ordered tomorrow    Enterocutaneous fistula  Assessment & Plan  · Noted history   · Continue home pain regimen   · Surgical consultation     Short gut syndrome  Assessment & Plan  · Patient has 5 feet of small bowel as per patient   · Surgical consultation     Chronic, continuous use of opioids  Assessment & Plan  · With history of ileus  · Continue home opioid regimen   · Consider bowel regimen, but will defer to surgery ultimately    VTE Prophylaxis: None needed as per VTE protocol   / reason for no mechanical VTE prophylaxis None needed as per VTE protocol    Code Status: Full Code   POLST: POLST form is not discussed and not completed at this time  Discussion with family: None at bedside     Anticipated Length of Stay:  Patient will be admitted on an Observation basis with an anticipated length of stay of  Less than 2 midnights  Justification for Hospital Stay: As per above assessment and plan     Total Time for Visit, including Counseling / Coordination of Care: 70 minutes  Greater than 50% of this total time spent on direct patient counseling and coordination of care  Chief Complaint:   Abdominal Pain    History of Present Illness:    Anna Clemens is a 73 y o  female with a history of enterocolonic fistula, short gut syndrome, and ileus who presents with abdominal pain  She reports that her pain got worse Thursday  She reports that she is not passing flatus and has not had a BM since Wednesday  She states that her abdomen is distended and her pain is generalized  She reports that she has been vomiting since Thursday multiple times per day  She reports that anything she tries to eat and drink she will vomit  Denies bloody vomiting, bloody stool, or blood in her urine  Denies fevers of chills  Denies chest pain or difficulty breathing  Review of Systems:    Review of Systems   Constitutional: Negative for appetite change, chills, diaphoresis, fatigue and fever  HENT: Negative for congestion, rhinorrhea and sore throat  Eyes: Negative for visual disturbance  Respiratory: Negative for cough, chest tightness, shortness of breath and wheezing  Cardiovascular: Negative for chest pain, palpitations and leg swelling  Gastrointestinal: Positive for abdominal distention, abdominal pain, constipation and vomiting  Negative for anal bleeding, blood in stool, diarrhea and nausea  Genitourinary: Negative for dysuria and hematuria  Musculoskeletal: Negative for arthralgias and myalgias  Neurological: Negative for dizziness, syncope, weakness, light-headedness, numbness and headaches  All other systems reviewed and are negative        Past Medical and Surgical History:     Past Medical History:   Diagnosis Date    Asthma     Bowel obstruction ([facility] [address]     Choledocholithiasis 11/18/2020    Chronic back pain     Colon polyp     Enlarged kidney     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    Hydronephrosis 2/26/2019    Ileus ([address] ) 7/2/2018    Overview:  Last Assessment & Plan:  Patient presented with generalized abdominal pain nausea and bilious vomiting ,imaging study reports " diffuse bowel dilatation involving distal small bowel and the entire colon to the panel verge obstruction mass is not identified and this may represent adynamic ileus in the postoperative setting possible related to medication"  Patient states that she still ha    Liver mass     Opiate dependence ([address] )     Post-ERCP acute pancreatitis 2020    Short gut syndrome     5 feet small bowel    Small bowel fistula     Urinary retention with incomplete bladder emptying        Past Surgical History:   Procedure Laterality Date    ABDOMINAL SURGERY      x 25    APPENDECTOMY      Open     BACK SURGERY      x 3     SECTION      x1    CHOLECYSTECTOMY      Open    COLON SURGERY      Sigmoid     COLONOSCOPY N/A 2018    Procedure: COLONOSCOPY;  Surgeon: Randal May MD;  Location: [facility]; Service: Gastroenterology    COLONOSCOPY      ERCP      EXPLORATORY LAPAROTOMY      Several for adhesive disease; has had several bowel resections; has a small bowel fistula    GASTROCUTANEOUS FISTULA CLOSURE      HERNIA REPAIR      Incisional     IR PICC REPOSITION  3/3/2021    MAMMO (HISTORICAL)  2017    SPINAL FUSION      Lower back     TONSILLECTOMY      TOTAL ABDOMINAL HYSTERECTOMY      Not due to cancer - complete     TRACHEOSTOMY  2019       Meds/Allergies:    Prior to Admission medications    Medication Sig Start Date End Date Taking?  Authorizing Provider   cholecalciferol (VITAMIN D3) 1,000 units tablet 1 tablet 2x daily    Historical Provider, MD   diazepam (VALIUM) 5 mg tablet  21   Historical Provider, MD   escitalopram (LEXAPRO) 20 mg tablet Take 1 tablet (20 mg total) by mouth daily 21   Venus Hernandez DO   ferrous sulfate 324 (65 Fe) mg Take 1 tablet (324 mg total) by mouth 2 (two) times a day before meals  Patient not taking: Reported on 2021 12/10/20   Tiffanie Cortez MD   folic acid (FOLVITE) 1 mg tablet Take 1 tablet (1 mg total) by mouth daily 12/10/20   Tiffanie Cortez MD   gabapentin (NEURONTIN) 300 mg capsule Take 600 mg by mouth 2 (two) times a day 17   Historical Provider, MD   Gattex 5 MG KIT  21   Historical Provider, MD   HYDROmorphone (DILAUDID) 4 mg tablet Take 4 mg by mouth 4 (four) times a day    Historical Provider, MD   metaxalone (SKELAXIN) 400 MG tablet  10/20/20   Historical Provider, MD   methocarbamol (ROBAXIN) 500 mg tablet take 1 tablet by mouth every 8 hours if needed for SPASMS 20   Historical Provider, MD   methocarbamol (ROBAXIN) 750 mg tablet Take 750 mg by mouth 3 (three) times a day 21   Historical Provider, MD   morphine (MS CONTIN) 30 mg 12 hr tablet take 1 tablet by mouth every 8 hours 11/10/18   Historical Provider, MD   ondansetron (ZOFRAN) 4 mg tablet take 1 tablet by mouth every 6 hours if needed for nausea and vomiting 20   Historical Provider, MD   tamsulosin (FLOMAX) 0 4 mg Take 2 capsules (0 8 mg total) by mouth daily with dinner 3/2/21   Lamin Santos DO     I have reviewed home medications with patient personally  Allergies:    Allergies   Allergen Reactions    Nsaids      Irritable, upset stomach  Irritable, upset stomach    Tolmetin      Irritable, upset stomach    Codeine Hives     Per [address] admission orders    Oxycodone-Acetaminophen GI Intolerance     Percocet Tabs    Tylenol [Acetaminophen]        Social History:     Marital Status: /Civil Union   Occupation: Noncontributory   Patient Pre-hospital Living Situation: Home  Patient Pre-hospital Level of Mobility: Full  Patient Pre-hospital Diet Restrictions: None  Substance Use History:   Social History     Substance and Sexual Activity   Alcohol Use Not Currently    Alcohol/week: 0 0 standard drinks    Frequency: Never    Binge frequency: Never    Comment: social drinker     Social History     Tobacco Use   Smoking Status Former Smoker    Quit date: 1980    Years since quittin 9   Smokeless Tobacco Never Used   Tobacco Comment    Never smoker - As per [employer]      Social History     Substance and Sexual Activity   Drug Use No       Family History:    Family History   Problem Relation Age of Onset    Lung cancer Mother     No Known Problems Father        Physical Exam:     Vitals:   Blood Pressure: 118/76 (05/22/21 1637)  Pulse: 84 (05/22/21 1908)  Temperature: 98 4 °F (36 9 °C) (05/22/21 1637)  Temp Source: Oral (05/22/21 1637)  Respirations: 18 (05/22/21 1908)  SpO2: 95 % (05/22/21 1908)    Physical Exam  Constitutional:       General: She is in acute distress  Appearance: Normal appearance  She is normal weight  She is not ill-appearing or diaphoretic  HENT:      Head: Normocephalic and atraumatic  Mouth/Throat:      Mouth: Mucous membranes are moist    Eyes:      General: No scleral icterus  Pupils: Pupils are equal, round, and reactive to light  Cardiovascular:      Rate and Rhythm: Normal rate and regular rhythm  Pulses: Normal pulses  Heart sounds: Normal heart sounds, S1 normal and S2 normal  No murmur  No systolic murmur  No diastolic murmur  No gallop  No S3 or S4 sounds  Pulmonary:      Effort: Pulmonary effort is normal  No accessory muscle usage or respiratory distress  Breath sounds: Normal breath sounds  No stridor  No decreased breath sounds, wheezing, rhonchi or rales  Chest:      Chest wall: No tenderness  Abdominal:      General: Bowel sounds are decreased  There is distension  Palpations: Abdomen is soft  Tenderness: There is generalized abdominal tenderness  There is no guarding  Musculoskeletal:      Right lower leg: No edema  Left lower leg: No edema  Skin:     General: Skin is warm and dry  Coloration: Skin is not jaundiced  Neurological:      General: No focal deficit present  Mental Status: She is alert  Mental status is at baseline  Motor: No tremor or seizure activity  Psychiatric:         Behavior: Behavior is cooperative               Additional Data:     Lab Results: I have personally reviewed pertinent reports  Results from last 7 days   Lab Units 05/22/21  1713   WBC Thousand/uL 4 89   HEMOGLOBIN g/dL 10 6*   HEMATOCRIT % 32 9*   PLATELETS Thousands/uL 222   NEUTROS PCT % 42*   LYMPHS PCT % 46*   MONOS PCT % 9   EOS PCT % 2     Results from last 7 days   Lab Units 05/22/21  1713   SODIUM mmol/L 142   POTASSIUM mmol/L 3 5   CHLORIDE mmol/L 105   CO2 mmol/L 26   BUN mg/dL 6   CREATININE mg/dL 0 81   ANION GAP mmol/L 11   CALCIUM mg/dL 8 3   ALBUMIN g/dL 3 4*   TOTAL BILIRUBIN mg/dL 0 24   ALK PHOS U/L 143*   ALT U/L 85*   AST U/L 107*   GLUCOSE RANDOM mg/dL 91                 Results from last 7 days   Lab Units 05/22/21  1731   LACTIC ACID mmol/L 1 9       Imaging: I have personally reviewed pertinent reports  CT abdomen pelvis with contrast   Final Result by Stepan De La Cruz MD (05/22 1826)      Fluid and gas noted in the inferior abdominal wall previously attributed to enterocutaneous fistula grossly unchanged  Stable distention of the colon and stomach  Workstation performed: [id number]         XR chest 1 view portable    (Results Pending)       EKG, Pathology, and Other Studies Reviewed on Admission:   · EKG: NSR, 78 BPM  · CXR: No acute disease, my personal read   · CT abdomen pelvis with contrast: Fluid and gas noted in the inferior abdominal wall previously attributed to enterocutaneous fistula grossly unchanged  Stable distention of the colon and stomach    [employer] / Epic Records Reviewed: Yes     ** Please Note: This note has been constructed using a voice recognition system   **

## 2021-05-23 NOTE — PLAN OF CARE
Problem: Potential for Falls  Goal: Patient will remain free of falls  Description: INTERVENTIONS:  - Assess patient frequently for physical needs  -  Identify cognitive and physical deficits and behaviors that affect risk of falls  -  [place] fall precautions as indicated by assessment   - Educate patient/family on patient safety including physical limitations  - Instruct patient to call for assistance with activity based on assessment  - Modify environment to reduce risk of injury  - Consider OT/PT consult to assist with strengthening/mobility  Outcome: Progressing     Problem: Nutrition/Hydration-ADULT  Goal: Nutrient/Hydration intake appropriate for improving, restoring or maintaining nutritional needs  Description: Monitor and assess patient's nutrition/hydration status for malnutrition  Collaborate with interdisciplinary team and initiate plan and interventions as ordered  Monitor patient's weight and dietary intake as ordered or per policy  Utilize nutrition screening tool and intervene as necessary  Determine patient's food preferences and provide high-protein, high-caloric foods as appropriate       INTERVENTIONS:  - Monitor oral intake, urinary output, labs, and treatment plans  - Assess nutrition and hydration status and recommend course of action  - Evaluate amount of meals eaten  - Assist patient with eating if necessary   - Allow adequate time for meals  - Recommend/ encourage appropriate diets, oral nutritional supplements, and vitamin/mineral supplements  - Order, calculate, and assess calorie counts as needed  - Recommend, monitor, and adjust tube feedings and TPN/PPN based on assessed needs  - Assess need for intravenous fluids  - Provide specific nutrition/hydration education as appropriate  - Include patient/family/caregiver in decisions related to nutrition  Outcome: Progressing     Problem: PAIN - ADULT  Goal: Verbalizes/displays adequate comfort level or baseline comfort level  Description: Interventions:  - Encourage patient to monitor pain and request assistance  - Assess pain using appropriate pain scale  - Administer analgesics based on type and severity of pain and evaluate response  - Implement non-pharmacological measures as appropriate and evaluate response  - Consider cultural and social influences on pain and pain management  - Notify physician/advanced practitioner if interventions unsuccessful or patient reports new pain  Outcome: Progressing     Problem: INFECTION - ADULT  Goal: Absence or prevention of progression during hospitalization  Description: INTERVENTIONS:  - Assess and monitor for signs and symptoms of infection  - Monitor lab/diagnostic results  - Monitor all insertion sites, i e  indwelling lines, tubes, and drains  - Monitor endotracheal if appropriate and nasal secretions for changes in amount and color  - [place] appropriate cooling/warming therapies per order  - Administer medications as ordered  - Instruct and encourage patient and family to use good hand hygiene technique  - Identify and instruct in appropriate isolation precautions for identified infection/condition  Outcome: Progressing     Problem: SAFETY ADULT  Goal: Patient will remain free of falls  Description: INTERVENTIONS:  - Assess patient frequently for physical needs  -  Identify cognitive and physical deficits and behaviors that affect risk of falls    -  [place] fall precautions as indicated by assessment   - Educate patient/family on patient safety including physical limitations  - Instruct patient to call for assistance with activity based on assessment  - Modify environment to reduce risk of injury  - Consider OT/PT consult to assist with strengthening/mobility  Outcome: Progressing     Problem: DISCHARGE PLANNING  Goal: Discharge to home or other facility with appropriate resources  Description: INTERVENTIONS:  - Identify barriers to discharge w/patient and caregiver  - Arrange for needed discharge resources and transportation as appropriate  - Identify discharge learning needs (meds, wound care, etc )  - Arrange for interpretive services to assist at discharge as needed  - Refer to Case Management Department for coordinating discharge planning if the patient needs post-hospital services based on physician/advanced practitioner order or complex needs related to functional status, cognitive ability, or social support system  Outcome: Progressing     Problem: Knowledge Deficit  Goal: Patient/family/caregiver demonstrates understanding of disease process, treatment plan, medications, and discharge instructions  Description: Complete learning assessment and assess knowledge base    Interventions:  - Provide teaching at level of understanding  - Provide teaching via preferred learning methods  Outcome: Progressing

## 2021-05-24 NOTE — ASSESSMENT & PLAN NOTE
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

## 2021-05-24 NOTE — PROGRESS NOTES
Progress Note - General Surgery   Herminio Nails 73 y o  female MRN: [id number]  Unit/Bed#: S -01 Encounter: [id number]    Assessment:  71yo F with complex surgical Hx, who p/w opioid-induced colonic dysmotility     dark brown (?bile-tinged)  BM x1    Plan:   Repeat dose of relistor today   Continue NPO/NGT -- possible clamp trial later today   Continue nocturnal TPN without lipids   Start protonix 40mg IV daily -- transition to PO once NGT able to be removed   Skin care to liudmila-fistula site    Subjective/Objective     Subjective:   No acute events overnight  Had BM yesterday, which she states was darker than usual     Objective:    Blood pressure 123/70, pulse 70, temperature 98 2 °F (36 8 °C), temperature source Oral, resp  rate 18, SpO2 94 %, not currently breastfeeding  ,There is no height or weight on file to calculate BMI        Intake/Output Summary (Last 24 hours) at 5/24/2021 0615  Last data filed at 5/23/2021 2318  Gross per 24 hour   Intake 1961 25 ml   Output 900 ml   Net 1061 25 ml       Invasive Devices     Peripherally Inserted Central Catheter Line            PICC Line Left Basilic -- days    PICC Line 65/95/39 Left Basilic 81 days          Drain            Closed/Suction Drain Other (Comment) -- days    NG/OG/Enteral Tube Nasogastric 16 Fr Right nares 1 day                Physical Exam:   Gen:  NAD  HENT: NGT in place  CV:  warm, well-perfused  Lungs: nl effort  Abd:  soft, mild distension, generalized tenderness worst in epigastrium  Ext:  no CCE  Neuro: A&Ox3     Results from last 7 days   Lab Units 05/24/21  0500 05/23/21  0533 05/22/21  1713   WBC Thousand/uL 4 68 4 91 4 89   HEMOGLOBIN g/dL 10 4* 10 1* 10 6*   HEMATOCRIT % 32 8* 32 2* 32 9*   PLATELETS Thousands/uL 197 214 222     Results from last 7 days   Lab Units 05/24/21  0500 05/23/21  0533 05/22/21  1713   POTASSIUM mmol/L 3 5 4 1 3 5   CHLORIDE mmol/L 107 109* 105   CO2 mmol/L 28 26 26   BUN mg/dL 8 8 6   CREATININE mg/dL 0 78 0 83 0 81   CALCIUM mg/dL 8 0* 7 9* 8 3

## 2021-05-24 NOTE — PROGRESS NOTES
Progress Note - General Surgery   Anna Clemens 73 y o  female MRN: [id number]  Unit/Bed#: S -23 Encounter: [id number]    Assessment:  73 F with short gut syndrome and small-bowel fistula, admitted with opioid induced constipation and colonic pseudo-obstruction  Received Relistor on 05/23, and 5/24  VSS, afebrile     900cc UOP  1 1 L brown bilious NGT output     1x small soft bowel movement in past 24 hours     Abdomen soft, moderate diffuse tenderness, fistula with well-fitted appliance, minimal output, slightly distended    WBC 4 5 from 4 6  Hb 10 0 from 10 4    CMP within normal limits    Plan:  -NPO/NGT  -IV fluid hydration  -continue Relistor for return of bowel function  -nocturnal TPN as per home regimen via PICC line  -pulmonary toilet  -ambulation out of bed as able  -DVT prophylaxis  -additional care as per primary    Subjective/Objective     Subjective: No acute events occurred overnight     Objective:     Blood pressure 145/67, pulse 70, temperature 98 3 °F (36 8 °C), temperature source Oral, resp  rate 18, SpO2 96 %, not currently breastfeeding  ,There is no height or weight on file to calculate BMI        Intake/Output Summary (Last 24 hours) at 5/24/2021 1813  Last data filed at 5/24/2021 0932  Gross per 24 hour   Intake 1747 5 ml   Output 1000 ml   Net 747 5 ml       Invasive Devices     Peripherally Inserted Central Catheter Line            PICC Line 59/82/54 Left Basilic 82 days    PICC Line 12/01/18 Left Basilic 82 days          Drain            Closed/Suction Drain Other (Comment) -- days    NG/OG/Enteral Tube Nasogastric 16 Fr Right nares 1 day                Physical Exam:     General: NAD  HEENT: normocephalic, atraumatic, NGT in place  Cardiovascular: regular rate, no murmurs   Respiratory: no distress, comfortable on room air  Abdomen: soft, mildly distended, mild diffuse tenderness, fistula with well-fitted appliance and scant output   Extremities: no C/C/E  Neuro: AAOx3  Skin:warm, dry    Lab, Imaging and other studies:  I have personally reviewed pertinent lab results    , CBC:   Lab Results   Component Value Date    WBC 4 50 05/25/2021    HGB 10 0 (L) 05/25/2021    HCT 31 1 (L) 05/25/2021    MCV 87 05/25/2021     05/25/2021    MCH 28 1 05/25/2021    MCHC 32 2 05/25/2021    RDW 13 9 05/25/2021    MPV 9 5 05/25/2021    NRBC 0 05/25/2021   , CMP:   Lab Results   Component Value Date    SODIUM 142 05/25/2021    K 3 6 05/25/2021     05/25/2021    CO2 31 05/25/2021    BUN 10 05/25/2021    CREATININE 0 71 05/25/2021    CALCIUM 8 0 (L) 05/25/2021    AST 20 05/25/2021    ALT 38 05/25/2021    ALKPHOS 116 05/25/2021    EGFR 90 05/25/2021     VTE Pharmacologic Prophylaxis: Heparin  VTE Mechanical Prophylaxis: sequential compression device

## 2021-05-24 NOTE — PROGRESS NOTES
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  CT scan with distention, albeit stable     · Continue D5 1/2  ml/h  ·  Continue home pain regimen  · NGT/bowel regimen - defer to surgical team   · Monitor electrolytes  · TPN  today    Short gut syndrome  Assessment & Plan  · Patient has 5 feet of small bowel as per patient   · Surgical team on board  Enterocutaneous fistula  Assessment & Plan  · Noted history   · Continue home pain regimen   · Surgical consultation     Chronic, continuous use of opioids  Assessment & Plan  · With history of ileus  · Continue home opioid regimen   · Consider bowel regimen, but will defer to surgery ultimately          VTE Pharmacologic Prophylaxis:   Pharmacologic: Heparin  Mechanical VTE Prophylaxis in Place: Yes    Discussions with Specialists or Other Care Team Provider: Surgical Team    Education and Discussions with Family / Patient:   Bill    Current Length of Stay: 1 day(s)    Current Patient Status: Inpatient     Discharge Plan / Estimated Discharge Date: 72 hours    Code Status: Level 1 - Full Code      Subjective:   Patient was awake and had NGT in situ  NGT container had very little drainage  Patient admits to having a bowel movement last night and this morning  She denies any fever, chills, dizziness, CP, SOB  She still endorses abdominal pain, nausea and weakness      Objective:     Vitals:   Temp (24hrs), Av 4 °F (36 9 °C), Min:98 2 °F (36 8 °C), Max:98 6 °F (37 °C)    Temp:  [98 2 °F (36 8 °C)-98 6 °F (37 °C)] 98 3 °F (36 8 °C)  HR:  [70-78] 70  Resp:  [18] 18  BP: (123-145)/(65-70) 145/67  SpO2:  [94 %-96 %] 96 %  There is no height or weight on file to calculate BMI  Input and Output Summary (last 24 hours): Intake/Output Summary (Last 24 hours) at 5/24/2021 1606  Last data filed at 5/24/2021 0932  Gross per 24 hour   Intake 1747 5 ml   Output 1250 ml   Net 497 5 ml       Physical Exam:     Physical Exam  Vitals signs and nursing note reviewed  Constitutional:       General: She is not in acute distress  Appearance: Normal appearance  She is not ill-appearing, toxic-appearing or diaphoretic  HENT:      Head: Normocephalic and atraumatic  Nose: Nose normal       Mouth/Throat:      Mouth: Mucous membranes are moist       Pharynx: No oropharyngeal exudate or posterior oropharyngeal erythema  Eyes:      General: No scleral icterus  Right eye: No discharge  Left eye: No discharge  Pupils: Pupils are equal, round, and reactive to light  Neck:      Musculoskeletal: Normal range of motion and neck supple  No neck rigidity or muscular tenderness  Vascular: No carotid bruit  Cardiovascular:      Rate and Rhythm: Normal rate and regular rhythm  Pulses: Normal pulses  Heart sounds: Normal heart sounds  No murmur  No friction rub  No gallop  Pulmonary:      Effort: Pulmonary effort is normal  No respiratory distress  Breath sounds: Normal breath sounds  No stridor  No wheezing, rhonchi or rales  Abdominal:      General: Bowel sounds are normal       Palpations: Abdomen is soft  Tenderness: There is abdominal tenderness (diffuse)  There is no right CVA tenderness, left CVA tenderness, guarding or rebound  Comments: Ileostomy bag in situ  Musculoskeletal:         General: No swelling, tenderness or deformity  Right lower leg: No edema  Left lower leg: No edema     Lymphadenopathy:      Cervical: No cervical adenopathy  Skin:     General: Skin is warm and dry  Capillary Refill: Capillary refill takes less than 2 seconds  Coloration: Skin is not jaundiced  Findings: No lesion or rash  Neurological:      General: No focal deficit present  Mental Status: She is alert and oriented to person, place, and time  Mental status is at baseline  Motor: No weakness  Psychiatric:         Mood and Affect: Mood normal          Behavior: Behavior normal          Thought Content: Thought content normal          Judgment: Judgment normal          Additional Data:     Labs:    Results from last 7 days   Lab Units 05/24/21  0500   WBC Thousand/uL 4 68   HEMOGLOBIN g/dL 10 4*   HEMATOCRIT % 32 8*   PLATELETS Thousands/uL 197   NEUTROS PCT % 56   LYMPHS PCT % 32   MONOS PCT % 8   EOS PCT % 3     Results from last 7 days   Lab Units 05/24/21  0500   POTASSIUM mmol/L 3 5   CHLORIDE mmol/L 107   CO2 mmol/L 28   BUN mg/dL 8   CREATININE mg/dL 0 78   CALCIUM mg/dL 8 0*   ALK PHOS U/L 135*   ALT U/L 48   AST U/L 27           * I Have Reviewed All Lab Data Listed Above  * Additional Pertinent Lab Tests Reviewed:  All Labs Within Last 24 Hours Reviewed    Imaging:    Imaging Reports Reviewed Today Include: None  Imaging Personally Reviewed by Myself Includes:  None    Recent Cultures (last 7 days):     Results from last 7 days   Lab Units 05/19/21  1132   C DIFF TOXIN B BY PCR  Negative       Last 24 Hours Medication List:   Current Facility-Administered Medications   Medication Dose Route Frequency Provider Last Rate    acetaminophen  650 mg Oral Q6H PRN Gm Giles PA-C      Adult TPN (CUSTOM BASE/STANDARD ELECTROLYTE)   Intravenous Continuous TPN Addis Sosa PA-C      bisacodyl  10 mg Rectal Daily Zechariah Gabriel MD      cholecalciferol  1,000 Units Oral BID Bogdan Carlos PA-C      dextrose 5 % and sodium chloride 0 45 % with KCl 20 mEq/L  100 mL/hr Intravenous Continuous Bonnie Garcia  mL/hr (05/24/21 0933)    escitalopram  20 mg Oral Daily Bogdan Steward PA-C      folic acid  1 mg Oral Daily Bogdan Steward PA-C      gabapentin  600 mg Oral BID Bogdan Steward PA-C      heparin (porcine)  5,000 Units Subcutaneous Q8H [facility] Lee Arnold MD      HYDROmorphone  0 5 mg Intravenous Q4H PRN Hyla Cuff, CRNP      HYDROmorphone  0 5 mg Intravenous Q4H PRN Hyla Cuff, CRNP      HYDROmorphone  1 mg Intravenous Q4H PRN Hyla Cuff, CRNP      methocarbamol  750 mg Oral Q8H PRN Gladis ModerSAMANTA      morphine  30 mg Oral Q8H Bogdan Carlos PA-C      ondansetron  4 mg Intravenous Q6H PRN Gladis ModerSAMANTA      pantoprazole  40 mg Intravenous Q24H Kwan Blackwell MD      tamsulosin  0 8 mg Oral Daily With Dinner Bogdan Steward PA-C          Today, Patient Was Seen By: Villa Varela MD    ** Please Note: This note has been constructed using a voice recognition system   **

## 2021-05-24 NOTE — PROGRESS NOTES
Progress Note - General Surgery   Herminio Nails 73 y o  female MRN: [id number]  Unit/Bed#: S -14 Encounter: [id number]    Assessment:  Short-gut syndrome with small bowel fistula  Opioid induced constipation and colonic pseudo-obstruction  Plan:  Intravenous hydration  Relistor to initiate colonic movement  Subjective/Objective   Chief Complaint:  Abdominal pain and obstipation    Subjective:  Present episode started 7 days ago  Initially patient had 3 days of uncontrolled diarrhea which was much more than her usual 6-7 loose bowel movements a day  She felt very dehydrated with the diarrhea  This was associated with vomiting and inability to keep food down  After 3 days she tried taking some over-the-counter antidiarrheal medications including Kaopectate and Imodium but very promptly then became obstipated  She stopped having bowel movements, passed no flatus, developed abdominal distention and abdominal pain and continued with vomiting  She then came to the emergency room on Saturday  She continues with obstipation  Has had a 1 very small bowel movement but no flatus since admission  Continues to complain of abdominal distention and abdominal pain  Objective:  Stable vital sign    Blood pressure 123/70, pulse 70, temperature 98 2 °F (36 8 °C), temperature source Oral, resp  rate 18, SpO2 94 %, not currently breastfeeding  ,There is no height or weight on file to calculate BMI        Intake/Output Summary (Last 24 hours) at 5/24/2021 0856  Last data filed at 5/24/2021 0308  Gross per 24 hour   Intake 1961 25 ml   Output 1300 ml   Net 661 25 ml       Invasive Devices     Peripherally Inserted Central Catheter Line            PICC Line Left Basilic -- days    PICC Line 73/22/95 Left Basilic 81 days          Drain            Closed/Suction Drain Other (Comment) -- days    NG/OG/Enteral Tube Nasogastric 16 Fr Right nares 1 day                Physical Exam:  Abdomen is distended, tender and tense   Fistula output is minimal     Lab, Imaging and other studies:I have personally reviewed pertinent lab results      VTE Pharmacologic Prophylaxis: Heparin  VTE Mechanical Prophylaxis: sequential compression device

## 2021-05-25 NOTE — ASSESSMENT & PLAN NOTE
· With history of ileus  · Home opioid regimen on hold due to patient being NPO  · Dulcolax supp qd  · Relistor 8 mg sc q d  X3 doses administered so far

## 2021-05-25 NOTE — ASSESSMENT & PLAN NOTE
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

## 2021-05-25 NOTE — PLAN OF CARE
Problem: Potential for Falls  Goal: Patient will remain free of falls  Description: INTERVENTIONS:  - Assess patient frequently for physical needs  -  Identify cognitive and physical deficits and behaviors that affect risk of falls  -  [place] fall precautions as indicated by assessment   - Educate patient/family on patient safety including physical limitations  - Instruct patient to call for assistance with activity based on assessment  - Modify environment to reduce risk of injury  - Consider OT/PT consult to assist with strengthening/mobility  Outcome: Progressing     Problem: Nutrition/Hydration-ADULT  Goal: Nutrient/Hydration intake appropriate for improving, restoring or maintaining nutritional needs  Description: Monitor and assess patient's nutrition/hydration status for malnutrition  Collaborate with interdisciplinary team and initiate plan and interventions as ordered  Monitor patient's weight and dietary intake as ordered or per policy  Utilize nutrition screening tool and intervene as necessary  Determine patient's food preferences and provide high-protein, high-caloric foods as appropriate       INTERVENTIONS:  - Monitor oral intake, urinary output, labs, and treatment plans  - Assess nutrition and hydration status and recommend course of action  - Evaluate amount of meals eaten  - Assist patient with eating if necessary   - Allow adequate time for meals  - Recommend/ encourage appropriate diets, oral nutritional supplements, and vitamin/mineral supplements  - Order, calculate, and assess calorie counts as needed  - Recommend, monitor, and adjust tube feedings and TPN/PPN based on assessed needs  - Assess need for intravenous fluids  - Provide specific nutrition/hydration education as appropriate  - Include patient/family/caregiver in decisions related to nutrition  Outcome: Progressing     Problem: PAIN - ADULT  Goal: Verbalizes/displays adequate comfort level or baseline comfort level  Description: Interventions:  - Encourage patient to monitor pain and request assistance  - Assess pain using appropriate pain scale  - Administer analgesics based on type and severity of pain and evaluate response  - Implement non-pharmacological measures as appropriate and evaluate response  - Consider cultural and social influences on pain and pain management  - Notify physician/advanced practitioner if interventions unsuccessful or patient reports new pain  Outcome: Progressing     Problem: INFECTION - ADULT  Goal: Absence or prevention of progression during hospitalization  Description: INTERVENTIONS:  - Assess and monitor for signs and symptoms of infection  - Monitor lab/diagnostic results  - Monitor all insertion sites, i e  indwelling lines, tubes, and drains  - Monitor endotracheal if appropriate and nasal secretions for changes in amount and color  - [place] appropriate cooling/warming therapies per order  - Administer medications as ordered  - Instruct and encourage patient and family to use good hand hygiene technique  - Identify and instruct in appropriate isolation precautions for identified infection/condition  Outcome: Progressing     Problem: SAFETY ADULT  Goal: Patient will remain free of falls  Description: INTERVENTIONS:  - Assess patient frequently for physical needs  -  Identify cognitive and physical deficits and behaviors that affect risk of falls    -  [place] fall precautions as indicated by assessment   - Educate patient/family on patient safety including physical limitations  - Instruct patient to call for assistance with activity based on assessment  - Modify environment to reduce risk of injury  - Consider OT/PT consult to assist with strengthening/mobility  Outcome: Progressing     Problem: DISCHARGE PLANNING  Goal: Discharge to home or other facility with appropriate resources  Description: INTERVENTIONS:  - Identify barriers to discharge w/patient and caregiver  - Arrange for needed discharge resources and transportation as appropriate  - Identify discharge learning needs (meds, wound care, etc )  - Arrange for interpretive services to assist at discharge as needed  - Refer to Case Management Department for coordinating discharge planning if the patient needs post-hospital services based on physician/advanced practitioner order or complex needs related to functional status, cognitive ability, or social support system  Outcome: Progressing     Problem: Knowledge Deficit  Goal: Patient/family/caregiver demonstrates understanding of disease process, treatment plan, medications, and discharge instructions  Description: Complete learning assessment and assess knowledge base    Interventions:  - Provide teaching at level of understanding  - Provide teaching via preferred learning methods  Outcome: Progressing

## 2021-05-25 NOTE — PROGRESS NOTES
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  CT scan with distention, albeit stable     · Continue D5 1/2  ml/h  ·  Continue home pain regimen when able to tolerate po meds  For now patient will continue Dilaudid IV for pain  · NGT/bowel regimen - defer to surgical team   · Monitor electrolytes  Short gut syndrome  Assessment & Plan  · Patient has 5 feet of small bowel as per patient   · Surgical team on board  Enterocutaneous fistula  Assessment & Plan  · Noted history   · Continue home pain regimen   · Surgical consultation     Chronic, continuous use of opioids  Assessment & Plan  · With history of ileus  · Home opioid regimen on hold due to patient being NPO  · Dulcolax supp qd  · Relistor 8 mg sc q d  X3 doses administered so far  VTE Pharmacologic Prophylaxis:   Pharmacologic: Heparin  Mechanical VTE Prophylaxis in Place: Yes    Discussions with Specialists or Other Care Team Provider:  Surgical team    Education and Discussions with Family / Patient:   bruise    Current Length of Stay: 2 day(s)    Current Patient Status: Inpatient     Discharge Plan / Estimated Discharge Date: >72 hours  Pseudo bowel obstruction with NG tube in place    Code Status: Level 1 - Full Code      Subjective:   Patient was awake  She admits to mild abdominal pain that is diffuse  Last bowel movement was yesterday morning    She denies any fever, chills, dizziness, headaches, blurry vision, neck stiffness, CP, SOB, coughing or palpitations  Objective:     Vitals:   Temp (24hrs), Av 7 °F (37 1 °C), Min:98 7 °F (37 1 °C), Max:98 7 °F (37 1 °C)    Temp:  [98 7 °F (37 1 °C)] 98 7 °F (37 1 °C)  HR:  [58-91] 91  Resp:  [18] 18  BP: (116-129)/(62-69) 116/69  SpO2:  [92 %-96 %] 96 %  There is no height or weight on file to calculate BMI  Input and Output Summary (last 24 hours): Intake/Output Summary (Last 24 hours) at 2021 1535  Last data filed at 2021 1328  Gross per 24 hour   Intake 1190 ml   Output 2125 ml   Net -935 ml       Physical Exam:     Physical Exam  Vitals signs and nursing note reviewed  Constitutional:       General: She is not in acute distress  Appearance: Normal appearance  She is not ill-appearing, toxic-appearing or diaphoretic  HENT:      Head: Normocephalic and atraumatic  Nose: Nose normal       Comments: NG tube in-situ with dark brown liquid  Mouth/Throat:      Mouth: Mucous membranes are moist       Pharynx: No oropharyngeal exudate or posterior oropharyngeal erythema  Eyes:      General: No scleral icterus  Right eye: No discharge  Left eye: No discharge  Pupils: Pupils are equal, round, and reactive to light  Neck:      Musculoskeletal: Normal range of motion and neck supple  No neck rigidity or muscular tenderness  Vascular: No carotid bruit  Cardiovascular:      Rate and Rhythm: Normal rate and regular rhythm  Pulses: Normal pulses  Heart sounds: Normal heart sounds  No murmur  No friction rub  No gallop  Pulmonary:      Effort: Pulmonary effort is normal  No respiratory distress  Breath sounds: Normal breath sounds  No stridor  No wheezing, rhonchi or rales  Abdominal:      General: Bowel sounds are normal       Palpations: Abdomen is soft  Tenderness: There is no abdominal tenderness  There is no right CVA tenderness, left CVA tenderness, guarding or rebound     Musculoskeletal:         General: No swelling, tenderness or deformity  Right lower leg: No edema  Left lower leg: No edema  Lymphadenopathy:      Cervical: No cervical adenopathy  Skin:     General: Skin is warm and dry  Capillary Refill: Capillary refill takes less than 2 seconds  Coloration: Skin is not jaundiced  Findings: No lesion or rash  Neurological:      General: No focal deficit present  Mental Status: She is alert and oriented to person, place, and time  Mental status is at baseline  Motor: No weakness  Psychiatric:         Mood and Affect: Mood normal          Behavior: Behavior normal          Thought Content: Thought content normal          Judgment: Judgment normal        Additional Data:     Labs:    Results from last 7 days   Lab Units 05/25/21  0510   WBC Thousand/uL 4 50   HEMOGLOBIN g/dL 10 0*   HEMATOCRIT % 31 1*   PLATELETS Thousands/uL 202   NEUTROS PCT % 59   LYMPHS PCT % 26   MONOS PCT % 10   EOS PCT % 4     Results from last 7 days   Lab Units 05/25/21  0510   POTASSIUM mmol/L 3 6   CHLORIDE mmol/L 107   CO2 mmol/L 31   BUN mg/dL 10   CREATININE mg/dL 0 71   CALCIUM mg/dL 8 0*   ALK PHOS U/L 116   ALT U/L 38   AST U/L 20           * I Have Reviewed All Lab Data Listed Above  * Additional Pertinent Lab Tests Reviewed:  All Labs Within Last 24 Hours Reviewed    Imaging:    Imaging Reports Reviewed Today Include: none  Imaging Personally Reviewed by Myself Includes:  None    Recent Cultures (last 7 days):     Results from last 7 days   Lab Units 05/19/21  1132   C DIFF TOXIN B BY PCR  Negative       Last 24 Hours Medication List:   Current Facility-Administered Medications   Medication Dose Route Frequency Provider Last Rate    bisacodyl  10 mg Rectal Daily Dave Stuart MD      dextrose 5 % and sodium chloride 0 45 % with KCl 20 mEq/L  100 mL/hr Intravenous Continuous Breana Flores  mL/hr (05/25/21 0942)    heparin (porcine)  5,000 Units Subcutaneous Q8H [address] MD Dav Almodovar HYDROmorphone  0 5 mg Intravenous Q4H PRN CAITLYN Smith      HYDROmorphone  1 mg Intravenous Q4H PRN CAITLYN Smith      ondansetron  4 mg Intravenous Q6H PRN Bogdan Umanzor PA-C      pantoprazole  40 mg Intravenous Q24H [facility] Dave Stuart MD      phenol  1 spray Mouth/Throat Q2H PRN Manny Barakat MD          Today, Patient Was Seen By: Manny Barakat MD    ** Please Note: This note has been constructed using a voice recognition system   **

## 2021-05-25 NOTE — PLAN OF CARE
Problem: Potential for Falls  Goal: Patient will remain free of falls  Description: INTERVENTIONS:  - Assess patient frequently for physical needs  -  Identify cognitive and physical deficits and behaviors that affect risk of falls  -  [place] fall precautions as indicated by assessment   - Educate patient/family on patient safety including physical limitations  - Instruct patient to call for assistance with activity based on assessment  - Modify environment to reduce risk of injury  - Consider OT/PT consult to assist with strengthening/mobility  Outcome: Progressing     Problem: Nutrition/Hydration-ADULT  Goal: Nutrient/Hydration intake appropriate for improving, restoring or maintaining nutritional needs  Description: Monitor and assess patient's nutrition/hydration status for malnutrition  Collaborate with interdisciplinary team and initiate plan and interventions as ordered  Monitor patient's weight and dietary intake as ordered or per policy  Utilize nutrition screening tool and intervene as necessary  Determine patient's food preferences and provide high-protein, high-caloric foods as appropriate       INTERVENTIONS:  - Monitor oral intake, urinary output, labs, and treatment plans  - Assess nutrition and hydration status and recommend course of action  - Evaluate amount of meals eaten  - Assist patient with eating if necessary   - Allow adequate time for meals  - Recommend/ encourage appropriate diets, oral nutritional supplements, and vitamin/mineral supplements  - Order, calculate, and assess calorie counts as needed  - Recommend, monitor, and adjust tube feedings and TPN/PPN based on assessed needs  - Assess need for intravenous fluids  - Provide specific nutrition/hydration education as appropriate  - Include patient/family/caregiver in decisions related to nutrition  Outcome: Progressing     Problem: PAIN - ADULT  Goal: Verbalizes/displays adequate comfort level or baseline comfort level  Description: Interventions:  - Encourage patient to monitor pain and request assistance  - Assess pain using appropriate pain scale  - Administer analgesics based on type and severity of pain and evaluate response  - Implement non-pharmacological measures as appropriate and evaluate response  - Consider cultural and social influences on pain and pain management  - Notify physician/advanced practitioner if interventions unsuccessful or patient reports new pain  Outcome: Progressing     Problem: INFECTION - ADULT  Goal: Absence or prevention of progression during hospitalization  Description: INTERVENTIONS:  - Assess and monitor for signs and symptoms of infection  - Monitor lab/diagnostic results  - Monitor all insertion sites, i e  indwelling lines, tubes, and drains  - Monitor endotracheal if appropriate and nasal secretions for changes in amount and color  - [place] appropriate cooling/warming therapies per order  - Administer medications as ordered  - Instruct and encourage patient and family to use good hand hygiene technique  - Identify and instruct in appropriate isolation precautions for identified infection/condition  Outcome: Progressing     Problem: SAFETY ADULT  Goal: Patient will remain free of falls  Description: INTERVENTIONS:  - Assess patient frequently for physical needs  -  Identify cognitive and physical deficits and behaviors that affect risk of falls    -  [place] fall precautions as indicated by assessment   - Educate patient/family on patient safety including physical limitations  - Instruct patient to call for assistance with activity based on assessment  - Modify environment to reduce risk of injury  - Consider OT/PT consult to assist with strengthening/mobility  Outcome: Progressing     Problem: DISCHARGE PLANNING  Goal: Discharge to home or other facility with appropriate resources  Description: INTERVENTIONS:  - Identify barriers to discharge w/patient and caregiver  - Arrange for needed discharge resources and transportation as appropriate  - Identify discharge learning needs (meds, wound care, etc )  - Arrange for interpretive services to assist at discharge as needed  - Refer to Case Management Department for coordinating discharge planning if the patient needs post-hospital services based on physician/advanced practitioner order or complex needs related to functional status, cognitive ability, or social support system  Outcome: Progressing     Problem: Knowledge Deficit  Goal: Patient/family/caregiver demonstrates understanding of disease process, treatment plan, medications, and discharge instructions  Description: Complete learning assessment and assess knowledge base    Interventions:  - Provide teaching at level of understanding  - Provide teaching via preferred learning methods  Outcome: Progressing

## 2021-05-25 NOTE — PLAN OF CARE
Problem: Potential for Falls  Goal: Patient will remain free of falls  Description: INTERVENTIONS:  - Assess patient frequently for physical needs  -  Identify cognitive and physical deficits and behaviors that affect risk of falls  -  [place] fall precautions as indicated by assessment   - Educate patient/family on patient safety including physical limitations  - Instruct patient to call for assistance with activity based on assessment  - Modify environment to reduce risk of injury  - Consider OT/PT consult to assist with strengthening/mobility  Outcome: Progressing     Problem: Nutrition/Hydration-ADULT  Goal: Nutrient/Hydration intake appropriate for improving, restoring or maintaining nutritional needs  Description: Monitor and assess patient's nutrition/hydration status for malnutrition  Collaborate with interdisciplinary team and initiate plan and interventions as ordered  Monitor patient's weight and dietary intake as ordered or per policy  Utilize nutrition screening tool and intervene as necessary  Determine patient's food preferences and provide high-protein, high-caloric foods as appropriate       INTERVENTIONS:  - Monitor oral intake, urinary output, labs, and treatment plans  - Assess nutrition and hydration status and recommend course of action  - Evaluate amount of meals eaten  - Assist patient with eating if necessary   - Allow adequate time for meals  - Recommend/ encourage appropriate diets, oral nutritional supplements, and vitamin/mineral supplements  - Order, calculate, and assess calorie counts as needed  - Recommend, monitor, and adjust tube feedings and TPN/PPN based on assessed needs  - Assess need for intravenous fluids  - Provide specific nutrition/hydration education as appropriate  - Include patient/family/caregiver in decisions related to nutrition  Outcome: Progressing     Problem: PAIN - ADULT  Goal: Verbalizes/displays adequate comfort level or baseline comfort level  Description: Interventions:  - Encourage patient to monitor pain and request assistance  - Assess pain using appropriate pain scale  - Administer analgesics based on type and severity of pain and evaluate response  - Implement non-pharmacological measures as appropriate and evaluate response  - Consider cultural and social influences on pain and pain management  - Notify physician/advanced practitioner if interventions unsuccessful or patient reports new pain  Outcome: Progressing     Problem: INFECTION - ADULT  Goal: Absence or prevention of progression during hospitalization  Description: INTERVENTIONS:  - Assess and monitor for signs and symptoms of infection  - Monitor lab/diagnostic results  - Monitor all insertion sites, i e  indwelling lines, tubes, and drains  - Monitor endotracheal if appropriate and nasal secretions for changes in amount and color  - [place] appropriate cooling/warming therapies per order  - Administer medications as ordered  - Instruct and encourage patient and family to use good hand hygiene technique  - Identify and instruct in appropriate isolation precautions for identified infection/condition  Outcome: Progressing     Problem: SAFETY ADULT  Goal: Patient will remain free of falls  Description: INTERVENTIONS:  - Assess patient frequently for physical needs  -  Identify cognitive and physical deficits and behaviors that affect risk of falls    -  [place] fall precautions as indicated by assessment   - Educate patient/family on patient safety including physical limitations  - Instruct patient to call for assistance with activity based on assessment  - Modify environment to reduce risk of injury  - Consider OT/PT consult to assist with strengthening/mobility  Outcome: Progressing     Problem: DISCHARGE PLANNING  Goal: Discharge to home or other facility with appropriate resources  Description: INTERVENTIONS:  - Identify barriers to discharge w/patient and caregiver  - Arrange for needed discharge resources and transportation as appropriate  - Identify discharge learning needs (meds, wound care, etc )  - Arrange for interpretive services to assist at discharge as needed  - Refer to Case Management Department for coordinating discharge planning if the patient needs post-hospital services based on physician/advanced practitioner order or complex needs related to functional status, cognitive ability, or social support system  Outcome: Progressing     Problem: Knowledge Deficit  Goal: Patient/family/caregiver demonstrates understanding of disease process, treatment plan, medications, and discharge instructions  Description: Complete learning assessment and assess knowledge base    Interventions:  - Provide teaching at level of understanding  - Provide teaching via preferred learning methods  Outcome: Progressing

## 2021-05-25 NOTE — PLAN OF CARE
Problem: Potential for Falls  Goal: Patient will remain free of falls  Description: INTERVENTIONS:  - Assess patient frequently for physical needs  -  Identify cognitive and physical deficits and behaviors that affect risk of falls  -  [place] fall precautions as indicated by assessment   - Educate patient/family on patient safety including physical limitations  - Instruct patient to call for assistance with activity based on assessment  - Modify environment to reduce risk of injury  - Consider OT/PT consult to assist with strengthening/mobility  Outcome: Progressing     Problem: Nutrition/Hydration-ADULT  Goal: Nutrient/Hydration intake appropriate for improving, restoring or maintaining nutritional needs  Description: Monitor and assess patient's nutrition/hydration status for malnutrition  Collaborate with interdisciplinary team and initiate plan and interventions as ordered  Monitor patient's weight and dietary intake as ordered or per policy  Utilize nutrition screening tool and intervene as necessary  Determine patient's food preferences and provide high-protein, high-caloric foods as appropriate       INTERVENTIONS:  - Monitor oral intake, urinary output, labs, and treatment plans  - Assess nutrition and hydration status and recommend course of action  - Evaluate amount of meals eaten  - Assist patient with eating if necessary   - Allow adequate time for meals  - Recommend/ encourage appropriate diets, oral nutritional supplements, and vitamin/mineral supplements  - Order, calculate, and assess calorie counts as needed  - Recommend, monitor, and adjust tube feedings and TPN/PPN based on assessed needs  - Assess need for intravenous fluids  - Provide specific nutrition/hydration education as appropriate  - Include patient/family/caregiver in decisions related to nutrition  Outcome: Progressing     Problem: INFECTION - ADULT  Goal: Absence or prevention of progression during hospitalization  Description: INTERVENTIONS:  - Assess and monitor for signs and symptoms of infection  - Monitor lab/diagnostic results  - Monitor all insertion sites, i e  indwelling lines, tubes, and drains  - Monitor endotracheal if appropriate and nasal secretions for changes in amount and color  - [place] appropriate cooling/warming therapies per order  - Administer medications as ordered  - Instruct and encourage patient and family to use good hand hygiene technique  - Identify and instruct in appropriate isolation precautions for identified infection/condition  Outcome: Progressing     Problem: SAFETY ADULT  Goal: Patient will remain free of falls  Description: INTERVENTIONS:  - Assess patient frequently for physical needs  -  Identify cognitive and physical deficits and behaviors that affect risk of falls    -  [place] fall precautions as indicated by assessment   - Educate patient/family on patient safety including physical limitations  - Instruct patient to call for assistance with activity based on assessment  - Modify environment to reduce risk of injury  - Consider OT/PT consult to assist with strengthening/mobility  Outcome: Progressing     Problem: DISCHARGE PLANNING  Goal: Discharge to home or other facility with appropriate resources  Description: INTERVENTIONS:  - Identify barriers to discharge w/patient and caregiver  - Arrange for needed discharge resources and transportation as appropriate  - Identify discharge learning needs (meds, wound care, etc )  - Arrange for interpretive services to assist at discharge as needed  - Refer to Case Management Department for coordinating discharge planning if the patient needs post-hospital services based on physician/advanced practitioner order or complex needs related to functional status, cognitive ability, or social support system  Outcome: Progressing     Problem: Knowledge Deficit  Goal: Patient/family/caregiver demonstrates understanding of disease process, treatment plan, medications, and discharge instructions  Description: Complete learning assessment and assess knowledge base    Interventions:  - Provide teaching at level of understanding  - Provide teaching via preferred learning methods  Outcome: Progressing

## 2021-05-26 NOTE — ASSESSMENT & PLAN NOTE
· With history of ileus  · Home opioid regimen on hold due to patient being NPO  · Dulcolax supp qd  ·   Use lidocaine patch for back pain  ·   Reduce IV opioid use p r n  Aidan Gilmore · Relistor 8 mg sc q d  X3 doses administered so far

## 2021-05-26 NOTE — PLAN OF CARE
Problem: Potential for Falls  Goal: Patient will remain free of falls  Description: INTERVENTIONS:  - Assess patient frequently for physical needs  -  Identify cognitive and physical deficits and behaviors that affect risk of falls  -  [place] fall precautions as indicated by assessment   - Educate patient/family on patient safety including physical limitations  - Instruct patient to call for assistance with activity based on assessment  - Modify environment to reduce risk of injury  - Consider OT/PT consult to assist with strengthening/mobility  Outcome: Progressing     Problem: Nutrition/Hydration-ADULT  Goal: Nutrient/Hydration intake appropriate for improving, restoring or maintaining nutritional needs  Description: Monitor and assess patient's nutrition/hydration status for malnutrition  Collaborate with interdisciplinary team and initiate plan and interventions as ordered  Monitor patient's weight and dietary intake as ordered or per policy  Utilize nutrition screening tool and intervene as necessary  Determine patient's food preferences and provide high-protein, high-caloric foods as appropriate       INTERVENTIONS:  - Monitor oral intake, urinary output, labs, and treatment plans  - Assess nutrition and hydration status and recommend course of action  - Evaluate amount of meals eaten  - Assist patient with eating if necessary   - Allow adequate time for meals  - Recommend/ encourage appropriate diets, oral nutritional supplements, and vitamin/mineral supplements  - Order, calculate, and assess calorie counts as needed  - Recommend, monitor, and adjust tube feedings and TPN/PPN based on assessed needs  - Assess need for intravenous fluids  - Provide specific nutrition/hydration education as appropriate  - Include patient/family/caregiver in decisions related to nutrition  Outcome: Progressing     Problem: PAIN - ADULT  Goal: Verbalizes/displays adequate comfort level or baseline comfort level  Description: Interventions:  - Encourage patient to monitor pain and request assistance  - Assess pain using appropriate pain scale  - Administer analgesics based on type and severity of pain and evaluate response  - Implement non-pharmacological measures as appropriate and evaluate response  - Consider cultural and social influences on pain and pain management  - Notify physician/advanced practitioner if interventions unsuccessful or patient reports new pain  Outcome: Progressing     Problem: INFECTION - ADULT  Goal: Absence or prevention of progression during hospitalization  Description: INTERVENTIONS:  - Assess and monitor for signs and symptoms of infection  - Monitor lab/diagnostic results  - Monitor all insertion sites, i e  indwelling lines, tubes, and drains  - Monitor endotracheal if appropriate and nasal secretions for changes in amount and color  - [place] appropriate cooling/warming therapies per order  - Administer medications as ordered  - Instruct and encourage patient and family to use good hand hygiene technique  - Identify and instruct in appropriate isolation precautions for identified infection/condition  Outcome: Progressing     Problem: SAFETY ADULT  Goal: Patient will remain free of falls  Description: INTERVENTIONS:  - Assess patient frequently for physical needs  -  Identify cognitive and physical deficits and behaviors that affect risk of falls    -  [place] fall precautions as indicated by assessment   - Educate patient/family on patient safety including physical limitations  - Instruct patient to call for assistance with activity based on assessment  - Modify environment to reduce risk of injury  - Consider OT/PT consult to assist with strengthening/mobility  Outcome: Progressing     Problem: DISCHARGE PLANNING  Goal: Discharge to home or other facility with appropriate resources  Description: INTERVENTIONS:  - Identify barriers to discharge w/patient and caregiver  - Arrange for needed discharge resources and transportation as appropriate  - Identify discharge learning needs (meds, wound care, etc )  - Arrange for interpretive services to assist at discharge as needed  - Refer to Case Management Department for coordinating discharge planning if the patient needs post-hospital services based on physician/advanced practitioner order or complex needs related to functional status, cognitive ability, or social support system  Outcome: Progressing     Problem: Knowledge Deficit  Goal: Patient/family/caregiver demonstrates understanding of disease process, treatment plan, medications, and discharge instructions  Description: Complete learning assessment and assess knowledge base    Interventions:  - Provide teaching at level of understanding  - Provide teaching via preferred learning methods  Outcome: Progressing

## 2021-05-26 NOTE — PROGRESS NOTES
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  CT scan with distention, albeit stable     · Continue D5 1/2  ml/h  ·  Continue home pain regimen when able to tolerate po meds  For now patient will continue Dilaudid IV for pain  · NGT/bowel regimen - defer to surgical team   · Monitor electrolytes  Short gut syndrome  Assessment & Plan  · Patient has 5 feet of small bowel as per patient   · Surgical team on board  Enterocutaneous fistula  Assessment & Plan  · Noted history   · Continue home pain regimen   · Surgical consultation     Chronic, continuous use of opioids  Assessment & Plan  · With history of ileus  · Home opioid regimen on hold due to patient being NPO  · Dulcolax supp qd  ·   Use lidocaine patch for back pain  ·   Reduce IV opioid use p r n  Robbie Stevenson · Relistor 8 mg sc q d  X3 doses administered so far  VTE Pharmacologic Prophylaxis:   Pharmacologic: Heparin  Mechanical VTE Prophylaxis in Place: Yes    Discussions with Specialists or Other Care Team Provider:   Surgical team on board  PT team     Education and Discussions with Family / Patient:    Bill    Current Length of Stay: 3 day(s)    Current Patient Status: Inpatient     Discharge Plan / Estimated Discharge Date: >72 hours  Bowel obstruction    Code Status: Level 1 - Full Code      Subjective:    patient was awake and laying in bed  She seemed frustrated that she is still not able to have a bowel movement early this morning    She still has significant amount of fluid through her NG tube  She states that she is getting frustrated about being chronically ill with abdominal issues  She admits to be in significant back pain as well as abdominal pain  Later in the morning patient was reported by nurse to have a small bowel movement  And continues to be on Relistor  She denies any fever, chills, dizziness, headaches, body aches, CP, SOB, numbness or tingling sensation  Objective:     Vitals:   Temp (24hrs), Av 4 °F (36 9 °C), Min:97 9 °F (36 6 °C), Max:98 7 °F (37 1 °C)    Temp:  [97 9 °F (36 6 °C)-98 7 °F (37 1 °C)] 97 9 °F (36 6 °C)  HR:  [55-91] 58  Resp:  [18] 18  BP: (110-121)/(62-69) 121/62  SpO2:  [93 %-96 %] 93 %  There is no height or weight on file to calculate BMI  Input and Output Summary (last 24 hours): Intake/Output Summary (Last 24 hours) at 2021 1402  Last data filed at 2021 1240  Gross per 24 hour   Intake 1060 ml   Output 1700 ml   Net -640 ml       Physical Exam:     Physical Exam  Vitals signs and nursing note reviewed  Constitutional:       General: She is not in acute distress  Appearance: Normal appearance  She is not ill-appearing, toxic-appearing or diaphoretic  HENT:      Head: Normocephalic and atraumatic  Nose: Nose normal       Comments: NG tube in-situ  Mouth/Throat:      Mouth: Mucous membranes are moist       Pharynx: No oropharyngeal exudate or posterior oropharyngeal erythema  Eyes:      General: No scleral icterus  Right eye: No discharge  Left eye: No discharge  Pupils: Pupils are equal, round, and reactive to light  Neck:      Musculoskeletal: Normal range of motion and neck supple  No neck rigidity or muscular tenderness  Vascular: No carotid bruit  Cardiovascular:      Rate and Rhythm: Normal rate and regular rhythm  Pulses: Normal pulses  Heart sounds: Normal heart sounds  No murmur  No friction rub  No gallop      Pulmonary:      Effort: Pulmonary effort is normal  No respiratory distress  Breath sounds: Normal breath sounds  No stridor  No wheezing, rhonchi or rales  Abdominal:      Palpations: Abdomen is soft  Tenderness: There is no abdominal tenderness  There is no right CVA tenderness, left CVA tenderness, guarding or rebound  Comments: No bowel sounds  NG tube sound appreciated  Musculoskeletal:         General: No swelling, tenderness or deformity  Right lower leg: No edema  Left lower leg: No edema  Lymphadenopathy:      Cervical: No cervical adenopathy  Skin:     General: Skin is warm and dry  Capillary Refill: Capillary refill takes less than 2 seconds  Coloration: Skin is not jaundiced  Findings: No lesion or rash  Neurological:      General: No focal deficit present  Mental Status: She is alert and oriented to person, place, and time  Mental status is at baseline  Motor: No weakness  Psychiatric:         Mood and Affect: Mood normal          Behavior: Behavior normal          Thought Content: Thought content normal          Judgment: Judgment normal        Additional Data:     Labs:    Results from last 7 days   Lab Units 05/26/21  0514   WBC Thousand/uL 3 75*   HEMOGLOBIN g/dL 11 0*   HEMATOCRIT % 35 0   PLATELETS Thousands/uL 179   NEUTROS PCT % 46   LYMPHS PCT % 37   MONOS PCT % 9   EOS PCT % 7*     Results from last 7 days   Lab Units 05/26/21  0514   POTASSIUM mmol/L 3 7   CHLORIDE mmol/L 109*   CO2 mmol/L 30   BUN mg/dL 8   CREATININE mg/dL 0 78   CALCIUM mg/dL 8 3   ALK PHOS U/L 119*   ALT U/L 47   AST U/L 37           * I Have Reviewed All Lab Data Listed Above  * Additional Pertinent Lab Tests Reviewed:  All Labs Within Last 24 Hours Reviewed    Imaging:    Imaging Reports Reviewed Today Include: None  Imaging Personally Reviewed by Myself Includes:  None    Recent Cultures (last 7 days):           Last 24 Hours Medication List:   Current Facility-Administered Medications   Medication Dose Route Frequency Provider Last Rate    bisacodyl  10 mg Rectal Daily Viktoria Guallpa MD      dextrose 5 % and sodium chloride 0 45 % with KCl 20 mEq/L  100 mL/hr Intravenous Continuous Aries Oneill  mL/hr (05/26/21 0332)    heparin (porcine)  5,000 Units Subcutaneous Q8H [facility] Migdalia Arriaza MD      HYDROmorphone  0 5 mg Intravenous Q4H PRN CAITLYN Beavers      HYDROmorphone  1 mg Intravenous Q4H PRN CAITLYN Beavers      lidocaine  2 patch Topical Q24H Viki Shi MD      methylnaltrexone bromide  8 mg Subcutaneous Daily Aries Oneill MD      ondansetron  4 mg Intravenous Q6H PRN Bogdan Peace PA-C      pantoprazole  40 mg Intravenous Q24H Erasmo Madrigal MD      phenol  1 spray Mouth/Throat Q2H PRN Viki Shi MD          Today, Patient Was Seen By: Viki Shi MD    ** Please Note: This note has been constructed using a voice recognition system   **

## 2021-05-26 NOTE — PROGRESS NOTES
Progress Note - General Surgery   Jane Beal 73 y o  female MRN: [id number]  Unit/Bed#: S -27 Encounter: [id number]    Assessment:  73 F with short gut syndrome and small-bowel fistula, admitted with opioid induced constipation and colonic pseudo-obstruction    Plan:   Continue daily relistor   Continue NPO/NGT   OOB/ambulate frequently   Await return of bowel function   Continue nocturnal TPN    Subjective/Objective     Subjective:   No acute events overnight  Reports no return of bowel function yet  States she has not been ambulating much  Objective:    Blood pressure 121/62, pulse 58, temperature 97 9 °F (36 6 °C), temperature source Oral, resp  rate 18, SpO2 93 %, not currently breastfeeding  ,There is no height or weight on file to calculate BMI        Intake/Output Summary (Last 24 hours) at 5/26/2021 0753  Last data filed at 5/26/2021 0516  Gross per 24 hour   Intake 1180 ml   Output 1525 ml   Net -345 ml       Invasive Devices     Peripherally Inserted Central Catheter Line            PICC Line 77/48/53 Left Basilic 84 days    PICC Line 40/06/91 Left Basilic 83 days          Drain            Closed/Suction Drain Other (Comment) -- days    NG/OG/Enteral Tube Nasogastric 16 Fr Right nares 3 days                Physical Exam:   Gen:  NAD  CV:  warm, well-perfused  Lungs: nl effort  Abd:  soft, tender in epigastrium   ECF with scant drainage in wound manager   numerous well-healed surgical scars  Ext:  no CCE  Neuro: A&Ox3     Results from last 7 days   Lab Units 05/26/21  0514 05/25/21  0510 05/24/21  0500   WBC Thousand/uL 3 75* 4 50 4 68   HEMOGLOBIN g/dL 11 0* 10 0* 10 4*   HEMATOCRIT % 35 0 31 1* 32 8*   PLATELETS Thousands/uL 179 202 197     Results from last 7 days   Lab Units 05/26/21  0514 05/25/21  0510 05/24/21  0500   POTASSIUM mmol/L 3 7 3 6 3 5   CHLORIDE mmol/L 109* 107 107   CO2 mmol/L 30 31 28   BUN mg/dL 8 10 8   CREATININE mg/dL 0 78 0 71 0 78   CALCIUM mg/dL 8 3 8 0* 8 0*

## 2021-05-26 NOTE — PROGRESS NOTES
Progress Note - General Surgery   Brant Nance 73 y o  female MRN: [id number]  Unit/Bed#: S -65 Encounter: [id number]    Assessment:  73 F with short gut syndrome and small-bowel fistula, admitted with opioid induced constipation and colonic pseudo-obstruction  Nasogastric tube removed, and diet commenced on 05/26  VSS, afebrile    UOP:  Multiple unmeasured occurrences  -multiple small loose bowel movements in the prior 24 hours    Abdomen is soft, mildly tender diffusely, fistula with well fitted appliance and no output, nondistended      A m  Labs pending      Plan:  -continue diet  -continue Relistor  -frequent ambulation, time out of bed  -nocturnal TPN as per home regimen  -continue Protonix  -continue bowel regimen  -patient will require opioid reduction and cessation moving forward prevent repeated episodes of this functional problem  -consider resumption of Gattex upon discharge when bowel function regulated    Subjective/Objective     Subjective:  No acute events transpired overnight  Objective:     Blood pressure 110/62, pulse 67, temperature 98 5 °F (36 9 °C), temperature source Oral, resp  rate 18, SpO2 94 %, not currently breastfeeding  ,There is no height or weight on file to calculate BMI        Intake/Output Summary (Last 24 hours) at 5/27/2021 8355  Last data filed at 5/26/2021 1429  Gross per 24 hour   Intake 1573 33 ml   Output 300 ml   Net 1273 33 ml       Invasive Devices     Peripherally Inserted Central Catheter Line            PICC Line 48/23/03 Left Basilic 85 days    PICC Line 58/69/73 Left Basilic 84 days          Drain            Closed/Suction Drain Other (Comment) -- days                Physical Exam:     General:  No acute distress  HEENT:  Normocephalic, atraumatic  Cardiovascular:  Regular rate and rhythm  Respiratory:  No distress, comfortable on room air  Abdomen:  Soft, mild diffuse tenderness, nondistended, EC fistula with well fitted appliance bag, no contents in bag  Extremities:  No clubbing or cyanosis, no pitting edema  Neuro:  Awake, alert, and oriented x3  Skin:  Warm, dry, no jaundice    Lab, Imaging and other studies:  Pending  VTE Pharmacologic Prophylaxis: Heparin  VTE Mechanical Prophylaxis: sequential compression device

## 2021-05-27 NOTE — PHYSICAL THERAPY NOTE
PHYSICAL THERAPY CANCELLATION NOTE          Patient Name: Ophelia Carney  [id number] Date: 5/27/2021 05/27/21 0827   PT Last Visit   PT Visit Date 05/27/21   Note Type   Note type Evaluation   Cancel Reasons Other   Assessment   Assessment Pt eval orders received; chart review performed  Attempted to evaluate pt w/ pt declining/refusing PT services at this time w/ RN aware  Spoke to [facility] who said pt occasionally required assistance to maintain balance when ambulating  PT will continues to follow and attempt to evaluate pt's functional mobility during this admission as appropriate and as schedule allows

## 2021-05-27 NOTE — PLAN OF CARE
Problem: Potential for Falls  Goal: Patient will remain free of falls  Description: INTERVENTIONS:  - Assess patient frequently for physical needs  -  Identify cognitive and physical deficits and behaviors that affect risk of falls  -  [place] fall precautions as indicated by assessment   - Educate patient/family on patient safety including physical limitations  - Instruct patient to call for assistance with activity based on assessment  - Modify environment to reduce risk of injury  - Consider OT/PT consult to assist with strengthening/mobility  Outcome: Progressing     Problem: Nutrition/Hydration-ADULT  Goal: Nutrient/Hydration intake appropriate for improving, restoring or maintaining nutritional needs  Description: Monitor and assess patient's nutrition/hydration status for malnutrition  Collaborate with interdisciplinary team and initiate plan and interventions as ordered  Monitor patient's weight and dietary intake as ordered or per policy  Utilize nutrition screening tool and intervene as necessary  Determine patient's food preferences and provide high-protein, high-caloric foods as appropriate       INTERVENTIONS:  - Monitor oral intake, urinary output, labs, and treatment plans  - Assess nutrition and hydration status and recommend course of action  - Evaluate amount of meals eaten  - Assist patient with eating if necessary   - Allow adequate time for meals  - Recommend/ encourage appropriate diets, oral nutritional supplements, and vitamin/mineral supplements  - Order, calculate, and assess calorie counts as needed  - Recommend, monitor, and adjust tube feedings and TPN/PPN based on assessed needs  - Assess need for intravenous fluids  - Provide specific nutrition/hydration education as appropriate  - Include patient/family/caregiver in decisions related to nutrition  Outcome: Progressing     Problem: PAIN - ADULT  Goal: Verbalizes/displays adequate comfort level or baseline comfort level  Description: Interventions:  - Encourage patient to monitor pain and request assistance  - Assess pain using appropriate pain scale  - Administer analgesics based on type and severity of pain and evaluate response  - Implement non-pharmacological measures as appropriate and evaluate response  - Consider cultural and social influences on pain and pain management  - Notify physician/advanced practitioner if interventions unsuccessful or patient reports new pain  Outcome: Progressing     Problem: INFECTION - ADULT  Goal: Absence or prevention of progression during hospitalization  Description: INTERVENTIONS:  - Assess and monitor for signs and symptoms of infection  - Monitor lab/diagnostic results  - Monitor all insertion sites, i e  indwelling lines, tubes, and drains  - Monitor endotracheal if appropriate and nasal secretions for changes in amount and color  - [place] appropriate cooling/warming therapies per order  - Administer medications as ordered  - Instruct and encourage patient and family to use good hand hygiene technique  - Identify and instruct in appropriate isolation precautions for identified infection/condition  Outcome: Progressing     Problem: SAFETY ADULT  Goal: Patient will remain free of falls  Description: INTERVENTIONS:  - Assess patient frequently for physical needs  -  Identify cognitive and physical deficits and behaviors that affect risk of falls    -  [place] fall precautions as indicated by assessment   - Educate patient/family on patient safety including physical limitations  - Instruct patient to call for assistance with activity based on assessment  - Modify environment to reduce risk of injury  - Consider OT/PT consult to assist with strengthening/mobility  Outcome: Progressing     Problem: DISCHARGE PLANNING  Goal: Discharge to home or other facility with appropriate resources  Description: INTERVENTIONS:  - Identify barriers to discharge w/patient and caregiver  - Arrange for needed discharge resources and transportation as appropriate  - Identify discharge learning needs (meds, wound care, etc )  - Arrange for interpretive services to assist at discharge as needed  - Refer to Case Management Department for coordinating discharge planning if the patient needs post-hospital services based on physician/advanced practitioner order or complex needs related to functional status, cognitive ability, or social support system  Outcome: Progressing     Problem: Knowledge Deficit  Goal: Patient/family/caregiver demonstrates understanding of disease process, treatment plan, medications, and discharge instructions  Description: Complete learning assessment and assess knowledge base    Interventions:  - Provide teaching at level of understanding  - Provide teaching via preferred learning methods  Outcome: Progressing

## 2021-05-27 NOTE — PLAN OF CARE
LSW spoke with pt and her spouse. Spouse is primary care giver at home and he reports that pt becomes more confused in the evenings and she has become violent at times. He feels that he cannot manage her as she is and that her meds will need adjusted. We discussed options and spouse feels that [facility] would be a good option if physicians all agree. If they do not want her at [facility] then he would like [facility] as she has been there in the past.      Correction to above note. Caregiver is not pt's spouse. He is her friend of 14 years and she has been living in his home. Pt has a son in [place] but he has not seen her or spoken with her for many years. She has a brother and a mother living in [place] but they also have not spoken with pt for many years. Problem: Potential for Falls  Goal: Patient will remain free of falls  Description: INTERVENTIONS:  - Assess patient frequently for physical needs  -  Identify cognitive and physical deficits and behaviors that affect risk of falls  -  [place] fall precautions as indicated by assessment   - Educate patient/family on patient safety including physical limitations  - Instruct patient to call for assistance with activity based on assessment  - Modify environment to reduce risk of injury  - Consider OT/PT consult to assist with strengthening/mobility  5/27/2021 1201 by Emiliano Su RN  Outcome: Adequate for Discharge  5/27/2021 2530 by Emiliano Su RN  Outcome: Progressing     Problem: Nutrition/Hydration-ADULT  Goal: Nutrient/Hydration intake appropriate for improving, restoring or maintaining nutritional needs  Description: Monitor and assess patient's nutrition/hydration status for malnutrition  Collaborate with interdisciplinary team and initiate plan and interventions as ordered  Monitor patient's weight and dietary intake as ordered or per policy  Utilize nutrition screening tool and intervene as necessary  Determine patient's food preferences and provide high-protein, high-caloric foods as appropriate       INTERVENTIONS:  - Monitor oral intake, urinary output, labs, and treatment plans  - Assess nutrition and hydration status and recommend course of action  - Evaluate amount of meals eaten  - Assist patient with eating if necessary   - Allow adequate time for meals  - Recommend/ encourage appropriate diets, oral nutritional supplements, and vitamin/mineral supplements  - Order, calculate, and assess calorie counts as needed  - Recommend, monitor, and adjust tube feedings and TPN/PPN based on assessed needs  - Assess need for intravenous fluids  - Provide specific nutrition/hydration education as appropriate  - Include patient/family/caregiver in decisions related to nutrition  5/27/2021 1201 by Kate Patel RN  Outcome: Adequate for Discharge  5/27/2021 6024 by Kate Patel RN  Outcome: Progressing     Problem: PAIN - ADULT  Goal: Verbalizes/displays adequate comfort level or baseline comfort level  Description: Interventions:  - Encourage patient to monitor pain and request assistance  - Assess pain using appropriate pain scale  - Administer analgesics based on type and severity of pain and evaluate response  - Implement non-pharmacological measures as appropriate and evaluate response  - Consider cultural and social influences on pain and pain management  - Notify physician/advanced practitioner if interventions unsuccessful or patient reports new pain  5/27/2021 1201 by Kate Patel RN  Outcome: Adequate for Discharge  5/27/2021 2235 by Kate Patel RN  Outcome: Progressing     Problem: INFECTION - ADULT  Goal: Absence or prevention of progression during hospitalization  Description: INTERVENTIONS:  - Assess and monitor for signs and symptoms of infection  - Monitor lab/diagnostic results  - Monitor all insertion sites, i e  indwelling lines, tubes, and drains  - Monitor endotracheal if appropriate and nasal secretions for changes in amount and color  - [place] appropriate cooling/warming therapies per order  - Administer medications as ordered  - Instruct and encourage patient and family to use good hand hygiene technique  - Identify and instruct in appropriate isolation precautions for identified infection/condition  5/27/2021 1201 by Kate Patel RN  Outcome: Adequate for Discharge  5/27/2021 0355 by Kate Patel RN  Outcome: Progressing     Problem: SAFETY ADULT  Goal: Patient will remain free of falls  Description: INTERVENTIONS:  - Assess patient frequently for physical needs  -  Identify cognitive and physical deficits and behaviors that affect risk of falls    -  [place] fall precautions as indicated by assessment   - Educate patient/family on patient safety including physical limitations  - Instruct patient to call for assistance with activity based on assessment  - Modify environment to reduce risk of injury  - Consider OT/PT consult to assist with strengthening/mobility  5/27/2021 1201 by Edith Ferris RN  Outcome: Adequate for Discharge  5/27/2021 8308 by Edith Ferris RN  Outcome: Progressing     Problem: DISCHARGE PLANNING  Goal: Discharge to home or other facility with appropriate resources  Description: INTERVENTIONS:  - Identify barriers to discharge w/patient and caregiver  - Arrange for needed discharge resources and transportation as appropriate  - Identify discharge learning needs (meds, wound care, etc )  - Arrange for interpretive services to assist at discharge as needed  - Refer to Case Management Department for coordinating discharge planning if the patient needs post-hospital services based on physician/advanced practitioner order or complex needs related to functional status, cognitive ability, or social support system  5/27/2021 1201 by Edith Ferris RN  Outcome: Adequate for Discharge  5/27/2021 8630 by Edith Ferris RN  Outcome: Progressing     Problem: Knowledge Deficit  Goal: Patient/family/caregiver demonstrates understanding of disease process, treatment plan, medications, and discharge instructions  Description: Complete learning assessment and assess knowledge base    Interventions:  - Provide teaching at level of understanding  - Provide teaching via preferred learning methods  5/27/2021 1201 by Edith Ferris RN  Outcome: Adequate for Discharge  5/27/2021 6835 by Edith Ferris RN  Outcome: Progressing

## 2021-05-27 NOTE — DISCHARGE SUMMARY
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  CT scan with distention, albeit stable     · Resolved  · Follow-up with primary care provider as needed  · Follow-up with surgery outpatient as needed  · Follow-up with pain management specialist as scheduled  · Follow-up with opioid dependence rehab team outpatient  Short gut syndrome  Assessment & Plan  · Patient has 5 feet of small bowel as per patient   · Continue outpatient follow-up as needed  Enterocutaneous fistula  Assessment & Plan  · Noted history   · Continue home pain regimen   · Continue outpatient follow-up as needed  Chronic, continuous use of opioids  Assessment & Plan  · With history of ileus  · Continue pain regimen as scheduled outpatient  Patient encouraged to reduce her or  Medication as tolerated and follow-up for weaning or tapering off with pain management and possibly opioid rehab team outpatient  · Continue follow-up outpatient as needed with pain management  Discharging Resident Physician: Eugenie Meier MD  Attending: No att  providers found  PCP: Shahana Bee DO  Admission Date: 5/22/2021  Discharge Date: 05/27/21    Disposition:     Home    Reason for Admission:  Abdominal pain with opioid induced bowel obstruction  Consultations During Hospital Stay:  · Surgical team    Procedures Performed:     · None    Significant Findings / Test Results:     Xr Chest 1 View Portable Result Date: 5/23/2021  Impression: No acute cardiopulmonary disease        Ct Abdomen Pelvis With Contrast Result Date: 5/22/2021  · Impression: Fluid and gas noted in the inferior abdominal wall previously attributed to enterocutaneous fistula grossly unchanged  Stable distention of the colon and stomach  Incidental Findings:   · None    Test Results Pending at Discharge (will require follow up): · None     Outpatient Tests Requested:  · None    Complications:  None    Hospital Course:     Marian Schirmer is a 73 y o  female patient with PMH of enterocolonic fistula, short-gut syndrome S/P 35 abdominal surgeries, ileus and chronic back pain with 3 back surgeries on chronic pain medication who originally presented to the hospital on 5/22/2021 due to abdominal pain with associated nausea vomiting and constipation x3 days  Patient admits to having a history of short-gut syndrome and usually has about 10 or more watery diarrhea bowel movements per day worse at night due to 35 previous abdominal surgeries  Patient started experiencing constipation for about 3 days which is suspected to be secondary to opioid use  Patient thought that the symptoms would pass however symptoms worsened and she was unable to tolerate any food p o  Due to nausea and vomiting  In the ED patient was afebrile and hemodynamically stable  On physical exam she was in acute distress and had decreased bowel sounds with abdominal distension and tenderness  CBC was remarkable for chronic anemia at baseline with a hemoglobin of 10 6  CMP was otherwise unremarkable  Alk-phos was mildly elevated at 143  Lactic acid was normal   CXR was benign  CT abdomen and pelvis was significant for fluid and gas noted in the inferior abdominal wall previously attributed to enterocutaneous fistula grossly unchanged  Stable distension of the colon and stomach were also noted  EKG showed NSR with HR 78 be p m  Geovani Manifold Patient was admitted for bowel obstruction and surgical team was consulted      During hospitalization patient remained hemodynamically stable  NG tube was placed and patient was NPO  She received Relistor daily and bowel movements gradually increased as her hospital stay progressed  She was discharged in stable condition after having multiple bowel movements in a day and able to tolerate diet without nausea and vomiting  Patient encouraged to reduce her or quantity of opioid medication and to work with Pain Management outpatient for a taper and eventual complete weaning off of the opioid medication to avoid recurrence of similar symptoms as this has occurred several times in the past leading to multiple hospitalizations and surgeries  Condition at Discharge: stable     Discharge Day Visit / Exam:     Subjective:    Vitals: Blood Pressure: 140/76 (05/27/21 0736)  Pulse: 63 (05/27/21 0736)  Temperature: 98 8 °F (37 1 °C) (05/27/21 0736)  Temp Source: Oral (05/27/21 0736)  Respirations: 20 (05/27/21 0736)  SpO2: 97 % (05/27/21 0736)    Exam:   Physical Exam  Vitals signs and nursing note reviewed  Constitutional:       General: She is not in acute distress  Appearance: Normal appearance  She is not ill-appearing, toxic-appearing or diaphoretic  HENT:      Head: Normocephalic and atraumatic  Nose: Nose normal       Mouth/Throat:      Mouth: Mucous membranes are moist       Pharynx: No oropharyngeal exudate or posterior oropharyngeal erythema  Eyes:      General: No scleral icterus  Right eye: No discharge  Left eye: No discharge  Pupils: Pupils are equal, round, and reactive to light  Neck:      Musculoskeletal: Normal range of motion and neck supple  No neck rigidity or muscular tenderness  Vascular: No carotid bruit  Cardiovascular:      Rate and Rhythm: Normal rate and regular rhythm  Pulses: Normal pulses  Heart sounds: Normal heart sounds  No murmur  No friction rub  No gallop  Pulmonary:      Effort: Pulmonary effort is normal  No respiratory distress  Breath sounds: Normal breath sounds  No stridor  No wheezing, rhonchi or rales  Abdominal:      General: Bowel sounds are normal       Palpations: Abdomen is soft  Tenderness: There is no abdominal tenderness  There is no right CVA tenderness, left CVA tenderness, guarding or rebound  Comments: Ostomy bag in-situ  No stool in the ostomy bag as it had just been changed  Musculoskeletal:         General: No swelling, tenderness or deformity  Right lower leg: No edema  Left lower leg: No edema  Lymphadenopathy:      Cervical: No cervical adenopathy  Skin:     General: Skin is warm and dry  Capillary Refill: Capillary refill takes less than 2 seconds  Coloration: Skin is not jaundiced  Findings: No lesion or rash  Neurological:      General: No focal deficit present  Mental Status: She is alert and oriented to person, place, and time  Mental status is at baseline  Motor: No weakness  Psychiatric:         Mood and Affect: Mood normal          Behavior: Behavior normal          Thought Content: Thought content normal          Judgment: Judgment normal          Discussion with Family:      Discharge instructions/Information to patient and family:   See after visit summary for information provided to patient and family  Provisions for Follow-Up Care:  See after visit summary for information related to follow-up care and any pertinent home health orders  Planned Readmission:  None  Discharge Medications:  See after visit summary for reconciled discharge medications provided to patient and family        ** Please Note: This note has been constructed using a voice recognition system **

## 2021-05-27 NOTE — DISCHARGE INSTR - AVS FIRST PAGE
Dear Anna Clemens,     It was our pleasure to care for you here at [facility] [address]  It is our hope that we were always able to exceed the expected standards for your care during your stay  You were hospitalized due to ***  You were cared for on the *** floor by Benson Torres MD under the service of King Cresencio MD with the [facility] who covers for your primary care physician (PCP), Miriam Mejia DO, while you were hospitalized  If you have any questions or concerns related to this hospitalization, you may contact us at [phone]  For follow up as well as any medication refills, we recommend that you follow up with your primary care physician  A registered nurse will reach out to you by phone within a few days after your discharge to answer any additional questions that you may have after going home  However, at this time we provide for you here, the most important instructions / recommendations at discharge:     · Notable Medication Adjustments -   · No Medication changes  · Please resume TPN regimen as prior to hospitalization  · Testing Required after Discharge -   · None  · Important follow up information -   · Please follow up with Primary Care Doctor in one week  · Please follow up with Pain Management Specialist for pain medication management and possible weaning off of opioids  · Please follow up with Case management about Opioid Detoxification programs  · Other Instructions -   Practice social distancing  Adequate hand washing hygiene  Wear mask in public at all times  · Please review this entire after visit summary as additional general instructions including medication list, appointments, activity, diet, any pertinent wound care, and other additional recommendations from your care team that may be provided for you        Sincerely,     Benson Torres MD

## 2021-05-27 NOTE — PLAN OF CARE
Problem: Potential for Falls  Goal: Patient will remain free of falls  Description: INTERVENTIONS:  - Assess patient frequently for physical needs  -  Identify cognitive and physical deficits and behaviors that affect risk of falls  -  [place] fall precautions as indicated by assessment   - Educate patient/family on patient safety including physical limitations  - Instruct patient to call for assistance with activity based on assessment  - Modify environment to reduce risk of injury  - Consider OT/PT consult to assist with strengthening/mobility  Outcome: Progressing     Problem: Nutrition/Hydration-ADULT  Goal: Nutrient/Hydration intake appropriate for improving, restoring or maintaining nutritional needs  Description: Monitor and assess patient's nutrition/hydration status for malnutrition  Collaborate with interdisciplinary team and initiate plan and interventions as ordered  Monitor patient's weight and dietary intake as ordered or per policy  Utilize nutrition screening tool and intervene as necessary  Determine patient's food preferences and provide high-protein, high-caloric foods as appropriate       INTERVENTIONS:  - Monitor oral intake, urinary output, labs, and treatment plans  - Assess nutrition and hydration status and recommend course of action  - Evaluate amount of meals eaten  - Assist patient with eating if necessary   - Allow adequate time for meals  - Recommend/ encourage appropriate diets, oral nutritional supplements, and vitamin/mineral supplements  - Order, calculate, and assess calorie counts as needed  - Recommend, monitor, and adjust tube feedings and TPN/PPN based on assessed needs  - Assess need for intravenous fluids  - Provide specific nutrition/hydration education as appropriate  - Include patient/family/caregiver in decisions related to nutrition  Outcome: Progressing     Problem: PAIN - ADULT  Goal: Verbalizes/displays adequate comfort level or baseline comfort level  Description: Interventions:  - Encourage patient to monitor pain and request assistance  - Assess pain using appropriate pain scale  - Administer analgesics based on type and severity of pain and evaluate response  - Implement non-pharmacological measures as appropriate and evaluate response  - Consider cultural and social influences on pain and pain management  - Notify physician/advanced practitioner if interventions unsuccessful or patient reports new pain  Outcome: Progressing     Problem: INFECTION - ADULT  Goal: Absence or prevention of progression during hospitalization  Description: INTERVENTIONS:  - Assess and monitor for signs and symptoms of infection  - Monitor lab/diagnostic results  - Monitor all insertion sites, i e  indwelling lines, tubes, and drains  - Monitor endotracheal if appropriate and nasal secretions for changes in amount and color  - [place] appropriate cooling/warming therapies per order  - Administer medications as ordered  - Instruct and encourage patient and family to use good hand hygiene technique  - Identify and instruct in appropriate isolation precautions for identified infection/condition  Outcome: Progressing     Problem: SAFETY ADULT  Goal: Patient will remain free of falls  Description: INTERVENTIONS:  - Assess patient frequently for physical needs  -  Identify cognitive and physical deficits and behaviors that affect risk of falls    -  [place] fall precautions as indicated by assessment   - Educate patient/family on patient safety including physical limitations  - Instruct patient to call for assistance with activity based on assessment  - Modify environment to reduce risk of injury  - Consider OT/PT consult to assist with strengthening/mobility  Outcome: Progressing     Problem: DISCHARGE PLANNING  Goal: Discharge to home or other facility with appropriate resources  Description: INTERVENTIONS:  - Identify barriers to discharge w/patient and caregiver  - Arrange for needed discharge resources and transportation as appropriate  - Identify discharge learning needs (meds, wound care, etc )  - Arrange for interpretive services to assist at discharge as needed  - Refer to Case Management Department for coordinating discharge planning if the patient needs post-hospital services based on physician/advanced practitioner order or complex needs related to functional status, cognitive ability, or social support system  Outcome: Progressing     Problem: Knowledge Deficit  Goal: Patient/family/caregiver demonstrates understanding of disease process, treatment plan, medications, and discharge instructions  Description: Complete learning assessment and assess knowledge base    Interventions:  - Provide teaching at level of understanding  - Provide teaching via preferred learning methods  Outcome: Progressing

## 2021-05-27 NOTE — ASSESSMENT & PLAN NOTE
· With history of ileus  · Continue pain regimen as scheduled outpatient  Patient encouraged to reduce her or  Medication as tolerated and follow-up for weaning or tapering off with pain management and possibly opioid rehab team outpatient  · Continue follow-up outpatient as needed with pain management

## 2021-05-27 NOTE — ASSESSMENT & PLAN NOTE
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

## 2021-05-27 NOTE — CASE MANAGEMENT
CM informed pt is ready for d/c      CM met with pt who reports she is open to [facility] and [facility] for TPN infusions  Pt aware CM will send referrals for [facility]  IMM also reviewed - signature obtained and placed in medical records bin  TC from [address] [place] [address] - dietician with [facility]  Nacho Parks requesting clinicals including CMC, CMP, HP consults and progress notes  Also labs for mag and phos (SLIM aware)  Also requesting order for "resumption of TPN prior to hospitalization"  Nacho Parks requested this is all faxed to her at [phone]  [facility] able to resume care - F/U providers updated  Clinicals and order faxed to Shary[facility] Pool  TC to Sh[facility] Pool and she is aware

## 2021-06-04 NOTE — TELEPHONE ENCOUNTER
Tristan Francisco from [facility] needs an updated home health care order  She said it was faxed on 06/02   Fax # [phone]

## 2021-06-10 NOTE — TELEPHONE ENCOUNTER
Patient LM stating it is the 3rd call  She needs to know when she is scheduled to have her stent removed

## 2021-06-10 NOTE — TELEPHONE ENCOUNTER
Called pt and advised her the EGD with stent removal is scheduled on the 16th and will be called with a time the day before     Pt voiced understanding

## 2021-06-11 NOTE — TELEPHONE ENCOUNTER
Spoke with patient and confirmed her stent removal is scheduled for 6/16/21  b/l SDH s/p multiple falls  3/20/19 s/p bilateral craniotomies for evacuation of SDHs (subdural hematomas)  3/17/19 s/p right forearm cast for right distal radius fracture s/p multiple falls  3/20/19 s/p bilateral craniotomies for evacuation of SDHs (subdural hematomas)  3/17/19 s/p casting of right distal radius fracture

## 2021-06-11 NOTE — TELEPHONE ENCOUNTER
Patient called to find out what day she is schedule to have her stent removed  Could it be 06/16/21?  Please call Donis Villatoro at [phone] ty

## 2021-06-16 NOTE — ANESTHESIA PREPROCEDURE EVALUATION
Procedure:  EGD    Relevant Problems   GI/HEPATIC   (+) Pancreatic divisum      MUSCULOSKELETAL   (+) Chronic midline thoracic back pain   (+) DDD (degenerative disc disease), lumbosacral      NEURO/PSYCH   (+) Chronic midline thoracic back pain   (+) Chronic pain disorder   (+) Chronic, continuous use of opioids   (+) Depression   (+) Generalized anxiety disorder   (+) History of urinary retention   (+) Hx of colonic polyp     Had an emergency tracheostomy last year after her last abdominal surgery, decannulated 2 weeks later still have chronic cough since     * Abdominal pain with distention, not passing gas/stool, vomiting  Assessment & Plan  · POA, patient with abdominal pain, distention, vomiting everything she intakes, and not passing gas  Noted 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 Resolved  ? Follow-up with primary care provider as needed  ? Follow-up with surgery outpatient as needed  ? Follow-up with pain management specialist as scheduled  ? Follow-up with opioid dependence rehab team outpatient      Short gut syndrome  Assessment & Plan  · Patient has 5 feet of small bowel as per patient   ? Continue outpatient follow-up as needed      Enterocutaneous fistula  Assessment & Plan  · Noted history   ? Continue home pain regimen   ? Continue outpatient follow-up as needed      Chronic, continuous use of opioids  Assessment & Plan  · With history of ileus  · Continue pain regimen as scheduled outpatient  Patient encouraged to reduce her or  Medication as tolerated and follow-up for weaning or tapering off with pain management and possibly opioid rehab team outpatient  ?  Continue follow-up outpatient as needed with pain management      Physical Exam    Airway    Mallampati score: III  TM Distance: >3 FB  Neck ROM: full     Dental       Cardiovascular  Cardiovascular exam normal    Pulmonary  Pulmonary exam normal     Other Findings      73 y o  female patient with PMH of enterocolonic fistula, short-gut syndrome S/P 35 abdominal surgeries, ileus and chronic back pain with 3 back surgeries on chronic pain medication who originally presented to the hospital on 5/22/2021 due to abdominal pain with associated nausea vomiting and constipation x3 days  Patient admits to having a history of short-gut syndrome and usually has about 10 or more watery diarrhea bowel movements per day worse at night due to 35 previous abdominal surgeries  Patient started experiencing constipation for about 3 days which is suspected to be secondary to opioid use  Patient thought that the symptoms would pass however symptoms worsened and she was unable to tolerate any food p o  Due to nausea and vomiting  Anesthesia Plan  ASA Score- 4     Anesthesia Type- IV sedation with anesthesia with ASA Monitors  Additional Monitors:   Airway Plan:           Plan Factors-Exercise tolerance (METS): <4 METS  Chart reviewed  EKG reviewed  Existing labs reviewed  Patient summary reviewed  Induction- intravenous  Postoperative Plan-     Informed Consent- Anesthetic plan and risks discussed with patient  I personally reviewed this patient with the CRNA  Discussed and agreed on the Anesthesia Plan with the CRNA  Efraín Ovalles

## 2021-06-16 NOTE — ANESTHESIA POSTPROCEDURE EVALUATION
Post-Op Assessment Note    CV Status:  Stable  Pain Score: 0    Pain management: adequate     Mental Status:  Alert and awake   Hydration Status:  Euvolemic   PONV Controlled:  Controlled   Airway Patency:  Patent and adequate   Two or more mitigation strategies used for obstructive sleep apnea   Post Op Vitals Reviewed: Yes      Staff: CRNA         No complications documented      /61 (06/16/21 1011)    Temp 98 1 °F (36 7 °C) (06/16/21 1011)    Pulse 64 (06/16/21 1011)   Resp 17 (06/16/21 1011)    SpO2 96 % (06/16/21 1011)

## 2021-06-16 NOTE — H&P
History and Physical - [facility]  Garlin Galeazzi 73 y o  female MRN: [id number]                  HPI: Garlin Galeazzi is a 73y o  year old female who presents for EGD with stent removal with abdominal pain      REVIEW OF SYSTEMS: Per the HPI, and otherwise unremarkable  Historical Information   Past Medical History:   Diagnosis Date    Asthma     Bowel obstruction ([address] )     Choledocholithiasis 2020    Chronic back pain     Colon polyp     Enlarged kidney     Enterocutaneous fistula 2019    Hydronephrosis 2019    Ileus ([address] ) 2018    Overview:  Last Assessment & Plan:  Patient presented with generalized abdominal pain nausea and bilious vomiting ,imaging study reports " diffuse bowel dilatation involving distal small bowel and the entire colon to the panel verge obstruction mass is not identified and this may represent adynamic ileus in the postoperative setting possible related to medication"  Patient states that she still ha    Liver mass     Opiate dependence ([address] )     Post-ERCP acute pancreatitis 2020    Short gut syndrome     5 feet small bowel    Small bowel fistula     Urinary retention with incomplete bladder emptying      Past Surgical History:   Procedure Laterality Date    ABDOMINAL SURGERY      x 25    APPENDECTOMY      Open     BACK SURGERY      x 3     SECTION      x1    CHOLECYSTECTOMY      Open    COLON SURGERY      Sigmoid     COLONOSCOPY N/A 2018    Procedure: COLONOSCOPY;  Surgeon: Ari Gongora MD;  Location: [facility];   Service: Gastroenterology    COLONOSCOPY  2018    ERCP      EXPLORATORY LAPAROTOMY      Several for adhesive disease; has had several bowel resections; has a small bowel fistula    GASTROCUTANEOUS FISTULA CLOSURE      HERNIA REPAIR      Incisional     IR PICC REPOSITION  3/3/2021    MAMMO (HISTORICAL)  2017    SPINAL FUSION      Lower back     TONSILLECTOMY      TOTAL ABDOMINAL HYSTERECTOMY      Not due to cancer - complete     TRACHEOSTOMY  2019     Social History   Social History     Substance and Sexual Activity   Alcohol Use Not Currently    Alcohol/week: 0 0 standard drinks    Comment: social drinker     Social History     Substance and Sexual Activity   Drug Use No     Social History     Tobacco Use   Smoking Status Former Smoker    Quit date: 1980    Years since quittin 9   Smokeless Tobacco Never Used   Tobacco Comment    Never smoker - As per [employer]      Family History   Problem Relation Age of Onset    Lung cancer Mother     No Known Problems Father        Meds/Allergies     (Not in a hospital admission)      Allergies   Allergen Reactions    Nsaids      Irritable, upset stomach  Irritable, upset stomach    Tolmetin      Irritable, upset stomach    Codeine Hives     Per [address] admission orders    Oxycodone-Acetaminophen GI Intolerance     Percocet Tabs    Tylenol [Acetaminophen]        Objective     Blood pressure 120/58, pulse 58, temperature 97 8 °F (36 6 °C), temperature source Temporal, resp  rate 20, height 5' 4" (1 626 m), weight 64 2 kg (141 lb 8 6 oz), SpO2 99 %, not currently breastfeeding  PHYSICAL EXAM    /58   Pulse 58   Temp 97 8 °F (36 6 °C) (Temporal)   Resp 20   Ht 5' 4" (1 626 m)   Wt 64 2 kg (141 lb 8 6 oz)   LMP  (LMP Unknown)   SpO2 99%   BMI 24 29 kg/m²       Gen: NAD  CV: RRR  CHEST: Clear  ABD: soft, NT/ND  EXT: no edema      ASSESSMENT/PLAN:  This is a 73y o  year old female here for EGD, and she is stable and optimized for her procedure

## 2021-06-16 NOTE — PROGRESS NOTES
Followed order to use PICC line  Line flushed, capped changed, no blood return noted  A/p patient this has been normal occurrence  Dr Roly Garcia made aware

## 2021-06-23 NOTE — PROGRESS NOTES
Norma Cruz 1955 female MRN: [id number]      ASSESSMENT/PLAN  Problem List Items Addressed This Visit        Other    Chronic pain disorder - Primary    Depression    Relevant Medications    escitalopram (LEXAPRO) 20 mg tablet    Generalized anxiety disorder (Chronic)    Relevant Medications    escitalopram (LEXAPRO) 20 mg tablet        Discussed dangers of stopping benzodiazepines without tapering; however pt states she has done so previously while hospitalized without issue and has not taken her medication in 3 days, and does not want to continue it  Encouraged to monitor off Diazepam, but if she develops any symptoms of withdraw, to call  Will also cut Lexapro to 10 mg daily x7 days and then stop, as pt is not noting improvement with this  Pt believes the pill she was referring to was Xanax, and she would like to try this instead  Reviewed that this is similar to Diazepam, so she may not note any benefit with it; however, pt would like to try  Will f/u in 2 weeks to monitor above changes, and consider trial of Xanax instead  Will send staff message to Pain Management for recommendations of weaning schedule for pt's current regimen and f/u as above  Pt agreeable  Future Appointments   Date Time Provider Amanda Enrique   6/24/2021  1:00 PM Wanda Calhoun MD [facility]   7/1/2021 11:00 AM Wanda Calhoun MD [facility]   7/2/2021  2:40 PM DO ELENA Harley  [facility]   7/27/2021 11:00 AM Wanda Calhoun MD [facility]          SUBJECTIVE  CC: Follow-up      HPI:  Norma Cruz is a 73 y o  female who presents to discuss her current medication regimen  Pt states that her mental health is not doing well -- her anxiety is not well controlled, especially when she needs to be admitted to the hospital  She notes that previously she slept well, but she is currently not doing so because her mind keeps going   She has been on Lexapro and Diazepam chronically, but feels they are not managing her symptoms  She has previously been off the Diazepam while in the hospital without issue  She has not taken it in three days because she doesn't want to continue something that is not helping  Pt states she was previously on a "small blue pill" that helped her anxiety and sleep  Also previously on Effexor, but she did not like they way she felt on it  She is also frustrated with her recurrent hospitalizations  She knows that her chronic pain regimen is not helping her recurrent obstructions, and is interested in starting to wean down  She does not think she can tolerate completely off of them due to her chronic back and abdominal pain, but would like to try and lessen her regimen  Review of Systems   Gastrointestinal: Positive for abdominal pain  Musculoskeletal: Positive for back pain  Psychiatric/Behavioral: Positive for sleep disturbance  The patient is nervous/anxious  Historical Information   The patient history was reviewed and updated as follows:    Past Medical History:   Diagnosis Date    Asthma     Bowel obstruction ([address] )     Choledocholithiasis 11/18/2020    Chronic back pain     Colon polyp     Enlarged kidney     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    Hydronephrosis 2/26/2019    Ileus ([address] ) 7/2/2018    Overview:  Last Assessment & Plan:  Patient presented with generalized abdominal pain nausea and bilious vomiting ,imaging study reports " diffuse bowel dilatation involving distal small bowel and the entire colon to the panel verge obstruction mass is not identified and this may represent adynamic ileus in the postoperative setting possible related to medication"   Patient states that she still ha    Liver mass     [address] [address]     Post-ERCP acute pancreatitis 11/18/2020    Short gut syndrome     5 feet small bowel    Small bowel fistula     Urinary retention with incomplete bladder emptying      Past Surgical History:   Procedure Laterality Date    ABDOMINAL SURGERY      x 25    APPENDECTOMY      Open     BACK SURGERY      x 3     SECTION      x1    CHOLECYSTECTOMY      Open    COLON SURGERY      Sigmoid     COLONOSCOPY N/A 2018    Procedure: COLONOSCOPY;  Surgeon: Chip Fallon MD;  Location: [facility];   Service: Gastroenterology    COLONOSCOPY  2018    ERCP      EXPLORATORY LAPAROTOMY      Several for adhesive disease; has had several bowel resections; has a small bowel fistula    GASTROCUTANEOUS FISTULA CLOSURE      HERNIA REPAIR      Incisional     IR PICC REPOSITION  3/3/2021    MAMMO (HISTORICAL)  2017    SPINAL FUSION      Lower back     TONSILLECTOMY      TOTAL ABDOMINAL HYSTERECTOMY      Not due to cancer - complete     TRACHEOSTOMY  2019     Family History   Problem Relation Age of Onset    Lung cancer Mother     No Known Problems Father       Social History   Social History     Substance and Sexual Activity   Alcohol Use Not Currently    Alcohol/week: 0 0 standard drinks    Comment: social drinker     Social History     Substance and Sexual Activity   Drug Use No     Social History     Tobacco Use   Smoking Status Former Smoker    Quit date: 1980    Years since quittin 0   Smokeless Tobacco Never Used   Tobacco Comment    Never smoker - As per [employer]        Medications:     Current Outpatient Medications:     cholecalciferol (VITAMIN D3) 1,000 units tablet, 1 tablet 2x daily, Disp: , Rfl:     escitalopram (LEXAPRO) 20 mg tablet, Take 0 5 tablets (10 mg total) by mouth daily, Disp: 90 tablet, Rfl: 1    Ferrous Sulfate (Iron) 325 (65 Fe) MG TABS, take 1 tablet by mouth once daily, Disp: 30 tablet, Rfl: 2    folic acid (FOLVITE) 1 mg tablet, Take 1 tablet (1 mg total) by mouth daily, Disp: 90 tablet, Rfl: 3    gabapentin (NEURONTIN) 300 mg capsule, Take 600 mg by mouth 2 (two) times a day, Disp: , Rfl:     Gattex 5 MG KIT, , Disp: , Rfl:     HYDROmorphone (DILAUDID) 4 mg tablet, Take 4 mg by mouth 4 (four) times a day, Disp: , Rfl:     metaxalone (SKELAXIN) 400 MG tablet, , Disp: , Rfl:     methocarbamol (ROBAXIN) 500 mg tablet, take 1 tablet by mouth every 8 hours if needed for SPASMS, Disp: , Rfl:     morphine (MS CONTIN) 30 mg 12 hr tablet, take 1 tablet by mouth every 8 hours, Disp: , Rfl:     ondansetron (ZOFRAN) 4 mg tablet, take 1 tablet by mouth every 6 hours if needed for nausea and vomiting, Disp: , Rfl:     tamsulosin (FLOMAX) 0 4 mg, Take 2 capsules (0 8 mg total) by mouth daily with dinner, Disp: 180 capsule, Rfl: 1    Current Facility-Administered Medications:     cyanocobalamin injection 1,000 mcg, 1,000 mcg, Intramuscular, Q30 Days, Venus Hernandez DO, 1,000 mcg at 06/23/21 1614  Allergies   Allergen Reactions    Nsaids      Irritable, upset stomach  Irritable, upset stomach    Tolmetin      Irritable, upset stomach    Codeine Hives     Per [address] admission orders    Oxycodone-Acetaminophen GI Intolerance     Percocet Tabs    Tylenol [Acetaminophen]        OBJECTIVE    Vitals:   Vitals:    06/23/21 1427   BP: 122/70   Pulse: 86   Temp: 98 4 °F (36 9 °C)   SpO2: 95%   Weight: 63 kg (139 lb)   Height: 5' 4" (1 626 m)           Physical Exam  Vitals and nursing note reviewed  Constitutional:       General: She is not in acute distress  Appearance: Normal appearance  HENT:      Head: Normocephalic and atraumatic  Pulmonary:      Effort: Pulmonary effort is normal  No respiratory distress  Neurological:      General: No focal deficit present  Mental Status: She is alert     Psychiatric:         Mood and Affect: Mood normal                     Venus Hernandez DO  [facility]   6/23/2021  4:16 PM

## 2021-06-24 NOTE — PROGRESS NOTES
Assessment/Plan:   Diagnoses and all orders for this visit:    Enterocutaneous fistula      Continue with Gattex and nocturnal TPN  Resume Protonix 40 mg daily  Subjective:      Patient ID: Chris Jensen is a 73 y o  female  HPI     Patient  has been well since her last visit  She had her ERCP stent removed 2 weeks ago at the [facility]  She continues on nocturnal TPN and Gattex injections for the enterocutaneous fistula  Patient states that she has noticed higher than usual output through her fistula for the last couple of weeks  Patient has discontinued her Protonix which may be the reason for the higher output of the fistula  She needs to be on lifelong Protonix because of short-gut syndrome  Patient continues to have loose bowel movements several times a day  This is due to her short gut syndrome  She is off all antidiarrheals as this precipitates colonic pseudo-obstruction  She has to except this as a way of life  Patient had lab studies drawn yesterday all of which are within satisfactory range  The following portions of the patient's history were reviewed and updated as appropriate: allergies, current medications, past family history, past medical history, past social history, past surgical history, and problem list     Review of Systems      Chronic back pain and opiate dependence    Objective:      /72   Pulse 69   Ht 5' 4" (1 626 m)   Wt 61 2 kg (135 lb)   LMP  (LMP Unknown)   BMI 23 17 kg/m²          Physical Exam      Patient looks well nourished  No pallor or icterus  Abdominal exam reveals a non-distended abdomen  Fistula bag has enteric contents

## 2021-07-02 NOTE — PROGRESS NOTES
Steven Batres 1955 female MRN: [id number]      ASSESSMENT/PLAN  Problem List Items Addressed This Visit        Digestive    Enterocutaneous fistula    Relevant Orders    Transfer to other facility (Completed)    Short gut syndrome    Relevant Orders    Transfer to other facility (Completed)       Other    Abdominal pain with distention, not passing gas/stool, vomiting - Primary    Relevant Orders    Transfer to other facility (Completed)      Other Visit Diagnoses     Abdominal distention        Relevant Orders    Transfer to other facility (Completed)        Discussed with pt that given her symptoms, it is likely she has another pseudo-obstruction/ileus  Encouraged pt to go to ED for further evaluation  She asked if she could wait until tomorrow to see if she improves, and I stated I would not recommend this  She then asked if she could wait a few hours, and I again stated that I would not recommend this, but ultimately it is her decision  Discussed that I would alert [facility] [employer] that she would be coming in for evaluation (ADT21 placed), and also encouraged her to call her surgeon  Will defer pain regimen weaning due to acute illness  Discussed that we can trial Xanax in place of Diazepam, though reviewed that as they are in the same family, she may not note relief from it either  Future Appointments   Date Time Provider Amanda Enrique   7/27/2021 11:00 AM Guero Santos MD [facility]   9/16/2021 12:00 PM Guero Santos MD [facility]          SUBJECTIVE  CC: Depression (Diazepam is not helping per patient) and Anxiety      HPI:  Steven Batres is a 73 y o  female who presents for follow up of medication weaning  Planned to wean off Lexapro, pt had previously stopped Valium  She had mentioned that she would like to try Xanax instead, as this had worked well in the past      Pt would also like to try weaning down pain regimen   She is currently taking 154 MME daily, and as she has been taking these medications for >1 year, will plan to wean 10% per month  Today:   Pt states she notices a difference since being off Valium (has been >1 week) -- she is very anxious  Reviewed that Xanax and Valium are in the same family, so she may not note improvement with Xanax either, but we can try if she would like  She is in a great deal of pain  She states it started last night and she was "walking the floors all night"  Her abdomen is distended  She knows if she calls her surgeon he will tell her to go to the hospital  She is scared to do so and she is tired of constantly being in and out of the hospital      Review of Systems   Gastrointestinal: Positive for abdominal distention and abdominal pain  Psychiatric/Behavioral: Positive for sleep disturbance  The patient is nervous/anxious  Historical Information   The patient history was reviewed and updated as follows:    Past Medical History:   Diagnosis Date    Asthma     Bowel obstruction ([address] )     Choledocholithiasis 11/18/2020    Chronic back pain     Colon polyp     Enlarged kidney     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    Hydronephrosis 2/26/2019    Ileus ([address] ) 7/2/2018    Overview:  Last Assessment & Plan:  Patient presented with generalized abdominal pain nausea and bilious vomiting ,imaging study reports " diffuse bowel dilatation involving distal small bowel and the entire colon to the panel verge obstruction mass is not identified and this may represent adynamic ileus in the postoperative setting possible related to medication"   Patient states that she still ha    Liver mass     Opiate dependence ([address] )     Post-ERCP acute pancreatitis 11/18/2020    Short gut syndrome     5 feet small bowel    Small bowel fistula     Urinary retention with incomplete bladder emptying      Past Surgical History:   Procedure Laterality Date    ABDOMINAL SURGERY      x 25    APPENDECTOMY      Open     BACK SURGERY      x 3     SECTION      x1    CHOLECYSTECTOMY      Open    COLON SURGERY      Sigmoid     COLONOSCOPY N/A 2018    Procedure: COLONOSCOPY;  Surgeon: Arina Dominguez MD;  Location: [facility];   Service: Gastroenterology    COLONOSCOPY      ERCP      EXPLORATORY LAPAROTOMY      Several for adhesive disease; has had several bowel resections; has a small bowel fistula    GASTROCUTANEOUS FISTULA CLOSURE      HERNIA REPAIR      Incisional     IR PICC REPOSITION  3/3/2021    MAMMO (HISTORICAL)  2017    SPINAL FUSION      Lower back     TONSILLECTOMY      TOTAL ABDOMINAL HYSTERECTOMY      Not due to cancer - complete     TRACHEOSTOMY  2019     Family History   Problem Relation Age of Onset    Lung cancer Mother     No Known Problems Father       Social History   Social History     Substance and Sexual Activity   Alcohol Use Not Currently    Alcohol/week: 0 0 standard drinks    Comment: social drinker     Social History     Substance and Sexual Activity   Drug Use No     Social History     Tobacco Use   Smoking Status Former Smoker    Quit date: 1980    Years since quittin 0   Smokeless Tobacco Never Used   Tobacco Comment    Never smoker - As per [employer]        Medications:     Current Outpatient Medications:     cholecalciferol (VITAMIN D3) 1,000 units tablet, 1 tablet 2x daily, Disp: , Rfl:     escitalopram (LEXAPRO) 20 mg tablet, Take 0 5 tablets (10 mg total) by mouth daily, Disp: 90 tablet, Rfl: 1    Ferrous Sulfate (Iron) 325 (65 Fe) MG TABS, take 1 tablet by mouth once daily, Disp: 30 tablet, Rfl: 2    folic acid (FOLVITE) 1 mg tablet, Take 1 tablet (1 mg total) by mouth daily, Disp: 90 tablet, Rfl: 3    gabapentin (NEURONTIN) 300 mg capsule, Take 600 mg by mouth 2 (two) times a day, Disp: , Rfl:     Gattex 5 MG KIT, , Disp: , Rfl:     HYDROmorphone (DILAUDID) 4 mg tablet, Take 4 mg by mouth 4 (four) times a day, Disp: , Rfl:     metaxalone (SKELAXIN) 400 MG tablet, , Disp: , Rfl:     methocarbamol (ROBAXIN) 500 mg tablet, take 1 tablet by mouth every 8 hours if needed for SPASMS, Disp: , Rfl:     morphine (MS CONTIN) 30 mg 12 hr tablet, take 1 tablet by mouth every 8 hours, Disp: , Rfl:     ondansetron (ZOFRAN) 4 mg tablet, take 1 tablet by mouth every 6 hours if needed for nausea and vomiting, Disp: , Rfl:     pantoprazole (PROTONIX) 40 mg tablet, Take 1 tablet (40 mg total) by mouth daily, Disp: 90 tablet, Rfl: 3    tamsulosin (FLOMAX) 0 4 mg, Take 2 capsules (0 8 mg total) by mouth daily with dinner, Disp: 180 capsule, Rfl: 1    Current Facility-Administered Medications:     cyanocobalamin injection 1,000 mcg, 1,000 mcg, Intramuscular, Q30 Days, Venus Hernandez DO, 1,000 mcg at 06/23/21 1614  Allergies   Allergen Reactions    Nsaids      Irritable, upset stomach  Irritable, upset stomach    Tolmetin      Irritable, upset stomach    Codeine Hives     Per [address] admission orders    Oxycodone-Acetaminophen GI Intolerance     Percocet Tabs    Tylenol [Acetaminophen]        OBJECTIVE    Vitals:   Vitals:    07/02/21 1443   BP: 118/70   BP Location: Left arm   Patient Position: Sitting   Cuff Size: Standard   Pulse: 98   Temp: (!) 97 1 °F (36 2 °C)   SpO2: 98%   Weight: 62 6 kg (138 lb)   Height: 5' 4" (1 626 m)           Physical Exam  Vitals and nursing note reviewed  Constitutional:       General: She is in acute distress  Appearance: Normal appearance  HENT:      Head: Normocephalic and atraumatic  Pulmonary:      Effort: Pulmonary effort is normal  No respiratory distress  Abdominal:      General: Bowel sounds are increased  There is distension  Neurological:      General: No focal deficit present  Mental Status: She is alert     Psychiatric:         Mood and Affect: Mood normal                     DO Jairo Harley [address] [facility]   7/2/2021  3:20 PM

## 2021-07-05 NOTE — TELEPHONE ENCOUNTER
I am taking the new prescription of Alprazolam, and it doesn't seem to be helping as much  Can someone please give me call back to discuss the changes in medication

## 2021-07-06 NOTE — TELEPHONE ENCOUNTER
Nathaniel Old was on Diazapam 5 mg this stopped working after 12 years  She was started on Alprazolam 0 25 mg and it is not helping her at all  She thinks it is too small of a dose  She was wondering if this could be increased    Please advise

## 2021-07-15 NOTE — TELEPHONE ENCOUNTER
Ok to increase to 1 mg BID (can take two tabs of what I sent most recently)  Please have her schedule a follow up with me in the next 1-2 weeks  Thanks!

## 2021-07-15 NOTE — TELEPHONE ENCOUNTER
Pt called stating her current anxiety regimen is not working and her anxiety is still very high  She is requesting a change to Lorazepam 1mg BID, or however you feel necessary  Pt states this regimen worked best for her in the past  Please advise and call pt

## 2021-07-19 NOTE — ED NOTES
FN not done at this time d/t pt having picc line, requesting bloodwork to be done after doctor sees pt d/t wanting picc line used      Haley Davis RN  07/19/21 4067

## 2021-07-19 NOTE — ED ATTENDING ATTESTATION
7/19/2021  IAbdulkadir DO, saw and evaluated the patient  I have discussed the patient with the resident/non-physician practitioner and agree with the resident's/non-physician practitioner's findings, Plan of Care, and MDM as documented in the resident's/non-physician practitioner's note, except where noted  All available labs and Radiology studies were reviewed  I was present for key portions of any procedure(s) performed by the resident/non-physician practitioner and I was immediately available to provide assistance  At this point I agree with the current assessment done in the Emergency Department  I have conducted an independent evaluation of this patient a history and physical is as follows:          73-year-old female, long extensive history of intra-abdominal surgeries and fistulas and small bowel obstructions presents today with a large amount of abdominal pain  , history of similar events but worse over the past couple of days  Pain is currently 8/10 nonradiating worse with palpation better when left alone      Review of Systems   Constitutional: Negative for activity change, chills, diaphoresis and fever  HENT: Negative for congestion, sinus pressure and sore throat  Eyes: Negative for pain and visual disturbance  Respiratory: Negative for cough, chest tightness, shortness of breath, wheezing and stridor  Cardiovascular: Negative for chest pain and palpitations  Gastrointestinal:  Positive for for abdominal distention, abdominal pain, constipation, negative for diarrhea, positive for n ausea and vomiting  Genitourinary: Negative for dysuria and frequency  Musculoskeletal: Negative for neck pain and neck stiffness  Skin: Negative for rash  Neurological: Negative for dizziness, speech difficulty, light-headedness, numbness and headaches  Physical Exam  Vitals reviewed  Constitutional:       General: She is not in acute distress       Appearance: She is well-developed  She is not diaphoretic  HENT:      Head: Normocephalic and atraumatic  Right Ear: External ear normal       Left Ear: External ear normal       Nose: Nose normal    Eyes:      General:         Right eye: No discharge  Left eye: No discharge  Pupils: Pupils are equal, round, and reactive to light  Neck:      Trachea: No tracheal deviation  Cardiovascular:      Rate and Rhythm: Normal rate and regular rhythm  Heart sounds: Normal heart sounds  No murmur heard  Pulmonary:      Effort: Pulmonary effort is normal  No respiratory distress  Breath sounds: Normal breath sounds  No stridor  Abdominal:      General: There is distension  Palpations: Abdomen is soft  Tenderness: There is abdominal tenderness  There is no guarding or rebound  Comments: Ostomy bag over cutaneous fistula site   Musculoskeletal:         General: Normal range of motion  Cervical back: Normal range of motion and neck supple  Skin:     General: Skin is warm and dry  Coloration: Skin is not pale  Findings: No erythema  Neurological:      General: No focal deficit present  Mental Status: She is alert and oriented to person, place, and time                    ED Course         Critical Care Time  ECG 12 Lead Documentation Only    Date/Time: 7/19/2021 3:58 PM  Performed by: Chuy Ortiz DO  Authorized by: Chuy Ortiz DO     ECG reviewed by me, the ED Provider: yes    Patient location:  ED  Interpretation:     Interpretation: non-specific    Rate:     ECG rate:  64    ECG rate assessment: normal    Rhythm:     Rhythm: sinus rhythm    Ectopy:     Ectopy: none    QRS:     QRS axis:  Normal    QRS intervals:  Normal  Conduction:     Conduction: normal    ST segments:     ST segments:  Non-specific  T waves:     T waves: non-specific          Labs Reviewed   CBC AND DIFFERENTIAL - Abnormal       Result Value Ref Range Status    WBC 7 22  4 31 - 10 16 Thousand/uL Final    RBC 3 96  3 81 - 5 12 Million/uL Final    Hemoglobin 11 3 (*) 11 5 - 15 4 g/dL Final    Hematocrit 35 1  34 8 - 46 1 % Final    MCV 89  82 - 98 fL Final    MCH 28 5  26 8 - 34 3 pg Final    MCHC 32 2  31 4 - 37 4 g/dL Final    RDW 14 1  11 6 - 15 1 % Final    MPV 9 5  8 9 - 12 7 fL Final    Platelets 820  965 - 390 Thousands/uL Final    nRBC 0  /100 WBCs Final    Neutrophils Relative 60  43 - 75 % Final    Immat GRANS % 1  0 - 2 % Final    Lymphocytes Relative 31  14 - 44 % Final    Monocytes Relative 6  4 - 12 % Final    Eosinophils Relative 1  0 - 6 % Final    Basophils Relative 1  0 - 1 % Final    Neutrophils Absolute 4 46  1 85 - 7 62 Thousands/µL Final    Immature Grans Absolute 0 05  0 00 - 0 20 Thousand/uL Final    Lymphocytes Absolute 2 20  0 60 - 4 47 Thousands/µL Final    Monocytes Absolute 0 42  0 17 - 1 22 Thousand/µL Final    Eosinophils Absolute 0 04  0 00 - 0 61 Thousand/µL Final    Basophils Absolute 0 05  0 00 - 0 10 Thousands/µL Final   COMPREHENSIVE METABOLIC PANEL - Abnormal    Sodium 138  136 - 145 mmol/L Final    Potassium 3 8  3 5 - 5 3 mmol/L Final    Chloride 103  100 - 108 mmol/L Final    CO2 22  21 - 32 mmol/L Final    ANION GAP 13  4 - 13 mmol/L Final    BUN 10  5 - 25 mg/dL Final    Creatinine 0 91  0 60 - 1 30 mg/dL Final    Comment: Standardized to IDMS reference method    Glucose 74  65 - 140 mg/dL Final    Comment: If the patient is fasting, the ADA then defines impaired fasting glucose as > 100 mg/dL and diabetes as > or equal to 123 mg/dL  Specimen collection should occur prior to Sulfasalazine administration due to the potential for falsely depressed results  Specimen collection should occur prior to Sulfapyridine administration due to the potential for falsely elevated results      Calcium 8 7  8 3 - 10 1 mg/dL Final    Corrected Calcium 9 2  8 3 - 10 1 mg/dL Final    AST 16  5 - 45 U/L Final    Comment: Specimen collection should occur prior to Sulfasalazine administration due to the potential for falsely depressed results  ALT 27  12 - 78 U/L Final    Comment: Specimen collection should occur prior to Sulfasalazine administration due to the potential for falsely depressed results  Alkaline Phosphatase 138 (*) 46 - 116 U/L Final    Total Protein 7 4  6 4 - 8 2 g/dL Final    Albumin 3 4 (*) 3 5 - 5 0 g/dL Final    Total Bilirubin 0 41  0 20 - 1 00 mg/dL Final    Comment: Use of this assay is not recommended for patients undergoing treatment with eltrombopag due to the potential for falsely elevated results  eGFR 66  ml/min/1 73sq m Final    Narrative:     [place] guidelines for Chronic Kidney Disease (CKD):     Stage 1 with normal or high GFR (GFR > 90 mL/min/1 73 square meters)    Stage 2 Mild CKD (GFR = 60-89 mL/min/1 73 square meters)    Stage 3A Moderate CKD (GFR = 45-59 mL/min/1 73 square meters)    Stage 3B Moderate CKD (GFR = 30-44 mL/min/1 73 square meters)    Stage 4 Severe CKD (GFR = 15-29 mL/min/1 73 square meters)    Stage 5 End Stage CKD (GFR <15 mL/min/1 73 square meters)  Note: GFR calculation is accurate only with a steady state creatinine   BASIC METABOLIC PANEL - Abnormal    Sodium 143  136 - 145 mmol/L Final    Potassium 3 4 (*) 3 5 - 5 3 mmol/L Final    Chloride 111 (*) 100 - 108 mmol/L Final    CO2 20 (*) 21 - 32 mmol/L Final    ANION GAP 12  4 - 13 mmol/L Final    BUN 10  5 - 25 mg/dL Final    Creatinine 0 94  0 60 - 1 30 mg/dL Final    Comment: Standardized to IDMS reference method    Glucose 85  65 - 140 mg/dL Final    Comment: If the patient is fasting, the ADA then defines impaired fasting glucose as > 100 mg/dL and diabetes as > or equal to 123 mg/dL  Specimen collection should occur prior to Sulfasalazine administration due to the potential for falsely depressed results  Specimen collection should occur prior to Sulfapyridine administration due to the potential for falsely elevated results  Calcium 8 4  8 3 - 10 1 mg/dL Final    eGFR 64  ml/min/1 73sq m Final    Narrative:     [place] guidelines for Chronic Kidney Disease (CKD):     Stage 1 with normal or high GFR (GFR > 90 mL/min/1 73 square meters)    Stage 2 Mild CKD (GFR = 60-89 mL/min/1 73 square meters)    Stage 3A Moderate CKD (GFR = 45-59 mL/min/1 73 square meters)    Stage 3B Moderate CKD (GFR = 30-44 mL/min/1 73 square meters)    Stage 4 Severe CKD (GFR = 15-29 mL/min/1 73 square meters)    Stage 5 End Stage CKD (GFR <15 mL/min/1 73 square meters)  Note: GFR calculation is accurate only with a steady state creatinine   CBC AND DIFFERENTIAL - Abnormal    WBC 4 53  4 31 - 10 16 Thousand/uL Final    RBC 3 68 (*) 3 81 - 5 12 Million/uL Final    Hemoglobin 10 3 (*) 11 5 - 15 4 g/dL Final    Hematocrit 33 2 (*) 34 8 - 46 1 % Final    MCV 90  82 - 98 fL Final    MCH 28 0  26 8 - 34 3 pg Final    MCHC 31 0 (*) 31 4 - 37 4 g/dL Final    RDW 14 5  11 6 - 15 1 % Final    MPV 9 6  8 9 - 12 7 fL Final    Platelets 944  468 - 390 Thousands/uL Final    nRBC 0  /100 WBCs Final    Neutrophils Relative 50  43 - 75 % Final    Immat GRANS % 1  0 - 2 % Final    Lymphocytes Relative 40  14 - 44 % Final    Monocytes Relative 6  4 - 12 % Final    Eosinophils Relative 2  0 - 6 % Final    Basophils Relative 1  0 - 1 % Final    Neutrophils Absolute 2 30  1 85 - 7 62 Thousands/µL Final    Immature Grans Absolute 0 03  0 00 - 0 20 Thousand/uL Final    Lymphocytes Absolute 1 81  0 60 - 4 47 Thousands/µL Final    Monocytes Absolute 0 27  0 17 - 1 22 Thousand/µL Final    Eosinophils Absolute 0 08  0 00 - 0 61 Thousand/µL Final    Basophils Absolute 0 04  0 00 - 0 10 Thousands/µL Final   LIPASE - Normal    Lipase 74  73 - 393 u/L Final   PROTIME-INR - Normal    Protime 13 1  11 6 - 14 5 seconds Final    INR 0 98  0 84 - 1 19 Final   APTT - Normal    PTT 28  23 - 37 seconds Final    Comment: Therapeutic Heparin Range =  60-90 seconds   LACTIC ACID, PLASMA - Normal    LACTIC ACID 1 0  0 5 - 2 0 mmol/L Final    Narrative:     Result may be elevated if tourniquet was used during collection  MAGNESIUM - Normal    Magnesium 1 9  1 6 - 2 6 mg/dL Final   BLOOD CULTURE    Blood Culture Received in Microbiology Lab  Culture in Progress  Preliminary   BLOOD CULTURE    Blood Culture Received in Microbiology Lab  Culture in Progress  Preliminary       CT abdomen pelvis with contrast   Final Result      No abnormal dilation of the small bowel loops to suggest bowel obstruction  Dilated colonic loops, unchanged   Infiltration in the anterior abdominal wall with enterocutaneous fistula along with foci of air, unchanged      Workstation performed: [id number]               MDM  Number of Diagnoses or Management Options  Intractable abdominal pain: new, needed workup     Amount and/or Complexity of Data Reviewed  Clinical lab tests: ordered and reviewed  Tests in the radiology section of CPT®: ordered and reviewed  Decide to obtain previous medical records or to obtain history from someone other than the patient: yes  Obtain history from someone other than the patient: yes  Review and summarize past medical records: yes  Discuss the patient with other providers: yes        Time reflects when diagnosis was documented in both MDM as applicable and the Disposition within this note     Time User Action Codes Description Comment    7/19/2021  6:33 PM Milton Robledo Add [R10 9] Intractable abdominal pain       ED Disposition     ED Disposition Condition Date/Time Comment    Admit Stable Mon Jul 19, 2021  6:33 PM Case was discussed with Dr Lizz Fajardo and the patient's admission status was agreed to be Admission Status: inpatient status to the service of Dr Lizz Fajardo          Follow-up Information    None

## 2021-07-19 NOTE — ED PROVIDER NOTES
History  Chief Complaint   Patient presents with    Fever - 9 weeks to 74 years     pt c/o fevers (103) x3 days, has picc line for home tpn  Patient is 71-year-old female here today for fever and severe generalized abdominal pain  Patient 1st noticed the symptoms 3 days ago on Friday  She endorses fevers of 103-104  She has been taking Tylenol for pain and fever control  She did not taken any Tylenol today  Patient has not had a bowel movement since her symptoms began and is not passing gas  She is also nauseous and has had episodes of vomiting  Patient has a significant history of abdominal surgeries  Patient endorses having over 30 laparoscopic procedures to her abdomen over the last 40 years  She says that she only has about 5 ft of small bowel left  She frequently presents to the hospital for obstructions  She has a fistula to her skin and a PICC line for TPN  History provided by:  Patient and spouse   used: No    Fever - 9 weeks to 74 years  Associated symptoms: chills, nausea and vomiting    Associated symptoms: no chest pain, no confusion, no cough, no dysuria, no ear pain, no headaches, no rash and no sore throat        Prior to Admission Medications   Prescriptions Last Dose Informant Patient Reported? Taking?    ALPRAZolam (XANAX) 0 5 mg tablet   No No   Sig: Take 0 5 tablets (0 25 mg total) by mouth 3 (three) times a day as needed for anxiety   Ferrous Sulfate (Iron) 325 (65 Fe) MG TABS   No No   Sig: take 1 tablet by mouth once daily   Gattex 5 MG KIT  Self Yes No   HYDROmorphone (DILAUDID) 4 mg tablet  Self Yes No   Sig: Take 4 mg by mouth 4 (four) times a day   cholecalciferol (VITAMIN D3) 1,000 units tablet  Self Yes No   Si tablet 2x daily   escitalopram (LEXAPRO) 20 mg tablet   No No   Sig: Take 0 5 tablets (10 mg total) by mouth daily   folic acid (FOLVITE) 1 mg tablet  Self No No   Sig: Take 1 tablet (1 mg total) by mouth daily   gabapentin (NEURONTIN) 300 mg capsule  Self Yes No   Sig: Take 600 mg by mouth 2 (two) times a day   metaxalone (SKELAXIN) 400 MG tablet  Self Yes No   methocarbamol (ROBAXIN) 500 mg tablet  Self Yes No   Sig: take 1 tablet by mouth every 8 hours if needed for SPASMS   morphine (MS CONTIN) 30 mg 12 hr tablet  Self Yes No   Sig: take 1 tablet by mouth every 8 hours   naloxone (NARCAN) 4 mg/0 1 mL nasal spray   No No   Sig: Administer 1 spray into a nostril  If no response after 2-3 minutes, give another dose in the other nostril using a new spray  ondansetron (ZOFRAN) 4 mg tablet  Self Yes No   Sig: take 1 tablet by mouth every 6 hours if needed for nausea and vomiting   pantoprazole (PROTONIX) 40 mg tablet   No No   Sig: Take 1 tablet (40 mg total) by mouth daily   tamsulosin (FLOMAX) 0 4 mg  Self No No   Sig: Take 2 capsules (0 8 mg total) by mouth daily with dinner      Facility-Administered Medications Last Administration Doses Remaining   cyanocobalamin injection 1,000 mcg 6/23/2021  4:14 PM           Past Medical History:   Diagnosis Date    Asthma     Bowel obstruction ([address] )     Choledocholithiasis 11/18/2020    Chronic back pain     Colon polyp     Enlarged kidney     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    Hydronephrosis 2/26/2019    Ileus ([address] ) 7/2/2018    Overview:  Last Assessment & Plan:  Patient presented with generalized abdominal pain nausea and bilious vomiting ,imaging study reports " diffuse bowel dilatation involving distal small bowel and the entire colon to the panel verge obstruction mass is not identified and this may represent adynamic ileus in the postoperative setting possible related to medication"   Patient states that she still ha    Liver mass     Opiate dependence ([address] )     Post-ERCP acute pancreatitis 11/18/2020    Short gut syndrome     5 feet small bowel    Small bowel fistula     Urinary retention with incomplete bladder emptying        Past Surgical History:   Procedure Laterality Date    ABDOMINAL SURGERY      x 25    APPENDECTOMY      Open     BACK SURGERY      x 3     SECTION      x1    CHOLECYSTECTOMY      Open    COLON SURGERY      Sigmoid     COLONOSCOPY N/A 2018    Procedure: COLONOSCOPY;  Surgeon: Hannah Christie MD;  Location: [facility]; Service: Gastroenterology    COLONOSCOPY  2018    ERCP      EXPLORATORY LAPAROTOMY      Several for adhesive disease; has had several bowel resections; has a small bowel fistula    GASTROCUTANEOUS FISTULA CLOSURE      HERNIA REPAIR      Incisional     IR PICC REPOSITION  3/3/2021    MAMMO (HISTORICAL)  2017    SPINAL FUSION      Lower back     TONSILLECTOMY      TOTAL ABDOMINAL HYSTERECTOMY      Not due to cancer - complete     TRACHEOSTOMY  2019       Family History   Problem Relation Age of Onset    Lung cancer Mother     No Known Problems Father      I have reviewed and agree with the history as documented  E-Cigarette/Vaping    E-Cigarette Use Never User      E-Cigarette/Vaping Substances     Social History     Tobacco Use    Smoking status: Former Smoker     Quit date: 1980     Years since quittin 0    Smokeless tobacco: Never Used    Tobacco comment: Never smoker - As per [employer]    Vaping Use    Vaping Use: Never used   Substance Use Topics    Alcohol use: Not Currently     Alcohol/week: 0 0 standard drinks     Comment: social drinker    Drug use: No        Review of Systems   Constitutional: Positive for chills and fever  HENT: Negative for ear pain and sore throat  Eyes: Negative for pain and visual disturbance  Respiratory: Negative for cough and shortness of breath  Cardiovascular: Negative for chest pain and palpitations  Gastrointestinal: Positive for abdominal distention, abdominal pain, constipation, nausea and vomiting  Genitourinary: Negative for dysuria and hematuria  Musculoskeletal: Negative for arthralgias and back pain     Skin: Negative for color change and rash  Neurological: Negative for seizures, syncope and headaches  Psychiatric/Behavioral: Negative for agitation and confusion  All other systems reviewed and are negative  Physical Exam  ED Triage Vitals   Temperature Pulse Respirations Blood Pressure SpO2   07/19/21 1243 07/19/21 1241 07/19/21 1241 07/19/21 1241 07/19/21 1241   98 2 °F (36 8 °C) 67 18 102/56 99 %      Temp src Heart Rate Source Patient Position - Orthostatic VS BP Location FiO2 (%)   -- 07/19/21 1241 07/19/21 1542 07/19/21 1542 --    Monitor Lying Right arm       Pain Score       07/19/21 1545       9             Orthostatic Vital Signs  Vitals:    07/19/21 1241 07/19/21 1542 07/19/21 1843   BP: 102/56 106/59 100/59   Pulse: 67 64 56   Patient Position - Orthostatic VS:  Lying Sitting       Physical Exam  Vitals and nursing note reviewed  Constitutional:       Appearance: Normal appearance  HENT:      Head: Normocephalic and atraumatic  Right Ear: External ear normal       Left Ear: External ear normal       Mouth/Throat:      Mouth: Mucous membranes are moist       Pharynx: Oropharynx is clear  Eyes:      General: No scleral icterus  Conjunctiva/sclera: Conjunctivae normal    Cardiovascular:      Rate and Rhythm: Normal rate and regular rhythm  Heart sounds: Normal heart sounds  Pulmonary:      Effort: Pulmonary effort is normal  No respiratory distress  Breath sounds: Normal breath sounds  Abdominal:      General: Bowel sounds are decreased  There is distension  Palpations: Abdomen is rigid  There is no mass  Tenderness: There is generalized abdominal tenderness  There is guarding  Musculoskeletal:         General: No tenderness or signs of injury  Cervical back: Neck supple  No rigidity  Skin:     General: Skin is warm  Coloration: Skin is not jaundiced  Findings: No erythema or rash  Neurological:      General: No focal deficit present  Mental Status: She is alert  Mental status is at baseline     Psychiatric:         Mood and Affect: Mood normal          Behavior: Behavior normal          ED Medications  Medications   sodium chloride 0 9 % bolus 500 mL (0 mL Intravenous Stopped 7/19/21 1840)   ondansetron (ZOFRAN) injection 4 mg (4 mg Intravenous Given 7/19/21 1545)   fentanyl citrate (PF) 100 MCG/2ML 50 mcg (50 mcg Intravenous Given 7/19/21 1545)   iohexol (OMNIPAQUE) 350 MG/ML injection (SINGLE-DOSE) 100 mL (100 mL Intravenous Given 7/19/21 1624)   fentanyl citrate (PF) 100 MCG/2ML 50 mcg (50 mcg Intravenous Given 7/19/21 1840)       Diagnostic Studies  Results Reviewed     Procedure Component Value Units Date/Time    Protime-INR [[id number]]  (Normal) Collected: 07/19/21 1522    Lab Status: Final result Specimen: Blood from Arm, Right Updated: 07/19/21 1617     Protime 13 1 seconds      INR 0 98    APTT [[id number]]  (Normal) Collected: 07/19/21 1522    Lab Status: Final result Specimen: Blood from Arm, Right Updated: 07/19/21 1617     PTT 28 seconds     Lipase [[id number]]  (Normal) Collected: 07/19/21 1522    Lab Status: Final result Specimen: Blood from Arm, Right Updated: 07/19/21 1612     Lipase 74 u/L     Comprehensive metabolic panel [[id number]]  (Abnormal) Collected: 07/19/21 1522    Lab Status: Final result Specimen: Blood from Arm, Right Updated: 07/19/21 1612     Sodium 138 mmol/L      Potassium 3 8 mmol/L      Chloride 103 mmol/L      CO2 22 mmol/L      ANION GAP 13 mmol/L      BUN 10 mg/dL      Creatinine 0 91 mg/dL      Glucose 74 mg/dL      Calcium 8 7 mg/dL      Corrected Calcium 9 2 mg/dL      AST 16 U/L      ALT 27 U/L      Alkaline Phosphatase 138 U/L      Total Protein 7 4 g/dL      Albumin 3 4 g/dL      Total Bilirubin 0 41 mg/dL      eGFR 66 ml/min/1 73sq m     Narrative:      [place] guidelines for Chronic Kidney Disease (CKD):     Stage 1 with normal or high GFR (GFR > 90 mL/min/1 73 square meters)    Stage 2 Mild CKD (GFR = 60-89 mL/min/1 73 square meters)    Stage 3A Moderate CKD (GFR = 45-59 mL/min/1 73 square meters)    Stage 3B Moderate CKD (GFR = 30-44 mL/min/1 73 square meters)    Stage 4 Severe CKD (GFR = 15-29 mL/min/1 73 square meters)    Stage 5 End Stage CKD (GFR <15 mL/min/1 73 square meters)  Note: GFR calculation is accurate only with a steady state creatinine    Lactic acid [[id number]]  (Normal) Collected: 07/19/21 1522    Lab Status: Final result Specimen: Blood from Arm, Right Updated: 07/19/21 1605     LACTIC ACID 1 0 mmol/L     Narrative:      Result may be elevated if tourniquet was used during collection  Blood culture #2 [[id number]] Collected: 07/19/21 1539    Lab Status: In process Specimen: Blood from Arm, Left Updated: 07/19/21 1543    CBC and differential [[id number]]  (Abnormal) Collected: 07/19/21 1522    Lab Status: Final result Specimen: Blood from Arm, Right Updated: 07/19/21 1529     WBC 7 22 Thousand/uL      RBC 3 96 Million/uL      Hemoglobin 11 3 g/dL      Hematocrit 35 1 %      MCV 89 fL      MCH 28 5 pg      MCHC 32 2 g/dL      RDW 14 1 %      MPV 9 5 fL      Platelets 802 Thousands/uL      nRBC 0 /100 WBCs      Neutrophils Relative 60 %      Immat GRANS % 1 %      Lymphocytes Relative 31 %      Monocytes Relative 6 %      Eosinophils Relative 1 %      Basophils Relative 1 %      Neutrophils Absolute 4 46 Thousands/µL      Immature Grans Absolute 0 05 Thousand/uL      Lymphocytes Absolute 2 20 Thousands/µL      Monocytes Absolute 0 42 Thousand/µL      Eosinophils Absolute 0 04 Thousand/µL      Basophils Absolute 0 05 Thousands/µL     Blood culture #1 [[id number]] Collected: 07/19/21 1522    Lab Status: In process Specimen: Blood from Arm, Right Updated: 07/19/21 1524                 CT abdomen pelvis with contrast   Final Result by Maren Madrigal MD (07/19 1646)      No abnormal dilation of the small bowel loops to suggest bowel obstruction     Dilated colonic loops, unchanged   Infiltration in the anterior abdominal wall with enterocutaneous fistula along with foci of air, unchanged      Workstation performed: [id number]               Procedures  Procedures      ED Course                                       MDM  Number of Diagnoses or Management Options  Intractable abdominal pain: new and requires workup     Amount and/or Complexity of Data Reviewed  Clinical lab tests: ordered and reviewed  Tests in the radiology section of CPT®: ordered and reviewed  Review and summarize past medical records: yes  Independent visualization of images, tracings, or specimens: yes    Risk of Complications, Morbidity, and/or Mortality  General comments: Patient is a 81-year-old female here today for intractable abdominal pain and fevers  The patient has a long standing history of abdominal procedures and abdominal pain  Patient has a fistula in place  She also has a PICC line in place for TPN  She says that she only has about 5 cm small bowel left and that she frequently has obstructions  She has not had a bowel movement since Friday which was 3 days ago  She also says that she is not passing gas  She is nauseous and has been vomiting  Today in the emergency department she did not have a fever and I have low suspicion for an infection  CT scan of the abdomen did not show any changes in her prior CT scans though there were noted abnormalities given her massive abdominal surgical history  There was not any evidence of an obstruction  Dr Radha Alanis visited the patient at the bedside, patient has been seen by him for some time  He would like to admit her under general surgery for her intractable abdominal pain      Patient Progress  Patient progress: stable      Disposition  Final diagnoses:   Intractable abdominal pain     Time reflects when diagnosis was documented in both MDM as applicable and the Disposition within this note     Time User Action Codes Description Comment    7/19/2021  6:33 PM [address] Erickson, [place] Add [R10 9] Intractable abdominal pain       ED Disposition     ED Disposition Condition Date/Time Comment    Admit Stable Mon Jul 19, 2021  6:33 PM Case was discussed with Dr Lizz Fajardo and the patient's admission status was agreed to be Admission Status: inpatient status to the service of Dr Lizz Fajardo  Follow-up Information    None         Patient's Medications   Discharge Prescriptions    No medications on file     No discharge procedures on file  PDMP Review       Value Time User    PDMP Reviewed  Yes 7/12/2021  4:32 PM Norma Buckley DO           ED Provider  Attending physically available and evaluated Shira Wesley  I managed the patient along with the ED Attending      Electronically Signed by         Елена Clemens DO  07/19/21 1925

## 2021-07-20 NOTE — CASE MANAGEMENT
Upon entering room, patient stated "I want to go home " CM introduced self and role  Patient stated "I had a BM and I feel much better  "I am drinking water and I don't feel nauseous " Patient continued to state "I am leaving today " CM offered to f/u with attending  CM verified with patient, she is current with [facility]  CM updated nursing and surgical resident  STEPHEN sent CAYETANO referral to [facility]  Waiting for response  STEPHEN sent CAYETANO referral to [address] [place] [address] Dr Marcos  Waiting for response

## 2021-07-20 NOTE — DISCHARGE SUMMARY
Discharge Summary   Argelia Masker 73 y o  female MRN: [id number]  Unit/Bed#: W -96 Encounter: [id number]    Admission Date: 2021     Discharge Date:21    Cachorro Shaffer MD     Consultants: none    Admitting Diagnosis: Fever [R50 9]  Intractable abdominal pain [R10 9]    Principle/ Secondary Diagnosis:  Past Medical History:   Diagnosis Date    Asthma     Bowel obstruction ([address] )     Choledocholithiasis 2020    Chronic back pain     Colon polyp     Enlarged kidney     Enterocutaneous fistula 2019    Hydronephrosis 2019    Ileus ([address] ) 2018    Overview:  Last Assessment & Plan:  Patient presented with generalized abdominal pain nausea and bilious vomiting ,imaging study reports " diffuse bowel dilatation involving distal small bowel and the entire colon to the panel verge obstruction mass is not identified and this may represent adynamic ileus in the postoperative setting possible related to medication"  Patient states that she still ha    Liver mass     Opiate dependence ([address] )     Post-ERCP acute pancreatitis 2020    Short gut syndrome     5 feet small bowel    Small bowel fistula     Urinary retention with incomplete bladder emptying      Past Surgical History:   Procedure Laterality Date    ABDOMINAL SURGERY      x 25    APPENDECTOMY      Open     BACK SURGERY      x 3     SECTION      x1    CHOLECYSTECTOMY      Open    COLON SURGERY      Sigmoid     COLONOSCOPY N/A 2018    Procedure: COLONOSCOPY;  Surgeon: Suyapa Lim MD;  Location: [facility];   Service: Gastroenterology    COLONOSCOPY  2018    ERCP      EXPLORATORY LAPAROTOMY      Several for adhesive disease; has had several bowel resections; has a small bowel fistula    GASTROCUTANEOUS FISTULA CLOSURE      HERNIA REPAIR      Incisional     IR PICC REPOSITION  3/3/2021    MAMMO (HISTORICAL)  2017    SPINAL FUSION      Lower back     TONSILLECTOMY      TOTAL ABDOMINAL HYSTERECTOMY      Not due to cancer - complete     TRACHEOSTOMY  09/2019       Procedures Performed:   Orders Placed This Encounter   Procedures    ED ECG Documentation Only     No admission procedures for hospital encounter  Procedure name not found  Laboratory data at discharge:   Results from last 7 days   Lab Units 07/20/21  0446 07/19/21  1522   WBC Thousand/uL 4 53 7 22   HEMOGLOBIN g/dL 10 3* 11 3*   HEMATOCRIT % 33 2* 35 1   PLATELETS Thousands/uL 224 243     Results from last 7 days   Lab Units 07/20/21  0446 07/19/21  1522   POTASSIUM mmol/L 3 4* 3 8   CHLORIDE mmol/L 111* 103   CO2 mmol/L 20* 22   BUN mg/dL 10 10   CREATININE mg/dL 0 94 0 91   CALCIUM mg/dL 8 4 8 7     Results from last 7 days   Lab Units 07/19/21  1522   INR  0 98   PTT seconds 28           Discharge instructions/Information to patient and family:   See after visit summary for information provided to patient and family  Discharge Medications:  See after visit summary for reconciled discharge medications provided to patient and family  Hospital Course:   Patient presented to ED complaining severe generalized abdominal pain, vomiting and fevers at home  Patient is well known to the General Surgery Service, was evaluated in the ED and ultimately admitted to the service for conservative management of likely opioid induced constipation  She was made NPO and [facility]  Hospital day 1 the patient was feeling much improved, had a bowel movement and tolerated a diet, she was requesting to be discharged  Prior to discharge, the patient was given instructions for outpatient care and follow-up  The patient has been instructed to call w/ any questions, changes, or concerns for the medical condition  For further details of the hospitalization, please refer to the medical record      Condition at Discharge: good     Provisions for Follow-Up Care:  See after visit summary for information related to follow-up care and any pertinent home health orders  Disposition: Home    Planned Readmission: Yes likely to future episodes of Opioid induced constipation     Discharge Statement   I spent 25 minutes discharging the patient  This time was spent on the day of discharge  I had direct contact with the patient on the day of discharge  Additional documentation is required if more than 30 minutes were spent on discharge  Matt Mattson PA-C  7/20/2021      This text is generated with voice recognition software  There may be translation, syntax,  or grammatical errors  If you have any questions, please contact the dictating provider

## 2021-07-20 NOTE — PLAN OF CARE
Problem: Nutrition/Hydration-ADULT  Goal: Nutrient/Hydration intake appropriate for improving, restoring or maintaining nutritional needs  Description: Monitor and assess patient's nutrition/hydration status for malnutrition  Collaborate with interdisciplinary team and initiate plan and interventions as ordered  Monitor patient's weight and dietary intake as ordered or per policy  Utilize nutrition screening tool and intervene as necessary  Determine patient's food preferences and provide high-protein, high-caloric foods as appropriate       INTERVENTIONS:  - Monitor oral intake, urinary output, labs, and treatment plans  - Assess nutrition and hydration status and recommend course of action  - Evaluate amount of meals eaten  - Assist patient with eating if necessary   - Allow adequate time for meals  - Recommend/ encourage appropriate diets, oral nutritional supplements, and vitamin/mineral supplements  - Order, calculate, and assess calorie counts as needed  - Recommend, monitor, and adjust tube feedings and TPN/PPN based on assessed needs  - Assess need for intravenous fluids  - Provide specific nutrition/hydration education as appropriate  - Include patient/family/caregiver in decisions related to nutrition  7/20/2021 1104 by Sanchez Lopez RN  Outcome: Progressing  7/20/2021 1103 by Sanchez Lopez RN  Outcome: Progressing     Problem: PAIN - ADULT  Goal: Verbalizes/displays adequate comfort level or baseline comfort level  Description: Interventions:  - Encourage patient to monitor pain and request assistance  - Assess pain using appropriate pain scale  - Administer analgesics based on type and severity of pain and evaluate response  - Implement non-pharmacological measures as appropriate and evaluate response  - Consider cultural and social influences on pain and pain management  - Notify physician/advanced practitioner if interventions unsuccessful or patient reports new pain  Outcome: Progressing     Problem: INFECTION - ADULT  Goal: Absence or prevention of progression during hospitalization  Description: INTERVENTIONS:  - Assess and monitor for signs and symptoms of infection  - Monitor lab/diagnostic results  - Monitor all insertion sites, i e  indwelling lines, tubes, and drains  - Monitor endotracheal if appropriate and nasal secretions for changes in amount and color  - [place] appropriate cooling/warming therapies per order  - Administer medications as ordered  - Instruct and encourage patient and family to use good hand hygiene technique  - Identify and instruct in appropriate isolation precautions for identified infection/condition  Outcome: Progressing     Problem: SAFETY ADULT  Goal: Patient will remain free of falls  Description: INTERVENTIONS:  - Educate patient/family on patient safety including physical limitations  - Instruct patient to call for assistance with activity   - Consult OT/PT to assist with strengthening/mobility   - Keep Call bell within reach  - Keep bed low and locked with side rails adjusted as appropriate  - Keep care items and personal belongings within reach  - Initiate and maintain comfort rounds  - Make Fall Risk Sign visible to staff       Problem: DISCHARGE PLANNING  Goal: Discharge to home or other facility with appropriate resources  Description: INTERVENTIONS:  - Identify barriers to discharge w/patient and caregiver  - Arrange for needed discharge resources and transportation as appropriate  - Identify discharge learning needs (meds, wound care, etc )  - Arrange for interpretive services to assist at discharge as needed  - Refer to Case Management Department for coordinating discharge planning if the patient needs post-hospital services based on physician/advanced practitioner order or complex needs related to functional status, cognitive ability, or social support system  Outcome: Progressing     Problem: Knowledge Deficit  Goal: Patient/family/caregiver demonstrates understanding of disease process, treatment plan, medications, and discharge instructions  Description: Complete learning assessment and assess knowledge base    Interventions:  - Provide teaching at level of understanding  - Provide teaching via preferred learning methods  Outcome: Progressing     Problem: GASTROINTESTINAL - ADULT  Goal: Maintains or returns to baseline bowel function  Description: INTERVENTIONS:  - Assess bowel function  - Encourage oral fluids to ensure adequate hydration  - Administer IV fluids if ordered to ensure adequate hydration  - Administer ordered medications as needed  - Encourage mobilization and activity  - Consider nutritional services referral to assist patient with adequate nutrition and appropriate food choices  Outcome: Progressing

## 2021-07-20 NOTE — PROGRESS NOTES
Progress Note - General Surgery   Alexandre Vinson 73 y o  female MRN: [id number]  Unit/Bed#: W -01 Encounter: [id number]    Assessment:  Colonic pseudo-obstruction due to opiate dependence    Plan:  NPO status  IV fluids  Relistor injections  Subjective/Objective   Chief Complaint:  Mild improvement since admission    Subjective:  Patient is complaining of nausea but has had no vomiting since admission  She has not passed any flatus pain    Objective:  Afebrile with stable vital signs    Blood pressure 104/57, pulse 73, temperature 98 2 °F (36 8 °C), resp  rate 18, height 5' 4" (1 626 m), weight 63 5 kg (139 lb 15 9 oz), SpO2 97 %, not currently breastfeeding  ,Body mass index is 24 03 kg/m²  No intake or output data in the 24 hours ending 07/20/21 0831    Invasive Devices     Peripherally Inserted Central Catheter Line            PICC Line 32/59/06 Left Basilic 806 days    PICC Line 62/43/36 Left Basilic 802 days          Peripheral Intravenous Line            Peripheral IV 07/19/21 Right Antecubital <1 day          Drain            Closed/Suction Drain Other (Comment) -- days                Physical Exam:  Abdomen is not distended  Bowel sounds are hypoactive  Fistula bag a small amount of drainage  Lab, Imaging and other studies:I have personally reviewed pertinent lab results      VTE Pharmacologic Prophylaxis: Heparin  VTE Mechanical Prophylaxis: sequential compression device

## 2021-07-22 NOTE — PROGRESS NOTES
Assessment/Plan:        Problem List Items Addressed This Visit        Digestive    Colonic pseudoobstruction - Primary    Enterocutaneous fistula    Relevant Medications    HYDROmorphone (DILAUDID) 4 mg tablet    morphine (MS CONTIN) 30 mg 12 hr tablet    methocarbamol (ROBAXIN) 750 mg tablet    gabapentin (NEURONTIN) 300 mg capsule    Short gut syndrome    Relevant Medications    HYDROmorphone (DILAUDID) 4 mg tablet    morphine (MS CONTIN) 30 mg 12 hr tablet    methocarbamol (ROBAXIN) 750 mg tablet    gabapentin (NEURONTIN) 300 mg capsule       Other    B12 deficiency    Chronic midline thoracic back pain    Relevant Medications    HYDROmorphone (DILAUDID) 4 mg tablet    morphine (MS CONTIN) 30 mg 12 hr tablet    methocarbamol (ROBAXIN) 750 mg tablet    gabapentin (NEURONTIN) 300 mg capsule    Chronic pain disorder    Relevant Medications    HYDROmorphone (DILAUDID) 4 mg tablet    morphine (MS CONTIN) 30 mg 12 hr tablet    methocarbamol (ROBAXIN) 750 mg tablet    gabapentin (NEURONTIN) 300 mg capsule    Generalized anxiety disorder (Chronic)    Relevant Medications    ALPRAZolam (XANAX) 1 mg tablet         Reviewed plan for opioid wean with decrease of 10% from total MME once monthly  Start with transition to Dilaudid TID (in place of QID)  Continue Morphine, Gabapentin, and Robaxin as prescribed  Will follow up in 4 weeks to monitor, but reviewed signs of withdrawal and encouraged pt to call with any symptoms prior  Pt agreeable and expressed understanding  Will continue Xanax 1 mg TID for anxiety management, especially during opioid weaning process  Once pain regimen has decreased (or been fully removed), can consider alternative anxiety regimen as well  B12 injection provided  Subjective:     Patient ID: Steph Goff is a 73 y o  female  HPI    Pt presents for hospital follow up s/p admission to [facility] from 7/19-7/20  Pt initially presented due to fever, abdominal pain, and vomiting   She was admitted for recurrent pseudo-obstruction, made NPO, and given a dose of Relistor with improvement of symptoms  Today:   Pt states she is overall sore and fatigued  However, she is very motivated to start weaning down her opioid regimen as she does not want to continue going in to the hospital for psuedo-obstructions and she knows her pain medication is playing a big role in this  Currently:   Dilaudid 64 MME  Morphine 90 MME   TOTAL 154 MME daily     Review of Systems   Constitutional: Positive for fatigue  Gastrointestinal: Positive for abdominal pain  Musculoskeletal: Positive for back pain  Neurological: Positive for weakness  Psychiatric/Behavioral: The patient is nervous/anxious  Objective:     Physical Exam  Vitals and nursing note reviewed  Constitutional:       General: She is not in acute distress  Appearance: Normal appearance  HENT:      Head: Normocephalic and atraumatic  Pulmonary:      Effort: Pulmonary effort is normal  No respiratory distress  Neurological:      General: No focal deficit present  Mental Status: She is alert  Psychiatric:         Mood and Affect: Mood normal       Comments: In good spirits and motivated to make change           Vitals:    07/22/21 1129 07/22/21 1141   BP: (!) 80/42 112/54   BP Location: Left arm    Patient Position: Sitting    Cuff Size: Large    Pulse: 73    Temp: 97 5 °F (36 4 °C)    SpO2: 99%    Weight: 63 9 kg (140 lb 12 8 oz)    Height: 5' 4" (1 626 m)        Transitional Care Management Review:  Steph Goff is a 73 y o  female here for TCM follow up       During the TCM phone call patient stated:    TCM Call (since 6/21/2021)     Date and time call was made  7/21/2021  8:51 AM    Patient was hospitialized at  [address] [place] [address]        Date of Admission  07/19/21    Date of discharge  07/20/21    Diagnosis  Colonic pseudoobstruction    Disposition  Home      TCM Call (since 6/21/2021)     Scheduled for follow up?  Yes    Did you obtain your prescribed medications  Yes    Do you need help managing your prescriptions or medications  No    Is transportation to your appointment needed  No    I have advised the patient to call PCP with any new or worsening symptoms  [facility] [place], DO

## 2021-07-29 NOTE — PHYSICIAN ADVISOR
Current patient class: Inpatient  The patient is currently on Hospital Day: 2 at [address]      The patient was admitted to the hospital  on 7/19/21 at  6:34 PM for the following diagnosis:  Fever [R50 9]  Intractable abdominal pain [R10 9]     After review of the relevant documentation, labs, vital signs and test results, this is a PROVIDER LIABLE case  In this particular case the patient was admitted to the hospital as an inpatient  The patient however failed to satisfy the 2 midnight benchmark and closer scrutiny of the case was warranted  After review of the patient presentation and relevant labs the patient was most appropriate for observation or outpatient class at the time of admission  Given that this patient has already been discharged prior to this review they become a provider liable case  Rationale is as follows: The patient is a 71-year-old female who presented with abdominal pain and vomiting and lack of bowel movement for two days  She has a complex surgical history   Including short-gut syndrome and chronic enterocutaneous fistula  CT demonstrated dilated loops of colon without evidence of obstruction  She reported fever at home but was afebrile in the hospital     Labs were unremarkable and there was no leukocytosis  Concern was for opiate induced constipation  The patient was started on Relistor  There was no anticipated length of stay documented  The patient did well and was feeling improved and had a bowel movement  She was discharged within about 24 hours  Based on the documentation on length of stay, observation class would have been appropriate to determine if she required further hospitalization for continued symptoms or failure to respond to treatment      The patients vitals on arrival were   ED Triage Vitals   Temperature Pulse Respirations Blood Pressure SpO2   07/19/21 1243 07/19/21 1241 07/19/21 1241 07/19/21 1241 07/19/21 1241 98 2 °F (36 8 °C) 67 18 102/56 99 %      Temp src Heart Rate Source Patient Position - Orthostatic VS BP Location FiO2 (%)   -- 21 1241 21 1542 21 1542 --    Monitor Lying Right arm       Pain Score       21 1545       9           Past Medical History:   Diagnosis Date    Asthma     Bowel obstruction (HCC)     Choledocholithiasis 2020    Chronic back pain     Colon polyp     Enlarged kidney     Enterocutaneous fistula 2019    Hydronephrosis 2019    Ileus ([address] ) 2018    Overview:  Last Assessment & Plan:  Patient presented with generalized abdominal pain nausea and bilious vomiting ,imaging study reports " diffuse bowel dilatation involving distal small bowel and the entire colon to the panel verge obstruction mass is not identified and this may represent adynamic ileus in the postoperative setting possible related to medication"  Patient states that she still ha    Liver mass     Opiate dependence ([address] )     Post-ERCP acute pancreatitis 2020    Short gut syndrome     5 feet small bowel    Small bowel fistula     Urinary retention with incomplete bladder emptying      Past Surgical History:   Procedure Laterality Date    ABDOMINAL SURGERY      x 25    APPENDECTOMY      Open     BACK SURGERY      x 3     SECTION      x1    CHOLECYSTECTOMY      Open    COLON SURGERY      Sigmoid     COLONOSCOPY N/A 2018    Procedure: COLONOSCOPY;  Surgeon: Jamie Mendosa MD;  Location: [facility];   Service: Gastroenterology    COLONOSCOPY      ERCP      EXPLORATORY LAPAROTOMY      Several for adhesive disease; has had several bowel resections; has a small bowel fistula    GASTROCUTANEOUS FISTULA CLOSURE      HERNIA REPAIR      Incisional     IR PICC REPOSITION  3/3/2021    MAMMO (HISTORICAL)  2017    SPINAL FUSION      Lower back     TONSILLECTOMY      TOTAL ABDOMINAL HYSTERECTOMY      Not due to cancer - complete     TRACHEOSTOMY 09/2019           Consults have been placed to:   None    Vitals:    07/20/21 0700 07/20/21 0752 07/20/21 0829 07/20/21 0831   BP: 97/55 104/57  102/55   BP Location:  Right arm     Pulse: 68 73  72   Resp: 18   15   Temp:    98 2 °F (36 8 °C)   SpO2: 96% 97%  97%   Weight:   63 5 kg (139 lb 15 9 oz)    Height:   5' 4" (1 626 m)        Most recent labs:    No results for input(s): WBC, HGB, HCT, PLT, K, NA, CALCIUM, BUN, CREATININE, LIPASE, AMYLASE, INR, TROPONINI, CKTOTAL, AST, ALT, ALKPHOS, BILITOT in the last 72 hours  Scheduled Meds:  Current Facility-Administered Medications   Medication Dose Route Frequency Provider Last Rate    cyanocobalamin  1,000 mcg Intramuscular Q30 Days Venus Hernandez DO       Continuous Infusions:   PRN Meds:      Surgical procedures (if appropriate):

## 2021-08-16 NOTE — PATIENT INSTRUCTIONS
Medicare Preventive Visit Patient Instructions  Thank you for completing your Welcome to Medicare Visit or Medicare Annual Wellness Visit today  Your next wellness visit will be due in one year (8/17/2022)  The screening/preventive services that you may require over the next 5-10 years are detailed below  Some tests may not apply to you based off risk factors and/or age  Screening tests ordered at today's visit but not completed yet may show as past due  Also, please note that scanned in results may not display below  Preventive Screenings:  Service Recommendations Previous Testing/Comments   Colorectal Cancer Screening  * Colonoscopy    * Fecal Occult Blood Test (FOBT)/Fecal Immunochemical Test (FIT)  * Fecal DNA/Cologuard Test  * Flexible Sigmoidoscopy Age: 54-65 years old   Colonoscopy: every 10 years (may be performed more frequently if at higher risk)  OR  FOBT/FIT: every 1 year  OR  Cologuard: every 3 years  OR  Sigmoidoscopy: every 5 years  Screening may be recommended earlier than age 48 if at higher risk for colorectal cancer  Also, an individualized decision between you and your healthcare provider will decide whether screening between the ages of 74-80 would be appropriate  Colonoscopy: 12/04/2018  FOBT/FIT: Not on file  Cologuard: Not on file  Sigmoidoscopy: Not on file    Screening Current     Breast Cancer Screening Age: 36 years old  Frequency: every 1-2 years  Not required if history of left and right mastectomy Mammogram: Not on file        Cervical Cancer Screening Between the ages of 21-29, pap smear recommended once every 3 years  Between the ages of 33-67, can perform pap smear with HPV co-testing every 5 years     Recommendations may differ for women with a history of total hysterectomy, cervical cancer, or abnormal pap smears in past  Pap Smear: Not on file    Screening Not Indicated   Hepatitis C Screening Once for adults born between 1945 and 1965  More frequently in patients at high risk for Hepatitis C Hep C Antibody: Not on file        Diabetes Screening 1-2 times per year if you're at risk for diabetes or have pre-diabetes Fasting glucose: 107 mg/dL   A1C: No results in last 5 years    Screening Current   Cholesterol Screening Once every 5 years if you don't have a lipid disorder  May order more often based on risk factors  Lipid panel: 12/16/2019    Screening Current     Other Preventive Screenings Covered by Medicare:  1  Abdominal Aortic Aneurysm (AAA) Screening: covered once if your at risk  You're considered to be at risk if you have a family history of AAA  2  Lung Cancer Screening: covers low dose CT scan once per year if you meet all of the following conditions: (1) Age 50-69; (2) No signs or symptoms of lung cancer; (3) Current smoker or have quit smoking within the last 15 years; (4) You have a tobacco smoking history of at least 30 pack years (packs per day multiplied by number of years you smoked); (5) You get a written order from a healthcare provider  3  Glaucoma Screening: covered annually if you're considered high risk: (1) You have diabetes OR (2) Family history of glaucoma OR (3)  aged 48 and older OR (3)  American aged 72 and older  3  Osteoporosis Screening: covered every 2 years if you meet one of the following conditions: (1) You're estrogen deficient and at risk for osteoporosis based off medical history and other findings; (2) Have a vertebral abnormality; (3) On glucocorticoid therapy for more than 3 months; (4) Have primary hyperparathyroidism; (5) On osteoporosis medications and need to assess response to drug therapy  · Last bone density test (DXA Scan): Not on file  5  HIV Screening: covered annually if you're between the age of 12-76  Also covered annually if you are younger than 13 and older than 72 with risk factors for HIV infection  For pregnant patients, it is covered up to 3 times per pregnancy      Immunizations:  Immunization Recommendations   Influenza Vaccine Annual influenza vaccination during flu season is recommended for all persons aged >= 6 months who do not have contraindications   Pneumococcal Vaccine (Prevnar and Pneumovax)  * Prevnar = PCV13  * Pneumovax = PPSV23   Adults 25-60 years old: 1-3 doses may be recommended based on certain risk factors  Adults 72 years old: Prevnar (PCV13) vaccine recommended followed by Pneumovax (PPSV23) vaccine  If already received PPSV23 since turning 65, then PCV13 recommended at least one year after PPSV23 dose  Hepatitis B Vaccine 3 dose series if at intermediate or high risk (ex: diabetes, end stage renal disease, liver disease)   Tetanus (Td) Vaccine - COST NOT COVERED BY MEDICARE PART B Following completion of primary series, a booster dose should be given every 10 years to maintain immunity against tetanus  Td may also be given as tetanus wound prophylaxis  Tdap Vaccine - COST NOT COVERED BY MEDICARE PART B Recommended at least once for all adults  For pregnant patients, recommended with each pregnancy  Shingles Vaccine (Shingrix) - COST NOT COVERED BY MEDICARE PART B  2 shot series recommended in those aged 48 and above     Health Maintenance Due:      Topic Date Due    Hepatitis C Screening  Never done    HIV Screening  Never done    Breast Cancer Screening: Mammogram  Never done    Colorectal Cancer Screening  12/04/2028     Immunizations Due:      Topic Date Due    Influenza Vaccine (1) 09/01/2021     Advance Directives   What are advance directives? Advance directives are legal documents that state your wishes and plans for medical care  These plans are made ahead of time in case you lose your ability to make decisions for yourself  Advance directives can apply to any medical decision, such as the treatments you want, and if you want to donate organs  What are the types of advance directives?   There are many types of advance directives, and each state has rules about how to use them  You may choose a combination of any of the following:  · Living will: This is a written record of the treatment you want  You can also choose which treatments you do not want, which to limit, and which to stop at a certain time  This includes surgery, medicine, IV fluid, and tube feedings  · Durable power of  for healthcare [facility]): This is a written record that states who you want to make healthcare choices for you when you are unable to make them for yourself  This person, called a proxy, is usually a family member or a friend  You may choose more than 1 proxy  · Do not resuscitate (DNR) order:  A DNR order is used in case your heart stops beating or you stop breathing  It is a request not to have certain forms of treatment, such as CPR  A DNR order may be included in other types of advance directives  · Medical directive: This covers the care that you want if you are in a coma, near death, or unable to make decisions for yourself  You can list the treatments you want for each condition  Treatment may include pain medicine, surgery, blood transfusions, dialysis, IV or tube feedings, and a ventilator (breathing machine)  · Values history: This document has questions about your views, beliefs, and how you feel and think about life  This information can help others choose the care that you would choose  Why are advance directives important? An advance directive helps you control your care  Although spoken wishes may be used, it is better to have your wishes written down  Spoken wishes can be misunderstood, or not followed  Treatments may be given even if you do not want them  An advance directive may make it easier for your family to make difficult choices about your care  Urinary Incontinence   Urinary incontinence (UI)  is when you lose control of your bladder  UI develops because your bladder cannot store or empty urine properly   The 3 most common types of UI are stress incontinence, urge incontinence, or both  Medicines:   · May be given to help strengthen your bladder control  Report any side effects of medication to your healthcare provider  Do pelvic muscle exercises often:  Your pelvic muscles help you stop urinating  Squeeze these muscles tight for 5 seconds, then relax for 5 seconds  Gradually work up to squeezing for 10 seconds  Do 3 sets of 15 repetitions a day, or as directed  This will help strengthen your pelvic muscles and improve bladder control  Train your bladder:  Go to the bathroom at set times, such as every 2 hours, even if you do not feel the urge to go  You can also try to hold your urine when you feel the urge to go  For example, hold your urine for 5 minutes when you feel the urge to go  As that becomes easier, hold your urine for 10 minutes  Self-care:   · Keep a UI record  Write down how often you leak urine and how much you leak  Make a note of what you were doing when you leaked urine  · Drink liquids as directed  You may need to limit the amount of liquid you drink to help control your urine leakage  Do not drink any liquid right before you go to bed  Limit or do not have drinks that contain caffeine or alcohol  · Prevent constipation  Eat a variety of high-fiber foods  Good examples are high-fiber cereals, beans, vegetables, and whole-grain breads  Walking is the best way to trigger your intestines to have a bowel movement  · Exercise regularly and maintain a healthy weight  Weight loss and exercise will decrease pressure on your bladder and help you control your leakage  · Use a catheter as directed  to help empty your bladder  A catheter is a tiny, plastic tube that is put into your bladder to drain your urine  · Go to behavior therapy as directed  Behavior therapy may be used to help you learn to control your urge to urinate      Weight Management   Why it is important to manage your weight:  Being overweight increases your risk of health conditions such as heart disease, high blood pressure, type 2 diabetes, and certain types of cancer  It can also increase your risk for osteoarthritis, sleep apnea, and other respiratory problems  Aim for a slow, steady weight loss  Even a small amount of weight loss can lower your risk of health problems  How to lose weight safely:  A safe and healthy way to lose weight is to eat fewer calories and get regular exercise  You can lose up about 1 pound a week by decreasing the number of calories you eat by 500 calories each day  Healthy meal plan for weight management:  A healthy meal plan includes a variety of foods, contains fewer calories, and helps you stay healthy  A healthy meal plan includes the following:  · Eat whole-grain foods more often  A healthy meal plan should contain fiber  Fiber is the part of grains, fruits, and vegetables that is not broken down by your body  Whole-grain foods are healthy and provide extra fiber in your diet  Some examples of whole-grain foods are whole-wheat breads and pastas, oatmeal, brown rice, and bulgur  · Eat a variety of vegetables every day  Include dark, leafy greens such as spinach, kale, adrien greens, and mustard greens  Eat yellow and orange vegetables such as carrots, sweet potatoes, and winter squash  · Eat a variety of fruits every day  Choose fresh or canned fruit (canned in its own juice or light syrup) instead of juice  Fruit juice has very little or no fiber  · Eat low-fat dairy foods  Drink fat-free (skim) milk or 1% milk  Eat fat-free yogurt and low-fat cottage cheese  Try low-fat cheeses such as mozzarella and other reduced-fat cheeses  · Choose meat and other protein foods that are low in fat  Choose beans or other legumes such as split peas or lentils  Choose fish, skinless poultry (chicken or turkey), or lean cuts of red meat (beef or pork)  Before you cook meat or poultry, cut off any visible fat  · Use less fat and oil    Try baking foods instead of frying them  Add less fat, such as margarine, sour cream, regular salad dressing and mayonnaise to foods  Eat fewer high-fat foods  Some examples of high-fat foods include french fries, doughnuts, ice cream, and cakes  · Eat fewer sweets  Limit foods and drinks that are high in sugar  This includes candy, cookies, regular soda, and sweetened drinks  Exercise:  Exercise at least 30 minutes per day on most days of the week  Some examples of exercise include walking, biking, dancing, and swimming  You can also fit in more physical activity by taking the stairs instead of the elevator or parking farther away from stores  Ask your healthcare provider about the best exercise plan for you  Narcotic (Opioid) Safety    Use narcotics safely:  · Take prescribed narcotics exactly as directed  · Do not give narcotics to others or take narcotics that belong to someone else  · Do not mix narcotics without medicines or alcohol  · Do not drive or operate heavy machinery after you take the narcotic  · Monitor for side effects and notify your healthcare provider if you experienced side effects such as nausea, sleepiness, itching, or trouble thinking clearly  Manage constipation:    Constipation is the most common side effect of narcotic medicine  Constipation is when you have hard, dry bowel movements, or you go longer than usual between bowel movements  Tell your healthcare provider about all changes in your bowel movements while you are taking narcotics  He or she may recommend laxative medicine to help you have a bowel movement  He or she may also change the kind of narcotic you are taking, or change when you take it  The following are more ways you can prevent or relieve constipation:    · Drink liquids as directed  You may need to drink extra liquids to help soften and move your bowels  Ask how much liquid to drink each day and which liquids are best for you  · Eat high-fiber foods    This may help decrease constipation by adding bulk to your bowel movements  High-fiber foods include fruits, vegetables, whole-grain breads and cereals, and beans  Your healthcare provider or dietitian can help you create a high-fiber meal plan  Your provider may also recommend a fiber supplement if you cannot get enough fiber from food  · Exercise regularly  Regular physical activity can help stimulate your intestines  Walking is a good exercise to prevent or relieve constipation  Ask which exercises are best for you  · Schedule a time each day to have a bowel movement  This may help train your body to have regular bowel movements  Bend forward while you are on the toilet to help move the bowel movement out  Sit on the toilet for at least 10 minutes, even if you do not have a bowel movement  Store narcotics safely:   · Store narcotics where others cannot easily get them  Keep them in a locked cabinet or secure area  Do not  keep them in a purse or other bag you carry with you  A person may be looking for something else and find the narcotics  · Make sure narcotics are stored out of the reach of children  A child can easily overdose on narcotics  Narcotics may look like candy to a small child  The best way to dispose of narcotics: The laws vary by country and area  In the [place], the best way is to return the narcotics through a take-back program  This program is offered by the [employer] (Walk-in Appointment Scheduler)  The following are options for using the program:  · Take the narcotics to a MARYA collection site  The site is often a law enforcement center  Call your local law enforcement center for scheduled take-back days in your area  You will be given information on where to go if the collection site is in a different location  · Take the narcotics to an approved pharmacy or hospital   A pharmacy or hospital may be set up as a collection site   You will need to ask if it is a MARYA collection site if you were not directed there  A pharmacy or doctor's office may not be able to take back narcotics unless it is a MARYA site  · Use a mail-back system  This means you are given containers to put the narcotics into  You will then mail them in the containers  · Use a take-back drop box  This is a place to leave the narcotics at any time  People and animals will not be able to get into the box  Your local law enforcement agency can tell you where to find a drop box in your area  Other ways to manage pain:   · Ask your healthcare provider about non-narcotic medicines to control pain  Nonprescription medicines include NSAIDs (such as ibuprofen) and acetaminophen  Prescription medicines include muscle relaxers, antidepressants, and steroids  · Pain may be managed without any medicines  Some ways to relieve pain include massage, aromatherapy, or meditation  Physical or occupational therapy may also help  For more information:   · Drug Enforcement Administration  [address] [place] [address] [address]  Phone: 4- 765 - [phone]  Web Address: [email or web address]    · [address]  and Drug Administration  [facility] , [address] [place] [address]  Phone: 3- 271 - 949-2969  Web Address: http://[employer]/     © Copyright [employer] 2018 Information is for End User's use only and may not be sold, redistributed or otherwise used for commercial purposes   All illustrations and images included in CareNotes® are the copyrighted property of [employer]  or [address] [place] [address]

## 2021-08-16 NOTE — PROGRESS NOTES
Assessment and Plan:     Problem List Items Addressed This Visit        Other    Chronic pain disorder    Chronic, continuous use of opioids      Other Visit Diagnoses     Right-sided chest wall pain    -  Primary    Relevant Orders    XR chest pa & lateral    Medicare annual wellness visit, subsequent        Encounter for screening mammogram for malignant neoplasm of breast        Relevant Orders    Mammo screening bilateral w 3d & cad    Screening for osteoporosis        Relevant Orders    DXA bone density spine hip and pelvis    Postmenopausal estrogen deficiency        Relevant Orders    DXA bone density spine hip and pelvis           Preventive health issues were discussed with patient, and age appropriate screening tests were ordered as noted in patient's After Visit Summary  Personalized health advice and appropriate referrals for health education or preventive services given if needed, as noted in patient's After Visit Summary       History of Present Illness:     Patient presents for Medicare Annual Wellness visit    Patient Care Team:  Allyn Polanco DO as PCP - General (Family Medicine)  MD Kelly Aponte MD as Endoscopist     Problem List:     Patient Active Problem List   Diagnosis    Generalized anxiety disorder    Chronic, continuous use of opioids    History of urinary retention    Chronic midline thoracic back pain    Depression    Hx of colonic polyp    Abdominal pain with distention, not passing gas/stool, vomiting    DDD (degenerative disc disease), lumbosacral    Enterocutaneous fistula    Short gut syndrome    Chronic pain disorder    Pancreatic divisum    History of ERCP    B12 deficiency    Colonic pseudoobstruction    Epigastric pain    Functional diarrhea      Past Medical and Surgical History:     Past Medical History:   Diagnosis Date    Asthma     Bowel obstruction ([address] )     Choledocholithiasis 11/18/2020    Chronic back pain     Colon polyp     Enlarged kidney     Enterocutaneous fistula 2019    Hydronephrosis 2019    Ileus ([address] ) 2018    Overview:  Last Assessment & Plan:  Patient presented with generalized abdominal pain nausea and bilious vomiting ,imaging study reports " diffuse bowel dilatation involving distal small bowel and the entire colon to the panel verge obstruction mass is not identified and this may represent adynamic ileus in the postoperative setting possible related to medication"  Patient states that she still ha    Liver mass     Opiate dependence ([address] )     Post-ERCP acute pancreatitis 2020    Short gut syndrome     5 feet small bowel    Small bowel fistula     Urinary retention with incomplete bladder emptying      Past Surgical History:   Procedure Laterality Date    ABDOMINAL SURGERY      x 25    APPENDECTOMY      Open     BACK SURGERY      x 3     SECTION      x1    CHOLECYSTECTOMY      Open    COLON SURGERY      Sigmoid     COLONOSCOPY N/A 2018    Procedure: COLONOSCOPY;  Surgeon: Farideh Alfred MD;  Location: [facility];   Service: Gastroenterology    COLONOSCOPY      ERCP      EXPLORATORY LAPAROTOMY      Several for adhesive disease; has had several bowel resections; has a small bowel fistula    GASTROCUTANEOUS FISTULA CLOSURE      HERNIA REPAIR      Incisional     IR PICC REPOSITION  3/3/2021    MAMMO (HISTORICAL)  2017    SPINAL FUSION      Lower back     TONSILLECTOMY      TOTAL ABDOMINAL HYSTERECTOMY      Not due to cancer - complete     TRACHEOSTOMY  2019      Family History:     Family History   Problem Relation Age of Onset    Lung cancer Mother     No Known Problems Father       Social History:     Social History     Socioeconomic History    Marital status: /Civil Union     Spouse name: None    Number of children: None    Years of education: None    Highest education level: None   Occupational History    None   Tobacco Use    Smoking status: Former Smoker     Quit date: 1980     Years since quittin 1    Smokeless tobacco: Never Used    Tobacco comment: Never smoker - As per [employer]    Vaping Use    Vaping Use: Never used   Substance and Sexual Activity    Alcohol use: Not Currently     Alcohol/week: 0 0 standard drinks     Comment: social drinker    Drug use: No    Sexual activity: Yes   Other Topics Concern    None   Social History Narrative    Lives with , dog, and brother     Retired        Nondrinker/no alcohol use - As per [employer]     Social Determinants of Health     Financial Resource Strain:     Difficulty of Paying Living Expenses:    Food Insecurity:     Worried About [address] in the Last Year:     [address] [employer] in the Last Year:    Transportation Needs:     Lack of Transportation (Medical):      Lack of Transportation (Non-Medical):    Physical Activity:     Days of Exercise per Week:     Minutes of Exercise per Session:    Stress:     Feeling of Stress :    Social Connections:     Frequency of Communication with Friends and Family:     Frequency of Social Gatherings with Friends and Family:     Attends [employer] Services:     Active Member of Clubs or Organizations:     Attends Club or Organization Meetings:     Marital Status:    Intimate Partner Violence:     Fear of Current or Ex-Partner:     Emotionally Abused:     Physically Abused:     Sexually Abused:       Medications and Allergies:     Current Outpatient Medications   Medication Sig Dispense Refill    cholecalciferol (VITAMIN D3) 1,000 units tablet 1 tablet 2x daily      diazepam (VALIUM) 5 mg tablet Take 1 tablet (5 mg total) by mouth every 8 (eight) hours as needed for anxiety  0    escitalopram (LEXAPRO) 20 mg tablet Take 0 5 tablets (10 mg total) by mouth daily 90 tablet 1    Ferrous Sulfate (Iron) 325 (65 Fe) MG TABS take 1 tablet by mouth once daily 30 tablet 2    folic acid (FOLVITE) 1 mg tablet Take 1 tablet (1 mg total) by mouth daily 90 tablet 3    gabapentin (NEURONTIN) 300 mg capsule Take 2 capsules (600 mg total) by mouth 3 (three) times a day 180 capsule 2    Gattex 5 MG KIT       HYDROmorphone (DILAUDID) 4 mg tablet Take 1 tablet (4 mg total) by mouth 3 (three) times a dayMax Daily Amount: 12 mg 90 tablet 0    methocarbamol (ROBAXIN) 750 mg tablet Take 1 tablet (750 mg total) by mouth 3 (three) times a day 90 tablet 2    morphine (MS CONTIN) 30 mg 12 hr tablet Take 1 tablet (30 mg total) by mouth every 8 (eight) hoursMax Daily Amount: 90 mg 90 tablet 0    naloxone (NARCAN) 4 mg/0 1 mL nasal spray Administer 1 spray into a nostril  If no response after 2-3 minutes, give another dose in the other nostril using a new spray   1 each 1    ondansetron (ZOFRAN) 4 mg tablet take 1 tablet by mouth every 6 hours if needed for nausea and vomiting      pantoprazole (PROTONIX) 40 mg tablet Take 1 tablet (40 mg total) by mouth daily 90 tablet 3    tamsulosin (FLOMAX) 0 4 mg Take 2 capsules (0 8 mg total) by mouth daily with dinner 180 capsule 1     Current Facility-Administered Medications   Medication Dose Route Frequency Provider Last Rate Last Admin    cyanocobalamin injection 1,000 mcg  1,000 mcg Intramuscular Q30 Days Venus Hernandez DO   1,000 mcg at 08/16/21 1522     Allergies   Allergen Reactions    Nsaids      Irritable, upset stomach  Irritable, upset stomach    Tolmetin      Irritable, upset stomach    Codeine Hives     Per [address] admission orders    Oxycodone-Acetaminophen GI Intolerance     Percocet Tabs    Tylenol [Acetaminophen]       Immunizations:     Immunization History   Administered Date(s) Administered    INFLUENZA 08/14/2014, 08/10/2015, 08/04/2016, 08/01/2017, 08/16/2018    Influenza, injectable, quadrivalent, preservative free 0 5 mL 08/16/2018    Pneumococcal Conjugate 13-Valent 03/31/2016    Pneumococcal Polysaccharide PPV23 01/08/2015    SARS-CoV-2 / COVID-19 mRNA IM ([email or web address]) 02/24/2021, 03/17/2021    Tdap 06/08/2015, 04/14/2016, 09/25/2017    Zoster 04/14/2016    Zoster Vaccine Recombinant 10/08/2020      Health Maintenance:         Topic Date Due    Hepatitis C Screening  Never done    HIV Screening  Never done    Breast Cancer Screening: Mammogram  Never done    Colorectal Cancer Screening  12/04/2028         Topic Date Due    Influenza Vaccine (1) 09/01/2021      Medicare Health Risk Assessment:     /68   Pulse 73   Temp (!) 97 1 °F (36 2 °C)   Ht 5' 4" (1 626 m)   Wt 67 1 kg (148 lb)   LMP  (LMP Unknown)   SpO2 97%   BMI 25 40 kg/m²      Jeanne Osuna is here for her Subsequent Wellness visit  Health Risk Assessment:   Patient rates overall health as fair  Patient feels that their physical health rating is slightly better  Patient is satisfied with their life  Eyesight was rated as same  Hearing was rated as same  Patient feels that their emotional and mental health rating is same  Patients states they are never, rarely angry  Patient states they are never, rarely unusually tired/fatigued  Pain experienced in the last 7 days has been some  Patient's pain rating has been 5/10  Patient states that she has experienced no weight loss or gain in last 6 months  Depression Screening:   PHQ-2 Score: 0  PHQ-9 Score: 0      Fall Risk Screening: In the past year, patient has experienced: no history of falling in past year      Urinary Incontinence Screening:   Patient has not leaked urine accidently in the last six months  Home Safety:  Patient does not have trouble with stairs inside or outside of their home  Patient has working smoke alarms and has working carbon monoxide detector  Home safety hazards include: none  Nutrition:   Current diet is Regular  Medications:   Patient is currently taking over-the-counter supplements  OTC medications include: see medication list  Patient is able to manage medications       Activities of Daily Living (ADLs)/Instrumental Activities of Daily Living (IADLs):   Walk and transfer into and out of bed and chair?: Yes  Dress and groom yourself?: Yes    Bathe or shower yourself?: Yes    Feed yourself? Yes  Do your laundry/housekeeping?: Yes  Manage your money, pay your bills and track your expenses?: Yes  Make your own meals?: Yes    Do your own shopping?: Yes    Durable Medical Equipment Suppliers  N/A    Previous Hospitalizations:   Any hospitalizations or ED visits within the last 12 months?: Yes    How many hospitalizations have you had in the last year?: more than 4    Advance Care Planning:   Living will: No    Advanced directive: No      Cognitive Screening:   Provider or family/friend/caregiver concerned regarding cognition?: No    PREVENTIVE SCREENINGS      Cardiovascular Screening:    General: Screening Current      Diabetes Screening:     General: Screening Current      Colorectal Cancer Screening:     General: Screening Current      Breast Cancer Screening:       Due for: Mammogram        Cervical Cancer Screening:    General: Screening Not Indicated      Osteoporosis Screening:      Due for: DXA Appendicular      Abdominal Aortic Aneurysm (AAA) Screening:        General: Screening Not Indicated      Lung Cancer Screening:     General: Screening Not Indicated      Hepatitis C Screening:    General: Screening Not Indicated    Screening, Brief Intervention, and Referral to Treatment (SBIRT)    Screening  Typical number of drinks in a day: 0  Typical number of drinks in a week: 1  Interpretation: Low risk drinking behavior      Single Item Drug Screening:  How often have you used an illegal drug (including marijuana) or a prescription medication for non-medical reasons in the past year? never    Single Item Drug Screen Score: 0  Interpretation: Negative screen for possible drug use disorder    Review of Current Opioid Use    Opioid Risk Tool (ORT) Interpretation: Complete Opioid Risk Tool (ORT)      John Gongora, DO

## 2021-08-16 NOTE — TELEPHONE ENCOUNTER
Patient forgot to mention that if she eats with the Alprazolam she is okay, so she would like a refill sent to [facility]

## 2021-08-16 NOTE — TELEPHONE ENCOUNTER
Verito Grimes calls stating shes in severe pain on her right side  R shoulder area wrapping around to the front  Cant lift arm and needs to have a med review  You are booked all day  I offered her appts with other providers as early as 920 today but she refused  She said she only wants you  Any suggestions for overbooking or what to do? Shes waiting for a call     Thanks Rb

## 2021-08-16 NOTE — PROGRESS NOTES
Cristal Guthrie 1955 female MRN: [id number]      ASSESSMENT/PLAN  Problem List Items Addressed This Visit        Other    Chronic pain disorder    Chronic, continuous use of opioids      Other Visit Diagnoses     Right-sided chest wall pain    -  Primary    Relevant Orders    XR chest pa & lateral    Medicare annual wellness visit, subsequent        Encounter for screening mammogram for malignant neoplasm of breast        Relevant Orders    Mammo screening bilateral w 3d & cad    Screening for osteoporosis        Relevant Orders    DXA bone density spine hip and pelvis    Postmenopausal estrogen deficiency        Relevant Orders    DXA bone density spine hip and pelvis        History and exam most consistent with [facility] etiology  Will update XR to r/o lung, bony pathology  Continue supportive care  Discussed pt should try to transition to Dilaudid TID again and we will follow up again in 4 weeks to re-evaluate  Future Appointments   Date Time Provider Amanda Enrique   9/13/2021 10:20 AM DO ELENA Andrews  [facility]   9/16/2021 12:00 PM Liya Palmer MD [facility]          SUBJECTIVE  CC: Lung Pain (Pain in right lung, recently had a central line removed), Medicare Wellness Visit, and Medication Review      HPI:  Cristal Guthrie is a 73 y o  female who presents due to R lung pain  Has slowly been getting pain on R chest wall over the past three weeks   Central line was removed from this area about 3 months ago   Starts in clavicle and radiates around to the back   Tender to palpation   Tried heating pad     Pt states she has taken extra medication because she has been having so much pain  "I feel like I was hit with a [employer] truck"  Has no energy  States she was able to tolerate Dilaudid TID x1 week, prior to increasing back to QID due to pain  Review of Systems   Constitutional: Positive for fatigue  Gastrointestinal: Positive for abdominal pain and diarrhea  Musculoskeletal: Positive for arthralgias  Psychiatric/Behavioral: Positive for sleep disturbance  Historical Information   The patient history was reviewed and updated as follows:    Past Medical History:   Diagnosis Date    Asthma     Bowel obstruction ([address] )     Choledocholithiasis 2020    Chronic back pain     Colon polyp     Enlarged kidney     Enterocutaneous fistula 2019    Hydronephrosis 2019    Ileus ([address] ) 2018    Overview:  Last Assessment & Plan:  Patient presented with generalized abdominal pain nausea and bilious vomiting ,imaging study reports " diffuse bowel dilatation involving distal small bowel and the entire colon to the panel verge obstruction mass is not identified and this may represent adynamic ileus in the postoperative setting possible related to medication"  Patient states that she still ha    Liver mass     Opiate dependence [address] [address] [address]     Post-ERCP acute pancreatitis 2020    Short gut syndrome     5 feet small bowel    Small bowel fistula     Urinary retention with incomplete bladder emptying      Past Surgical History:   Procedure Laterality Date    ABDOMINAL SURGERY      x 25    APPENDECTOMY      Open     BACK SURGERY      x 3     SECTION      x1    CHOLECYSTECTOMY      Open    COLON SURGERY      Sigmoid     COLONOSCOPY N/A 2018    Procedure: COLONOSCOPY;  Surgeon: Cesia Villalta MD;  Location: [facility];   Service: Gastroenterology    COLONOSCOPY  2018    ERCP      EXPLORATORY LAPAROTOMY      Several for adhesive disease; has had several bowel resections; has a small bowel fistula    GASTROCUTANEOUS FISTULA CLOSURE      HERNIA REPAIR      Incisional     IR PICC REPOSITION  3/3/2021    MAMMO (HISTORICAL)  2017    SPINAL FUSION      Lower back     TONSILLECTOMY      TOTAL ABDOMINAL HYSTERECTOMY      Not due to cancer - complete     TRACHEOSTOMY  2019     Family History   Problem Relation Age of Onset    Lung cancer Mother     No Known Problems Father       Social History   Social History     Substance and Sexual Activity   Alcohol Use Not Currently    Alcohol/week: 0 0 standard drinks    Comment: social drinker     Social History     Substance and Sexual Activity   Drug Use No     Social History     Tobacco Use   Smoking Status Former Smoker    Quit date: 1980    Years since quittin 1   Smokeless Tobacco Never Used   Tobacco Comment    Never smoker - As per [employer]        Medications:     Current Outpatient Medications:     cholecalciferol (VITAMIN D3) 1,000 units tablet, 1 tablet 2x daily, Disp: , Rfl:     diazepam (VALIUM) 5 mg tablet, Take 1 tablet (5 mg total) by mouth every 8 (eight) hours as needed for anxiety, Disp: , Rfl: 0    escitalopram (LEXAPRO) 20 mg tablet, Take 0 5 tablets (10 mg total) by mouth daily, Disp: 90 tablet, Rfl: 1    Ferrous Sulfate (Iron) 325 (65 Fe) MG TABS, take 1 tablet by mouth once daily, Disp: 30 tablet, Rfl: 2    folic acid (FOLVITE) 1 mg tablet, Take 1 tablet (1 mg total) by mouth daily, Disp: 90 tablet, Rfl: 3    gabapentin (NEURONTIN) 300 mg capsule, Take 2 capsules (600 mg total) by mouth 3 (three) times a day, Disp: 180 capsule, Rfl: 2    Gattex 5 MG KIT, , Disp: , Rfl:     HYDROmorphone (DILAUDID) 4 mg tablet, Take 1 tablet (4 mg total) by mouth 3 (three) times a dayMax Daily Amount: 12 mg, Disp: 90 tablet, Rfl: 0    methocarbamol (ROBAXIN) 750 mg tablet, Take 1 tablet (750 mg total) by mouth 3 (three) times a day, Disp: 90 tablet, Rfl: 2    morphine (MS CONTIN) 30 mg 12 hr tablet, Take 1 tablet (30 mg total) by mouth every 8 (eight) hoursMax Daily Amount: 90 mg, Disp: 90 tablet, Rfl: 0    naloxone (NARCAN) 4 mg/0 1 mL nasal spray, Administer 1 spray into a nostril   If no response after 2-3 minutes, give another dose in the other nostril using a new spray , Disp: 1 each, Rfl: 1    ondansetron (ZOFRAN) 4 mg tablet, take 1 tablet by mouth every 6 hours if needed for nausea and vomiting, Disp: , Rfl:     pantoprazole (PROTONIX) 40 mg tablet, Take 1 tablet (40 mg total) by mouth daily, Disp: 90 tablet, Rfl: 3    tamsulosin (FLOMAX) 0 4 mg, Take 2 capsules (0 8 mg total) by mouth daily with dinner, Disp: 180 capsule, Rfl: 1    Current Facility-Administered Medications:     cyanocobalamin injection 1,000 mcg, 1,000 mcg, Intramuscular, Q30 Days, Venus Hernandez DO, 1,000 mcg at 08/16/21 1522  Allergies   Allergen Reactions    Nsaids      Irritable, upset stomach  Irritable, upset stomach    Tolmetin      Irritable, upset stomach    Codeine Hives     Per [address] admission orders    Oxycodone-Acetaminophen GI Intolerance     Percocet Tabs    Tylenol [Acetaminophen]        OBJECTIVE    Vitals:   Vitals:    08/16/21 1451   BP: 117/68   Pulse: 73   Temp: (!) 97 1 °F (36 2 °C)   SpO2: 97%   Weight: 67 1 kg (148 lb)   Height: 5' 4" (1 626 m)           Physical Exam  Constitutional:       General: She is not in acute distress  Appearance: Normal appearance  HENT:      Head: Normocephalic and atraumatic  Cardiovascular:      Rate and Rhythm: Normal rate and regular rhythm  Pulmonary:      Effort: Pulmonary effort is normal  No respiratory distress  Breath sounds: Normal breath sounds  Musculoskeletal:        Arms:       Comments: Tender to palpation over scar tissue from previous port  Shoulder ROM decreased due to pain in chest wall    Skin:     General: Skin is warm and dry  Neurological:      General: No focal deficit present  Mental Status: She is alert     Psychiatric:         Mood and Affect: Mood normal                     Venus Hernandez DO  [facility]   8/16/2021  9:17 PM

## 2021-08-17 NOTE — TELEPHONE ENCOUNTER
Pt calls would like to go back on lorazapam 1mg taking it three times a day   When she takes it with food it

## 2021-08-17 NOTE — TELEPHONE ENCOUNTER
Unless I am mistaken, when we last spoke about this, pt had switched back to Diazepam and had been taking that  If that's the case, pt last filled Alprazolam on 7/22, and then decided to switch back to Diazepam and filled that script on 7/26, meaning she should have only taken 4 days worth at most of the Alprazolam and should not be due for a refill until at least 25 days from now  Thanks!

## 2021-08-17 NOTE — TELEPHONE ENCOUNTER
Pt was notified  She called asking for a refill again this morning  I reviewed the task with her  She was clueless she had the leftover meds from the rx in July, she will restart them   She has dc'ed the diazapam  Rb

## 2021-08-18 PROBLEM — K52.9 NONSPECIFIC COLITIS: Status: ACTIVE | Noted: 2021-01-01

## 2021-08-18 NOTE — CONSULTS
Consultation - General Surgery   Say Espinoza 73 y o  female MRN: [id number]  Unit/Bed#: [facility] Encounter: [id number]    Assessment/Plan     Assessment:  27-year-old female well known to surgical service presents with abdominal pain, nausea, and fevers at home    Plan:  -NPO  -IV fluids  -will follow-up lab work and [address] [place] testing and peripheral blood cultures for fever, will consider removing PICC line and considering DC long-term TPN  -suspect patient has component of opioid induced constipation as she has on multiple previous admissions  In the past this has responded well to Relistor will plan to start Relistor pending CT results  Discussed with surgical attending  History of Present Illness     HPI:  Say Espinoza is a 73 y o  female who presents with abdominal pain, fevers, nausea and vomiting  Patient states the pain is going on for the last few days  She says the pain is diffuse  Pain is associated with nausea and vomiting  Chest states she has had fevers on multiple days up to as high as 104  Patient states she has not had a bowel movement or passed flatus in 3 days  She denies any sick contacts  Patient is well known to the surgical service  She has had multiple similar issues in the past found to be secondary to chronic opioid induced constipation  Patient has a chronic enterocutaneous fistula which she says has been much improved recently  She only into the bag once every 3 days  Patient has a history of multiple abdominal surgeries and bowel resections with functional short-gut syndrome  In the emergency room the patient's vital signs were significant for borderline hypotension blood pressure 97/59  Patient is afebrile with temperature of 98 5°  Heart rate was in the 60s to 70s  Patient was in no acute distress  Laboratory evaluation is significant for leukocytosis to 11 with neutrophil predominance  Other labs including lactic acid were within normal limits    CT abdomen pelvis was pending at the time my evaluation  Consults    Review of Systems   Constitutional: Positive for chills and fever  Respiratory: Negative  Cardiovascular: Negative  Gastrointestinal: Positive for abdominal distention, abdominal pain, nausea and vomiting  Genitourinary: Negative  Musculoskeletal: Negative  Skin: Negative  Neurological: Negative  Hematological: Negative  Psychiatric/Behavioral: Negative  Historical Information   Past Medical History:   Diagnosis Date    Asthma     Bowel obstruction ([address] )     Choledocholithiasis 2020    Chronic back pain     Colon polyp     Enlarged kidney     Enterocutaneous fistula 2019    Hydronephrosis 2019    Ileus ([address] ) 2018    Overview:  Last Assessment & Plan:  Patient presented with generalized abdominal pain nausea and bilious vomiting ,imaging study reports " diffuse bowel dilatation involving distal small bowel and the entire colon to the panel verge obstruction mass is not identified and this may represent adynamic ileus in the postoperative setting possible related to medication"  Patient states that she still ha    Liver mass     [facility] dependence [facility] [address] [facility]     Post-ERCP acute pancreatitis 2020    Short gut syndrome     5 feet small bowel    Small bowel fistula     Urinary retention with incomplete bladder emptying      Past Surgical History:   Procedure Laterality Date    ABDOMINAL SURGERY      x 25    APPENDECTOMY      Open     BACK SURGERY      x 3     SECTION      x1    CHOLECYSTECTOMY      Open    COLON SURGERY      Sigmoid     COLONOSCOPY N/A 2018    Procedure: COLONOSCOPY;  Surgeon: Bernard Fisher MD;  Location: [facility];   Service: Gastroenterology    COLONOSCOPY  2018    ERCP      EXPLORATORY LAPAROTOMY      Several for adhesive disease; has had several bowel resections; has a small bowel fistula    GASTROCUTANEOUS FISTULA CLOSURE      HERNIA REPAIR      Incisional     IR PICC REPOSITION  3/3/2021    MAMMO (HISTORICAL)  2017    SPINAL FUSION      Lower back     TONSILLECTOMY      TOTAL ABDOMINAL HYSTERECTOMY      Not due to cancer - complete     TRACHEOSTOMY  2019     Social History   Social History     Substance and Sexual Activity   Alcohol Use Not Currently    Alcohol/week: 0 0 standard drinks    Comment: social drinker     Social History     Substance and Sexual Activity   Drug Use No     E-Cigarette/Vaping    E-Cigarette Use Never User      E-Cigarette/Vaping Substances     Social History     Tobacco Use   Smoking Status Former Smoker    Quit date: 1980    Years since quittin 1   Smokeless Tobacco Never Used   Tobacco Comment    Never smoker - As per [employer]      Family History:   Family History   Problem Relation Age of Onset    Lung cancer Mother     No Known Problems Father        Meds/Allergies   PTA meds:   Prior to Admission Medications   Prescriptions Last Dose Informant Patient Reported? Taking?    Ferrous Sulfate (Iron) 325 (65 Fe) MG TABS   No No   Sig: take 1 tablet by mouth once daily   Gattex 5 MG KIT  Self Yes No   HYDROmorphone (DILAUDID) 4 mg tablet   No No   Sig: Take 1 tablet (4 mg total) by mouth 3 (three) times a dayMax Daily Amount: 12 mg   LORazepam (ATIVAN) 1 mg tablet   No No   Sig: Take 1 tablet (1 mg total) by mouth every 8 (eight) hours as needed for anxiety   cholecalciferol (VITAMIN D3) 1,000 units tablet  Self Yes No   Si tablet 2x daily   escitalopram (LEXAPRO) 20 mg tablet   No No   Sig: Take 0 5 tablets (10 mg total) by mouth daily   folic acid (FOLVITE) 1 mg tablet  Self No No   Sig: Take 1 tablet (1 mg total) by mouth daily   gabapentin (NEURONTIN) 300 mg capsule   No No   Sig: Take 2 capsules (600 mg total) by mouth 3 (three) times a day   methocarbamol (ROBAXIN) 750 mg tablet   No No   Sig: Take 1 tablet (750 mg total) by mouth 3 (three) times a day   morphine (MS CONTIN) 30 mg 12 hr tablet   No No   Sig: Take 1 tablet (30 mg total) by mouth every 8 (eight) hoursMax Daily Amount: 90 mg   naloxone (NARCAN) 4 mg/0 1 mL nasal spray   No No   Sig: Administer 1 spray into a nostril  If no response after 2-3 minutes, give another dose in the other nostril using a new spray     ondansetron (ZOFRAN) 4 mg tablet  Self Yes No   Sig: take 1 tablet by mouth every 6 hours if needed for nausea and vomiting   pantoprazole (PROTONIX) 40 mg tablet   No No   Sig: Take 1 tablet (40 mg total) by mouth daily   tamsulosin (FLOMAX) 0 4 mg  Self No No   Sig: Take 2 capsules (0 8 mg total) by mouth daily with dinner      Facility-Administered Medications Last Administration Doses Remaining   cyanocobalamin injection 1,000 mcg 8/16/2021  3:22 PM         Allergies   Allergen Reactions    Nsaids      Irritable, upset stomach  Irritable, upset stomach    Tolmetin      Irritable, upset stomach    Codeine Hives     Per [address] admission orders    Oxycodone-Acetaminophen GI Intolerance     Percocet Tabs    Tylenol [Acetaminophen]        Objective   First Vitals:   Blood Pressure: 97/59 (08/18/21 1246)  Pulse: 79 (08/18/21 1246)  Temperature: 98 5 °F (36 9 °C) (08/18/21 1246)  Temp Source: Oral (08/18/21 1246)  Respirations: 20 (08/18/21 1246)  Height: 5' 4" (162 6 cm) (08/18/21 1246)  Weight - Scale: 63 5 kg (140 lb) (08/18/21 1246)  SpO2: 100 % (08/18/21 1246)    Current Vitals:   Blood Pressure: 92/52 (08/18/21 1406)  Pulse: 69 (08/18/21 1406)  Temperature: 98 5 °F (36 9 °C) (08/18/21 1246)  Temp Source: Oral (08/18/21 1246)  Respirations: 18 (08/18/21 1406)  Height: 5' 4" (162 6 cm) (08/18/21 1246)  Weight - Scale: 63 5 kg (140 lb) (08/18/21 1246)  SpO2: 98 % (08/18/21 1406)    No intake or output data in the 24 hours ending 08/18/21 1458    Invasive Devices     Peripherally Inserted Central Catheter Line            PICC Line 08/43/76 Left Basilic 844 days          Drain            Closed/Suction Drain Other (Comment) -- days                Physical Exam  Constitutional:       General: She is not in acute distress  Appearance: Normal appearance  She is not toxic-appearing  HENT:      Head: Normocephalic and atraumatic  Cardiovascular:      Rate and Rhythm: Normal rate and regular rhythm  Pulses: Normal pulses  Heart sounds: No murmur heard  Pulmonary:      Effort: Pulmonary effort is normal  No respiratory distress  Abdominal:      Comments: Firm, distended, diffuse tenderness to palpation, no guarding or rebound, multiple chronic well-healed scars, lower midline fistula bag without significant output   Musculoskeletal:         General: Normal range of motion  Skin:     General: Skin is warm  Capillary Refill: Capillary refill takes less than 2 seconds  Neurological:      General: No focal deficit present  Mental Status: She is alert and oriented to person, place, and time  Psychiatric:         Mood and Affect: Mood normal          Behavior: Behavior normal          Lab Results: I have personally reviewed pertinent lab results  Imaging: I have personally reviewed pertinent reports  EKG, Pathology, and Other Studies: I have personally reviewed pertinent reports

## 2021-08-18 NOTE — ED PROVIDER NOTES
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 presenting to the emergency department for abdominal pain and distension increasing for approximately 10 days  The patient also notes intermittent fevers with T-max 104°  The patient notes her most recent fever was this morning was in the 100  The patient has not had Tylenol yet today  The patient is complaining of some nausea and significant abdominal distension  The patient notes she has been constipated for approximately 3 days but this pain feels different than normal constipation for her  Of note, the patient was recently admitted for constipation secondary to opioid use and required admission for Relistor therapy  The patient denies any chest pain or shortness of breath  The patient does note 1 episode of vomiting that was nonbilious and nonbloody  The patient denies any urinary symptoms including dysuria and hematuria  The patient denies any abnormal vaginal bleeding or discharge  The patient does follow-up with Dr Helen Garcia for her chronic surgical complaints  The patient denies other complaints at this time  History provided by:  Patient   used: No    Abdominal Pain  Pain location:  Generalized  Pain quality: aching    Pain radiates to:  Does not radiate  Pain severity:  Severe  Onset quality:  Gradual  Duration:  10 days  Timing:  Constant  Progression:  Worsening  Chronicity:  Recurrent  Context: previous surgery    Relieved by:  Nothing  Worsened by: Movement, palpation and coughing  Ineffective treatments:   Bowel activity  Associated symptoms: anorexia, constipation, nausea and vomiting    Associated symptoms: no chest pain, no chills, no cough, no diarrhea, no dysuria, no fatigue, no fever, no flatus, no hematemesis, no hematuria, no shortness of breath, no vaginal bleeding and no vaginal discharge    Risk factors: being elderly, multiple surgeries and recent hospitalization    Risk factors: not obese and not pregnant        Prior to Admission Medications   Prescriptions Last Dose Informant Patient Reported? Taking? Ferrous Sulfate (Iron) 325 (65 Fe) MG TABS   No Yes   Sig: take 1 tablet by mouth once daily   Gattex 5 MG KIT  Self Yes No   HYDROmorphone (DILAUDID) 4 mg tablet   No Yes   Sig: Take 1 tablet (4 mg total) by mouth 3 (three) times a dayMax Daily Amount: 12 mg   LORazepam (ATIVAN) 1 mg tablet   No Yes   Sig: Take 1 tablet (1 mg total) by mouth every 8 (eight) hours as needed for anxiety   cholecalciferol (VITAMIN D3) 1,000 units tablet  Self Yes No   Si tablet 2x daily   escitalopram (LEXAPRO) 20 mg tablet   No Yes   Sig: Take 0 5 tablets (10 mg total) by mouth daily   folic acid (FOLVITE) 1 mg tablet  Self No Yes   Sig: Take 1 tablet (1 mg total) by mouth daily   gabapentin (NEURONTIN) 300 mg capsule   No Yes   Sig: Take 2 capsules (600 mg total) by mouth 3 (three) times a day   methocarbamol (ROBAXIN) 750 mg tablet   No Yes   Sig: Take 1 tablet (750 mg total) by mouth 3 (three) times a day   morphine (MS CONTIN) 30 mg 12 hr tablet   No Yes   Sig: Take 1 tablet (30 mg total) by mouth every 8 (eight) hoursMax Daily Amount: 90 mg   naloxone (NARCAN) 4 mg/0 1 mL nasal spray   No No   Sig: Administer 1 spray into a nostril  If no response after 2-3 minutes, give another dose in the other nostril using a new spray     ondansetron (ZOFRAN) 4 mg tablet  Self Yes Yes   Sig: take 1 tablet by mouth every 6 hours if needed for nausea and vomiting   pantoprazole (PROTONIX) 40 mg tablet   No Yes   Sig: Take 1 tablet (40 mg total) by mouth daily   tamsulosin (FLOMAX) 0 4 mg  Self No Yes   Sig: Take 2 capsules (0 8 mg total) by mouth daily with dinner      Facility-Administered Medications Last Administration Doses Remaining   cyanocobalamin injection 1,000 mcg 2021  3:22 PM           Past Medical History:   Diagnosis Date    Asthma     Bowel obstruction ([address] )     Choledocholithiasis 2020    Chronic back pain     Colon polyp     Enlarged kidney     Enterocutaneous fistula 2019    Hydronephrosis 2019    Ileus ([address] ) 2018    Overview:  Last Assessment & Plan:  Patient presented with generalized abdominal pain nausea and bilious vomiting ,imaging study reports " diffuse bowel dilatation involving distal small bowel and the entire colon to the panel verge obstruction mass is not identified and this may represent adynamic ileus in the postoperative setting possible related to medication"  Patient states that she still ha    Liver mass     [facility]ate dependence [facility] [address] [facility]     Post-ERCP acute pancreatitis 2020    Short gut syndrome     5 feet small bowel    Small bowel fistula     Urinary retention with incomplete bladder emptying        Past Surgical History:   Procedure Laterality Date    ABDOMINAL SURGERY      x 25    APPENDECTOMY      Open     BACK SURGERY      x 3     SECTION      x1    CHOLECYSTECTOMY      Open    COLON SURGERY      Sigmoid     COLONOSCOPY N/A 2018    Procedure: COLONOSCOPY;  Surgeon: Marlea Sacks, MD;  Location: [facility];   Service: Gastroenterology    COLONOSCOPY  2018    ERCP      EXPLORATORY LAPAROTOMY      Several for adhesive disease; has had several bowel resections; has a small bowel fistula    GASTROCUTANEOUS FISTULA CLOSURE      HERNIA REPAIR      Incisional     IR PICC REPOSITION  3/3/2021    MAMMO (HISTORICAL)  2017    SPINAL FUSION      Lower back     TONSILLECTOMY      TOTAL ABDOMINAL HYSTERECTOMY      Not due to cancer - complete     TRACHEOSTOMY  2019       Family History   Problem Relation Age of Onset    Lung cancer Mother     No Known Problems Father      I have reviewed and agree with the history as documented  E-Cigarette/Vaping    E-Cigarette Use Never User      E-Cigarette/Vaping Substances     Social History     Tobacco Use    Smoking status: Former Smoker     Quit date: 1980     Years since quittin 1    Smokeless tobacco: Never Used    Tobacco comment: Never smoker - As per [employer]    Vaping Use    Vaping Use: Never used   Substance Use Topics    Alcohol use: Not Currently     Alcohol/week: 0 0 standard drinks     Comment: social drinker    Drug use: No       Review of Systems   Constitutional: Negative for chills, fatigue and fever  Eyes: Negative for visual disturbance  Respiratory: Negative for cough, chest tightness, shortness of breath and wheezing  Cardiovascular: Negative for chest pain and palpitations  Gastrointestinal: Positive for abdominal pain, anorexia, constipation, nausea and vomiting  Negative for blood in stool, diarrhea, flatus and hematemesis  Genitourinary: Negative for dysuria, flank pain, hematuria, pelvic pain, vaginal bleeding and vaginal discharge  Skin: Negative for color change and wound  Neurological: Negative for dizziness, seizures, syncope, weakness, light-headedness, numbness and headaches  Psychiatric/Behavioral: Negative for confusion  All other systems reviewed and are negative  Physical Exam  Physical Exam  Vitals and nursing note reviewed  Constitutional:       General: She is not in acute distress  Appearance: Normal appearance  She is normal weight  She is ill-appearing  She is not toxic-appearing or diaphoretic  HENT:      Head: Normocephalic and atraumatic  Nose: Nose normal       Mouth/Throat:      Mouth: Mucous membranes are moist    Eyes:      General: No scleral icterus  Right eye: No discharge  Left eye: No discharge  Extraocular Movements: Extraocular movements intact        Conjunctiva/sclera: Conjunctivae normal    Cardiovascular:      Rate and Rhythm: Normal rate and regular rhythm  Pulses: Normal pulses  Heart sounds: Normal heart sounds  No murmur heard  No friction rub  No gallop  Comments: 2+ radial and DP pulses bilaterally  Pulmonary:      Effort: Pulmonary effort is normal  No respiratory distress  Breath sounds: Normal breath sounds  No stridor  No wheezing, rhonchi or rales  Comments: Clear to auscultation bilaterally without any adventitious breath sounds  Abdominal:      General: Abdomen is flat  There is distension  Tenderness: There is abdominal tenderness  There is no right CVA tenderness, left CVA tenderness or guarding  Comments: The patient's abdomen is remarkably distended and very hard to palpation  The patient is diffusely tender throughout her abdomen  Non peritoneal  No CVA tenderness  Fistula noted to cutaneous examination   Musculoskeletal:      Right lower leg: No edema  Left lower leg: No edema  Skin:     General: Skin is warm and dry  Capillary Refill: Capillary refill takes less than 2 seconds  Coloration: Skin is not jaundiced or pale  Neurological:      General: No focal deficit present  Mental Status: She is alert and oriented to person, place, and time  Mental status is at baseline     Psychiatric:         Mood and Affect: Mood normal          Behavior: Behavior normal          Vital Signs  ED Triage Vitals   Temperature Pulse Respirations Blood Pressure SpO2   08/18/21 1246 08/18/21 1246 08/18/21 1246 08/18/21 1246 08/18/21 1246   98 5 °F (36 9 °C) 79 20 97/59 100 %      Temp Source Heart Rate Source Patient Position - Orthostatic VS BP Location FiO2 (%)   08/18/21 1246 08/18/21 1246 08/18/21 1246 08/18/21 1246 --   Oral Monitor Sitting Right arm       Pain Score       08/18/21 1406       Worst Possible Pain           Vitals:    08/18/21 1406 08/18/21 1519 08/18/21 1700 08/18/21 1900   BP: 92/52 98/59 117/69 106/61   Pulse: 69 63 62 72   Patient Position - Orthostatic VS: Lying Lying Lying          Visual Acuity      ED Medications  Medications   escitalopram (LEXAPRO) tablet 10 mg (has no administration in time range)   gabapentin (NEURONTIN) capsule 600 mg (600 mg Oral Not Given 8/18/21 1722)   HYDROmorphone (DILAUDID) tablet 4 mg (4 mg Oral Not Given 8/18/21 1722)   LORazepam (ATIVAN) tablet 1 mg (has no administration in time range)   methocarbamol (ROBAXIN) tablet 750 mg (750 mg Oral Not Given 8/18/21 1722)   pantoprazole (PROTONIX) EC tablet 40 mg (has no administration in time range)   tamsulosin (FLOMAX) capsule 0 8 mg (0 8 mg Oral Not Given 8/18/21 1723)   lactated ringers infusion (125 mL/hr Intravenous New Bag 8/18/21 1710)   polyethylene glycol (MIRALAX) packet 17 g (has no administration in time range)   senna (SENOKOT) tablet 8 6 mg (has no administration in time range)   heparin (porcine) subcutaneous injection 5,000 Units (5,000 Units Subcutaneous Given 8/18/21 1751)   HYDROmorphone (DILAUDID) injection 0 5 mg (has no administration in time range)   acetaminophen (TYLENOL) tablet 650 mg (has no administration in time range)   naloxone (NARCAN) 0 04 mg/mL syringe 0 04 mg (has no administration in time range)   ondansetron (ZOFRAN-ODT) dispersible tablet 4 mg (4 mg Oral Given 8/18/21 1406)   fentanyl citrate (PF) 100 MCG/2ML 50 mcg (50 mcg Intravenous Given 8/18/21 1442)   lactated ringers bolus 1,000 mL (0 mL Intravenous Stopped 8/18/21 1545)   iohexol (OMNIPAQUE) 350 MG/ML injection (SINGLE-DOSE) 100 mL (100 mL Intravenous Given 8/18/21 1553)   fentanyl citrate (PF) 100 MCG/2ML 50 mcg (50 mcg Intravenous Given 8/18/21 1706)   methylnaltrexone (RELISTOR) subcutaneous injection 6 mg (6 mg Subcutaneous Given 8/18/21 1752)       Diagnostic Studies  Results Reviewed     Procedure Component Value Units Date/Time    Novel Coronavirus [facility] [[id number]]  (Normal) Collected: 08/18/21 1529    Lab Status: Final result Specimen: Nares from Nose Updated: 08/18/21 4175 SARS-CoV-2 Negative    Narrative: The specimen collection materials, transport medium, and/or testing methodology utilized in the production of these test results have been proven to be reliable in a limited validation with an abbreviated program under the Emergency Utilization Authorization provided by the FDA  Testing reported as "Presumptive positive" will be confirmed with secondary testing to ensure result accuracy  Clinical caution and judgement should be used with the interpretation of these results with consideration of the clinical impression and other laboratory testing  Testing reported as "Positive" or "Negative" has been proven to be accurate according to standard laboratory validation requirements  All testing is performed with control materials showing appropriate reactivity at standard intervals  Lactic acid [[id number]]  (Normal) Collected: 08/18/21 1432    Lab Status: Final result Specimen: Blood from Arm, Left Updated: 08/18/21 1504     LACTIC ACID 1 6 mmol/L     Narrative:      Result may be elevated if tourniquet was used during collection      Comprehensive metabolic panel [[id number]]  (Abnormal) Collected: 08/18/21 1432    Lab Status: Final result Specimen: Blood from Arm, Left Updated: 08/18/21 1501     Sodium 137 mmol/L      Potassium 3 6 mmol/L      Chloride 102 mmol/L      CO2 27 mmol/L      ANION GAP 8 mmol/L      BUN 11 mg/dL      Creatinine 0 97 mg/dL      Glucose 77 mg/dL      Calcium 8 6 mg/dL      Corrected Calcium 9 3 mg/dL      AST 51 U/L      ALT 66 U/L      Alkaline Phosphatase 159 U/L      Total Protein 7 0 g/dL      Albumin 3 1 g/dL      Total Bilirubin 0 48 mg/dL      eGFR 61 ml/min/1 73sq m     Narrative:      [place] guidelines for Chronic Kidney Disease (CKD):     Stage 1 with normal or high GFR (GFR > 90 mL/min/1 73 square meters)    Stage 2 Mild CKD (GFR = 60-89 mL/min/1 73 square meters)    Stage 3A Moderate CKD (GFR = 45-59 mL/min/1 73 square meters)    Stage 3B Moderate CKD (GFR = 30-44 mL/min/1 73 square meters)    Stage 4 Severe CKD (GFR = 15-29 mL/min/1 73 square meters)    Stage 5 End Stage CKD (GFR <15 mL/min/1 73 square meters)  Note: GFR calculation is accurate only with a steady state creatinine    Lipase [[id number]]  (Abnormal) Collected: 08/18/21 1432    Lab Status: Final result Specimen: Blood from Arm, Left Updated: 08/18/21 1501     Lipase 54 u/L     CBC and differential [[id number]]  (Abnormal) Collected: 08/18/21 1432    Lab Status: Final result Specimen: Blood from Arm, Left Updated: 08/18/21 1450     WBC 11 07 Thousand/uL      RBC 3 84 Million/uL      Hemoglobin 10 9 g/dL      Hematocrit 35 2 %      MCV 92 fL      MCH 28 4 pg      MCHC 31 0 g/dL      RDW 14 2 %      MPV 9 1 fL      Platelets 203 Thousands/uL      nRBC 0 /100 WBCs      Neutrophils Relative 77 %      Immat GRANS % 0 %      Lymphocytes Relative 12 %      Monocytes Relative 9 %      Eosinophils Relative 1 %      Basophils Relative 1 %      Neutrophils Absolute 8 62 Thousands/µL      Immature Grans Absolute 0 04 Thousand/uL      Lymphocytes Absolute 1 34 Thousands/µL      Monocytes Absolute 0 94 Thousand/µL      Eosinophils Absolute 0 07 Thousand/µL      Basophils Absolute 0 06 Thousands/µL                  CT abdomen pelvis with contrast   Final Result by Gina Hernandez MD (08/18 1633)      Fluid-filled mildly thickened colon with diffuse mucosal hyperemia in keeping with a nonspecific colitis  Reidentified enterocutaneous fistula  No bowel obstruction  The study was marked in [facility] for immediate notification  Workstation performed: [id number]                    Procedures  Procedures         ED Course                             SBIRT 20yo+      Most Recent Value   SBIRT (24 yo +)   In order to provide better care to our patients, we are screening all of our patients for alcohol and drug use   Would it be okay to ask you these screening questions? Yes Filed at: 08/18/2021 1249   Initial Alcohol Screen: US AUDIT-C    1  How often do you have a drink containing alcohol?  0 Filed at: 08/18/2021 1249   2  How many drinks containing alcohol do you have on a typical day you are drinking? 0 Filed at: 08/18/2021 1249   3a  Male UNDER 65: How often do you have five or more drinks on one occasion? 0 Filed at: 08/18/2021 1249   3b  FEMALE Any Age, or MALE 65+: How often do you have 4 or more drinks on one occassion? 0 Filed at: 08/18/2021 1249   Audit-C Score  0 Filed at: 08/18/2021 1249   YONG: How many times in the past year have you    Used an illegal drug or used a prescription medication for non-medical reasons? Never Filed at: 08/18/2021 1249                    MDM  Number of Diagnoses or Management Options  Colitis: new and requires workup  Drug-induced constipation: new and requires workup  Diagnosis management comments: This is a 71-year-old female presenting to the emergency department for abdominal distention and abdominal pain for approximately 10 days  The patient had similar symptoms recently requiring admission for constipation and Relistor therapy  On examination, the patient's abdomen was remarkably distended and tender throughout  Lab workup significant for mild leukocytosis without any lactic acidosis  CT abdomen and pelvis significant for fluid-filled mildly thickened colon with diffuse mucosal hyperemia in keeping with a nonspecific colitis in addition to her re-identified enterocutaneous fistula  Patient was given pain relief medication here in the emergency department  Given this workup, decision was made to admit the patient inpatient to the service of Dr Helen Garcia  The patient verifies her understanding and agreed to the plan at this time  All questions answered to the patient's satisfaction         Amount and/or Complexity of Data Reviewed  Clinical lab tests: ordered and reviewed  Tests in the radiology section of CPT®: ordered and reviewed  Discuss the patient with other providers: yes  Independent visualization of images, tracings, or specimens: yes    Patient Progress  Patient progress: stable      Disposition  Final diagnoses:   Colitis   Drug-induced constipation     Time reflects when diagnosis was documented in both MDM as applicable and the Disposition within this note     Time User Action Codes Description Comment    8/18/2021  5:22 PM Randagurpreet Correa Add [K52 9] Colitis     8/18/2021  5:22 PM Padmini Quesada Add [K59 03] Drug-induced constipation       ED Disposition     ED Disposition Condition Date/Time Comment    Admit Stable Wed Aug 18, 2021 1805 Case was discussed with General Surgery and the patient's admission status was agreed to be Admission Status: inpatient status to the service of Dr Luis Wood   Follow-up Information    None         Patient's Medications   Discharge Prescriptions    No medications on file     No discharge procedures on file      PDMP Review       Value Time User    PDMP Reviewed  Yes 8/17/2021  7:52 AM Abdirizak Omer DO          ED Provider  Electronically Signed by           Cynthia Buenrostro PA-C  08/18/21 1955

## 2021-08-18 NOTE — ED ATTENDING ATTESTATION
8/18/2021  Viincio Toribio DO, saw and evaluated the patient  I have discussed the patient with the resident/non-physician practitioner and agree with the resident's/non-physician practitioner's findings, Plan of Care, and MDM as documented in the resident's/non-physician practitioner's note, except where noted  All available labs and Radiology studies were reviewed  I was present for key portions of any procedure(s) performed by the resident/non-physician practitioner and I was immediately available to provide assistance  At this point I agree with the current assessment done in the Emergency Department  I have conducted an independent evaluation of this patient a history and physical is as follows:    74 y/o female presents today reporting severe abdominal pain  Patient has a complex past surgical history with multiple bowel resections, functional short-gut syndrome, and chronic enterocutaneous fistula  Abdomen is rigid and very diffusely tender to palpation  ED Course         Critical Care Time  Procedures    A/P  1) Abdominal pain - colitis present on CT scan  Bps improved after IVF  Will d/w general surgery for admission

## 2021-08-19 NOTE — PLAN OF CARE
Problem: Potential for Falls  Goal: Patient will remain free of falls  Description: INTERVENTIONS:  - Educate patient/family on patient safety including physical limitations  - Instruct patient to call for assistance with activity   - Consult OT/PT to assist with strengthening/mobility   - Keep Call bell within reach  - Keep bed low and locked with side rails adjusted as appropriate  - Keep care items and personal belongings within reach  - Initiate and maintain comfort rounds  - Make Fall Risk Sign visible to staff  - Offer Toileting every 2 Hours, in advance of need  - Initiate/Maintain alarm  - Obtain necessary fall risk management equipment  - Apply yellow socks and bracelet for high fall risk patients  - Consider moving patient to room near nurses station  8/19/2021 0527 by Nimco Spear RN  Outcome: Progressing  8/19/2021 0527 by Nimco Spear RN  Outcome: Progressing     Problem: Nutrition/Hydration-ADULT  Goal: Nutrient/Hydration intake appropriate for improving, restoring or maintaining nutritional needs  Description: Monitor and assess patient's nutrition/hydration status for malnutrition  Collaborate with interdisciplinary team and initiate plan and interventions as ordered  Monitor patient's weight and dietary intake as ordered or per policy  Utilize nutrition screening tool and intervene as necessary  Determine patient's food preferences and provide high-protein, high-caloric foods as appropriate       INTERVENTIONS:  - Monitor oral intake, urinary output, labs, and treatment plans  - Assess nutrition and hydration status and recommend course of action  - Evaluate amount of meals eaten  - Assist patient with eating if necessary   - Allow adequate time for meals  - Recommend/ encourage appropriate diets, oral nutritional supplements, and vitamin/mineral supplements  - Order, calculate, and assess calorie counts as needed  - Recommend, monitor, and adjust tube feedings and TPN/PPN based on assessed needs  - Assess need for intravenous fluids  - Provide specific nutrition/hydration education as appropriate  - Include patient/family/caregiver in decisions related to nutrition  8/19/2021 0527 by Arianne Eagle RN  Outcome: Progressing  8/19/2021 0527 by Arianne Eagle RN  Outcome: Progressing     Problem: GASTROINTESTINAL - ADULT  Goal: Minimal or absence of nausea and/or vomiting  Description: INTERVENTIONS:  - Administer IV fluids if ordered to ensure adequate hydration  - Maintain NPO status until nausea and vomiting are resolved  - Nasogastric tube if ordered  - Administer ordered antiemetic medications as needed  - Provide nonpharmacologic comfort measures as appropriate  - Advance diet as tolerated, if ordered  - Consider nutrition services referral to assist patient with adequate nutrition and appropriate food choices  Outcome: Progressing

## 2021-08-19 NOTE — PROGRESS NOTES
Progress Note - General Surgery   Adeline Chandler 73 y o  female MRN: [id number]  Unit/Bed#: S -01 Encounter: [id number]    Assessment:  67 y/o F with extensive PSH, short gut syndrome, EC fistula, opioid dependence, p/w worsening abdominal pain, nausea    Plan:  --Advance diet as tolerated  --Continue Relistor, bowel regimen for likely opioid-induced constipation  --Pain control w/ chronic pain regimen  --f/u blood cultures  --Consider d/c PICC line w/ culture of tip if becomes febrile or increasing leukocytosis    Subjective/Objective     Subjective:     No acute events overnight  Pt c/o nausea and bilateral lower abdominal pain this morning  Denies any fevers, chills, flatus, or bowel movements  Objective:     Blood pressure 101/54, pulse 74, temperature 98 °F (36 7 °C), temperature source Oral, resp  rate 18, height 5' 4" (1 626 m), weight 63 5 kg (140 lb), SpO2 93 %, not currently breastfeeding  ,Body mass index is 24 03 kg/m²  Intake/Output Summary (Last 24 hours) at 8/18/2021 2143  Last data filed at 8/18/2021 1545  Gross per 24 hour   Intake 1000 ml   Output --   Net 1000 ml       Invasive Devices     Peripherally Inserted Central Catheter Line            PICC Line 90/64/49 Left Basilic 570 days          Drain            Closed/Suction Drain Other (Comment) -- days                Physical Exam:     GEN: NAD  HEENT: MMM  CV: RRR  Lung: normal effort  Ab: Soft, NT/ND, EC fistula with scant effluent in drainage bag  Extrem: No CCE  Neuro:  A+Ox3, motor and sensation grossly intact    Lab, Imaging and other studies:  CBC:   Lab Results   Component Value Date    WBC 11 07 (H) 08/18/2021    HGB 10 9 (L) 08/18/2021    HCT 35 2 08/18/2021    MCV 92 08/18/2021     08/18/2021    MCH 28 4 08/18/2021    MCHC 31 0 (L) 08/18/2021    RDW 14 2 08/18/2021    MPV 9 1 08/18/2021    NRBC 0 08/18/2021   , CMP:   Lab Results   Component Value Date    SODIUM 137 08/18/2021    K 3 6 08/18/2021     08/18/2021 CO2 27 08/18/2021    BUN 11 08/18/2021    CREATININE 0 97 08/18/2021    CALCIUM 8 6 08/18/2021    AST 51 (H) 08/18/2021    ALT 66 08/18/2021    ALKPHOS 159 (H) 08/18/2021    EGFR 61 08/18/2021   , Coagulation: No results found for: PT, INR, APTT, Urinalysis: No results found for: COLORU, CLARITYU, SPECGRAV, PHUR, LEUKOCYTESUR, NITRITE, PROTEINUA, GLUCOSEU, KETONESU, BILIRUBINUR, BLOODU  VTE Pharmacologic Prophylaxis: Heparin  VTE Mechanical Prophylaxis: sequential compression device

## 2021-08-19 NOTE — DISCHARGE INSTRUCTIONS
Colitis   WHAT YOU NEED TO KNOW:   Colitis is swelling and irritation of your colon  Colitis may be caused by ulcers or a problem with your immune system  Bacteria, a virus, or a parasite may also cause colitis  The cause may not be known  You may have diarrhea, abdominal pain, fever, or blood or mucus in your bowel movement  DISCHARGE INSTRUCTIONS:   Return to the emergency department if:   · You have sudden trouble breathing  · Your bowel movements are black or have blood in them  · You have blood in your vomit  · You have severe abdominal pain or your abdomen is swollen and feels hard  · You have any of the following signs of dehydration:     ? Dizziness or weakness    ? Dry mouth, cracked lips, or severe thirst    ? Fast heartbeat or breathing    ? Urinating very little or not at all    Call your doctor if:   · Your symptoms get worse or do not go away  · You have a fever, chills, cough, or feel weak and achy  · You suddenly lose weight without trying  · You have questions or concerns about your condition or care  Medicines:   · Medicines  may be given to decrease inflammation in your colon and treat diarrhea  · Take your medicine as directed  Contact your healthcare provider if you think your medicine is not helping or if you have side effects  Tell him of her if you are allergic to any medicine  Keep a list of the medicines, vitamins, and herbs you take  Include the amounts, and when and why you take them  Bring the list or the pill bottles to follow-up visits  Carry your medicine list with you in case of an emergency  Manage your symptoms:   · Drink liquids as directed to help prevent dehydration  Good liquids to drink include water, juice, and broth  Ask how much liquid to drink each day  You may need to drink an oral rehydration solution (ORS)  An ORS contains a balance of water, salt, and sugar to replace body fluids lost during diarrhea      · Eat a variety of healthy foods   Healthy foods include fruits, vegetables, whole-grain breads, beans, low-fat dairy products, lean meats, and fish  You may need to eat several small meals throughout the day instead of large meals  Avoid spicy foods, caffeine, chocolate, and foods high in fat  · Talk to your healthcare provider before you take NSAIDs  NSAIDs can cause worsen your symptoms if ulcers are causing your colitis  · Start to exercise when you feel better  Regular exercise helps your bowels work normally  Ask about the best exercise plan for you  Prevent the spread of germs:       · Wash your hands often  Wash your hands several times each day  Wash after you use the bathroom, change a child's diaper, and before you prepare or eat food  Use soap and water every time  Rub your soapy hands together, lacing your fingers  Wash the front and back of your hands, and in between your fingers  Use the fingers of one hand to scrub under the fingernails of the other hand  Wash for at least 20 seconds  Rinse with warm, running water for several seconds  Then dry your hands with a clean towel or paper towel  Use hand  that contains alcohol if soap and water are not available  Do not touch your eyes, nose, or mouth without washing your hands first          · Cover a sneeze or cough  Use a tissue that covers your mouth and nose  Throw the tissue away in a trash can right away  Use the bend of your arm if a tissue is not available  Wash your hands well with soap and water or use a hand   · Clean surfaces often  Clean doorknobs, countertops, cell phones, and other surfaces that are touched often  Use a disinfecting wipe, a single-use sponge, or a cloth you can wash and reuse  Use disinfecting  if you do not have wipes  You can create a disinfecting  by mixing 1 part bleach with 10 parts water  · Ask about vaccines you may need  Vaccines help prevent disease caused by some viruses and bacteria  Get the influenza (flu) vaccine as soon as recommended each year  The flu vaccine is usually available starting in September or October  Flu viruses change, so it is important to get a flu vaccine every year  Get the pneumonia vaccine if recommended  This vaccine is usually recommended every 5 years  Your provider will tell you when to get this vaccine, if needed  Your healthcare provider can tell you if you should get other vaccines, and when to get them  Follow up with your doctor as directed: You may need to return for a colonoscopy or other tests  Write down how often you have a bowel movements and what they look like  Bring this to your follow-up visits  Write down your questions so you remember to ask them during your visits  © Copyright [employer] 2021 Information is for End User's use only and may not be sold, redistributed or otherwise used for commercial purposes  All illustrations and images included in CareNotes® are the copyrighted property of A D A M , Inc  or [employer]anca Wall[employer]  The above information is an  only  It is not intended as medical advice for individual conditions or treatments  Talk to your doctor, nurse or pharmacist before following any medical regimen to see if it is safe and effective for you

## 2021-08-19 NOTE — DISCHARGE SUMMARY
Discharge Summary - Héctor Rose 73 y o  female MRN: [id number]    Unit/Bed#: S -18 Encounter: [id number]    Admission Date:   Admission Orders (From admission, onward)     Ordered        08/18/21 1649  Place in Observation  Once                     Admitting Diagnosis: Colitis [K52 9]  Vomiting [R11 10]  Abdominal pain [R10 9]  Drug-induced constipation [K59 03]  Fever [R50 9]    HPI: as per Nunu Abbott DO, on 8/18/21, " Héctor Rose is a 73 y o  female who presents with abdominal pain, fevers, nausea and vomiting  Patient states the pain is going on for the last few days  She says the pain is diffuse  Pain is associated with nausea and vomiting  Chest states she has had fevers on multiple days up to as high as 104  Patient states she has not had a bowel movement or passed flatus in 3 days  She denies any sick contacts      Patient is well known to the surgical service  She has had multiple similar issues in the past found to be secondary to chronic opioid induced constipation  Patient has a chronic enterocutaneous fistula which she says has been much improved recently  She only into the bag once every 3 days  Patient has a history of multiple abdominal surgeries and bowel resections with functional short-gut syndrome      In the emergency room the patient's vital signs were significant for borderline hypotension blood pressure 97/59  Patient is afebrile with temperature of 98 5°  Heart rate was in the 60s to 70s  Patient was in no acute distress  Laboratory evaluation is significant for leukocytosis to 11 with neutrophil predominance  Other labs including lactic acid were within normal limits  CT abdomen pelvis was pending at the time my evaluation   "    Procedures Performed: No orders of the defined types were placed in this encounter  Summary of Hospital Course: The patient was made NPO and started on IV fluid hydration   A dose of Relistor was administered for presumed opiate induced constipation, and her inpatient opiate regimen was decreased  She was monitored of antibiotics given her afebrile presentation and normal WBC count despite her self-reported home history of fever  On day one of hospitalization she was feeling mildly improved, remained afebrile, had decreasing pain and return of bowel function via a bowel movement  She tolerated a trial of soft diet and her IV fluids were discontinued  Following being seen on surgical rounds and discussion with her surgeon she requested discharge to home, to which there was no medical objection  Prior to her departure from the hospital, provisions for PCP follow-up, supportive care of possible colitis/enteritis, medicaiton reconciliation, opiate counseling, and PRN surgical follow-up were all arranged  Significant Findings, Care, Treatment and Services Provided:   8/18/21 CT scan of the abdomen and pelvis showing consistent EC fistula, new nonspecific colitis, no obstruction  Complications: none    Discharge Diagnosis:  Opiate induced consitpation, nonspecific colitis     Medical Problems     Resolved Problems  Date Reviewed: 8/16/2021    None                Condition at Discharge: good         Discharge instructions/Information to patient and family:   See after visit summary for information provided to patient and family  Provisions for Follow-Up Care:  See after visit summary for information related to follow-up care and any pertinent home health orders  PCP: Xiomy Meier DO    Disposition: Home    Planned Readmission: No      Discharge Statement   I spent 29 minutes discharging the patient  This time was spent on the day of discharge  I had direct contact with the patient on the day of discharge  Additional documentation is required if more than 30 minutes were spent on discharge  Discharge Medications:  See after visit summary for reconciled discharge medications provided to patient and family

## 2021-08-20 NOTE — TELEPHONE ENCOUNTER
Reviewed her XR -- shows scarring in her right lung which is unchanged, otherwise no acute issues  Would recommend she continue intermittent heat, stretching  Thanks!

## 2021-08-20 NOTE — TELEPHONE ENCOUNTER
Paradise Garcia called looking for results for her chest xray  She said that you ordered it but I just saw in under dr Jessica Barth name  She is in a lot of pain  Please advise

## 2021-08-21 NOTE — TELEPHONE ENCOUNTER
The patient needs a refill on the following medications: HYDROmorphone (DILAUDID) 4 mg tablet and morphine (MS CONTIN) 30 mg 12 hr tablet  The patient will be completely out Monday morning

## 2021-08-23 NOTE — TELEPHONE ENCOUNTER
Pt called the office again stating she needs her pain meds ASAP  Please call pt as soon as order is placed   Thank you

## 2021-08-27 NOTE — PROGRESS NOTES
Assessment/Plan:        Problem List Items Addressed This Visit        Digestive    Nonspecific colitis - Primary      Other Visit Diagnoses     Encounter for support and coordination of transition of care        Acute pain of right shoulder        Relevant Orders    Ambulatory referral to Physical Therapy    Neck pain on right side        Relevant Orders    Ambulatory referral to Physical Therapy    Acute right-sided thoracic back pain        Relevant Orders    Ambulatory referral to Physical Therapy         Will continue to work on opioid taper to lessen likelihood of constipation and recurrent hospitalizations  Exam consistent with MSK etiology of pain in R neck/shoulder/back region  Significantly tender to touch -- likely a component of hyperalgesia given chronic opioid use  Will refer to PT for eval & treat  Subjective:     Patient ID: Lawanda Aranda is a 73 y o  female  HPI    Pt presents for hospital follow up, s/p admission to [address] [place] [address] from 08/18-08/19  Pt initially presented due to fever, abdominal pain, nausea and vomiting  She was made NPO and given Relistor  She remained afebrile during admission  At time of discharge, she was tolerating soft diet  Today:   GI status overall stable  Continues to have significant pain in her R neck, back, and into shoulder  XR negative for bony changes, did show apical scarring in R lung  States she is not sleeping well due to pain  On chronic opioid regimen + Robaxin  Review of Systems   Gastrointestinal: Positive for abdominal distention, abdominal pain, constipation and diarrhea  Musculoskeletal: Positive for back pain and myalgias  Objective:     Physical Exam  Vitals and nursing note reviewed  Constitutional:       General: She is not in acute distress  Appearance: Normal appearance  HENT:      Head: Normocephalic and atraumatic  Pulmonary:      Effort: Pulmonary effort is normal  No respiratory distress     Musculoskeletal: Arms:       Comments: Area as above exquisitely tender to palpation    Neurological:      General: No focal deficit present  Mental Status: She is alert  Psychiatric:         Mood and Affect: Mood normal            Vitals:    08/27/21 1439   BP: 90/60   BP Location: Left arm   Patient Position: Sitting   Cuff Size: Standard   Pulse: 79   Temp: 98 4 °F (36 9 °C)   SpO2: 95%   Weight: 63 5 kg (140 lb)   Height: 5' 4" (1 626 m)       Transitional Care Management Review:  Steven Batres is a 73 y o  female here for TCM follow up  During the TCM phone call patient stated:    TCM Call (since 7/27/2021)     Date and time call was made  8/23/2021  2:51 PM    Hospital care reviewed  Records reviewed    Patient was hospitialized at  [address] [place] [address]        Date of Admission  08/18/21    Date of discharge  08/19/21    Diagnosis  Nonspecific colitis    Disposition  Home    Were the patients medications reviewed and updated  No    Current Symptoms  Constipation (Comment)  PAIN      TCM Call (since 7/27/2021)     Post hospital issues  Reduced activity; Poor ADL (Activities of Daily Living) performance    Should patient be enrolled in anticoag monitoring? No    Scheduled for follow up?   Yes    Did you obtain your prescribed medications  Yes    Do you need help managing your prescriptions or medications  No    Is transportation to your appointment needed  No    I have advised the patient to call PCP with any new or worsening symptoms  BRIAN Reveles Ear, DO

## 2021-09-06 NOTE — ED NOTES
Bethany Galan [place] aware that patient is very difficult stick, unable to obtain blood cultures from peripheral veins  Attempt x 2 from RN  Pt requesting that PICC be used  Two sets of blood cultures drawn off of PICC line and at bedside  Kourtney Bills states to keep the blood cultures at bedside for now and he will assess the situation and see if he can get peripheral lab draw        Gregorio Lee RN  08/18/21 3007 06-Sep-2021 23:55

## 2021-09-09 NOTE — PROGRESS NOTES
PT Evaluation     Today's date: 2021  Patient name: Antoine Bergman  : 1955  MRN: [id number]  Referring provider: Gladis Ramirez DO  Dx:   Encounter Diagnosis     ICD-10-CM    1  Acute pain of right shoulder  M25 511 Ambulatory referral to Physical Therapy   2  Neck pain on right side  M54 2 Ambulatory referral to Physical Therapy   3  Acute right-sided thoracic back pain  M54 6 Ambulatory referral to Physical Therapy                  Assessment  Assessment details: Antoine Bergman is a 73 y o  female referred with primary diagnosis of Acute pain of right shoulder  (primary encounter diagnosis)  Neck pain on right side  Acute right-sided thoracic back pain   Patient presents with the following functional limitations: Unable to lift/carry anthing, reach overhead, look over shoulder while driving, and has sleep disturbances  Exquisite point tenderness right UT, right parascapular musculature and all right intercostals  She demonstrates significant limitations in cervical ROM and right shoulder ROM  Symptoms in neck minimally improved with trial of manual cervical traction  Treatment to include: Manual therapy techniques, extremity/core strengthening, neuromuscular control exercises, instruction in a comprehensive HEP, and modalities as needed  They will benefit from skilled PT services to address the above functional deficits and to decrease pain to promote a return to their premorbid level of function  Impairments: abnormal or restricted ROM, activity intolerance, lacks appropriate home exercise program, pain with function and poor posture   Functional limitations: Unable to lift/carry anthing, reach overhead, look over shoulder while driving, and has sleep disturbances  Understanding of Dx/Px/POC: good   Prognosis: fair  Prognosis details: Chronic pain syndrome    Goals  STG (4 weeks)  1  Patient will report pain as a 4-5/10 at worst with normal ADL's  2   Patient will demonstrate 50% gains in cervical and right shoulder ROM  LTG (8 weeks)  1  Patient will report pain as a 1-2/10 at worst with normal activities  2  Patient will demonstrate cervical and right shoulder AROM WFL to resume all ADL's  3  Patient will sleep through the night without disturbances related to pain  4  Patient will be independent and compliant with a HEP in order to maintain gains made with skilled PT services  Plan  Patient would benefit from: skilled physical therapy  Planned modality interventions: thermotherapy: hydrocollator packs  Planned therapy interventions: abdominal trunk stabilization, joint mobilization, manual therapy, patient education, postural training, neuromuscular re-education, strengthening, stretching, therapeutic activities, therapeutic exercise and home exercise program  Frequency: 2x week  Duration in weeks: 8  Plan of Care beginning date: 2021  Plan of Care expiration date: 2021  Treatment plan discussed with: patient        Subjective Evaluation    History of Present Illness  Mechanism of injury: Patient states she has been having right sided UT, chest and parascapular pain which started of insidious onset about 2 5 weeks ago  She had a chest x-ray which she states showed scar tissue  She is referred now to outpatient PT services  She states her chest pain has migrated from the left side of her sternum to the right    Pain  Current pain ratin  At best pain ratin  At worst pain rating: 10  Location: Right UT, chest and parascapular regions  Quality: knife-like and sharp  Relieving factors: heat  Progression: worsening    Social Support  Lives with: spouse    Employment status: not working (Retired)  Hand dominance: left  Exercise history: Sedentary due to illness      Diagnostic Tests  X-ray: abnormal    FCE comments: Patient states her  does everything around the home  Dresses and showers independently  Denies paresthesias or weaknessTreatments  Previous treatment: medication  Current treatment: medication  Patient Goals  Patient goals for therapy: decreased pain and independence with ADLs/IADLs  Patient goal: Relieve pain and resume normal lifestyle        Objective     Concurrent Complaints  Negative for dizziness, faints, headaches, cardiac problem, history of cancer and history of trauma    Static Posture     Head  Forward  Palpation     Right   No palpable tenderness to the suboccipitals  Tenderness of the intercostals, middle trapezius, pectoralis major, pectoralis minor, rhomboids, scalenes, sternocleidomastoid and upper trapezius  Trigger point to upper trapezius  Tenderness   Cervical Spine   Tenderness in the facet joint (Right C3-7), spinous process (C4) and right ribs/costal cartilage  No tenderness in the left transverse process  Left Shoulder   No tenderness in the clavicle  Active Range of Motion   Cervical/Thoracic Spine       Cervical    Flexion: 25 degrees  with pain  Extension: 9 degrees     with pain  Left lateral flexion: 19 degrees      Right lateral flexion: 12 degrees     with pain  Left rotation: 29 degrees with pain  Right rotation: 28 degrees    with pain  Left Shoulder   Flexion: 109 (Pain in right UT) degrees with pain  Abduction: 140 (Pain in right UT) degrees with pain    Right Shoulder   Flexion: 69 (Pain in right UT) degrees with pain  Abduction: 55 degrees with pain    Joint Play   Joints within functional limits: C3, C4, C5, C6 and C7     Tests   Cervical   Positive lumbar distraction test   Negative vertical compression  Left   Negative Spurling's Test A  Right   Negative Spurling's Test A  Lumbar   Negative vertical compression  Additional Tests Details  Distraction provided minimal symptom relief only during traction  Neuro Exam:     Headaches   Patient reports headaches: No               Precautions: Asthma, 3 spine surgeries  Access Code: [email or web address]  URL: [email or web address]  Date: 09/09/2021  Prepared by: Johan Mora    Manuals 9/9            Cervical traction JF            Right UT stretch JF            IASTM right UT JF                         Neuro Re-Ed             Bkwd shoulder rolls Instructed            Scapular retractions Instructed            Cervical retractions NV                                                                Ther Ex             UBE seated bkwd only             AROM c-spine             Wall pulleys flexion and scaption             Supine pec stretch on foam                                                                 Ther Activity                                       Gait Training                                       Modalities

## 2021-09-13 NOTE — PROGRESS NOTES
Say Espinoza 1955 female MRN: [id number]      ASSESSMENT/PLAN  Problem List Items Addressed This Visit        Other    Chronic, continuous use of opioids - Primary    Generalized anxiety disorder (Chronic)    Relevant Medications    LORazepam (ATIVAN) 1 mg tablet      Other Visit Diagnoses     Needs flu shot        Relevant Orders    Flu Vaccine High Dose Split Preservative Free IM (Completed)        Chronic Pain: Will continue current regimen for an additional 2 weeks, as pt has not been on this regimen for a month as of yet; tolerating well so far, will plan to decrease Dilaudid to BID at next visit  Anxiety: Doing well with Ativan, as long as she takes with food -- continue current regimen, refill sent         Future Appointments   Date Time Provider Amanda Enrique   9/14/2021 11:30 AM [facility]   9/16/2021 12:00 PM Siobhan Granados MD GEN SURG [facility]   9/17/2021 10:00 AM Geovani Guerra PT [facility]   9/20/2021 11:30 AM [facility]   9/24/2021 11:15 AM [facility]   9/27/2021 10:30 AM [facility]   9/27/2021 11:20 AM DO ELENA Godfrey  [facility]   10/1/2021 10:00 AM [facility]   10/4/2021 10:30 AM [facility]   10/8/2021 10:00 AM [facility]   10/11/2021 10:30 AM [facility]   10/15/2021 10:30 AM Maria Elena Gamez, PT [facility]          SUBJECTIVE  CC: Anxiety (would like Lorazepam )      HPI:  Say Espinoza is a 73 y o  female who presents for chronic follow up as below  Anxiety: Needs a refill on Ativan, tolerating well with PO; helping her sleep   Chronic Pain: Has been on lower dose of Dilaudid for about 3 weeks, tolerating well so far   Is motivated to continue weaning her regimen to remain out of the hospital     Is planning to get a new pillow for her shoulder pain  Did a session of PT with some relief  Continues to have significant abdominal pain and stool dumping  Review of Systems   Constitutional: Positive for fatigue  Gastrointestinal: Positive for abdominal pain  Musculoskeletal: Positive for arthralgias and myalgias  Historical Information   The patient history was reviewed and updated as follows:    Past Medical History:   Diagnosis Date    Asthma     Bowel obstruction ([address] )     Choledocholithiasis 2020    Chronic back pain     Colon polyp     Enlarged kidney     Enterocutaneous fistula 2019    Hydronephrosis 2019    Ileus ([address] ) 2018    Overview:  Last Assessment & Plan:  Patient presented with generalized abdominal pain nausea and bilious vomiting ,imaging study reports " diffuse bowel dilatation involving distal small bowel and the entire colon to the panel verge obstruction mass is not identified and this may represent adynamic ileus in the postoperative setting possible related to medication"  Patient states that she still ha    Liver mass     [address]pi[address] dependence [address] [address]     Post-ERCP acute pancreatitis 2020    Short gut syndrome     5 feet small bowel    Small bowel fistula     Urinary retention with incomplete bladder emptying      Past Surgical History:   Procedure Laterality Date    ABDOMINAL SURGERY      x 25    APPENDECTOMY      Open     BACK SURGERY      x 3     SECTION      x1    CHOLECYSTECTOMY      Open    COLON SURGERY      Sigmoid     COLONOSCOPY N/A 2018    Procedure: COLONOSCOPY;  Surgeon: Goran Godinez MD;  [facility];   Service: Gastroenterology    COLONOSCOPY  2018    ERCP      EXPLORATORY LAPAROTOMY      Several for adhesive disease; has had several bowel resections; has a small bowel fistula    GASTROCUTANEOUS FISTULA CLOSURE      HERNIA REPAIR      Incisional     IR PICC REPOSITION  3/3/2021    MAMMO (HISTORICAL)  2017  SPINAL FUSION      Lower back     TONSILLECTOMY      TOTAL ABDOMINAL HYSTERECTOMY      Not due to cancer - complete     TRACHEOSTOMY  2019     Family History   Problem Relation Age of Onset    Lung cancer Mother     No Known Problems Father       Social History   Social History     Substance and Sexual Activity   Alcohol Use Not Currently    Alcohol/week: 0 0 standard drinks    Comment: social drinker     Social History     Substance and Sexual Activity   Drug Use No     Social History     Tobacco Use   Smoking Status Former Smoker    Quit date: 1980    Years since quittin 2   Smokeless Tobacco Never Used   Tobacco Comment    Never smoker - As per [employer]        Medications:     Current Outpatient Medications:     LORazepam (ATIVAN) 1 mg tablet, Take 1 tablet (1 mg total) by mouth every 8 (eight) hours as needed for anxiety, Disp: 90 tablet, Rfl: 0    cholecalciferol (VITAMIN D3) 1,000 units tablet, 1 tablet 2x daily, Disp: , Rfl:     escitalopram (LEXAPRO) 20 mg tablet, Take 0 5 tablets (10 mg total) by mouth daily, Disp: 90 tablet, Rfl: 1    FeroSul 325 (65 Fe) MG tablet, take 1 tablet by mouth once daily, Disp: 30 tablet, Rfl: 2    folic acid (FOLVITE) 1 mg tablet, Take 1 tablet (1 mg total) by mouth daily, Disp: 90 tablet, Rfl: 3    gabapentin (NEURONTIN) 300 mg capsule, Take 2 capsules (600 mg total) by mouth 3 (three) times a day, Disp: 180 capsule, Rfl: 2    Gattex 5 MG KIT, , Disp: , Rfl:     HYDROmorphone (DILAUDID) 4 mg tablet, Take 1 tablet (4 mg total) by mouth 3 (three) times a dayMax Daily Amount: 12 mg, Disp: 90 tablet, Rfl: 0    methocarbamol (ROBAXIN) 750 mg tablet, Take 1 tablet (750 mg total) by mouth 3 (three) times a day, Disp: 90 tablet, Rfl: 2    morphine (MS CONTIN) 30 mg 12 hr tablet, Take 1 tablet (30 mg total) by mouth every 8 (eight) hoursMax Daily Amount: 90 mg, Disp: 90 tablet, Rfl: 0    naloxone (NARCAN) 4 mg/0 1 mL nasal spray, Administer 1 spray into a nostril  If no response after 2-3 minutes, give another dose in the other nostril using a new spray , Disp: 1 each, Rfl: 1    ondansetron (ZOFRAN) 4 mg tablet, take 1 tablet by mouth every 6 hours if needed for nausea and vomiting, Disp: , Rfl:     pantoprazole (PROTONIX) 40 mg tablet, Take 1 tablet (40 mg total) by mouth daily, Disp: 90 tablet, Rfl: 3    tamsulosin (FLOMAX) 0 4 mg, take 2 capsules by mouth daily WITH DINNER, Disp: 180 capsule, Rfl: 1    Current Facility-Administered Medications:     cyanocobalamin injection 1,000 mcg, 1,000 mcg, Intramuscular, Q30 Days, Venus Hernandez DO, 1,000 mcg at 08/16/21 1522  Allergies   Allergen Reactions    Nsaids      Irritable, upset stomach  Irritable, upset stomach    Tolmetin      Irritable, upset stomach    Codeine Hives     Per [address] admission orders    Oxycodone-Acetaminophen GI Intolerance     Percocet Tabs    Tylenol [Acetaminophen]        OBJECTIVE    Vitals:   Vitals:    09/13/21 1015   BP: 106/70   Pulse: 64   Temp: 97 7 °F (36 5 °C)   SpO2: 98%   Weight: 63 5 kg (140 lb)   Height: 5' 4" (1 626 m)           Physical Exam  Vitals and nursing note reviewed  Constitutional:       General: She is not in acute distress  Appearance: Normal appearance  HENT:      Head: Normocephalic and atraumatic  Pulmonary:      Effort: Pulmonary effort is normal  No respiratory distress  Neurological:      General: No focal deficit present  Mental Status: She is alert     Psychiatric:         Mood and Affect: Mood normal                     Venus Hernandez DO  [facility] [address] [facility]   9/13/2021  10:44 AM

## 2021-09-27 NOTE — TELEPHONE ENCOUNTER
Fyi: Pt calls to cancel appt for today she is currently in the hospital  She said she is really sick again  She also wanted to know when she would be able to get her lorazapam again her  thru out the bottle by mistake she also needed to know dates morphine and dilaudin are able to be filled again

## 2021-09-27 NOTE — TELEPHONE ENCOUNTER
We cannot provide new scripts for lost medication  Her pain medication can be filled after 10/18  Thanks!

## 2021-10-01 PROBLEM — K52.9 NONSPECIFIC COLITIS: Status: RESOLVED | Noted: 2021-01-01 | Resolved: 2021-01-01

## 2021-10-01 PROBLEM — Z93.2 ILEOSTOMY PRESENT (HCC): Status: ACTIVE | Noted: 2021-01-01

## 2021-10-01 PROBLEM — R10.9 ABDOMINAL PAIN: Status: RESOLVED | Noted: 2019-05-16 | Resolved: 2021-01-01

## 2021-10-05 NOTE — PROGRESS NOTES
Patient seen for only one visit on 9/9/21  Has not returned to PT after that time  Unable to update subjective, objective, or measurements as patient was seen for only her evaluation  DC PT services at this time due to inactivity

## 2021-11-04 PROBLEM — Z93.2 ILEOSTOMY PRESENT (HCC): Status: RESOLVED | Noted: 2021-01-01 | Resolved: 2021-01-01

## 2021-11-04 PROBLEM — Z98.890 HISTORY OF ERCP: Status: RESOLVED | Noted: 2020-12-05 | Resolved: 2021-01-01

## 2021-11-04 PROBLEM — R10.13 EPIGASTRIC PAIN: Status: RESOLVED | Noted: 2021-01-01 | Resolved: 2021-01-01

## 2021-11-04 PROBLEM — K59.1 FUNCTIONAL DIARRHEA: Status: RESOLVED | Noted: 2021-01-01 | Resolved: 2021-01-01

## 2021-11-08 PROBLEM — E87.6 HYPOKALEMIA: Status: RESOLVED | Noted: 2018-07-02 | Resolved: 2021-01-01

## 2021-11-12 PROBLEM — I95.2 HYPOTENSION DUE TO DRUGS: Status: ACTIVE | Noted: 2021-01-01

## 2021-11-13 PROBLEM — E83.39 HYPERPHOSPHATEMIA: Status: ACTIVE | Noted: 2021-01-01

## 2021-11-18 PROBLEM — R10.9 ABDOMINAL PAIN: Status: RESOLVED | Noted: 2019-05-16 | Resolved: 2021-01-01

## 2022-01-01 ENCOUNTER — OFFICE VISIT (OUTPATIENT)
Dept: GASTROENTEROLOGY | Facility: CLINIC | Age: 67
End: 2022-01-01
Payer: MEDICARE

## 2022-01-01 ENCOUNTER — LAB REQUISITION (OUTPATIENT)
Dept: LAB | Facility: HOSPITAL | Age: 67
End: 2022-01-01
Payer: MEDICARE

## 2022-01-01 ENCOUNTER — TELEPHONE (OUTPATIENT)
Dept: FAMILY MEDICINE CLINIC | Facility: CLINIC | Age: 67
End: 2022-01-01

## 2022-01-01 ENCOUNTER — OFFICE VISIT (OUTPATIENT)
Dept: FAMILY MEDICINE CLINIC | Facility: CLINIC | Age: 67
End: 2022-01-01
Payer: MEDICARE

## 2022-01-01 ENCOUNTER — RA CDI HCC (OUTPATIENT)
Dept: OTHER | Facility: HOSPITAL | Age: 67
End: 2022-01-01

## 2022-01-01 ENCOUNTER — PREP FOR PROCEDURE (OUTPATIENT)
Dept: INTERVENTIONAL RADIOLOGY/VASCULAR | Facility: HOSPITAL | Age: 67
End: 2022-01-01

## 2022-01-01 ENCOUNTER — TELEPHONE (OUTPATIENT)
Dept: SURGERY | Facility: CLINIC | Age: 67
End: 2022-01-01

## 2022-01-01 ENCOUNTER — HOSPITAL ENCOUNTER (OUTPATIENT)
Dept: NON INVASIVE DIAGNOSTICS | Facility: HOSPITAL | Age: 67
Discharge: HOME | End: 2022-03-16
Attending: RADIOLOGY | Admitting: RADIOLOGY
Payer: MEDICARE

## 2022-01-01 ENCOUNTER — APPOINTMENT (EMERGENCY)
Dept: CT IMAGING | Facility: HOSPITAL | Age: 67
End: 2022-01-01
Payer: MEDICARE

## 2022-01-01 ENCOUNTER — OFFICE VISIT (OUTPATIENT)
Dept: SURGERY | Facility: CLINIC | Age: 67
End: 2022-01-01
Payer: MEDICARE

## 2022-01-01 ENCOUNTER — HOSPITAL ENCOUNTER (EMERGENCY)
Facility: HOSPITAL | Age: 67
Discharge: HOME | End: 2022-03-08
Attending: EMERGENCY MEDICINE
Payer: MEDICARE

## 2022-01-01 ENCOUNTER — APPOINTMENT (OUTPATIENT)
Dept: LAB | Facility: CLINIC | Age: 67
End: 2022-01-01
Payer: MEDICARE

## 2022-01-01 VITALS
RESPIRATION RATE: 17 BRPM | WEIGHT: 140 LBS | HEART RATE: 76 BPM | DIASTOLIC BLOOD PRESSURE: 67 MMHG | OXYGEN SATURATION: 96 % | TEMPERATURE: 98.3 F | BODY MASS INDEX: 24.03 KG/M2 | SYSTOLIC BLOOD PRESSURE: 134 MMHG

## 2022-01-01 VITALS
SYSTOLIC BLOOD PRESSURE: 110 MMHG | DIASTOLIC BLOOD PRESSURE: 64 MMHG | OXYGEN SATURATION: 96 % | TEMPERATURE: 97.9 F | BODY MASS INDEX: 23.9 KG/M2 | HEIGHT: 64 IN | WEIGHT: 140 LBS | HEART RATE: 73 BPM

## 2022-01-01 VITALS
DIASTOLIC BLOOD PRESSURE: 60 MMHG | TEMPERATURE: 97 F | HEIGHT: 64 IN | OXYGEN SATURATION: 97 % | SYSTOLIC BLOOD PRESSURE: 90 MMHG | BODY MASS INDEX: 23.9 KG/M2 | WEIGHT: 140 LBS | HEART RATE: 78 BPM

## 2022-01-01 VITALS
TEMPERATURE: 97.4 F | HEART RATE: 79 BPM | BODY MASS INDEX: 23.9 KG/M2 | DIASTOLIC BLOOD PRESSURE: 72 MMHG | WEIGHT: 140 LBS | HEIGHT: 64 IN | SYSTOLIC BLOOD PRESSURE: 118 MMHG

## 2022-01-01 VITALS
TEMPERATURE: 98.4 F | HEIGHT: 64 IN | OXYGEN SATURATION: 99 % | DIASTOLIC BLOOD PRESSURE: 78 MMHG | HEART RATE: 69 BPM | SYSTOLIC BLOOD PRESSURE: 100 MMHG | BODY MASS INDEX: 23.73 KG/M2 | WEIGHT: 139 LBS

## 2022-01-01 VITALS
SYSTOLIC BLOOD PRESSURE: 112 MMHG | HEIGHT: 64 IN | BODY MASS INDEX: 23.7 KG/M2 | WEIGHT: 138.8 LBS | HEART RATE: 88 BPM | DIASTOLIC BLOOD PRESSURE: 70 MMHG

## 2022-01-01 DIAGNOSIS — G89.29 CHRONIC MIDLINE THORACIC BACK PAIN: ICD-10-CM

## 2022-01-01 DIAGNOSIS — G89.4 CHRONIC PAIN DISORDER: ICD-10-CM

## 2022-01-01 DIAGNOSIS — K63.2 ENTEROCUTANEOUS FISTULA: ICD-10-CM

## 2022-01-01 DIAGNOSIS — G89.4 CHRONIC PAIN DISORDER: Primary | ICD-10-CM

## 2022-01-01 DIAGNOSIS — M54.6 CHRONIC MIDLINE THORACIC BACK PAIN: ICD-10-CM

## 2022-01-01 DIAGNOSIS — K91.2 SHORT GUT SYNDROME: ICD-10-CM

## 2022-01-01 DIAGNOSIS — K63.2 ENTEROCUTANEOUS FISTULA: Primary | ICD-10-CM

## 2022-01-01 DIAGNOSIS — K52.9 CHRONIC DIARRHEA: ICD-10-CM

## 2022-01-01 DIAGNOSIS — Z45.2 FITTING AND ADJUSTMENT OF VASCULAR CATHETER: ICD-10-CM

## 2022-01-01 DIAGNOSIS — F33.0 MILD EPISODE OF RECURRENT MAJOR DEPRESSIVE DISORDER (HCC): ICD-10-CM

## 2022-01-01 DIAGNOSIS — K64.8 INTERNAL HEMORRHOIDS: Primary | ICD-10-CM

## 2022-01-01 DIAGNOSIS — R11.2 INTRACTABLE NAUSEA AND VOMITING: ICD-10-CM

## 2022-01-01 DIAGNOSIS — F41.1 GENERALIZED ANXIETY DISORDER: ICD-10-CM

## 2022-01-01 DIAGNOSIS — K63.2 FISTULA OF INTESTINE, EXCLUDING RECTUM AND ANUS: ICD-10-CM

## 2022-01-01 DIAGNOSIS — K52.9 COLITIS: Primary | ICD-10-CM

## 2022-01-01 DIAGNOSIS — K90.9 INTESTINAL MALABSORPTION, UNSPECIFIED: ICD-10-CM

## 2022-01-01 DIAGNOSIS — F32.A DEPRESSION, UNSPECIFIED DEPRESSION TYPE: ICD-10-CM

## 2022-01-01 DIAGNOSIS — F11.90 CHRONIC, CONTINUOUS USE OF OPIOIDS: ICD-10-CM

## 2022-01-01 DIAGNOSIS — Z78.9 ON TOTAL PARENTERAL NUTRITION (TPN): ICD-10-CM

## 2022-01-01 DIAGNOSIS — Z45.2 ENCOUNTER FOR ADJUSTMENT AND MANAGEMENT OF VASCULAR ACCESS DEVICE: ICD-10-CM

## 2022-01-01 DIAGNOSIS — F41.1 GENERALIZED ANXIETY DISORDER: Chronic | ICD-10-CM

## 2022-01-01 DIAGNOSIS — K59.81 COLONIC PSEUDOOBSTRUCTION: ICD-10-CM

## 2022-01-01 DIAGNOSIS — K91.2 SHORT GUT SYNDROME: Primary | ICD-10-CM

## 2022-01-01 DIAGNOSIS — K91.2 POSTSURGICAL MALABSORPTION, NOT ELSEWHERE CLASSIFIED: ICD-10-CM

## 2022-01-01 LAB
ALBUMIN SERPL BCP-MCNC: 3 G/DL (ref 3.5–5)
ALBUMIN SERPL BCP-MCNC: 3.2 G/DL (ref 3.5–5)
ALBUMIN SERPL BCP-MCNC: 3.2 G/DL (ref 3.5–5)
ALBUMIN SERPL BCP-MCNC: 3.4 G/DL (ref 3.5–5)
ALBUMIN SERPL BCP-MCNC: 3.9 G/DL (ref 3.5–5)
ALP SERPL-CCNC: 107 U/L (ref 46–116)
ALP SERPL-CCNC: 84 U/L (ref 46–116)
ALP SERPL-CCNC: 90 U/L (ref 46–116)
ALP SERPL-CCNC: 93 U/L (ref 46–116)
ALP SERPL-CCNC: 98 U/L (ref 46–116)
ALT SERPL W P-5'-P-CCNC: 14 U/L (ref 12–78)
ALT SERPL W P-5'-P-CCNC: 16 U/L (ref 12–78)
ALT SERPL W P-5'-P-CCNC: 16 U/L (ref 12–78)
ALT SERPL W P-5'-P-CCNC: 33 U/L (ref 12–78)
ALT SERPL W P-5'-P-CCNC: 47 U/L (ref 12–78)
ANION GAP SERPL CALCULATED.3IONS-SCNC: 10 MMOL/L (ref 4–13)
ANION GAP SERPL CALCULATED.3IONS-SCNC: 10 MMOL/L (ref 4–13)
ANION GAP SERPL CALCULATED.3IONS-SCNC: 11 MMOL/L (ref 4–13)
ANION GAP SERPL CALCULATED.3IONS-SCNC: 13 MMOL/L (ref 4–13)
ANION GAP SERPL CALCULATED.3IONS-SCNC: 8 MMOL/L (ref 4–13)
APTT PPP: 26 SECONDS (ref 23–37)
AST SERPL W P-5'-P-CCNC: 13 U/L (ref 5–45)
AST SERPL W P-5'-P-CCNC: 17 U/L (ref 5–45)
AST SERPL W P-5'-P-CCNC: 19 U/L (ref 5–45)
AST SERPL W P-5'-P-CCNC: 25 U/L (ref 5–45)
AST SERPL W P-5'-P-CCNC: 29 U/L (ref 5–45)
ATRIAL RATE: 70 BPM
BACTERIA BLD CULT: NORMAL
BACTERIA BLD CULT: NORMAL
BASOPHILS # BLD AUTO: 0.04 THOUSANDS/ΜL (ref 0–0.1)
BASOPHILS NFR BLD AUTO: 1 % (ref 0–1)
BILIRUB SERPL-MCNC: 0.26 MG/DL (ref 0.2–1)
BILIRUB SERPL-MCNC: 0.3 MG/DL (ref 0.2–1)
BILIRUB SERPL-MCNC: 0.45 MG/DL (ref 0.2–1)
BILIRUB SERPL-MCNC: 0.61 MG/DL (ref 0.2–1)
BILIRUB SERPL-MCNC: 0.81 MG/DL (ref 0.2–1)
BUN SERPL-MCNC: 11 MG/DL (ref 5–25)
BUN SERPL-MCNC: 11 MG/DL (ref 5–25)
BUN SERPL-MCNC: 13 MG/DL (ref 5–25)
BUN SERPL-MCNC: 13 MG/DL (ref 5–25)
BUN SERPL-MCNC: 15 MG/DL (ref 5–25)
CALCIUM ALBUM COR SERPL-MCNC: 8.4 MG/DL (ref 8.3–10.1)
CALCIUM ALBUM COR SERPL-MCNC: 8.8 MG/DL (ref 8.3–10.1)
CALCIUM ALBUM COR SERPL-MCNC: 8.8 MG/DL (ref 8.3–10.1)
CALCIUM ALBUM COR SERPL-MCNC: 9 MG/DL (ref 8.3–10.1)
CALCIUM SERPL-MCNC: 7.8 MG/DL (ref 8.3–10.1)
CALCIUM SERPL-MCNC: 8.2 MG/DL (ref 8.3–10.1)
CALCIUM SERPL-MCNC: 8.2 MG/DL (ref 8.3–10.1)
CALCIUM SERPL-MCNC: 8.3 MG/DL (ref 8.3–10.1)
CALCIUM SERPL-MCNC: 8.8 MG/DL (ref 8.3–10.1)
CHLORIDE SERPL-SCNC: 103 MMOL/L (ref 100–108)
CHLORIDE SERPL-SCNC: 103 MMOL/L (ref 100–108)
CHLORIDE SERPL-SCNC: 105 MMOL/L (ref 100–108)
CHLORIDE SERPL-SCNC: 106 MMOL/L (ref 100–108)
CHLORIDE SERPL-SCNC: 110 MMOL/L (ref 100–108)
CO2 SERPL-SCNC: 22 MMOL/L (ref 21–32)
CO2 SERPL-SCNC: 24 MMOL/L (ref 21–32)
CO2 SERPL-SCNC: 25 MMOL/L (ref 21–32)
CO2 SERPL-SCNC: 25 MMOL/L (ref 21–32)
CO2 SERPL-SCNC: 26 MMOL/L (ref 21–32)
CREAT SERPL-MCNC: 0.72 MG/DL (ref 0.6–1.3)
CREAT SERPL-MCNC: 0.78 MG/DL (ref 0.6–1.3)
CREAT SERPL-MCNC: 0.83 MG/DL (ref 0.6–1.3)
CREAT SERPL-MCNC: 0.93 MG/DL (ref 0.6–1.3)
CREAT SERPL-MCNC: 1.02 MG/DL (ref 0.6–1.3)
EOSINOPHIL # BLD AUTO: 0.02 THOUSAND/ΜL (ref 0–0.61)
EOSINOPHIL # BLD AUTO: 0.11 THOUSAND/ΜL (ref 0–0.61)
EOSINOPHIL # BLD AUTO: 0.12 THOUSAND/ΜL (ref 0–0.61)
EOSINOPHIL NFR BLD AUTO: 0 % (ref 0–6)
EOSINOPHIL NFR BLD AUTO: 2 % (ref 0–6)
EOSINOPHIL NFR BLD AUTO: 2 % (ref 0–6)
ERYTHROCYTE [DISTWIDTH] IN BLOOD BY AUTOMATED COUNT: 12.5 % (ref 11.6–15.1)
ERYTHROCYTE [DISTWIDTH] IN BLOOD BY AUTOMATED COUNT: 12.7 % (ref 11.6–15.1)
ERYTHROCYTE [DISTWIDTH] IN BLOOD BY AUTOMATED COUNT: 12.9 % (ref 11.6–15.1)
ERYTHROCYTE [DISTWIDTH] IN BLOOD BY AUTOMATED COUNT: 13.2 % (ref 11.6–15.1)
ERYTHROCYTE [DISTWIDTH] IN BLOOD BY AUTOMATED COUNT: 13.4 % (ref 11.6–15.1)
FLUAV RNA RESP QL NAA+PROBE: NEGATIVE
FLUBV RNA RESP QL NAA+PROBE: NEGATIVE
GFR SERPL CREATININE-BSD FRML MDRD: 57 ML/MIN/1.73SQ M
GFR SERPL CREATININE-BSD FRML MDRD: 64 ML/MIN/1.73SQ M
GFR SERPL CREATININE-BSD FRML MDRD: 73 ML/MIN/1.73SQ M
GFR SERPL CREATININE-BSD FRML MDRD: 79 ML/MIN/1.73SQ M
GFR SERPL CREATININE-BSD FRML MDRD: 87 ML/MIN/1.73SQ M
GLUCOSE SERPL-MCNC: 68 MG/DL (ref 65–140)
GLUCOSE SERPL-MCNC: 71 MG/DL (ref 65–140)
GLUCOSE SERPL-MCNC: 81 MG/DL (ref 65–140)
GLUCOSE SERPL-MCNC: 89 MG/DL (ref 65–140)
GLUCOSE SERPL-MCNC: 93 MG/DL (ref 65–140)
HCT VFR BLD AUTO: 34.6 % (ref 34.8–46.1)
HCT VFR BLD AUTO: 35.2 % (ref 34.8–46.1)
HCT VFR BLD AUTO: 35.6 % (ref 34.8–46.1)
HCT VFR BLD AUTO: 37 % (ref 34.8–46.1)
HCT VFR BLD AUTO: 39.8 % (ref 34.8–46.1)
HGB BLD-MCNC: 11.2 G/DL (ref 11.5–15.4)
HGB BLD-MCNC: 11.3 G/DL (ref 11.5–15.4)
HGB BLD-MCNC: 11.6 G/DL (ref 11.5–15.4)
HGB BLD-MCNC: 12.1 G/DL (ref 11.5–15.4)
HGB BLD-MCNC: 13.4 G/DL (ref 11.5–15.4)
IMM GRANULOCYTES # BLD AUTO: 0.01 THOUSAND/UL (ref 0–0.2)
IMM GRANULOCYTES NFR BLD AUTO: 0 % (ref 0–2)
INR PPP: 1.01 (ref 0.84–1.19)
LACTATE SERPL-SCNC: 1.1 MMOL/L (ref 0.5–2)
LIPASE SERPL-CCNC: 109 U/L (ref 73–393)
LYMPHOCYTES # BLD AUTO: 1.14 THOUSANDS/ΜL (ref 0.6–4.47)
LYMPHOCYTES # BLD AUTO: 1.58 THOUSANDS/ΜL (ref 0.6–4.47)
LYMPHOCYTES # BLD AUTO: 2.21 THOUSANDS/ΜL (ref 0.6–4.47)
LYMPHOCYTES NFR BLD AUTO: 21 % (ref 14–44)
LYMPHOCYTES NFR BLD AUTO: 33 % (ref 14–44)
LYMPHOCYTES NFR BLD AUTO: 42 % (ref 14–44)
MAGNESIUM SERPL-MCNC: 1.7 MG/DL (ref 1.6–2.6)
MAGNESIUM SERPL-MCNC: 1.7 MG/DL (ref 1.6–2.6)
MAGNESIUM SERPL-MCNC: 2 MG/DL (ref 1.6–2.6)
MAGNESIUM SERPL-MCNC: 2 MG/DL (ref 1.6–2.6)
MCH RBC QN AUTO: 29.8 PG (ref 26.8–34.3)
MCH RBC QN AUTO: 29.9 PG (ref 26.8–34.3)
MCH RBC QN AUTO: 30.2 PG (ref 26.8–34.3)
MCH RBC QN AUTO: 30.4 PG (ref 26.8–34.3)
MCH RBC QN AUTO: 30.9 PG (ref 26.8–34.3)
MCHC RBC AUTO-ENTMCNC: 31.7 G/DL (ref 31.4–37.4)
MCHC RBC AUTO-ENTMCNC: 32.4 G/DL (ref 31.4–37.4)
MCHC RBC AUTO-ENTMCNC: 32.7 G/DL (ref 31.4–37.4)
MCHC RBC AUTO-ENTMCNC: 33 G/DL (ref 31.4–37.4)
MCHC RBC AUTO-ENTMCNC: 33.7 G/DL (ref 31.4–37.4)
MCV RBC AUTO: 90 FL (ref 82–98)
MCV RBC AUTO: 93 FL (ref 82–98)
MCV RBC AUTO: 93 FL (ref 82–98)
MCV RBC AUTO: 94 FL (ref 82–98)
MCV RBC AUTO: 94 FL (ref 82–98)
MONOCYTES # BLD AUTO: 0.35 THOUSAND/ΜL (ref 0.17–1.22)
MONOCYTES # BLD AUTO: 0.43 THOUSAND/ΜL (ref 0.17–1.22)
MONOCYTES # BLD AUTO: 0.48 THOUSAND/ΜL (ref 0.17–1.22)
MONOCYTES NFR BLD AUTO: 10 % (ref 4–12)
MONOCYTES NFR BLD AUTO: 7 % (ref 4–12)
MONOCYTES NFR BLD AUTO: 8 % (ref 4–12)
NEUTROPHILS # BLD AUTO: 2.49 THOUSANDS/ΜL (ref 1.85–7.62)
NEUTROPHILS # BLD AUTO: 2.52 THOUSANDS/ΜL (ref 1.85–7.62)
NEUTROPHILS # BLD AUTO: 3.82 THOUSANDS/ΜL (ref 1.85–7.62)
NEUTS SEG NFR BLD AUTO: 47 % (ref 43–75)
NEUTS SEG NFR BLD AUTO: 54 % (ref 43–75)
NEUTS SEG NFR BLD AUTO: 71 % (ref 43–75)
NRBC BLD AUTO-RTO: 0 /100 WBCS
P AXIS: 56 DEGREES
PHOSPHATE SERPL-MCNC: 2.4 MG/DL (ref 2.3–4.1)
PHOSPHATE SERPL-MCNC: 3.1 MG/DL (ref 2.3–4.1)
PHOSPHATE SERPL-MCNC: 3.4 MG/DL (ref 2.3–4.1)
PHOSPHATE SERPL-MCNC: 3.7 MG/DL (ref 2.3–4.1)
PLATELET # BLD AUTO: 192 THOUSANDS/UL (ref 149–390)
PLATELET # BLD AUTO: 209 THOUSANDS/UL (ref 149–390)
PLATELET # BLD AUTO: 215 THOUSANDS/UL (ref 149–390)
PLATELET # BLD AUTO: 232 THOUSANDS/UL (ref 149–390)
PLATELET # BLD AUTO: 238 THOUSANDS/UL (ref 149–390)
PMV BLD AUTO: 10 FL (ref 8.9–12.7)
PMV BLD AUTO: 10.1 FL (ref 8.9–12.7)
PMV BLD AUTO: 10.5 FL (ref 8.9–12.7)
PMV BLD AUTO: 9.4 FL (ref 8.9–12.7)
PMV BLD AUTO: 9.7 FL (ref 8.9–12.7)
POTASSIUM SERPL-SCNC: 3.3 MMOL/L (ref 3.5–5.3)
POTASSIUM SERPL-SCNC: 3.7 MMOL/L (ref 3.5–5.3)
POTASSIUM SERPL-SCNC: 3.8 MMOL/L (ref 3.5–5.3)
POTASSIUM SERPL-SCNC: 3.8 MMOL/L (ref 3.5–5.3)
POTASSIUM SERPL-SCNC: 4.5 MMOL/L (ref 3.5–5.3)
PR INTERVAL: 176 MS
PREALB SERPL-MCNC: 17 MG/DL (ref 18–40)
PREALB SERPL-MCNC: 18.3 MG/DL (ref 18–40)
PREALB SERPL-MCNC: 18.8 MG/DL (ref 18–40)
PREALB SERPL-MCNC: 24 MG/DL (ref 18–40)
PROCALCITONIN SERPL-MCNC: 0.11 NG/ML
PROT SERPL-MCNC: 6 G/DL (ref 6.4–8.2)
PROT SERPL-MCNC: 6.2 G/DL (ref 6.4–8.2)
PROT SERPL-MCNC: 6.3 G/DL (ref 6.4–8.2)
PROT SERPL-MCNC: 6.7 G/DL (ref 6.4–8.2)
PROT SERPL-MCNC: 7.7 G/DL (ref 6.4–8.2)
PROTHROMBIN TIME: 12.9 SECONDS (ref 11.6–14.5)
QRS AXIS: 85 DEGREES
QRSD INTERVAL: 90 MS
QT INTERVAL: 422 MS
QTC INTERVAL: 455 MS
RBC # BLD AUTO: 3.74 MILLION/UL (ref 3.81–5.12)
RBC # BLD AUTO: 3.76 MILLION/UL (ref 3.81–5.12)
RBC # BLD AUTO: 3.79 MILLION/UL (ref 3.81–5.12)
RBC # BLD AUTO: 3.98 MILLION/UL (ref 3.81–5.12)
RBC # BLD AUTO: 4.44 MILLION/UL (ref 3.81–5.12)
RSV RNA RESP QL NAA+PROBE: NEGATIVE
SARS-COV-2 RNA RESP QL NAA+PROBE: NEGATIVE
SODIUM SERPL-SCNC: 138 MMOL/L (ref 136–145)
SODIUM SERPL-SCNC: 139 MMOL/L (ref 136–145)
SODIUM SERPL-SCNC: 139 MMOL/L (ref 136–145)
SODIUM SERPL-SCNC: 142 MMOL/L (ref 136–145)
SODIUM SERPL-SCNC: 143 MMOL/L (ref 136–145)
T WAVE AXIS: 56 DEGREES
VENTRICULAR RATE: 70 BPM
WBC # BLD AUTO: 4.62 THOUSAND/UL (ref 4.31–10.16)
WBC # BLD AUTO: 4.74 THOUSAND/UL (ref 4.31–10.16)
WBC # BLD AUTO: 4.75 THOUSAND/UL (ref 4.31–10.16)
WBC # BLD AUTO: 5.3 THOUSAND/UL (ref 4.31–10.16)
WBC # BLD AUTO: 5.38 THOUSAND/UL (ref 4.31–10.16)
ZINC SERPL-MCNC: 81 UG/DL (ref 44–115)

## 2022-01-01 PROCEDURE — 83690 ASSAY OF LIPASE: CPT | Performed by: PHYSICIAN ASSISTANT

## 2022-01-01 PROCEDURE — 99214 OFFICE O/P EST MOD 30 MIN: CPT | Performed by: FAMILY MEDICINE

## 2022-01-01 PROCEDURE — 83735 ASSAY OF MAGNESIUM: CPT | Performed by: SURGERY

## 2022-01-01 PROCEDURE — 84100 ASSAY OF PHOSPHORUS: CPT | Performed by: SURGERY

## 2022-01-01 PROCEDURE — 83605 ASSAY OF LACTIC ACID: CPT | Performed by: PHYSICIAN ASSISTANT

## 2022-01-01 PROCEDURE — 85025 COMPLETE CBC W/AUTO DIFF WBC: CPT | Performed by: SURGERY

## 2022-01-01 PROCEDURE — 0241U HB NFCT DS VIR RESP RNA 4 TRGT: CPT | Performed by: PHYSICIAN ASSISTANT

## 2022-01-01 PROCEDURE — 99214 OFFICE O/P EST MOD 30 MIN: CPT | Performed by: SURGERY

## 2022-01-01 PROCEDURE — 85730 THROMBOPLASTIN TIME PARTIAL: CPT | Performed by: PHYSICIAN ASSISTANT

## 2022-01-01 PROCEDURE — 36415 COLL VENOUS BLD VENIPUNCTURE: CPT | Performed by: PHYSICIAN ASSISTANT

## 2022-01-01 PROCEDURE — 85610 PROTHROMBIN TIME: CPT | Performed by: PHYSICIAN ASSISTANT

## 2022-01-01 PROCEDURE — 80053 COMPREHEN METABOLIC PANEL: CPT | Performed by: SURGERY

## 2022-01-01 PROCEDURE — 84145 PROCALCITONIN (PCT): CPT | Performed by: PHYSICIAN ASSISTANT

## 2022-01-01 PROCEDURE — 96361 HYDRATE IV INFUSION ADD-ON: CPT

## 2022-01-01 PROCEDURE — 83735 ASSAY OF MAGNESIUM: CPT

## 2022-01-01 PROCEDURE — 84134 ASSAY OF PREALBUMIN: CPT | Performed by: SURGERY

## 2022-01-01 PROCEDURE — 36573 INSJ PICC RS&I 5 YR+: CPT | Performed by: RADIOLOGY

## 2022-01-01 PROCEDURE — 96374 THER/PROPH/DIAG INJ IV PUSH: CPT

## 2022-01-01 PROCEDURE — 87040 BLOOD CULTURE FOR BACTERIA: CPT | Performed by: PHYSICIAN ASSISTANT

## 2022-01-01 PROCEDURE — 36573 INSJ PICC RS&I 5 YR+: CPT

## 2022-01-01 PROCEDURE — 99213 OFFICE O/P EST LOW 20 MIN: CPT | Performed by: FAMILY MEDICINE

## 2022-01-01 PROCEDURE — 84630 ASSAY OF ZINC: CPT

## 2022-01-01 PROCEDURE — 85027 COMPLETE CBC AUTOMATED: CPT

## 2022-01-01 PROCEDURE — 93010 ELECTROCARDIOGRAM REPORT: CPT | Performed by: INTERNAL MEDICINE

## 2022-01-01 PROCEDURE — 96372 THER/PROPH/DIAG INJ SC/IM: CPT | Performed by: FAMILY MEDICINE

## 2022-01-01 PROCEDURE — 74177 CT ABD & PELVIS W/CONTRAST: CPT

## 2022-01-01 PROCEDURE — 96376 TX/PRO/DX INJ SAME DRUG ADON: CPT

## 2022-01-01 PROCEDURE — 80053 COMPREHEN METABOLIC PANEL: CPT

## 2022-01-01 PROCEDURE — 80053 COMPREHEN METABOLIC PANEL: CPT | Performed by: PHYSICIAN ASSISTANT

## 2022-01-01 PROCEDURE — 36415 COLL VENOUS BLD VENIPUNCTURE: CPT

## 2022-01-01 PROCEDURE — C1751 CATH, INF, PER/CENT/MIDLINE: HCPCS

## 2022-01-01 PROCEDURE — 93005 ELECTROCARDIOGRAM TRACING: CPT

## 2022-01-01 PROCEDURE — 96375 TX/PRO/DX INJ NEW DRUG ADDON: CPT

## 2022-01-01 PROCEDURE — 99214 OFFICE O/P EST MOD 30 MIN: CPT | Performed by: PHYSICIAN ASSISTANT

## 2022-01-01 PROCEDURE — 99285 EMERGENCY DEPT VISIT HI MDM: CPT | Performed by: PHYSICIAN ASSISTANT

## 2022-01-01 PROCEDURE — 84134 ASSAY OF PREALBUMIN: CPT

## 2022-01-01 PROCEDURE — 84100 ASSAY OF PHOSPHORUS: CPT

## 2022-01-01 PROCEDURE — 99285 EMERGENCY DEPT VISIT HI MDM: CPT

## 2022-01-01 PROCEDURE — 85025 COMPLETE CBC W/AUTO DIFF WBC: CPT | Performed by: PHYSICIAN ASSISTANT

## 2022-01-01 PROCEDURE — 85027 COMPLETE CBC AUTOMATED: CPT | Performed by: SURGERY

## 2022-01-01 RX ORDER — HYDROMORPHONE HYDROCHLORIDE 4 MG/1
4 TABLET ORAL 2 TIMES DAILY PRN
Qty: 60 TABLET | Refills: 0 | OUTPATIENT
Start: 2022-01-01

## 2022-01-01 RX ORDER — METHOCARBAMOL 750 MG/1
750 TABLET, FILM COATED ORAL 3 TIMES DAILY
Qty: 90 TABLET | Refills: 5 | Status: SHIPPED | OUTPATIENT
Start: 2022-01-01

## 2022-01-01 RX ORDER — HYDROMORPHONE HCL/PF 1 MG/ML
0.5 SYRINGE (ML) INJECTION ONCE
Status: COMPLETED | OUTPATIENT
Start: 2022-01-01 | End: 2022-01-01

## 2022-01-01 RX ORDER — MORPHINE SULFATE 30 MG/1
30 TABLET, FILM COATED, EXTENDED RELEASE ORAL EVERY 8 HOURS
Qty: 90 TABLET | Refills: 0 | Status: SHIPPED | OUTPATIENT
Start: 2022-01-01 | End: 2022-01-01 | Stop reason: SDUPTHER

## 2022-01-01 RX ORDER — MORPHINE SULFATE 30 MG/1
30 TABLET, FILM COATED, EXTENDED RELEASE ORAL EVERY 8 HOURS
Qty: 90 TABLET | Refills: 0 | Status: SHIPPED | OUTPATIENT
Start: 2022-01-01

## 2022-01-01 RX ORDER — ESCITALOPRAM OXALATE 20 MG/1
TABLET ORAL
Qty: 90 TABLET | Refills: 1 | Status: SHIPPED | OUTPATIENT
Start: 2022-01-01

## 2022-01-01 RX ORDER — MORPHINE SULFATE 30 MG/1
30 TABLET, FILM COATED, EXTENDED RELEASE ORAL EVERY 8 HOURS
Qty: 90 TABLET | Refills: 0 | OUTPATIENT
Start: 2022-01-01

## 2022-01-01 RX ORDER — METOCLOPRAMIDE 10 MG/1
10 TABLET ORAL EVERY 6 HOURS
Qty: 30 TABLET | Refills: 0 | Status: SHIPPED | OUTPATIENT
Start: 2022-01-01

## 2022-01-01 RX ORDER — HYDROMORPHONE HYDROCHLORIDE 4 MG/1
4 TABLET ORAL 2 TIMES DAILY PRN
Qty: 60 TABLET | Refills: 0 | Status: SHIPPED | OUTPATIENT
Start: 2022-01-01 | End: 2022-01-01 | Stop reason: SDUPTHER

## 2022-01-01 RX ORDER — DIAZEPAM 5 MG/1
5 TABLET ORAL 3 TIMES DAILY PRN
Qty: 90 TABLET | Refills: 0 | OUTPATIENT
Start: 2022-01-01

## 2022-01-01 RX ORDER — HYDROMORPHONE HYDROCHLORIDE 2 MG/1
2 TABLET ORAL 2 TIMES DAILY PRN
Qty: 60 TABLET | Refills: 0 | Status: SHIPPED | OUTPATIENT
Start: 2022-01-01

## 2022-01-01 RX ORDER — DICYCLOMINE HCL 20 MG
20 TABLET ORAL ONCE
Status: COMPLETED | OUTPATIENT
Start: 2022-01-01 | End: 2022-01-01

## 2022-01-01 RX ORDER — ONDANSETRON 4 MG/1
4 TABLET, FILM COATED ORAL EVERY 8 HOURS PRN
Qty: 90 TABLET | Refills: 2 | Status: SHIPPED | OUTPATIENT
Start: 2022-01-01

## 2022-01-01 RX ORDER — HYDROCORTISONE ACETATE 25 MG/1
25 SUPPOSITORY RECTAL 2 TIMES DAILY
Qty: 28 SUPPOSITORY | Refills: 0 | Status: SHIPPED | OUTPATIENT
Start: 2022-01-01 | End: 2022-01-01

## 2022-01-01 RX ORDER — DIAZEPAM 5 MG/1
5 TABLET ORAL 3 TIMES DAILY PRN
Qty: 90 TABLET | Refills: 0 | Status: SHIPPED | OUTPATIENT
Start: 2022-01-01 | End: 2022-01-01

## 2022-01-01 RX ORDER — METOCLOPRAMIDE 10 MG/1
10 TABLET ORAL EVERY 6 HOURS
Qty: 30 TABLET | Refills: 0 | Status: SHIPPED | OUTPATIENT
Start: 2022-01-01 | End: 2022-01-01 | Stop reason: SDUPTHER

## 2022-01-01 RX ORDER — POTASSIUM CHLORIDE 20 MEQ/1
40 TABLET, EXTENDED RELEASE ORAL ONCE
Status: DISCONTINUED | OUTPATIENT
Start: 2022-01-01 | End: 2022-01-01

## 2022-01-01 RX ORDER — METHOCARBAMOL 750 MG/1
750 TABLET, FILM COATED ORAL 3 TIMES DAILY
Qty: 90 TABLET | Refills: 5 | OUTPATIENT
Start: 2022-01-01

## 2022-01-01 RX ORDER — ONDANSETRON 2 MG/ML
4 INJECTION INTRAMUSCULAR; INTRAVENOUS ONCE
Status: COMPLETED | OUTPATIENT
Start: 2022-01-01 | End: 2022-01-01

## 2022-01-01 RX ORDER — LIDOCAINE WITH 8.4% SOD BICARB 0.9%(10ML)
SYRINGE (ML) INJECTION CODE/TRAUMA/SEDATION MEDICATION
Status: COMPLETED | OUTPATIENT
Start: 2022-01-01 | End: 2022-01-01

## 2022-01-01 RX ADMIN — CYANOCOBALAMIN 1000 MCG: 1000 INJECTION INTRAMUSCULAR; SUBCUTANEOUS at 10:43

## 2022-01-01 RX ADMIN — ONDANSETRON 4 MG: 2 INJECTION INTRAMUSCULAR; INTRAVENOUS at 13:48

## 2022-01-01 RX ADMIN — Medication 10 ML: at 13:18

## 2022-01-01 RX ADMIN — SODIUM CHLORIDE 1000 ML: 0.9 INJECTION, SOLUTION INTRAVENOUS at 16:27

## 2022-01-01 RX ADMIN — DICYCLOMINE HYDROCHLORIDE 20 MG: 20 TABLET ORAL at 18:32

## 2022-01-01 RX ADMIN — HYDROMORPHONE HYDROCHLORIDE 0.5 MG: 1 INJECTION, SOLUTION INTRAMUSCULAR; INTRAVENOUS; SUBCUTANEOUS at 13:49

## 2022-01-01 RX ADMIN — CYANOCOBALAMIN 1000 MCG: 1000 INJECTION INTRAMUSCULAR; SUBCUTANEOUS at 13:53

## 2022-01-01 RX ADMIN — IOHEXOL 100 ML: 350 INJECTION, SOLUTION INTRAVENOUS at 15:07

## 2022-01-01 RX ADMIN — HYDROMORPHONE HYDROCHLORIDE 0.5 MG: 1 INJECTION, SOLUTION INTRAMUSCULAR; INTRAVENOUS; SUBCUTANEOUS at 18:32

## 2022-01-01 RX ADMIN — HYDROMORPHONE HYDROCHLORIDE 0.5 MG: 1 INJECTION, SOLUTION INTRAMUSCULAR; INTRAVENOUS; SUBCUTANEOUS at 15:46

## 2022-01-07 NOTE — PROGRESS NOTES
Na Win 1955 female MRN: [id number]      ASSESSMENT/PLAN  Problem List Items Addressed This Visit        Digestive    Colonic pseudoobstruction    Enterocutaneous fistula    Short gut syndrome       Other    Chronic pain disorder - Primary    Chronic, continuous use of opioids        Will continue to work towards weaning off opioids -- plan to decrease Dilaudid to BID at next fill  Continue TPN and monitoring per surgeon    Future Appointments   Date Time Provider Amanda Enrique   1/28/2022 10:45 AM MD KIRK Upton [facility]   2/18/2022  1:40 PM DO ELENA Harley [facility]          SUBJECTIVE  CC: Transition of Care Management (Patient seen in office today for a follow up from recent hospitalization x 2 ( too late for a TCM )  Patient stated that overall she is feeling good - GI issues)      HPI:  Na Win is a 73 y o  female who presents for hospital follow up  Pt was admitted to [facility] from 12/10-12/14 and 12/16-12/18 due to recurrent abdominal pain similar to previous admissions  Today:   She feels like she is starting to "figure things out"   She has an "excessive" amount gas and associated with significant pain -- has been told by her surgeon that "everything is filling up with air"   Is wondering if there is anything that can lessen her gas   Is currently on TPN qHS -- she would like to stop the lipids -- with labs every other weekl  She is ready to cut down the Dilaudid another dose     Review of Systems   Gastrointestinal: Positive for abdominal pain and diarrhea  (+) gas   Musculoskeletal: Positive for back pain         Historical Information   The patient history was reviewed and updated as follows:    Past Medical History:   Diagnosis Date    Asthma     Bowel obstruction ([address] )     Choledocholithiasis 11/18/2020    Chronic back pain     Colon polyp     Enlarged kidney     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    Hydronephrosis 2019    Ileus ([address] ) 2018    Overview:  Last Assessment & Plan:  Patient presented with generalized abdominal pain nausea and bilious vomiting ,imaging study reports " diffuse bowel dilatation involving distal small bowel and the entire colon to the panel verge obstruction mass is not identified and this may represent adynamic ileus in the postoperative setting possible related to medication"  Patient states that she still ha    Liver mass     Opiate dependence ([address] )     Post-ERCP acute pancreatitis 2020    Short gut syndrome     5 feet small bowel    Small bowel fistula     Urinary retention with incomplete bladder emptying      Past Surgical History:   Procedure Laterality Date    ABDOMINAL SURGERY      x 25    APPENDECTOMY      Open     BACK SURGERY      x 3     SECTION      x1    CHOLECYSTECTOMY      Open    COLON SURGERY      Sigmoid     COLONOSCOPY N/A 2018    Procedure: COLONOSCOPY;  Surgeon: Coty Bautista MD;  [facility];   Service: Gastroenterology    COLONOSCOPY      ERCP      EXPLORATORY LAPAROTOMY      Several for adhesive disease; has had several bowel resections; has a small bowel fistula    GASTROCUTANEOUS FISTULA CLOSURE      HERNIA REPAIR      Incisional     IR PICC REPOSITION  3/3/2021    MAMMO (HISTORICAL)  2017    SPINAL FUSION      Lower back     TONSILLECTOMY      TOTAL ABDOMINAL HYSTERECTOMY      Not due to cancer - complete     TRACHEOSTOMY  2019     Family History   Problem Relation Age of Onset    Lung cancer Mother     No Known Problems Father       Social History   Social History     Substance and Sexual Activity   Alcohol Use Not Currently    Alcohol/week: 0 0 standard drinks    Comment: social drinker     Social History     Substance and Sexual Activity   Drug Use No     Social History     Tobacco Use   Smoking Status Former Smoker    Packs/day: 1 00    Years: 15 00    Pack years: 15     Types: Cigarettes    Quit date: 1980    Years since quittin 5   Smokeless Tobacco Never Used   Tobacco Comment    Never smoker - As per [employer]        Medications:     Current Outpatient Medications:     cholecalciferol (VITAMIN D3) 1,000 units tablet, 1 tablet 2x daily, Disp: , Rfl:     diazepam (VALIUM) 5 mg tablet, Take 1 tablet (5 mg total) by mouth every 8 (eight) hours as needed for anxiety, Disp: 90 tablet, Rfl: 0    diphenhydrAMINE (BENADRYL) 50 mg capsule, Take 50 mg by mouth every 6 (six) hours as needed for itching, Disp: , Rfl:     escitalopram (LEXAPRO) 20 mg tablet, Take 0 5 tablets (10 mg total) by mouth daily, Disp: 90 tablet, Rfl: 1    FeroSul 325 (65 Fe) MG tablet, take 1 tablet by mouth once daily, Disp: 30 tablet, Rfl: 2    folic acid (FOLVITE) 1 mg tablet, Take 1 tablet (1 mg total) by mouth daily, Disp: 90 tablet, Rfl: 3    Gattex 5 MG KIT, , Disp: , Rfl:     HYDROmorphone (DILAUDID) 4 mg tablet, Take 1 tablet (4 mg total) by mouth every 8 (eight) hours as needed for severe pain Max Daily Amount: 12 mg, Disp: 90 tablet, Rfl: 0    methocarbamol (ROBAXIN) 750 mg tablet, Take 750 mg by mouth 3 (three) times a day, Disp: , Rfl:     morphine (MS CONTIN) 30 mg 12 hr tablet, Take 1 tablet (30 mg total) by mouth every 8 (eight) hours Max Daily Amount: 90 mg, Disp: 90 tablet, Rfl: 0    naloxone (NARCAN) 4 mg/0 1 mL nasal spray, Administer 1 spray into a nostril   If no response after 2-3 minutes, give another dose in the other nostril using a new spray , Disp: 1 each, Rfl: 1    ondansetron (ZOFRAN) 4 mg tablet, Take 1 tablet (4 mg total) by mouth every 8 (eight) hours as needed for nausea or vomiting, Disp: 90 tablet, Rfl: 2    Current Facility-Administered Medications:     cyanocobalamin injection 1,000 mcg, 1,000 mcg, Intramuscular, Q30 Days, Venus Hernandez DO, 1,000 mcg at 21 1047  Allergies   Allergen Reactions    Nsaids      Irritable, upset stomach  Irritable, upset stomach    Tolmetin      Irritable, upset stomach    Codeine Hives     Per [address] admission orders    Oxycodone-Acetaminophen GI Intolerance     Percocet Tabs    Tylenol [Acetaminophen]        OBJECTIVE    Vitals:   Vitals:    01/07/22 1435   BP: 100/78   BP Location: Left arm   Patient Position: Sitting   Cuff Size: Large   Pulse: 69   Temp: 98 4 °F (36 9 °C)   SpO2: 99%   Weight: 63 kg (139 lb)   Height: 5' 4" (1 626 m)           Physical Exam  Vitals and nursing note reviewed  Constitutional:       General: She is not in acute distress  Appearance: Normal appearance  HENT:      Head: Normocephalic and atraumatic  Pulmonary:      Effort: Pulmonary effort is normal  No respiratory distress  Neurological:      General: No focal deficit present  Mental Status: She is alert     Psychiatric:         Mood and Affect: Mood normal                     DO Jairo Harley [address] [facility]   1/7/2022  3:13 PM

## 2022-01-14 NOTE — TELEPHONE ENCOUNTER
Pt was vaccinated & boostered  Her cleaning lady has covid  Pt has sore throat, cough, congestion  Pt req coming to have covid swab on Saturday

## 2022-01-18 PROBLEM — I95.2 HYPOTENSION DUE TO DRUGS: Status: RESOLVED | Noted: 2021-01-01 | Resolved: 2022-01-01

## 2022-01-18 PROBLEM — E83.39 HYPERPHOSPHATEMIA: Status: RESOLVED | Noted: 2021-01-01 | Resolved: 2022-01-01

## 2022-01-18 PROBLEM — E53.8 B12 DEFICIENCY: Status: RESOLVED | Noted: 2021-03-02 | Resolved: 2022-01-01

## 2022-01-18 NOTE — PROGRESS NOTES
Assessment/Plan:     Diagnoses and all orders for this visit:    Short gut syndrome        Patient is maintaining good body weight  Will discontinue lipids in the TPN  Will continue with Gattex injections  Recommend discontinuing Questran as this has not benefited the patient  Subjective:      Patient ID: Delilah Chilel is a 73 y o  female  HPI   Patient has now stayed out of the hospital for the last 1 month  She states that she is working with her PCP to try to reduce her opiate dependence  She continues on home TPN every night  She has put on weight  Her fistula output is minimal   She continues to have diarrhea which has not been relieved with Questran  She complains of cramping and increased flatulence  The following portions of the patient's history were reviewed and updated as appropriate: allergies, current medications, past family history, past medical history, past social history, past surgical history, and problem list     Review of Systems    Chronic back pain  Opiate dependence    Objective:    /72   Pulse 79   Temp (!) 97 4 °F (36 3 °C)   Ht 5' 4" (1 626 m)   Wt 63 5 kg (140 lb)   LMP  (LMP Unknown)   BMI 24 03 kg/m²        Physical Exam    Patient looks well nourished  No pallor    Abdominal exam reveals soft abdomen  There is a fistula appliance but the bag is empty

## 2022-02-02 NOTE — TELEPHONE ENCOUNTER
Pt is requesting these medications are sent today, she is worried about the ice/snow over the weekend  Please call pt when sent  Thanks!

## 2022-02-09 NOTE — PROGRESS NOTES
[facility]  Cleve Marjan 73 y o  female MRN: [id number]       CC: BRBPR    HPI: Mera Dickinson is a 78year old female with extensive surgical history including multiple bowel obstructions status post resection of short bowel resulting in short gut syndrome, chronic opioid use for chronic back pain and enterocutaneous fistula  Patient also on Gattex for short gut syndrome once daily  She is here to follow-up for rectal bleeding  She reports hat despite being on Gattex, she continues to have over 10 bowel movements daily  She has failed Questran in the past  Her Gattex was being managed by her surgeon, Dr Hilton Garcia  Her last hospitalization was in December 2021 for recurrent colonic pseudo-obstruction  She reports that since he has been hospitalized, her abdomen is no longer distended  Last colonoscopy was in 2018 with Dr Carla Norman  At the time, she had colonoscopy for decompression  Two polyps were removed at the time  These biopsied as 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  She was recommended a repeat colonoscopy in 6 weeks, but was hospitalized again  Review of Systems:    CONSTITUTIONAL: Denies any fever, chills, or rigors  Good appetite, and no recent weight loss  HEENT: No earache or tinnitus  Denies hearing loss or visual disturbances  CARDIOVASCULAR: No chest pain or palpitations  RESPIRATORY: Denies any cough, hemoptysis, shortness of breath or dyspnea on exertion  GASTROINTESTINAL: As noted in the History of Present Illness  GENITOURINARY: No problems with urination  Denies any hematuria or dysuria  NEUROLOGIC: No dizziness or vertigo, denies headaches  MUSCULOSKELETAL: Denies any muscle or joint pain  SKIN: Denies skin rashes or itching  ENDOCRINE: Denies excessive thirst  Denies intolerance to heat or cold  PSYCHOSOCIAL: Denies depression or anxiety  Denies any recent memory loss         Current Outpatient Medications   Medication Sig Dispense Refill    cholecalciferol (VITAMIN D3) 1,000 units tablet 1 tablet 2x daily      diazepam (VALIUM) 5 mg tablet Take 1 tablet (5 mg total) by mouth 3 (three) times a day as needed for muscle spasms 90 tablet 0    diphenhydrAMINE (BENADRYL) 50 mg capsule Take 50 mg by mouth every 6 (six) hours as needed for itching      escitalopram (LEXAPRO) 20 mg tablet take 1 tablet by mouth once daily 90 tablet 1    FeroSul 325 (65 Fe) MG tablet take 1 tablet by mouth once daily 30 tablet 2    folic acid (FOLVITE) 1 mg tablet Take 1 tablet (1 mg total) by mouth daily 90 tablet 3    Gattex 5 MG KIT       HYDROmorphone (DILAUDID) 4 mg tablet Take 1 tablet (4 mg total) by mouth 2 (two) times a day as needed for severe pain Max Daily Amount: 8 mg 60 tablet 0    methocarbamol (ROBAXIN) 750 mg tablet Take 750 mg by mouth 3 (three) times a day      morphine (MS CONTIN) 30 mg 12 hr tablet Take 1 tablet (30 mg total) by mouth every 8 (eight) hours Max Daily Amount: 90 mg 90 tablet 0    ondansetron (ZOFRAN) 4 mg tablet Take 1 tablet (4 mg total) by mouth every 8 (eight) hours as needed for nausea or vomiting 90 tablet 2    hydrocortisone (ANUSOL-HC) 25 mg suppository Insert 1 suppository (25 mg total) into the rectum 2 (two) times a day for 14 days 28 suppository 0    naloxone (NARCAN) 4 mg/0 1 mL nasal spray Administer 1 spray into a nostril  If no response after 2-3 minutes, give another dose in the other nostril using a new spray   (Patient not taking: Reported on 2/9/2022 ) 1 each 1     Current Facility-Administered Medications   Medication Dose Route Frequency Provider Last Rate Last Admin    cyanocobalamin injection 1,000 mcg  1,000 mcg Intramuscular Q30 Days Venus Hernandez DO   1,000 mcg at 11/18/21 1047     Past Medical History:   Diagnosis Date    Asthma     [facility]el [facility] [address] [facility]     Choledocholithiasis 11/18/2020    Chronic back pain     Colon polyp     Enlarged kidney     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    Hydronephrosis 2019    Ileus ([address] ) 2018    Overview:  Last Assessment & Plan:  Patient presented with generalized abdominal pain nausea and bilious vomiting ,imaging study reports " diffuse bowel dilatation involving distal small bowel and the entire colon to the panel verge obstruction mass is not identified and this may represent adynamic ileus in the postoperative setting possible related to medication"  Patient states that she still ha    Liver mass     Opiate dependence ([address] )     Post-ERCP acute pancreatitis 2020    Short gut syndrome     5 feet small bowel    Small bowel fistula     Urinary retention with incomplete bladder emptying      Past Surgical History:   Procedure Laterality Date    ABDOMINAL SURGERY      x 25    APPENDECTOMY      Open     BACK SURGERY      x 3     SECTION      x1    CHOLECYSTECTOMY      Open    COLON SURGERY      Sigmoid     COLONOSCOPY N/A 2018    Procedure: COLONOSCOPY;  Surgeon: Karen Pimentel MD;  Location: [facility];   Service: Gastroenterology    COLONOSCOPY  2018    ERCP      EXPLORATORY LAPAROTOMY      Several for adhesive disease; has had several bowel resections; has a small bowel fistula    GASTROCUTANEOUS FISTULA CLOSURE      HERNIA REPAIR      Incisional     IR PICC REPOSITION  3/3/2021    MAMMO (HISTORICAL)  2017    SPINAL FUSION      Lower back     TONSILLECTOMY      TOTAL ABDOMINAL HYSTERECTOMY      Not due to cancer - complete     TRACHEOSTOMY  2019     Social History     Socioeconomic History    Marital status: /Civil Union     Spouse name: None    Number of children: None    Years of education: None    Highest education level: None   Occupational History    None   Tobacco Use    Smoking status: Former Smoker     Packs/day: 1 00     Years: 15 00     Pack years: 15 00     Types: Cigarettes     Quit date: 1980     Years since quittin 6    Smokeless tobacco: Never Used    Tobacco comment: Never smoker - As per [employer]    Vaping Use    Vaping Use: Never used   Substance and Sexual Activity    Alcohol use: Not Currently     Alcohol/week: 0 0 standard drinks     Comment: social drinker    Drug use: No    Sexual activity: Yes   Other Topics Concern    None   Social History Narrative    Lives with , dog, and brother     Retired        Nondrinker/no alcohol use - As per [employer]     Social Determinants of Health     Financial Resource Strain: Not on file   Food Insecurity: Not on file   Transportation Needs: No Transportation Needs    Lack of Transportation (Medical): No    Lack of Transportation (Non-Medical): No   Physical Activity: Not on file   Stress: Not on file   Social Connections: Not on file   Intimate Partner Violence: Not on file   Housing Stability: Not on file     Family History   Problem Relation Age of Onset    Lung cancer Mother     No Known Problems Father             PHYSICAL EXAM:    Vitals:    02/09/22 0952   BP: 112/70   Pulse: 88   Weight: 63 kg (138 lb 12 8 oz)   Height: 5' 4" (1 626 m)     General Appearance:   Alert and oriented x 3  Cooperative, and in no respiratory distress   HEENT:   Normocephalic, atraumatic, anicteric      Neck:  Supple, symmetrical, trachea midline   Lungs:   Clear to auscultation bilaterally    Heart[de-identified]   Regular rate and rhythm   Abdomen:   Soft, non-tender, non-distended; normal bowel sounds; no masses, no organomegaly    Genitalia:   Deferred    Rectal:   No JUDIT was performed  Non thrombosed skin tags seen around the anus     Extremities:  No cyanosis, clubbing or edema    Pulses:  2+ and symmetric all extremities    Skin:  Skin color, texture, turgor normal, no rashes or lesions    Lymph nodes:  No palpable cervical or supraclavicular lymphadenopathy        Lab Results:   Results from last 6 Months   Lab Units 01/19/22  1400   WBC Thousand/uL 5 30   HEMOGLOBIN g/dL 11 2*   HEMATOCRIT % 34 6*   PLATELETS Thousands/uL 192   NEUTROS PCT % 47   LYMPHS PCT % 42   MONOS PCT % 8   EOS PCT % 2     Results from last 6 Months   Lab Units 01/19/22  1400   POTASSIUM mmol/L 3 7   CHLORIDE mmol/L 103   CO2 mmol/L 25   BUN mg/dL 11   CREATININE mg/dL 0 93   CALCIUM mg/dL 8 3   ALK PHOS U/L 98   ALT U/L 33   AST U/L 25         Results from last 6 Months   Lab Units 12/16/21  1005   LIPASE u/L 59*       Imaging Studies: I have personally reviewed pertinent imaging studies  X-ray abdomen obstruction series    Result Date: 12/16/2021  Impression: Dilated air-filled loops of bowel most suggestive of ileus and new since the prior exam  Workstation performed: [id number]       ASSESSMENT and PLAN:      1)  Rectal bleeding, history of hemorrhoids - No thrombosed external hemorrhoids  May be secondary to internal source  However is over due for colonoscopy  - Discuss timing of colonoscopy with Dr Carlos Stephens during next follow-up  - Anusol suppository course    2) Short gut syndrome - Patient was managed by general surgery  She is on Gattex once daily  She has chronic enterocutaneous fistula that continues to drain  She is on Dilaudid  Unfortunately, her narcotic requirement is her largest barrier   - Continue Gattex for now  - Patient is looking for another surgeon, however unable to make recommendation at this time given her need for TPN and Gattex management  - May need to transition to octreotide  - Patient insists on following up with Dr Carlos Stephens to discuss further  - If she has no stool output or lack of passing flatus or recurrent abdominal distention, patient understands to go back to the ED        Follow up already scheduled in March

## 2022-02-18 NOTE — PROGRESS NOTES
Marian Schirmer 1955 female MRN: [id number]      ASSESSMENT/PLAN  Problem List Items Addressed This Visit        Other    Chronic pain disorder - Primary    Chronic, continuous use of opioids        Encouraged pt to seek second opinion regarding chronic fistula  Will continue to work on weaning chronic opioids  Encouraged good hydration, slow reintroduction of diet given current abdominal symptoms -- if unable to maintain PO, should be evaluated in ED for possible fluid hydration/bowel rest; currently no suggestion of pseudoobstruction  Future Appointments   Date Time Provider Department Center   3/11/2022  2:20 PM Adama Mancuso [place] [place]   4/19/2022 11:00 AM Jennifer Zepeda MD [facility]          SUBJECTIVE  CC: Pain      HPI:  Marian Schirmer is a 78 y o  female who presents for chronic follow up as below  Has been having worsening pain the past 3 days -- was not able to sleep last night  Was having diarrhea and vomiting last night   Having significant abdominal distention   Is scheduled to see a surgeon for a second opinion in early March     Review of Systems   Gastrointestinal: Positive for abdominal distention, abdominal pain, diarrhea and vomiting         Historical Information   The patient history was reviewed and updated as follows:    Past Medical History:   Diagnosis Date    Asthma     Bowel obstruction ([facility] [address] [facility]     Choledocholithiasis 11/18/2020    Chronic back pain     Colon polyp     Enlarged kidney     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    Hydronephrosis 2/26/2019    Ileus ([facility] [address] ) 7/2/2018    Overview:  Last Assessment & Plan:  Patient presented with generalized abdominal pain nausea and bilious vomiting ,imaging study reports " diffuse bowel dilatation involving distal small bowel and the entire colon to the panel verge obstruction mass is not identified and this may represent adynamic ileus in the postoperative setting possible related to medication"  Patient states that she still ha    Liver mass     Opiate dependence ([address] )     Post-ERCP acute pancreatitis 2020    Short gut syndrome     5 feet small bowel    Small bowel fistula     Urinary retention with incomplete bladder emptying      Past Surgical History:   Procedure Laterality Date    ABDOMINAL SURGERY      x 25    APPENDECTOMY      Open     BACK SURGERY      x 3     SECTION      x1    CHOLECYSTECTOMY      Open    COLON SURGERY      Sigmoid     COLONOSCOPY N/A 2018    Procedure: COLONOSCOPY;  Surgeon: Triston Hancock MD;  Location: [facility];   Service: Gastroenterology    COLONOSCOPY      ERCP      EXPLORATORY LAPAROTOMY      Several for adhesive disease; has had several bowel resections; has a small bowel fistula    GASTROCUTANEOUS FISTULA CLOSURE      HERNIA REPAIR      Incisional     IR PICC REPOSITION  3/3/2021    MAMMO (HISTORICAL)  2017    SPINAL FUSION      Lower back     TONSILLECTOMY      TOTAL ABDOMINAL HYSTERECTOMY      Not due to cancer - complete     TRACHEOSTOMY  2019     Family History   Problem Relation Age of Onset    Lung cancer Mother     No Known Problems Father       Social History   Social History     Substance and Sexual Activity   Alcohol Use Not Currently    Alcohol/week: 0 0 standard drinks    Comment: social drinker     Social History     Substance and Sexual Activity   Drug Use No     Social History     Tobacco Use   Smoking Status Former Smoker    Packs/day: 1 00    Years: 15 00    Pack years: 15 00    Types: Cigarettes    Quit date: 1980    Years since quittin 6   Smokeless Tobacco Never Used   Tobacco Comment    Never smoker - As per [employer]        Medications:     Current Outpatient Medications:     cholecalciferol (VITAMIN D3) 1,000 units tablet, 1 tablet 2x daily, Disp: , Rfl:     diazepam (VALIUM) 5 mg tablet, Take 1 tablet (5 mg total) by mouth 3 (three) times a day as needed for muscle spasms, Disp: 90 tablet, Rfl: 0    diphenhydrAMINE (BENADRYL) 50 mg capsule, Take 50 mg by mouth every 6 (six) hours as needed for itching, Disp: , Rfl:     escitalopram (LEXAPRO) 20 mg tablet, take 1 tablet by mouth once daily, Disp: 90 tablet, Rfl: 1    FeroSul 325 (65 Fe) MG tablet, take 1 tablet by mouth once daily, Disp: 30 tablet, Rfl: 2    folic acid (FOLVITE) 1 mg tablet, Take 1 tablet (1 mg total) by mouth daily, Disp: 90 tablet, Rfl: 3    Gattex 5 MG KIT, , Disp: , Rfl:     hydrocortisone (ANUSOL-HC) 25 mg suppository, Insert 1 suppository (25 mg total) into the rectum 2 (two) times a day for 14 days, Disp: 28 suppository, Rfl: 0    HYDROmorphone (DILAUDID) 4 mg tablet, Take 1 tablet (4 mg total) by mouth 2 (two) times a day as needed for severe pain Max Daily Amount: 8 mg, Disp: 60 tablet, Rfl: 0    methocarbamol (ROBAXIN) 750 mg tablet, Take 750 mg by mouth 3 (three) times a day, Disp: , Rfl:     morphine (MS CONTIN) 30 mg 12 hr tablet, Take 1 tablet (30 mg total) by mouth every 8 (eight) hours Max Daily Amount: 90 mg, Disp: 90 tablet, Rfl: 0    naloxone (NARCAN) 4 mg/0 1 mL nasal spray, Administer 1 spray into a nostril  If no response after 2-3 minutes, give another dose in the other nostril using a new spray   (Patient not taking: Reported on 2/9/2022 ), Disp: 1 each, Rfl: 1    ondansetron (ZOFRAN) 4 mg tablet, Take 1 tablet (4 mg total) by mouth every 8 (eight) hours as needed for nausea or vomiting, Disp: 90 tablet, Rfl: 2    Current Facility-Administered Medications:     cyanocobalamin injection 1,000 mcg, 1,000 mcg, Intramuscular, Q30 Days, Venus Hernandez DO, 1,000 mcg at 02/18/22 1353  Allergies   Allergen Reactions    Nsaids      Irritable, upset stomach  Irritable, upset stomach    Tolmetin      Irritable, upset stomach    Codeine Hives     Per [address] admission orders    Oxycodone-Acetaminophen GI Intolerance     Percocet Tabs    Tylenol [Acetaminophen] OBJECTIVE    Vitals:   Vitals:    02/18/22 1337   BP: 90/60   BP Location: Left arm   Patient Position: Sitting   Cuff Size: Standard   Pulse: 78   Temp: (!) 97 °F (36 1 °C)   SpO2: 97%   Weight: 63 5 kg (140 lb)   Height: 5' 4" (1 626 m)           Physical Exam  Vitals and nursing note reviewed  Constitutional:       General: She is not in acute distress  Appearance: Normal appearance  HENT:      Head: Normocephalic and atraumatic  Pulmonary:      Effort: Pulmonary effort is normal  No respiratory distress  Neurological:      General: No focal deficit present  Mental Status: She is alert     Psychiatric:         Mood and Affect: Mood normal                     DO Mire[facility] [address] [facility]   2/18/2022  1:57 PM

## 2022-03-01 NOTE — TELEPHONE ENCOUNTER
I do not have Gabapentin on her med list -- who was previously prescribing this? Would she be able to bring in the bottle or send a picture in for us? Thanks!

## 2022-03-02 NOTE — TELEPHONE ENCOUNTER
Sorry I did not realize that was not in the original message  I called Samara Dakin back, she takes Gabapentin 300mg 2 capsules by mouth 3 times a day    I did call [facility] to confirm this as well

## 2022-03-02 NOTE — TELEPHONE ENCOUNTER
This is listed in her past meds, I spoke with Moris Orellana, she did get this from her pain specialist however does not see them any longer  She was unsure if you would be able to fill for her or not

## 2022-03-04 NOTE — TELEPHONE ENCOUNTER
Pt called requesting an appt that we "take over her surgery with dr Dwaine Purvis"  Pt stated she had an abdominal fistula put in 3 years ago and dr Dwaine Purvis refuses to take it out  Pt states she is in pain, with nausea vomiting and diarrhea, denies any fever or blood in stool       Please advise

## 2022-03-04 NOTE — TELEPHONE ENCOUNTER
I agree with Dr June Sargent, no surgery is indicated for this patient's condition, I recommend for her to continue care with Dr June Sargent who knows best her surgical issues

## 2022-03-08 NOTE — ED NOTES
Attempted to do a PO challenge on the pt  Pt only took a sip of water and refusing to drink anything further  Refusing PO potassium, PA Kecia De La Garza made aware  Pt states she hasn't drank anything for 1 5 days nor has peed, as I informed pt we need a urine sample  IVF hung as ordered        Delfina Redding, RN  03/08/22 0755

## 2022-03-08 NOTE — DISCHARGE INSTRUCTIONS
Please follow-up with General surgery for reassessment  Take Reglan as needed for alleviation of nausea and abdominal pain  Recommend bland diet and supplementation with potassium rich foods  Please return immediately to the emergency department if you experience any new or worsening symptoms as discussed

## 2022-03-08 NOTE — ED PROVIDER NOTES
History  Chief Complaint   Patient presents with    Abdominal Pain     Patient states she is having abdominal pain, and problems with her abdominal fistula  Patient states her fistula had projectile fluid this morning after she got out of the shower  Patient states she called her surgeons office and the nurse recommended she come to the hospital for furthur evaluation  Chris Jensen is a 60-year-old female with past medical history including short-gut syndrome status post small-bowel resection, enterocutaneous fistula with ostomy, chronic pain arriving to the emergency department for evaluation with chief complaint of severe abdominal pain  She reports onset of abdominal pain about 2 days ago which has been progressively worsening from time of onset  Her pain seems to be more so located at the site of her enterocutaneous fistula  The patient states that after a shower today there was "projectile" diarrhea from the fistula site  She has chronic diarrhea given her chronic conditions as above, denying any changes in the stool to include a foul odor, purulence or blood in the stool  The patient states that she had developed nausea without episodes of vomiting  She states that  her abdomen presently feels distended as well  The patient has an extensive past surgical history of more than 30 laparotomies  She is followed by Dr Clarissa Moran, General Surgery, who had recently seen the patient in 1/2022  She had contacted his office to make aware of symptoms today and was advised by office rn to proceed to the ED for further evaluation  She denies fevers, chills, sweats  She denies any recent medication changes or recent antibiotic use  She denies any urinary complaints  The patient offers no other complaints or concerns at this time  Prior to Admission Medications   Prescriptions Last Dose Informant Patient Reported? Taking?    FeroSul 325 (65 Fe) MG tablet   No No   Sig: take 1 tablet by mouth once daily Gattex 5 MG KIT  Self Yes No   cholecalciferol (VITAMIN D3) 1,000 units tablet  Self Yes No   Si tablet 2x daily   diazepam (VALIUM) 5 mg tablet   No No   Sig: Take 1 tablet (5 mg total) by mouth 3 (three) times a day as needed for muscle spasms   diphenhydrAMINE (BENADRYL) 50 mg capsule   Yes No   Sig: Take 50 mg by mouth every 6 (six) hours as needed for itching   escitalopram (LEXAPRO) 20 mg tablet   No No   Sig: take 1 tablet by mouth once daily   folic acid (FOLVITE) 1 mg tablet  Self No No   Sig: Take 1 tablet (1 mg total) by mouth daily   gabapentin (Neurontin) 300 mg capsule   No No   Sig: Take 2 capsules (600 mg total) by mouth 3 (three) times a day for 3 days   hydrocortisone (ANUSOL-HC) 25 mg suppository   No No   Sig: Insert 1 suppository (25 mg total) into the rectum 2 (two) times a day for 14 days   methocarbamol (ROBAXIN) 750 mg tablet   No No   Sig: Take 1 tablet (750 mg total) by mouth 3 (three) times a day   naloxone (NARCAN) 4 mg/0 1 mL nasal spray   No No   Sig: Administer 1 spray into a nostril  If no response after 2-3 minutes, give another dose in the other nostril using a new spray     Patient not taking: Reported on 2022    ondansetron (ZOFRAN) 4 mg tablet   No No   Sig: Take 1 tablet (4 mg total) by mouth every 8 (eight) hours as needed for nausea or vomiting      Facility-Administered Medications Last Administration Doses Remaining   cyanocobalamin injection 1,000 mcg 2022  1:53 PM           Past Medical History:   Diagnosis Date    Asthma     [facility]     Choledocholithiasis 2020    Chronic back pain     Colon polyp     Enlarged kidney     Enterocutaneous fistula 2019    Hydronephrosis 2019    Ileus ([address] ) 2018    Overview:  Last Assessment & Plan:  Patient presented with generalized abdominal pain nausea and bilious vomiting ,imaging study reports " diffuse bowel dilatation involving distal small bowel and the entire colon to the panel verge obstruction mass is not identified and this may represent adynamic ileus in the postoperative setting possible related to medication"  Patient states that she still ha    Liver mass     Opiate dependence ([address] )     Post-ERCP acute pancreatitis 2020    Short gut syndrome     5 feet small bowel    Small bowel fistula     Urinary retention with incomplete bladder emptying        Past Surgical History:   Procedure Laterality Date    ABDOMINAL SURGERY      x 25    APPENDECTOMY      Open     BACK SURGERY      x 3     SECTION      x1    CHOLECYSTECTOMY      Open    COLON SURGERY      Sigmoid     COLONOSCOPY N/A 2018    Procedure: COLONOSCOPY;  Surgeon: Betina Garcia MD;  Location[facility]; Service: Gastroenterology    COLONOSCOPY      ERCP      EXPLORATORY LAPAROTOMY      Several for adhesive disease; has had several bowel resections; has a small bowel fistula    GASTROCUTANEOUS FISTULA CLOSURE      HERNIA REPAIR      Incisional     IR PICC REPOSITION  3/3/2021    MAMMO (HISTORICAL)  2017    SPINAL FUSION      Lower back     TONSILLECTOMY      TOTAL ABDOMINAL HYSTERECTOMY      Not due to cancer - complete     TRACHEOSTOMY  2019       Family History   Problem Relation Age of Onset    Lung cancer Mother     No Known Problems Father      I have reviewed and agree with the history as documented      E-Cigarette/Vaping    E-Cigarette Use Never User      E-Cigarette/Vaping Substances    Nicotine No     THC No     CBD No      Social History     Tobacco Use    Smoking status: Former Smoker     Packs/day: 1 00     Years: 15 00     Pack years: 15 00     Types: Cigarettes     Quit date: 1980     Years since quittin 7    Smokeless tobacco: Never Used    Tobacco comment: Never smoker - As per [employer]    Vaping Use    Vaping Use: Never used   Substance Use Topics    Alcohol use: Not Currently     Alcohol/week: 0 0 standard drinks Comment: social drinker    Drug use: No       Review of Systems   Constitutional: Negative for chills, diaphoresis, fatigue and fever  Gastrointestinal: Positive for abdominal pain, diarrhea and nausea  Negative for blood in stool and vomiting  All other systems reviewed and are negative  Physical Exam  Physical Exam  Vitals and nursing note reviewed  Constitutional:       General: She is in acute distress (mild distress secondary to main)  Appearance: Normal appearance  She is well-developed  She is not ill-appearing, toxic-appearing or diaphoretic  HENT:      Head: Normocephalic and atraumatic  Right Ear: External ear normal       Left Ear: External ear normal    Eyes:      Conjunctiva/sclera: Conjunctivae normal    Cardiovascular:      Rate and Rhythm: Normal rate and regular rhythm  Pulses: Normal pulses  Pulmonary:      Effort: Pulmonary effort is normal  No respiratory distress  Breath sounds: Normal breath sounds  No wheezing, rhonchi or rales  Chest:      Chest wall: No tenderness  Abdominal:      General: A surgical scar is present  Palpations: Abdomen is soft  Tenderness: There is abdominal tenderness  There is no guarding or rebound  Comments: No stool visualized in ostomy bag  Abdomen soft, no peritoneal signs   Musculoskeletal:         General: Normal range of motion  Cervical back: Normal range of motion and neck supple  Skin:     General: Skin is warm and dry  Capillary Refill: Capillary refill takes less than 2 seconds  Neurological:      Mental Status: She is alert  Motor: Motor function is intact  No weakness     Psychiatric:         Mood and Affect: Mood normal          Vital Signs  ED Triage Vitals   Temperature Pulse Respirations Blood Pressure SpO2   03/08/22 1130 03/08/22 1130 03/08/22 1130 03/08/22 1130 03/08/22 1130   98 3 °F (36 8 °C) 77 18 128/67 99 %      Temp Source Heart Rate Source Patient Position - Orthostatic VS BP Location FiO2 (%)   03/08/22 1130 03/08/22 1130 03/08/22 1130 03/08/22 1130 --   Oral Monitor Sitting Left arm       Pain Score       03/08/22 1349       9           Vitals:    03/08/22 1130 03/08/22 1400 03/08/22 1500 03/08/22 1700   BP: 128/67 116/64 131/67 134/67   Pulse: 77 72 80 76   Patient Position - Orthostatic VS: Sitting            Visual Acuity      ED Medications  Medications   ondansetron (ZOFRAN) injection 4 mg (4 mg Intravenous Given 3/8/22 1348)   HYDROmorphone (DILAUDID) injection 0 5 mg (0 5 mg Intravenous Given 3/8/22 1349)   iohexol (OMNIPAQUE) 350 MG/ML injection (SINGLE-DOSE) 100 mL (100 mL Intravenous Given 3/8/22 1507)   HYDROmorphone (DILAUDID) injection 0 5 mg (0 5 mg Intravenous Given 3/8/22 1546)   sodium chloride 0 9 % bolus 1,000 mL (0 mL Intravenous Stopped 3/8/22 1744)   HYDROmorphone (DILAUDID) injection 0 5 mg (0 5 mg Intravenous Given 3/8/22 1832)   dicyclomine (BENTYL) tablet 20 mg (20 mg Oral Given 3/8/22 1832)       Diagnostic Studies  Results Reviewed     Procedure Component Value Units Date/Time    Blood culture #1 [[id number]] Collected: 03/08/22 1348    Lab Status: Preliminary result Specimen: Blood Updated: 03/12/22 0946     Blood Culture No Growth at 72 hrs  Blood culture #2 [[id number]] Collected: 03/08/22 1348    Lab Status: Preliminary result Specimen: Blood Updated: 03/12/22 0946     Blood Culture No Growth at 72 hrs      Procalcitonin with AM Reflex [[id number]]  (Normal) Collected: 03/08/22 1349    Lab Status: Final result Specimen: Blood Updated: 03/08/22 1442     Procalcitonin 0 11 ng/ml     COVID/FLU/RSV - 2 hour TAT [[id number]]  (Normal) Collected: 03/08/22 1349    Lab Status: Final result Specimen: Nares from Nose Updated: 03/08/22 1437     SARS-CoV-2 Negative     INFLUENZA A PCR Negative     INFLUENZA B PCR Negative     RSV PCR Negative    Narrative:      FOR PEDIATRIC PATIENTS - copy/paste COVID Guidelines URL to browser: [email or web address]    SARS-CoV-2 assay is a Nucleic Acid Amplification assay intended for the  qualitative detection of nucleic acid from SARS-CoV-2 in nasopharyngeal  swabs  Results are for the presumptive identification of SARS-CoV-2 RNA  Positive results are indicative of infection with SARS-CoV-2, the virus  causing COVID-19, but do not rule out bacterial infection or co-infection  with other viruses  Laboratories within the [place] and its  territories are required to report all positive results to the appropriate  public health authorities  Negative results do not preclude SARS-CoV-2  infection and should not be used as the sole basis for treatment or other  patient management decisions  Negative results must be combined with  clinical observations, patient history, and epidemiological information  This test has not been FDA cleared or approved  This test has been authorized by FDA under an Emergency Use Authorization  (EUA)  This test is only authorized for the duration of time the  declaration that circumstances exist justifying the authorization of the  emergency use of an in vitro diagnostic tests for detection of SARS-CoV-2  virus and/or diagnosis of COVID-19 infection under section 564(b)(1) of  the Act, 21 U  S C  454XTN-6(Z)(8), unless the authorization is terminated  or revoked sooner  The test has been validated but independent review by FDA  and CLIA is pending  Test performed using [employer] Gene[employer]pert: This RT-PCR assay targets N2,  a region unique to SARS-CoV-2  A conserved region in the E-gene was chosen  for pan-Sarbecovirus detection which includes SARS-CoV-2      Comprehensive metabolic panel [[id number]]  (Abnormal) Collected: 03/08/22 1349    Lab Status: Final result Specimen: Blood Updated: 03/08/22 1434     Sodium 143 mmol/L      Potassium 3 3 mmol/L      Chloride 106 mmol/L      CO2 24 mmol/L      ANION GAP 13 mmol/L BUN 13 mg/dL      Creatinine 1 02 mg/dL      Glucose 89 mg/dL      Calcium 8 8 mg/dL      AST 19 U/L      ALT 16 U/L      Alkaline Phosphatase 107 U/L      Total Protein 7 7 g/dL      Albumin 3 9 g/dL      Total Bilirubin 0 61 mg/dL      eGFR 57 ml/min/1 73sq m     Narrative:      [place] guidelines for Chronic Kidney Disease (CKD):     Stage 1 with normal or high GFR (GFR > 90 mL/min/1 73 square meters)    Stage 2 Mild CKD (GFR = 60-89 mL/min/1 73 square meters)    Stage 3A Moderate CKD (GFR = 45-59 mL/min/1 73 square meters)    Stage 3B Moderate CKD (GFR = 30-44 mL/min/1 73 square meters)    Stage 4 Severe CKD (GFR = 15-29 mL/min/1 73 square meters)    Stage 5 End Stage CKD (GFR <15 mL/min/1 73 square meters)  Note: GFR calculation is accurate only with a steady state creatinine    Lipase [[id number]]  (Normal) Collected: 03/08/22 1349    Lab Status: Final result Specimen: Blood Updated: 03/08/22 1434     Lipase 109 u/L     Lactic acid [[id number]]  (Normal) Collected: 03/08/22 1349    Lab Status: Final result Specimen: Blood Updated: 03/08/22 1425     LACTIC ACID 1 1 mmol/L     Narrative:      Result may be elevated if tourniquet was used during collection      Protime-INR [[id number]]  (Normal) Collected: 03/08/22 1348    Lab Status: Final result Specimen: Blood Updated: 03/08/22 1416     Protime 12 9 seconds      INR 1 01    APTT [[id number]]  (Normal) Collected: 03/08/22 1348    Lab Status: Final result Specimen: Blood Updated: 03/08/22 1416     PTT 26 seconds     CBC and differential [[id number]] Collected: 03/08/22 1349    Lab Status: Final result Specimen: Blood Updated: 03/08/22 1359     WBC 5 38 Thousand/uL      RBC 4 44 Million/uL      Hemoglobin 13 4 g/dL      Hematocrit 39 8 %      MCV 90 fL      MCH 30 2 pg      MCHC 33 7 g/dL      RDW 12 5 %      MPV 9 4 fL      Platelets 188 Thousands/uL      nRBC 0 /100 WBCs      Neutrophils Relative 71 %      Immat GRANS % 0 % Lymphocytes Relative 21 %      Monocytes Relative 7 %      Eosinophils Relative 0 %      Basophils Relative 1 %      Neutrophils Absolute 3 82 Thousands/µL      Immature Grans Absolute 0 01 Thousand/uL      Lymphocytes Absolute 1 14 Thousands/µL      Monocytes Absolute 0 35 Thousand/µL      Eosinophils Absolute 0 02 Thousand/µL      Basophils Absolute 0 04 Thousands/µL                  CT abdomen pelvis with contrast   Final Result by Vikas French MD (03/08 5266)      Fluid-filled and hyperemic colon in keeping with with a nonspecific diarrheal illness /mild colitis  The study was marked in [facility] for immediate notification  Workstation performed: [id number]                    Procedures  Procedures         ED Course                               SBIRT 22yo+      Most Recent Value   SBIRT (24 yo +)    In order to provide better care to our patients, we are screening all of our patients for alcohol and drug use  Would it be okay to ask you these screening questions? No Filed at: 03/08/2022 1352                    MDM  Number of Diagnoses or Management Options  Colitis: new and requires workup  Diagnosis management comments: This is a 71-year-old female with extensive past abdominal surgical history and enterocutaneous fistula arriving to the emergency department for evaluation with chief complaint of abdominal pain, nausea, and diarrhea  The patient reports onset of abdominal pain localized to the site of her enterocutaneous fistula about 2 days ago finding that this has been progressively worsening from time of symptom onset  She states that today after exiting the shower there was "projectile" diarrhea from the fistula site  She denies any blood in the stool  She had called the office of her general surgeon Dr Yumiko Antunez, and was encouraged by the office nurse to come to the ED for further assessment  Patient denies any episodes of vomiting  She denies fevers, chills, sweats    She states that her abdomen feels more distended at this time  She is passing flatus without issue  Differential diagnosis includes but is not limited to: imaging to assess for acute intra-abdominal pathology to include enteritis, colitis, fistula, obstruction, hernia, ibs, ibd    Initial ED plan: labs, imaging, dispo pending    Final ED Assessment: Vital signs stable on ED presentation, examination as above  All labs and imaging independently reviewed with imaging interpreted by the Radiologist   There were no significant lab findings, no leukocytosis, lactic acidosis, or significant electrolyte derangements  Her renal function is stable  Her potassium is 3 3 and oral repletion was offered and declined by the patient  Patient declines to provide urine specimen here in the department as well  CT abdomen/pelvis reports findings consistent with a nonspecific diarrheal illness/mild colitis  Given patient's known enterocutaneous fistula, she had requested that I speak with a general surgeon regarding management  I had discussed the case and imaging report with general surgery attending Dr Nick Arnold, states that the patient may follow-up as an outpatient in their office and she was given follow-up in her discharge paperwork  The patient had tolerated oral intake in the emergency department without any episodes of vomiting  Her pain had been well controlled throughout ED course  I had recommended continued follow-up with pcp, gastroenterology, and General surgery  Will discharge with short-term script for reglan  Return precautions discussed at length  The patient had verbalized understanding and she was comfortable and agreeable with disposition and care plan  The patient was discharged in stable condition          Amount and/or Complexity of Data Reviewed  Clinical lab tests: ordered and reviewed  Tests in the radiology section of CPT®: ordered and reviewed  Review and summarize past medical records: yes  Discuss the patient with other providers: yes (Dr Edith Betts, General Surgery)  Independent visualization of images, tracings, or specimens: yes    Risk of Complications, Morbidity, and/or Mortality  Presenting problems: moderate  Diagnostic procedures: moderate  Management options: low    Patient Progress  Patient progress: stable      Disposition  Final diagnoses:   Colitis     Time reflects when diagnosis was documented in both MDM as applicable and the Disposition within this note     Time User Action Codes Description Comment    3/8/2022  6:02 PM Corina Katie Add [K52 9] Enteritis     3/8/2022  6:10 PM Corina Katie Add [K52 9] Colitis     3/8/2022  6:10 PM [place] [K52 9] Enteritis     3/8/2022  6:10 PM Corina Katie Modify [K52 9] Colitis     3/8/2022  6:11 PM Corina Katie Remove [K52 9] Enteritis       ED Disposition     ED Disposition Condition Date/Time Comment    Discharge Stable Tue Mar 8, 2022  6:01 PM Rcico Jackson discharge to home/self care              Follow-up Information     Follow up With Specialties Details Why Contact Info Additional 18 [facility],  Family Medicine   [address] [facility] [address] [facility] General Surgery Call   [address] [facility]  [address] [phone]  [place] [address], [address] [facility]  [address] [place], [place], [address]    [address] [facility] Emergency Department Emergency Medicine  If symptoms worsen 34 [facility] [address] [facility] Emergency Department, [address] [place], [place], [address]          Discharge Medication List as of 3/8/2022  6:29 PM      START taking these medications    Details   metoclopramide (Reglan) 10 mg tablet Take 1 tablet (10 mg total) by mouth every 6 (six) hours, Starting Tue 3/8/2022, Normal CONTINUE these medications which have NOT CHANGED    Details   cholecalciferol (VITAMIN D3) 1,000 units tablet 1 tablet 2x daily, Historical Med      diazepam (VALIUM) 5 mg tablet Take 1 tablet (5 mg total) by mouth 3 (three) times a day as needed for muscle spasms, Starting Fri 2/25/2022, Normal      diphenhydrAMINE (BENADRYL) 50 mg capsule Take 50 mg by mouth every 6 (six) hours as needed for itching, Historical Med      escitalopram (LEXAPRO) 20 mg tablet take 1 tablet by mouth once daily, Normal      FeroSul 325 (65 Fe) MG tablet take 1 tablet by mouth once daily, Normal      folic acid (FOLVITE) 1 mg tablet Take 1 tablet (1 mg total) by mouth daily, Starting Thu 12/10/2020, Normal      gabapentin (Neurontin) 300 mg capsule Take 2 capsules (600 mg total) by mouth 3 (three) times a day for 3 days, Starting Wed 3/2/2022, Until Sat 3/5/2022, Normal      Gattex 5 MG KIT Starting Mon 2/1/2021, Historical Med      hydrocortisone (ANUSOL-HC) 25 mg suppository Insert 1 suppository (25 mg total) into the rectum 2 (two) times a day for 14 days, Starting Wed 2/9/2022, Until Wed 2/23/2022, Normal      methocarbamol (ROBAXIN) 750 mg tablet Take 1 tablet (750 mg total) by mouth 3 (three) times a day, Starting Tue 3/1/2022, Normal      naloxone (NARCAN) 4 mg/0 1 mL nasal spray Administer 1 spray into a nostril   If no response after 2-3 minutes, give another dose in the other nostril using a new spray , Normal      ondansetron (ZOFRAN) 4 mg tablet Take 1 tablet (4 mg total) by mouth every 8 (eight) hours as needed for nausea or vomiting, Starting Wed 12/15/2021, Normal      HYDROmorphone (DILAUDID) 4 mg tablet Take 1 tablet (4 mg total) by mouth 2 (two) times a day as needed for severe pain Max Daily Amount: 8 mg, Starting Fri 2/11/2022, Normal      morphine (MS CONTIN) 30 mg 12 hr tablet Take 1 tablet (30 mg total) by mouth every 8 (eight) hours Max Daily Amount: 90 mg, Starting Fri 2/11/2022, Normal             No discharge procedures on file      PDMP Review       Value Time User    PDMP Reviewed  Yes 3/9/2022 10:44 AM Brandyn Ruelas DO          ED Provider  Electronically Signed by           Lana Rebolledo PA-C  03/12/22 7230

## 2022-03-15 NOTE — H&P
Assessment/Plan     Active Problems:    * No active hospital problems  *       Short gut syndrome  Enterocutaneous fistula  Numerous abdominal surgeries  To restart tPA, PICC line placement    Donell Jensen is an 78 y o  female  HPI:  Please see above  At least 1 PICC placement, and 1 PICC replacement in IR in the past      Past Surgical History:   Procedure Laterality Date    ABDOMINAL SURGERY      x 25    APPENDECTOMY      Open     BACK SURGERY      x 3     SECTION      x1    CHOLECYSTECTOMY      Open    COLON SURGERY      Sigmoid     COLONOSCOPY N/A 2018    Procedure: COLONOSCOPY;  Surgeon: Jin Burgos MD;  Location: [facility]; Service: Gastroenterology    COLONOSCOPY  2018    ERCP      EXPLORATORY LAPAROTOMY      Several for adhesive disease; has had several bowel resections; has a small bowel fistula    GASTROCUTANEOUS FISTULA CLOSURE      HERNIA REPAIR      Incisional     IR PICC REPOSITION  3/3/2021    MAMMO (HISTORICAL)  2017    SPINAL FUSION      Lower back     TONSILLECTOMY      TOTAL ABDOMINAL HYSTERECTOMY      Not due to cancer - complete     TRACHEOSTOMY  2019       Review of Systems    Objective     LMP  (LMP Unknown)     Physical Exam  chart review only        [facility]

## 2022-03-16 NOTE — TELEPHONE ENCOUNTER
Timothy Knutson called and said that she has an UTI (burning, frequency, pain) she would like a refill on the 7 day medication you gave her before for it  I did not see where there was something in for her   She said it was about a month ago   [facility]

## 2022-03-16 NOTE — BRIEF OP NOTE (RAD/CATH)
INTERVENTIONAL RADIOLOGY PROCEDURE NOTE    Date: 3/16/2022    Procedure: IR PICC PLACEMENT DOUBLE LUMEN    Preoperative diagnosis:   1  Short gut syndrome    2  Enterocutaneous fistula         Postoperative diagnosis: Same  Surgeon: Randell Cristina MD     Assistant: None  No qualified resident was available  Blood loss: 4 mL    Specimens: None     Findings: Successful left basilic vein 4 Fr double lumen PICC placement  Complications: None immediate      Anesthesia: local

## 2022-03-16 NOTE — TELEPHONE ENCOUNTER
Mera Dickinson is unable to come in to the office, her PICC line came out today and she just got home from the hospital   She has a double lumen in now and she is miserable    She is not able to come in

## 2022-03-16 NOTE — DISCHARGE INSTRUCTIONS
[phone] Peripherally Inserted Central Catheter     WHAT YOU NEED TO KNOW:   A PICC is an IV placed into a large blood vessel near your heart  It is usually inserted through a blood vessel in your arm  Your PICC may have multiple ports  Ports are tubes where you can inject medicine  A PICC can stay in place for several weeks or months  You may need a PICC to get nutrition, medicine, or fluids  Blood samples can be removed from your PICC and sent to the lab for tests  DISCHARGE INSTRUCTIONS:    [facility] and [facility] patients,    Contact Interventional Radiology at [phone] PATIENTS: Contact Interventional Radiology at [phone]   [facility] PATIENTS: Contact Interventional Radiology at [phone] if:  · Blood soaks through your bandage  · Your arm or leg feels warm, tender, and painful  It may look swollen and red  · You have trouble moving your arm  · Your catheter falls out  · You have a fever or swelling, redness, pain, or pus where the catheter was inserted  · Persistent nausea or vomiting  · You cannot flush your catheter, or you feel pain when you flush your catheter  · You see a hole or crack in the tubing of your catheter  · You see fluid leaking from the insertion site  · You run out of supplies to care for your catheter  · You have questions or concerns about your condition or care

## 2022-03-16 NOTE — H&P
Interventional Radiology  History and Physical 3/16/2022     Jane Beal   1955   [id number]    Assessment/Plan:  72-year-old female with history of short gut syndrome and enterocutaneous fistula requires PICC line placement for TPN  Problem List Items Addressed This Visit        Digestive    Enterocutaneous fistula    Relevant Orders    IR PICC placement double lumen    Short gut syndrome    Relevant Orders    IR PICC placement double lumen             Subjective:     Patient ID: Jane Beal is a 78 y o  female  History of Present Illness  Patient with history of short gut syndrome and enterocutaneous fistula requires PICC line placement for TPN  Review of Systems      Past Medical History:   Diagnosis Date    Asthma     Bowel obstruction ([address] )     Choledocholithiasis 2020    Chronic back pain     Colon polyp     Enlarged kidney     Enterocutaneous fistula 2019    Hydronephrosis 2019    Ileus ([address] ) 2018    Overview:  Last Assessment & Plan:  Patient presented with generalized abdominal pain nausea and bilious vomiting ,imaging study reports " diffuse bowel dilatation involving distal small bowel and the entire colon to the panel verge obstruction mass is not identified and this may represent adynamic ileus in the postoperative setting possible related to medication"  Patient states that she still ha    Liver mass     Opiate dependence ([address] )     Post-ERCP acute pancreatitis 2020    Short gut syndrome     5 feet small bowel    Small bowel fistula     Urinary retention with incomplete bladder emptying         Past Surgical History:   Procedure Laterality Date    ABDOMINAL SURGERY      x 25    APPENDECTOMY      Open     BACK SURGERY      x 3     SECTION      x1    CHOLECYSTECTOMY      Open    COLON SURGERY      Sigmoid     COLONOSCOPY N/A 2018    Procedure: COLONOSCOPY;  Surgeon: Giorgio Coreas MD;  Location: [facility];   Service: Gastroenterology    COLONOSCOPY  2018    ERCP      EXPLORATORY LAPAROTOMY      Several for adhesive disease; has had several bowel resections; has a small bowel fistula    GASTROCUTANEOUS FISTULA CLOSURE      HERNIA REPAIR      Incisional     IR PICC REPOSITION  3/3/2021    MAMMO (HISTORICAL)  2017    SPINAL FUSION      Lower back     TONSILLECTOMY      TOTAL ABDOMINAL HYSTERECTOMY      Not due to cancer - complete     TRACHEOSTOMY  2019        Social History     Tobacco Use   Smoking Status Former Smoker    Packs/day: 1 00    Years: 15 00    Pack years: 15 00    Types: Cigarettes    Quit date: 1980    Years since quittin 7   Smokeless Tobacco Never Used   Tobacco Comment    Never smoker - As per [employer]         Social History     Substance and Sexual Activity   Alcohol Use Not Currently    Alcohol/week: 0 0 standard drinks    Comment: social drinker        Social History     Substance and Sexual Activity   Drug Use No        Allergies   Allergen Reactions    Nsaids      Irritable, upset stomach  Irritable, upset stomach    Tolmetin      Irritable, upset stomach    Codeine Hives     Per [address] admission orders    Oxycodone-Acetaminophen GI Intolerance     Percocet Tabs    Tylenol [Acetaminophen]        Current Outpatient Medications   Medication Sig Dispense Refill    cholecalciferol (VITAMIN D3) 1,000 units tablet 1 tablet 2x daily      diazepam (VALIUM) 5 mg tablet Take 1 tablet (5 mg total) by mouth 3 (three) times a day as needed for muscle spasms 90 tablet 0    diphenhydrAMINE (BENADRYL) 50 mg capsule Take 50 mg by mouth every 6 (six) hours as needed for itching      escitalopram (LEXAPRO) 20 mg tablet take 1 tablet by mouth once daily 90 tablet 1    FeroSul 325 (65 Fe) MG tablet take 1 tablet by mouth once daily 90 tablet 1    folic acid (FOLVITE) 1 mg tablet Take 1 tablet (1 mg total) by mouth daily 90 tablet 3    gabapentin (Neurontin) 300 mg capsule Take 2 capsules (600 mg total) by mouth 3 (three) times a day for 3 days 270 capsule 1    Gattex 5 MG KIT       hydrocortisone (ANUSOL-HC) 25 mg suppository Insert 1 suppository (25 mg total) into the rectum 2 (two) times a day for 14 days 28 suppository 0    HYDROmorphone (DILAUDID) 4 mg tablet Take 1 tablet (4 mg total) by mouth 2 (two) times a day as needed for severe pain Max Daily Amount: 8 mg 60 tablet 0    methocarbamol (ROBAXIN) 750 mg tablet Take 1 tablet (750 mg total) by mouth 3 (three) times a day 90 tablet 5    metoclopramide (Reglan) 10 mg tablet Take 1 tablet (10 mg total) by mouth every 6 (six) hours 30 tablet 0    morphine (MS CONTIN) 30 mg 12 hr tablet Take 1 tablet (30 mg total) by mouth every 8 (eight) hours Max Daily Amount: 90 mg 90 tablet 0    naloxone (NARCAN) 4 mg/0 1 mL nasal spray Administer 1 spray into a nostril  If no response after 2-3 minutes, give another dose in the other nostril using a new spray  (Patient not taking: Reported on 2/9/2022 ) 1 each 1    ondansetron (ZOFRAN) 4 mg tablet Take 1 tablet (4 mg total) by mouth every 8 (eight) hours as needed for nausea or vomiting 90 tablet 2     Current Facility-Administered Medications   Medication Dose Route Frequency Provider Last Rate Last Admin    cyanocobalamin injection 1,000 mcg  1,000 mcg Intramuscular Q30 Days Venus Hernandez DO   1,000 mcg at 02/18/22 1353          Objective: There were no vitals filed for this visit  Physical Exam  Constitutional:       Appearance: Normal appearance  Pulmonary:      Effort: Pulmonary effort is normal    Skin:     General: Skin is dry             No results found for: BNP   Lab Results   Component Value Date    WBC 5 38 03/08/2022    HGB 13 4 03/08/2022    HCT 39 8 03/08/2022    MCV 90 03/08/2022     03/08/2022     Lab Results   Component Value Date    INR 1 01 03/08/2022    INR 0 98 07/19/2021    INR 1 03 10/22/2020    PROTIME 12 9 03/08/2022    PROTIME 13 1 07/19/2021 PROTIME 13 0 10/22/2020     Lab Results   Component Value Date    PTT 26 03/08/2022         I have personally reviewed pertinent imaging and laboratory results  Code Status: Prior  Advance Directive and Living Will:      Power of :    POLST:      This text is generated with voice recognition software  There may be translation, syntax,  or grammatical errors  If you have any questions, please contact the dictating provider

## 2022-03-18 PROBLEM — Z78.9 ON TOTAL PARENTERAL NUTRITION (TPN): Status: ACTIVE | Noted: 2022-01-01

## 2022-03-18 NOTE — PROGRESS NOTES
Isaías Wick 1955 female MRN: [id number]      ASSESSMENT/PLAN  Problem List Items Addressed This Visit        Digestive    Chronic diarrhea    Enterocutaneous fistula - Primary    Short gut syndrome       Other    Chronic pain disorder    Chronic, continuous use of opioids    Depression    Generalized anxiety disorder        Enterocutaneous fistula/Short gut/Chronic diarrhea on TPN: Discussed that no one can proceed with surgical intervention without her consent, so it is fine to request a second opinion as it is just a conversation if she would like  They will either tell her a similar view to Dr Madeleine Irwin or suggest an alternative management strategy and then she can review her options from there  Continue TPN  Depression/Anxiety: Overall stable  Chronic Pain on opioids: Reviewed concept of hyperalgesia with long term opioid use  Will continue to work on tapering down regimen  Pt may benefit from a different regimen such as Tylenol #3, Tramadol, etc in long run, though ultimate goal would be to stop all controlled medications  B12 given       Future Appointments   Date Time Provider Amadna Enrique   4/19/2022 11:00 AM Demetria Aj MD [facility]          SUBJECTIVE  CC: Follow-up (1 month follow up - recently in the ER  Having alot of discharge from her fistula) and B12 Injection      HPI:  Isaías Wick is a 78 y o  female who presents for chronic follow up as below  Enterocutaneous fistula/Short gut/Chronic diarrhea on TPN:   3/8 [facility] ED -- increased discharge from fistual and abdominal pain   CT demonstrated diarrheal illness/mild colitis   Labs benign aside from hypokalemia, pt deferred repletion   Presentation/symptoms discussed with Dr Mathew Rodarte, who felt pt appropriate for outpatient f/u     Had planned to get second opinion through [facility], but is scared because Dr Madeleine Irwin has told her that "she will die" if she has another surgery     Continues to have VNA services for assistance with TPN management/administration/lab monitoring     Depression/Anxiety: Mood is "ok" -- taking Valium TID   Chronic Pain w/ Opioid Use: Continuing to work on weaning opioid regimen -- plan for decrease in Dilaudid at next refill     Review of Systems   Gastrointestinal: Positive for abdominal pain and diarrhea  Musculoskeletal: Positive for back pain  Psychiatric/Behavioral: The patient is nervous/anxious  Historical Information   The patient history was reviewed and updated as follows:    Past Medical History:   Diagnosis Date    Asthma     Bowel obstruction ([address] )     Choledocholithiasis 2020    Chronic back pain     Colon polyp     Enlarged kidney     Enterocutaneous fistula 2019    Hydronephrosis 2019    Ileus ([address] ) 2018    Overview:  Last Assessment & Plan:  Patient presented with generalized abdominal pain nausea and bilious vomiting ,imaging study reports " diffuse bowel dilatation involving distal small bowel and the entire colon to the panel verge obstruction mass is not identified and this may represent adynamic ileus in the postoperative setting possible related to medication"  Patient states that she still ha    Liver mass     Opiate dependence ([address] )     Post-ERCP acute pancreatitis 2020    Short gut syndrome     5 feet small bowel    Small bowel fistula     Urinary retention with incomplete bladder emptying      Past Surgical History:   Procedure Laterality Date    ABDOMINAL SURGERY      x 25    APPENDECTOMY      Open     BACK SURGERY      x 3     SECTION      x1    CHOLECYSTECTOMY      Open    COLON SURGERY      Sigmoid     COLONOSCOPY N/A 2018    Procedure: COLONOSCOPY;  Surgeon: Todd Lake MD;  Location: [facility];   Service: Gastroenterology    COLONOSCOPY      ERCP      EXPLORATORY LAPAROTOMY      Several for adhesive disease; has had several bowel resections; has a small bowel fistula    GASTROCUTANEOUS FISTULA CLOSURE      HERNIA REPAIR      Incisional     IR PICC PLACEMENT DOUBLE LUMEN  3/16/2022    IR PICC REPOSITION  3/3/2021    MAMMO (HISTORICAL)  2017    SPINAL FUSION      Lower back     TONSILLECTOMY      TOTAL ABDOMINAL HYSTERECTOMY      Not due to cancer - complete     TRACHEOSTOMY  2019     Family History   Problem Relation Age of Onset    Lung cancer Mother     No Known Problems Father       Social History   Social History     Substance and Sexual Activity   Alcohol Use Not Currently    Alcohol/week: 0 0 standard drinks    Comment: social drinker     Social History     Substance and Sexual Activity   Drug Use No     Social History     Tobacco Use   Smoking Status Former Smoker    Packs/day: 1     Years: 15     Pack years: 15     Types: Cigarettes    Quit date: 1980    Years since quittin 7   Smokeless Tobacco Never Used   Tobacco Comment    Never smoker - As per [employer]        Medications:     Current Outpatient Medications:     cholecalciferol (VITAMIN D3) 1,000 units tablet, 1 tablet 2x daily, Disp: , Rfl:     diazepam (VALIUM) 5 mg tablet, Take 1 tablet (5 mg total) by mouth 3 (three) times a day as needed for muscle spasms, Disp: 90 tablet, Rfl: 0    diphenhydrAMINE (BENADRYL) 50 mg capsule, Take 50 mg by mouth every 6 (six) hours as needed for itching, Disp: , Rfl:     escitalopram (LEXAPRO) 20 mg tablet, take 1 tablet by mouth once daily, Disp: 90 tablet, Rfl: 1    FeroSul 325 (65 Fe) MG tablet, take 1 tablet by mouth once daily, Disp: 90 tablet, Rfl: 1    folic acid (FOLVITE) 1 mg tablet, Take 1 tablet (1 mg total) by mouth daily, Disp: 90 tablet, Rfl: 3    gabapentin (Neurontin) 300 mg capsule, Take 2 capsules (600 mg total) by mouth 3 (three) times a day for 3 days, Disp: 270 capsule, Rfl: 1    Gattex 5 MG KIT, , Disp: , Rfl:     hydrocortisone (ANUSOL-HC) 25 mg suppository, Insert 1 suppository (25 mg total) into the rectum 2 (two) times a day for 14 days, Disp: 28 suppository, Rfl: 0    HYDROmorphone (DILAUDID) 4 mg tablet, Take 1 tablet (4 mg total) by mouth 2 (two) times a day as needed for severe pain Max Daily Amount: 8 mg, Disp: 60 tablet, Rfl: 0    methocarbamol (ROBAXIN) 750 mg tablet, Take 1 tablet (750 mg total) by mouth 3 (three) times a day, Disp: 90 tablet, Rfl: 5    metoclopramide (Reglan) 10 mg tablet, Take 1 tablet (10 mg total) by mouth every 6 (six) hours, Disp: 30 tablet, Rfl: 0    morphine (MS CONTIN) 30 mg 12 hr tablet, Take 1 tablet (30 mg total) by mouth every 8 (eight) hours Max Daily Amount: 90 mg, Disp: 90 tablet, Rfl: 0    naloxone (NARCAN) 4 mg/0 1 mL nasal spray, Administer 1 spray into a nostril  If no response after 2-3 minutes, give another dose in the other nostril using a new spray  (Patient not taking: Reported on 2/9/2022 ), Disp: 1 each, Rfl: 1    ondansetron (ZOFRAN) 4 mg tablet, Take 1 tablet (4 mg total) by mouth every 8 (eight) hours as needed for nausea or vomiting, Disp: 90 tablet, Rfl: 2    sulfamethoxazole-trimethoprim (BACTRIM DS) 800-160 mg per tablet, Take 1 tablet by mouth 2 (two) times a day for 3 days, Disp: 6 tablet, Rfl: 0    Current Facility-Administered Medications:     cyanocobalamin injection 1,000 mcg, 1,000 mcg, Intramuscular, Q30 Days, Venus Hernandez DO, 1,000 mcg at 02/18/22 1353  Allergies   Allergen Reactions    Nsaids      Irritable, upset stomach  Irritable, upset stomach    Tolmetin      Irritable, upset stomach    Codeine Hives     Per [address] admission orders    Oxycodone-Acetaminophen GI Intolerance     Percocet Tabs    Tylenol [Acetaminophen]        OBJECTIVE    Vitals:   Vitals:    03/18/22 1021   BP: 110/64   BP Location: Left arm   Patient Position: Sitting   Cuff Size: Standard   Pulse: 73   Temp: 97 9 °F (36 6 °C)   SpO2: 96%   Weight: 63 5 kg (140 lb)   Height: 5' 4" (1 626 m)           Physical Exam  Vitals and nursing note reviewed  Constitutional:       General: She is not in acute distress  Appearance: Normal appearance  HENT:      Head: Normocephalic and atraumatic  Pulmonary:      Effort: Pulmonary effort is normal  No respiratory distress  Neurological:      General: No focal deficit present  Mental Status: She is alert     Psychiatric:         Mood and Affect: Mood normal                     Venus Hernandez DO  [facility]   3/18/2022  10:37 AM

## 2022-03-18 NOTE — TELEPHONE ENCOUNTER
Elliot Resendez called and asked that you do not lower her pain medication any more because she is in too much pain  She said she will talk to you at her next appointment

## 2022-03-21 NOTE — TELEPHONE ENCOUNTER
I haven't changed her prescription over past few fills  As we discussed at her visit recently, our goal is to continue to decrease slowly to retrain the pain pathways so they are not overactive as they currently are  However, if she does not want to continue weaning, she will need to re-establish with her pain management provider, as I took on her prescribing in order to help her taper down  I don't generally prescribe chronic pain medication regimens such as hers  Thanks!

## 2022-03-21 NOTE — TELEPHONE ENCOUNTER
Spoke with Samara Dakin and she does want to still keep trying to taper it down   She'll do whatever you think is best Thank you

## 2022-03-21 NOTE — TELEPHONE ENCOUNTER
Dr Jose Rae has decreased dilaudid  Krishna Po and patient asked that she not decrease it any more    She is feeling a lot of pain    not feeling good at all

## 2022-03-25 NOTE — TELEPHONE ENCOUNTER
Script sent in earlier this month had 5 refills on it, so she should not need new script  Please confirm with pharmacy  Thanks!

## 2022-03-25 NOTE — TELEPHONE ENCOUNTER
Spoke with the pharmacist at [facility] and she said that pt has been filling robaxin script through Dr Coty Rocha, last refill was 3/4 and picked up on 3/7, so isn't due for a refill until 4/4  States pt has been using this script from dr Coty Rocha since October and usually pays out of pocket to pick it up about 10 days early  Pharmacist did math and said that pt should have about 45 extra pills if she has been taking it as prescribed  Attempted to call pt to notify we cannot give permission to refill early   answered and said she was on the phone with the pharmacy on her cell and he would tell her to call us back

## 2022-04-08 NOTE — TELEPHONE ENCOUNTER
Refills sent  Please let pt know they can't be filled until 4/10  Also, as we discussed at her visit, I have decreased her Dilaudid further -- she was previously on 4 mg BID, I changed it to 2 mg BID (the equivalent of getting rid of a dose, like we have been, but I split it up so she could take twice daily if needed)  I do not see Flomax on her list?     Thanks!

## 2022-04-08 NOTE — TELEPHONE ENCOUNTER
She also needs a refill on her tamsulosin  Detail Level: Simple Body Of Note (Please Add Your Own Text Here): A complete skin exam was performed to include nails, genetalia, lymph nodes and use of the dermatoscope with exceptions as stated. No abnormal lesions were identified except as documented in the note impressions. Price (Do Not Change): 0.00 Instructions: This plan will send the code FBSE to the PM system.  DO NOT or CHANGE the price.

## 2022-04-11 NOTE — TELEPHONE ENCOUNTER
I spoke with patient, she said she takes this for urinary retention (it leaks out after she goes sometimes)    She said this has been prescribed by you in the past (in past rx's )   Please send in for her

## 2022-04-11 NOTE — TELEPHONE ENCOUNTER
Pt calls again asking for the tamsolosin - wasn't called in   She doesn't know the strength of it just that its overdue,    Please erx to RA/lam

## 2022-04-11 NOTE — TELEPHONE ENCOUNTER
Script sent -- it can cause low blood pressure, so she should monitor for dizzy/lightheadedness when taking  Thanks!

## 2022-04-11 NOTE — TELEPHONE ENCOUNTER
I don't have this on her medication list -- can you find out what she is taking it for and I will send it in  Please go through her med list with her to confirm and see if we are missing anything else  Thanks!

## 2022-04-13 NOTE — TELEPHONE ENCOUNTER
FYI: I called to make appt w/pt and  Vivienne Arevalo advised she passed away last night in her sleep

## 2022-04-13 NOTE — TELEPHONE ENCOUNTER
It looks like pt doesn't have her monthly follow up appointment scheduled -- can we please call and schedule her for this week or next? Thanks!

## 2023-10-13 ENCOUNTER — TELEPHONE (OUTPATIENT)
Dept: SURGERY | Facility: CLINIC | Age: 68
End: 2023-10-13

## 2023-11-03 ENCOUNTER — TELEPHONE (OUTPATIENT)
Dept: SURGERY | Facility: CLINIC | Age: 68
End: 2023-11-03

## 2024-11-18 NOTE — ASSESSMENT & PLAN NOTE
Pt called requesting insulin be refilled. Med was last filled last year 10/19/23 with 0 refills. Pt missed appt 10/07/24 with PCP.    · Patient takes Dilaudid and morphine at home with Neurontin to manage her chronic back pain  · Hold oral agents for now  · IV dilaudid as needed due to renal failure/contraindication for morphine, judicious use due to concern for obstruction related to slow transit in the setting of chronic narcotic use  · Consider Relistor when renal functions improve  · Aqua K, and lidocaine patch (patient refuses)

## 2025-05-22 ENCOUNTER — TELEPHONE (OUTPATIENT)
Dept: SURGERY | Facility: CLINIC | Age: 70
End: 2025-05-22